# Patient Record
Sex: FEMALE | Race: WHITE | Employment: OTHER | URBAN - METROPOLITAN AREA
[De-identification: names, ages, dates, MRNs, and addresses within clinical notes are randomized per-mention and may not be internally consistent; named-entity substitution may affect disease eponyms.]

---

## 2017-01-02 PROBLEM — J96.21 ACUTE ON CHRONIC RESPIRATORY FAILURE WITH HYPOXIA (HCC): Status: ACTIVE | Noted: 2017-01-02

## 2017-01-06 ENCOUNTER — CARE COORDINATION (OUTPATIENT)
Dept: CARE COORDINATION | Age: 77
End: 2017-01-06

## 2017-01-18 ENCOUNTER — NURSE ONLY (OUTPATIENT)
Dept: FAMILY MEDICINE CLINIC | Age: 77
End: 2017-01-18

## 2017-01-18 ENCOUNTER — OFFICE VISIT (OUTPATIENT)
Dept: PULMONOLOGY | Age: 77
End: 2017-01-18

## 2017-01-18 ENCOUNTER — ANTI-COAG VISIT (OUTPATIENT)
Dept: FAMILY MEDICINE CLINIC | Age: 77
End: 2017-01-18

## 2017-01-18 VITALS
SYSTOLIC BLOOD PRESSURE: 122 MMHG | HEIGHT: 62 IN | WEIGHT: 268 LBS | OXYGEN SATURATION: 96 % | RESPIRATION RATE: 22 BRPM | HEART RATE: 104 BPM | TEMPERATURE: 98.6 F | DIASTOLIC BLOOD PRESSURE: 60 MMHG | BODY MASS INDEX: 49.32 KG/M2

## 2017-01-18 DIAGNOSIS — J43.2 CENTRILOBULAR EMPHYSEMA (HCC): Primary | ICD-10-CM

## 2017-01-18 DIAGNOSIS — I26.99 PULMONARY EMBOLISM (HCC): ICD-10-CM

## 2017-01-18 DIAGNOSIS — J96.11 CHRONIC RESPIRATORY FAILURE WITH HYPOXIA (HCC): ICD-10-CM

## 2017-01-18 DIAGNOSIS — R60.0 BILATERAL EDEMA OF LOWER EXTREMITY: ICD-10-CM

## 2017-01-18 LAB
INTERNATIONAL NORMALIZATION RATIO, POC: 3.6
PROTHROMBIN TIME, POC: NORMAL

## 2017-01-18 PROCEDURE — 85610 PROTHROMBIN TIME: CPT | Performed by: FAMILY MEDICINE

## 2017-01-18 PROCEDURE — G8926 SPIRO NO PERF OR DOC: HCPCS | Performed by: INTERNAL MEDICINE

## 2017-01-18 PROCEDURE — 4040F PNEUMOC VAC/ADMIN/RCVD: CPT | Performed by: INTERNAL MEDICINE

## 2017-01-18 PROCEDURE — G8400 PT W/DXA NO RESULTS DOC: HCPCS | Performed by: INTERNAL MEDICINE

## 2017-01-18 PROCEDURE — 1036F TOBACCO NON-USER: CPT | Performed by: INTERNAL MEDICINE

## 2017-01-18 PROCEDURE — 1090F PRES/ABSN URINE INCON ASSESS: CPT | Performed by: INTERNAL MEDICINE

## 2017-01-18 PROCEDURE — 3014F SCREEN MAMMO DOC REV: CPT | Performed by: INTERNAL MEDICINE

## 2017-01-18 PROCEDURE — 1123F ACP DISCUSS/DSCN MKR DOCD: CPT | Performed by: INTERNAL MEDICINE

## 2017-01-18 PROCEDURE — G8427 DOCREV CUR MEDS BY ELIG CLIN: HCPCS | Performed by: INTERNAL MEDICINE

## 2017-01-18 PROCEDURE — 1111F DSCHRG MED/CURRENT MED MERGE: CPT | Performed by: INTERNAL MEDICINE

## 2017-01-18 PROCEDURE — 3023F SPIROM DOC REV: CPT | Performed by: INTERNAL MEDICINE

## 2017-01-18 PROCEDURE — 99214 OFFICE O/P EST MOD 30 MIN: CPT | Performed by: INTERNAL MEDICINE

## 2017-01-18 PROCEDURE — 3017F COLORECTAL CA SCREEN DOC REV: CPT | Performed by: INTERNAL MEDICINE

## 2017-01-18 PROCEDURE — G8419 CALC BMI OUT NRM PARAM NOF/U: HCPCS | Performed by: INTERNAL MEDICINE

## 2017-01-18 PROCEDURE — G8484 FLU IMMUNIZE NO ADMIN: HCPCS | Performed by: INTERNAL MEDICINE

## 2017-01-19 ENCOUNTER — NURSE ONLY (OUTPATIENT)
Dept: FAMILY MEDICINE CLINIC | Age: 77
End: 2017-01-19

## 2017-01-19 DIAGNOSIS — I26.99 PULMONARY EMBOLISM (HCC): ICD-10-CM

## 2017-01-19 LAB
INTERNATIONAL NORMALIZATION RATIO, POC: 2.7
PROTHROMBIN TIME, POC: NORMAL

## 2017-01-19 PROCEDURE — 85610 PROTHROMBIN TIME: CPT | Performed by: NURSE PRACTITIONER

## 2017-01-26 ENCOUNTER — NURSE ONLY (OUTPATIENT)
Dept: FAMILY MEDICINE CLINIC | Age: 77
End: 2017-01-26

## 2017-01-26 DIAGNOSIS — I26.99 PULMONARY EMBOLISM (HCC): ICD-10-CM

## 2017-01-26 LAB
INTERNATIONAL NORMALIZATION RATIO, POC: 1.7
PROTHROMBIN TIME, POC: NORMAL

## 2017-01-26 PROCEDURE — 85610 PROTHROMBIN TIME: CPT | Performed by: NURSE PRACTITIONER

## 2017-01-30 ENCOUNTER — NURSE ONLY (OUTPATIENT)
Dept: FAMILY MEDICINE CLINIC | Age: 77
End: 2017-01-30

## 2017-01-30 DIAGNOSIS — I26.99 PULMONARY EMBOLISM (HCC): ICD-10-CM

## 2017-01-30 LAB
INTERNATIONAL NORMALIZATION RATIO, POC: 2.3
PROTHROMBIN TIME, POC: NORMAL

## 2017-01-30 PROCEDURE — 85610 PROTHROMBIN TIME: CPT | Performed by: NURSE PRACTITIONER

## 2017-02-14 ENCOUNTER — NURSE ONLY (OUTPATIENT)
Dept: FAMILY MEDICINE CLINIC | Age: 77
End: 2017-02-14

## 2017-02-14 ENCOUNTER — ANTI-COAG VISIT (OUTPATIENT)
Dept: FAMILY MEDICINE CLINIC | Age: 77
End: 2017-02-14

## 2017-02-14 DIAGNOSIS — I26.99 PULMONARY EMBOLISM (HCC): ICD-10-CM

## 2017-02-14 LAB
INTERNATIONAL NORMALIZATION RATIO, POC: 2.8
PROTHROMBIN TIME, POC: NORMAL

## 2017-02-14 PROCEDURE — 85610 PROTHROMBIN TIME: CPT | Performed by: NURSE PRACTITIONER

## 2017-03-15 ENCOUNTER — NURSE ONLY (OUTPATIENT)
Dept: FAMILY MEDICINE CLINIC | Age: 77
End: 2017-03-15

## 2017-03-15 DIAGNOSIS — I26.99 PULMONARY EMBOLISM (HCC): ICD-10-CM

## 2017-03-15 LAB
INTERNATIONAL NORMALIZATION RATIO, POC: 1.9
PROTHROMBIN TIME, POC: NORMAL

## 2017-03-15 PROCEDURE — 85610 PROTHROMBIN TIME: CPT | Performed by: NURSE PRACTITIONER

## 2017-04-19 ENCOUNTER — OFFICE VISIT (OUTPATIENT)
Dept: PULMONOLOGY | Age: 77
End: 2017-04-19

## 2017-04-19 VITALS
HEART RATE: 87 BPM | WEIGHT: 264 LBS | OXYGEN SATURATION: 92 % | SYSTOLIC BLOOD PRESSURE: 128 MMHG | RESPIRATION RATE: 20 BRPM | DIASTOLIC BLOOD PRESSURE: 68 MMHG | TEMPERATURE: 98 F | HEIGHT: 62 IN | BODY MASS INDEX: 48.58 KG/M2

## 2017-04-19 DIAGNOSIS — E66.01 MORBID OBESITY DUE TO EXCESS CALORIES (HCC): ICD-10-CM

## 2017-04-19 DIAGNOSIS — J44.9 CHRONIC OBSTRUCTIVE PULMONARY DISEASE, UNSPECIFIED COPD TYPE (HCC): Primary | ICD-10-CM

## 2017-04-19 DIAGNOSIS — J96.11 CHRONIC RESPIRATORY FAILURE WITH HYPOXIA (HCC): ICD-10-CM

## 2017-04-19 PROBLEM — J96.21 ACUTE ON CHRONIC RESPIRATORY FAILURE WITH HYPOXIA (HCC): Status: RESOLVED | Noted: 2017-01-02 | Resolved: 2017-04-19

## 2017-04-19 PROCEDURE — 99214 OFFICE O/P EST MOD 30 MIN: CPT | Performed by: INTERNAL MEDICINE

## 2017-04-19 PROCEDURE — 3023F SPIROM DOC REV: CPT | Performed by: INTERNAL MEDICINE

## 2017-04-19 PROCEDURE — 1123F ACP DISCUSS/DSCN MKR DOCD: CPT | Performed by: INTERNAL MEDICINE

## 2017-04-19 PROCEDURE — G8417 CALC BMI ABV UP PARAM F/U: HCPCS | Performed by: INTERNAL MEDICINE

## 2017-04-19 PROCEDURE — G8400 PT W/DXA NO RESULTS DOC: HCPCS | Performed by: INTERNAL MEDICINE

## 2017-04-19 PROCEDURE — 1036F TOBACCO NON-USER: CPT | Performed by: INTERNAL MEDICINE

## 2017-04-19 PROCEDURE — G8926 SPIRO NO PERF OR DOC: HCPCS | Performed by: INTERNAL MEDICINE

## 2017-04-19 PROCEDURE — 3017F COLORECTAL CA SCREEN DOC REV: CPT | Performed by: INTERNAL MEDICINE

## 2017-04-19 PROCEDURE — G8427 DOCREV CUR MEDS BY ELIG CLIN: HCPCS | Performed by: INTERNAL MEDICINE

## 2017-04-19 PROCEDURE — 4040F PNEUMOC VAC/ADMIN/RCVD: CPT | Performed by: INTERNAL MEDICINE

## 2017-04-19 PROCEDURE — 3014F SCREEN MAMMO DOC REV: CPT | Performed by: INTERNAL MEDICINE

## 2017-04-19 PROCEDURE — 1090F PRES/ABSN URINE INCON ASSESS: CPT | Performed by: INTERNAL MEDICINE

## 2017-04-19 RX ORDER — ALBUTEROL SULFATE 90 UG/1
2 AEROSOL, METERED RESPIRATORY (INHALATION) EVERY 6 HOURS PRN
Qty: 8 G | Refills: 5 | Status: SHIPPED | OUTPATIENT
Start: 2017-04-19 | End: 2017-06-14

## 2017-05-01 ENCOUNTER — OFFICE VISIT (OUTPATIENT)
Dept: FAMILY MEDICINE CLINIC | Age: 77
End: 2017-05-01

## 2017-05-01 ENCOUNTER — HOSPITAL ENCOUNTER (OUTPATIENT)
Dept: OTHER | Age: 77
Discharge: HOME | End: 2017-05-01
Attending: NURSE PRACTITIONER | Admitting: NURSE PRACTITIONER

## 2017-05-01 ENCOUNTER — HOSPITAL ENCOUNTER (OUTPATIENT)
Dept: CT IMAGING | Facility: MEDICAL CENTER | Age: 77
Discharge: HOME | End: 2017-05-01
Attending: NURSE PRACTITIONER | Admitting: NURSE PRACTITIONER

## 2017-05-01 ENCOUNTER — TELEPHONE (OUTPATIENT)
Dept: FAMILY MEDICINE CLINIC | Age: 77
End: 2017-05-01

## 2017-05-01 VITALS
WEIGHT: 267.2 LBS | DIASTOLIC BLOOD PRESSURE: 60 MMHG | SYSTOLIC BLOOD PRESSURE: 118 MMHG | BODY MASS INDEX: 49.17 KG/M2 | HEART RATE: 97 BPM | HEIGHT: 62 IN | OXYGEN SATURATION: 94 %

## 2017-05-01 DIAGNOSIS — R11.2 NAUSEA AND VOMITING, INTRACTABILITY OF VOMITING NOT SPECIFIED, UNSPECIFIED VOMITING TYPE: ICD-10-CM

## 2017-05-01 DIAGNOSIS — R10.10 PAIN OF UPPER ABDOMEN: ICD-10-CM

## 2017-05-01 DIAGNOSIS — Z51.81 MONITORING FOR LONG-TERM ANTICOAGULANT USE: ICD-10-CM

## 2017-05-01 DIAGNOSIS — R10.10 UPPER ABDOMINAL PAIN: ICD-10-CM

## 2017-05-01 DIAGNOSIS — R10.32 LEFT LOWER QUADRANT PAIN: ICD-10-CM

## 2017-05-01 DIAGNOSIS — Z79.01 MONITORING FOR LONG-TERM ANTICOAGULANT USE: ICD-10-CM

## 2017-05-01 DIAGNOSIS — R10.10 PAIN OF UPPER ABDOMEN: Primary | ICD-10-CM

## 2017-05-01 DIAGNOSIS — R19.7 DIARRHEA, UNSPECIFIED TYPE: ICD-10-CM

## 2017-05-01 LAB
BILIRUBIN, POC: NORMAL
BLOOD URINE, POC: NEGATIVE
CLARITY, POC: NORMAL
COLOR, POC: NORMAL
GLUCOSE URINE, POC: NEGATIVE
INTERNATIONAL NORMALIZATION RATIO, POC: 2.7
KETONES, POC: NEGATIVE
LEUKOCYTE EST, POC: NEGATIVE
NITRITE, POC: NEGATIVE
PH, POC: 5.5
PROTEIN, POC: NEGATIVE
PROTHROMBIN TIME, POC: NORMAL
SPECIFIC GRAVITY, POC: >=1.03
UROBILINOGEN, POC: 0.2

## 2017-05-01 PROCEDURE — G8427 DOCREV CUR MEDS BY ELIG CLIN: HCPCS | Performed by: NURSE PRACTITIONER

## 2017-05-01 PROCEDURE — G8400 PT W/DXA NO RESULTS DOC: HCPCS | Performed by: NURSE PRACTITIONER

## 2017-05-01 PROCEDURE — 81002 URINALYSIS NONAUTO W/O SCOPE: CPT | Performed by: NURSE PRACTITIONER

## 2017-05-01 PROCEDURE — 4040F PNEUMOC VAC/ADMIN/RCVD: CPT | Performed by: NURSE PRACTITIONER

## 2017-05-01 PROCEDURE — 99213 OFFICE O/P EST LOW 20 MIN: CPT | Performed by: NURSE PRACTITIONER

## 2017-05-01 PROCEDURE — 1090F PRES/ABSN URINE INCON ASSESS: CPT | Performed by: NURSE PRACTITIONER

## 2017-05-01 PROCEDURE — 3014F SCREEN MAMMO DOC REV: CPT | Performed by: NURSE PRACTITIONER

## 2017-05-01 PROCEDURE — 1036F TOBACCO NON-USER: CPT | Performed by: NURSE PRACTITIONER

## 2017-05-01 PROCEDURE — 1123F ACP DISCUSS/DSCN MKR DOCD: CPT | Performed by: NURSE PRACTITIONER

## 2017-05-01 PROCEDURE — 85610 PROTHROMBIN TIME: CPT | Performed by: NURSE PRACTITIONER

## 2017-05-01 PROCEDURE — 36415 COLL VENOUS BLD VENIPUNCTURE: CPT | Performed by: NURSE PRACTITIONER

## 2017-05-01 PROCEDURE — 3017F COLORECTAL CA SCREEN DOC REV: CPT | Performed by: NURSE PRACTITIONER

## 2017-05-01 PROCEDURE — G8417 CALC BMI ABV UP PARAM F/U: HCPCS | Performed by: NURSE PRACTITIONER

## 2017-05-01 RX ORDER — METRONIDAZOLE 500 MG/1
500 TABLET ORAL 3 TIMES DAILY
Qty: 30 TABLET | Refills: 0 | Status: SHIPPED | OUTPATIENT
Start: 2017-05-01 | End: 2017-05-11

## 2017-05-01 RX ORDER — SULFAMETHOXAZOLE AND TRIMETHOPRIM 800; 160 MG/1; MG/1
1 TABLET ORAL 2 TIMES DAILY
Qty: 20 TABLET | Refills: 0 | Status: SHIPPED | OUTPATIENT
Start: 2017-05-01 | End: 2017-05-11

## 2017-05-01 ASSESSMENT — ENCOUNTER SYMPTOMS
CONSTIPATION: 0
BLOATING: 1
COUGH: 1
DIARRHEA: 1
EYES NEGATIVE: 1
FLATUS: 1
NAUSEA: 1
ABDOMINAL PAIN: 1
VOMITING: 1
BELCHING: 1

## 2017-05-01 ASSESSMENT — CROHNS DISEASE ACTIVITY INDEX (CDAI): CDAI SCORE: 0

## 2017-05-02 LAB
A/G RATIO: 1.3 (ref 1.1–2.2)
ALBUMIN SERPL-MCNC: 4.1 G/DL (ref 3.4–5)
ALP BLD-CCNC: 98 U/L (ref 40–129)
ALT SERPL-CCNC: 10 U/L (ref 10–40)
ANION GAP SERPL CALCULATED.3IONS-SCNC: 13 MMOL/L (ref 3–16)
AST SERPL-CCNC: 12 U/L (ref 15–37)
BASOPHILS ABSOLUTE: 0.1 K/UL (ref 0–0.2)
BASOPHILS RELATIVE PERCENT: 0.8 %
BILIRUB SERPL-MCNC: 0.3 MG/DL (ref 0–1)
BUN BLDV-MCNC: 14 MG/DL (ref 7–20)
C DIFFICILE TOXIN, EIA: NORMAL
CALCIUM SERPL-MCNC: 9.2 MG/DL (ref 8.3–10.6)
CHLORIDE BLD-SCNC: 99 MMOL/L (ref 99–110)
CO2: 30 MMOL/L (ref 21–32)
CREAT SERPL-MCNC: 0.5 MG/DL (ref 0.6–1.2)
EOSINOPHILS ABSOLUTE: 0.2 K/UL (ref 0–0.6)
EOSINOPHILS RELATIVE PERCENT: 2.4 %
GFR AFRICAN AMERICAN: >60
GFR NON-AFRICAN AMERICAN: >60
GLOBULIN: 3.2 G/DL
GLUCOSE BLD-MCNC: 116 MG/DL (ref 70–99)
HCT VFR BLD CALC: 46.8 % (ref 36–48)
HEMOGLOBIN: 14.4 G/DL (ref 12–16)
LIPASE: 27 U/L (ref 13–60)
LYMPHOCYTES ABSOLUTE: 2.4 K/UL (ref 1–5.1)
LYMPHOCYTES RELATIVE PERCENT: 28.9 %
MCH RBC QN AUTO: 27 PG (ref 26–34)
MCHC RBC AUTO-ENTMCNC: 30.8 G/DL (ref 31–36)
MCV RBC AUTO: 87.7 FL (ref 80–100)
MONOCYTES ABSOLUTE: 0.8 K/UL (ref 0–1.3)
MONOCYTES RELATIVE PERCENT: 9.6 %
NEUTROPHILS ABSOLUTE: 4.8 K/UL (ref 1.7–7.7)
NEUTROPHILS RELATIVE PERCENT: 58.3 %
PDW BLD-RTO: 16.9 % (ref 12.4–15.4)
PLATELET # BLD: 325 K/UL (ref 135–450)
PMV BLD AUTO: 7.6 FL (ref 5–10.5)
POTASSIUM SERPL-SCNC: 4.9 MMOL/L (ref 3.5–5.1)
RBC # BLD: 5.34 M/UL (ref 4–5.2)
SODIUM BLD-SCNC: 142 MMOL/L (ref 136–145)
TOTAL PROTEIN: 7.3 G/DL (ref 6.4–8.2)
WBC # BLD: 8.2 K/UL (ref 4–11)

## 2017-05-03 ENCOUNTER — HOSPITAL ENCOUNTER (OUTPATIENT)
Dept: OTHER | Age: 77
Discharge: HOME | End: 2017-05-03
Attending: NURSE PRACTITIONER | Admitting: NURSE PRACTITIONER

## 2017-05-03 ENCOUNTER — OFFICE VISIT (OUTPATIENT)
Dept: FAMILY MEDICINE CLINIC | Age: 77
End: 2017-05-03

## 2017-05-03 VITALS
OXYGEN SATURATION: 92 % | BODY MASS INDEX: 48.99 KG/M2 | WEIGHT: 266.2 LBS | DIASTOLIC BLOOD PRESSURE: 64 MMHG | HEART RATE: 92 BPM | HEIGHT: 62 IN | SYSTOLIC BLOOD PRESSURE: 130 MMHG | TEMPERATURE: 97.7 F

## 2017-05-03 DIAGNOSIS — K57.32 DIVERTICULITIS OF LARGE INTESTINE WITHOUT PERFORATION OR ABSCESS WITHOUT BLEEDING: Primary | ICD-10-CM

## 2017-05-03 DIAGNOSIS — K59.00 CONSTIPATION, UNSPECIFIED CONSTIPATION TYPE: ICD-10-CM

## 2017-05-03 DIAGNOSIS — Z51.81 MONITORING FOR ANTICOAGULANT USE: ICD-10-CM

## 2017-05-03 DIAGNOSIS — Z79.01 MONITORING FOR ANTICOAGULANT USE: ICD-10-CM

## 2017-05-03 LAB
CRYPTOSPORIDIUM ANTIGEN STOOL: NORMAL
E HISTOLYTICA ANTIGEN STOOL: NORMAL
GI BACTERIAL PATHOGENS BY PCR: NORMAL
GIARDIA ANTIGEN STOOL: NORMAL
INTERNATIONAL NORMALIZATION RATIO, POC: 3.1
PROTHROMBIN TIME, POC: NORMAL

## 2017-05-03 PROCEDURE — G8400 PT W/DXA NO RESULTS DOC: HCPCS | Performed by: NURSE PRACTITIONER

## 2017-05-03 PROCEDURE — 99213 OFFICE O/P EST LOW 20 MIN: CPT | Performed by: NURSE PRACTITIONER

## 2017-05-03 PROCEDURE — 3017F COLORECTAL CA SCREEN DOC REV: CPT | Performed by: NURSE PRACTITIONER

## 2017-05-03 PROCEDURE — 1123F ACP DISCUSS/DSCN MKR DOCD: CPT | Performed by: NURSE PRACTITIONER

## 2017-05-03 PROCEDURE — 1090F PRES/ABSN URINE INCON ASSESS: CPT | Performed by: NURSE PRACTITIONER

## 2017-05-03 PROCEDURE — 1036F TOBACCO NON-USER: CPT | Performed by: NURSE PRACTITIONER

## 2017-05-03 PROCEDURE — 4040F PNEUMOC VAC/ADMIN/RCVD: CPT | Performed by: NURSE PRACTITIONER

## 2017-05-03 PROCEDURE — 85610 PROTHROMBIN TIME: CPT | Performed by: NURSE PRACTITIONER

## 2017-05-03 PROCEDURE — G8417 CALC BMI ABV UP PARAM F/U: HCPCS | Performed by: NURSE PRACTITIONER

## 2017-05-03 PROCEDURE — 3014F SCREEN MAMMO DOC REV: CPT | Performed by: NURSE PRACTITIONER

## 2017-05-03 PROCEDURE — G8427 DOCREV CUR MEDS BY ELIG CLIN: HCPCS | Performed by: NURSE PRACTITIONER

## 2017-05-03 RX ORDER — POLYETHYLENE GLYCOL 3350 17 G/17G
17 POWDER, FOR SOLUTION ORAL DAILY PRN
Qty: 1 BOTTLE | Refills: 11 | Status: SHIPPED | OUTPATIENT
Start: 2017-05-03 | End: 2017-08-23 | Stop reason: ALTCHOICE

## 2017-05-05 ENCOUNTER — ANTI-COAG VISIT (OUTPATIENT)
Dept: FAMILY MEDICINE CLINIC | Age: 77
End: 2017-05-05

## 2017-05-05 ENCOUNTER — NURSE ONLY (OUTPATIENT)
Dept: FAMILY MEDICINE CLINIC | Age: 77
End: 2017-05-05

## 2017-05-05 DIAGNOSIS — I26.99 PULMONARY EMBOLISM (HCC): ICD-10-CM

## 2017-05-05 LAB
INTERNATIONAL NORMALIZATION RATIO, POC: 3
PROTHROMBIN TIME, POC: NORMAL

## 2017-05-05 PROCEDURE — 85610 PROTHROMBIN TIME: CPT | Performed by: NURSE PRACTITIONER

## 2017-05-10 ENCOUNTER — NURSE ONLY (OUTPATIENT)
Dept: FAMILY MEDICINE CLINIC | Age: 77
End: 2017-05-10

## 2017-05-10 DIAGNOSIS — I26.99 PULMONARY EMBOLISM (HCC): ICD-10-CM

## 2017-05-10 LAB
INTERNATIONAL NORMALIZATION RATIO, POC: 4
PROTHROMBIN TIME, POC: NORMAL

## 2017-05-10 PROCEDURE — 85610 PROTHROMBIN TIME: CPT | Performed by: NURSE PRACTITIONER

## 2017-05-11 ENCOUNTER — NURSE ONLY (OUTPATIENT)
Dept: FAMILY MEDICINE CLINIC | Age: 77
End: 2017-05-11

## 2017-05-11 ENCOUNTER — TELEPHONE (OUTPATIENT)
Dept: FAMILY MEDICINE CLINIC | Age: 77
End: 2017-05-11

## 2017-05-11 DIAGNOSIS — R19.7 DIARRHEA, UNSPECIFIED TYPE: Primary | ICD-10-CM

## 2017-05-11 DIAGNOSIS — I26.99 PULMONARY EMBOLISM (HCC): ICD-10-CM

## 2017-05-11 LAB
INTERNATIONAL NORMALIZATION RATIO, POC: 3.3
PROTHROMBIN TIME, POC: NORMAL

## 2017-05-11 PROCEDURE — 85610 PROTHROMBIN TIME: CPT | Performed by: NURSE PRACTITIONER

## 2017-05-12 ENCOUNTER — NURSE ONLY (OUTPATIENT)
Dept: FAMILY MEDICINE CLINIC | Age: 77
End: 2017-05-12

## 2017-05-12 DIAGNOSIS — I26.99 PULMONARY EMBOLISM (HCC): ICD-10-CM

## 2017-05-12 LAB
INTERNATIONAL NORMALIZATION RATIO, POC: 2.7
PROTHROMBIN TIME, POC: NORMAL

## 2017-05-12 PROCEDURE — 85610 PROTHROMBIN TIME: CPT | Performed by: NURSE PRACTITIONER

## 2017-05-13 ASSESSMENT — ENCOUNTER SYMPTOMS
DIARRHEA: 1
RESPIRATORY NEGATIVE: 1
EYES NEGATIVE: 1
ABDOMINAL PAIN: 1

## 2017-05-15 ENCOUNTER — NURSE ONLY (OUTPATIENT)
Dept: FAMILY MEDICINE CLINIC | Age: 77
End: 2017-05-15

## 2017-05-15 ENCOUNTER — ANTI-COAG VISIT (OUTPATIENT)
Dept: FAMILY MEDICINE CLINIC | Age: 77
End: 2017-05-15

## 2017-05-15 ENCOUNTER — TELEPHONE (OUTPATIENT)
Dept: PULMONOLOGY | Age: 77
End: 2017-05-15

## 2017-05-15 DIAGNOSIS — J96.11 CHRONIC RESPIRATORY FAILURE WITH HYPOXIA (HCC): Primary | ICD-10-CM

## 2017-05-15 DIAGNOSIS — I26.99 PULMONARY EMBOLISM (HCC): ICD-10-CM

## 2017-05-15 LAB
INTERNATIONAL NORMALIZATION RATIO, POC: 2.7
PROTHROMBIN TIME, POC: NORMAL

## 2017-05-15 PROCEDURE — 85610 PROTHROMBIN TIME: CPT | Performed by: FAMILY MEDICINE

## 2017-05-23 ENCOUNTER — NURSE ONLY (OUTPATIENT)
Dept: FAMILY MEDICINE CLINIC | Age: 77
End: 2017-05-23

## 2017-05-23 ENCOUNTER — ANTI-COAG VISIT (OUTPATIENT)
Dept: FAMILY MEDICINE CLINIC | Age: 77
End: 2017-05-23

## 2017-05-23 ENCOUNTER — HOSPITAL ENCOUNTER (OUTPATIENT)
Dept: PULMONOLOGY | Age: 77
Discharge: HOME | End: 2017-05-23
Attending: INTERNAL MEDICINE | Admitting: INTERNAL MEDICINE

## 2017-05-23 ENCOUNTER — TELEPHONE (OUTPATIENT)
Dept: FAMILY MEDICINE CLINIC | Age: 77
End: 2017-05-23

## 2017-05-23 DIAGNOSIS — I26.99 PULMONARY EMBOLISM (HCC): ICD-10-CM

## 2017-05-23 DIAGNOSIS — J96.11 CHRONIC RESPIRATORY FAILURE WITH HYPOXIA (HCC): ICD-10-CM

## 2017-05-23 LAB
INTERNATIONAL NORMALIZATION RATIO, POC: 2.1
PROTHROMBIN TIME, POC: NORMAL

## 2017-05-23 PROCEDURE — 85610 PROTHROMBIN TIME: CPT | Performed by: NURSE PRACTITIONER

## 2017-05-24 RX ORDER — CHOLESTYRAMINE 4 G/9G
1 POWDER, FOR SUSPENSION ORAL
COMMUNITY
End: 2017-08-23 | Stop reason: ALTCHOICE

## 2017-05-25 DIAGNOSIS — J44.9 CHRONIC OBSTRUCTIVE PULMONARY DISEASE, UNSPECIFIED COPD TYPE (HCC): Primary | ICD-10-CM

## 2017-05-26 ENCOUNTER — TELEPHONE (OUTPATIENT)
Dept: PULMONOLOGY | Age: 77
End: 2017-05-26

## 2017-06-02 ENCOUNTER — NURSE ONLY (OUTPATIENT)
Dept: FAMILY MEDICINE CLINIC | Age: 77
End: 2017-06-02

## 2017-06-02 DIAGNOSIS — I26.99 PULMONARY EMBOLISM (HCC): ICD-10-CM

## 2017-06-02 LAB
INTERNATIONAL NORMALIZATION RATIO, POC: 1.8
PROTHROMBIN TIME, POC: NORMAL

## 2017-06-02 PROCEDURE — 85610 PROTHROMBIN TIME: CPT | Performed by: NURSE PRACTITIONER

## 2017-06-13 ENCOUNTER — NURSE ONLY (OUTPATIENT)
Dept: FAMILY MEDICINE CLINIC | Age: 77
End: 2017-06-13

## 2017-06-13 ENCOUNTER — ANTI-COAG VISIT (OUTPATIENT)
Dept: FAMILY MEDICINE CLINIC | Age: 77
End: 2017-06-13

## 2017-06-13 DIAGNOSIS — I26.99 PULMONARY EMBOLISM (HCC): ICD-10-CM

## 2017-06-13 LAB
INTERNATIONAL NORMALIZATION RATIO, POC: 2.5
PROTHROMBIN TIME, POC: NORMAL

## 2017-06-13 PROCEDURE — 85610 PROTHROMBIN TIME: CPT | Performed by: NURSE PRACTITIONER

## 2017-06-14 ENCOUNTER — OFFICE VISIT (OUTPATIENT)
Dept: FAMILY MEDICINE CLINIC | Age: 77
End: 2017-06-14

## 2017-06-14 VITALS
HEIGHT: 62 IN | BODY MASS INDEX: 47.92 KG/M2 | HEART RATE: 88 BPM | SYSTOLIC BLOOD PRESSURE: 124 MMHG | DIASTOLIC BLOOD PRESSURE: 60 MMHG | OXYGEN SATURATION: 94 % | WEIGHT: 260.4 LBS

## 2017-06-14 DIAGNOSIS — B02.9 HERPES ZOSTER WITHOUT COMPLICATION: Primary | ICD-10-CM

## 2017-06-14 PROCEDURE — 99213 OFFICE O/P EST LOW 20 MIN: CPT | Performed by: NURSE PRACTITIONER

## 2017-06-14 PROCEDURE — 3014F SCREEN MAMMO DOC REV: CPT | Performed by: NURSE PRACTITIONER

## 2017-06-14 PROCEDURE — 1090F PRES/ABSN URINE INCON ASSESS: CPT | Performed by: NURSE PRACTITIONER

## 2017-06-14 PROCEDURE — G8400 PT W/DXA NO RESULTS DOC: HCPCS | Performed by: NURSE PRACTITIONER

## 2017-06-14 PROCEDURE — G8427 DOCREV CUR MEDS BY ELIG CLIN: HCPCS | Performed by: NURSE PRACTITIONER

## 2017-06-14 PROCEDURE — 3017F COLORECTAL CA SCREEN DOC REV: CPT | Performed by: NURSE PRACTITIONER

## 2017-06-14 PROCEDURE — 1036F TOBACCO NON-USER: CPT | Performed by: NURSE PRACTITIONER

## 2017-06-14 PROCEDURE — 4040F PNEUMOC VAC/ADMIN/RCVD: CPT | Performed by: NURSE PRACTITIONER

## 2017-06-14 PROCEDURE — 1123F ACP DISCUSS/DSCN MKR DOCD: CPT | Performed by: NURSE PRACTITIONER

## 2017-06-14 PROCEDURE — G8417 CALC BMI ABV UP PARAM F/U: HCPCS | Performed by: NURSE PRACTITIONER

## 2017-06-14 RX ORDER — ACYCLOVIR 800 MG/1
800 TABLET ORAL
Qty: 50 TABLET | Refills: 0 | Status: SHIPPED | OUTPATIENT
Start: 2017-06-14 | End: 2017-06-24

## 2017-06-14 ASSESSMENT — ENCOUNTER SYMPTOMS
GASTROINTESTINAL NEGATIVE: 1
SHORTNESS OF BREATH: 1
SORE THROAT: 0
RHINORRHEA: 0
EYE PAIN: 0
DIARRHEA: 0
COUGH: 1
VOMITING: 0
EYES NEGATIVE: 1
NAIL CHANGES: 0

## 2017-06-14 ASSESSMENT — PATIENT HEALTH QUESTIONNAIRE - PHQ9
SUM OF ALL RESPONSES TO PHQ9 QUESTIONS 1 & 2: 1
SUM OF ALL RESPONSES TO PHQ QUESTIONS 1-9: 1
2. FEELING DOWN, DEPRESSED OR HOPELESS: 0
1. LITTLE INTEREST OR PLEASURE IN DOING THINGS: 1

## 2017-06-22 ENCOUNTER — NURSE ONLY (OUTPATIENT)
Dept: FAMILY MEDICINE CLINIC | Age: 77
End: 2017-06-22

## 2017-06-22 DIAGNOSIS — I26.99 PULMONARY EMBOLISM (HCC): ICD-10-CM

## 2017-06-22 LAB
INTERNATIONAL NORMALIZATION RATIO, POC: 2.6
PROTHROMBIN TIME, POC: NORMAL

## 2017-06-22 PROCEDURE — 85610 PROTHROMBIN TIME: CPT | Performed by: NURSE PRACTITIONER

## 2017-07-14 ENCOUNTER — NURSE ONLY (OUTPATIENT)
Dept: FAMILY MEDICINE CLINIC | Age: 77
End: 2017-07-14

## 2017-07-14 DIAGNOSIS — I26.99 PULMONARY EMBOLISM (HCC): ICD-10-CM

## 2017-07-14 LAB
INTERNATIONAL NORMALIZATION RATIO, POC: 2.4
PROTHROMBIN TIME, POC: NORMAL

## 2017-07-14 PROCEDURE — 85610 PROTHROMBIN TIME: CPT | Performed by: NURSE PRACTITIONER

## 2017-08-22 RX ORDER — WARFARIN SODIUM 5 MG/1
5 TABLET ORAL DAILY
Qty: 90 TABLET | Refills: 3 | Status: SHIPPED | OUTPATIENT
Start: 2017-08-22 | End: 2018-07-16 | Stop reason: SDUPTHER

## 2017-08-23 ENCOUNTER — OFFICE VISIT (OUTPATIENT)
Dept: PULMONOLOGY | Age: 77
End: 2017-08-23

## 2017-08-23 ENCOUNTER — NURSE ONLY (OUTPATIENT)
Dept: FAMILY MEDICINE CLINIC | Age: 77
End: 2017-08-23

## 2017-08-23 VITALS
OXYGEN SATURATION: 94 % | TEMPERATURE: 97.9 F | HEART RATE: 91 BPM | DIASTOLIC BLOOD PRESSURE: 68 MMHG | RESPIRATION RATE: 18 BRPM | HEIGHT: 62 IN | SYSTOLIC BLOOD PRESSURE: 128 MMHG

## 2017-08-23 DIAGNOSIS — I26.99 PULMONARY EMBOLISM (HCC): ICD-10-CM

## 2017-08-23 DIAGNOSIS — J44.9 CHRONIC OBSTRUCTIVE PULMONARY DISEASE, UNSPECIFIED COPD TYPE (HCC): Primary | ICD-10-CM

## 2017-08-23 DIAGNOSIS — R06.02 SHORTNESS OF BREATH: ICD-10-CM

## 2017-08-23 DIAGNOSIS — J96.11 CHRONIC RESPIRATORY FAILURE WITH HYPOXIA (HCC): ICD-10-CM

## 2017-08-23 LAB
INTERNATIONAL NORMALIZATION RATIO, POC: 2.6
PROTHROMBIN TIME, POC: NORMAL

## 2017-08-23 PROCEDURE — 1123F ACP DISCUSS/DSCN MKR DOCD: CPT | Performed by: INTERNAL MEDICINE

## 2017-08-23 PROCEDURE — 3023F SPIROM DOC REV: CPT | Performed by: INTERNAL MEDICINE

## 2017-08-23 PROCEDURE — G8926 SPIRO NO PERF OR DOC: HCPCS | Performed by: INTERNAL MEDICINE

## 2017-08-23 PROCEDURE — G8427 DOCREV CUR MEDS BY ELIG CLIN: HCPCS | Performed by: INTERNAL MEDICINE

## 2017-08-23 PROCEDURE — 1036F TOBACCO NON-USER: CPT | Performed by: INTERNAL MEDICINE

## 2017-08-23 PROCEDURE — 99214 OFFICE O/P EST MOD 30 MIN: CPT | Performed by: INTERNAL MEDICINE

## 2017-08-23 PROCEDURE — 4040F PNEUMOC VAC/ADMIN/RCVD: CPT | Performed by: INTERNAL MEDICINE

## 2017-08-23 PROCEDURE — 1090F PRES/ABSN URINE INCON ASSESS: CPT | Performed by: INTERNAL MEDICINE

## 2017-08-23 PROCEDURE — 3017F COLORECTAL CA SCREEN DOC REV: CPT | Performed by: INTERNAL MEDICINE

## 2017-08-23 PROCEDURE — 85610 PROTHROMBIN TIME: CPT | Performed by: NURSE PRACTITIONER

## 2017-08-23 PROCEDURE — 3014F SCREEN MAMMO DOC REV: CPT | Performed by: INTERNAL MEDICINE

## 2017-08-23 PROCEDURE — G8400 PT W/DXA NO RESULTS DOC: HCPCS | Performed by: INTERNAL MEDICINE

## 2017-08-23 PROCEDURE — G8417 CALC BMI ABV UP PARAM F/U: HCPCS | Performed by: INTERNAL MEDICINE

## 2017-08-23 RX ORDER — ALBUTEROL SULFATE 2.5 MG/3ML
2.5 SOLUTION RESPIRATORY (INHALATION) EVERY 6 HOURS PRN
Qty: 360 ML | Refills: 3 | Status: SHIPPED | OUTPATIENT
Start: 2017-08-23 | End: 2019-04-15 | Stop reason: SDUPTHER

## 2017-08-24 ENCOUNTER — TELEPHONE (OUTPATIENT)
Dept: PULMONOLOGY | Age: 77
End: 2017-08-24

## 2017-09-22 ENCOUNTER — NURSE ONLY (OUTPATIENT)
Dept: FAMILY MEDICINE CLINIC | Age: 77
End: 2017-09-22

## 2017-09-22 DIAGNOSIS — I26.99 PULMONARY EMBOLISM (HCC): ICD-10-CM

## 2017-09-22 DIAGNOSIS — Z23 NEED FOR INFLUENZA VACCINATION: Primary | ICD-10-CM

## 2017-09-22 LAB
INTERNATIONAL NORMALIZATION RATIO, POC: 2.9
PROTHROMBIN TIME, POC: NORMAL

## 2017-09-22 PROCEDURE — 85610 PROTHROMBIN TIME: CPT | Performed by: NURSE PRACTITIONER

## 2017-09-22 PROCEDURE — G0008 ADMIN INFLUENZA VIRUS VAC: HCPCS | Performed by: NURSE PRACTITIONER

## 2017-09-22 PROCEDURE — 90688 IIV4 VACCINE SPLT 0.5 ML IM: CPT | Performed by: NURSE PRACTITIONER

## 2017-09-25 ENCOUNTER — OFFICE VISIT (OUTPATIENT)
Dept: FAMILY MEDICINE CLINIC | Age: 77
End: 2017-09-25

## 2017-09-25 ENCOUNTER — HOSPITAL ENCOUNTER (OUTPATIENT)
Dept: OTHER | Age: 77
Discharge: HOME | End: 2017-09-25
Attending: NURSE PRACTITIONER | Admitting: NURSE PRACTITIONER

## 2017-09-25 VITALS
OXYGEN SATURATION: 93 % | HEART RATE: 97 BPM | WEIGHT: 256 LBS | DIASTOLIC BLOOD PRESSURE: 60 MMHG | SYSTOLIC BLOOD PRESSURE: 120 MMHG | RESPIRATION RATE: 22 BRPM | HEIGHT: 62 IN | BODY MASS INDEX: 47.11 KG/M2

## 2017-09-25 DIAGNOSIS — M25.551 RIGHT HIP PAIN: ICD-10-CM

## 2017-09-25 DIAGNOSIS — M54.41 CHRONIC BILATERAL LOW BACK PAIN WITH BILATERAL SCIATICA: ICD-10-CM

## 2017-09-25 DIAGNOSIS — M25.552 PAIN OF BOTH HIP JOINTS: Primary | ICD-10-CM

## 2017-09-25 DIAGNOSIS — M25.551 PAIN OF BOTH HIP JOINTS: Primary | ICD-10-CM

## 2017-09-25 DIAGNOSIS — G89.29 CHRONIC BILATERAL LOW BACK PAIN WITH BILATERAL SCIATICA: ICD-10-CM

## 2017-09-25 DIAGNOSIS — M25.552 LEFT HIP PAIN: ICD-10-CM

## 2017-09-25 DIAGNOSIS — Z23 NEED FOR PROPHYLACTIC VACCINATION AGAINST DIPHTHERIA-TETANUS-PERTUSSIS (DTP): ICD-10-CM

## 2017-09-25 DIAGNOSIS — M16.0 PRIMARY OSTEOARTHRITIS OF BOTH HIPS: ICD-10-CM

## 2017-09-25 DIAGNOSIS — M17.0 PRIMARY OSTEOARTHRITIS OF BOTH KNEES: ICD-10-CM

## 2017-09-25 DIAGNOSIS — M54.42 CHRONIC BILATERAL LOW BACK PAIN WITH BILATERAL SCIATICA: ICD-10-CM

## 2017-09-25 DIAGNOSIS — Z23 NEED FOR PROPHYLACTIC VACCINATION AGAINST STREPTOCOCCUS PNEUMONIAE (PNEUMOCOCCUS): ICD-10-CM

## 2017-09-25 DIAGNOSIS — F41.9 ANXIETY: ICD-10-CM

## 2017-09-25 PROCEDURE — 1090F PRES/ABSN URINE INCON ASSESS: CPT | Performed by: NURSE PRACTITIONER

## 2017-09-25 PROCEDURE — G0009 ADMIN PNEUMOCOCCAL VACCINE: HCPCS | Performed by: NURSE PRACTITIONER

## 2017-09-25 PROCEDURE — G8427 DOCREV CUR MEDS BY ELIG CLIN: HCPCS | Performed by: NURSE PRACTITIONER

## 2017-09-25 PROCEDURE — G8400 PT W/DXA NO RESULTS DOC: HCPCS | Performed by: NURSE PRACTITIONER

## 2017-09-25 PROCEDURE — 3017F COLORECTAL CA SCREEN DOC REV: CPT | Performed by: NURSE PRACTITIONER

## 2017-09-25 PROCEDURE — G8417 CALC BMI ABV UP PARAM F/U: HCPCS | Performed by: NURSE PRACTITIONER

## 2017-09-25 PROCEDURE — 99213 OFFICE O/P EST LOW 20 MIN: CPT | Performed by: NURSE PRACTITIONER

## 2017-09-25 PROCEDURE — 4040F PNEUMOC VAC/ADMIN/RCVD: CPT | Performed by: NURSE PRACTITIONER

## 2017-09-25 PROCEDURE — 1123F ACP DISCUSS/DSCN MKR DOCD: CPT | Performed by: NURSE PRACTITIONER

## 2017-09-25 PROCEDURE — 90670 PCV13 VACCINE IM: CPT | Performed by: NURSE PRACTITIONER

## 2017-09-25 PROCEDURE — 3014F SCREEN MAMMO DOC REV: CPT | Performed by: NURSE PRACTITIONER

## 2017-09-25 PROCEDURE — 1036F TOBACCO NON-USER: CPT | Performed by: NURSE PRACTITIONER

## 2017-09-25 RX ORDER — HYDROXYZINE PAMOATE 25 MG/1
25 CAPSULE ORAL 3 TIMES DAILY PRN
Qty: 60 CAPSULE | Refills: 1 | Status: SHIPPED | OUTPATIENT
Start: 2017-09-25 | End: 2018-06-13

## 2017-09-27 ASSESSMENT — ENCOUNTER SYMPTOMS
RESPIRATORY NEGATIVE: 1
GASTROINTESTINAL NEGATIVE: 1
BACK PAIN: 1
EYES NEGATIVE: 1

## 2017-09-28 ENCOUNTER — TELEPHONE (OUTPATIENT)
Dept: FAMILY MEDICINE CLINIC | Age: 77
End: 2017-09-28

## 2017-09-29 ENCOUNTER — CLINICAL DOCUMENTATION (OUTPATIENT)
Dept: SPIRITUAL SERVICES | Age: 77
End: 2017-09-29

## 2017-10-06 ENCOUNTER — CLINICAL DOCUMENTATION (OUTPATIENT)
Dept: SPIRITUAL SERVICES | Age: 77
End: 2017-10-06

## 2017-10-06 NOTE — PROGRESS NOTES
10/6/2017  1:47pm/1347  Outpatient SCS team member telephoned patient:    Ambulatory Spiritual Care Note     Visit Type: Initial Visit   Patient Name:  Monica Borden   YOB: 1940   Age/Gender: 78 y.o. / female     Consultation Requested By: Lissett Shaikh CNP for Emotional Distress, Grief/Loss and Life Adjustment. Advanced Care Planning:   Living Will: Yes Per patient's chart  Medical Power of : Yes per patient's chart  [place] DNR: No Per patient's chart    Patient Demographics    Address: [address] [place], [place] [address]   Contact Numbers: [phone] (home) [phone] (work)    E-mail Address: [email or web address]   Support System: Children, [employer] and Family Members   MyChart Status:  Declined   Place of Restorationist: [facility]   Mobility/Ability to leave home-Do they Drive? Patient said, \"I can't get around well because of my oxygen and I use a cane. \" Patient said she can drive and goes to stores. PCP's Name: Svetlana Patel CNP   TL  Scheduled appointments:  Future Appointments  Date Time Provider Allyssa Levine   2018 11:45 AM [facility]        Subjective:      Discussed with: Patient  Patient greeted Outpatient SCS team member warmly. Patient spoke of her current health as well as discussed losses she has experienced over the past years. Patient talked about the recent loss of her son who  in July of this year. Patient was appreciative of telephone call and would like to continue receiving telephone calls from Outpatient SCS. Objective:       Approachable [x] Sad  [x]   Angry [] Shock   []   Calm [] Tearful  [x]   Happy [] Venting Emotion []    Other []      Assessment:     Emotional Distress, Grief/Loss, Life Adjustment and Spiritual Struggle. Meaning/Purpose: Patient finds meaning in relating to her family. She voices that she does ask God, \"Why\", yet does find strength in her bita. Patient enjoys FaceBook and doing word searches. Community: Patient states that she has supportive family. Patient said that she does relate to a jaime community on an infrequent basis. Hope/Peace: Patient vocalized, \"I find hope in my children, grand-children, and great-grandchild. Patient has experienced multiple losses (death within family,  Changes in health, recent move). Personal Spirituality and Practices: Prayer, Bible reading and Religious attendance are meaningful practices    Effects on Medical Care and Decision Making:  Patient voiced that she feels \"tired\"    Plan:       Outpatient SCS to follow with telephone calls offering emotional and spiritual support.       Follow-Up: Telephone call in 2-3 weeks    Length of Encounter: 30 minutes    Complexity of Encounter: High

## 2017-10-13 ENCOUNTER — NURSE ONLY (OUTPATIENT)
Dept: FAMILY MEDICINE CLINIC | Age: 77
End: 2017-10-13

## 2017-10-13 DIAGNOSIS — I26.99 PULMONARY EMBOLISM (HCC): ICD-10-CM

## 2017-10-13 LAB
INTERNATIONAL NORMALIZATION RATIO, POC: 1.9
PROTHROMBIN TIME, POC: NORMAL

## 2017-10-13 PROCEDURE — 85610 PROTHROMBIN TIME: CPT | Performed by: NURSE PRACTITIONER

## 2017-10-13 NOTE — PATIENT INSTRUCTIONS
Please have patient take 7.5 mg of coumadin tonight and then continue her normal regimen of 5mg of coumadin nightly. Please have her come in for a recheck in 1 week.

## 2017-10-22 ENCOUNTER — HOSPITAL ENCOUNTER (OUTPATIENT)
Dept: OTHER | Age: 77
Discharge: HOME | End: 2017-10-22
Attending: NURSE PRACTITIONER | Admitting: NURSE PRACTITIONER

## 2017-10-22 DIAGNOSIS — J18.9 PNEUMONIA DUE TO ORGANISM: ICD-10-CM

## 2017-10-24 ENCOUNTER — CLINICAL DOCUMENTATION (OUTPATIENT)
Dept: SPIRITUAL SERVICES | Age: 77
End: 2017-10-24

## 2017-10-24 NOTE — PROGRESS NOTES
10/24/2017  9:44am/944    Outpatient SCS team member attempted telephone call to patient. There was no answer and voice message was left encouraging patient to contact Outpatient SCS. Contact information for Outpatient SCS provided.

## 2017-10-25 ENCOUNTER — NURSE ONLY (OUTPATIENT)
Dept: FAMILY MEDICINE CLINIC | Age: 77
End: 2017-10-25

## 2017-10-25 DIAGNOSIS — I26.99 PULMONARY EMBOLISM (HCC): ICD-10-CM

## 2017-10-25 LAB
INTERNATIONAL NORMALIZATION RATIO, POC: 2.4
PROTHROMBIN TIME, POC: NORMAL

## 2017-10-25 PROCEDURE — 85610 PROTHROMBIN TIME: CPT | Performed by: NURSE PRACTITIONER

## 2017-10-25 NOTE — PROGRESS NOTES
Continue same regimen and recheck on Friday since patient on atb  There was some/mild \"fluid\" on cxr

## 2017-10-25 NOTE — PROGRESS NOTES
Pt said that she was dx with pneumonia and placed on 3 different medications. Pt said that they did inform her that it will affect her INR and to keep an eye on it. Pt did say that they found water around her lungs she does believe so she wants Nelly to check out her records to see what all is going on.

## 2017-11-02 ENCOUNTER — NURSE ONLY (OUTPATIENT)
Dept: FAMILY MEDICINE CLINIC | Age: 77
End: 2017-11-02

## 2017-11-02 DIAGNOSIS — I26.99 PULMONARY EMBOLISM (HCC): ICD-10-CM

## 2017-11-02 LAB
INTERNATIONAL NORMALIZATION RATIO, POC: 1.9
PROTHROMBIN TIME, POC: NORMAL

## 2017-11-02 PROCEDURE — 85610 PROTHROMBIN TIME: CPT | Performed by: NURSE PRACTITIONER

## 2017-11-02 NOTE — PROGRESS NOTES
Pt states that she is till taking the antibiotic that she was on for pneumonia along with the steroids.

## 2017-11-10 ENCOUNTER — CLINICAL DOCUMENTATION (OUTPATIENT)
Dept: SPIRITUAL SERVICES | Age: 77
End: 2017-11-10

## 2017-11-20 ENCOUNTER — NURSE ONLY (OUTPATIENT)
Dept: FAMILY MEDICINE CLINIC | Age: 77
End: 2017-11-20

## 2017-11-20 ENCOUNTER — ANTI-COAG VISIT (OUTPATIENT)
Dept: FAMILY MEDICINE CLINIC | Age: 77
End: 2017-11-20

## 2017-11-20 DIAGNOSIS — I26.99 PULMONARY EMBOLISM (HCC): ICD-10-CM

## 2017-11-20 LAB
INTERNATIONAL NORMALIZATION RATIO, POC: 1.7
PROTHROMBIN TIME, POC: NORMAL

## 2017-11-20 PROCEDURE — 85610 PROTHROMBIN TIME: CPT | Performed by: NURSE PRACTITIONER

## 2017-11-29 ENCOUNTER — NURSE ONLY (OUTPATIENT)
Dept: FAMILY MEDICINE CLINIC | Age: 77
End: 2017-11-29

## 2017-11-29 DIAGNOSIS — I26.99 PULMONARY EMBOLISM (HCC): ICD-10-CM

## 2017-11-29 LAB
INTERNATIONAL NORMALIZATION RATIO, POC: 2.3
PROTHROMBIN TIME, POC: NORMAL

## 2017-11-29 PROCEDURE — 85610 PROTHROMBIN TIME: CPT | Performed by: NURSE PRACTITIONER

## 2017-12-05 ENCOUNTER — CLINICAL DOCUMENTATION (OUTPATIENT)
Dept: SPIRITUAL SERVICES | Age: 77
End: 2017-12-05

## 2017-12-12 ENCOUNTER — NURSE ONLY (OUTPATIENT)
Dept: FAMILY MEDICINE CLINIC | Age: 77
End: 2017-12-12

## 2017-12-12 ENCOUNTER — ANTI-COAG VISIT (OUTPATIENT)
Dept: FAMILY MEDICINE CLINIC | Age: 77
End: 2017-12-12

## 2017-12-12 DIAGNOSIS — I26.99 PULMONARY EMBOLISM (HCC): ICD-10-CM

## 2017-12-12 LAB
INTERNATIONAL NORMALIZATION RATIO, POC: 2.8
PROTHROMBIN TIME, POC: NORMAL

## 2017-12-12 PROCEDURE — 85610 PROTHROMBIN TIME: CPT | Performed by: NURSE PRACTITIONER

## 2017-12-22 ENCOUNTER — TELEPHONE (OUTPATIENT)
Dept: FAMILY MEDICINE CLINIC | Age: 77
End: 2017-12-22

## 2018-01-04 ENCOUNTER — NURSE ONLY (OUTPATIENT)
Dept: FAMILY MEDICINE CLINIC | Age: 78
End: 2018-01-04

## 2018-01-04 DIAGNOSIS — I26.99 PULMONARY EMBOLISM (HCC): ICD-10-CM

## 2018-01-04 LAB
INTERNATIONAL NORMALIZATION RATIO, POC: 2.3
PROTHROMBIN TIME, POC: NORMAL

## 2018-01-04 PROCEDURE — 85610 PROTHROMBIN TIME: CPT | Performed by: NURSE PRACTITIONER

## 2018-01-05 ENCOUNTER — CLINICAL DOCUMENTATION (OUTPATIENT)
Dept: SPIRITUAL SERVICES | Age: 78
End: 2018-01-05

## 2018-01-11 ENCOUNTER — OFFICE VISIT (OUTPATIENT)
Dept: FAMILY MEDICINE CLINIC | Age: 78
End: 2018-01-11

## 2018-01-11 VITALS
BODY MASS INDEX: 48.15 KG/M2 | HEART RATE: 89 BPM | HEIGHT: 61 IN | WEIGHT: 255 LBS | OXYGEN SATURATION: 95 % | SYSTOLIC BLOOD PRESSURE: 118 MMHG | DIASTOLIC BLOOD PRESSURE: 64 MMHG

## 2018-01-11 DIAGNOSIS — Z12.83 SKIN CANCER SCREENING: ICD-10-CM

## 2018-01-11 DIAGNOSIS — Z13.1 DIABETES MELLITUS SCREENING: ICD-10-CM

## 2018-01-11 DIAGNOSIS — I48.0 PAROXYSMAL ATRIAL FIBRILLATION WITH RVR (HCC): Primary | ICD-10-CM

## 2018-01-11 DIAGNOSIS — Z86.39 HISTORY OF HYPERGLYCEMIA: ICD-10-CM

## 2018-01-11 DIAGNOSIS — J44.9 CHRONIC OBSTRUCTIVE PULMONARY DISEASE, UNSPECIFIED COPD TYPE (HCC): ICD-10-CM

## 2018-01-11 DIAGNOSIS — E55.9 VITAMIN D DEFICIENCY: ICD-10-CM

## 2018-01-11 DIAGNOSIS — F41.9 ANXIETY: ICD-10-CM

## 2018-01-11 DIAGNOSIS — I87.2 VENOUS (PERIPHERAL) INSUFFICIENCY: ICD-10-CM

## 2018-01-11 LAB — HBA1C MFR BLD: 6.1 %

## 2018-01-11 PROCEDURE — 3017F COLORECTAL CA SCREEN DOC REV: CPT | Performed by: NURSE PRACTITIONER

## 2018-01-11 PROCEDURE — 99213 OFFICE O/P EST LOW 20 MIN: CPT | Performed by: NURSE PRACTITIONER

## 2018-01-11 PROCEDURE — 3023F SPIROM DOC REV: CPT | Performed by: NURSE PRACTITIONER

## 2018-01-11 PROCEDURE — 1090F PRES/ABSN URINE INCON ASSESS: CPT | Performed by: NURSE PRACTITIONER

## 2018-01-11 PROCEDURE — G8484 FLU IMMUNIZE NO ADMIN: HCPCS | Performed by: NURSE PRACTITIONER

## 2018-01-11 PROCEDURE — 1123F ACP DISCUSS/DSCN MKR DOCD: CPT | Performed by: NURSE PRACTITIONER

## 2018-01-11 PROCEDURE — 4040F PNEUMOC VAC/ADMIN/RCVD: CPT | Performed by: NURSE PRACTITIONER

## 2018-01-11 PROCEDURE — 83036 HEMOGLOBIN GLYCOSYLATED A1C: CPT | Performed by: NURSE PRACTITIONER

## 2018-01-11 PROCEDURE — 1036F TOBACCO NON-USER: CPT | Performed by: NURSE PRACTITIONER

## 2018-01-11 PROCEDURE — G8926 SPIRO NO PERF OR DOC: HCPCS | Performed by: NURSE PRACTITIONER

## 2018-01-11 PROCEDURE — G8427 DOCREV CUR MEDS BY ELIG CLIN: HCPCS | Performed by: NURSE PRACTITIONER

## 2018-01-11 PROCEDURE — G8417 CALC BMI ABV UP PARAM F/U: HCPCS | Performed by: NURSE PRACTITIONER

## 2018-01-11 PROCEDURE — G8400 PT W/DXA NO RESULTS DOC: HCPCS | Performed by: NURSE PRACTITIONER

## 2018-01-11 NOTE — PROGRESS NOTES
Subjective:     Patient Name: Derrick Leon is a 78 y.o. female. Chief Complaint   Patient presents with    Check-Up       HPI     Vitamin D Deficiency  Patient with vitamin d deficiency and currently on supplement without adverse reactions. Anticoagulation  Patient is here for anticoagulation follow-up. Indication: atrial fibrillation  Bleeding Signs/Symptoms:  None  Thromboembolic Signs/Symptoms:  None    Missed Coumadin Doses:  None  Medication Changes:  no  Dietary Changes:  no  Bacterial/Viral Infection:  no    Other Concerns:  no    COPD:  Current treatment includes short-acting beta agonist inhaler. Residual symptoms: chronic dyspnea and non-productive cough. She denies any other symptoms. She requires her rescue inhaler 2 time(s) per week. O2 at 3.5 L/min continuously. Has follow up with Dr Bowen Meneses 2/21/18---sees about every 6 months    Delon Hurtado is a 78 y.o. female who presents for follow-up of Paroxysmal atrial fibrillation and PE. Onset was 3 years---6/2015 ago, and patient reports symptoms have stabilized since that time. Recently, the patient has had leg swelling. The patient denies calf pain, chest pain, dizziness, palpitations, rapid heart beat, slow heart beat and syncope. The patient has a past history of atrial fibrillation and PE. Mood Disorder:  Patient presents for follow-up of anxiety disorder and panic disorder. Current complaints include: tearfulness, difficulty concentrating and depressed at times. She denies feelings of hopelessness, feelings of worthlessness/excessive guilt, obsessive thoughts, compulsive behaviors, increased use of drugs or alcohol and suicidal thoughts or behavior. Symptoms/signs of zulay: none. External stressors: nothing new. Current treatment includes: vistaril. Medication side effects: none. Venous insufficiency  Patient does have BLE edema, worse at end of the day.  Patient admits she hasn't used lasix in heart sounds and normal pulses. Exam reveals no gallop and no friction rub. No murmur heard. Pulses:       Radial pulses are 2+ on the right side, and 2+ on the left side. Dorsalis pedis pulses are 2+ on the right side, and 2+ on the left side. Posterior tibial pulses are 2+ on the right side, and 2+ on the left side. Pulmonary/Chest: Effort normal and breath sounds normal.   Abdominal: Soft. Normal appearance and bowel sounds are normal. She exhibits no mass. There is no hepatosplenomegaly. There is no tenderness. Musculoskeletal: Normal range of motion. She exhibits edema (2+ BLE). Lymphadenopathy:        Head (right side): No submandibular adenopathy present. Head (left side): No submandibular adenopathy present. She has no cervical adenopathy. Neurological: She is alert and oriented to person, place, and time. She has normal strength. Gait normal.   Skin: Skin is warm and dry. No lesion and no rash noted. Psychiatric: She has a normal mood and affect. Her speech is normal and behavior is normal. Judgment and thought content normal. Cognition and memory are normal.   Nursing note and vitals reviewed. /64   Pulse 89   Ht 5' 1\" (1.549 m)   Wt 255 lb (115.7 kg)   LMP  (LMP Unknown)   SpO2 95%   BMI 48.18 kg/m²   Body mass index is 48.18 kg/m². BP Readings from Last 2 Encounters:   01/11/18 118/64   10/28/17 (!) 121/58       Wt Readings from Last 3 Encounters:   01/11/18 255 lb (115.7 kg)   10/28/17 256 lb 3.2 oz (116.2 kg)   09/25/17 256 lb (116.1 kg)       Lab Review   Nurse Only on 01/04/2018   Component Date Value    INR 01/04/2018 2.3    Nurse Only on 12/12/2017   Component Date Value    INR 12/12/2017 2.8    Nurse Only on 11/29/2017   Component Date Value    INR 11/29/2017 2.3    Nurse Only on 11/20/2017   Component Date Value    INR 11/20/2017 1.7        No results found for this visit on 01/11/18. Assessment:       1.  Paroxysmal atrial screening  -     POCT glycosylated hemoglobin (Hb A1C)    History of hyperglycemia  -     POCT glycosylated hemoglobin (Hb A1C)    Skin cancer screening  -     Mai Casillas MD          Patient has been instructed call the office immediately with new symptoms, change in symptoms or worsening of symptoms. If this is not feasible, patient is instructed to report to the emergency room. Medication profile reviewed. Medication side effects and possible impairments from medications were discussed as applicable. Allergies were reviewed. Health maintenance was reviewed and updated as appropriate.

## 2018-01-21 ASSESSMENT — ENCOUNTER SYMPTOMS
GASTROINTESTINAL NEGATIVE: 1
SHORTNESS OF BREATH: 1
EYES NEGATIVE: 1
COUGH: 1

## 2018-02-02 ENCOUNTER — NURSE ONLY (OUTPATIENT)
Dept: FAMILY MEDICINE CLINIC | Age: 78
End: 2018-02-02

## 2018-02-02 DIAGNOSIS — I26.99 PULMONARY EMBOLISM (HCC): ICD-10-CM

## 2018-02-02 LAB
INTERNATIONAL NORMALIZATION RATIO, POC: 1.6
PROTHROMBIN TIME, POC: NORMAL

## 2018-02-02 PROCEDURE — 85610 PROTHROMBIN TIME: CPT | Performed by: NURSE PRACTITIONER

## 2018-02-04 NOTE — PROGRESS NOTES
Why was this sent to me when I was not in office? ??  It doesn't look like it was taken care of,  Please see if that is correct

## 2018-02-05 ENCOUNTER — TELEPHONE (OUTPATIENT)
Dept: FAMILY MEDICINE CLINIC | Age: 78
End: 2018-02-05

## 2018-02-06 ENCOUNTER — NURSE ONLY (OUTPATIENT)
Dept: FAMILY MEDICINE CLINIC | Age: 78
End: 2018-02-06

## 2018-02-06 ENCOUNTER — ANTI-COAG VISIT (OUTPATIENT)
Dept: FAMILY MEDICINE CLINIC | Age: 78
End: 2018-02-06

## 2018-02-06 DIAGNOSIS — I26.99 PULMONARY EMBOLISM (HCC): ICD-10-CM

## 2018-02-06 LAB
INTERNATIONAL NORMALIZATION RATIO, POC: 2.1
PROTHROMBIN TIME, POC: NORMAL

## 2018-02-06 PROCEDURE — 85610 PROTHROMBIN TIME: CPT | Performed by: NURSE PRACTITIONER

## 2018-02-08 ENCOUNTER — CLINICAL DOCUMENTATION (OUTPATIENT)
Dept: SPIRITUAL SERVICES | Age: 78
End: 2018-02-08

## 2018-02-08 NOTE — PROGRESS NOTES
2/8/2018 3:43p/1543    Outpatient Spiritual Care Services telephoned patient, who seemed hesitant to talk. Patient said \"I'm doing fine\" and identified her family, friends and [facility] as coping resources for the loss of her son last July. Conversation ended quickly as patient didn't engage in conversation.

## 2018-02-16 ENCOUNTER — NURSE ONLY (OUTPATIENT)
Dept: FAMILY MEDICINE CLINIC | Age: 78
End: 2018-02-16

## 2018-02-16 DIAGNOSIS — I26.99 PULMONARY EMBOLISM (HCC): ICD-10-CM

## 2018-02-16 LAB
INTERNATIONAL NORMALIZATION RATIO, POC: 1.7
PROTHROMBIN TIME, POC: NORMAL

## 2018-02-16 PROCEDURE — 85610 PROTHROMBIN TIME: CPT | Performed by: NURSE PRACTITIONER

## 2018-02-21 ENCOUNTER — OFFICE VISIT (OUTPATIENT)
Dept: PULMONOLOGY | Age: 78
End: 2018-02-21

## 2018-02-21 VITALS
SYSTOLIC BLOOD PRESSURE: 116 MMHG | TEMPERATURE: 98.1 F | RESPIRATION RATE: 18 BRPM | WEIGHT: 259 LBS | HEART RATE: 96 BPM | DIASTOLIC BLOOD PRESSURE: 58 MMHG | HEIGHT: 61 IN | BODY MASS INDEX: 48.9 KG/M2 | OXYGEN SATURATION: 94 %

## 2018-02-21 DIAGNOSIS — J44.9 CHRONIC OBSTRUCTIVE PULMONARY DISEASE, UNSPECIFIED COPD TYPE (HCC): Primary | ICD-10-CM

## 2018-02-21 DIAGNOSIS — J96.11 CHRONIC RESPIRATORY FAILURE WITH HYPOXIA (HCC): ICD-10-CM

## 2018-02-21 DIAGNOSIS — E66.09 CLASS 1 OBESITY DUE TO EXCESS CALORIES WITH SERIOUS COMORBIDITY IN ADULT, UNSPECIFIED BMI: ICD-10-CM

## 2018-02-21 PROCEDURE — G8427 DOCREV CUR MEDS BY ELIG CLIN: HCPCS | Performed by: INTERNAL MEDICINE

## 2018-02-21 PROCEDURE — 1090F PRES/ABSN URINE INCON ASSESS: CPT | Performed by: INTERNAL MEDICINE

## 2018-02-21 PROCEDURE — 3017F COLORECTAL CA SCREEN DOC REV: CPT | Performed by: INTERNAL MEDICINE

## 2018-02-21 PROCEDURE — 1036F TOBACCO NON-USER: CPT | Performed by: INTERNAL MEDICINE

## 2018-02-21 PROCEDURE — G8484 FLU IMMUNIZE NO ADMIN: HCPCS | Performed by: INTERNAL MEDICINE

## 2018-02-21 PROCEDURE — G8417 CALC BMI ABV UP PARAM F/U: HCPCS | Performed by: INTERNAL MEDICINE

## 2018-02-21 PROCEDURE — 99214 OFFICE O/P EST MOD 30 MIN: CPT | Performed by: INTERNAL MEDICINE

## 2018-02-21 PROCEDURE — 3023F SPIROM DOC REV: CPT | Performed by: INTERNAL MEDICINE

## 2018-02-21 PROCEDURE — 4040F PNEUMOC VAC/ADMIN/RCVD: CPT | Performed by: INTERNAL MEDICINE

## 2018-02-21 PROCEDURE — G8400 PT W/DXA NO RESULTS DOC: HCPCS | Performed by: INTERNAL MEDICINE

## 2018-02-21 PROCEDURE — 1123F ACP DISCUSS/DSCN MKR DOCD: CPT | Performed by: INTERNAL MEDICINE

## 2018-02-21 PROCEDURE — G8926 SPIRO NO PERF OR DOC: HCPCS | Performed by: INTERNAL MEDICINE

## 2018-02-21 RX ORDER — ALBUTEROL SULFATE 2.5 MG/3ML
2.5 SOLUTION RESPIRATORY (INHALATION) EVERY 6 HOURS PRN
Qty: 360 ML | Refills: 5 | Status: CANCELLED | OUTPATIENT
Start: 2018-02-21

## 2018-02-21 NOTE — PROGRESS NOTES
[facility]    Outpatient Follow Up Note    CHIEF COMPLAINT : Dyspnea    HPI:  The patient is a 79 yo woman with hx of tobacco use, COPD and ILD, PE in 6/15. She has a 50 pack year smoking history. Was in [facility] in 6/15 for bilateral pulmonary emboli. Since last clinic visit, the patient has been doing ok. She states she has gained some weight from not being able to exercise. Shortness of breath remains severe. She is wearing her auction around-the-clock. There are no changes to past medical history, family history, social history or review of systems(except as noted in the history section) since prior note (all reviewed with patient). Current Medications:    Current Outpatient Prescriptions:     furosemide (LASIX) 40 MG tablet, Take 0.5 tablets by mouth daily, Disp: 30 tablet, Rfl: 0    Lift Chair MISC, by Does not apply route, Disp: 1 each, Rfl: 0    hydrOXYzine (VISTARIL) 25 MG capsule, Take 1 capsule by mouth 3 times daily as needed for Anxiety, Disp: 60 capsule, Rfl: 1    warfarin (COUMADIN) 5 MG tablet, Take 1 tablet by mouth daily Indications: mon tues thurs sat sunday 5 mg and wednesdays 2.5 mg, Disp: 90 tablet, Rfl: 3    melatonin 3 MG TABS tablet, Take 1 tablet by mouth nightly as needed (insomnia), Disp: 15 tablet, Rfl: 0    OXYGEN, 4.5 L, Disp: , Rfl:     aspirin 81 MG chewable tablet, Take 1 tablet by mouth daily. , Disp: 30 tablet, Rfl: 11    Cholecalciferol (VITAMIN D-3 PO), Take  by mouth 2 times daily. , Disp: , Rfl:     albuterol (PROVENTIL) (2.5 MG/3ML) 0.083% nebulizer solution, Take 3 mLs by nebulization every 6 hours as needed for Wheezing or Shortness of Breath DX COPD J44.9, Disp: 360 mL, Rfl: 3        Objective:   PHYSICAL EXAM:        VITALS:    BP (!) 116/58   Pulse 96   Temp 98.1 °F (36.7 °C) (Oral)   Resp 18   Ht 5' 1\" (1.549 m)   Wt 259 lb (117.5 kg)   LMP  (LMP Unknown)   SpO2 94% Comment: 3L O2  BMI 48.94 kg/m²     Gen:  In

## 2018-02-23 ENCOUNTER — NURSE ONLY (OUTPATIENT)
Dept: FAMILY MEDICINE CLINIC | Age: 78
End: 2018-02-23

## 2018-02-23 ENCOUNTER — ANTI-COAG VISIT (OUTPATIENT)
Dept: FAMILY MEDICINE CLINIC | Age: 78
End: 2018-02-23

## 2018-02-23 DIAGNOSIS — I26.99 PULMONARY EMBOLISM (HCC): ICD-10-CM

## 2018-02-23 LAB
INTERNATIONAL NORMALIZATION RATIO, POC: 2.2
PROTHROMBIN TIME, POC: NORMAL

## 2018-02-23 PROCEDURE — 85610 PROTHROMBIN TIME: CPT | Performed by: NURSE PRACTITIONER

## 2018-03-02 ENCOUNTER — NURSE ONLY (OUTPATIENT)
Dept: FAMILY MEDICINE CLINIC | Age: 78
End: 2018-03-02

## 2018-03-02 DIAGNOSIS — I26.99 PULMONARY EMBOLISM (HCC): ICD-10-CM

## 2018-03-02 LAB
INTERNATIONAL NORMALIZATION RATIO, POC: 2.5
PROTHROMBIN TIME, POC: NORMAL

## 2018-03-02 PROCEDURE — 85610 PROTHROMBIN TIME: CPT | Performed by: NURSE PRACTITIONER

## 2018-03-12 ENCOUNTER — NURSE ONLY (OUTPATIENT)
Dept: FAMILY MEDICINE CLINIC | Age: 78
End: 2018-03-12

## 2018-03-12 ENCOUNTER — ANTI-COAG VISIT (OUTPATIENT)
Dept: FAMILY MEDICINE CLINIC | Age: 78
End: 2018-03-12

## 2018-03-12 DIAGNOSIS — I26.99 PULMONARY EMBOLISM (HCC): ICD-10-CM

## 2018-03-12 LAB
INTERNATIONAL NORMALIZATION RATIO, POC: 2.7
PROTHROMBIN TIME, POC: NORMAL

## 2018-03-12 PROCEDURE — 85610 PROTHROMBIN TIME: CPT | Performed by: NURSE PRACTITIONER

## 2018-04-06 ENCOUNTER — NURSE ONLY (OUTPATIENT)
Dept: FAMILY MEDICINE CLINIC | Age: 78
End: 2018-04-06

## 2018-04-06 DIAGNOSIS — I26.99 PULMONARY EMBOLISM (HCC): ICD-10-CM

## 2018-04-06 LAB
INTERNATIONAL NORMALIZATION RATIO, POC: 2.3
PROTHROMBIN TIME, POC: NORMAL

## 2018-04-06 PROCEDURE — 85610 PROTHROMBIN TIME: CPT | Performed by: NURSE PRACTITIONER

## 2018-05-02 ENCOUNTER — OFFICE VISIT (OUTPATIENT)
Dept: FAMILY MEDICINE CLINIC | Age: 78
End: 2018-05-02

## 2018-05-02 VITALS
BODY MASS INDEX: 48.33 KG/M2 | HEIGHT: 61 IN | HEART RATE: 90 BPM | SYSTOLIC BLOOD PRESSURE: 112 MMHG | WEIGHT: 256 LBS | OXYGEN SATURATION: 92 % | DIASTOLIC BLOOD PRESSURE: 64 MMHG

## 2018-05-02 DIAGNOSIS — F41.8 DEPRESSION WITH ANXIETY: Primary | ICD-10-CM

## 2018-05-02 DIAGNOSIS — Z23 NEED FOR SHINGLES VACCINE: ICD-10-CM

## 2018-05-02 DIAGNOSIS — Z13.31 POSITIVE DEPRESSION SCREENING: ICD-10-CM

## 2018-05-02 DIAGNOSIS — Z23 NEED FOR DIPHTHERIA-TETANUS-PERTUSSIS (TDAP) VACCINE: ICD-10-CM

## 2018-05-02 DIAGNOSIS — F51.01 PRIMARY INSOMNIA: ICD-10-CM

## 2018-05-02 DIAGNOSIS — Z79.01 ANTICOAGULANT LONG-TERM USE: ICD-10-CM

## 2018-05-02 LAB
INTERNATIONAL NORMALIZATION RATIO, POC: 2.4
PROTHROMBIN TIME, POC: NORMAL

## 2018-05-02 PROCEDURE — 1090F PRES/ABSN URINE INCON ASSESS: CPT | Performed by: NURSE PRACTITIONER

## 2018-05-02 PROCEDURE — 4040F PNEUMOC VAC/ADMIN/RCVD: CPT | Performed by: NURSE PRACTITIONER

## 2018-05-02 PROCEDURE — G8417 CALC BMI ABV UP PARAM F/U: HCPCS | Performed by: NURSE PRACTITIONER

## 2018-05-02 PROCEDURE — 3017F COLORECTAL CA SCREEN DOC REV: CPT | Performed by: NURSE PRACTITIONER

## 2018-05-02 PROCEDURE — G8431 POS CLIN DEPRES SCRN F/U DOC: HCPCS | Performed by: NURSE PRACTITIONER

## 2018-05-02 PROCEDURE — 85610 PROTHROMBIN TIME: CPT | Performed by: NURSE PRACTITIONER

## 2018-05-02 PROCEDURE — G8427 DOCREV CUR MEDS BY ELIG CLIN: HCPCS | Performed by: NURSE PRACTITIONER

## 2018-05-02 PROCEDURE — G0444 DEPRESSION SCREEN ANNUAL: HCPCS | Performed by: NURSE PRACTITIONER

## 2018-05-02 PROCEDURE — 1036F TOBACCO NON-USER: CPT | Performed by: NURSE PRACTITIONER

## 2018-05-02 PROCEDURE — 99213 OFFICE O/P EST LOW 20 MIN: CPT | Performed by: NURSE PRACTITIONER

## 2018-05-02 PROCEDURE — 1123F ACP DISCUSS/DSCN MKR DOCD: CPT | Performed by: NURSE PRACTITIONER

## 2018-05-02 PROCEDURE — G8400 PT W/DXA NO RESULTS DOC: HCPCS | Performed by: NURSE PRACTITIONER

## 2018-05-02 RX ORDER — TRAZODONE HYDROCHLORIDE 50 MG/1
50 TABLET ORAL NIGHTLY
Qty: 30 TABLET | Refills: 1 | Status: SHIPPED | OUTPATIENT
Start: 2018-05-02 | End: 2020-10-23 | Stop reason: SDUPTHER

## 2018-05-02 ASSESSMENT — ENCOUNTER SYMPTOMS
RESPIRATORY NEGATIVE: 1
GASTROINTESTINAL NEGATIVE: 1
EYES NEGATIVE: 1

## 2018-05-02 ASSESSMENT — PATIENT HEALTH QUESTIONNAIRE - PHQ9
SUM OF ALL RESPONSES TO PHQ QUESTIONS 1-9: 16
7. TROUBLE CONCENTRATING ON THINGS, SUCH AS READING THE NEWSPAPER OR WATCHING TELEVISION: 1
1. LITTLE INTEREST OR PLEASURE IN DOING THINGS: 2
5. POOR APPETITE OR OVEREATING: 3
4. FEELING TIRED OR HAVING LITTLE ENERGY: 3
9. THOUGHTS THAT YOU WOULD BE BETTER OFF DEAD, OR OF HURTING YOURSELF: 0
8. MOVING OR SPEAKING SO SLOWLY THAT OTHER PEOPLE COULD HAVE NOTICED. OR THE OPPOSITE, BEING SO FIGETY OR RESTLESS THAT YOU HAVE BEEN MOVING AROUND A LOT MORE THAN USUAL: 0
6. FEELING BAD ABOUT YOURSELF - OR THAT YOU ARE A FAILURE OR HAVE LET YOURSELF OR YOUR FAMILY DOWN: 2
2. FEELING DOWN, DEPRESSED OR HOPELESS: 2
3. TROUBLE FALLING OR STAYING ASLEEP: 3
10. IF YOU CHECKED OFF ANY PROBLEMS, HOW DIFFICULT HAVE THESE PROBLEMS MADE IT FOR YOU TO DO YOUR WORK, TAKE CARE OF THINGS AT HOME, OR GET ALONG WITH OTHER PEOPLE: 1
SUM OF ALL RESPONSES TO PHQ9 QUESTIONS 1 & 2: 4

## 2018-06-06 ENCOUNTER — TELEPHONE (OUTPATIENT)
Dept: PHARMACY | Facility: CLINIC | Age: 78
End: 2018-06-06

## 2018-06-11 DIAGNOSIS — J44.9 CHRONIC OBSTRUCTIVE PULMONARY DISEASE, UNSPECIFIED COPD TYPE (HCC): Primary | ICD-10-CM

## 2018-06-12 RX ORDER — ALBUTEROL SULFATE 90 UG/1
2 AEROSOL, METERED RESPIRATORY (INHALATION) EVERY 6 HOURS PRN
Qty: 2 INHALER | Refills: 3 | Status: SHIPPED | OUTPATIENT
Start: 2018-06-12 | End: 2020-10-28 | Stop reason: SDUPTHER

## 2018-06-13 ENCOUNTER — OFFICE VISIT (OUTPATIENT)
Dept: FAMILY MEDICINE CLINIC | Age: 78
End: 2018-06-13

## 2018-06-13 ENCOUNTER — ANTI-COAG VISIT (OUTPATIENT)
Dept: FAMILY MEDICINE CLINIC | Age: 78
End: 2018-06-13

## 2018-06-13 VITALS
DIASTOLIC BLOOD PRESSURE: 74 MMHG | HEIGHT: 61 IN | WEIGHT: 262 LBS | SYSTOLIC BLOOD PRESSURE: 122 MMHG | OXYGEN SATURATION: 92 % | HEART RATE: 99 BPM | BODY MASS INDEX: 49.47 KG/M2

## 2018-06-13 DIAGNOSIS — J44.9 CHRONIC OBSTRUCTIVE PULMONARY DISEASE, UNSPECIFIED COPD TYPE (HCC): ICD-10-CM

## 2018-06-13 DIAGNOSIS — R73.9 HYPERGLYCEMIA: ICD-10-CM

## 2018-06-13 DIAGNOSIS — R07.89 CHEST PRESSURE: Primary | ICD-10-CM

## 2018-06-13 DIAGNOSIS — I48.0 PAROXYSMAL ATRIAL FIBRILLATION WITH RVR (HCC): ICD-10-CM

## 2018-06-13 DIAGNOSIS — J96.11 CHRONIC RESPIRATORY FAILURE WITH HYPOXIA (HCC): ICD-10-CM

## 2018-06-13 DIAGNOSIS — R40.0 DAYTIME SOMNOLENCE: ICD-10-CM

## 2018-06-13 DIAGNOSIS — F41.9 ANXIETY: ICD-10-CM

## 2018-06-13 DIAGNOSIS — R53.83 FATIGUE, UNSPECIFIED TYPE: ICD-10-CM

## 2018-06-13 DIAGNOSIS — F45.8 OTHER SOMATOFORM DISORDERS: ICD-10-CM

## 2018-06-13 LAB
A/G RATIO: 1.5 (ref 1.1–2.2)
ALBUMIN SERPL-MCNC: 4 G/DL (ref 3.4–5)
ALP BLD-CCNC: 89 U/L (ref 40–129)
ALT SERPL-CCNC: 12 U/L (ref 10–40)
ANION GAP SERPL CALCULATED.3IONS-SCNC: 13 MMOL/L (ref 3–16)
AST SERPL-CCNC: 14 U/L (ref 15–37)
BASOPHILS ABSOLUTE: 0 K/UL (ref 0–0.2)
BASOPHILS RELATIVE PERCENT: 0.5 %
BILIRUB SERPL-MCNC: <0.2 MG/DL (ref 0–1)
BUN BLDV-MCNC: 11 MG/DL (ref 7–20)
CALCIUM SERPL-MCNC: 8.7 MG/DL (ref 8.3–10.6)
CHLORIDE BLD-SCNC: 100 MMOL/L (ref 99–110)
CO2: 31 MMOL/L (ref 21–32)
CREAT SERPL-MCNC: <0.5 MG/DL (ref 0.6–1.2)
EOSINOPHILS ABSOLUTE: 0.3 K/UL (ref 0–0.6)
EOSINOPHILS RELATIVE PERCENT: 4.1 %
GFR AFRICAN AMERICAN: >60
GFR NON-AFRICAN AMERICAN: >60
GLOBULIN: 2.6 G/DL
GLUCOSE BLD-MCNC: 94 MG/DL (ref 70–99)
HCT VFR BLD CALC: 38.3 % (ref 36–48)
HEMOGLOBIN: 12.6 G/DL (ref 12–16)
INTERNATIONAL NORMALIZATION RATIO, POC: 3.9
IRON SATURATION: 14 % (ref 15–50)
IRON: 48 UG/DL (ref 37–145)
LYMPHOCYTES ABSOLUTE: 2.3 K/UL (ref 1–5.1)
LYMPHOCYTES RELATIVE PERCENT: 29.5 %
MCH RBC QN AUTO: 28.6 PG (ref 26–34)
MCHC RBC AUTO-ENTMCNC: 32.8 G/DL (ref 31–36)
MCV RBC AUTO: 87.3 FL (ref 80–100)
MONOCYTES ABSOLUTE: 0.8 K/UL (ref 0–1.3)
MONOCYTES RELATIVE PERCENT: 10 %
NEUTROPHILS ABSOLUTE: 4.4 K/UL (ref 1.7–7.7)
NEUTROPHILS RELATIVE PERCENT: 55.9 %
PDW BLD-RTO: 14.1 % (ref 12.4–15.4)
PLATELET # BLD: 282 K/UL (ref 135–450)
PMV BLD AUTO: 7.9 FL (ref 5–10.5)
POTASSIUM SERPL-SCNC: 4.3 MMOL/L (ref 3.5–5.1)
PROTHROMBIN TIME, POC: NORMAL
RBC # BLD: 4.39 M/UL (ref 4–5.2)
SODIUM BLD-SCNC: 144 MMOL/L (ref 136–145)
TOTAL IRON BINDING CAPACITY: 344 UG/DL (ref 260–445)
TOTAL PROTEIN: 6.6 G/DL (ref 6.4–8.2)
WBC # BLD: 7.9 K/UL (ref 4–11)

## 2018-06-13 PROCEDURE — G8400 PT W/DXA NO RESULTS DOC: HCPCS | Performed by: NURSE PRACTITIONER

## 2018-06-13 PROCEDURE — 1036F TOBACCO NON-USER: CPT | Performed by: NURSE PRACTITIONER

## 2018-06-13 PROCEDURE — G8417 CALC BMI ABV UP PARAM F/U: HCPCS | Performed by: NURSE PRACTITIONER

## 2018-06-13 PROCEDURE — 3023F SPIROM DOC REV: CPT | Performed by: NURSE PRACTITIONER

## 2018-06-13 PROCEDURE — G8926 SPIRO NO PERF OR DOC: HCPCS | Performed by: NURSE PRACTITIONER

## 2018-06-13 PROCEDURE — G8427 DOCREV CUR MEDS BY ELIG CLIN: HCPCS | Performed by: NURSE PRACTITIONER

## 2018-06-13 PROCEDURE — 99214 OFFICE O/P EST MOD 30 MIN: CPT | Performed by: NURSE PRACTITIONER

## 2018-06-13 PROCEDURE — 93000 ELECTROCARDIOGRAM COMPLETE: CPT | Performed by: NURSE PRACTITIONER

## 2018-06-13 PROCEDURE — 1090F PRES/ABSN URINE INCON ASSESS: CPT | Performed by: NURSE PRACTITIONER

## 2018-06-13 PROCEDURE — 3017F COLORECTAL CA SCREEN DOC REV: CPT | Performed by: NURSE PRACTITIONER

## 2018-06-13 PROCEDURE — 85610 PROTHROMBIN TIME: CPT | Performed by: NURSE PRACTITIONER

## 2018-06-13 PROCEDURE — 4040F PNEUMOC VAC/ADMIN/RCVD: CPT | Performed by: NURSE PRACTITIONER

## 2018-06-13 PROCEDURE — 36415 COLL VENOUS BLD VENIPUNCTURE: CPT | Performed by: NURSE PRACTITIONER

## 2018-06-13 PROCEDURE — 1123F ACP DISCUSS/DSCN MKR DOCD: CPT | Performed by: NURSE PRACTITIONER

## 2018-06-13 RX ORDER — HYDROXYZINE PAMOATE 25 MG/1
25 CAPSULE ORAL 3 TIMES DAILY PRN
Qty: 60 CAPSULE | Refills: 1 | Status: SHIPPED | OUTPATIENT
Start: 2018-06-13 | End: 2018-11-16

## 2018-06-13 RX ORDER — BUSPIRONE HYDROCHLORIDE 10 MG/1
10 TABLET ORAL 3 TIMES DAILY
COMMUNITY
End: 2018-06-13 | Stop reason: ALTCHOICE

## 2018-06-13 ASSESSMENT — PATIENT HEALTH QUESTIONNAIRE - PHQ9
SUM OF ALL RESPONSES TO PHQ QUESTIONS 1-9: 9
9. THOUGHTS THAT YOU WOULD BE BETTER OFF DEAD, OR OF HURTING YOURSELF: 0
7. TROUBLE CONCENTRATING ON THINGS, SUCH AS READING THE NEWSPAPER OR WATCHING TELEVISION: 0
10. IF YOU CHECKED OFF ANY PROBLEMS, HOW DIFFICULT HAVE THESE PROBLEMS MADE IT FOR YOU TO DO YOUR WORK, TAKE CARE OF THINGS AT HOME, OR GET ALONG WITH OTHER PEOPLE: 1
2. FEELING DOWN, DEPRESSED OR HOPELESS: 1
4. FEELING TIRED OR HAVING LITTLE ENERGY: 3
3. TROUBLE FALLING OR STAYING ASLEEP: 1
8. MOVING OR SPEAKING SO SLOWLY THAT OTHER PEOPLE COULD HAVE NOTICED. OR THE OPPOSITE, BEING SO FIGETY OR RESTLESS THAT YOU HAVE BEEN MOVING AROUND A LOT MORE THAN USUAL: 0
1. LITTLE INTEREST OR PLEASURE IN DOING THINGS: 1
5. POOR APPETITE OR OVEREATING: 2
6. FEELING BAD ABOUT YOURSELF - OR THAT YOU ARE A FAILURE OR HAVE LET YOURSELF OR YOUR FAMILY DOWN: 1
SUM OF ALL RESPONSES TO PHQ9 QUESTIONS 1 & 2: 2

## 2018-06-13 ASSESSMENT — ENCOUNTER SYMPTOMS
SHORTNESS OF BREATH: 1
GASTROINTESTINAL NEGATIVE: 1
EYES NEGATIVE: 1

## 2018-06-14 LAB
ESTIMATED AVERAGE GLUCOSE: 134.1 MG/DL
FERRITIN: 42.1 NG/ML (ref 15–150)
FOLATE: 6.41 NG/ML (ref 4.78–24.2)
HBA1C MFR BLD: 6.3 %
TSH SERPL DL<=0.05 MIU/L-ACNC: 2.21 UIU/ML (ref 0.27–4.2)
VITAMIN B-12: 541 PG/ML (ref 211–911)

## 2018-06-15 ENCOUNTER — NURSE ONLY (OUTPATIENT)
Dept: FAMILY MEDICINE CLINIC | Age: 78
End: 2018-06-15

## 2018-06-15 DIAGNOSIS — I26.99 PULMONARY EMBOLISM (HCC): ICD-10-CM

## 2018-06-15 LAB
INTERNATIONAL NORMALIZATION RATIO, POC: 2.1
PROTHROMBIN TIME, POC: NORMAL

## 2018-06-15 PROCEDURE — 85610 PROTHROMBIN TIME: CPT | Performed by: NURSE PRACTITIONER

## 2018-06-19 ENCOUNTER — NURSE ONLY (OUTPATIENT)
Dept: FAMILY MEDICINE CLINIC | Age: 78
End: 2018-06-19

## 2018-06-19 DIAGNOSIS — I26.99 PULMONARY EMBOLISM (HCC): ICD-10-CM

## 2018-06-19 LAB
INTERNATIONAL NORMALIZATION RATIO, POC: 1.4
PROTHROMBIN TIME, POC: NORMAL

## 2018-06-19 PROCEDURE — 85610 PROTHROMBIN TIME: CPT | Performed by: NURSE PRACTITIONER

## 2018-06-20 ENCOUNTER — NURSE ONLY (OUTPATIENT)
Dept: FAMILY MEDICINE CLINIC | Age: 78
End: 2018-06-20

## 2018-06-20 DIAGNOSIS — I26.99 PULMONARY EMBOLISM (HCC): ICD-10-CM

## 2018-06-20 LAB
INTERNATIONAL NORMALIZATION RATIO, POC: 1.7
PROTHROMBIN TIME, POC: NORMAL

## 2018-06-20 PROCEDURE — 85610 PROTHROMBIN TIME: CPT | Performed by: NURSE PRACTITIONER

## 2018-06-25 ENCOUNTER — TELEPHONE (OUTPATIENT)
Dept: FAMILY MEDICINE CLINIC | Age: 78
End: 2018-06-25

## 2018-06-25 ENCOUNTER — NURSE ONLY (OUTPATIENT)
Dept: FAMILY MEDICINE CLINIC | Age: 78
End: 2018-06-25

## 2018-06-25 DIAGNOSIS — I26.99 PULMONARY EMBOLISM (HCC): ICD-10-CM

## 2018-06-25 LAB
INTERNATIONAL NORMALIZATION RATIO, POC: 2.6
PROTHROMBIN TIME, POC: NORMAL

## 2018-06-25 PROCEDURE — 85610 PROTHROMBIN TIME: CPT | Performed by: NURSE PRACTITIONER

## 2018-06-29 ENCOUNTER — TELEPHONE (OUTPATIENT)
Dept: PHARMACY | Facility: CLINIC | Age: 78
End: 2018-06-29

## 2018-06-29 ENCOUNTER — CARE COORDINATION (OUTPATIENT)
Dept: CASE MANAGEMENT | Age: 78
End: 2018-06-29

## 2018-06-29 DIAGNOSIS — J44.1 COPD EXACERBATION (HCC): Primary | ICD-10-CM

## 2018-06-29 PROCEDURE — 1111F DSCHRG MED/CURRENT MED MERGE: CPT | Performed by: NURSE PRACTITIONER

## 2018-07-02 ENCOUNTER — CARE COORDINATION (OUTPATIENT)
Dept: CASE MANAGEMENT | Age: 78
End: 2018-07-02

## 2018-07-02 NOTE — CARE COORDINATION
[facility] Transitions Follow Up Call    2018    Patient: Booker Zepeda  Patient : 1940   MRN: [id number]  Reason for Admission: aeCOPD  Discharge Date: 18 RARS: Readmission Risk Score: 12       Spoke with: Booker Zepeda (patient)    Care Transitions Subsequent and Final Call    Schedule Follow Up Appointment with PCP:  Completed  Subsequent and Final Calls  Do you have any ongoing symptoms?:  Yes  Onset of Patient-reported symptoms:  Other  Patient-reported symptoms:  Other  Interventions for patient-reported symptoms:  Notified Home Care  Have your medications changed?:  No  Do you have any questions related to your medications?:  No  Do you currently have any active services?:  Yes  Are you currently active with any services?:  Home Health  Do you have any needs or concerns that I can assist you with?:  No  Identified Barriers:  Impairment  Care Transitions Interventions  Other Interventions:          Continues with tightness in chest same as during admission noted by pulm prior to discharge. Has not improved. Breathing is baseline and wearing oxygen at 4lpm continuous. States weight same but would not provide number. Educated her again to report weight gain of 2-3#/day or 5#/week to pulm, PCP or CTC. She verbalizes understanding. States she is able to lie flat in bed and sleep. Denies cough. Urinating \"all the time\" from her furosemide 20mg daily. Still has BLE edema. Encouraged elevation above level of heart and ankle pumps while elevated. Bowels moving although she states not as they usually do, abd distended as during inpatient stay, denies n/v.  Reviewed COPD Zone Tool and when to communicate with Dr Yoshi Barroso. She verbalizes understanding. Encouraged her to continue daily weights. She got a call from [facility] office on Friday saying they would be out within 24-48 hours but no one ever called again to see her.  Writer will call [facility] to request [facility] today on call since they have made a delay in care. Patient in agreement with plan. Sees PCP in 3 days. Call to [facility] LPN liaison with [facility]. Notified her of [facility] delay in care with no update. Request patient be added to schedule today because services have been delayed and patient's report of BLE edema, abd distention. She will call back with update after looking into their system for documentation. Did not hear back from [facility]. so called patient. [facility] Yvonne RN did [facility] visit today. Encouraged elevation BLE. [employer] she found a pair of compression stockings. Instructed her to set them out and don first thing in the morning tomorrow - on in the morning and off at night. She verbalizes understanding. Again reviewed weight gain parameters and PCP appt in 3 days.      Future Appointments  Date Time Provider Allyssa Levine   7/5/2018 11:20 AM TURNER Scruggs CNP [facility]   8/22/2018 11:30 [facility]   9/13/2018 1:00 PM TURNER Scruggs CNP [facility]       Kartik Nurse, RN

## 2018-07-09 ENCOUNTER — ANTI-COAG VISIT (OUTPATIENT)
Dept: FAMILY MEDICINE CLINIC | Age: 78
End: 2018-07-09

## 2018-07-09 ENCOUNTER — CARE COORDINATION (OUTPATIENT)
Dept: CASE MANAGEMENT | Age: 78
End: 2018-07-09

## 2018-07-09 LAB — INR BLD: 3.5

## 2018-07-12 ENCOUNTER — OFFICE VISIT (OUTPATIENT)
Dept: FAMILY MEDICINE CLINIC | Age: 78
End: 2018-07-12

## 2018-07-12 ENCOUNTER — ANTI-COAG VISIT (OUTPATIENT)
Dept: FAMILY MEDICINE CLINIC | Age: 78
End: 2018-07-12

## 2018-07-12 VITALS
BODY MASS INDEX: 47.66 KG/M2 | HEART RATE: 95 BPM | HEIGHT: 62 IN | WEIGHT: 259 LBS | OXYGEN SATURATION: 93 % | DIASTOLIC BLOOD PRESSURE: 64 MMHG | SYSTOLIC BLOOD PRESSURE: 118 MMHG

## 2018-07-12 DIAGNOSIS — Z79.01 ANTICOAGULANT LONG-TERM USE: ICD-10-CM

## 2018-07-12 DIAGNOSIS — J96.21 ACUTE ON CHRONIC RESPIRATORY FAILURE WITH HYPOXIA (HCC): Primary | ICD-10-CM

## 2018-07-12 DIAGNOSIS — Z09 HOSPITAL DISCHARGE FOLLOW-UP: ICD-10-CM

## 2018-07-12 DIAGNOSIS — I26.99 OTHER PULMONARY EMBOLISM WITHOUT ACUTE COR PULMONALE, UNSPECIFIED CHRONICITY (HCC): ICD-10-CM

## 2018-07-12 DIAGNOSIS — J44.1 COPD EXACERBATION (HCC): ICD-10-CM

## 2018-07-12 LAB
INTERNATIONAL NORMALIZATION RATIO, POC: 3.1
PROTHROMBIN TIME, POC: NORMAL

## 2018-07-12 PROCEDURE — 85610 PROTHROMBIN TIME: CPT | Performed by: NURSE PRACTITIONER

## 2018-07-12 PROCEDURE — 99495 TRANSJ CARE MGMT MOD F2F 14D: CPT | Performed by: NURSE PRACTITIONER

## 2018-07-12 PROCEDURE — 1111F DSCHRG MED/CURRENT MED MERGE: CPT | Performed by: NURSE PRACTITIONER

## 2018-07-12 NOTE — PROGRESS NOTES
Post-Discharge Transitional Care Management Services      Art Mention   YOB: 1940    Date of Office Visit:  7/12/2018  Date of Hospital Admission: 6/25/18  Date of Hospital Discharge: 6/28/18  [facility] Risk Score [risk of hospital readmission >=10  medium risk (chance of readmission ~ 12%) >14  high risk (chance of readmission ~18%)]:Readmission Risk Score: 16    Care management risk score Rising risk (score 2-5) and Complex Care (Scores >=6): 9       Patient Active Problem List   Diagnosis    COPD exacerbation    Pulmonary fibrosis ([facility] [address]    Chronic respiratory failure with hypoxia (HCC)    Anxiety    Hyperlipidemia    Diverticulosis    COPD (chronic obstructive pulmonary disease) (HCC)    Abnormal chest CT    Vitamin D deficiency    Abnormal CXR    Venous (peripheral) insufficiency    Lymphedema    Swelling of limb, right, with redness and tightness of thigh    Chronic angle-closure glaucoma of both eyes    Arthritis of hips and knees    Obesity    Shortness of breath    Diastolic dysfunction    Acute on chronic respiratory failure with hypoxia (HCC)    Bilateral edema of lower extremity    Chest pain    Paroxysmal atrial fibrillation with RVR (HCC)       Allergies   Allergen Reactions    Crestor [Rosuvastatin]      Muscle aches    Levofloxacin Other (See Comments)     Achiness    Lipitor     Rocephin In Dextrose [Ceftriaxone Sodium In Dextrose]      Rash,itching    Zocor [Simvastatin]     Codeine Itching and Nausea And Vomiting       Medications listed as ordered at the time of discharge from [place] [place] [facility]   Home Medication Instructions CRISTOBAL:    Printed on:07/12/18 1137   Medication Information                      albuterol (PROVENTIL) (2.5 MG/3ML) 0.083% nebulizer solution  Take 3 mLs by nebulization every 6 hours as needed for Wheezing or Shortness of Breath DX COPD J44.9             albuterol sulfate HFA (VENTOLIN HFA) 108 (90 against medications ordered at time of hospital discharge: Yes    Vitals:    07/12/18 1121   BP: 118/64   Site: Right Arm   Position: Sitting   Cuff Size: Large Adult   Pulse: 95   SpO2: 93%   Weight: 259 lb (117.5 kg)   Height: 5' 2\" (1.575 m)     Body mass index is 47.37 kg/m². Wt Readings from Last 3 Encounters:   07/12/18 259 lb (117.5 kg)   06/28/18 275 lb 6 oz (124.9 kg)   06/13/18 262 lb (118.8 kg)     BP Readings from Last 3 Encounters:   07/12/18 118/64   06/28/18 123/61   06/13/18 122/74        Inpatient course: Discharge summary reviewed- see chart. Chief Complaint   Patient presents with    Follow-Up from Hospital     seen at [facility] for copd      History of Present illness - Follow up of Hospital diagnosis(es): COPD exacerbation, acute on chronic respiratory failure with hypoxia, chronic pulmonary edema and chest pain  No medications were changed and the patient was discharged on doxycycline and steroids      Non face to face  following discharge, date last encounter closed (first attempt may have been earlier): 6/29/2018 11:34 AM    Call initiated 2 business days of discharge: Yes     Interval history/Current status: Patient states that she has returned to her baseline dyspnea and fatigue. Patient states that she is feeling better than when she was in the hospital. The patient does not have follow-up with pulmonology until 8/22/18. The patient finished her doxycycline and steroid taper last week. No rebound symptoms of infection. No increase in amount of secretions or color of secretions. She denies any fever, chills, nausea, vomiting or diarrhea.     Physical Exam:  General Appearance: alert and oriented to person, place and time, well-developed and well nourished, obese, pale and O2 per NC  Skin: warm and dry and no rash or erythema  Head: normocephalic and atraumatic  Eyes: pupils equal, round, and reactive to light, extraocular eye movements intact, conjunctivae normal  ENT: tympanic

## 2018-07-13 ENCOUNTER — CARE COORDINATION (OUTPATIENT)
Dept: CASE MANAGEMENT | Age: 78
End: 2018-07-13

## 2018-07-13 NOTE — CARE COORDINATION
[facility] Transitions FINAL Call    2018    Patient: Booker Zepeda  Patient : 1940   MRN: [id number]  Reason for Admission: aeCOPD  Discharge Date: 18 RARS: Readmission Risk Score: 12       Spoke with: Booker Zepeda (patient)    Care Transitions Subsequent and Final Call    Schedule Follow Up Appointment with PCP:  Completed  Subsequent and Final Calls  Do you have any ongoing symptoms?:  No  Have your medications changed?:  No  Do you have any questions related to your medications?:  No  Do you currently have any active services?:  Yes  Are you currently active with any services?:  Home Health  Do you have any needs or concerns that I can assist you with?:  No  Identified Barriers:  None  Care Transitions Interventions  Other Interventions:          Reports she is back at baseline. Had a stressful week (accidentally threw her car keys in the garbage and had to have her car towed and new keys made at great expense). Completed TCM visit yesterday. No new orders. Continues Lasix and was instructed to elevate BLE. Reviewed zone tools for COPD and when to call doctor including 2-3# weight gain/day or 5#/week, increased SOB, increased cough, change in sputum, fever/chills. She verbalizes understanding. She is unsure if [facility] is still active. States SN came on Monday and she hasn't heard from them since. Writer to call [facility] and request they communicate care plan with her and visits. She is agreeable to plan. Call to [facility] and left message for Reshma Palomino RN liaison requesting communication with patient on visits and care plan and update to writer if she has been discharged [facility].. Return call from [facility],  is Jason Shearer RN and patient is scheduled for SN on  weekly through August. Peyton Man alerted Jason Shearer RN that patient needs to be alerted to plan and aware that INR due Monday. Care transition complete.      Follow Up  Future Appointments  Date Time Provider Allyssa Levine   8/22/2018 11:30 AM Martha Gates MD [facility]   9/13/2018 1:00 PM [facility]URNER Rankin [facility]       Job Gutierrez RN

## 2018-07-16 ENCOUNTER — ANTI-COAG VISIT (OUTPATIENT)
Dept: FAMILY MEDICINE CLINIC | Age: 78
End: 2018-07-16

## 2018-07-16 LAB — INR BLD: 2.1

## 2018-07-16 RX ORDER — WARFARIN SODIUM 5 MG/1
TABLET ORAL
Qty: 75 TABLET | Refills: 2 | Status: SHIPPED | OUTPATIENT
Start: 2018-07-16 | End: 2019-06-08 | Stop reason: SDUPTHER

## 2018-07-20 ENCOUNTER — CARE COORDINATION (OUTPATIENT)
Dept: CARE COORDINATION | Age: 78
End: 2018-07-20

## 2018-07-25 ENCOUNTER — ANTI-COAG VISIT (OUTPATIENT)
Dept: FAMILY MEDICINE CLINIC | Age: 78
End: 2018-07-25

## 2018-07-25 LAB — INR BLD: 2.1

## 2018-07-30 ENCOUNTER — HOSPITAL ENCOUNTER (OUTPATIENT)
Age: 78
Discharge: HOME | End: 2018-07-30
Payer: MEDICARE

## 2018-07-30 ENCOUNTER — CARE COORDINATION (OUTPATIENT)
Dept: CARE COORDINATION | Age: 78
End: 2018-07-30

## 2018-07-30 VITALS — OXYGEN SATURATION: 93 % | BODY MASS INDEX: 48.29 KG/M2 | WEIGHT: 264 LBS

## 2018-07-30 DIAGNOSIS — E87.79 OTHER HYPERVOLEMIA: Primary | ICD-10-CM

## 2018-07-30 LAB
ANION GAP SERPL CALCULATED.3IONS-SCNC: 19 MMOL/L (ref 3–16)
BUN BLDV-MCNC: 16 MG/DL (ref 7–20)
CALCIUM SERPL-MCNC: 9 MG/DL (ref 8.3–10.6)
CHLORIDE BLD-SCNC: 100 MMOL/L (ref 99–110)
CO2: 28 MMOL/L (ref 21–32)
CREAT SERPL-MCNC: 0.7 MG/DL (ref 0.6–1.2)
GFR AFRICAN AMERICAN: >60
GFR NON-AFRICAN AMERICAN: >60
GLUCOSE BLD-MCNC: 73 MG/DL (ref 70–99)
POTASSIUM SERPL-SCNC: 4.3 MMOL/L (ref 3.5–5.1)
PRO-BNP: 247 PG/ML (ref 0–449)
SODIUM BLD-SCNC: 147 MMOL/L (ref 136–145)

## 2018-07-30 PROCEDURE — 83880 ASSAY OF NATRIURETIC PEPTIDE: CPT

## 2018-07-30 PROCEDURE — 36415 COLL VENOUS BLD VENIPUNCTURE: CPT

## 2018-07-30 PROCEDURE — 80048 BASIC METABOLIC PNL TOTAL CA: CPT

## 2018-07-30 RX ORDER — POTASSIUM CHLORIDE 750 MG/1
10 TABLET, EXTENDED RELEASE ORAL DAILY
Qty: 30 TABLET | Refills: 0 | Status: SHIPPED | OUTPATIENT
Start: 2018-07-30 | End: 2019-01-16 | Stop reason: ALTCHOICE

## 2018-07-30 RX ORDER — FUROSEMIDE 40 MG/1
40 TABLET ORAL DAILY
Qty: 30 TABLET | Refills: 0 | Status: SHIPPED | OUTPATIENT
Start: 2018-07-30 | End: 2019-06-12 | Stop reason: ALTCHOICE

## 2018-08-10 ENCOUNTER — ANTI-COAG VISIT (OUTPATIENT)
Dept: FAMILY MEDICINE CLINIC | Age: 78
End: 2018-08-10

## 2018-08-10 ENCOUNTER — TELEPHONE (OUTPATIENT)
Dept: FAMILY MEDICINE CLINIC | Age: 78
End: 2018-08-10

## 2018-08-14 ENCOUNTER — TELEPHONE (OUTPATIENT)
Dept: FAMILY MEDICINE CLINIC | Age: 78
End: 2018-08-14

## 2018-08-14 NOTE — TELEPHONE ENCOUNTER
Serenity Fenton from [address] [place] [phone] called and said that pt was having problems with having a sore throat and the glandes in her neck were tender was running a temp yesterday and was wanting to see if she could get something called in to [facility]

## 2018-08-15 ENCOUNTER — OFFICE VISIT (OUTPATIENT)
Dept: FAMILY MEDICINE CLINIC | Age: 78
End: 2018-08-15

## 2018-08-15 VITALS
HEIGHT: 62 IN | HEART RATE: 99 BPM | SYSTOLIC BLOOD PRESSURE: 112 MMHG | BODY MASS INDEX: 48.95 KG/M2 | OXYGEN SATURATION: 92 % | DIASTOLIC BLOOD PRESSURE: 78 MMHG | WEIGHT: 266 LBS | TEMPERATURE: 98.6 F

## 2018-08-15 DIAGNOSIS — J02.9 SORE THROAT: Primary | ICD-10-CM

## 2018-08-15 DIAGNOSIS — L02.91 ABSCESS: ICD-10-CM

## 2018-08-15 LAB — S PYO AG THROAT QL: NORMAL

## 2018-08-15 PROCEDURE — 1123F ACP DISCUSS/DSCN MKR DOCD: CPT | Performed by: NURSE PRACTITIONER

## 2018-08-15 PROCEDURE — 99213 OFFICE O/P EST LOW 20 MIN: CPT | Performed by: NURSE PRACTITIONER

## 2018-08-15 PROCEDURE — 1101F PT FALLS ASSESS-DOCD LE1/YR: CPT | Performed by: NURSE PRACTITIONER

## 2018-08-15 PROCEDURE — G8400 PT W/DXA NO RESULTS DOC: HCPCS | Performed by: NURSE PRACTITIONER

## 2018-08-15 PROCEDURE — 4040F PNEUMOC VAC/ADMIN/RCVD: CPT | Performed by: NURSE PRACTITIONER

## 2018-08-15 PROCEDURE — 87880 STREP A ASSAY W/OPTIC: CPT | Performed by: NURSE PRACTITIONER

## 2018-08-15 PROCEDURE — G8417 CALC BMI ABV UP PARAM F/U: HCPCS | Performed by: NURSE PRACTITIONER

## 2018-08-15 PROCEDURE — 1036F TOBACCO NON-USER: CPT | Performed by: NURSE PRACTITIONER

## 2018-08-15 PROCEDURE — 1090F PRES/ABSN URINE INCON ASSESS: CPT | Performed by: NURSE PRACTITIONER

## 2018-08-15 PROCEDURE — 3017F COLORECTAL CA SCREEN DOC REV: CPT | Performed by: NURSE PRACTITIONER

## 2018-08-15 PROCEDURE — G8427 DOCREV CUR MEDS BY ELIG CLIN: HCPCS | Performed by: NURSE PRACTITIONER

## 2018-08-15 RX ORDER — SULFAMETHOXAZOLE AND TRIMETHOPRIM 800; 160 MG/1; MG/1
1 TABLET ORAL 2 TIMES DAILY
Qty: 20 TABLET | Refills: 0 | Status: SHIPPED | OUTPATIENT
Start: 2018-08-15 | End: 2018-08-25

## 2018-08-15 NOTE — PROGRESS NOTES
Subjective:     Patient Name: Piero Casarez is a 78 y.o. female. Chief Complaint   Patient presents with    Rash     under right arm pit area very sore and red     Pharyngitis     started a couple days ago       Pharyngitis   This is a new problem. The current episode started in the past 7 days. The problem occurs daily. The problem has been unchanged. Associated symptoms include coughing (chronic), a rash and a sore throat. Pertinent negatives include no abdominal pain, anorexia, arthralgias, change in bowel habit, chest pain, chills, congestion, diaphoresis, fatigue, fever, headaches, joint swelling, myalgias, nausea, neck pain, numbness, swollen glands, urinary symptoms, vertigo, visual change, vomiting or weakness. Nothing aggravates the symptoms. She has tried nothing for the symptoms. The treatment provided no relief. Rash   This is a new problem. The current episode started in the past 7 days. The problem has been gradually worsening since onset. The affected locations include the right axilla. The rash is characterized by pain, draining and redness. She was exposed to nothing. Associated symptoms include coughing (chronic) and a sore throat. Pertinent negatives include no anorexia, congestion, diarrhea, eye pain, facial edema, fatigue, fever, joint pain, nail changes, rhinorrhea, shortness of breath or vomiting. Past treatments include cold compress and antibiotic cream. The treatment provided no relief. There is no history of allergies or eczema. Review of Systems   Constitutional: Negative. Negative for chills, diaphoresis, fatigue and fever. HENT: Positive for sore throat. Negative for congestion and rhinorrhea. Eyes: Negative. Negative for pain. Respiratory: Positive for cough (chronic). Negative for shortness of breath. Cardiovascular: Negative. Negative for chest pain. Gastrointestinal: Negative.   Negative for abdominal pain, anorexia, change in bowel habit, Lab Review   Hospital Outpatient Visit on 07/30/2018   Component Date Value    Sodium 07/30/2018 147*    Potassium 07/30/2018 4.3     Chloride 07/30/2018 100     CO2 07/30/2018 28     Anion Gap 07/30/2018 19*    Glucose 07/30/2018 73     BUN 07/30/2018 16     CREATININE 07/30/2018 0.7     GFR Non- 07/30/2018 >60     GFR  07/30/2018 >60     Calcium 07/30/2018 9.0     Pro-BNP 07/30/2018 247    Anti-coag visit on 07/25/2018   Component Date Value    INR 07/25/2018 2.10    Anti-coag visit on 07/16/2018   Component Date Value    INR 07/16/2018 2.10    Office Visit on 07/12/2018   Component Date Value    INR 07/12/2018 3.1    Anti-coag visit on 07/09/2018   Component Date Value    INR 07/09/2018 3.50    Admission on 06/25/2018, Discharged on 06/28/2018   Component Date Value    WBC 06/25/2018 7.8     RBC 06/25/2018 4.47     Hemoglobin 06/25/2018 12.6     Hematocrit 06/25/2018 38.9     MCV 06/25/2018 87.1     MCH 06/25/2018 28.3     MCHC 06/25/2018 32.5     RDW 06/25/2018 14.0     Platelets 00/09/6540 300     MPV 06/25/2018 7.3     Neutrophils % 06/25/2018 56.8     Lymphocytes % 06/25/2018 30.2     Monocytes % 06/25/2018 9.7     Eosinophils % 06/25/2018 2.5     Basophils % 06/25/2018 0.8     Neutrophils # 06/25/2018 4.4     Lymphocytes # 06/25/2018 2.4     Monocytes # 06/25/2018 0.8     Eosinophils # 06/25/2018 0.2     Basophils # 06/25/2018 0.1     Sodium 06/25/2018 139     Potassium 06/25/2018 4.0     Chloride 06/25/2018 97*    CO2 06/25/2018 32     Anion Gap 06/25/2018 10     Glucose 06/25/2018 122*    BUN 06/25/2018 13     CREATININE 06/25/2018 <0.5*    GFR Non- 06/25/2018 >60     GFR  06/25/2018 >60     Calcium 06/25/2018 9.2     Total Protein 06/25/2018 7.3     Alb 06/25/2018 3.9     Albumin/Globulin Ratio 06/25/2018 1.1     Total Bilirubin 06/25/2018 0.3     Alkaline Phosphatase 06/25/2018 87  ALT 06/25/2018 12     AST 06/25/2018 16     Globulin 06/25/2018 3.4     Pro-BNP 06/25/2018 197     Troponin 06/25/2018 <0.01     Ventricular Rate 06/25/2018 85     Atrial Rate 06/25/2018 85     P-R Interval 06/25/2018 166     QRS Duration 06/25/2018 90     Q-T Interval 06/25/2018 368     QTc Calculation (Bazett) 06/25/2018 437     P Axis 06/25/2018 69     R Axis 06/25/2018 -10     T Axis 06/25/2018 63     Diagnosis 06/25/2018                      Value:Normal sinus rhythm  Normal ECG  When compared with ECG of 27-OCT-2017 10:39, (unconfirmed)  Questionable change in QRS axis  Confirmed by MIKE KHAN, DHEERAJ ([id number]) on 6/25/2018 5:06:48 PM      Protime 06/25/2018 29.0*    INR 06/25/2018 2.54*    Troponin 06/25/2018 <0.01     WBC 06/26/2018 8.7     RBC 06/26/2018 4.14     Hemoglobin 06/26/2018 11.9*    Hematocrit 06/26/2018 35.9*    MCV 06/26/2018 86.6     MCH 06/26/2018 28.7     MCHC 06/26/2018 33.1     RDW 06/26/2018 13.8     Platelets 48/29/8496 292     MPV 06/26/2018 7.3     Troponin 06/25/2018 <0.01     Protime 06/26/2018 29.6*    INR 06/26/2018 2.60*    Protime 06/27/2018 32.5*    INR 06/27/2018 2.85*    Protime 06/28/2018 36.2*    INR 06/28/2018 3.18*    Sodium 06/28/2018 142     Potassium 06/28/2018 4.2     Chloride 06/28/2018 101     CO2 06/28/2018 36*    Anion Gap 06/28/2018 5     Glucose 06/28/2018 97     BUN 06/28/2018 21*    CREATININE 06/28/2018 <0.5*    GFR Non- 06/28/2018 >60     GFR  06/28/2018 >60     Calcium 06/28/2018 8.7     WBC 06/28/2018 12.2*    RBC 06/28/2018 4.15     Hemoglobin 06/28/2018 11.7*    Hematocrit 06/28/2018 36.4     MCV 06/28/2018 87.9     MCH 06/28/2018 28.2     MCHC 06/28/2018 32.1     RDW 06/28/2018 14.0     Platelets 72/46/4487 308     MPV 06/28/2018 7.4     Neutrophils % 06/28/2018 62.8     Lymphocytes % 06/28/2018 28.3     Monocytes % 06/28/2018 8.4     Eosinophils % 06/28/2018 0.2  Basophils % 06/28/2018 0.3     Neutrophils # 06/28/2018 7.7     Lymphocytes # 06/28/2018 3.4     Monocytes # 06/28/2018 1.0     Eosinophils # 06/28/2018 0.0     Basophils # 06/28/2018 0.0    Nurse Only on 06/25/2018   Component Date Value    INR 06/25/2018 2.6    Nurse Only on 06/20/2018   Component Date Value    INR 06/20/2018 1.7    Nurse Only on 06/19/2018   Component Date Value    INR 06/19/2018 1.4        No results found for this visit on 08/15/18. Assessment:       1. Sore throat    2. Abscess        Results for POC orders placed in visit on 08/15/18   POCT rapid strep A   Result Value Ref Range    Strep A Ag None Detected None Detected            Plan:       [place] was seen today for rash and pharyngitis. Diagnoses and all orders for this visit:    Sore throat  -     POCT rapid strep A--negative  Symptomatic therapy suggested: push fluids, rest, gargle warm salt water, use vaporizer or mist prn, use acetaminophen, ibuprofen prn and return office visit prn if symptoms persist or worsen. Lack of antibiotic effectiveness discussed with her. Call or return to clinic prn if these symptoms worsen or fail to improve as anticipated. Abscess  -     Wound Culture  -     sulfamethoxazole-trimethoprim (BACTRIM DS) 800-160 MG per tablet; Take 1 tablet by mouth 2 times daily for 10 days  Follow up in few days  Educated patient she must have pt/inr checked every 2-3 days while on antibiotic therapy        Patient has been instructed call the office immediately with new symptoms, change in symptoms or worsening of symptoms. If this is not feasible, patient is instructed to report to the emergency room. Medication profile reviewed. Medication side effects and possible impairments from medications were discussed as applicable. Allergies were reviewed. Health maintenance was reviewed and updated as appropriate.

## 2018-08-17 ENCOUNTER — CARE COORDINATION (OUTPATIENT)
Dept: CARE COORDINATION | Age: 78
End: 2018-08-17

## 2018-08-17 ENCOUNTER — NURSE ONLY (OUTPATIENT)
Dept: FAMILY MEDICINE CLINIC | Age: 78
End: 2018-08-17

## 2018-08-17 DIAGNOSIS — I26.99 PULMONARY EMBOLISM (HCC): ICD-10-CM

## 2018-08-17 LAB
INTERNATIONAL NORMALIZATION RATIO, POC: 3.1
PROTHROMBIN TIME, POC: NORMAL

## 2018-08-17 PROCEDURE — 85610 PROTHROMBIN TIME: CPT | Performed by: NURSE PRACTITIONER

## 2018-08-17 NOTE — CARE COORDINATION
Ambulatory Care Coordination Note  8/17/2018  CM Risk Score: 9  Karla Mortality Risk Score: 23.89    ACC: Arpita Billings RN    Summary Note: ACC spoke with patient for follow up this am. She is still hoarse but has improvement in her sore throat. She continues on antibiotic (Bactrim) for the wound that she had drained in the office near her right armpit. Denies any fevers or increased redness. She feels this is healing. Reviewed s/s of worsening infection to watch for and report. She continues to have issues with edema in her feet and her abdomen. Reports weight fluctuations of 2-3 lbs at times. She was previously on increased lasix but symptoms of edema never fully resolved. She has a follow up with Dr. Kaykay Nunez next week. Patient is continuing to weight daily and understands weight parameters changes to report. Past Medical History:   Diagnosis Date    Acute exacerbation of chronic obstructive pulmonary disease (COPD) ([address]     Acute on chronic respiratory failure (HCC)     Atrial fibrillation with rapid ventricular response ([address] 6/23/2015    CAP (community acquired pneumonia) 1/29/2015    CHF (congestive heart failure) ([address]     Diverticulitis     HCAP (healthcare-associated pneumonia)     Panic disorder     Pulmonary embolism ([address]     6/15    [facility] spotted fever 2013    Sciatic nerve pain     Sepsis ([address] 6/22/2015    Tobacco abuse 6/22/2015    Vitamin D deficiency      Plan      follow up call next week for assessment of symptoms   Ongoing support with copd   COPD zones initiated and fall precautions discussed and mailed. Care Coordination Interventions    Program Enrollment:  Complex Care  Referral from Primary Care Provider:  No  Suggested Interventions and Community Resources  Fall Risk Prevention: In Process (Comment: fall precautions mailed and discussed)  Home Health Services:  Completed (Comment: active. [facility]  [phone])  Zone Management Tools:   In Process (Comment: copd mailed and discussed)         Goals Addressed             Most Recent       Care Coordination     Conditions and Symptoms   No change (8/17/2018)             I will schedule office visits, as directed by my provider. I will keep my appointment or reschedule if I have to cancel. I will notify my provider of any barriers to my plan of care. I will follow my Zone Management tool to seek urgent or emergent care. I will notify my provider of any symptoms that indicate a worsening of my condition. Barriers: none  Plan for overcoming my barriers: home care, care coordination   Confidence: 6/10  Anticipated Goal Completion Date:   9/18       Self Monitoring   On track (8/17/2018)             Daily Weights - I will weight myself as directed - Daily and write down weights    Patient Reported Weight No flowsheet data found. Barriers: none  Plan for overcoming my barriers: home care, care coordination, family support  Confidence: 7/10  Anticipated Goal Completion Date: 9/18              Prior to Admission medications    Medication Sig Start Date End Date Taking?  Authorizing Provider   sulfamethoxazole-trimethoprim (BACTRIM DS) 800-160 MG per tablet Take 1 tablet by mouth 2 times daily for 10 days 8/15/18 8/25/18  TURNER Rankin CNP   furosemide (LASIX) 40 MG tablet Take 1 tablet by mouth daily 7/30/18   TURNER Kearns CNP   potassium chloride (KLOR-CON M) 10 MEQ extended release tablet Take 1 tablet by mouth daily 7/30/18   TURNER Kearns CNP   warfarin (COUMADIN) 5 MG tablet TAKE ONE TABLET BY MOUTH DAILY ON MON, TUES, THURS, FRI,  AND SAT, AND SUNDAY (TAKE 2.5 MG ON WED) 7/16/18   Sonia Nance MD   hydrOXYzine (VISTARIL) 25 MG capsule Take 1 capsule by mouth 3 times daily as needed for Anxiety 6/13/18   TURNER Rankin CNP   albuterol sulfate HFA (VENTOLIN HFA) 108 (90 Base) MCG/ACT inhaler Inhale 2 puffs into the lungs every

## 2018-08-17 NOTE — PROGRESS NOTES
Patient  currently on antibiotics.   Coumadin 2.5 mg today, 5 mg Saturday, 2.5 mg on Sunday and recheck PT/INR on Monday

## 2018-08-17 NOTE — PATIENT INSTRUCTIONS
Patient Education        Preventing Falls: Care Instructions  Your Care Instructions    Getting around your home safely can be a challenge if you have injuries or health problems that make it easy for you to fall. Loose rugs and furniture in walkways are among the dangers for many older people who have problems walking or who have poor eyesight. People who have conditions such as arthritis, osteoporosis, or dementia also have to be careful not to fall. You can make your home safer with a few simple measures. Follow-up care is a key part of your treatment and safety. Be sure to make and go to all appointments, and call your doctor if you are having problems. It's also a good idea to know your test results and keep a list of the medicines you take. How can you care for yourself at home? Taking care of yourself  · You may get dizzy if you do not drink enough water. To prevent dehydration, drink plenty of fluids, enough so that your urine is light yellow or clear like water. Choose water and other caffeine-free clear liquids. If you have kidney, heart, or liver disease and have to limit fluids, talk with your doctor before you increase the amount of fluids you drink. · Exercise regularly to improve your strength, muscle tone, and balance. Walk if you can. Swimming may be a good choice if you cannot walk easily. · Have your vision and hearing checked each year or any time you notice a change. If you have trouble seeing and hearing, you might not be able to avoid objects and could lose your balance. · Know the side effects of the medicines you take. Ask your doctor or pharmacist whether the medicines you take can affect your balance. Sleeping pills or sedatives can affect your balance. · Limit the amount of alcohol you drink. Alcohol can impair your balance and other senses. · Ask your doctor whether calluses or corns on your feet need to be removed.  If you wear loose-fitting shoes because of calluses or corns, that will allow you to sit while showering. · Get into a tub or shower by putting the weaker leg in first. Get out of a tub or shower with your strong side first.  · Repair loose toilet seats and consider installing a raised toilet seat to make getting on and off the toilet easier. · Keep your bathroom door unlocked while you are in the shower. Where can you learn more? Go to https://chpepiceweb.OTI Greentech. org and sign in to your GeeYeet account. Enter [id number] in the [employer] box to learn more about \"Preventing Falls: Care Instructions. \"     If you do not have an account, please click on the \"Sign Up Now\" link. Current as of: May 12, 2017  Content Version: 11.7  © 1784-4734 [employer], [employer]. Care instructions adapted under license by [facility] ([facility]). If you have questions about a medical condition or this instruction, always ask your healthcare professional. [place] [address] any warranty or liability for your use of this information. Patient Education        Preventing Falls: Care Instructions  Your Care Instructions    Getting around your home safely can be a challenge if you have injuries or health problems that make it easy for you to fall. Loose rugs and furniture in walkways are among the dangers for many older people who have problems walking or who have poor eyesight. People who have conditions such as arthritis, osteoporosis, or dementia also have to be careful not to fall. You can make your home safer with a few simple measures. Follow-up care is a key part of your treatment and safety. Be sure to make and go to all appointments, and call your doctor if you are having problems. It's also a good idea to know your test results and keep a list of the medicines you take. How can you care for yourself at home? Taking care of yourself  · You may get dizzy if you do not drink enough water.  To prevent dehydration, drink plenty of fluids, enough so that your cabinets so that the things you use a lot are on the lower shelves (about waist level). · Keep a cordless phone and a flashlight with new batteries by your bed. If possible, put a phone in each of the main rooms of your house, or carry a cell phone in case you fall and cannot reach a phone. Or, you can wear a device around your neck or wrist. You push a button that sends a signal for help. · Wear low-heeled shoes that fit well and give your feet good support. Use footwear with nonskid soles. Check the heels and soles of your shoes for wear. Repair or replace worn heels or soles. · Do not wear socks without shoes on wood floors. · Walk on the grass when the sidewalks are slippery. If you live in an area that gets snow and ice in the winter, sprinkle salt on slippery steps and sidewalks. Preventing falls in the bath  · Install grab bars and nonskid mats inside and outside your shower or tub and near the toilet and sinks. · Use shower chairs and bath benches. · Use a hand-held shower head that will allow you to sit while showering. · Get into a tub or shower by putting the weaker leg in first. Get out of a tub or shower with your strong side first.  · Repair loose toilet seats and consider installing a raised toilet seat to make getting on and off the toilet easier. · Keep your bathroom door unlocked while you are in the shower. Where can you learn more? Go to https://Henableluisa.Limonetik. org and sign in to your Frograms account. Enter [id number] in the [facility] box to learn more about \"Preventing Falls: Care Instructions. \"     If you do not have an account, please click on the \"Sign Up Now\" link. Current as of: May 12, 2017  Content Version: 11.7  © 4249-2788 [employer], [employer]. Care instructions adapted under license by [facility] ([facility]).  If you have questions about a medical condition or this instruction, always ask your healthcare professional. Mario Chavarria any

## 2018-08-18 LAB
GRAM STAIN RESULT: ABNORMAL
ORGANISM: ABNORMAL
WOUND/ABSCESS: ABNORMAL
WOUND/ABSCESS: ABNORMAL

## 2018-08-20 ENCOUNTER — OFFICE VISIT (OUTPATIENT)
Dept: FAMILY MEDICINE CLINIC | Age: 78
End: 2018-08-20

## 2018-08-20 VITALS
HEIGHT: 62 IN | WEIGHT: 265 LBS | BODY MASS INDEX: 48.76 KG/M2 | DIASTOLIC BLOOD PRESSURE: 60 MMHG | SYSTOLIC BLOOD PRESSURE: 112 MMHG

## 2018-08-20 DIAGNOSIS — L02.411 ABSCESS OF RIGHT AXILLA: Primary | ICD-10-CM

## 2018-08-20 PROCEDURE — 3017F COLORECTAL CA SCREEN DOC REV: CPT | Performed by: NURSE PRACTITIONER

## 2018-08-20 PROCEDURE — 4040F PNEUMOC VAC/ADMIN/RCVD: CPT | Performed by: NURSE PRACTITIONER

## 2018-08-20 PROCEDURE — G8400 PT W/DXA NO RESULTS DOC: HCPCS | Performed by: NURSE PRACTITIONER

## 2018-08-20 PROCEDURE — G8417 CALC BMI ABV UP PARAM F/U: HCPCS | Performed by: NURSE PRACTITIONER

## 2018-08-20 PROCEDURE — 1101F PT FALLS ASSESS-DOCD LE1/YR: CPT | Performed by: NURSE PRACTITIONER

## 2018-08-20 PROCEDURE — G8427 DOCREV CUR MEDS BY ELIG CLIN: HCPCS | Performed by: NURSE PRACTITIONER

## 2018-08-20 PROCEDURE — 99212 OFFICE O/P EST SF 10 MIN: CPT | Performed by: NURSE PRACTITIONER

## 2018-08-20 PROCEDURE — 1123F ACP DISCUSS/DSCN MKR DOCD: CPT | Performed by: NURSE PRACTITIONER

## 2018-08-20 PROCEDURE — 1036F TOBACCO NON-USER: CPT | Performed by: NURSE PRACTITIONER

## 2018-08-20 PROCEDURE — 1090F PRES/ABSN URINE INCON ASSESS: CPT | Performed by: NURSE PRACTITIONER

## 2018-08-20 NOTE — PATIENT INSTRUCTIONS
Patient Education        Skin Abscess: Care Instructions  Your Care Instructions    A skin abscess is a bacterial infection that forms a pocket of pus. A boil is a kind of skin abscess. The doctor may have cut an opening in the abscess so that the pus can drain out. You may have gauze in the cut so that the abscess will stay open and keep draining. You may need antibiotics. You will need to follow up with your doctor to make sure the infection has gone away. The doctor has checked you carefully, but problems can develop later. If you notice any problems or new symptoms, get medical treatment right away. Follow-up care is a key part of your treatment and safety. Be sure to make and go to all appointments, and call your doctor if you are having problems. It's also a good idea to know your test results and keep a list of the medicines you take. How can you care for yourself at home? · Apply warm and dry compresses, a heating pad set on low, or a hot water bottle 3 or 4 times a day for pain. Keep a cloth between the heat source and your skin. · If your doctor prescribed antibiotics, take them as directed. Do not stop taking them just because you feel better. You need to take the full course of antibiotics. · Take pain medicines exactly as directed. ¨ If the doctor gave you a prescription medicine for pain, take it as prescribed. ¨ If you are not taking a prescription pain medicine, ask your doctor if you can take an over-the-counter medicine. · Keep your bandage clean and dry. Change the bandage whenever it gets wet or dirty, or at least one time a day. · If the abscess was packed with gauze:  ¨ Keep follow-up appointments to have the gauze changed or removed. If the doctor instructed you to remove the gauze, gently pull out all of the gauze when your doctor tells you to. ¨ After the gauze is removed, soak the area in warm water for 15 to 20 minutes 2 times a day, until the wound closes.   When should you call for help? Call your doctor now or seek immediate medical care if:    · You have signs of worsening infection, such as:  ¨ Increased pain, swelling, warmth, or redness. ¨ Red streaks leading from the infected skin. ¨ Pus draining from the wound. ¨ A fever.    Watch closely for changes in your health, and be sure to contact your doctor if:    · You do not get better as expected. Where can you learn more? Go to https://NerdiespeWalkmoreeweb.Revolution Money. org and sign in to your [employer] account. Enter V139 in the WeSpeke box to learn more about \"Skin Abscess: Care Instructions. \"     If you do not have an account, please click on the \"Sign Up Now\" link. Current as of: May 10, 2017  Content Version: 11.7  © 9629-3233 [employer]. Care instructions adapted under license by [facility] ([facility]). If you have questions about a medical condition or this instruction, always ask your healthcare professional. [place] [address] any warranty or liability for your use of this information.

## 2018-08-20 NOTE — PROGRESS NOTES
history, social history, or family history were noted during the patient encounter unless specifically listed above. All updates of past medical history, past surgical history, social history, or family history were reviewed personally by me during the office visit. All problems listed in the assessment are stable unless noted otherwise. Medication profile reviewed personally by me during the office visit. Medication side effects and possible impairments from medications were discussed as applicable. Objective:       Physical Exam   Constitutional: She is oriented to person, place, and time. Vital signs are normal. She appears well-developed and well-nourished. She is cooperative. Non-toxic appearance. She does not have a sickly appearance. No distress. HENT:   Head: Normocephalic and atraumatic. Right Ear: Hearing, tympanic membrane and ear canal normal.   Left Ear: Hearing, tympanic membrane and ear canal normal.   Nose: Nose normal.   Mouth/Throat: Uvula is midline, oropharynx is clear and moist and mucous membranes are normal.   Eyes: Conjunctivae and lids are normal.   Neck: Neck supple. Cardiovascular: Normal rate, regular rhythm, normal heart sounds and normal pulses. Pulmonary/Chest: Effort normal and breath sounds normal. No accessory muscle usage. No respiratory distress. Abdominal: Soft. Normal appearance. There is no tenderness. Lymphadenopathy:        Head (right side): No submental and no submandibular adenopathy present. Head (left side): No submental and no submandibular adenopathy present. She has no cervical adenopathy. She has axillary adenopathy. Neurological: She is alert and oriented to person, place, and time. Skin: Skin is warm and dry. No lesion and no rash noted. Psychiatric: She has a normal mood and affect.  Her speech is normal and behavior is normal. Cognition and memory are normal.       /60 (Site: Left Arm, Position: Sitting, Cuff 06/28/2018 142     Potassium 06/28/2018 4.2     Chloride 06/28/2018 101     CO2 06/28/2018 36*    Anion Gap 06/28/2018 5     Glucose 06/28/2018 97     BUN 06/28/2018 21*    CREATININE 06/28/2018 <0.5*    GFR Non- 06/28/2018 >60     GFR  06/28/2018 >60     Calcium 06/28/2018 8.7     WBC 06/28/2018 12.2*    RBC 06/28/2018 4.15     Hemoglobin 06/28/2018 11.7*    Hematocrit 06/28/2018 36.4     MCV 06/28/2018 87.9     MCH 06/28/2018 28.2     MCHC 06/28/2018 32.1     RDW 06/28/2018 14.0     Platelets 87/34/8320 308     MPV 06/28/2018 7.4     Neutrophils % 06/28/2018 62.8     Lymphocytes % 06/28/2018 28.3     Monocytes % 06/28/2018 8.4     Eosinophils % 06/28/2018 0.2     Basophils % 06/28/2018 0.3     Neutrophils # 06/28/2018 7.7     Lymphocytes # 06/28/2018 3.4     Monocytes # 06/28/2018 1.0     Eosinophils # 06/28/2018 0.0     Basophils # 06/28/2018 0.0    Nurse Only on 06/25/2018   Component Date Value    INR 06/25/2018 2.6        No results found for this visit on 08/20/18. Assessment:       1. Abscess of right axilla        No results found for this visit on 08/20/18. Plan:       [place] was seen today for [facility]. Diagnoses and all orders for this visit:    Abscess of right axilla  Patient to continue on her antibiotics as prescribed. I did review and discuss the wound culture results with the patient at today's visit  I did review MRSA precautions with the patient. She will keep the area clean and dry and covered as long as it is draining. Patient was reminded she needs to have her PT INR checked every 2-3 days while she continues on antibiotic therapy    Patient has been instructed call the office immediately with new symptoms, change in symptoms or worsening of symptoms. If this is not feasible, patient is instructed to report to the emergency room. Medication profile reviewed.  Medication side effects and possible impairments from

## 2018-08-22 ENCOUNTER — OFFICE VISIT (OUTPATIENT)
Dept: PULMONOLOGY | Age: 78
End: 2018-08-22

## 2018-08-22 VITALS
HEART RATE: 83 BPM | SYSTOLIC BLOOD PRESSURE: 118 MMHG | RESPIRATION RATE: 22 BRPM | DIASTOLIC BLOOD PRESSURE: 66 MMHG | BODY MASS INDEX: 48.87 KG/M2 | OXYGEN SATURATION: 93 % | HEIGHT: 62 IN | TEMPERATURE: 98.5 F | WEIGHT: 265.6 LBS

## 2018-08-22 DIAGNOSIS — J44.9 CHRONIC OBSTRUCTIVE PULMONARY DISEASE, UNSPECIFIED COPD TYPE (HCC): Primary | ICD-10-CM

## 2018-08-22 DIAGNOSIS — D75.1 ERYTHROCYTOSIS: ICD-10-CM

## 2018-08-22 DIAGNOSIS — J96.11 CHRONIC RESPIRATORY FAILURE WITH HYPOXIA (HCC): ICD-10-CM

## 2018-08-22 PROCEDURE — 3017F COLORECTAL CA SCREEN DOC REV: CPT | Performed by: INTERNAL MEDICINE

## 2018-08-22 PROCEDURE — 4040F PNEUMOC VAC/ADMIN/RCVD: CPT | Performed by: INTERNAL MEDICINE

## 2018-08-22 PROCEDURE — 1090F PRES/ABSN URINE INCON ASSESS: CPT | Performed by: INTERNAL MEDICINE

## 2018-08-22 PROCEDURE — G8400 PT W/DXA NO RESULTS DOC: HCPCS | Performed by: INTERNAL MEDICINE

## 2018-08-22 PROCEDURE — G8427 DOCREV CUR MEDS BY ELIG CLIN: HCPCS | Performed by: INTERNAL MEDICINE

## 2018-08-22 PROCEDURE — 1123F ACP DISCUSS/DSCN MKR DOCD: CPT | Performed by: INTERNAL MEDICINE

## 2018-08-22 PROCEDURE — G8926 SPIRO NO PERF OR DOC: HCPCS | Performed by: INTERNAL MEDICINE

## 2018-08-22 PROCEDURE — 3023F SPIROM DOC REV: CPT | Performed by: INTERNAL MEDICINE

## 2018-08-22 PROCEDURE — 99214 OFFICE O/P EST MOD 30 MIN: CPT | Performed by: INTERNAL MEDICINE

## 2018-08-22 PROCEDURE — G8417 CALC BMI ABV UP PARAM F/U: HCPCS | Performed by: INTERNAL MEDICINE

## 2018-08-22 PROCEDURE — 1101F PT FALLS ASSESS-DOCD LE1/YR: CPT | Performed by: INTERNAL MEDICINE

## 2018-08-22 PROCEDURE — 1036F TOBACCO NON-USER: CPT | Performed by: INTERNAL MEDICINE

## 2018-08-22 RX ORDER — ALBUTEROL SULFATE 2.5 MG/3ML
2.5 SOLUTION RESPIRATORY (INHALATION) EVERY 6 HOURS PRN
Qty: 360 ML | Refills: 3 | Status: CANCELLED | OUTPATIENT
Start: 2018-08-22

## 2018-08-22 RX ORDER — ALBUTEROL SULFATE 90 UG/1
2 AEROSOL, METERED RESPIRATORY (INHALATION) EVERY 6 HOURS PRN
Qty: 2 INHALER | Refills: 3 | Status: CANCELLED | OUTPATIENT
Start: 2018-08-22

## 2018-08-22 NOTE — PROGRESS NOTES
Oxygen was checked in office today and results are as follows:  92-94% on room air at rest.   88% on room air on exertion. Applied oxygen at 4 LPM.   Patient maintained oxygen saturations of 93% on 4 LPM on exertion.
suggestive of chronic pulmonary arterial hypertension        ONO2 5/20/13 showed significant nocturnal hypoxemia    Assessment:   -Chronic Obstructive Pulmonary Disease  -Chronic Respiratory Failure - hypoxemic, Stable  - Dyspnea-severe  -Erythrocytosis- likely secondary to chronic hypoxemia; myeloproliferative disease also possible, f/b Hem/Onc previously  - bilateral PEs 6/15  - obesity- worse  LE edema    Plan:     - Inhaled bronchodilator therapy -  Albuterol mdi and  nebs prn. Could not afford spiriva, tudorza- did not tolerate because of vision changes  - Patient is up to date with Pneumococcal vaccine   - Continue home O2 at night 4l and 6Lwith exertion,- OCD for supplemental O2 ;  Based upon repeat 6MWT. - has handicap placard  - recommend lifelong anticoagulation- on coumadin  - We discussed diet and limited exercise as she is able  - I advised the patient to take her Lasix twice daily for at least 3 days a week. She plans to follow-up on this plan with NP- Conn. - Son was present during the visit today. Multpile questions were asked and answered.

## 2018-08-23 ENCOUNTER — OFFICE VISIT (OUTPATIENT)
Dept: FAMILY MEDICINE CLINIC | Age: 78
End: 2018-08-23

## 2018-08-23 VITALS
WEIGHT: 265 LBS | TEMPERATURE: 98.3 F | HEIGHT: 62 IN | DIASTOLIC BLOOD PRESSURE: 64 MMHG | BODY MASS INDEX: 48.76 KG/M2 | SYSTOLIC BLOOD PRESSURE: 110 MMHG

## 2018-08-23 DIAGNOSIS — L02.411 ABSCESS OF RIGHT AXILLA: Primary | ICD-10-CM

## 2018-08-23 PROCEDURE — 1090F PRES/ABSN URINE INCON ASSESS: CPT | Performed by: NURSE PRACTITIONER

## 2018-08-23 PROCEDURE — 4040F PNEUMOC VAC/ADMIN/RCVD: CPT | Performed by: NURSE PRACTITIONER

## 2018-08-23 PROCEDURE — 3017F COLORECTAL CA SCREEN DOC REV: CPT | Performed by: NURSE PRACTITIONER

## 2018-08-23 PROCEDURE — 1101F PT FALLS ASSESS-DOCD LE1/YR: CPT | Performed by: NURSE PRACTITIONER

## 2018-08-23 PROCEDURE — G8427 DOCREV CUR MEDS BY ELIG CLIN: HCPCS | Performed by: NURSE PRACTITIONER

## 2018-08-23 PROCEDURE — 99213 OFFICE O/P EST LOW 20 MIN: CPT | Performed by: NURSE PRACTITIONER

## 2018-08-23 PROCEDURE — G8400 PT W/DXA NO RESULTS DOC: HCPCS | Performed by: NURSE PRACTITIONER

## 2018-08-23 PROCEDURE — 1036F TOBACCO NON-USER: CPT | Performed by: NURSE PRACTITIONER

## 2018-08-23 PROCEDURE — G8417 CALC BMI ABV UP PARAM F/U: HCPCS | Performed by: NURSE PRACTITIONER

## 2018-08-23 PROCEDURE — 1123F ACP DISCUSS/DSCN MKR DOCD: CPT | Performed by: NURSE PRACTITIONER

## 2018-08-23 RX ORDER — SULFAMETHOXAZOLE AND TRIMETHOPRIM 800; 160 MG/1; MG/1
1 TABLET ORAL 2 TIMES DAILY
Qty: 14 TABLET | Refills: 0 | Status: SHIPPED | OUTPATIENT
Start: 2018-08-23 | End: 2018-08-30

## 2018-08-23 ASSESSMENT — ENCOUNTER SYMPTOMS
GASTROINTESTINAL NEGATIVE: 1
EYES NEGATIVE: 1
RESPIRATORY NEGATIVE: 1

## 2018-08-23 NOTE — PROGRESS NOTES
Subjective:     Patient Name: Piero Casarez is a 78 y.o. female. Chief Complaint   Patient presents with    Mass     under right armpit area        HPI  Patient is here today for follow-up on abscess under right axilla. On 8/20/18 the patient did have spontaneous rupture of the abscess and packing was applied at that time. The patient also had multiple areas that were forming abscess at that visit. The patient states that the other areas have not opened up or drained and actually have become smaller. The patient states that the packing in the ruptured abscess is still in place. She denies any fever, chills, nausea, vomiting or diarrhea. She continues on the Bactrim DS and has been on the medication for approximately 8 days. The patient did have a wound culture on 8/15/18 that did show positive heavy growth MRSA    Review of Systems   Constitutional: Negative. HENT: Negative. Eyes: Negative. Respiratory: Negative. Cardiovascular: Negative. Gastrointestinal: Negative. Genitourinary: Negative. Musculoskeletal: Negative. Skin: Negative. Right axilla  abscess   Neurological: Negative. Endo/Heme/Allergies: Negative. Psychiatric/Behavioral: Negative. All other systems reviewed and are negative.        Past Medical History:   Diagnosis Date    Acute exacerbation of chronic obstructive pulmonary disease (COPD) ([address]     Acute on chronic respiratory failure (HCC)     Atrial fibrillation with rapid ventricular response ([address] 6/23/2015    CAP (community acquired pneumonia) 1/29/2015    CHF (congestive heart failure) (HCC)     Diverticulitis     HCAP (healthcare-associated pneumonia)     Panic disorder     Pulmonary embolism ([address]     6/15    [facility] spotted fever 2013    Sciatic nerve pain     Sepsis ([address] 6/22/2015    Tobacco abuse 6/22/2015    Vitamin D deficiency      Family History   Problem Relation Age of Onset    Cancer Mother 43        lung every 6 hours as needed for Wheezing or Shortness of Breath DX COPD J44.9 360 mL 3    melatonin 3 MG TABS tablet Take 1 tablet by mouth nightly as needed (insomnia) 15 tablet 0    OXYGEN 4.5 L      Cholecalciferol (VITAMIN D-3 PO) Take 2,000 Units by mouth daily        No current facility-administered medications for this visit. No changes in past medical history, past surgical history, social history, or family history were noted during the patient encounter unless specifically listed above. All updates of past medical history, past surgical history, social history, or family history were reviewed personally by me during the office visit. All problems listed in the assessment are stable unless noted otherwise. Medication profile reviewed personally by me during the office visit. Medication side effects and possible impairments from medications were discussed as applicable. Objective:       Physical Exam   Constitutional: She is oriented to person, place, and time. Vital signs are normal. She appears well-developed and well-nourished. She is cooperative. Non-toxic appearance. She does not have a sickly appearance. No distress. HENT:   Head: Normocephalic and atraumatic. Right Ear: Hearing, tympanic membrane and ear canal normal.   Left Ear: Hearing, tympanic membrane and ear canal normal.   Nose: Nose normal.   Mouth/Throat: Uvula is midline, oropharynx is clear and moist and mucous membranes are normal.   Eyes: Conjunctivae and lids are normal.   Neck: Neck supple. Cardiovascular: Normal rate, regular rhythm, normal heart sounds and normal pulses. Pulmonary/Chest: Effort normal and breath sounds normal. No accessory muscle usage. No respiratory distress. Abdominal: Soft. Normal appearance. There is no tenderness. Lymphadenopathy:        Head (right side): No submental and no submandibular adenopathy present.         Head (left side): No submental and no submandibular adenopathy present. She has no cervical adenopathy. Neurological: She is alert and oriented to person, place, and time. Skin: Skin is warm and dry. No lesion and no rash noted. Psychiatric: She has a normal mood and affect. Her speech is normal and behavior is normal. Cognition and memory are normal.       /64 (Site: Left Arm, Position: Sitting, Cuff Size: Large Adult)   Temp 98.3 °F (36.8 °C) (Oral)   Ht 5' 2\" (1.575 m)   Wt 265 lb (120.2 kg)   LMP  (LMP Unknown)   BMI 48.47 kg/m²   Body mass index is 48.47 kg/m².     BP Readings from Last 2 Encounters:   08/23/18 110/64   08/22/18 118/66       Wt Readings from Last 3 Encounters:   08/23/18 265 lb (120.2 kg)   08/22/18 265 lb 9.6 oz (120.5 kg)   08/20/18 265 lb (120.2 kg)       Lab Review   Nurse Only on 08/17/2018   Component Date Value    INR 08/17/2018 3.1    Office Visit on 08/15/2018   Component Date Value    Gram Stain Result 08/15/2018 *                    Value:1+ Gram positive cocci  1+ WBC's (Polymorphonuclear)      Organism 08/15/2018 Staph aureus MRSA*    WOUND/ABSCESS 08/15/2018 *                    Value:Heavy growth  [address] Outpatient Visit on 07/30/2018   Component Date Value    Sodium 07/30/2018 147*    Potassium 07/30/2018 4.3     Chloride 07/30/2018 100     CO2 07/30/2018 28     Anion Gap 07/30/2018 19*    Glucose 07/30/2018 73     BUN 07/30/2018 16     CREATININE 07/30/2018 0.7     GFR Non- 07/30/2018 >60     GFR  07/30/2018 >60     Calcium 07/30/2018 9.0     Pro-BNP 07/30/2018 247    Anti-coag visit on 07/25/2018   Component Date Value    INR 07/25/2018 2.10    Anti-coag visit on 07/16/2018   Component Date Value    INR 07/16/2018 2.10    Office Visit on 07/12/2018   Component Date Value    INR 07/12/2018 3.1    Anti-coag visit on 07/09/2018   Component Date Value    INR 07/09/2018 3.50    Admission on 06/25/2018, Discharged on 06/28/2018 Component Date Value    WBC 06/25/2018 7.8     RBC 06/25/2018 4.47     Hemoglobin 06/25/2018 12.6     Hematocrit 06/25/2018 38.9     MCV 06/25/2018 87.1     MCH 06/25/2018 28.3     MCHC 06/25/2018 32.5     RDW 06/25/2018 14.0     Platelets 03/71/1467 300     MPV 06/25/2018 7.3     Neutrophils % 06/25/2018 56.8     Lymphocytes % 06/25/2018 30.2     Monocytes % 06/25/2018 9.7     Eosinophils % 06/25/2018 2.5     Basophils % 06/25/2018 0.8     Neutrophils # 06/25/2018 4.4     Lymphocytes # 06/25/2018 2.4     Monocytes # 06/25/2018 0.8     Eosinophils # 06/25/2018 0.2     Basophils # 06/25/2018 0.1     Sodium 06/25/2018 139     Potassium 06/25/2018 4.0     Chloride 06/25/2018 97*    CO2 06/25/2018 32     Anion Gap 06/25/2018 10     Glucose 06/25/2018 122*    BUN 06/25/2018 13     CREATININE 06/25/2018 <0.5*    GFR Non- 06/25/2018 >60     GFR  06/25/2018 >60     Calcium 06/25/2018 9.2     Total Protein 06/25/2018 7.3     Alb 06/25/2018 3.9     Albumin/Globulin Ratio 06/25/2018 1.1     Total Bilirubin 06/25/2018 0.3     Alkaline Phosphatase 06/25/2018 87     ALT 06/25/2018 12     AST 06/25/2018 16     Globulin 06/25/2018 3.4     Pro-BNP 06/25/2018 197     Troponin 06/25/2018 <0.01     Ventricular Rate 06/25/2018 85     Atrial Rate 06/25/2018 85     P-R Interval 06/25/2018 166     QRS Duration 06/25/2018 90     Q-T Interval 06/25/2018 368     QTc Calculation ([place]azett) 06/25/2018 437     P Axis 06/25/2018 69     R Axis 06/25/2018 -10     T Axis 06/25/2018 63     Diagnosis 06/25/2018                      Value:Normal sinus rhythm  Normal ECG  When compared with ECG of 27-OCT-2017 10:39, (unconfirmed)  Questionable change in QRS axis  Confirmed by Saw Hathaway MD, [address] ([id number]) on 6/25/2018 5:06:48 PM      Protime 06/25/2018 29.0*    INR 06/25/2018 2.54*    Troponin 06/25/2018 <0.01     WBC 06/26/2018 8.7     RBC 06/26/2018 4.14     Hemoglobin 06/26/2018 11.9*    Hematocrit 06/26/2018 35.9*    MCV 06/26/2018 86.6     MCH 06/26/2018 28.7     MCHC 06/26/2018 33.1     RDW 06/26/2018 13.8     Platelets 17/40/0531 292     MPV 06/26/2018 7.3     Troponin 06/25/2018 <0.01     Protime 06/26/2018 29.6*    INR 06/26/2018 2.60*    Protime 06/27/2018 32.5*    INR 06/27/2018 2.85*    Protime 06/28/2018 36.2*    INR 06/28/2018 3.18*    Sodium 06/28/2018 142     Potassium 06/28/2018 4.2     Chloride 06/28/2018 101     CO2 06/28/2018 36*    Anion Gap 06/28/2018 5     Glucose 06/28/2018 97     BUN 06/28/2018 21*    CREATININE 06/28/2018 <0.5*    GFR Non- 06/28/2018 >60     GFR  06/28/2018 >60     Calcium 06/28/2018 8.7     WBC 06/28/2018 12.2*    RBC 06/28/2018 4.15     Hemoglobin 06/28/2018 11.7*    Hematocrit 06/28/2018 36.4     MCV 06/28/2018 87.9     MCH 06/28/2018 28.2     MCHC 06/28/2018 32.1     RDW 06/28/2018 14.0     Platelets 60/22/6088 308     MPV 06/28/2018 7.4     Neutrophils % 06/28/2018 62.8     Lymphocytes % 06/28/2018 28.3     Monocytes % 06/28/2018 8.4     Eosinophils % 06/28/2018 0.2     Basophils % 06/28/2018 0.3     Neutrophils # 06/28/2018 7.7     Lymphocytes # 06/28/2018 3.4     Monocytes # 06/28/2018 1.0     Eosinophils # 06/28/2018 0.0     Basophils # 06/28/2018 0.0    Nurse Only on 06/25/2018   Component Date Value    INR 06/25/2018 2.6        No results found for this visit on 08/23/18. Assessment:       1. Abscess of right axilla        No results found for this visit on 08/23/18. Plan:       [place] was seen today for mass. Diagnoses and all orders for this visit:    Abscess of right axilla  Area is improving and packing was removed. There was a clean wound bed the area was thoroughly cleaned and flushed and a new dressing without packing was applied. Patient was educated that she should continue with daily dressing changes.  We'll put her on one

## 2018-08-27 ENCOUNTER — ANTI-COAG VISIT (OUTPATIENT)
Dept: FAMILY MEDICINE CLINIC | Age: 78
End: 2018-08-27

## 2018-08-27 LAB — INR BLD: 3

## 2018-08-27 NOTE — PROGRESS NOTES
Patient is currently on antibiotic therapy until 8/30/18.   Please have patient continue the same Coumadin regimen and recheck PT/INR on Wednesday

## 2018-08-30 ENCOUNTER — ANTI-COAG VISIT (OUTPATIENT)
Dept: FAMILY MEDICINE CLINIC | Age: 78
End: 2018-08-30

## 2018-08-30 LAB — INR BLD: 3.4

## 2018-08-31 ENCOUNTER — TELEPHONE (OUTPATIENT)
Dept: FAMILY MEDICINE CLINIC | Age: 78
End: 2018-08-31

## 2018-08-31 ASSESSMENT — ENCOUNTER SYMPTOMS
GASTROINTESTINAL NEGATIVE: 1
ROS SKIN COMMENTS: RIGHT AXILLA ABSCESS
EYES NEGATIVE: 1
RESPIRATORY NEGATIVE: 1

## 2018-08-31 NOTE — TELEPHONE ENCOUNTER
Pt stated that she was suppose to come in today to have her pro time completed and due to transportation issues was not able to make it in and wants to know what her maintenance dose should be.

## 2018-08-31 NOTE — TELEPHONE ENCOUNTER
I recommend that the patient take Coumadin 2.5 mg today and then 5 mg every day and recheck PT/INR on Tuesday

## 2018-09-04 ENCOUNTER — TELEPHONE (OUTPATIENT)
Dept: FAMILY MEDICINE CLINIC | Age: 78
End: 2018-09-04

## 2018-09-04 ASSESSMENT — ENCOUNTER SYMPTOMS
NAUSEA: 0
GASTROINTESTINAL NEGATIVE: 1
NAIL CHANGES: 0
DIARRHEA: 0
EYE PAIN: 0
ABDOMINAL PAIN: 0
VOMITING: 0
CHANGE IN BOWEL HABIT: 0
VISUAL CHANGE: 0
RHINORRHEA: 0
COUGH: 1
EYES NEGATIVE: 1
SHORTNESS OF BREATH: 0
SORE THROAT: 1
SWOLLEN GLANDS: 0

## 2018-09-05 ENCOUNTER — TELEPHONE (OUTPATIENT)
Dept: FAMILY MEDICINE CLINIC | Age: 78
End: 2018-09-05

## 2018-09-05 ENCOUNTER — ANTI-COAG VISIT (OUTPATIENT)
Dept: FAMILY MEDICINE CLINIC | Age: 78
End: 2018-09-05

## 2018-09-05 LAB — INR BLD: 1.4

## 2018-09-05 NOTE — TELEPHONE ENCOUNTER
Patient's INR yesterday was 1.3. It should not jump that high over night. She needs a repeat INR stat.

## 2018-09-05 NOTE — TELEPHONE ENCOUNTER
Zina Gaona from [employer] called with PT and INR results. PT: 1.4 : INR: 17.4 : pt took coumadin 7.5mg last night.    Call back number [phone]

## 2018-09-07 ENCOUNTER — TELEPHONE (OUTPATIENT)
Dept: FAMILY MEDICINE CLINIC | Age: 78
End: 2018-09-07

## 2018-09-07 NOTE — TELEPHONE ENCOUNTER
Tejal([employer]) [phone] called with results    INR= 2.2 Her   Protime 26.8  Dose taken was 7.5 on Tues/ 10 on WED/ 5 on Thurs    Please advise.  Thank You

## 2018-09-10 ENCOUNTER — ANTI-COAG VISIT (OUTPATIENT)
Dept: FAMILY MEDICINE CLINIC | Age: 78
End: 2018-09-10

## 2018-09-10 ENCOUNTER — TELEPHONE (OUTPATIENT)
Dept: FAMILY MEDICINE CLINIC | Age: 78
End: 2018-09-10

## 2018-09-10 LAB — INR BLD: 3

## 2018-09-10 NOTE — TELEPHONE ENCOUNTER
Take an additional furosemide for the next 3 days. Call in with daily weights. Elevated legs above heart. Decrease salt intake. Compression hose on BLE.

## 2018-09-10 NOTE — TELEPHONE ENCOUNTER
Lauro Reinoso from [facility] called regarding pt having a lot of edema today. Had a 2lb weight gain overnight.  Would like a call back with instructions on what to do. [phone]

## 2018-09-12 ENCOUNTER — ANTI-COAG VISIT (OUTPATIENT)
Dept: FAMILY MEDICINE CLINIC | Age: 78
End: 2018-09-12

## 2018-09-12 ENCOUNTER — TELEPHONE (OUTPATIENT)
Dept: FAMILY MEDICINE CLINIC | Age: 78
End: 2018-09-12

## 2018-09-12 LAB — INR BLD: 2.2

## 2018-09-12 NOTE — PROGRESS NOTES
Spoke w/ patient, informed of dosing instructions and date of next testing. She is scheduled for a follow up appt tomorrow.  Advised pt to keep appt but recheck INR on Monday

## 2018-09-12 NOTE — TELEPHONE ENCOUNTER
Patient is taking 40 mg bid since Monday. She took one this morning and typically takes her 2nd pill around dinner.   She does have her follow up with you tomorrow

## 2018-09-13 ENCOUNTER — OFFICE VISIT (OUTPATIENT)
Dept: FAMILY MEDICINE CLINIC | Age: 78
End: 2018-09-13

## 2018-09-13 VITALS
BODY MASS INDEX: 48.76 KG/M2 | HEART RATE: 86 BPM | WEIGHT: 265 LBS | HEIGHT: 62 IN | DIASTOLIC BLOOD PRESSURE: 64 MMHG | OXYGEN SATURATION: 92 % | SYSTOLIC BLOOD PRESSURE: 110 MMHG

## 2018-09-13 DIAGNOSIS — R73.01 IFG (IMPAIRED FASTING GLUCOSE): ICD-10-CM

## 2018-09-13 DIAGNOSIS — E66.01 CLASS 3 SEVERE OBESITY DUE TO EXCESS CALORIES WITH SERIOUS COMORBIDITY AND BODY MASS INDEX (BMI) OF 45.0 TO 49.9 IN ADULT (HCC): Primary | ICD-10-CM

## 2018-09-13 DIAGNOSIS — R60.0 LEG EDEMA, RIGHT: ICD-10-CM

## 2018-09-13 DIAGNOSIS — M79.661 RIGHT CALF PAIN: ICD-10-CM

## 2018-09-13 DIAGNOSIS — E66.01 MORBID OBESITY WITH BMI OF 45.0-49.9, ADULT (HCC): ICD-10-CM

## 2018-09-13 DIAGNOSIS — F41.9 ANXIETY: ICD-10-CM

## 2018-09-13 DIAGNOSIS — E55.9 VITAMIN D DEFICIENCY: ICD-10-CM

## 2018-09-13 DIAGNOSIS — E78.5 HYPERLIPIDEMIA, UNSPECIFIED HYPERLIPIDEMIA TYPE: ICD-10-CM

## 2018-09-13 DIAGNOSIS — Z86.711 HISTORY OF PULMONARY EMBOLUS (PE): ICD-10-CM

## 2018-09-13 LAB
INTERNATIONAL NORMALIZATION RATIO, POC: 2
PROTHROMBIN TIME, POC: NORMAL

## 2018-09-13 PROCEDURE — 1101F PT FALLS ASSESS-DOCD LE1/YR: CPT | Performed by: NURSE PRACTITIONER

## 2018-09-13 PROCEDURE — G8427 DOCREV CUR MEDS BY ELIG CLIN: HCPCS | Performed by: NURSE PRACTITIONER

## 2018-09-13 PROCEDURE — 3017F COLORECTAL CA SCREEN DOC REV: CPT | Performed by: NURSE PRACTITIONER

## 2018-09-13 PROCEDURE — 85610 PROTHROMBIN TIME: CPT | Performed by: NURSE PRACTITIONER

## 2018-09-13 PROCEDURE — 4040F PNEUMOC VAC/ADMIN/RCVD: CPT | Performed by: NURSE PRACTITIONER

## 2018-09-13 PROCEDURE — G8400 PT W/DXA NO RESULTS DOC: HCPCS | Performed by: NURSE PRACTITIONER

## 2018-09-13 PROCEDURE — 99214 OFFICE O/P EST MOD 30 MIN: CPT | Performed by: NURSE PRACTITIONER

## 2018-09-13 PROCEDURE — 1123F ACP DISCUSS/DSCN MKR DOCD: CPT | Performed by: NURSE PRACTITIONER

## 2018-09-13 PROCEDURE — G8417 CALC BMI ABV UP PARAM F/U: HCPCS | Performed by: NURSE PRACTITIONER

## 2018-09-13 PROCEDURE — 1090F PRES/ABSN URINE INCON ASSESS: CPT | Performed by: NURSE PRACTITIONER

## 2018-09-13 PROCEDURE — 1036F TOBACCO NON-USER: CPT | Performed by: NURSE PRACTITIONER

## 2018-09-13 RX ORDER — PRAVASTATIN SODIUM 20 MG
20 TABLET ORAL DAILY
Qty: 30 TABLET | Refills: 5 | Status: SHIPPED | OUTPATIENT
Start: 2018-09-13 | End: 2018-11-26 | Stop reason: SINTOL

## 2018-09-13 ASSESSMENT — PATIENT HEALTH QUESTIONNAIRE - PHQ9
2. FEELING DOWN, DEPRESSED OR HOPELESS: 2
10. IF YOU CHECKED OFF ANY PROBLEMS, HOW DIFFICULT HAVE THESE PROBLEMS MADE IT FOR YOU TO DO YOUR WORK, TAKE CARE OF THINGS AT HOME, OR GET ALONG WITH OTHER PEOPLE: 2
7. TROUBLE CONCENTRATING ON THINGS, SUCH AS READING THE NEWSPAPER OR WATCHING TELEVISION: 0
SUM OF ALL RESPONSES TO PHQ QUESTIONS 1-9: 13
4. FEELING TIRED OR HAVING LITTLE ENERGY: 3
3. TROUBLE FALLING OR STAYING ASLEEP: 3
6. FEELING BAD ABOUT YOURSELF - OR THAT YOU ARE A FAILURE OR HAVE LET YOURSELF OR YOUR FAMILY DOWN: 3
SUM OF ALL RESPONSES TO PHQ9 QUESTIONS 1 & 2: 3
1. LITTLE INTEREST OR PLEASURE IN DOING THINGS: 1
9. THOUGHTS THAT YOU WOULD BE BETTER OFF DEAD, OR OF HURTING YOURSELF: 0
5. POOR APPETITE OR OVEREATING: 1
SUM OF ALL RESPONSES TO PHQ QUESTIONS 1-9: 13
8. MOVING OR SPEAKING SO SLOWLY THAT OTHER PEOPLE COULD HAVE NOTICED. OR THE OPPOSITE, BEING SO FIGETY OR RESTLESS THAT YOU HAVE BEEN MOVING AROUND A LOT MORE THAN USUAL: 0

## 2018-09-13 ASSESSMENT — ENCOUNTER SYMPTOMS
RESPIRATORY NEGATIVE: 1
EYES NEGATIVE: 1
GASTROINTESTINAL NEGATIVE: 1

## 2018-09-13 NOTE — PROGRESS NOTES
Results   Component Value Date    ALT 12 06/25/2018    AST 16 06/25/2018        Vitamin D Deficiency  Patient with vitamin d deficiency and currently on supplement without adverse reactions. Mood Disorder:  Patient presents for follow-up of anxiety disorder. Current complaints include: anious and other symptoms below. She denies any other symptoms, increased use of drugs or alcohol and suicidal thoughts or behavior. Symptoms/signs of zulay: none. External stressors: nothing new. Current treatment includes: sleep aid- trazodone 50 mg  and vistaril 25 mg tid prn. Medication side effects: none  . PHQ-9  6/13/2018 5/2/2018 6/14/2017 1/14/2015   Little interest or pleasure in doing things 1 2 1 1   Feeling down, depressed, or hopeless 1 2 0 0   Trouble falling or staying asleep, or sleeping too much 1 3 - -   Feeling tired or having little energy 3 3 - -   Poor appetite or overeating 2 3 - -   Feeling bad about yourself - or that you are a failure or have let yourself or your family down 1 2 - -   Trouble concentrating on things, such as reading the newspaper or watching television 0 1 - -   Moving or speaking so slowly that other people could have noticed. Or the opposite - being so fidgety or restless that you have been moving around a lot more than usual 0 0 - -   Thoughts that you would be better off dead, or of hurting yourself in some way 0 0 - -   PHQ-2 Score 2 4 1 1   PHQ-9 Total Score 9 16 1 1   If you checked off any problems, how difficult have these problems made it for you to do your work, take care of things at home, or get along with other people? 1 1 - -     Interpretation of Total Score Total Score Depression Severity: 1-4 = Minimal depression, 5-9 = Mild depression, 10-14 = Moderate depression, 15-19 = Moderately severe depression, 20-27 = Severe depression        Review of Systems   Constitutional: Negative. HENT: Negative. Eyes: Negative. Respiratory: Negative.     Cardiovascular: Refill    furosemide (LASIX) 40 MG tablet Take 1 tablet by mouth daily (Patient taking differently: Take 40 mg by mouth daily Pt is to take 2 tablet 3 days a week) 30 tablet 0    potassium chloride (KLOR-CON M) 10 MEQ extended release tablet Take 1 tablet by mouth daily 30 tablet 0    warfarin (COUMADIN) 5 MG tablet TAKE ONE TABLET BY MOUTH DAILY ON MON, TUES, THURS, FRI,  AND SAT, AND SUNDAY (TAKE 2.5 MG ON WED) 75 tablet 2    hydrOXYzine (VISTARIL) 25 MG capsule Take 1 capsule by mouth 3 times daily as needed for Anxiety 60 capsule 1    albuterol sulfate HFA (VENTOLIN HFA) 108 (90 Base) MCG/ACT inhaler Inhale 2 puffs into the lungs every 6 hours as needed for Wheezing 2 Inhaler 3    traZODone (DESYREL) 50 MG tablet Take 1 tablet by mouth nightly 30 tablet 1    albuterol (PROVENTIL) (2.5 MG/3ML) 0.083% nebulizer solution Take 3 mLs by nebulization every 6 hours as needed for Wheezing or Shortness of Breath DX COPD J44.9 360 mL 3    melatonin 3 MG TABS tablet Take 1 tablet by mouth nightly as needed (insomnia) 15 tablet 0    OXYGEN 4.5 L      Cholecalciferol (VITAMIN D-3 PO) Take 2,000 Units by mouth daily        No current facility-administered medications for this visit. No changes in past medical history, past surgical history, social history, or family history were noted during the patient encounter unless specifically listed above. All updates of past medical history, past surgical history, social history, or family history were reviewed personally by me during the office visit. All problems listed in the assessment are stable unless noted otherwise. Medication profile reviewed personally by me during the office visit. Medication side effects and possible impairments from medications were discussed as applicable. Objective:       Physical Exam   Constitutional: She is oriented to person, place, and time. She appears well-developed and well-nourished. No distress.  Nasal cannula in place.   HENT:   Head: Normocephalic and atraumatic. Right Ear: Tympanic membrane, external ear and ear canal normal.   Left Ear: Tympanic membrane, external ear and ear canal normal.   Nose: Nose normal.   Mouth/Throat: Uvula is midline, oropharynx is clear and moist and mucous membranes are normal. No oropharyngeal exudate. Eyes: Pupils are equal, round, and reactive to light. Conjunctivae, EOM and lids are normal.   Neck: Normal range of motion. Neck supple. No JVD present. Carotid bruit is not present. No thyromegaly present. Cardiovascular: Normal rate, regular rhythm, normal heart sounds and normal pulses. Exam reveals no gallop and no friction rub. No murmur heard. Pulses:       Radial pulses are 2+ on the right side, and 2+ on the left side. Dorsalis pedis pulses are 2+ on the right side, and 2+ on the left side. Posterior tibial pulses are 2+ on the right side, and 2+ on the left side. Pulmonary/Chest: Effort normal and breath sounds normal.   Abdominal: Soft. Normal appearance and bowel sounds are normal. She exhibits no mass. There is no hepatosplenomegaly. There is no tenderness. Musculoskeletal: Normal range of motion. She exhibits edema (right LE edema). Lymphadenopathy:        Head (right side): No submandibular adenopathy present. Head (left side): No submandibular adenopathy present. She has no cervical adenopathy. Neurological: She is alert and oriented to person, place, and time. She has normal strength. Gait normal.   Skin: Skin is warm and dry. No lesion and no rash noted. Psychiatric: She has a normal mood and affect. Her speech is normal and behavior is normal. Judgment and thought content normal. Cognition and memory are normal.   Nursing note and vitals reviewed.       /64 (Site: Left Upper Arm, Position: Sitting, Cuff Size: Large Adult)   Pulse 86   Ht 5' 2\" (1.575 m)   Wt 265 lb (120.2 kg)   LMP  (LMP Unknown)   SpO2 92%   BMI

## 2018-09-13 NOTE — PATIENT INSTRUCTIONS
play a relaxation tape while you drive a car. When should you call for help? Call 911 anytime you think you may need emergency care. For example, call if:    · You feel you cannot stop from hurting yourself or someone else.   Avram Ahumada the numbers for these national suicide hotlines: 4-222-947-TALK (1-979.734.1569) and 2-366-MSGMHMF (3-202.780.5325). If you or someone you know talks about suicide or feeling hopeless, get help right away.   Watch closely for changes in your health, and be sure to contact your doctor if:    · You have anxiety or fear that affects your life.     · You have symptoms of anxiety that are new or different from those you had before. Where can you learn more? Go to https://YesweplaypeXipineb.FamilySkyline. org and sign in to your [employer] account. Enter P754 in the [employer] box to learn more about \"Anxiety Disorder: Care Instructions. \"     If you do not have an account, please click on the \"Sign Up Now\" link. Current as of: December 7, 2017  Content Version: 11.7  © 9793-5760 [employer]. Care instructions adapted under license by [facility] ([facility]). If you have questions about a medical condition or this instruction, always ask your healthcare professional. [address] any warranty or liability for your use of this information. Patient Education        Learning About Low-Carbohydrate Diets for Weight Loss  What is a low-carbohydrate diet? Low-carb diets avoid foods that are high in carbohydrate. These high-carb foods include pasta, bread, rice, cereal, fruits, and starchy vegetables. Instead, these diets usually have you eat foods that are high in fat and protein. Many people lose weight quickly on a low-carb diet. But the early weight loss is water. People on this diet often gain the weight back after they start eating carbs again. Not all diet plans are safe or work well. A lot of the evidence shows that low-carb diets aren't healthy. That's because these diets often don't include healthy foods like fruits and vegetables. Losing weight safely means balancing protein, fat, and carbs with every meal and snack. And low-carb diets don't always provide the vitamins, minerals, and fiber you need. If you have a serious medical condition, talk to your doctor before you try any diet. These conditions include kidney disease, heart disease, type 2 diabetes, high cholesterol, and high blood pressure. If you are pregnant, it may not be safe for your baby if you are on a low-carb diet. How can you lose weight safely? You might have heard that a diet plan helped another person lose weight. But that doesn't mean that it will work for you. It is very hard to stay on a diet that includes lots of big changes in your eating habits. If you want to get to a healthy weight and stay there, making healthy lifestyle changes will often work better than dieting. These steps can help. · Make a plan for change. Work with your doctor to create a plan that is right for you. · See a dietitian. He or she can show you how to make healthy changes in your eating habits. · Manage stress. If you have a lot of stress in your life, it can be hard to focus on making healthy changes to your daily habits. · Track your food and activity. You are likely to do better at losing weight if you keep track of what you eat and what you do. Follow-up care is a key part of your treatment and safety. Be sure to make and go to all appointments, and call your doctor if you are having problems. It's also a good idea to know your test results and keep a list of the medicines you take. Where can you learn more? Go to https://dillon.[employer]ards. org and sign in to your [employer] account. Enter A121 in the [employer] box to learn more about \"Learning About Low-Carbohydrate Diets for Weight Loss. \"     If you do not have an account, please click on the \"Sign Up Now\"

## 2018-09-14 ENCOUNTER — HOSPITAL ENCOUNTER (OUTPATIENT)
Dept: VASCULAR LAB | Age: 78
Discharge: HOME | End: 2018-09-14
Payer: MEDICARE

## 2018-09-14 PROCEDURE — 93971 EXTREMITY STUDY: CPT

## 2018-09-17 ENCOUNTER — ANTI-COAG VISIT (OUTPATIENT)
Dept: FAMILY MEDICINE CLINIC | Age: 78
End: 2018-09-17

## 2018-09-17 ENCOUNTER — TELEPHONE (OUTPATIENT)
Dept: FAMILY MEDICINE CLINIC | Age: 78
End: 2018-09-17

## 2018-09-17 LAB — INR BLD: 3.6

## 2018-09-17 NOTE — TELEPHONE ENCOUNTER
[phone] (home)  Patient states she was feeling a little weak and lightheaded earlier. She is sitting now and feel ok. Once in a while she feels the room spin. She states home nurse will be back to her house on Thursday. She denies missing any medications and is still taking lasix. She also states she just started a new cholesterol medication Pravastatin and is wondering if this might have caused it? She states she is trying to lose weight and has been watching her diet. She only ate yogurt and fruit this morning and nothing for lunch. She is cooking hamburgers for dinner. Right now she feels ok.

## 2018-09-17 NOTE — TELEPHONE ENCOUNTER
Maura Watson from [facility] went to see pt and she was wanting to let us know that pt's blood pressure was 99/46 if there if anything that needs to be changed or added please call pt

## 2018-09-20 ENCOUNTER — CARE COORDINATION (OUTPATIENT)
Dept: CARE COORDINATION | Age: 78
End: 2018-09-20

## 2018-09-20 LAB — INR BLD: 1.8

## 2018-09-20 NOTE — CARE COORDINATION
Future Appointments  Date Time Provider Allyssa Levine   11/26/2018 11:45 AM Tom Theodore MD [facility]      and   COPD Assessment    Does the patient understand envrionmental exposure?:  Yes  Is the patient able to verbalize Rescue vs. Long Acting medications?:  Yes  Does the patient have a nebulizer?:  Yes  Does the patient use a space with inhaled medications?:  No     No patient-reported symptoms         Symptoms:

## 2018-09-21 ENCOUNTER — TELEPHONE (OUTPATIENT)
Dept: FAMILY MEDICINE CLINIC | Age: 78
End: 2018-09-21

## 2018-09-21 ENCOUNTER — ANTI-COAG VISIT (OUTPATIENT)
Dept: FAMILY MEDICINE CLINIC | Age: 78
End: 2018-09-21

## 2018-09-21 ENCOUNTER — HOSPITAL ENCOUNTER (OUTPATIENT)
Age: 78
Setting detail: SPECIMEN
Discharge: HOME | End: 2018-09-21
Payer: MEDICARE

## 2018-09-21 DIAGNOSIS — R06.02 SOB (SHORTNESS OF BREATH): Primary | ICD-10-CM

## 2018-09-21 LAB
A/G RATIO: 1.4 (ref 1.1–2.2)
ALBUMIN SERPL-MCNC: 4.2 G/DL (ref 3.4–5)
ALP BLD-CCNC: 91 U/L (ref 40–129)
ALT SERPL-CCNC: 16 U/L (ref 10–40)
ANION GAP SERPL CALCULATED.3IONS-SCNC: 12 MMOL/L (ref 3–16)
AST SERPL-CCNC: 16 U/L (ref 15–37)
BASOPHILS ABSOLUTE: 0 K/UL (ref 0–0.2)
BASOPHILS RELATIVE PERCENT: 0.6 %
BILIRUB SERPL-MCNC: 0.3 MG/DL (ref 0–1)
BUN BLDV-MCNC: 19 MG/DL (ref 7–20)
CALCIUM SERPL-MCNC: 9.2 MG/DL (ref 8.3–10.6)
CHLORIDE BLD-SCNC: 97 MMOL/L (ref 99–110)
CO2: 33 MMOL/L (ref 21–32)
CREAT SERPL-MCNC: <0.5 MG/DL (ref 0.6–1.2)
EOSINOPHILS ABSOLUTE: 0.2 K/UL (ref 0–0.6)
EOSINOPHILS RELATIVE PERCENT: 2.3 %
GFR AFRICAN AMERICAN: >60
GFR NON-AFRICAN AMERICAN: >60
GLOBULIN: 2.9 G/DL
GLUCOSE BLD-MCNC: 199 MG/DL (ref 70–99)
HCT VFR BLD CALC: 40.5 % (ref 36–48)
HEMOGLOBIN: 12.7 G/DL (ref 12–16)
LYMPHOCYTES ABSOLUTE: 2.7 K/UL (ref 1–5.1)
LYMPHOCYTES RELATIVE PERCENT: 35.1 %
MCH RBC QN AUTO: 27.3 PG (ref 26–34)
MCHC RBC AUTO-ENTMCNC: 31.3 G/DL (ref 31–36)
MCV RBC AUTO: 87.1 FL (ref 80–100)
MONOCYTES ABSOLUTE: 0.6 K/UL (ref 0–1.3)
MONOCYTES RELATIVE PERCENT: 7.7 %
NEUTROPHILS ABSOLUTE: 4.2 K/UL (ref 1.7–7.7)
NEUTROPHILS RELATIVE PERCENT: 54.3 %
PDW BLD-RTO: 14.3 % (ref 12.4–15.4)
PLATELET # BLD: 283 K/UL (ref 135–450)
PMV BLD AUTO: 7.2 FL (ref 5–10.5)
POTASSIUM SERPL-SCNC: 4.2 MMOL/L (ref 3.5–5.1)
RBC # BLD: 4.65 M/UL (ref 4–5.2)
SODIUM BLD-SCNC: 142 MMOL/L (ref 136–145)
TOTAL PROTEIN: 7.1 G/DL (ref 6.4–8.2)
WBC # BLD: 7.7 K/UL (ref 4–11)

## 2018-09-21 PROCEDURE — 85025 COMPLETE CBC W/AUTO DIFF WBC: CPT

## 2018-09-21 PROCEDURE — 36415 COLL VENOUS BLD VENIPUNCTURE: CPT

## 2018-09-21 PROCEDURE — 80053 COMPREHEN METABOLIC PANEL: CPT

## 2018-09-21 NOTE — TELEPHONE ENCOUNTER
Keerthi Whipple from [facility] [phone] called and said that pt is still puffy and short of breath was wanting to see if she could get some blood work orders afraid that maybe renal failure or heart failure

## 2018-09-21 NOTE — TELEPHONE ENCOUNTER
Villa Marie informed she can do the blood work but will have to go to [facility] for xray order enter to system for them

## 2018-09-22 ENCOUNTER — HOSPITAL ENCOUNTER (OUTPATIENT)
Age: 78
Discharge: HOME | End: 2018-09-22
Payer: MEDICARE

## 2018-09-22 ENCOUNTER — HOSPITAL ENCOUNTER (OUTPATIENT)
Dept: GENERAL RADIOLOGY | Age: 78
Discharge: HOME | End: 2018-09-22
Payer: MEDICARE

## 2018-09-22 DIAGNOSIS — R06.02 SOB (SHORTNESS OF BREATH): ICD-10-CM

## 2018-09-22 PROCEDURE — 71046 X-RAY EXAM CHEST 2 VIEWS: CPT

## 2018-09-25 ENCOUNTER — TELEPHONE (OUTPATIENT)
Dept: FAMILY MEDICINE CLINIC | Age: 78
End: 2018-09-25

## 2018-09-26 NOTE — TELEPHONE ENCOUNTER
I called [facility] and [facility]. Is verbal order for weekly skilled nursing all they need or do they need an actual order?

## 2018-10-01 ENCOUNTER — ANTI-COAG VISIT (OUTPATIENT)
Dept: FAMILY MEDICINE CLINIC | Age: 78
End: 2018-10-01

## 2018-10-01 LAB — INR BLD: 1.5

## 2018-10-01 NOTE — PROGRESS NOTES
[phone] (home)  Patient informed. She asked me to call home care nurse Hans Gee. I called Hans Gee [phone] and informed her. She states she will go to her house tomorrow and check INR. She states that she will call patient tonight and let her know that she will be there tomorrow.

## 2018-10-02 ENCOUNTER — ANTI-COAG VISIT (OUTPATIENT)
Dept: FAMILY MEDICINE CLINIC | Age: 78
End: 2018-10-02

## 2018-10-02 LAB — INR BLD: 1.7

## 2018-10-02 NOTE — PATIENT INSTRUCTIONS
Take 7.5 mg of warfarin tonight and then continue her normal warfarin regimen. Check INR on Thursday.

## 2018-10-04 ENCOUNTER — ANTI-COAG VISIT (OUTPATIENT)
Dept: FAMILY MEDICINE CLINIC | Age: 78
End: 2018-10-04

## 2018-10-04 LAB — INR BLD: 2.5

## 2018-10-09 ENCOUNTER — TELEPHONE (OUTPATIENT)
Dept: FAMILY MEDICINE CLINIC | Age: 78
End: 2018-10-09

## 2018-10-16 ENCOUNTER — ANTI-COAG VISIT (OUTPATIENT)
Dept: FAMILY MEDICINE CLINIC | Age: 78
End: 2018-10-16

## 2018-10-16 LAB — INR BLD: 2.2

## 2018-11-01 ENCOUNTER — ANTI-COAG VISIT (OUTPATIENT)
Dept: FAMILY MEDICINE CLINIC | Age: 78
End: 2018-11-01

## 2018-11-01 LAB — INR BLD: 3.7

## 2018-11-02 ENCOUNTER — NURSE ONLY (OUTPATIENT)
Dept: FAMILY MEDICINE CLINIC | Age: 78
End: 2018-11-02
Payer: MEDICARE

## 2018-11-02 DIAGNOSIS — Z23 NEED FOR INFLUENZA VACCINATION: Primary | ICD-10-CM

## 2018-11-02 DIAGNOSIS — I26.99 PULMONARY EMBOLISM (HCC): ICD-10-CM

## 2018-11-02 LAB
INTERNATIONAL NORMALIZATION RATIO, POC: 2.7
PROTHROMBIN TIME, POC: NORMAL

## 2018-11-02 PROCEDURE — 85610 PROTHROMBIN TIME: CPT | Performed by: NURSE PRACTITIONER

## 2018-11-02 PROCEDURE — 90688 IIV4 VACCINE SPLT 0.5 ML IM: CPT | Performed by: NURSE PRACTITIONER

## 2018-11-02 PROCEDURE — G0008 ADMIN INFLUENZA VIRUS VAC: HCPCS | Performed by: NURSE PRACTITIONER

## 2018-11-09 ENCOUNTER — ANTI-COAG VISIT (OUTPATIENT)
Dept: FAMILY MEDICINE CLINIC | Age: 78
End: 2018-11-09

## 2018-11-09 LAB — INR BLD: 2.1

## 2018-11-15 ENCOUNTER — CARE COORDINATION (OUTPATIENT)
Dept: INTERNAL MEDICINE CLINIC | Age: 78
End: 2018-11-15

## 2018-11-16 ENCOUNTER — ANTI-COAG VISIT (OUTPATIENT)
Dept: FAMILY MEDICINE CLINIC | Age: 78
End: 2018-11-16

## 2018-11-16 DIAGNOSIS — F41.9 ANXIETY: ICD-10-CM

## 2018-11-16 LAB — INR BLD: 2.7

## 2018-11-16 RX ORDER — HYDROXYZINE PAMOATE 25 MG/1
CAPSULE ORAL
Qty: 60 CAPSULE | Refills: 0 | Status: SHIPPED | OUTPATIENT
Start: 2018-11-16 | End: 2019-04-02 | Stop reason: SDUPTHER

## 2018-11-26 ENCOUNTER — OFFICE VISIT (OUTPATIENT)
Dept: PULMONOLOGY | Age: 78
End: 2018-11-26
Payer: MEDICARE

## 2018-11-26 VITALS
HEIGHT: 62 IN | RESPIRATION RATE: 22 BRPM | WEIGHT: 267 LBS | SYSTOLIC BLOOD PRESSURE: 112 MMHG | BODY MASS INDEX: 49.13 KG/M2 | DIASTOLIC BLOOD PRESSURE: 62 MMHG | TEMPERATURE: 98.3 F | HEART RATE: 95 BPM | OXYGEN SATURATION: 95 %

## 2018-11-26 DIAGNOSIS — R60.0 BILATERAL EDEMA OF LOWER EXTREMITY: ICD-10-CM

## 2018-11-26 DIAGNOSIS — J96.11 CHRONIC RESPIRATORY FAILURE WITH HYPOXIA (HCC): ICD-10-CM

## 2018-11-26 DIAGNOSIS — J44.9 CHRONIC OBSTRUCTIVE PULMONARY DISEASE, UNSPECIFIED COPD TYPE (HCC): Primary | ICD-10-CM

## 2018-11-26 PROCEDURE — G8482 FLU IMMUNIZE ORDER/ADMIN: HCPCS | Performed by: INTERNAL MEDICINE

## 2018-11-26 PROCEDURE — 4040F PNEUMOC VAC/ADMIN/RCVD: CPT | Performed by: INTERNAL MEDICINE

## 2018-11-26 PROCEDURE — G8926 SPIRO NO PERF OR DOC: HCPCS | Performed by: INTERNAL MEDICINE

## 2018-11-26 PROCEDURE — 99214 OFFICE O/P EST MOD 30 MIN: CPT | Performed by: INTERNAL MEDICINE

## 2018-11-26 PROCEDURE — G8427 DOCREV CUR MEDS BY ELIG CLIN: HCPCS | Performed by: INTERNAL MEDICINE

## 2018-11-26 PROCEDURE — 1090F PRES/ABSN URINE INCON ASSESS: CPT | Performed by: INTERNAL MEDICINE

## 2018-11-26 PROCEDURE — 1101F PT FALLS ASSESS-DOCD LE1/YR: CPT | Performed by: INTERNAL MEDICINE

## 2018-11-26 PROCEDURE — 3023F SPIROM DOC REV: CPT | Performed by: INTERNAL MEDICINE

## 2018-11-26 PROCEDURE — 1123F ACP DISCUSS/DSCN MKR DOCD: CPT | Performed by: INTERNAL MEDICINE

## 2018-11-26 PROCEDURE — 1036F TOBACCO NON-USER: CPT | Performed by: INTERNAL MEDICINE

## 2018-11-26 PROCEDURE — G8417 CALC BMI ABV UP PARAM F/U: HCPCS | Performed by: INTERNAL MEDICINE

## 2018-11-26 PROCEDURE — G8400 PT W/DXA NO RESULTS DOC: HCPCS | Performed by: INTERNAL MEDICINE

## 2018-11-27 ENCOUNTER — ANTI-COAG VISIT (OUTPATIENT)
Dept: FAMILY MEDICINE CLINIC | Age: 78
End: 2018-11-27

## 2018-11-27 LAB — INR BLD: 3.1

## 2018-11-28 ENCOUNTER — ANTI-COAG VISIT (OUTPATIENT)
Dept: FAMILY MEDICINE CLINIC | Age: 78
End: 2018-11-28

## 2018-11-28 LAB — INR BLD: 2

## 2018-12-03 ENCOUNTER — ANTI-COAG VISIT (OUTPATIENT)
Dept: FAMILY MEDICINE CLINIC | Age: 78
End: 2018-12-03

## 2018-12-03 LAB — INR BLD: 1.8

## 2018-12-05 ENCOUNTER — ANTI-COAG VISIT (OUTPATIENT)
Dept: FAMILY MEDICINE CLINIC | Age: 78
End: 2018-12-05

## 2018-12-05 LAB — INR BLD: 2.5

## 2018-12-10 ENCOUNTER — ANTI-COAG VISIT (OUTPATIENT)
Dept: FAMILY MEDICINE CLINIC | Age: 78
End: 2018-12-10

## 2018-12-10 LAB — INR BLD: 3.1

## 2018-12-17 ENCOUNTER — ANTI-COAG VISIT (OUTPATIENT)
Dept: FAMILY MEDICINE CLINIC | Age: 78
End: 2018-12-17

## 2018-12-17 LAB — INR BLD: 3.3

## 2018-12-22 LAB — INR BLD: 2.3

## 2018-12-24 ENCOUNTER — ANTI-COAG VISIT (OUTPATIENT)
Dept: FAMILY MEDICINE CLINIC | Age: 78
End: 2018-12-24

## 2018-12-26 ENCOUNTER — ANTI-COAG VISIT (OUTPATIENT)
Dept: FAMILY MEDICINE CLINIC | Age: 78
End: 2018-12-26

## 2018-12-26 LAB — INR BLD: 2.5

## 2018-12-27 LAB — INR BLD: 2.1

## 2019-01-02 ENCOUNTER — ANTI-COAG VISIT (OUTPATIENT)
Dept: FAMILY MEDICINE CLINIC | Age: 79
End: 2019-01-02

## 2019-01-03 ENCOUNTER — TELEPHONE (OUTPATIENT)
Dept: FAMILY MEDICINE CLINIC | Age: 79
End: 2019-01-03

## 2019-01-04 ENCOUNTER — OFFICE VISIT (OUTPATIENT)
Dept: FAMILY MEDICINE CLINIC | Age: 79
End: 2019-01-04
Payer: MEDICARE

## 2019-01-04 VITALS
BODY MASS INDEX: 49.13 KG/M2 | WEIGHT: 267 LBS | OXYGEN SATURATION: 93 % | HEART RATE: 96 BPM | TEMPERATURE: 98.2 F | HEIGHT: 62 IN | SYSTOLIC BLOOD PRESSURE: 120 MMHG | DIASTOLIC BLOOD PRESSURE: 60 MMHG

## 2019-01-04 DIAGNOSIS — J44.9 CHRONIC OBSTRUCTIVE PULMONARY DISEASE, UNSPECIFIED COPD TYPE (HCC): ICD-10-CM

## 2019-01-04 DIAGNOSIS — J96.11 CHRONIC RESPIRATORY FAILURE WITH HYPOXIA (HCC): ICD-10-CM

## 2019-01-04 DIAGNOSIS — R06.02 SHORTNESS OF BREATH: Primary | ICD-10-CM

## 2019-01-04 DIAGNOSIS — R06.2 WHEEZING: ICD-10-CM

## 2019-01-04 DIAGNOSIS — R05.9 COUGH: ICD-10-CM

## 2019-01-04 DIAGNOSIS — J40 BRONCHITIS: ICD-10-CM

## 2019-01-04 DIAGNOSIS — J44.1 COPD EXACERBATION (HCC): ICD-10-CM

## 2019-01-04 PROCEDURE — 1090F PRES/ABSN URINE INCON ASSESS: CPT | Performed by: NURSE PRACTITIONER

## 2019-01-04 PROCEDURE — 3023F SPIROM DOC REV: CPT | Performed by: NURSE PRACTITIONER

## 2019-01-04 PROCEDURE — 99214 OFFICE O/P EST MOD 30 MIN: CPT | Performed by: NURSE PRACTITIONER

## 2019-01-04 PROCEDURE — 1036F TOBACCO NON-USER: CPT | Performed by: NURSE PRACTITIONER

## 2019-01-04 PROCEDURE — G8400 PT W/DXA NO RESULTS DOC: HCPCS | Performed by: NURSE PRACTITIONER

## 2019-01-04 PROCEDURE — 94640 AIRWAY INHALATION TREATMENT: CPT | Performed by: NURSE PRACTITIONER

## 2019-01-04 PROCEDURE — G8417 CALC BMI ABV UP PARAM F/U: HCPCS | Performed by: NURSE PRACTITIONER

## 2019-01-04 PROCEDURE — 1123F ACP DISCUSS/DSCN MKR DOCD: CPT | Performed by: NURSE PRACTITIONER

## 2019-01-04 PROCEDURE — G8482 FLU IMMUNIZE ORDER/ADMIN: HCPCS | Performed by: NURSE PRACTITIONER

## 2019-01-04 PROCEDURE — G8427 DOCREV CUR MEDS BY ELIG CLIN: HCPCS | Performed by: NURSE PRACTITIONER

## 2019-01-04 PROCEDURE — 1101F PT FALLS ASSESS-DOCD LE1/YR: CPT | Performed by: NURSE PRACTITIONER

## 2019-01-04 PROCEDURE — G8926 SPIRO NO PERF OR DOC: HCPCS | Performed by: NURSE PRACTITIONER

## 2019-01-04 PROCEDURE — 4040F PNEUMOC VAC/ADMIN/RCVD: CPT | Performed by: NURSE PRACTITIONER

## 2019-01-04 PROCEDURE — 96372 THER/PROPH/DIAG INJ SC/IM: CPT | Performed by: NURSE PRACTITIONER

## 2019-01-04 RX ORDER — DEXTROMETHORPHAN HYDROBROMIDE AND PROMETHAZINE HYDROCHLORIDE 15; 6.25 MG/5ML; MG/5ML
5 SYRUP ORAL 4 TIMES DAILY PRN
Qty: 240 ML | Refills: 0 | Status: SHIPPED | OUTPATIENT
Start: 2019-01-04 | End: 2019-01-11

## 2019-01-04 RX ORDER — DOXYCYCLINE HYCLATE 100 MG
100 TABLET ORAL 2 TIMES DAILY
Qty: 14 TABLET | Refills: 0 | Status: SHIPPED | OUTPATIENT
Start: 2019-01-04 | End: 2019-01-11

## 2019-01-04 RX ORDER — PREDNISONE 10 MG/1
TABLET ORAL
Qty: 30 TABLET | Refills: 0 | Status: SHIPPED | OUTPATIENT
Start: 2019-01-04 | End: 2019-01-16 | Stop reason: ALTCHOICE

## 2019-01-04 RX ORDER — ALBUTEROL SULFATE 2.5 MG/3ML
2.5 SOLUTION RESPIRATORY (INHALATION) ONCE
Status: COMPLETED | OUTPATIENT
Start: 2019-01-04 | End: 2019-01-04

## 2019-01-04 RX ADMIN — ALBUTEROL SULFATE 2.5 MG: 2.5 SOLUTION RESPIRATORY (INHALATION) at 16:23

## 2019-01-04 RX ADMIN — Medication 0.5 MG: at 16:24

## 2019-01-04 ASSESSMENT — ENCOUNTER SYMPTOMS
CHOKING: 0
RHINORRHEA: 1
VOMITING: 0
SINUS PAIN: 0
NAUSEA: 0
DIARRHEA: 0
SHORTNESS OF BREATH: 1
STRIDOR: 0
VOICE CHANGE: 0
CHEST TIGHTNESS: 0
APNEA: 0
TROUBLE SWALLOWING: 0
ABDOMINAL PAIN: 0
SINUS PRESSURE: 0
COUGH: 1
SORE THROAT: 1
WHEEZING: 1
SWOLLEN GLANDS: 0
FACIAL SWELLING: 0

## 2019-01-09 ENCOUNTER — ANTI-COAG VISIT (OUTPATIENT)
Dept: FAMILY MEDICINE CLINIC | Age: 79
End: 2019-01-09

## 2019-01-09 LAB — INR BLD: 2.8

## 2019-01-14 ENCOUNTER — CARE COORDINATION (OUTPATIENT)
Dept: CARE COORDINATION | Age: 79
End: 2019-01-14

## 2019-01-16 ENCOUNTER — CARE COORDINATION (OUTPATIENT)
Dept: CARE COORDINATION | Age: 79
End: 2019-01-16

## 2019-01-16 ASSESSMENT — ENCOUNTER SYMPTOMS: DYSPNEA ASSOCIATED WITH: EXERTION

## 2019-01-20 LAB — INR BLD: 1.4

## 2019-01-23 ENCOUNTER — TELEPHONE (OUTPATIENT)
Dept: FAMILY MEDICINE CLINIC | Age: 79
End: 2019-01-23

## 2019-01-23 ENCOUNTER — ANTI-COAG VISIT (OUTPATIENT)
Dept: FAMILY MEDICINE CLINIC | Age: 79
End: 2019-01-23

## 2019-01-23 LAB — INR BLD: 3.2

## 2019-01-23 RX ORDER — PREDNISONE 20 MG/1
40 TABLET ORAL DAILY
Qty: 10 TABLET | Refills: 0 | Status: SHIPPED | OUTPATIENT
Start: 2019-01-23 | End: 2019-01-28

## 2019-01-31 ENCOUNTER — ANTI-COAG VISIT (OUTPATIENT)
Dept: FAMILY MEDICINE CLINIC | Age: 79
End: 2019-01-31

## 2019-01-31 ENCOUNTER — TELEPHONE (OUTPATIENT)
Dept: FAMILY MEDICINE CLINIC | Age: 79
End: 2019-01-31

## 2019-01-31 LAB — INR BLD: 1.8

## 2019-01-31 RX ORDER — AZITHROMYCIN 250 MG/1
250 TABLET, FILM COATED ORAL SEE ADMIN INSTRUCTIONS
Qty: 6 TABLET | Refills: 0 | Status: SHIPPED | OUTPATIENT
Start: 2019-01-31 | End: 2019-02-05

## 2019-02-04 ENCOUNTER — ANTI-COAG VISIT (OUTPATIENT)
Dept: FAMILY MEDICINE CLINIC | Age: 79
End: 2019-02-04

## 2019-02-04 LAB — INR BLD: 4

## 2019-02-06 ENCOUNTER — ANTI-COAG VISIT (OUTPATIENT)
Dept: FAMILY MEDICINE CLINIC | Age: 79
End: 2019-02-06

## 2019-02-06 LAB — INR BLD: 2

## 2019-02-11 ENCOUNTER — ANTI-COAG VISIT (OUTPATIENT)
Dept: FAMILY MEDICINE CLINIC | Age: 79
End: 2019-02-11

## 2019-02-11 LAB — INR BLD: 1.3

## 2019-02-12 ENCOUNTER — ANTI-COAG VISIT (OUTPATIENT)
Dept: FAMILY MEDICINE CLINIC | Age: 79
End: 2019-02-12

## 2019-02-12 LAB — INR BLD: 1.4

## 2019-02-14 ENCOUNTER — ANTI-COAG VISIT (OUTPATIENT)
Dept: FAMILY MEDICINE CLINIC | Age: 79
End: 2019-02-14

## 2019-02-14 LAB — INR BLD: 2.4

## 2019-02-20 ENCOUNTER — ANTI-COAG VISIT (OUTPATIENT)
Dept: FAMILY MEDICINE CLINIC | Age: 79
End: 2019-02-20

## 2019-02-20 LAB — INR BLD: 2.7

## 2019-03-01 LAB — INR BLD: 1.9

## 2019-03-04 ENCOUNTER — ANTI-COAG VISIT (OUTPATIENT)
Dept: FAMILY MEDICINE CLINIC | Age: 79
End: 2019-03-04

## 2019-03-07 ENCOUNTER — TELEPHONE (OUTPATIENT)
Dept: FAMILY MEDICINE CLINIC | Age: 79
End: 2019-03-07

## 2019-03-11 ENCOUNTER — ANTI-COAG VISIT (OUTPATIENT)
Dept: FAMILY MEDICINE CLINIC | Age: 79
End: 2019-03-11

## 2019-03-11 LAB — INR BLD: 2.7

## 2019-03-15 ENCOUNTER — CARE COORDINATION (OUTPATIENT)
Dept: CARE COORDINATION | Age: 79
End: 2019-03-15

## 2019-04-02 ENCOUNTER — ANTI-COAG VISIT (OUTPATIENT)
Dept: FAMILY MEDICINE CLINIC | Age: 79
End: 2019-04-02

## 2019-04-02 DIAGNOSIS — F41.9 ANXIETY: ICD-10-CM

## 2019-04-02 LAB — INR BLD: 2.8

## 2019-04-03 ENCOUNTER — TELEPHONE (OUTPATIENT)
Dept: FAMILY MEDICINE CLINIC | Age: 79
End: 2019-04-03

## 2019-04-03 RX ORDER — BUSPIRONE HYDROCHLORIDE 10 MG/1
10 TABLET ORAL 3 TIMES DAILY PRN
Qty: 60 TABLET | Refills: 1 | Status: SHIPPED | OUTPATIENT
Start: 2019-04-03 | End: 2019-04-11

## 2019-04-03 RX ORDER — HYDROXYZINE PAMOATE 25 MG/1
CAPSULE ORAL
Qty: 60 CAPSULE | Refills: 0 | Status: SHIPPED | OUTPATIENT
Start: 2019-04-03 | End: 2019-04-03 | Stop reason: ALTCHOICE

## 2019-04-03 NOTE — TELEPHONE ENCOUNTER
May send prescription for BuSpar 10 mg one by mouth 3 times a day when necessary anxiety #60 with one refill  Discontinue hydroxyzine from the medication list and send prescription for BuSpar to pharmacy of choice and then notify the patient

## 2019-04-09 ENCOUNTER — ANTI-COAG VISIT (OUTPATIENT)
Dept: FAMILY MEDICINE CLINIC | Age: 79
End: 2019-04-09

## 2019-04-09 LAB — INR BLD: 3.3

## 2019-04-11 ENCOUNTER — ANTI-COAG VISIT (OUTPATIENT)
Dept: FAMILY MEDICINE CLINIC | Age: 79
End: 2019-04-11

## 2019-04-11 ENCOUNTER — OFFICE VISIT (OUTPATIENT)
Dept: FAMILY MEDICINE CLINIC | Age: 79
End: 2019-04-11
Payer: MEDICARE

## 2019-04-11 VITALS
BODY MASS INDEX: 48.83 KG/M2 | DIASTOLIC BLOOD PRESSURE: 60 MMHG | HEART RATE: 83 BPM | OXYGEN SATURATION: 96 % | SYSTOLIC BLOOD PRESSURE: 108 MMHG | HEIGHT: 62 IN

## 2019-04-11 DIAGNOSIS — I87.2 VENOUS (PERIPHERAL) INSUFFICIENCY: ICD-10-CM

## 2019-04-11 DIAGNOSIS — F33.1 MODERATE EPISODE OF RECURRENT MAJOR DEPRESSIVE DISORDER (HCC): Primary | ICD-10-CM

## 2019-04-11 DIAGNOSIS — E66.01 MORBID OBESITY WITH BMI OF 45.0-49.9, ADULT (HCC): ICD-10-CM

## 2019-04-11 DIAGNOSIS — I51.89 DIASTOLIC DYSFUNCTION: ICD-10-CM

## 2019-04-11 DIAGNOSIS — I48.0 PAROXYSMAL ATRIAL FIBRILLATION WITH RVR (HCC): ICD-10-CM

## 2019-04-11 DIAGNOSIS — R60.0 BILATERAL EDEMA OF LOWER EXTREMITY: ICD-10-CM

## 2019-04-11 DIAGNOSIS — R73.01 IFG (IMPAIRED FASTING GLUCOSE): ICD-10-CM

## 2019-04-11 DIAGNOSIS — F41.9 ANXIETY: ICD-10-CM

## 2019-04-11 DIAGNOSIS — E78.5 HYPERLIPIDEMIA, UNSPECIFIED HYPERLIPIDEMIA TYPE: ICD-10-CM

## 2019-04-11 DIAGNOSIS — Z13.31 POSITIVE DEPRESSION SCREENING: ICD-10-CM

## 2019-04-11 DIAGNOSIS — E55.9 VITAMIN D DEFICIENCY: ICD-10-CM

## 2019-04-11 LAB
A/G RATIO: 1.4 (ref 1.1–2.2)
ALBUMIN SERPL-MCNC: 3.9 G/DL (ref 3.4–5)
ALP BLD-CCNC: 96 U/L (ref 40–129)
ALT SERPL-CCNC: 13 U/L (ref 10–40)
ANION GAP SERPL CALCULATED.3IONS-SCNC: 12 MMOL/L (ref 3–16)
AST SERPL-CCNC: 16 U/L (ref 15–37)
BASOPHILS ABSOLUTE: 0.1 K/UL (ref 0–0.2)
BASOPHILS RELATIVE PERCENT: 0.7 %
BILIRUB SERPL-MCNC: 0.3 MG/DL (ref 0–1)
BUN BLDV-MCNC: 12 MG/DL (ref 7–20)
CALCIUM SERPL-MCNC: 8.8 MG/DL (ref 8.3–10.6)
CHLORIDE BLD-SCNC: 100 MMOL/L (ref 99–110)
CHOLESTEROL, TOTAL: 228 MG/DL (ref 0–199)
CO2: 31 MMOL/L (ref 21–32)
CREAT SERPL-MCNC: 0.6 MG/DL (ref 0.6–1.2)
EOSINOPHILS ABSOLUTE: 0.2 K/UL (ref 0–0.6)
EOSINOPHILS RELATIVE PERCENT: 1.9 %
GFR AFRICAN AMERICAN: >60
GFR NON-AFRICAN AMERICAN: >60
GLOBULIN: 2.8 G/DL
GLUCOSE BLD-MCNC: 119 MG/DL (ref 70–99)
HCT VFR BLD CALC: 40 % (ref 36–48)
HDLC SERPL-MCNC: 57 MG/DL (ref 40–60)
HEMOGLOBIN: 12.7 G/DL (ref 12–16)
INR BLD: 2.5
LDL CHOLESTEROL CALCULATED: 138 MG/DL
LYMPHOCYTES ABSOLUTE: 2.8 K/UL (ref 1–5.1)
LYMPHOCYTES RELATIVE PERCENT: 34.4 %
MCH RBC QN AUTO: 27.3 PG (ref 26–34)
MCHC RBC AUTO-ENTMCNC: 31.8 G/DL (ref 31–36)
MCV RBC AUTO: 85.8 FL (ref 80–100)
MONOCYTES ABSOLUTE: 0.8 K/UL (ref 0–1.3)
MONOCYTES RELATIVE PERCENT: 9.4 %
NEUTROPHILS ABSOLUTE: 4.4 K/UL (ref 1.7–7.7)
NEUTROPHILS RELATIVE PERCENT: 53.6 %
PDW BLD-RTO: 14.7 % (ref 12.4–15.4)
PLATELET # BLD: 287 K/UL (ref 135–450)
PMV BLD AUTO: 7.8 FL (ref 5–10.5)
POTASSIUM SERPL-SCNC: 4.4 MMOL/L (ref 3.5–5.1)
RBC # BLD: 4.66 M/UL (ref 4–5.2)
SODIUM BLD-SCNC: 143 MMOL/L (ref 136–145)
TOTAL PROTEIN: 6.7 G/DL (ref 6.4–8.2)
TRIGL SERPL-MCNC: 163 MG/DL (ref 0–150)
TSH SERPL DL<=0.05 MIU/L-ACNC: 1.88 UIU/ML (ref 0.27–4.2)
VITAMIN D 25-HYDROXY: 16.2 NG/ML
VLDLC SERPL CALC-MCNC: 33 MG/DL
WBC # BLD: 8.2 K/UL (ref 4–11)

## 2019-04-11 PROCEDURE — G8400 PT W/DXA NO RESULTS DOC: HCPCS | Performed by: NURSE PRACTITIONER

## 2019-04-11 PROCEDURE — G8417 CALC BMI ABV UP PARAM F/U: HCPCS | Performed by: NURSE PRACTITIONER

## 2019-04-11 PROCEDURE — G8427 DOCREV CUR MEDS BY ELIG CLIN: HCPCS | Performed by: NURSE PRACTITIONER

## 2019-04-11 PROCEDURE — 1123F ACP DISCUSS/DSCN MKR DOCD: CPT | Performed by: NURSE PRACTITIONER

## 2019-04-11 PROCEDURE — 4040F PNEUMOC VAC/ADMIN/RCVD: CPT | Performed by: NURSE PRACTITIONER

## 2019-04-11 PROCEDURE — 1036F TOBACCO NON-USER: CPT | Performed by: NURSE PRACTITIONER

## 2019-04-11 PROCEDURE — G8431 POS CLIN DEPRES SCRN F/U DOC: HCPCS | Performed by: NURSE PRACTITIONER

## 2019-04-11 PROCEDURE — 99214 OFFICE O/P EST MOD 30 MIN: CPT | Performed by: NURSE PRACTITIONER

## 2019-04-11 PROCEDURE — 36415 COLL VENOUS BLD VENIPUNCTURE: CPT | Performed by: NURSE PRACTITIONER

## 2019-04-11 PROCEDURE — 1090F PRES/ABSN URINE INCON ASSESS: CPT | Performed by: NURSE PRACTITIONER

## 2019-04-11 RX ORDER — CITALOPRAM 10 MG/1
10 TABLET ORAL DAILY
Qty: 30 TABLET | Refills: 1 | Status: ON HOLD | OUTPATIENT
Start: 2019-04-11 | End: 2019-08-16 | Stop reason: HOSPADM

## 2019-04-11 ASSESSMENT — PATIENT HEALTH QUESTIONNAIRE - PHQ9
6. FEELING BAD ABOUT YOURSELF - OR THAT YOU ARE A FAILURE OR HAVE LET YOURSELF OR YOUR FAMILY DOWN: 1
2. FEELING DOWN, DEPRESSED OR HOPELESS: 2
8. MOVING OR SPEAKING SO SLOWLY THAT OTHER PEOPLE COULD HAVE NOTICED. OR THE OPPOSITE, BEING SO FIGETY OR RESTLESS THAT YOU HAVE BEEN MOVING AROUND A LOT MORE THAN USUAL: 0
3. TROUBLE FALLING OR STAYING ASLEEP: 3
SUM OF ALL RESPONSES TO PHQ QUESTIONS 1-9: 13
9. THOUGHTS THAT YOU WOULD BE BETTER OFF DEAD, OR OF HURTING YOURSELF: 0
SUM OF ALL RESPONSES TO PHQ9 QUESTIONS 1 & 2: 3
10. IF YOU CHECKED OFF ANY PROBLEMS, HOW DIFFICULT HAVE THESE PROBLEMS MADE IT FOR YOU TO DO YOUR WORK, TAKE CARE OF THINGS AT HOME, OR GET ALONG WITH OTHER PEOPLE: 1
7. TROUBLE CONCENTRATING ON THINGS, SUCH AS READING THE NEWSPAPER OR WATCHING TELEVISION: 1
1. LITTLE INTEREST OR PLEASURE IN DOING THINGS: 1
5. POOR APPETITE OR OVEREATING: 2
4. FEELING TIRED OR HAVING LITTLE ENERGY: 3
SUM OF ALL RESPONSES TO PHQ QUESTIONS 1-9: 13

## 2019-04-11 NOTE — PATIENT INSTRUCTIONS

## 2019-04-11 NOTE — PROGRESS NOTES
regular exercise  Barriers: none    Home blood sugar records: patient does not test  Any episodes of hypoglycemia? no   reports that she quit smoking about 3 years ago. Her smoking use included cigarettes. She has a 16.50 pack-year smoking history. She has never used smokeless tobacco.   Daily Aspirin? No: on coumadin    Lab Results   Component Value Date    LABA1C 6.3 06/13/2018    LABA1C 6.1 01/11/2018    LABA1C 6.3 09/02/2016     Lab Results   Component Value Date    LABMICR YES 09/01/2016    CREATININE <0.5 (L) 09/21/2018     Lab Results   Component Value Date    ALT 16 09/21/2018    AST 16 09/21/2018     Lab Results   Component Value Date    CHOL 171 10/27/2017    TRIG 102 10/27/2017    HDL 49 10/27/2017    LDLCALC 102 (H) 10/27/2017        Mood Disorder:  Patient presents for follow-up of anxiety disorder. Current complaints include: anhedonia, depressed mood, feelings of hopelessness, feelings of worthlessness/excessive guilt, insomnia, fatigue, changes in appetite/weight: , difficulty concentrating and excessive worry. She denies any other symptoms, increased use of drugs or alcohol and suicidal thoughts or behavior. Symptoms/signs of zulay: none. External stressors: nothing new. Current treatment includes: trazodone 50 mg nightly. Medication side effects: none. buspar quit taking d/t caused jittery, upset stomach and diarrhea  Vistaril made her tired all the time.    PHQ-9  4/11/2019 9/13/2018 6/13/2018 5/2/2018 6/14/2017 1/14/2015   Little interest or pleasure in doing things 1 1 1 2 1 1   Feeling down, depressed, or hopeless 2 2 1 2 0 0   Trouble falling or staying asleep, or sleeping too much 3 3 1 3 - -   Feeling tired or having little energy 3 3 3 3 - -   Poor appetite or overeating 2 1 2 3 - -   Feeling bad about yourself - or that you are a failure or have let yourself or your family down 1 3 1 2 - -   Trouble concentrating on things, such as reading the newspaper or watching television 1 0 0 1 - -   Moving or speaking so slowly that other people could have noticed. Or the opposite - being so fidgety or restless that you have been moving around a lot more than usual 0 0 0 0 - -   Thoughts that you would be better off dead, or of hurting yourself in some way 0 0 0 0 - -   PHQ-2 Score 3 3 2 4 1 1   PHQ-9 Total Score 13 13 9 16 1 1   If you checked off any problems, how difficult have these problems made it for you to do your work, take care of things at home, or get along with other people? 1 2 1 1 - -     Interpretation of Total Score Total Score Depression Severity: 1-4 = Minimal depression, 5-9 = Mild depression, 10-14 = Moderate depression, 15-19 = Moderately severe depression, 20-27 = Severe depression          Review of Systems   Constitutional: Negative. HENT: Negative. Eyes: Negative. Respiratory: Positive for shortness of breath (chronic). Cardiovascular: Positive for leg swelling (BLE, chronic). Gastrointestinal: Negative. Genitourinary: Negative. Musculoskeletal: Negative. Skin: Negative. Neurological: Negative. Psychiatric/Behavioral: Negative. All other systems reviewed and are negative.        Past Medical History:   Diagnosis Date    Acute exacerbation of chronic obstructive pulmonary disease (COPD) ([address]     Acute on chronic respiratory failure (HCC)     Atrial fibrillation with rapid ventricular response ([address] 6/23/2015    CAP (community acquired pneumonia) 1/29/2015    CHF (congestive heart failure) ([address]     Diverticulitis     HCAP (healthcare-associated pneumonia)     IFG (impaired fasting glucose) 9/13/2018    Panic disorder     Pulmonary embolism ([address]     6/15    [facility] spotted fever 2013    Sciatic nerve pain     Sepsis ([address] 6/22/2015    Tobacco abuse 6/22/2015    Vitamin D deficiency      Family History   Problem Relation Age of Onset    Cancer Mother 43        lung cancer    Diabetes Sister     Cancer Daughter 43        lung daily 30 tablet 0    warfarin (COUMADIN) 5 MG tablet TAKE ONE TABLET BY MOUTH DAILY ON MON, TUES, THURS, FRI,  AND SAT, AND SUNDAY (TAKE 2.5 MG ON WED) (Patient taking differently: take 1/2 tab on Monday and Thursday and whole tab on all other days.) 75 tablet 2    albuterol sulfate HFA (VENTOLIN HFA) 108 (90 Base) MCG/ACT inhaler Inhale 2 puffs into the lungs every 6 hours as needed for Wheezing 2 Inhaler 3    traZODone (DESYREL) 50 MG tablet Take 1 tablet by mouth nightly 30 tablet 1    albuterol (PROVENTIL) (2.5 MG/3ML) 0.083% nebulizer solution Take 3 mLs by nebulization every 6 hours as needed for Wheezing or Shortness of Breath DX COPD J44.9 360 mL 3    melatonin 3 MG TABS tablet Take 1 tablet by mouth nightly as needed (insomnia) 15 tablet 0    Cholecalciferol (VITAMIN D-3 PO) Take 2,000 Units by mouth daily       OXYGEN 4.5 L       No current facility-administered medications for this visit. No changes in past medical history, past surgical history, social history, orfamily history were noted during the patient encounter unless specifically listed above. All updates of past medical history, past surgical history, social history, or family history were reviewed personally by me duringthe office visit. All problems listed in the assessment are stable unless noted otherwise. Medication profile reviewed personally by me during the office visit. Medication side effects and possible impairments frommedications were discussed as applicable. Objective:       Physical Exam   Constitutional: She is oriented to person, place, and time. She appears well-developed and well-nourished. No distress. Nasal cannula in place. obese   HENT:   Head: Normocephalic and atraumatic.    Right Ear: Tympanic membrane, external ear and ear canal normal.   Left Ear: Tympanic membrane, external ear and ear canal normal.   Nose: Nose normal.   Mouth/Throat: Uvula is midline, oropharynx is clear and moist and mucous membranes are normal. No oropharyngeal exudate. Eyes: Pupils are equal, round, and reactive to light. Conjunctivae, EOM and lids are normal.   Neck: Normal range of motion. Neck supple. No JVD present. Carotid bruit is not present. No thyromegaly present. Cardiovascular: Normal rate, regular rhythm, normal heart sounds and normal pulses. Exam reveals no gallop and no friction rub. No murmur heard. Pulses:       Radial pulses are 2+ on the right side, and 2+ on the left side. Dorsalis pedis pulses are 2+ on the right side, and 2+ on the left side. Posterior tibial pulses are 2+ on the right side, and 2+ on the left side. Pulmonary/Chest: Effort normal. She has decreased breath sounds. She has no wheezes. She has no rhonchi. She has no rales. Abdominal: Soft. Normal appearance and bowel sounds are normal. She exhibits no mass. There is no hepatosplenomegaly. There is no tenderness. Musculoskeletal: Normal range of motion. She exhibits edema (1+ BLE). Lymphadenopathy:        Head (right side): No submandibular adenopathy present. Head (left side): No submandibular adenopathy present. She has no cervical adenopathy. Neurological: She is alert and oriented to person, place, and time. She has normal strength. Gait normal.   Skin: Skin is warm and dry. No lesion and no rash noted. Psychiatric: She has a normal mood and affect. Her speech is normal and behavior is normal. Judgment and thought content normal. Cognition and memory are normal.   Nursing note and vitals reviewed. /60 (Site: Left Upper Arm, Position: Sitting, Cuff Size: Large Adult)   Pulse 83   Ht 5' 2\" (1.575 m)   LMP  (LMP Unknown)   SpO2 96%   BMI 48.83 kg/m²   Body mass index is 48.83 kg/m².     BP Readings from Last 2 Encounters:   04/11/19 108/60   01/04/19 120/60       Wt Readings from Last 3 Encounters:   01/04/19 267 lb (121.1 kg)   11/26/18 267 lb (121.1 kg)   09/13/18 265 lb (120.2 kg) Lab Review   Anti-coag visit on 04/11/2019   Component Date Value    INR 04/11/2019 2.50    Anti-coag visit on 04/09/2019   Component Date Value    INR 04/09/2019 3.30    Anti-coag visit on 04/02/2019   Component Date Value    INR 04/02/2019 2.80    Anti-coag visit on 03/11/2019   Component Date Value    INR 03/11/2019 2.70    Anti-coag visit on 03/04/2019   Component Date Value    INR 03/01/2019 1.90    Anti-coag visit on 02/20/2019   Component Date Value    INR 02/20/2019 2.70    Anti-coag visit on 02/14/2019   Component Date Value    INR 02/14/2019 2.40    Anti-coag visit on 02/12/2019   Component Date Value    INR 02/12/2019 1.40        No results found for this visit on 04/11/19. Assessment:       1. Moderate episode of recurrent major depressive disorder (HCC)    2. Paroxysmal atrial fibrillation with RVR ([facility]    3. Morbid obesity with BMI of 45.0-49.9, adult ([facility]    4. Hyperlipidemia, unspecified hyperlipidemia type    5. IFG (impaired fasting glucose)    6. Vitamin D deficiency    7. Diastolic dysfunction    8. Venous (peripheral) insufficiency    9. Bilateral edema of lower extremity    10. Anxiety    11. Positive depression screening        No results found for this visit on 04/11/19. Plan:       [place] was seen today for anxiety, hyperlipidemia, other and insomnia. Diagnoses and all orders for this visit:    Moderate episode of recurrent major depressive disorder ([facility]  START  -     citalopram (CELEXA) 10 MG tablet; Take 1 tablet by mouth daily  Educated if patient develops SI/HI/zulay to call 911 or go to ER. Discussed use, benefit, risks and side effects of prescribed medications. Barriers to compliance discussed. All patient questions answered. Pt voiced understanding.     Paroxysmal atrial fibrillation with RVR (HCC)  Asymptomatic  Referred to cardiology    Morbid obesity with BMI of 45.0-49.9, adult ([facility]   Discussed healthy lifestyle choices to attempt to incorporate into daily routine to attempt to obtain and maintain a healthy weight. General weight loss/lifestyle modification strategies discussed (elicit support from others; identify saboteurs; non-food rewards, etc). Discussed healthy nutritional options as well. Diet interventions: moderate (500 kCal/d) deficit diet. Disscussed trying to incorporate routine physical activity into daily regimen. Informal exercise measures discussed, e.g. taking stairs instead of elevator. Regular aerobic exercise program discussed. Surgical Procedure: was not discussed  Behavioral treatment: discussed commercial programs (Weight Watchers, Nutrisystem,etc), Overeaters Anonymous, Slim Fast, stress management and TOPS. Discussed consequences of obesity in terms of medical conditions that may develop in the future. Patient verbalized understanding. Hyperlipidemia, unspecified hyperlipidemia type  -     Comprehensive Metabolic Panel  -     Lipid Panel  -     TSH without Reflex  Discussed healthy lifestyle changes to improve lipids. Encouraged routine exercise, healthy eating habits and the importance of weight control to improve cholesterol. IFG (impaired fasting glucose)  -     CBC Auto Differential  -     Hemoglobin A1C    Vitamin D deficiency  -     Vitamin D 25 Hydroxy  Discussed with patient that we make vitamin D from the sun and get it from some food sources, but it is very common to be deficient. Discussed the need for vitamin D replacement because low vitamin D can cause fatigue, joint aches and has been implicated in heart disease, bone disease like osteoporosis, and some other chronic illnesses. Patient will start/continue vitamin D supplement as per order. Diastolic dysfunction  -     Shirlene Rosdao MD, Cardiology, [facility]    Venous (peripheral) insufficiency  -     CBC Auto Differential    Bilateral edema of lower extremity  History venous insufficiency  Recommendations: decrease

## 2019-04-12 LAB
ESTIMATED AVERAGE GLUCOSE: 131.2 MG/DL
HBA1C MFR BLD: 6.2 %

## 2019-04-15 ENCOUNTER — OFFICE VISIT (OUTPATIENT)
Dept: PULMONOLOGY | Age: 79
End: 2019-04-15
Payer: MEDICARE

## 2019-04-15 VITALS
RESPIRATION RATE: 20 BRPM | BODY MASS INDEX: 50.05 KG/M2 | OXYGEN SATURATION: 95 % | HEIGHT: 62 IN | WEIGHT: 272 LBS | TEMPERATURE: 98 F | SYSTOLIC BLOOD PRESSURE: 122 MMHG | HEART RATE: 98 BPM | DIASTOLIC BLOOD PRESSURE: 70 MMHG

## 2019-04-15 DIAGNOSIS — J44.9 CHRONIC OBSTRUCTIVE PULMONARY DISEASE, UNSPECIFIED COPD TYPE (HCC): ICD-10-CM

## 2019-04-15 DIAGNOSIS — J44.9 COPD, SEVERE (HCC): Primary | ICD-10-CM

## 2019-04-15 DIAGNOSIS — J96.11 CHRONIC RESPIRATORY FAILURE WITH HYPOXIA (HCC): ICD-10-CM

## 2019-04-15 PROCEDURE — G8400 PT W/DXA NO RESULTS DOC: HCPCS | Performed by: INTERNAL MEDICINE

## 2019-04-15 PROCEDURE — 99213 OFFICE O/P EST LOW 20 MIN: CPT | Performed by: INTERNAL MEDICINE

## 2019-04-15 PROCEDURE — G8417 CALC BMI ABV UP PARAM F/U: HCPCS | Performed by: INTERNAL MEDICINE

## 2019-04-15 PROCEDURE — 3023F SPIROM DOC REV: CPT | Performed by: INTERNAL MEDICINE

## 2019-04-15 PROCEDURE — 1090F PRES/ABSN URINE INCON ASSESS: CPT | Performed by: INTERNAL MEDICINE

## 2019-04-15 PROCEDURE — G8427 DOCREV CUR MEDS BY ELIG CLIN: HCPCS | Performed by: INTERNAL MEDICINE

## 2019-04-15 PROCEDURE — G8926 SPIRO NO PERF OR DOC: HCPCS | Performed by: INTERNAL MEDICINE

## 2019-04-15 PROCEDURE — 1123F ACP DISCUSS/DSCN MKR DOCD: CPT | Performed by: INTERNAL MEDICINE

## 2019-04-15 PROCEDURE — 4040F PNEUMOC VAC/ADMIN/RCVD: CPT | Performed by: INTERNAL MEDICINE

## 2019-04-15 PROCEDURE — 1036F TOBACCO NON-USER: CPT | Performed by: INTERNAL MEDICINE

## 2019-04-15 RX ORDER — ALBUTEROL SULFATE 2.5 MG/3ML
2.5 SOLUTION RESPIRATORY (INHALATION) EVERY 6 HOURS PRN
Qty: 360 ML | Refills: 5 | Status: SHIPPED | OUTPATIENT
Start: 2019-04-15 | End: 2020-04-13 | Stop reason: SDUPTHER

## 2019-04-15 NOTE — PROGRESS NOTES
[facility]    Outpatient Follow Up Note    CHIEF COMPLAINT : Dyspnea    HPI:  The patient is a 71 yo woman with hx of tobacco use, COPD and ILD, PE in 6/15. She has a 50 pack year smoking history. Was in [facility] in 6/15 for bilateral pulmonary emboli. Since last clinic visit, the patient has been doing so-so. She says she's gained some weight despite her attempts to lose weight. She is wearing oxygen around-the-clock. She is taking Lasix twice daily for lower extremity edema. She remains on Coumadin. There are no changes to past medical history, family history, social history or review of systems(except as noted in the history section) since prior note (all reviewed with patient).     Current Medications:    Current Outpatient Medications:     citalopram (CELEXA) 10 MG tablet, Take 1 tablet by mouth daily, Disp: 30 tablet, Rfl: 1    furosemide (LASIX) 40 MG tablet, Take 1 tablet by mouth daily, Disp: 30 tablet, Rfl: 0    warfarin (COUMADIN) 5 MG tablet, TAKE ONE TABLET BY MOUTH DAILY ON MON, TUES, THURS, FRI,  AND SAT, AND SUNDAY (TAKE 2.5 MG ON WED) (Patient taking differently: take 1/2 tab on Monday and Thursday and whole tab on all other days.), Disp: 75 tablet, Rfl: 2    albuterol sulfate HFA (VENTOLIN HFA) 108 (90 Base) MCG/ACT inhaler, Inhale 2 puffs into the lungs every 6 hours as needed for Wheezing, Disp: 2 Inhaler, Rfl: 3    traZODone (DESYREL) 50 MG tablet, Take 1 tablet by mouth nightly, Disp: 30 tablet, Rfl: 1    albuterol (PROVENTIL) (2.5 MG/3ML) 0.083% nebulizer solution, Take 3 mLs by nebulization every 6 hours as needed for Wheezing or Shortness of Breath DX COPD J44.9, Disp: 360 mL, Rfl: 3    melatonin 3 MG TABS tablet, Take 1 tablet by mouth nightly as needed (insomnia), Disp: 15 tablet, Rfl: 0    OXYGEN, 4.5 L, Disp: , Rfl:     Cholecalciferol (VITAMIN D-3 PO), Take 2,000 Units by mouth daily , Disp: , Rfl:         Objective:   PHYSICAL EXAM: VITALS:    /70   Pulse 98   Temp 98 °F (36.7 °C) (Oral)   Resp 20   Ht 5' 2\" (1.575 m)   Wt 272 lb (123.4 kg)   LMP  (LMP Unknown)   SpO2 95% Comment: 3. 5LPM  BMI 49.75 kg/m²     Gen: In no acute distress. Alert. Obese. Normocephalic, atraumatic. Eyes: PERRL. No sclera icterus. No conjunctival injection. ENT: No ocular or auricular discharge. Oropharynx clear. Neck: Trachea midline. Normal thyroid. Resp: No accessory muscle use. No crackles. No wheezes. No rhonchi. No dullness on percussion. Poor air movement. CV: Regular rate. Regular rhythm. No murmur or rub. Normal S1 and S2.  +1-2 bilateral LE edema  GI: Abdomen non-tender. Non-distended. Soft. Skin: Warm and dry. No nodules on exposed extremities. No rash on exposed extremities. Lymph: No cervical LAD. No supraclavicular LAD. M/S: No cyanosis. No synovitis or joint deformity. No clubbing. Neuro: Cranial nerves are grossly intact. Moving all extremities. Motor and sensation grossly intact. Psych: Oriented x 3. No anxiety or agitation. DATA:    LABS:     PFTs 4/30/12  FVC 1.45 (55%) FEV1 1.03 (56%)  FEV1/FVC ratio 71% TLC 4.61 (105%) DLCO 27.5 (159%, ?error), + BD    PFTs 2/27/15  FVC 1.02 (38%) FEV1 0.76 (38%) FEV1/FVC ratio  75%  TLC 5.04 (107 %) DLCO 16.17 (78%)  + BD   6MWT 560 feet, reqd 4L for low sat 84%    12/31/15 160 feet, required 3L for sat 81%    IMAGING:     I personally reviewed and interpreted the following in the office :     CXR 9/16/16: no airspace opacities, normal cardiomediastinal silhouette     CXR 8/27/15: no focal airspace consolidation; improved c/w 6/15    Chest CT 6/26/15: Large bilateral PEs involving all lobes of the lung. No pneumonia. Mild bibasilar atelectasis worse in the left lower  lobe. Mild to moderate bullous changes. Small right pleural effusion. CXR 4/20/15: improved hazy attenuation; hyperinflation    CXR 1/28/15:    Increased groundglass attenuation in the right lower lobe and mild bilateral perihilar airspace opacities may represent asymmetric pulmonary edema, atelectasis or developing pneumonia      CXR 9/25/13: The heart size is normal. Probable hyperinflation the lateral view is unchanged and there is no acute pulmonary infiltrate. A focal opacity in the medial right lung base is unchanged consistent with epicardial fat. Chest CT 2/21/13  IMPRESSION- Interval resolution of groundglass \"infiltrates\" in   both lungs suggesting resolution of infectious or inflammatory   pneumonitis over the past 2 months. Interval decreased size of   mediastinal and hilar lymphadenopathy which was probably reactive   lymphadenopathy. New recurrent linear atelectasis anterior medial aspect left upper   lobe similar to exam of March 2012. Small amount of parenchymal   scarring within the lingular segment and medial aspect right   middle lobe. Chest XR (12/30/12)1. Improving bibasilar airspace disease, representing either   atelectasis or pneumonia. 2. Interval improvement in appearance of pulmonary vascular   congestion. 3. Stable bilateral hilar lymphadenopathy. Chest CT(dated 12/29/12): 1. Limited but negative study for the presence of pulmonary   embolism. 2. Multifocal groundglass infiltrate predominantly in the left   lung, not present on the comparison CT chest from March 29, 2012,   nonspecific but may represent multifocal pneumonitis. 3. Small area of consolidation and possible pneumonia in the   lingula. 4. New hilar and mediastinal adenopathy.    5. Mild enlargement of the main pulmonary artery, stable from   March 2012, suggestive of chronic pulmonary arterial hypertension        ONO2 5/20/13 showed significant nocturnal hypoxemia    Assessment:   -Chronic Obstructive Pulmonary Disease  -Chronic Respiratory Failure - hypoxemic, Stable  - Dyspnea-severe  -Erythrocytosis- likely secondary to chronic hypoxemia; myeloproliferative disease also possible, f/b Hem/Onc previously  - bilateral PEs 6/15  - obesity- persistent  LE edema    Plan:     - Inhaled bronchodilator therapy -  Albuterol mdi and  nebs prn. Could not afford spiriva, tudorza- did not tolerate because of vision changes  - Patient is up to date with Pneumococcal vaccine   - Continue home O2 at night 4l and 6Lwith exertion,- OCD for supplemental O2 ;  Based upon repeat 6MWT.    - has handicap placard  - recommend lifelong anticoagulation- remains on coumadin  - We discussed diet and limited exercise as she is able  - had flu vaccine last year

## 2019-04-16 ENCOUNTER — TELEPHONE (OUTPATIENT)
Dept: FAMILY MEDICINE CLINIC | Age: 79
End: 2019-04-16

## 2019-04-16 ASSESSMENT — ENCOUNTER SYMPTOMS
EYES NEGATIVE: 1
GASTROINTESTINAL NEGATIVE: 1
SHORTNESS OF BREATH: 1

## 2019-04-16 NOTE — TELEPHONE ENCOUNTER
Callie Dodson from [facility] called regarding pt new Rx for Citalopram. Pt hasn't started taking it yet, and wants to make sure it's okay to take at night with her Melatonin and Trazodone. Call back Callie Dodson with when to take medication.  [phone]

## 2019-04-17 RX ORDER — EZETIMIBE 10 MG/1
10 TABLET ORAL DAILY
Qty: 30 TABLET | Refills: 3 | Status: SHIPPED | OUTPATIENT
Start: 2019-04-17 | End: 2019-11-01

## 2019-04-18 ENCOUNTER — ANTI-COAG VISIT (OUTPATIENT)
Dept: FAMILY MEDICINE CLINIC | Age: 79
End: 2019-04-18

## 2019-04-18 LAB — INR BLD: 1.3

## 2019-04-18 NOTE — PATIENT INSTRUCTIONS
Take 7.5 mg tonight and tomorrow and then resume normal warfarin schedule. Return for INR on Monday. This provider called patient and gave her instructions on how to take medication.   She verbalized

## 2019-04-22 ENCOUNTER — ANTI-COAG VISIT (OUTPATIENT)
Dept: FAMILY MEDICINE CLINIC | Age: 79
End: 2019-04-22

## 2019-04-22 LAB — INR BLD: 2.4

## 2019-04-26 ENCOUNTER — ANTI-COAG VISIT (OUTPATIENT)
Dept: FAMILY MEDICINE CLINIC | Age: 79
End: 2019-04-26

## 2019-04-26 LAB — INR BLD: 2.9

## 2019-05-02 ENCOUNTER — CARE COORDINATION (OUTPATIENT)
Dept: CARE COORDINATION | Age: 79
End: 2019-05-02

## 2019-05-03 ENCOUNTER — ANTI-COAG VISIT (OUTPATIENT)
Dept: FAMILY MEDICINE CLINIC | Age: 79
End: 2019-05-03

## 2019-05-03 LAB — INR BLD: 1.8

## 2019-05-06 ENCOUNTER — ANTI-COAG VISIT (OUTPATIENT)
Dept: FAMILY MEDICINE CLINIC | Age: 79
End: 2019-05-06

## 2019-05-06 LAB — INR BLD: 2.6

## 2019-05-14 ENCOUNTER — ANTI-COAG VISIT (OUTPATIENT)
Dept: FAMILY MEDICINE CLINIC | Age: 79
End: 2019-05-14

## 2019-05-14 LAB — INR BLD: 3.3

## 2019-05-16 ENCOUNTER — ANTI-COAG VISIT (OUTPATIENT)
Dept: FAMILY MEDICINE CLINIC | Age: 79
End: 2019-05-16

## 2019-05-16 LAB — INR BLD: 2.1

## 2019-05-21 ENCOUNTER — ANTI-COAG VISIT (OUTPATIENT)
Dept: FAMILY MEDICINE CLINIC | Age: 79
End: 2019-05-21

## 2019-05-21 LAB — INR BLD: 1.8

## 2019-05-23 ENCOUNTER — ANTI-COAG VISIT (OUTPATIENT)
Dept: FAMILY MEDICINE CLINIC | Age: 79
End: 2019-05-23

## 2019-05-23 LAB — INR BLD: 2.2

## 2019-05-31 ENCOUNTER — ANTI-COAG VISIT (OUTPATIENT)
Dept: FAMILY MEDICINE CLINIC | Age: 79
End: 2019-05-31

## 2019-05-31 LAB — INR BLD: 3.5

## 2019-05-31 NOTE — PROGRESS NOTES
Pt informed to hold coumadin tonight and tomorrow and resume with regular dose on Sunday and recheck monday

## 2019-06-03 ENCOUNTER — ANTI-COAG VISIT (OUTPATIENT)
Dept: FAMILY MEDICINE CLINIC | Age: 79
End: 2019-06-03

## 2019-06-03 LAB — INR BLD: 1.6

## 2019-06-05 ENCOUNTER — ANTI-COAG VISIT (OUTPATIENT)
Dept: FAMILY MEDICINE CLINIC | Age: 79
End: 2019-06-05

## 2019-06-05 LAB — INR BLD: 2

## 2019-06-07 ENCOUNTER — ANTI-COAG VISIT (OUTPATIENT)
Dept: FAMILY MEDICINE CLINIC | Age: 79
End: 2019-06-07

## 2019-06-07 LAB — INR BLD: 2.3

## 2019-06-10 RX ORDER — WARFARIN SODIUM 5 MG/1
TABLET ORAL
Qty: 60 TABLET | Refills: 0 | Status: SHIPPED | OUTPATIENT
Start: 2019-06-10 | End: 2019-06-12 | Stop reason: SDUPTHER

## 2019-06-12 ENCOUNTER — OFFICE VISIT (OUTPATIENT)
Dept: CARDIOLOGY CLINIC | Age: 79
End: 2019-06-12
Payer: MEDICARE

## 2019-06-12 VITALS
BODY MASS INDEX: 48.96 KG/M2 | DIASTOLIC BLOOD PRESSURE: 62 MMHG | HEIGHT: 62 IN | SYSTOLIC BLOOD PRESSURE: 122 MMHG | HEART RATE: 87 BPM | OXYGEN SATURATION: 92 % | WEIGHT: 266.08 LBS

## 2019-06-12 DIAGNOSIS — I48.0 PAF (PAROXYSMAL ATRIAL FIBRILLATION) (HCC): ICD-10-CM

## 2019-06-12 DIAGNOSIS — R60.0 BILATERAL EDEMA OF LOWER EXTREMITY: Primary | ICD-10-CM

## 2019-06-12 DIAGNOSIS — R07.2 PRECORDIAL PAIN: ICD-10-CM

## 2019-06-12 DIAGNOSIS — I51.89 DIASTOLIC DYSFUNCTION: ICD-10-CM

## 2019-06-12 DIAGNOSIS — R06.02 SOB (SHORTNESS OF BREATH) ON EXERTION: ICD-10-CM

## 2019-06-12 PROCEDURE — 93000 ELECTROCARDIOGRAM COMPLETE: CPT | Performed by: INTERNAL MEDICINE

## 2019-06-12 PROCEDURE — G8400 PT W/DXA NO RESULTS DOC: HCPCS | Performed by: INTERNAL MEDICINE

## 2019-06-12 PROCEDURE — 1123F ACP DISCUSS/DSCN MKR DOCD: CPT | Performed by: INTERNAL MEDICINE

## 2019-06-12 PROCEDURE — 4040F PNEUMOC VAC/ADMIN/RCVD: CPT | Performed by: INTERNAL MEDICINE

## 2019-06-12 PROCEDURE — 1036F TOBACCO NON-USER: CPT | Performed by: INTERNAL MEDICINE

## 2019-06-12 PROCEDURE — 99214 OFFICE O/P EST MOD 30 MIN: CPT | Performed by: INTERNAL MEDICINE

## 2019-06-12 PROCEDURE — G8417 CALC BMI ABV UP PARAM F/U: HCPCS | Performed by: INTERNAL MEDICINE

## 2019-06-12 PROCEDURE — 1090F PRES/ABSN URINE INCON ASSESS: CPT | Performed by: INTERNAL MEDICINE

## 2019-06-12 PROCEDURE — G8427 DOCREV CUR MEDS BY ELIG CLIN: HCPCS | Performed by: INTERNAL MEDICINE

## 2019-06-12 RX ORDER — BUSPIRONE HYDROCHLORIDE 10 MG/1
10 TABLET ORAL PRN
COMMUNITY
End: 2020-01-09 | Stop reason: SDUPTHER

## 2019-06-12 RX ORDER — TORSEMIDE 20 MG/1
20 TABLET ORAL 2 TIMES DAILY
Qty: 60 TABLET | Refills: 6 | Status: SHIPPED | OUTPATIENT
Start: 2019-06-12 | End: 2019-12-18 | Stop reason: SDUPTHER

## 2019-06-12 RX ORDER — SPIRONOLACTONE 25 MG/1
25 TABLET ORAL DAILY
Qty: 30 TABLET | Refills: 6 | Status: SHIPPED | OUTPATIENT
Start: 2019-06-12 | End: 2019-12-18 | Stop reason: SDUPTHER

## 2019-06-12 RX ORDER — HYDROXYZINE PAMOATE 25 MG/1
25 CAPSULE ORAL 3 TIMES DAILY PRN
COMMUNITY
End: 2019-06-26 | Stop reason: ALTCHOICE

## 2019-06-12 RX ORDER — WARFARIN SODIUM 5 MG/1
TABLET ORAL
Qty: 60 TABLET | Refills: 0 | Status: SHIPPED | OUTPATIENT
Start: 2019-06-12 | End: 2019-11-11 | Stop reason: SDUPTHER

## 2019-06-12 NOTE — PROGRESS NOTES
[address] Office Note  6/12/2019     Subjective:  Ms. Nicol Menezes is being seen today for  cardiology evaluation for chest pain, SOB, edema    HPI:    Today she ambulates to office with rollator walker. She reports she has been having chest tightness feels like rope around her chest under her breasts, it is soreness worse when bends forward, SOB most of the time on 4 liters oximizer, BLE edema for past year takes Lasix for past year. She reports SOB is mainly exertional such as running the sweeper walking down hallway. She is typically ok at rest. She reports nurse from [employer] sees her once per week. She reports PCP monitors her INR. She now has a home machine to check her own and calls her numbers in. PMH  PAF, CHF, COPD, respiratory failure, PE    Review of Systems:         12 point ROS negative in all areas as listed below except as in [place]  Constitutional, EENT, Cardiovascular, pulmonary, GI, , Musculoskeletal, skin, neurological, hematological, endocrine, Psychiatric    Reviewed past medical history, social, and family history.    Former smoker 2012 after smoking since age 24 1ppd, , lives alone  Past Medical History:   Diagnosis Date    Acute exacerbation of chronic obstructive pulmonary disease (COPD) ([address]     Acute on chronic respiratory failure ([address]     Atrial fibrillation with rapid ventricular response ([address] 6/23/2015    CAP (community acquired pneumonia) 1/29/2015    CHF (congestive heart failure) ([address]     Diverticulitis     HCAP (healthcare-associated pneumonia)     IFG (impaired fasting glucose) 9/13/2018    Panic disorder     Pulmonary embolism ([address]     6/15    [place] spotted fever 2013    Sciatic nerve pain     Sepsis ([address] 6/22/2015    Tobacco abuse 6/22/2015    Vitamin D deficiency      Past Surgical History:   Procedure Laterality Date    APPENDECTOMY      CARDIAC CATHETERIZATION  2003    HERNIA REPAIR      PARTIAL HYSTERECTOMY Objective:   /62   Pulse 87   Ht 5' 2\" (1.575 m)   Wt 266 lb 1.3 oz (120.7 kg)   LMP  (LMP Unknown)   SpO2 92%   BMI 48.67 kg/m²     Wt Readings from Last 3 Encounters:   06/12/19 266 lb 1.3 oz (120.7 kg)   04/15/19 272 lb (123.4 kg)   01/04/19 267 lb (121.1 kg)       Physical Exam:  General: No Respiratory distress, appears well developed and well nourished. Eyes:  Sclera nonicteric  Nose/Sinuses:  negative findings: nose shows no deformity, asymmetry, or inflammation, nasal mucosa normal, septum midline with no perforation or bleeding  Back:  no pain to palpation  Joint:  no active joint inflammation  Musculoskeletal:  negative  Skin:  Warm and dry  Neck:  Negative for JVD and Carotid Bruits. Chest:  Clear to auscultation, respiration easy  Cardiovascular:  RRR, S1S2 normal, no murmur, no rub or thrill.   Abdomen:  Soft normal liver and spleen  Extremities:4+  BLE  edema, no clubbing, cyanosis,  Neuro: intact    Medications:   Outpatient Encounter Medications as of 6/12/2019   Medication Sig Dispense Refill    busPIRone (BUSPAR) 10 MG tablet Take 10 mg by mouth 3 times daily      Acetaminophen (TYLENOL ARTHRITIS PAIN PO) Take by mouth      hydrOXYzine (VISTARIL) 25 MG capsule Take 25 mg by mouth 3 times daily as needed for Itching      torsemide (DEMADEX) 20 MG tablet Take 1 tablet by mouth 2 times daily 60 tablet 6    spironolactone (ALDACTONE) 25 MG tablet Take 1 tablet by mouth daily 30 tablet 6    warfarin (COUMADIN) 5 MG tablet TAKE ONE TABLET BY MOUTH DAILY 60 tablet 0    albuterol sulfate HFA (VENTOLIN HFA) 108 (90 Base) MCG/ACT inhaler Inhale 2 puffs into the lungs every 6 hours as needed for Wheezing 2 Inhaler 3    traZODone (DESYREL) 50 MG tablet Take 1 tablet by mouth nightly 30 tablet 1    melatonin 3 MG TABS tablet Take 1 tablet by mouth nightly as needed (insomnia) 15 tablet 0    OXYGEN 4.5 L      Cholecalciferol (VITAMIN D-3 PO) Take 2,000 Units by mouth daily       ezetimibe (ZETIA) 10 MG tablet Take 1 tablet by mouth daily 30 tablet 3    albuterol (PROVENTIL) (2.5 MG/3ML) 0.083% nebulizer solution Take 3 mLs by nebulization every 6 hours as needed for Wheezing or Shortness of Breath DX COPD J44.9 360 mL 5    citalopram (CELEXA) 10 MG tablet Take 1 tablet by mouth daily 30 tablet 1    [DISCONTINUED] furosemide (LASIX) 40 MG tablet Take 1 tablet by mouth daily (Patient taking differently: Take 40 mg by mouth 2 times daily ) 30 tablet 0     No facility-administered encounter medications on file as of 6/12/2019. Lab Data:  CBC: No results for input(s): WBC, HGB, HCT, MCV, PLT in the last 72 hours. BMP: No results for input(s): NA, K, CL, CO2, PHOS, BUN, CREATININE in the last 72 hours. Invalid input(s): CA  LIVER PROFILE: No results for input(s): AST, ALT, LIPASE, BILIDIR, BILITOT, ALKPHOS in the last 72 hours. Invalid input(s): AMYLASE,  ALB  LIPID:   Lab Results   Component Value Date    CHOL 228 (H) 04/11/2019    CHOL 171 10/27/2017    CHOL 243 (H) 11/14/2014     Lab Results   Component Value Date    TRIG 163 (H) 04/11/2019    TRIG 102 10/27/2017    TRIG 208 (H) 11/14/2014     Lab Results   Component Value Date    HDL 57 04/11/2019    HDL 49 10/27/2017    HDL 59 11/14/2014     Lab Results   Component Value Date    LDLCALC 138 (H) 04/11/2019    LDLCALC 102 (H) 10/27/2017    LDLCALC 142 (H) 11/14/2014     Lab Results   Component Value Date    LABVLDL 33 04/11/2019    LABVLDL 20 10/27/2017    LABVLDL 42 11/14/2014     No results found for: CHOLHDLRATIO  PT/INR: No results for input(s): PROTIME, INR in the last 72 hours. A1C:   Lab Results   Component Value Date    LABA1C 6.2 04/11/2019     BNP:  No results for input(s): BNP in the last 72 hours. IMAGING:   EKG 6/12/19  SR possible old septal infarct    CXR 9/22/18  No definite acute pulmonary disease.    Chronic appearing coarse interstitial densities predominate parahilar regions and lung bases, typical of sequela from smoking or other previous infectious/inflammatory process. Calcific atherosclerotic disease aorta. FINDINGS: Calcifications involving the aorta reflect atherosclerosis. The cardiomediastinal and hilar silhouettes appear otherwise unremarkable. No pleural effusion or dense consolidation. Chronic appearing coarse interstitial densities predominate parahilar regions and lung bases, typical of sequela from smoking or other previous infectious/inflammatory process. No pneumothorax is seen. No acute osseous abnormality is identified. Degenerative changes are seen throughout the thoracic spine. ECHO 6/25/18     Normal left ventricle size and systolic function with an estimated EF 55-60%. No regional wall motion abnormalities are seen. mild concentric left ventricular hypertrophy. Normal left ventricular diastolic filling pressure vsGrade II diastolic dysfunction with elevated filing pressure. Trivial to mild tricuspid regurgitation.   Systolic pulmonary artery pressure (SPAP) estimated at 48 mmHg (RA pressure 15 mmHg), consistent with mild pulmonary hypertension. EKG 6/25/18  Normal sinus rhythm   Normal ECG   When compared with ECG of 27-OCT-2017 10:39, (unconfirmed)   Questionable change in QRS axis   Confirmed by Kaity Reese MD, [address] ([id number]) on 6/25/2018 5:06:48 PM     Stress test 10/28/17  Summary  There is a small anterior mostly fixed defect most consistent with breast  attenuation artifact. There is no evidence of stress induced ischemia. Normal LV function with ejection fraction of 71 %. There are no regional wall motion abnormalities. Assessment:  1. PAF (paroxysmal atrial fibrillation) ([address]    2. Precordial pain    3. Bilateral edema of lower extremity    4. Diastolic dysfunction    5. SOB (shortness of breath) on exertion         Plan:  Orders Placed This Encounter   Procedures    BASIC METABOLIC PANEL    EKG 12 lead    Echo 2D w doppler w color complete     1.  Scheduled return visit 6 months. 2. Medications reviewed pcp refills meds  3. Will stop Lasix and add Torsemide 20 mg twice a day and add Spironolactone 25 mg 1 daily     education reinforced. ~salt restriction 2000mg per day              ~fluid restriction 64 oz per day              ~medication compliance keep feet and legs elevated while sitting  4.             ~daily weights and notify of any significant weight gain/loss  5. check BMP in 1 week    QUALITY MEASURES  1. Tobacco Cessation Counseling: NA  2. Retake of BP if >140/90:   NA  3. Documentation to PCP/referring for new patient:  Sent to PCP at close of office visit  4. CAD patient on anti-platelet: NA  5. CAD patient on STATIN therapy:  No  intolerant  6. Patient with CHF and aFib on anticoagulation:  Yes     This note was scribed in the presence of  Clement Mcallister MD by Hernandez Kwan RN  I, Dr. Clement Mcallister, personally performed the services described in this documentation, as scribed by the above signed scribe in my presence. It is both accurate and complete to my knowledge. I agree with the details independently gathered by the clinical support staff, while the remaining scribed note accurately describes my personal service to the patient.       [address] [place], MD 6/12/2019 3:35 PM

## 2019-06-12 NOTE — LETTER
[address] Office Note  6/12/2019     Subjective:  Ms. Emily Ashraf is being seen today for  cardiology evaluation for chest pain, SOB, edema    HPI:    Today she ambulates to office with rollator walker. She reports she has been having chest tightness feels like rope around her chest under her breasts, it is soreness worse when bends forward, SOB most of the time on 4 liters oximizer, BLE edema for past year takes Lasix for past year. She reports SOB is mainly exertional such as running the sweeper walking down hallway. She is typically ok at rest. She reports nurse from [employer] sees her once per week. She reports PCP monitors her INR. She now has a home machine to check her own and calls her numbers in. PMH  PAF, CHF, COPD, respiratory failure, PE    Review of Systems:         12 point ROS negative in all areas as listed below except as in [place]  Constitutional, EENT, Cardiovascular, pulmonary, GI, , Musculoskeletal, skin, neurological, hematological, endocrine, Psychiatric    Reviewed past medical history, social, and family history.    Former smoker 2012 after smoking since age 24 1ppd, , lives alone  Past Medical History:   Diagnosis Date    Acute exacerbation of chronic obstructive pulmonary disease (COPD) ([address]     Acute on chronic respiratory failure ([address]     Atrial fibrillation with rapid ventricular response ([address] 6/23/2015    CAP (community acquired pneumonia) 1/29/2015    CHF (congestive heart failure) ([address]     Diverticulitis     HCAP (healthcare-associated pneumonia)     IFG (impaired fasting glucose) 9/13/2018    Panic disorder     Pulmonary embolism ([address]     6/15    [place] spotted fever 2013    Sciatic nerve pain     Sepsis ([address] 6/22/2015    Tobacco abuse 6/22/2015    Vitamin D deficiency      Past Surgical History:   Procedure Laterality Date    APPENDECTOMY      CARDIAC CATHETERIZATION  2003    HERNIA REPAIR  PARTIAL HYSTERECTOMY         Objective:   /62   Pulse 87   Ht 5' 2\" (1.575 m)   Wt 266 lb 1.3 oz (120.7 kg)   LMP  (LMP Unknown)   SpO2 92%   BMI 48.67 kg/m²      Wt Readings from Last 3 Encounters:   06/12/19 266 lb 1.3 oz (120.7 kg)   04/15/19 272 lb (123.4 kg)   01/04/19 267 lb (121.1 kg)       Physical Exam:  General: No Respiratory distress, appears well developed and well nourished. Eyes:  Sclera nonicteric  Nose/Sinuses:  negative findings: nose shows no deformity, asymmetry, or inflammation, nasal mucosa normal, septum midline with no perforation or bleeding  Back:  no pain to palpation  Joint:  no active joint inflammation  Musculoskeletal:  negative  Skin:  Warm and dry  Neck:  Negative for JVD and Carotid Bruits. Chest:  Clear to auscultation, respiration easy  Cardiovascular:  RRR, S1S2 normal, no murmur, no rub or thrill.   Abdomen:  Soft normal liver and spleen  Extremities:4+  BLE  edema, no clubbing, cyanosis,  Neuro: intact    Medications:   Outpatient Encounter Medications as of 6/12/2019   Medication Sig Dispense Refill    busPIRone (BUSPAR) 10 MG tablet Take 10 mg by mouth 3 times daily      Acetaminophen (TYLENOL ARTHRITIS PAIN PO) Take by mouth      hydrOXYzine (VISTARIL) 25 MG capsule Take 25 mg by mouth 3 times daily as needed for Itching      torsemide (DEMADEX) 20 MG tablet Take 1 tablet by mouth 2 times daily 60 tablet 6    spironolactone (ALDACTONE) 25 MG tablet Take 1 tablet by mouth daily 30 tablet 6    warfarin (COUMADIN) 5 MG tablet TAKE ONE TABLET BY MOUTH DAILY 60 tablet 0    albuterol sulfate HFA (VENTOLIN HFA) 108 (90 Base) MCG/ACT inhaler Inhale 2 puffs into the lungs every 6 hours as needed for Wheezing 2 Inhaler 3    traZODone (DESYREL) 50 MG tablet Take 1 tablet by mouth nightly 30 tablet 1    melatonin 3 MG TABS tablet Take 1 tablet by mouth nightly as needed (insomnia) 15 tablet 0    OXYGEN 4.5 L  Cholecalciferol (VITAMIN D-3 PO) Take 2,000 Units by mouth daily       ezetimibe (ZETIA) 10 MG tablet Take 1 tablet by mouth daily 30 tablet 3    albuterol (PROVENTIL) (2.5 MG/3ML) 0.083% nebulizer solution Take 3 mLs by nebulization every 6 hours as needed for Wheezing or Shortness of Breath DX COPD J44.9 360 mL 5    citalopram (CELEXA) 10 MG tablet Take 1 tablet by mouth daily 30 tablet 1    [DISCONTINUED] furosemide (LASIX) 40 MG tablet Take 1 tablet by mouth daily (Patient taking differently: Take 40 mg by mouth 2 times daily ) 30 tablet 0     No facility-administered encounter medications on file as of 6/12/2019. Lab Data:  CBC: No results for input(s): WBC, HGB, HCT, MCV, PLT in the last 72 hours. BMP: No results for input(s): NA, K, CL, CO2, PHOS, BUN, CREATININE in the last 72 hours. Invalid input(s): CA  LIVER PROFILE: No results for input(s): AST, ALT, LIPASE, BILIDIR, BILITOT, ALKPHOS in the last 72 hours. Invalid input(s): AMYLASE,  ALB  LIPID:   Lab Results   Component Value Date    CHOL 228 (H) 04/11/2019    CHOL 171 10/27/2017    CHOL 243 (H) 11/14/2014     Lab Results   Component Value Date    TRIG 163 (H) 04/11/2019    TRIG 102 10/27/2017    TRIG 208 (H) 11/14/2014     Lab Results   Component Value Date    HDL 57 04/11/2019    HDL 49 10/27/2017    HDL 59 11/14/2014     Lab Results   Component Value Date    LDLCALC 138 (H) 04/11/2019    LDLCALC 102 (H) 10/27/2017    LDLCALC 142 (H) 11/14/2014     Lab Results   Component Value Date    LABVLDL 33 04/11/2019    LABVLDL 20 10/27/2017    LABVLDL 42 11/14/2014     No results found for: CHOLHDLRATIO  PT/INR: No results for input(s): PROTIME, INR in the last 72 hours. A1C:   Lab Results   Component Value Date    LABA1C 6.2 04/11/2019     BNP:  No results for input(s): BNP in the last 72 hours. IMAGING:   EKG 6/12/19  SR possible old septal infarct    CXR 9/22/18  No definite acute pulmonary disease.    Chronic appearing coarse  BASIC METABOLIC PANEL    EKG 12 lead    Echo 2D w doppler w color complete     1. Scheduled return visit 6 months. 2. Medications reviewed pcp refills meds  3. Will stop Lasix and add Torsemide 20 mg twice a day and add Spironolactone 25 mg 1 daily     education reinforced. ~salt restriction 2000mg per day              ~fluid restriction 64 oz per day              ~medication compliance keep feet and legs elevated while sitting  4.             ~daily weights and notify of any significant weight gain/loss  5. check BMP in 1 week    QUALITY MEASURES  1. Tobacco Cessation Counseling: NA  2. Retake of BP if >140/90:   NA  3. Documentation to PCP/referring for new patient:  Sent to PCP at close of office visit  4. CAD patient on anti-platelet: NA  5. CAD patient on STATIN therapy:  No  intolerant  6. Patient with CHF and aFib on anticoagulation:  Yes     This note was scribed in the presence of  Cedrick Lr MD by Robert Amor, RN  I, Dr. Cedrick Lr, personally performed the services described in this documentation, as scribed by the above signed scribe in my presence. It is both accurate and complete to my knowledge. I agree with the details independently gathered by the clinical support staff, while the remaining scribed note accurately describes my personal service to the patient.       [address] [place], MD 6/12/2019 3:35 PM

## 2019-06-13 ENCOUNTER — HOSPITAL ENCOUNTER (OUTPATIENT)
Age: 79
Discharge: HOME | End: 2019-06-13
Payer: MEDICARE

## 2019-06-13 DIAGNOSIS — I51.89 DIASTOLIC DYSFUNCTION: ICD-10-CM

## 2019-06-13 DIAGNOSIS — R60.0 BILATERAL EDEMA OF LOWER EXTREMITY: ICD-10-CM

## 2019-06-13 LAB
ANION GAP SERPL CALCULATED.3IONS-SCNC: 9 MMOL/L (ref 3–16)
BUN BLDV-MCNC: 11 MG/DL (ref 7–20)
CALCIUM SERPL-MCNC: 9.1 MG/DL (ref 8.3–10.6)
CHLORIDE BLD-SCNC: 98 MMOL/L (ref 99–110)
CO2: 33 MMOL/L (ref 21–32)
CREAT SERPL-MCNC: <0.5 MG/DL (ref 0.6–1.2)
GFR AFRICAN AMERICAN: >60
GFR NON-AFRICAN AMERICAN: >60
GLUCOSE BLD-MCNC: 120 MG/DL (ref 70–99)
POTASSIUM SERPL-SCNC: 4.3 MMOL/L (ref 3.5–5.1)
SODIUM BLD-SCNC: 140 MMOL/L (ref 136–145)

## 2019-06-13 PROCEDURE — 80048 BASIC METABOLIC PNL TOTAL CA: CPT

## 2019-06-13 PROCEDURE — 36415 COLL VENOUS BLD VENIPUNCTURE: CPT

## 2019-06-14 ENCOUNTER — ANTI-COAG VISIT (OUTPATIENT)
Dept: FAMILY MEDICINE CLINIC | Age: 79
End: 2019-06-14

## 2019-06-14 DIAGNOSIS — I20.9 ANGINA PECTORIS (HCC): Primary | ICD-10-CM

## 2019-06-14 LAB — INR BLD: 1.8

## 2019-06-17 ENCOUNTER — ANTI-COAG VISIT (OUTPATIENT)
Dept: FAMILY MEDICINE CLINIC | Age: 79
End: 2019-06-17

## 2019-06-17 LAB — INR BLD: 2.2

## 2019-06-21 ENCOUNTER — ANTI-COAG VISIT (OUTPATIENT)
Dept: FAMILY MEDICINE CLINIC | Age: 79
End: 2019-06-21

## 2019-06-21 LAB — INR BLD: 2.2

## 2019-06-24 ENCOUNTER — TELEPHONE (OUTPATIENT)
Dept: FAMILY MEDICINE CLINIC | Age: 79
End: 2019-06-24

## 2019-06-26 ENCOUNTER — HOSPITAL ENCOUNTER (OUTPATIENT)
Dept: NON INVASIVE DIAGNOSTICS | Age: 79
Discharge: HOME | End: 2019-06-26
Payer: MEDICARE

## 2019-06-26 ENCOUNTER — HOSPITAL ENCOUNTER (OUTPATIENT)
Dept: NUCLEAR MEDICINE | Age: 79
Discharge: HOME | End: 2019-06-26
Payer: MEDICARE

## 2019-06-26 DIAGNOSIS — R06.02 SOB (SHORTNESS OF BREATH) ON EXERTION: ICD-10-CM

## 2019-06-26 DIAGNOSIS — I51.89 DIASTOLIC DYSFUNCTION: ICD-10-CM

## 2019-06-26 DIAGNOSIS — I20.9 ANGINA PECTORIS (HCC): ICD-10-CM

## 2019-06-26 LAB
LV EF: 58 %
LV EF: 69 %
LVEF MODALITY: NORMAL
LVEF MODALITY: NORMAL

## 2019-06-26 PROCEDURE — A9502 TC99M TETROFOSMIN: HCPCS | Performed by: INTERNAL MEDICINE

## 2019-06-26 PROCEDURE — 93017 CV STRESS TEST TRACING ONLY: CPT

## 2019-06-26 PROCEDURE — 6360000002 HC RX W HCPCS: Performed by: INTERNAL MEDICINE

## 2019-06-26 PROCEDURE — 78452 HT MUSCLE IMAGE SPECT MULT: CPT

## 2019-06-26 PROCEDURE — 3430000000 HC RX DIAGNOSTIC RADIOPHARMACEUTICAL: Performed by: INTERNAL MEDICINE

## 2019-06-26 PROCEDURE — 93306 TTE W/DOPPLER COMPLETE: CPT

## 2019-06-26 RX ADMIN — TETROFOSMIN 33 MILLICURIE: 1.38 INJECTION, POWDER, LYOPHILIZED, FOR SOLUTION INTRAVENOUS at 10:28

## 2019-06-26 RX ADMIN — REGADENOSON 0.4 MG: 0.08 INJECTION, SOLUTION INTRAVENOUS at 10:28

## 2019-06-26 NOTE — PROGRESS NOTES
Patient arrived to stress lab for Lexiscan stress test (2 Day). Patient was educated on procedure, all questions answered, and consent verified. Pt denies CP.

## 2019-06-26 NOTE — PROGRESS NOTES
Pt completed stress portion of cardiac stress test. Pt is discharged to nuclear department for stress scan. Nuclear tech will remove PIV. Discharge instructions given to pt. Pt verbalizes understanding to discharge instructions. Pt understands to return to stress lab tomorrow morning for resting scan.  Nuclear tech to give time of arrival.

## 2019-06-27 ENCOUNTER — HOSPITAL ENCOUNTER (OUTPATIENT)
Dept: NUCLEAR MEDICINE | Age: 79
Discharge: HOME | End: 2019-06-27
Payer: MEDICARE

## 2019-06-27 PROCEDURE — A9502 TC99M TETROFOSMIN: HCPCS | Performed by: INTERNAL MEDICINE

## 2019-06-27 PROCEDURE — 3430000000 HC RX DIAGNOSTIC RADIOPHARMACEUTICAL: Performed by: INTERNAL MEDICINE

## 2019-06-27 RX ADMIN — TETROFOSMIN 34 MILLICURIE: 1.38 INJECTION, POWDER, LYOPHILIZED, FOR SOLUTION INTRAVENOUS at 09:49

## 2019-06-28 ENCOUNTER — ANTI-COAG VISIT (OUTPATIENT)
Dept: FAMILY MEDICINE CLINIC | Age: 79
End: 2019-06-28

## 2019-06-28 LAB — INR BLD: 3.3

## 2019-07-02 ENCOUNTER — ANTI-COAG VISIT (OUTPATIENT)
Dept: FAMILY MEDICINE CLINIC | Age: 79
End: 2019-07-02

## 2019-07-02 ENCOUNTER — CARE COORDINATION (OUTPATIENT)
Dept: INTERNAL MEDICINE CLINIC | Age: 79
End: 2019-07-02

## 2019-07-02 LAB — INR BLD: 2.2

## 2019-07-02 NOTE — PATIENT INSTRUCTIONS
quitting, talk to your doctor about stop-smoking programs and medicines. These can increase your chances of quitting for good.     · Avoid colds and flu. Get a pneumococcal vaccine shot. If you have had one before, ask your doctor whether you need a second dose. Get the flu vaccine every fall. If you must be around people with colds or the flu, wash your hands often.     · Avoid secondhand smoke, air pollution, and high altitudes. Also avoid cold, dry air and hot, humid air. Stay at home with your windows closed when air pollution is bad.    Medicines and oxygen therapy    · Take your medicines exactly as prescribed. Call your doctor if you think you are having a problem with your medicine.     · You may be taking medicines such as:  ? Bronchodilators. These help open your airways and make breathing easier. Bronchodilators are either short-acting (work for 6 to 9 hours) or long-acting (work for 24 hours). You inhale most bronchodilators, so they start to act quickly. Always carry your quick-relief inhaler with you in case you need it while you are away from home. ? Corticosteroids (prednisone, budesonide). These reduce airway inflammation. They come in pill or inhaled form. You must take these medicines every day for them to work well.     · A spacer may help you get more inhaled medicine to your lungs. Ask your doctor or pharmacist if a spacer is right for you. If it is, ask how to use it properly.     · Do not take any vitamins, over-the-counter medicine, or herbal products without talking to your doctor first.     · If your doctor prescribed antibiotics, take them as directed. Do not stop taking them just because you feel better. You need to take the full course of antibiotics.     · Oxygen therapy boosts the amount of oxygen in your blood and helps you breathe easier. Use the flow rate your doctor has recommended, and do not change it without talking to your doctor first.   Activity    · Get regular exercise.

## 2019-07-02 NOTE — CARE COORDINATION
Completed (Comment: fall precautions mailed and discussed. no new falls 1/1/6/19)  Home Health Services:  Completed (Comment: [facility]  [phone]. still active 1/16/19 )  Meals on Wheels:  Declined  Pulmonary Rehab:  Declined  Senior Services:  [facility]  Social Work:  Declined  Other Services:  Declined (Comment: pulmonary rehab declined 1/16/19)  Transportation Support:  Declined  Zone Management Tools:  Completed (Comment: copd mailed and discussed again. 1/16/19)         Goals Addressed                 This Visit's Progress       Care Coordination     Conditions and Symptoms   Worsening     I will schedule office visits, as directed by my provider. I will keep my appointment or reschedule if I have to cancel. I will notify my provider of any barriers to my plan of care. I will follow my Zone Management tool to seek urgent or emergent care. I will notify my provider of any symptoms that indicate a worsening of my condition. Barriers: none  Plan for overcoming my barriers: home care, care coordination   Confidence: 6/10  Anticipated Goal Completion Date:8/19    Edema in her feet again. Diuretics increased last week. Encouraged monitoring weights and symptoms. Fluid intake and low sodium diet. 7/2/19 trb.  Self Monitoring   Worsening     Daily Weights - I will weight myself as directed - Daily and write down weights    Patient Reported Weight No flowsheet data found. Barriers: none  Plan for overcoming my barriers: home care, care coordination, family support  Confidence: 7/10  Anticipated Goal Completion Date: 8/19    Requested she check her weight more often. She is only doing it a couple times a week. She has edema and feels like she has a bandlike pressure around the base of her lungs.  7/2/19 trb         Lifestyle     follow up   Not on track      I will schedule the recommended follow up visit when leaving the office today, and agree to keep the appointment or to

## 2019-07-09 ENCOUNTER — ANTI-COAG VISIT (OUTPATIENT)
Dept: FAMILY MEDICINE CLINIC | Age: 79
End: 2019-07-09

## 2019-07-09 LAB — INR BLD: 2.8

## 2019-07-11 ENCOUNTER — CARE COORDINATION (OUTPATIENT)
Dept: INTERNAL MEDICINE CLINIC | Age: 79
End: 2019-07-11

## 2019-07-11 NOTE — PATIENT INSTRUCTIONS
other fast foods. ? Pickles, olives, ketchup, and other condiments, especially soy sauce, unless labeled sodium-free or low-sodium. Where can you learn more? Go to https://CAVI Video ShoppingpeGivkwik.Cognea. org and sign in to your [employer] account. Enter O262 in the [facility] box to learn more about \"Low Sodium Diet (2,000 Milligram): Care Instructions. \"     If you do not have an account, please click on the \"Sign Up Now\" link. Current as of: November 7, 2018  Content Version: 12.0  © 5689-7444 [employer]. Care instructions adapted under license by [facility] ([facility]). If you have questions about a medical condition or this instruction, always ask your healthcare professional. [address] any warranty or liability for your use of this information. Patient Education        Avoiding Triggers With Heart Failure: Care Instructions  Your Care Instructions    Triggers are anything that make your heart failure flare up. A flare-up is also called \"sudden heart failure\" or \"acute heart failure. \" When you have a flare-up, fluid builds up in your lungs, and you have problems breathing. You might need to go to the hospital. By watching for changes in your condition and avoiding triggers, you can prevent heart failure flare-ups. Follow-up care is a key part of your treatment and safety. Be sure to make and go to all appointments, and call your doctor if you are having problems. It's also a good idea to know your test results and keep a list of the medicines you take. How can you care for yourself at home? Watch for changes in your weight and condition  · Weigh yourself without clothing at the same time each day. Record your weight. Call your doctor if you have sudden weight gain, such as more than 2 to 3 pounds in a day or 5 pounds in a week.  (Your doctor may suggest a different range of weight gain.) A sudden weight gain may mean that your heart failure is getting different range of weight gain.)     · You have increased swelling in your legs, ankles, or feet.     · You are suddenly so tired or weak that you cannot do your usual activities.    Watch closely for changes in your health, and be sure to contact your doctor if you develop new symptoms. Where can you learn more? Go to https://chpepiceweb.Droplr. org and sign in to your [employer] account. Enter J525 in the [employer] box to learn more about \"Avoiding Triggers With Heart Failure: Care Instructions. \"     If you do not have an account, please click on the \"Sign Up Now\" link. Current as of: July 22, 2018  Content Version: 12.0  © 7286-9539 [employer]. Care instructions adapted under license by [facility] ([facility]). If you have questions about a medical condition or this instruction, always ask your healthcare professional. [address] any warranty or liability for your use of this information.

## 2019-07-16 ENCOUNTER — ANTI-COAG VISIT (OUTPATIENT)
Dept: FAMILY MEDICINE CLINIC | Age: 79
End: 2019-07-16

## 2019-07-16 LAB — INR BLD: 1.8

## 2019-07-19 ENCOUNTER — TELEPHONE (OUTPATIENT)
Dept: FAMILY MEDICINE CLINIC | Age: 79
End: 2019-07-19

## 2019-07-19 ENCOUNTER — ANTI-COAG VISIT (OUTPATIENT)
Dept: FAMILY MEDICINE CLINIC | Age: 79
End: 2019-07-19

## 2019-07-19 LAB — INR BLD: 2.2

## 2019-07-19 RX ORDER — PREDNISONE 20 MG/1
40 TABLET ORAL DAILY
Qty: 10 TABLET | Refills: 0 | Status: SHIPPED | OUTPATIENT
Start: 2019-07-19 | End: 2019-07-24

## 2019-07-22 ENCOUNTER — HOSPITAL ENCOUNTER (OUTPATIENT)
Age: 79
Discharge: HOME | End: 2019-07-22
Payer: MEDICARE

## 2019-07-22 ENCOUNTER — HOSPITAL ENCOUNTER (OUTPATIENT)
Dept: GENERAL RADIOLOGY | Age: 79
Discharge: HOME | End: 2019-07-22
Payer: MEDICARE

## 2019-07-22 ENCOUNTER — TELEPHONE (OUTPATIENT)
Dept: FAMILY MEDICINE CLINIC | Age: 79
End: 2019-07-22

## 2019-07-22 DIAGNOSIS — R05.9 COUGH: ICD-10-CM

## 2019-07-22 DIAGNOSIS — R05.9 COUGH: Primary | ICD-10-CM

## 2019-07-22 PROCEDURE — 71046 X-RAY EXAM CHEST 2 VIEWS: CPT

## 2019-07-22 NOTE — TELEPHONE ENCOUNTER
Enma Leigh with [address] called stating pt still has a deep productive cough, greenish/brown phlegm, tightness in chest and shortness of breath when coughing. Pt has been taking OTC cough syrup and is on prednisone 40mg for 5 days. Call back pt [phone] with recommendations. Pt uses [facility].

## 2019-07-23 RX ORDER — DOXYCYCLINE HYCLATE 100 MG
100 TABLET ORAL 2 TIMES DAILY
Qty: 14 TABLET | Refills: 0 | Status: SHIPPED | OUTPATIENT
Start: 2019-07-23 | End: 2019-07-30

## 2019-07-25 ENCOUNTER — ANTI-COAG VISIT (OUTPATIENT)
Dept: FAMILY MEDICINE CLINIC | Age: 79
End: 2019-07-25

## 2019-07-25 LAB — INR BLD: 2.3

## 2019-07-29 ENCOUNTER — ANTI-COAG VISIT (OUTPATIENT)
Dept: FAMILY MEDICINE CLINIC | Age: 79
End: 2019-07-29

## 2019-07-29 LAB — INR BLD: 1.6

## 2019-08-05 ENCOUNTER — TELEPHONE (OUTPATIENT)
Dept: FAMILY MEDICINE CLINIC | Age: 79
End: 2019-08-05

## 2019-08-05 DIAGNOSIS — R06.2 WHEEZING: ICD-10-CM

## 2019-08-05 DIAGNOSIS — R05.9 COUGH: Primary | ICD-10-CM

## 2019-08-05 NOTE — TELEPHONE ENCOUNTER
David Calderon with [address] called stating that pt still has a moist productive cough, thick green sputum, and wheezing for 2 weeks. No fever. Pt finished prednisone and doxycyline on Friday(8/2). Pt uses [facility] [facility].  Call back pt with recommendations [phone]

## 2019-08-06 ENCOUNTER — HOSPITAL ENCOUNTER (OUTPATIENT)
Age: 79
Discharge: HOME | DRG: 166 | End: 2019-08-06
Payer: MEDICARE

## 2019-08-06 ENCOUNTER — HOSPITAL ENCOUNTER (OUTPATIENT)
Dept: GENERAL RADIOLOGY | Age: 79
Discharge: HOME | DRG: 166 | End: 2019-08-06
Payer: MEDICARE

## 2019-08-06 DIAGNOSIS — R06.2 WHEEZING: ICD-10-CM

## 2019-08-06 DIAGNOSIS — R05.9 COUGH: ICD-10-CM

## 2019-08-06 PROCEDURE — 71046 X-RAY EXAM CHEST 2 VIEWS: CPT

## 2019-08-07 ENCOUNTER — TELEPHONE (OUTPATIENT)
Dept: FAMILY MEDICINE CLINIC | Age: 79
End: 2019-08-07

## 2019-08-07 ENCOUNTER — ANTI-COAG VISIT (OUTPATIENT)
Dept: FAMILY MEDICINE CLINIC | Age: 79
End: 2019-08-07

## 2019-08-07 DIAGNOSIS — R05.9 COUGH: Primary | ICD-10-CM

## 2019-08-07 DIAGNOSIS — R93.89 ABNORMAL CHEST X-RAY: ICD-10-CM

## 2019-08-07 DIAGNOSIS — R06.02 SOB (SHORTNESS OF BREATH): ICD-10-CM

## 2019-08-07 LAB — INR BLD: 2.5

## 2019-08-09 ENCOUNTER — APPOINTMENT (OUTPATIENT)
Dept: CT IMAGING | Age: 79
DRG: 166 | End: 2019-08-09
Payer: MEDICARE

## 2019-08-09 ENCOUNTER — HOSPITAL ENCOUNTER (INPATIENT)
Age: 79
LOS: 7 days | Discharge: HOME | DRG: 166 | End: 2019-08-16
Attending: EMERGENCY MEDICINE | Admitting: INTERNAL MEDICINE
Payer: MEDICARE

## 2019-08-09 ENCOUNTER — HOSPITAL ENCOUNTER (OUTPATIENT)
Age: 79
Discharge: HOME | DRG: 166 | End: 2019-08-09
Payer: MEDICARE

## 2019-08-09 DIAGNOSIS — R06.02 SOB (SHORTNESS OF BREATH): ICD-10-CM

## 2019-08-09 DIAGNOSIS — R05.9 COUGH: Primary | ICD-10-CM

## 2019-08-09 DIAGNOSIS — R93.89 ABNORMAL CHEST X-RAY: ICD-10-CM

## 2019-08-09 DIAGNOSIS — R05.9 COUGH: ICD-10-CM

## 2019-08-09 DIAGNOSIS — J44.1 COPD EXACERBATION (HCC): Primary | ICD-10-CM

## 2019-08-09 DIAGNOSIS — R91.8 LUNG MASS: ICD-10-CM

## 2019-08-09 LAB
A/G RATIO: 1 (ref 1.1–2.2)
ALBUMIN SERPL-MCNC: 3.5 G/DL (ref 3.4–5)
ALP BLD-CCNC: 84 U/L (ref 40–129)
ALT SERPL-CCNC: 12 U/L (ref 10–40)
ANION GAP SERPL CALCULATED.3IONS-SCNC: 10 MMOL/L (ref 3–16)
ANION GAP SERPL CALCULATED.3IONS-SCNC: 11 MMOL/L (ref 3–16)
APTT: 50.1 SEC (ref 26–36)
AST SERPL-CCNC: 14 U/L (ref 15–37)
BASOPHILS ABSOLUTE: 0 K/UL (ref 0–0.2)
BASOPHILS RELATIVE PERCENT: 0.6 %
BILIRUB SERPL-MCNC: <0.2 MG/DL (ref 0–1)
BUN BLDV-MCNC: 13 MG/DL (ref 7–20)
BUN BLDV-MCNC: 13 MG/DL (ref 7–20)
CALCIUM SERPL-MCNC: 9.2 MG/DL (ref 8.3–10.6)
CALCIUM SERPL-MCNC: 9.3 MG/DL (ref 8.3–10.6)
CHLORIDE BLD-SCNC: 100 MMOL/L (ref 99–110)
CHLORIDE BLD-SCNC: 100 MMOL/L (ref 99–110)
CO2: 31 MMOL/L (ref 21–32)
CO2: 32 MMOL/L (ref 21–32)
CREAT SERPL-MCNC: 0.6 MG/DL (ref 0.6–1.2)
CREAT SERPL-MCNC: 0.6 MG/DL (ref 0.6–1.2)
EKG ATRIAL RATE: 83 BPM
EKG DIAGNOSIS: NORMAL
EKG P AXIS: 56 DEGREES
EKG P-R INTERVAL: 152 MS
EKG Q-T INTERVAL: 344 MS
EKG QRS DURATION: 84 MS
EKG QTC CALCULATION (BAZETT): 404 MS
EKG R AXIS: -13 DEGREES
EKG T AXIS: 58 DEGREES
EKG VENTRICULAR RATE: 83 BPM
EOSINOPHILS ABSOLUTE: 0.2 K/UL (ref 0–0.6)
EOSINOPHILS RELATIVE PERCENT: 2.3 %
GFR AFRICAN AMERICAN: >60
GFR AFRICAN AMERICAN: >60
GFR NON-AFRICAN AMERICAN: >60
GFR NON-AFRICAN AMERICAN: >60
GLOBULIN: 3.4 G/DL
GLUCOSE BLD-MCNC: 149 MG/DL (ref 70–99)
GLUCOSE BLD-MCNC: 151 MG/DL (ref 70–99)
HCT VFR BLD CALC: 37.8 % (ref 36–48)
HEMOGLOBIN: 12 G/DL (ref 12–16)
INR BLD: 2.96 (ref 0.86–1.14)
LACTIC ACID: 1.6 MMOL/L (ref 0.4–2)
LYMPHOCYTES ABSOLUTE: 2.2 K/UL (ref 1–5.1)
LYMPHOCYTES RELATIVE PERCENT: 28.7 %
MCH RBC QN AUTO: 27.3 PG (ref 26–34)
MCHC RBC AUTO-ENTMCNC: 31.9 G/DL (ref 31–36)
MCV RBC AUTO: 85.6 FL (ref 80–100)
MONOCYTES ABSOLUTE: 0.7 K/UL (ref 0–1.3)
MONOCYTES RELATIVE PERCENT: 9.8 %
NEUTROPHILS ABSOLUTE: 4.4 K/UL (ref 1.7–7.7)
NEUTROPHILS RELATIVE PERCENT: 58.6 %
PDW BLD-RTO: 14.5 % (ref 12.4–15.4)
PLATELET # BLD: 286 K/UL (ref 135–450)
PMV BLD AUTO: 6.8 FL (ref 5–10.5)
POTASSIUM SERPL-SCNC: 4.2 MMOL/L (ref 3.5–5.1)
POTASSIUM SERPL-SCNC: 4.2 MMOL/L (ref 3.5–5.1)
PROTHROMBIN TIME: 32.7 SEC (ref 9.8–13)
RBC # BLD: 4.41 M/UL (ref 4–5.2)
SODIUM BLD-SCNC: 142 MMOL/L (ref 136–145)
SODIUM BLD-SCNC: 142 MMOL/L (ref 136–145)
TOTAL PROTEIN: 6.9 G/DL (ref 6.4–8.2)
TROPONIN: <0.01 NG/ML
WBC # BLD: 7.6 K/UL (ref 4–11)

## 2019-08-09 PROCEDURE — 6360000004 HC RX CONTRAST MEDICATION: Performed by: EMERGENCY MEDICINE

## 2019-08-09 PROCEDURE — 83605 ASSAY OF LACTIC ACID: CPT

## 2019-08-09 PROCEDURE — 80053 COMPREHEN METABOLIC PANEL: CPT

## 2019-08-09 PROCEDURE — 96374 THER/PROPH/DIAG INJ IV PUSH: CPT

## 2019-08-09 PROCEDURE — 2700000000 HC OXYGEN THERAPY PER DAY

## 2019-08-09 PROCEDURE — 6370000000 HC RX 637 (ALT 250 FOR IP)

## 2019-08-09 PROCEDURE — 71260 CT THORAX DX C+: CPT

## 2019-08-09 PROCEDURE — 6370000000 HC RX 637 (ALT 250 FOR IP): Performed by: INTERNAL MEDICINE

## 2019-08-09 PROCEDURE — 85730 THROMBOPLASTIN TIME PARTIAL: CPT

## 2019-08-09 PROCEDURE — 36415 COLL VENOUS BLD VENIPUNCTURE: CPT

## 2019-08-09 PROCEDURE — 84484 ASSAY OF TROPONIN QUANT: CPT

## 2019-08-09 PROCEDURE — 94640 AIRWAY INHALATION TREATMENT: CPT

## 2019-08-09 PROCEDURE — 93005 ELECTROCARDIOGRAM TRACING: CPT | Performed by: EMERGENCY MEDICINE

## 2019-08-09 PROCEDURE — 1200000000 HC SEMI PRIVATE

## 2019-08-09 PROCEDURE — 93010 ELECTROCARDIOGRAM REPORT: CPT | Performed by: INTERNAL MEDICINE

## 2019-08-09 PROCEDURE — 85610 PROTHROMBIN TIME: CPT

## 2019-08-09 PROCEDURE — 85025 COMPLETE CBC W/AUTO DIFF WBC: CPT

## 2019-08-09 PROCEDURE — 87040 BLOOD CULTURE FOR BACTERIA: CPT

## 2019-08-09 PROCEDURE — 94761 N-INVAS EAR/PLS OXIMETRY MLT: CPT

## 2019-08-09 PROCEDURE — 6360000002 HC RX W HCPCS: Performed by: EMERGENCY MEDICINE

## 2019-08-09 PROCEDURE — 99285 EMERGENCY DEPT VISIT HI MDM: CPT

## 2019-08-09 PROCEDURE — 2580000003 HC RX 258: Performed by: INTERNAL MEDICINE

## 2019-08-09 PROCEDURE — 6360000002 HC RX W HCPCS: Performed by: INTERNAL MEDICINE

## 2019-08-09 RX ORDER — LANOLIN ALCOHOL/MO/W.PET/CERES
3 CREAM (GRAM) TOPICAL NIGHTLY PRN
Status: DISCONTINUED | OUTPATIENT
Start: 2019-08-09 | End: 2019-08-16 | Stop reason: HOSPADM

## 2019-08-09 RX ORDER — ALBUTEROL SULFATE 90 UG/1
2 AEROSOL, METERED RESPIRATORY (INHALATION) EVERY 6 HOURS PRN
Status: DISCONTINUED | OUTPATIENT
Start: 2019-08-09 | End: 2019-08-16 | Stop reason: HOSPADM

## 2019-08-09 RX ORDER — IPRATROPIUM BROMIDE AND ALBUTEROL SULFATE 2.5; .5 MG/3ML; MG/3ML
SOLUTION RESPIRATORY (INHALATION)
Status: COMPLETED
Start: 2019-08-09 | End: 2019-08-09

## 2019-08-09 RX ORDER — ALBUTEROL SULFATE 2.5 MG/3ML
2.5 SOLUTION RESPIRATORY (INHALATION) ONCE
Status: COMPLETED | OUTPATIENT
Start: 2019-08-09 | End: 2019-08-09

## 2019-08-09 RX ORDER — IPRATROPIUM BROMIDE AND ALBUTEROL SULFATE 2.5; .5 MG/3ML; MG/3ML
1 SOLUTION RESPIRATORY (INHALATION)
Status: DISCONTINUED | OUTPATIENT
Start: 2019-08-10 | End: 2019-08-10

## 2019-08-09 RX ORDER — ACETAMINOPHEN 325 MG/1
650 TABLET ORAL EVERY 4 HOURS PRN
Status: DISCONTINUED | OUTPATIENT
Start: 2019-08-09 | End: 2019-08-16 | Stop reason: HOSPADM

## 2019-08-09 RX ORDER — SODIUM CHLORIDE 0.9 % (FLUSH) 0.9 %
10 SYRINGE (ML) INJECTION EVERY 12 HOURS SCHEDULED
Status: DISCONTINUED | OUTPATIENT
Start: 2019-08-09 | End: 2019-08-16 | Stop reason: HOSPADM

## 2019-08-09 RX ORDER — TORSEMIDE 20 MG/1
20 TABLET ORAL 2 TIMES DAILY
Status: DISCONTINUED | OUTPATIENT
Start: 2019-08-10 | End: 2019-08-16 | Stop reason: HOSPADM

## 2019-08-09 RX ORDER — ONDANSETRON 2 MG/ML
4 INJECTION INTRAMUSCULAR; INTRAVENOUS EVERY 6 HOURS PRN
Status: DISCONTINUED | OUTPATIENT
Start: 2019-08-09 | End: 2019-08-16 | Stop reason: HOSPADM

## 2019-08-09 RX ORDER — ALBUTEROL SULFATE 2.5 MG/3ML
2.5 SOLUTION RESPIRATORY (INHALATION) EVERY 6 HOURS PRN
Status: DISCONTINUED | OUTPATIENT
Start: 2019-08-09 | End: 2019-08-16 | Stop reason: HOSPADM

## 2019-08-09 RX ORDER — CITALOPRAM 20 MG/1
10 TABLET ORAL DAILY
Status: DISCONTINUED | OUTPATIENT
Start: 2019-08-10 | End: 2019-08-14

## 2019-08-09 RX ORDER — BUSPIRONE HYDROCHLORIDE 10 MG/1
10 TABLET ORAL 3 TIMES DAILY PRN
Status: DISCONTINUED | OUTPATIENT
Start: 2019-08-09 | End: 2019-08-16 | Stop reason: HOSPADM

## 2019-08-09 RX ORDER — SODIUM CHLORIDE 0.9 % (FLUSH) 0.9 %
10 SYRINGE (ML) INJECTION PRN
Status: DISCONTINUED | OUTPATIENT
Start: 2019-08-09 | End: 2019-08-16 | Stop reason: HOSPADM

## 2019-08-09 RX ORDER — METHYLPREDNISOLONE SODIUM SUCCINATE 125 MG/2ML
125 INJECTION, POWDER, LYOPHILIZED, FOR SOLUTION INTRAMUSCULAR; INTRAVENOUS ONCE
Status: COMPLETED | OUTPATIENT
Start: 2019-08-09 | End: 2019-08-09

## 2019-08-09 RX ORDER — PREDNISONE 10 MG/1
40 TABLET ORAL
Status: DISCONTINUED | OUTPATIENT
Start: 2019-08-12 | End: 2019-08-14

## 2019-08-09 RX ORDER — WARFARIN SODIUM 5 MG/1
5 TABLET ORAL ONCE
Status: COMPLETED | OUTPATIENT
Start: 2019-08-09 | End: 2019-08-09

## 2019-08-09 RX ORDER — SPIRONOLACTONE 25 MG/1
25 TABLET ORAL DAILY
Status: DISCONTINUED | OUTPATIENT
Start: 2019-08-10 | End: 2019-08-16 | Stop reason: HOSPADM

## 2019-08-09 RX ORDER — METHYLPREDNISOLONE SODIUM SUCCINATE 40 MG/ML
40 INJECTION, POWDER, LYOPHILIZED, FOR SOLUTION INTRAMUSCULAR; INTRAVENOUS EVERY 6 HOURS
Status: COMPLETED | OUTPATIENT
Start: 2019-08-09 | End: 2019-08-11

## 2019-08-09 RX ORDER — EZETIMIBE 10 MG/1
10 TABLET ORAL DAILY
Status: DISCONTINUED | OUTPATIENT
Start: 2019-08-10 | End: 2019-08-11 | Stop reason: RX

## 2019-08-09 RX ORDER — IPRATROPIUM BROMIDE AND ALBUTEROL SULFATE 2.5; .5 MG/3ML; MG/3ML
1 SOLUTION RESPIRATORY (INHALATION) ONCE
Status: COMPLETED | OUTPATIENT
Start: 2019-08-09 | End: 2019-08-09

## 2019-08-09 RX ORDER — TRAZODONE HYDROCHLORIDE 50 MG/1
50 TABLET ORAL NIGHTLY
Status: DISCONTINUED | OUTPATIENT
Start: 2019-08-09 | End: 2019-08-16 | Stop reason: HOSPADM

## 2019-08-09 RX ADMIN — METHYLPREDNISOLONE SODIUM SUCCINATE 125 MG: 125 INJECTION, POWDER, FOR SOLUTION INTRAMUSCULAR; INTRAVENOUS at 17:35

## 2019-08-09 RX ADMIN — IPRATROPIUM BROMIDE AND ALBUTEROL SULFATE 1 AMPULE: .5; 3 SOLUTION RESPIRATORY (INHALATION) at 23:43

## 2019-08-09 RX ADMIN — IPRATROPIUM BROMIDE AND ALBUTEROL SULFATE 1 AMPULE: .5; 3 SOLUTION RESPIRATORY (INHALATION) at 16:25

## 2019-08-09 RX ADMIN — Medication 10 ML: at 23:17

## 2019-08-09 RX ADMIN — WARFARIN SODIUM 5 MG: 5 TABLET ORAL at 23:16

## 2019-08-09 RX ADMIN — ALBUTEROL SULFATE 2.5 MG: 2.5 SOLUTION RESPIRATORY (INHALATION) at 18:05

## 2019-08-09 RX ADMIN — ALBUTEROL SULFATE 2.5 MG: 2.5 SOLUTION RESPIRATORY (INHALATION) at 17:35

## 2019-08-09 RX ADMIN — IOPAMIDOL 75 ML: 755 INJECTION, SOLUTION INTRAVENOUS at 17:15

## 2019-08-09 RX ADMIN — IPRATROPIUM BROMIDE AND ALBUTEROL SULFATE 1 AMPULE: 2.5; .5 SOLUTION RESPIRATORY (INHALATION) at 16:25

## 2019-08-09 RX ADMIN — METHYLPREDNISOLONE SODIUM SUCCINATE 40 MG: 40 INJECTION, POWDER, FOR SOLUTION INTRAMUSCULAR; INTRAVENOUS at 23:17

## 2019-08-09 RX ADMIN — TRAZODONE HYDROCHLORIDE 50 MG: 50 TABLET ORAL at 23:16

## 2019-08-09 ASSESSMENT — PAIN SCALES - GENERAL: PAINLEVEL_OUTOF10: 7

## 2019-08-09 ASSESSMENT — ENCOUNTER SYMPTOMS
SHORTNESS OF BREATH: 1
DIARRHEA: 0
VOMITING: 0
COUGH: 1
NAUSEA: 0
SORE THROAT: 0

## 2019-08-09 ASSESSMENT — PAIN DESCRIPTION - LOCATION: LOCATION: RIB CAGE

## 2019-08-09 ASSESSMENT — PAIN DESCRIPTION - DESCRIPTORS: DESCRIPTORS: SHARP

## 2019-08-09 NOTE — ED PROVIDER NOTES
infection. She will accept the patient and will place orders. The Clinical Impression is exacerbation of COPD, new lung mass. Case is discussed with Dr. Damien Huddleston as he will be the on-duty physician as patient awaits transportation.      Mariah Gilbert MD  08/09/19 4259

## 2019-08-10 ENCOUNTER — APPOINTMENT (OUTPATIENT)
Dept: GENERAL RADIOLOGY | Age: 79
DRG: 166 | End: 2019-08-10
Payer: MEDICARE

## 2019-08-10 LAB
HCT VFR BLD CALC: 37.2 % (ref 36–48)
HEMOGLOBIN: 12 G/DL (ref 12–16)
INR BLD: 2.94 (ref 0.86–1.14)
MCH RBC QN AUTO: 27.8 PG (ref 26–34)
MCHC RBC AUTO-ENTMCNC: 32.1 G/DL (ref 31–36)
MCV RBC AUTO: 86.7 FL (ref 80–100)
PDW BLD-RTO: 14.6 % (ref 12.4–15.4)
PLATELET # BLD: 287 K/UL (ref 135–450)
PMV BLD AUTO: 7 FL (ref 5–10.5)
PROCALCITONIN: 0.02 NG/ML (ref 0–0.15)
PROTHROMBIN TIME: 33.5 SEC (ref 9.8–13)
RBC # BLD: 4.29 M/UL (ref 4–5.2)
WBC # BLD: 6.6 K/UL (ref 4–11)

## 2019-08-10 PROCEDURE — 94640 AIRWAY INHALATION TREATMENT: CPT

## 2019-08-10 PROCEDURE — 6370000000 HC RX 637 (ALT 250 FOR IP): Performed by: INTERNAL MEDICINE

## 2019-08-10 PROCEDURE — 6360000002 HC RX W HCPCS: Performed by: INTERNAL MEDICINE

## 2019-08-10 PROCEDURE — 71046 X-RAY EXAM CHEST 2 VIEWS: CPT

## 2019-08-10 PROCEDURE — 36415 COLL VENOUS BLD VENIPUNCTURE: CPT

## 2019-08-10 PROCEDURE — 85610 PROTHROMBIN TIME: CPT

## 2019-08-10 PROCEDURE — 2580000003 HC RX 258: Performed by: INTERNAL MEDICINE

## 2019-08-10 PROCEDURE — 85027 COMPLETE CBC AUTOMATED: CPT

## 2019-08-10 PROCEDURE — 2700000000 HC OXYGEN THERAPY PER DAY

## 2019-08-10 PROCEDURE — 84145 PROCALCITONIN (PCT): CPT

## 2019-08-10 PROCEDURE — 1200000000 HC SEMI PRIVATE

## 2019-08-10 PROCEDURE — 99223 1ST HOSP IP/OBS HIGH 75: CPT | Performed by: INTERNAL MEDICINE

## 2019-08-10 PROCEDURE — 94761 N-INVAS EAR/PLS OXIMETRY MLT: CPT

## 2019-08-10 RX ORDER — SODIUM CHLORIDE 9 MG/ML
INJECTION, SOLUTION INTRAVENOUS
Status: DISPENSED
Start: 2019-08-10 | End: 2019-08-10

## 2019-08-10 RX ORDER — IPRATROPIUM BROMIDE AND ALBUTEROL SULFATE 2.5; .5 MG/3ML; MG/3ML
1 SOLUTION RESPIRATORY (INHALATION) EVERY 4 HOURS
Status: DISCONTINUED | OUTPATIENT
Start: 2019-08-10 | End: 2019-08-11

## 2019-08-10 RX ORDER — WARFARIN SODIUM 2 MG/1
4 TABLET ORAL
Status: DISCONTINUED | OUTPATIENT
Start: 2019-08-10 | End: 2019-08-10

## 2019-08-10 RX ADMIN — Medication 10 ML: at 09:41

## 2019-08-10 RX ADMIN — Medication 10 ML: at 17:19

## 2019-08-10 RX ADMIN — METHYLPREDNISOLONE SODIUM SUCCINATE 40 MG: 40 INJECTION, POWDER, FOR SOLUTION INTRAMUSCULAR; INTRAVENOUS at 17:19

## 2019-08-10 RX ADMIN — METHYLPREDNISOLONE SODIUM SUCCINATE 40 MG: 40 INJECTION, POWDER, FOR SOLUTION INTRAMUSCULAR; INTRAVENOUS at 10:51

## 2019-08-10 RX ADMIN — IPRATROPIUM BROMIDE AND ALBUTEROL SULFATE 1 AMPULE: .5; 3 SOLUTION RESPIRATORY (INHALATION) at 23:08

## 2019-08-10 RX ADMIN — TRAZODONE HYDROCHLORIDE 50 MG: 50 TABLET ORAL at 21:34

## 2019-08-10 RX ADMIN — AZITHROMYCIN DIHYDRATE 500 MG: 500 INJECTION, POWDER, LYOPHILIZED, FOR SOLUTION INTRAVENOUS at 05:15

## 2019-08-10 RX ADMIN — IPRATROPIUM BROMIDE AND ALBUTEROL SULFATE 1 AMPULE: .5; 3 SOLUTION RESPIRATORY (INHALATION) at 19:32

## 2019-08-10 RX ADMIN — IPRATROPIUM BROMIDE AND ALBUTEROL SULFATE 1 AMPULE: .5; 3 SOLUTION RESPIRATORY (INHALATION) at 10:55

## 2019-08-10 RX ADMIN — IPRATROPIUM BROMIDE AND ALBUTEROL SULFATE 1 AMPULE: .5; 3 SOLUTION RESPIRATORY (INHALATION) at 07:52

## 2019-08-10 RX ADMIN — METHYLPREDNISOLONE SODIUM SUCCINATE 40 MG: 40 INJECTION, POWDER, FOR SOLUTION INTRAMUSCULAR; INTRAVENOUS at 05:15

## 2019-08-10 RX ADMIN — METHYLPREDNISOLONE SODIUM SUCCINATE 40 MG: 40 INJECTION, POWDER, FOR SOLUTION INTRAMUSCULAR; INTRAVENOUS at 22:30

## 2019-08-10 RX ADMIN — Medication 10 ML: at 21:34

## 2019-08-10 RX ADMIN — IPRATROPIUM BROMIDE AND ALBUTEROL SULFATE 1 AMPULE: .5; 3 SOLUTION RESPIRATORY (INHALATION) at 15:04

## 2019-08-10 NOTE — PROGRESS NOTES
or secretions have lessened to the point that the patient is able to clear them with a cough. Anti-inflammatory and Combination Medications:    1. If the patient lacks prior history of lung disease, is not using inhaled anti-inflammatory medication at home, and lacks wheezing by examination or by history for at least 24 hours, contact physician for possible discontinuation.

## 2019-08-10 NOTE — CONSULTS
Patient is being seen at the request of Dr. Edith Joshua for a consultation for COPD, lung mass    HISTORY OF PRESENT ILLNESS: This is a 32-year-old white female with a history of COPD who presented to the emergency department on 8/9/2019 with shortness of breath, moderately severe, worse with exertion, associated with bilateral anterior pleuritic chest pain. She is on 4 to 5 L home oxygen. She feels about the same after treatment with inhaled bronchodilators and steroids in hospital.    PAST MEDICAL HISTORY:  Past Medical History:   Diagnosis Date    Acute exacerbation of chronic obstructive pulmonary disease (COPD) ([address]     Acute on chronic respiratory failure (HCC)     Atrial fibrillation with rapid ventricular response ([address] 6/23/2015    CAP (community acquired pneumonia) 1/29/2015    CHF (congestive heart failure) ([address]     Diverticulitis     HCAP (healthcare-associated pneumonia)     IFG (impaired fasting glucose) 9/13/2018    Panic disorder     Pulmonary embolism ([address]     6/15    [place] spotted fever 2013    Sciatic nerve pain     Sepsis ([address] 6/22/2015    Tobacco abuse 6/22/2015    Vitamin D deficiency      PAST SURGICAL HISTORY:  Past Surgical History:   Procedure Laterality Date    APPENDECTOMY      CARDIAC CATHETERIZATION  2003    HERNIA REPAIR      PARTIAL HYSTERECTOMY         FAMILY HISTORY:  family history includes Asthma in her grandchild and son; Cancer (age of onset: 43) in her daughter and mother; Diabetes in her sister; Hypertension in her son and son. SOCIAL HISTORY:   reports that she quit smoking about 4 years ago. Her smoking use included cigarettes. She has a 16.50 pack-year smoking history.  She has never used smokeless tobacco.    Scheduled Meds:   ipratropium-albuterol  1 ampule Inhalation Q4H    azithromycin  500 mg Intravenous Q24H    warfarin  4 mg Oral Once    vitamin D  2,000 Units Oral Daily    traZODone  50 mg Oral Nightly    citalopram  10

## 2019-08-10 NOTE — H&P
OF THE CHEST 8/6/2019 1:47 pm COMPARISON: 07/22/2019 HISTORY: ORDERING SYSTEM PROVIDED HISTORY: Cough TECHNOLOGIST PROVIDED HISTORY: Reason for exam:->cough and wheezing Reason for Exam: pneumonia x 2 weeks, continued sob Acuity: Acute Type of Exam: Initial Relevant Medical/Surgical History: former smoker, chf, afib, cardiac cath FINDINGS: No change in the left lower lobe pneumonia. The cardiac silhouette is within normal limits. There is no pneumothorax or pleural effusion. 1. Unchanged left lower lobe pneumonia. Recommend chest radiograph in 6 weeks to confirm resolution. Xr Chest Standard (2 Vw)    Result Date: 7/22/2019  EXAMINATION: TWO XRAY VIEWS OF THE CHEST 7/22/2019 5:31 pm COMPARISON: None. HISTORY: ORDERING SYSTEM PROVIDED HISTORY: Cough TECHNOLOGIST PROVIDED HISTORY: Reason for exam:->cough Reason for Exam: productive cough with green sputum X 3 days, hx of COPD, pulmonary fibrosis Acuity: Acute Type of Exam: Initial FINDINGS: Right lower lobe airspace disease. Mild edema. Heart and mediastinum normal.  Bony thorax intact. Moderate kyphosis. Right lower lobe airspace disease. Recommend follow-up to resolution. Ct Chest W Contrast    Result Date: 8/9/2019  EXAMINATION: CT OF THE CHEST WITH CONTRAST 8/9/2019 5:09 pm TECHNIQUE: CT of the chest was performed with the administration of intravenous contrast. Multiplanar reformatted images are provided for review. Dose modulation, iterative reconstruction, and/or weight based adjustment of the mA/kV was utilized to reduce the radiation dose to as low as reasonably achievable. COMPARISON: 01/01/2017 and prior HISTORY: ORDERING SYSTEM PROVIDED HISTORY: ACUTE RESP ILLNESS, >36YEARS OLD TECHNOLOGIST PROVIDED HISTORY: Reason for Exam: SOB, chest tightness, COPD Acuity: Acute Type of Exam: Initial FINDINGS: Mediastinum: Heart size is within normal limits. Aorta and origin of the great vessels grossly unremarkable.   Main pulmonary artery normal 152 ms    QRS Duration 84 ms    Q-T Interval 344 ms    QTc Calculation (Bazett) 404 ms    P Axis 56 degrees    R Axis -13 degrees    T Axis 58 degrees    Diagnosis Normal sinus rhythmNormal ECGWhen compared with ECG of 26-JUN-2019 10:09, (unconfirmed)Criteria for Septal infarct are no longer PresentConfirmed by Francisco Arnold MD, [address] (On license of [facility]) on 8/9/2019 8:58:34 PM     CBC:  Recent Labs     08/09/19  1615   WBC 7.6   HGB 12.0   HCT 37.8         RENAL  Recent Labs     08/09/19  1615     142   K 4.2  4.2     100   CO2 31  32   BUN 13  13   CREATININE 0.6  0.6   GLUCOSE 149*  151*     Hemoglobin a1c:  Lab Results   Component Value Date    LABA1C 6.2 04/11/2019    LABA1C 6.3 06/13/2018    LABA1C 6.1 01/11/2018     LFT'S:  Recent Labs     08/09/19  1615   AST 14*   ALT 12   BILITOT <0.2   ALKPHOS 84     COAG:  Recent Labs     08/07/19 08/09/19  1615   INR 2.50 2.96*     CARDIAC ENZYMES:   Recent Labs     08/09/19  1615   TROPONINI <0.01     Lab Results   Component Value Date    PROBNP 247 07/30/2018    PROBNP 197 06/25/2018    PROBNP 381 10/28/2017     LACTIC ACID:  Recent Labs     08/09/19  1615   LACTA 1.6     PHYSICIAN CERTIFICATION    I certify that Bettina Mchugh is expected to be hospitalized for 2 midnights based on the following assessment and plan:    ASSESSMENT/PLAN:  1. aeCOPD, IV solumedrol/pred regimen per protocol, IV Azithro, PRN/SUKDHEV intensive NEB therapy. Check respiratory culture and procalcitonin. Pulm consult. Hold home regimen for now. 2. New mass like consolidations in L lung w/ suspicious nodule in R lung, will likely need bronch. NPO after midnight for possible bronch. Pulm c/s. 3. Chronic respiratory failure with hypoxia, 2/2 COPD, supplemental O2 to maintain SPO2 ? 92%, continuous pulse ox. Sats stable on pts home 4-5L Pulm c/s.   4. Hx atrial fib, currently sinus. Cont Coumadin (INR 2.96) and rest of home regimen. 5. Insomnia, cont home regimen.     6. Hx of

## 2019-08-10 NOTE — PROGRESS NOTES
Pt resting comfortably in bed with respirations greater than 10. Pt denies any needs at this time. Call light within reach, will continue to monitor.

## 2019-08-10 NOTE — PROGRESS NOTES
Pt resting in bed w eyes closed. no signs of distress noted Call light within reach. Will cont to monitor.

## 2019-08-11 LAB
INR BLD: 3.73 (ref 0.86–1.14)
PROTHROMBIN TIME: 42.5 SEC (ref 9.8–13)

## 2019-08-11 PROCEDURE — 99232 SBSQ HOSP IP/OBS MODERATE 35: CPT | Performed by: INTERNAL MEDICINE

## 2019-08-11 PROCEDURE — 6370000000 HC RX 637 (ALT 250 FOR IP): Performed by: INTERNAL MEDICINE

## 2019-08-11 PROCEDURE — 6360000002 HC RX W HCPCS: Performed by: INTERNAL MEDICINE

## 2019-08-11 PROCEDURE — 85610 PROTHROMBIN TIME: CPT

## 2019-08-11 PROCEDURE — 94669 MECHANICAL CHEST WALL OSCILL: CPT

## 2019-08-11 PROCEDURE — 94640 AIRWAY INHALATION TREATMENT: CPT

## 2019-08-11 PROCEDURE — 2580000003 HC RX 258: Performed by: INTERNAL MEDICINE

## 2019-08-11 PROCEDURE — 94761 N-INVAS EAR/PLS OXIMETRY MLT: CPT

## 2019-08-11 PROCEDURE — 36415 COLL VENOUS BLD VENIPUNCTURE: CPT

## 2019-08-11 PROCEDURE — 2700000000 HC OXYGEN THERAPY PER DAY

## 2019-08-11 PROCEDURE — 1200000000 HC SEMI PRIVATE

## 2019-08-11 RX ORDER — IPRATROPIUM BROMIDE AND ALBUTEROL SULFATE 2.5; .5 MG/3ML; MG/3ML
1 SOLUTION RESPIRATORY (INHALATION)
Status: DISCONTINUED | OUTPATIENT
Start: 2019-08-11 | End: 2019-08-14

## 2019-08-11 RX ORDER — PHYTONADIONE 5 MG/1
5 TABLET ORAL ONCE
Status: COMPLETED | OUTPATIENT
Start: 2019-08-11 | End: 2019-08-11

## 2019-08-11 RX ADMIN — PHYTONADIONE 5 MG: 5 TABLET ORAL at 12:47

## 2019-08-11 RX ADMIN — IPRATROPIUM BROMIDE AND ALBUTEROL SULFATE 1 AMPULE: .5; 3 SOLUTION RESPIRATORY (INHALATION) at 06:48

## 2019-08-11 RX ADMIN — TRAZODONE HYDROCHLORIDE 50 MG: 50 TABLET ORAL at 20:30

## 2019-08-11 RX ADMIN — SPIRONOLACTONE 25 MG: 25 TABLET ORAL at 10:24

## 2019-08-11 RX ADMIN — IPRATROPIUM BROMIDE AND ALBUTEROL SULFATE 1 AMPULE: .5; 3 SOLUTION RESPIRATORY (INHALATION) at 11:03

## 2019-08-11 RX ADMIN — AZITHROMYCIN DIHYDRATE 500 MG: 500 INJECTION, POWDER, LYOPHILIZED, FOR SOLUTION INTRAVENOUS at 05:04

## 2019-08-11 RX ADMIN — Medication 10 ML: at 17:06

## 2019-08-11 RX ADMIN — Medication 10 ML: at 20:30

## 2019-08-11 RX ADMIN — CITALOPRAM HYDROBROMIDE 10 MG: 20 TABLET ORAL at 10:24

## 2019-08-11 RX ADMIN — IPRATROPIUM BROMIDE AND ALBUTEROL SULFATE 1 AMPULE: .5; 3 SOLUTION RESPIRATORY (INHALATION) at 19:28

## 2019-08-11 RX ADMIN — METHYLPREDNISOLONE SODIUM SUCCINATE 40 MG: 40 INJECTION, POWDER, FOR SOLUTION INTRAMUSCULAR; INTRAVENOUS at 10:47

## 2019-08-11 RX ADMIN — METHYLPREDNISOLONE SODIUM SUCCINATE 40 MG: 40 INJECTION, POWDER, FOR SOLUTION INTRAMUSCULAR; INTRAVENOUS at 17:06

## 2019-08-11 RX ADMIN — IPRATROPIUM BROMIDE AND ALBUTEROL SULFATE 1 AMPULE: .5; 3 SOLUTION RESPIRATORY (INHALATION) at 23:12

## 2019-08-11 RX ADMIN — METHYLPREDNISOLONE SODIUM SUCCINATE 40 MG: 40 INJECTION, POWDER, FOR SOLUTION INTRAMUSCULAR; INTRAVENOUS at 05:04

## 2019-08-11 RX ADMIN — Medication 10 ML: at 10:48

## 2019-08-11 RX ADMIN — IPRATROPIUM BROMIDE AND ALBUTEROL SULFATE 1 AMPULE: .5; 3 SOLUTION RESPIRATORY (INHALATION) at 14:46

## 2019-08-11 RX ADMIN — Medication 10 ML: at 10:24

## 2019-08-11 RX ADMIN — VITAMIN D, TAB 1000IU (100/BT) 2000 UNITS: 25 TAB at 10:24

## 2019-08-11 NOTE — CARE COORDINATION
needs.  She lives alone in one floor home and uses a walker and cane at time. She also uses oxygen at 4-5 LPM which she gets through [employer]. She has skilled nurse visits through [facility] and wants to continue this. Explained Case Management role/services.

## 2019-08-11 NOTE — PROGRESS NOTES
cough.    Anti-inflammatory and Combination Medications:    1. If the patient lacks prior history of lung disease, is not using inhaled anti-inflammatory medication at home, and lacks wheezing by examination or by history for at least 24 hours, contact physician for possible discontinuation.

## 2019-08-11 NOTE — PROGRESS NOTES
Pulmonary Progress Note  CC: shortness of breath, lung mass    Subjective:  No new c/o, son @ bedside to discuss biopsy    IV line peripheral    EXAM:   /72   Pulse 101   Temp 97 °F (36.1 °C) (Oral)   Resp 18   Ht 5' 2\" (1.575 m)   Wt 272 lb 6.4 oz (123.6 kg)   LMP  (LMP Unknown)   SpO2 91%   BMI 49.82 kg/m²  on 5 L  Constitutional:  No acute distress   HEENT: no scleral icterus  Neck: No tracheal deviation present. Cardiovascular: Normal heart sounds. Pulmonary/Chest: No wheezes. No rhonchi. No rales. + decreased breath sounds. No accessory muscle usage or stridor. Abdominal: Soft. Musculoskeletal: No cyanosis. No clubbing. Skin: Skin is warm and dry. Scheduled Meds:   ipratropium-albuterol  1 ampule Inhalation Q4H While awake    azithromycin  500 mg Intravenous Q24H    vitamin D  2,000 Units Oral Daily    traZODone  50 mg Oral Nightly    citalopram  10 mg Oral Daily    ezetimibe  10 mg Oral Daily    torsemide  20 mg Oral BID    spironolactone  25 mg Oral Daily    sodium chloride flush  10 mL Intravenous 2 times per day    methylPREDNISolone  40 mg Intravenous Q6H    Followed by   Bakari Mcclure ON 8/12/2019] predniSONE  40 mg Oral Daily with breakfast     Continuous Infusions:    PRN Meds:  melatonin, albuterol sulfate HFA, albuterol, busPIRone, sodium chloride flush, magnesium hydroxide, ondansetron, acetaminophen    Labs:  CBC:   Recent Labs     08/09/19  1615 08/10/19  0523   WBC 7.6 6.6   HGB 12.0 12.0   HCT 37.8 37.2   MCV 85.6 86.7    287     BMP:   Recent Labs     08/09/19  1615     142   K 4.2  4.2     100   CO2 31  32   BUN 13  13   CREATININE 0.6  0.6       Cultures:  8/9/2019 blood no growth to date    Films:  CT chest 8/9/2019    Impression:       New masslike areas of consolidation involving the posterior aspect of the  left lower lobe and perihilar posterior aspect of the left upper lobe.   Bronchoscopy could be considered as clinically

## 2019-08-12 ENCOUNTER — APPOINTMENT (OUTPATIENT)
Dept: GENERAL RADIOLOGY | Age: 79
DRG: 166 | End: 2019-08-12
Payer: MEDICARE

## 2019-08-12 ENCOUNTER — ANESTHESIA (OUTPATIENT)
Dept: ENDOSCOPY | Age: 79
DRG: 166 | End: 2019-08-12
Payer: MEDICARE

## 2019-08-12 ENCOUNTER — ANESTHESIA EVENT (OUTPATIENT)
Dept: ENDOSCOPY | Age: 79
DRG: 166 | End: 2019-08-12
Payer: MEDICARE

## 2019-08-12 VITALS
OXYGEN SATURATION: 98 % | DIASTOLIC BLOOD PRESSURE: 69 MMHG | RESPIRATION RATE: 5 BRPM | SYSTOLIC BLOOD PRESSURE: 152 MMHG

## 2019-08-12 LAB
ABO/RH: NORMAL
ANION GAP SERPL CALCULATED.3IONS-SCNC: 9 MMOL/L (ref 3–16)
ANTIBODY SCREEN: NORMAL
BASOPHILS ABSOLUTE: 0 K/UL (ref 0–0.2)
BASOPHILS RELATIVE PERCENT: 0.3 %
BLOOD BANK DISPENSE STATUS: NORMAL
BLOOD BANK DISPENSE STATUS: NORMAL
BLOOD BANK PRODUCT CODE: NORMAL
BLOOD BANK PRODUCT CODE: NORMAL
BPU ID: NORMAL
BPU ID: NORMAL
BUN BLDV-MCNC: 18 MG/DL (ref 7–20)
CALCIUM SERPL-MCNC: 9 MG/DL (ref 8.3–10.6)
CHLORIDE BLD-SCNC: 101 MMOL/L (ref 99–110)
CO2: 33 MMOL/L (ref 21–32)
CREAT SERPL-MCNC: <0.5 MG/DL (ref 0.6–1.2)
DESCRIPTION BLOOD BANK: NORMAL
DESCRIPTION BLOOD BANK: NORMAL
EOSINOPHILS ABSOLUTE: 0 K/UL (ref 0–0.6)
EOSINOPHILS RELATIVE PERCENT: 0 %
GFR AFRICAN AMERICAN: >60
GFR NON-AFRICAN AMERICAN: >60
GLUCOSE BLD-MCNC: 212 MG/DL (ref 70–99)
HCT VFR BLD CALC: 35.7 % (ref 36–48)
HEMOGLOBIN: 11.3 G/DL (ref 12–16)
INR BLD: 1.48 (ref 0.86–1.14)
LYMPHOCYTES ABSOLUTE: 2.3 K/UL (ref 1–5.1)
LYMPHOCYTES RELATIVE PERCENT: 17.2 %
MCH RBC QN AUTO: 27.1 PG (ref 26–34)
MCHC RBC AUTO-ENTMCNC: 31.6 G/DL (ref 31–36)
MCV RBC AUTO: 85.8 FL (ref 80–100)
MONOCYTES ABSOLUTE: 1.1 K/UL (ref 0–1.3)
MONOCYTES RELATIVE PERCENT: 8.4 %
NEUTROPHILS ABSOLUTE: 10.1 K/UL (ref 1.7–7.7)
NEUTROPHILS RELATIVE PERCENT: 74.1 %
PDW BLD-RTO: 14.7 % (ref 12.4–15.4)
PLATELET # BLD: 290 K/UL (ref 135–450)
PMV BLD AUTO: 6.5 FL (ref 5–10.5)
POTASSIUM SERPL-SCNC: 4.8 MMOL/L (ref 3.5–5.1)
PROTHROMBIN TIME: 16.9 SEC (ref 9.8–13)
RBC # BLD: 4.17 M/UL (ref 4–5.2)
SODIUM BLD-SCNC: 143 MMOL/L (ref 136–145)
WBC # BLD: 13.6 K/UL (ref 4–11)

## 2019-08-12 PROCEDURE — 88112 CYTOPATH CELL ENHANCE TECH: CPT

## 2019-08-12 PROCEDURE — 87015 SPECIMEN INFECT AGNT CONCNTJ: CPT

## 2019-08-12 PROCEDURE — 87116 MYCOBACTERIA CULTURE: CPT

## 2019-08-12 PROCEDURE — 88360 TUMOR IMMUNOHISTOCHEM/MANUAL: CPT

## 2019-08-12 PROCEDURE — 87077 CULTURE AEROBIC IDENTIFY: CPT

## 2019-08-12 PROCEDURE — 99233 SBSQ HOSP IP/OBS HIGH 50: CPT | Performed by: INTERNAL MEDICINE

## 2019-08-12 PROCEDURE — 87205 SMEAR GRAM STAIN: CPT

## 2019-08-12 PROCEDURE — 6370000000 HC RX 637 (ALT 250 FOR IP): Performed by: ANESTHESIOLOGY

## 2019-08-12 PROCEDURE — 94761 N-INVAS EAR/PLS OXIMETRY MLT: CPT

## 2019-08-12 PROCEDURE — 6360000002 HC RX W HCPCS: Performed by: INTERNAL MEDICINE

## 2019-08-12 PROCEDURE — 6370000000 HC RX 637 (ALT 250 FOR IP): Performed by: INTERNAL MEDICINE

## 2019-08-12 PROCEDURE — 86900 BLOOD TYPING SEROLOGIC ABO: CPT

## 2019-08-12 PROCEDURE — 88177 CYTP FNA EVAL EA ADDL: CPT

## 2019-08-12 PROCEDURE — 85610 PROTHROMBIN TIME: CPT

## 2019-08-12 PROCEDURE — 88172 CYTP DX EVAL FNA 1ST EA SITE: CPT

## 2019-08-12 PROCEDURE — 2580000003 HC RX 258: Performed by: INTERNAL MEDICINE

## 2019-08-12 PROCEDURE — 3603165200 HC BRNCHSC EBUS GUIDED SAMPL 1/2 NODE STATION/STRUX: Performed by: INTERNAL MEDICINE

## 2019-08-12 PROCEDURE — 3609011200 HC BRONCHOSCOPY W/TRANSBRONCL NDL ASPIR BX EA ADDL LOBE: Performed by: INTERNAL MEDICINE

## 2019-08-12 PROCEDURE — 87102 FUNGUS ISOLATION CULTURE: CPT

## 2019-08-12 PROCEDURE — 87206 SMEAR FLUORESCENT/ACID STAI: CPT

## 2019-08-12 PROCEDURE — 6360000002 HC RX W HCPCS: Performed by: NURSE ANESTHETIST, CERTIFIED REGISTERED

## 2019-08-12 PROCEDURE — 80048 BASIC METABOLIC PNL TOTAL CA: CPT

## 2019-08-12 PROCEDURE — 71045 X-RAY EXAM CHEST 1 VIEW: CPT

## 2019-08-12 PROCEDURE — 94640 AIRWAY INHALATION TREATMENT: CPT

## 2019-08-12 PROCEDURE — 86850 RBC ANTIBODY SCREEN: CPT

## 2019-08-12 PROCEDURE — 2500000003 HC RX 250 WO HCPCS: Performed by: NURSE ANESTHETIST, CERTIFIED REGISTERED

## 2019-08-12 PROCEDURE — 3700000000 HC ANESTHESIA ATTENDED CARE: Performed by: INTERNAL MEDICINE

## 2019-08-12 PROCEDURE — 88185 FLOWCYTOMETRY/TC ADD-ON: CPT

## 2019-08-12 PROCEDURE — 07974ZX DRAINAGE OF THORAX LYMPHATIC, PERCUTANEOUS ENDOSCOPIC APPROACH, DIAGNOSTIC: ICD-10-PCS | Performed by: INTERNAL MEDICINE

## 2019-08-12 PROCEDURE — 7100000011 HC PHASE II RECOVERY - ADDTL 15 MIN: Performed by: INTERNAL MEDICINE

## 2019-08-12 PROCEDURE — 2580000003 HC RX 258: Performed by: NURSE ANESTHETIST, CERTIFIED REGISTERED

## 2019-08-12 PROCEDURE — 3609011900 HC BRONCHOSCOPY NEEDLE BX TRACHEA MAIN STEM&/BRON: Performed by: INTERNAL MEDICINE

## 2019-08-12 PROCEDURE — 31623 DX BRONCHOSCOPE/BRUSH: CPT | Performed by: INTERNAL MEDICINE

## 2019-08-12 PROCEDURE — 76000 FLUOROSCOPY <1 HR PHYS/QHP: CPT

## 2019-08-12 PROCEDURE — 3609010800 HC BRONCHOSCOPY ALVEOLAR LAVAGE: Performed by: INTERNAL MEDICINE

## 2019-08-12 PROCEDURE — 3609011800 HC BRONCHOSCOPY/TRANSBRONCHIAL LUNG BIOPSY: Performed by: INTERNAL MEDICINE

## 2019-08-12 PROCEDURE — 36415 COLL VENOUS BLD VENIPUNCTURE: CPT

## 2019-08-12 PROCEDURE — 1200000000 HC SEMI PRIVATE

## 2019-08-12 PROCEDURE — 0BBB8ZX EXCISION OF LEFT LOWER LOBE BRONCHUS, VIA NATURAL OR ARTIFICIAL OPENING ENDOSCOPIC, DIAGNOSTIC: ICD-10-PCS | Performed by: INTERNAL MEDICINE

## 2019-08-12 PROCEDURE — 0B9J8ZX DRAINAGE OF LEFT LOWER LUNG LOBE, VIA NATURAL OR ARTIFICIAL OPENING ENDOSCOPIC, DIAGNOSTIC: ICD-10-PCS | Performed by: INTERNAL MEDICINE

## 2019-08-12 PROCEDURE — 31628 BRONCHOSCOPY/LUNG BX EACH: CPT | Performed by: INTERNAL MEDICINE

## 2019-08-12 PROCEDURE — 87070 CULTURE OTHR SPECIMN AEROBIC: CPT

## 2019-08-12 PROCEDURE — 2700000000 HC OXYGEN THERAPY PER DAY

## 2019-08-12 PROCEDURE — 85025 COMPLETE CBC W/AUTO DIFF WBC: CPT

## 2019-08-12 PROCEDURE — 31653 BRONCH EBUS SAMPLNG 3/> NODE: CPT | Performed by: INTERNAL MEDICINE

## 2019-08-12 PROCEDURE — 3609011100 HC BRONCHOSCOPY BRUSHINGS: Performed by: INTERNAL MEDICINE

## 2019-08-12 PROCEDURE — 7100000010 HC PHASE II RECOVERY - FIRST 15 MIN: Performed by: INTERNAL MEDICINE

## 2019-08-12 PROCEDURE — 99232 SBSQ HOSP IP/OBS MODERATE 35: CPT | Performed by: INTERNAL MEDICINE

## 2019-08-12 PROCEDURE — 88184 FLOWCYTOMETRY/ TC 1 MARKER: CPT

## 2019-08-12 PROCEDURE — 87186 SC STD MICRODIL/AGAR DIL: CPT

## 2019-08-12 PROCEDURE — 31624 DX BRONCHOSCOPE/LAVAGE: CPT | Performed by: INTERNAL MEDICINE

## 2019-08-12 PROCEDURE — 94669 MECHANICAL CHEST WALL OSCILL: CPT

## 2019-08-12 PROCEDURE — 86901 BLOOD TYPING SEROLOGIC RH(D): CPT

## 2019-08-12 PROCEDURE — 88305 TISSUE EXAM BY PATHOLOGIST: CPT

## 2019-08-12 PROCEDURE — 2709999900 HC NON-CHARGEABLE SUPPLY: Performed by: INTERNAL MEDICINE

## 2019-08-12 PROCEDURE — 88341 IMHCHEM/IMCYTCHM EA ADD ANTB: CPT

## 2019-08-12 PROCEDURE — 3700000001 HC ADD 15 MINUTES (ANESTHESIA): Performed by: INTERNAL MEDICINE

## 2019-08-12 PROCEDURE — 36430 TRANSFUSION BLD/BLD COMPNT: CPT

## 2019-08-12 PROCEDURE — 88173 CYTOPATH EVAL FNA REPORT: CPT

## 2019-08-12 PROCEDURE — 88342 IMHCHEM/IMCYTCHM 1ST ANTB: CPT

## 2019-08-12 PROCEDURE — P9017 PLASMA 1 DONOR FRZ W/IN 8 HR: HCPCS

## 2019-08-12 RX ORDER — ROCURONIUM BROMIDE 10 MG/ML
INJECTION, SOLUTION INTRAVENOUS PRN
Status: DISCONTINUED | OUTPATIENT
Start: 2019-08-12 | End: 2019-08-12 | Stop reason: SDUPTHER

## 2019-08-12 RX ORDER — IPRATROPIUM BROMIDE AND ALBUTEROL SULFATE 2.5; .5 MG/3ML; MG/3ML
1 SOLUTION RESPIRATORY (INHALATION)
Status: DISCONTINUED | OUTPATIENT
Start: 2019-08-12 | End: 2019-08-12

## 2019-08-12 RX ORDER — HYDRALAZINE HYDROCHLORIDE 20 MG/ML
5 INJECTION INTRAMUSCULAR; INTRAVENOUS EVERY 10 MIN PRN
Status: DISCONTINUED | OUTPATIENT
Start: 2019-08-12 | End: 2019-08-12

## 2019-08-12 RX ORDER — ONDANSETRON 2 MG/ML
4 INJECTION INTRAMUSCULAR; INTRAVENOUS PRN
Status: DISCONTINUED | OUTPATIENT
Start: 2019-08-12 | End: 2019-08-12

## 2019-08-12 RX ORDER — PROPOFOL 10 MG/ML
INJECTION, EMULSION INTRAVENOUS PRN
Status: DISCONTINUED | OUTPATIENT
Start: 2019-08-12 | End: 2019-08-12 | Stop reason: SDUPTHER

## 2019-08-12 RX ORDER — OXYCODONE HYDROCHLORIDE AND ACETAMINOPHEN 5; 325 MG/1; MG/1
2 TABLET ORAL PRN
Status: DISCONTINUED | OUTPATIENT
Start: 2019-08-12 | End: 2019-08-12

## 2019-08-12 RX ORDER — PROMETHAZINE HYDROCHLORIDE 25 MG/ML
6.25 INJECTION, SOLUTION INTRAMUSCULAR; INTRAVENOUS
Status: DISCONTINUED | OUTPATIENT
Start: 2019-08-12 | End: 2019-08-12

## 2019-08-12 RX ORDER — SUCCINYLCHOLINE CHLORIDE 20 MG/ML
INJECTION INTRAMUSCULAR; INTRAVENOUS PRN
Status: DISCONTINUED | OUTPATIENT
Start: 2019-08-12 | End: 2019-08-12 | Stop reason: SDUPTHER

## 2019-08-12 RX ORDER — DIPHENHYDRAMINE HYDROCHLORIDE 50 MG/ML
12.5 INJECTION INTRAMUSCULAR; INTRAVENOUS
Status: DISCONTINUED | OUTPATIENT
Start: 2019-08-12 | End: 2019-08-12

## 2019-08-12 RX ORDER — ONDANSETRON 2 MG/ML
INJECTION INTRAMUSCULAR; INTRAVENOUS PRN
Status: DISCONTINUED | OUTPATIENT
Start: 2019-08-12 | End: 2019-08-12 | Stop reason: SDUPTHER

## 2019-08-12 RX ORDER — 0.9 % SODIUM CHLORIDE 0.9 %
250 INTRAVENOUS SOLUTION INTRAVENOUS ONCE
Status: DISCONTINUED | OUTPATIENT
Start: 2019-08-12 | End: 2019-08-13

## 2019-08-12 RX ORDER — OXYCODONE HYDROCHLORIDE AND ACETAMINOPHEN 5; 325 MG/1; MG/1
1 TABLET ORAL PRN
Status: DISCONTINUED | OUTPATIENT
Start: 2019-08-12 | End: 2019-08-12

## 2019-08-12 RX ORDER — LABETALOL HYDROCHLORIDE 5 MG/ML
5 INJECTION, SOLUTION INTRAVENOUS EVERY 10 MIN PRN
Status: DISCONTINUED | OUTPATIENT
Start: 2019-08-12 | End: 2019-08-12

## 2019-08-12 RX ORDER — FUROSEMIDE 10 MG/ML
40 INJECTION INTRAMUSCULAR; INTRAVENOUS ONCE
Status: COMPLETED | OUTPATIENT
Start: 2019-08-12 | End: 2019-08-12

## 2019-08-12 RX ORDER — METHYLPREDNISOLONE SODIUM SUCCINATE 125 MG/2ML
INJECTION, POWDER, LYOPHILIZED, FOR SOLUTION INTRAMUSCULAR; INTRAVENOUS PRN
Status: DISCONTINUED | OUTPATIENT
Start: 2019-08-12 | End: 2019-08-12 | Stop reason: SDUPTHER

## 2019-08-12 RX ORDER — MORPHINE SULFATE 10 MG/ML
1 INJECTION, SOLUTION INTRAMUSCULAR; INTRAVENOUS EVERY 5 MIN PRN
Status: DISCONTINUED | OUTPATIENT
Start: 2019-08-12 | End: 2019-08-12

## 2019-08-12 RX ORDER — MORPHINE SULFATE 10 MG/ML
2 INJECTION, SOLUTION INTRAMUSCULAR; INTRAVENOUS EVERY 5 MIN PRN
Status: DISCONTINUED | OUTPATIENT
Start: 2019-08-12 | End: 2019-08-12

## 2019-08-12 RX ORDER — SODIUM CHLORIDE, SODIUM LACTATE, POTASSIUM CHLORIDE, CALCIUM CHLORIDE 600; 310; 30; 20 MG/100ML; MG/100ML; MG/100ML; MG/100ML
INJECTION, SOLUTION INTRAVENOUS CONTINUOUS PRN
Status: DISCONTINUED | OUTPATIENT
Start: 2019-08-12 | End: 2019-08-12 | Stop reason: SDUPTHER

## 2019-08-12 RX ADMIN — IPRATROPIUM BROMIDE AND ALBUTEROL SULFATE 1 AMPULE: .5; 3 SOLUTION RESPIRATORY (INHALATION) at 16:08

## 2019-08-12 RX ADMIN — ONDANSETRON 4 MG: 2 INJECTION, SOLUTION INTRAMUSCULAR; INTRAVENOUS at 14:00

## 2019-08-12 RX ADMIN — IPRATROPIUM BROMIDE AND ALBUTEROL SULFATE 1 AMPULE: .5; 3 SOLUTION RESPIRATORY (INHALATION) at 07:02

## 2019-08-12 RX ADMIN — ROCURONIUM BROMIDE 50 MG: 10 INJECTION, SOLUTION INTRAVENOUS at 13:54

## 2019-08-12 RX ADMIN — FUROSEMIDE 40 MG: 10 INJECTION, SOLUTION INTRAMUSCULAR; INTRAVENOUS at 16:32

## 2019-08-12 RX ADMIN — AZITHROMYCIN DIHYDRATE 500 MG: 500 INJECTION, POWDER, LYOPHILIZED, FOR SOLUTION INTRAVENOUS at 05:35

## 2019-08-12 RX ADMIN — TRAZODONE HYDROCHLORIDE 50 MG: 50 TABLET ORAL at 22:08

## 2019-08-12 RX ADMIN — IPRATROPIUM BROMIDE AND ALBUTEROL SULFATE 1 AMPULE: .5; 3 SOLUTION RESPIRATORY (INHALATION) at 22:40

## 2019-08-12 RX ADMIN — IPRATROPIUM BROMIDE AND ALBUTEROL SULFATE 1 AMPULE: .5; 3 SOLUTION RESPIRATORY (INHALATION) at 19:32

## 2019-08-12 RX ADMIN — TORSEMIDE 20 MG: 20 TABLET ORAL at 20:15

## 2019-08-12 RX ADMIN — PROPOFOL 200 MG: 10 INJECTION, EMULSION INTRAVENOUS at 13:47

## 2019-08-12 RX ADMIN — METHYLPREDNISOLONE SODIUM SUCCINATE 125 MG: 125 INJECTION, POWDER, FOR SOLUTION INTRAMUSCULAR; INTRAVENOUS at 13:58

## 2019-08-12 RX ADMIN — MAGNESIUM HYDROXIDE 30 ML: 400 SUSPENSION ORAL at 22:10

## 2019-08-12 RX ADMIN — Medication 10 ML: at 08:49

## 2019-08-12 RX ADMIN — SUCCINYLCHOLINE CHLORIDE 120 MG: 20 INJECTION, SOLUTION INTRAMUSCULAR; INTRAVENOUS at 13:47

## 2019-08-12 RX ADMIN — SUGAMMADEX 200 MG: 100 INJECTION, SOLUTION INTRAVENOUS at 15:05

## 2019-08-12 RX ADMIN — IPRATROPIUM BROMIDE AND ALBUTEROL SULFATE 1 AMPULE: .5; 3 SOLUTION RESPIRATORY (INHALATION) at 15:31

## 2019-08-12 RX ADMIN — Medication 10 ML: at 20:16

## 2019-08-12 RX ADMIN — SODIUM CHLORIDE, POTASSIUM CHLORIDE, SODIUM LACTATE AND CALCIUM CHLORIDE: 600; 310; 30; 20 INJECTION, SOLUTION INTRAVENOUS at 13:34

## 2019-08-12 RX ADMIN — LIDOCAINE HYDROCHLORIDE 50 MG: 10 INJECTION, SOLUTION INFILTRATION; PERINEURAL at 13:47

## 2019-08-12 RX ADMIN — IPRATROPIUM BROMIDE AND ALBUTEROL SULFATE 1 AMPULE: .5; 3 SOLUTION RESPIRATORY (INHALATION) at 11:39

## 2019-08-12 ASSESSMENT — PULMONARY FUNCTION TESTS
PIF_VALUE: 30
PIF_VALUE: 1
PIF_VALUE: 30
PIF_VALUE: 31
PIF_VALUE: 1
PIF_VALUE: 30
PIF_VALUE: 1
PIF_VALUE: 30
PIF_VALUE: 3
PIF_VALUE: 30
PIF_VALUE: 2
PIF_VALUE: 30
PIF_VALUE: 37
PIF_VALUE: 30
PIF_VALUE: 5
PIF_VALUE: 30
PIF_VALUE: 30
PIF_VALUE: 0
PIF_VALUE: 30
PIF_VALUE: 30
PIF_VALUE: 1
PIF_VALUE: 11
PIF_VALUE: 0
PIF_VALUE: 2
PIF_VALUE: 17
PIF_VALUE: 30
PIF_VALUE: 33
PIF_VALUE: 30
PIF_VALUE: 20
PIF_VALUE: 30
PIF_VALUE: 12
PIF_VALUE: 21
PIF_VALUE: 0
PIF_VALUE: 30
PIF_VALUE: 30
PIF_VALUE: 29
PIF_VALUE: 30
PIF_VALUE: 2
PIF_VALUE: 30
PIF_VALUE: 2
PIF_VALUE: 30
PIF_VALUE: 31
PIF_VALUE: 30
PIF_VALUE: 36
PIF_VALUE: 30
PIF_VALUE: 30
PIF_VALUE: 4
PIF_VALUE: 30
PIF_VALUE: 13
PIF_VALUE: 30
PIF_VALUE: 22
PIF_VALUE: 31
PIF_VALUE: 30
PIF_VALUE: 30
PIF_VALUE: 25
PIF_VALUE: 30
PIF_VALUE: 0
PIF_VALUE: 30
PIF_VALUE: 27
PIF_VALUE: 30
PIF_VALUE: 28
PIF_VALUE: 2
PIF_VALUE: 30

## 2019-08-12 ASSESSMENT — ENCOUNTER SYMPTOMS: SHORTNESS OF BREATH: 1

## 2019-08-12 ASSESSMENT — PAIN - FUNCTIONAL ASSESSMENT: PAIN_FUNCTIONAL_ASSESSMENT: 0-10

## 2019-08-12 ASSESSMENT — PAIN SCALES - GENERAL: PAINLEVEL_OUTOF10: 0

## 2019-08-12 NOTE — PROGRESS NOTES
Pulmonary Progress Note    CC: Lung mass    Subjective:   Appears comfortable today  On 4 L O2-uses 4 L at home    IV line:      Intake/Output Summary (Last 24 hours) at 8/12/2019 0729  Last data filed at 8/12/2019 0412  Gross per 24 hour   Intake 720 ml   Output 2400 ml   Net -1680 ml       Exam:   /70   Pulse 98   Temp 97.7 °F (36.5 °C) (Oral)   Resp 18   Ht 5' 2\" (1.575 m)   Wt 284 lb 12.8 oz (129.2 kg)   LMP  (LMP Unknown)   SpO2 96%   BMI 52.09 kg/m²  on 4 L  Gen: No distress. Alert. Eyes: PERRL. No sclera icterus. No conjunctival injection. ENT: No discharge. Pharynx clear. Neck: Trachea midline. Normal thyroid. Resp: No accessory muscle use. Basilar crackles. No wheezes. No rhonchi. No dullness on percussion. CV: Regular rate. Regular rhythm. No murmur or rub. 1+ bilateral lower extremity edema. GI: Non-tender. Non-distended. No masses. No organomegaly. Normal bowel sounds. No hernia. Skin: Warm and dry. No nodule on exposed extremities. No rash on exposed extremities. Lymph: No cervical LAD. No supraclavicular LAD. M/S: No cyanosis. No joint deformity. No clubbing. Neuro: Awake. Moves all extremities. CN grossly intact. Psych: Oriented x 3. No anxiety or agitation.      Scheduled Meds:   ipratropium-albuterol  1 ampule Inhalation Q4H While awake    azithromycin  500 mg Intravenous Q24H    vitamin D  2,000 Units Oral Daily    traZODone  50 mg Oral Nightly    citalopram  10 mg Oral Daily    torsemide  20 mg Oral BID    spironolactone  25 mg Oral Daily    sodium chloride flush  10 mL Intravenous 2 times per day    predniSONE  40 mg Oral Daily with breakfast     Continuous Infusions:    PRN Meds:  melatonin, albuterol sulfate HFA, albuterol, busPIRone, sodium chloride flush, magnesium hydroxide, ondansetron, acetaminophen    Labs:  CBC:   Recent Labs     08/09/19  1615 08/10/19  0523   WBC 7.6 6.6   HGB 12.0 12.0   HCT 37.8 37.2   MCV 85.6 86.7    287     BMP: alternatives to the procedure and doing nothing have been discussed with patient including complications (collapse lung, bleed, infection, mechanical ventilation, hospitalization, cardiopulmonary arrest and death). We also discussed the risks of sedation/anesthesia. It is my assessment that the risk of the invasive procedure is outweighed by the benefit. The patient understands and wishes to proceed.

## 2019-08-12 NOTE — PROGRESS NOTES
SaO2 continue at 85-88% on 4L nc O2. O2 increased to 6 l then 10 L. P)lacced on high flow nasal cannula by RT. Pt is awake and talking with son, coughing occas. SaO2 currently 90% on 10L High flow nasal cannula. Report called to [facility] [place] on [address].  Lasix 40 Mg given IVP by SOL Brown RN as ordered.

## 2019-08-12 NOTE — PROGRESS NOTES
Pt to floor via stretcher on o2 at 8 lpm, pt c/o burning in nares, return to 10 lpm once in room, no other issues enroute.

## 2019-08-12 NOTE — H&P
spironolactone (ALDACTONE) tablet 25 mg  25 mg Oral Daily Claude Stains, MD   Stopped at 08/12/19 1126    sodium chloride flush 0.9 % injection 10 mL  10 mL Intravenous 2 times per day Claude Stains, MD   10 mL at 08/12/19 0849    sodium chloride flush 0.9 % injection 10 mL  10 mL Intravenous PRN Claude Stains, MD   10 mL at 08/11/19 1706    magnesium hydroxide (MILK OF MAGNESIA) 400 MG/5ML suspension 30 mL  30 mL Oral Daily PRN Claude Stains, MD        ondansetron [facility]) injection 4 mg  4 mg Intravenous Q6H PRN Claude Stains, MD        predniSONE (DELTASONE) tablet 40 mg  40 mg Oral Daily with breakfast Claude Stains, MD   Stopped at 08/12/19 1127    acetaminophen (TYLENOL) tablet 650 mg  650 mg Oral Q4H PRN Claude Stains, MD           Medication list was reviewed and updated as needed in Epic     reports that she quit smoking about 4 years ago. Her smoking use included cigarettes. She has a 16.50 pack-year smoking history. She has never used smokeless tobacco.    family history includes Asthma in her grandchild and son; Cancer (age of onset: 43) in her daughter and mother; Diabetes in her sister; Hypertension in her son and son. HENT: Airway patent and reviewed. Mallampati IV  Cardiovascular: Normal rate, regular rhythm, normal heart sounds. Pulmonary/Chest: No wheezes. Few rhonchi. Basilar rales. Abdominal: Soft. Bowel sounds are normal. No distension. ASA CLASS    I. Normal, healthy adult    II. Mild systemic disease  X  III. Severe Systemic Disease    IV. Severe Systemic Disease which is a threat to life.     Luis Miguel Poster Moribund      Sedation plan:   No sedation   Minimal   Moderate  X Sedation per anesthesia   Continue ICU sedation      Post Procedure Plan   Return to same level of care   ______________________       The risks and benefits of procedure, alternatives to the procedure and doing nothing have been discussed with patient including complications (collapse lung, bleed, mechanical

## 2019-08-12 NOTE — PROGRESS NOTES
skin.    Labs:   Recent Labs     08/09/19  1615 08/10/19  0523 08/12/19  0553   WBC 7.6 6.6 13.6*   HGB 12.0 12.0 11.3*   HCT 37.8 37.2 35.7*    287 290     Recent Labs     08/09/19  1615 08/12/19  0553     142 143   K 4.2  4.2 4.8     100 101   CO2 31  32 33*   BUN 13  13 18   CREATININE 0.6  0.6 <0.5*   CALCIUM 9.3  9.2 9.0     Recent Labs     08/09/19  1615   AST 14*   ALT 12   BILITOT <0.2   ALKPHOS 84     Recent Labs     08/10/19  0523 08/11/19  0540 08/12/19  0553   INR 2.94* 3.73* 1.48*     Recent Labs     08/09/19  1615   TROPONINI <0.01       Urinalysis:      Lab Results   Component Value Date    NITRU Negative 09/01/2016    WBCUA 0-2 09/01/2016    RBCUA None seen 09/01/2016    BLOODU negative 05/01/2017    BLOODU Negative 09/01/2016    SPECGRAV >=1.030 05/01/2017    SPECGRAV >=1.030 09/01/2016    GLUCOSEU negative 05/01/2017    GLUCOSEU Negative 09/01/2016       Radiology:  XR CHEST STANDARD (2 VW)   Final Result   Unchanged appearance of the chest over the past 24 hours, including a   masslike area of disease in the left lung base. CT CHEST W CONTRAST   Final Result   New masslike areas of consolidation involving the posterior aspect of the   left lower lobe and perihilar posterior aspect of the left upper lobe. Bronchoscopy could be considered as clinically indicated. New suspicious nodule in the periphery of the right upper lobe. Differential   includes primary and metastatic malignancies as well as infectious and   inflammatory processes. Chronic changes at the right lung base involving the right middle lobe and   medial portions of the right lower lobe likely representing atelectasis.                  Assessment/Plan:    Active Hospital Problems    Diagnosis    COPD exacerbation [J44.1]     Priority: High    Atrial fibrillation, chronic (HCC) [I48.2]    COPD with acute exacerbation ([address] [J44.1]    Lung mass [R91.8]    Mediastinal lymphadenopathy

## 2019-08-12 NOTE — ANESTHESIA PRE PROCEDURE
Medication Dose Route Frequency Provider Last Rate Last Dose    0.9 % sodium chloride bolus  250 mL Intravenous Once Maria Hathaway MD        ipratropium-albuterol (DUONEB) nebulizer solution 1 ampule  1 ampule Inhalation Q4H While awake Bessie Teixeira MD   1 ampule at 08/12/19 1139    azithromycin (ZITHROMAX) 500 mg in D5W 250ml addavial  500 mg Intravenous Q24H Annette Dumont MD   Stopped at 08/12/19 0646    vitamin D (CHOLECALCIFEROL) tablet 2,000 Units  2,000 Units Oral Daily Bessie Teixeira MD   Stopped at 08/12/19 1124    melatonin tablet 3 mg  3 mg Oral Nightly PRN Bessie Teixeira MD        traZODone (DESYREL) tablet 50 mg  50 mg Oral Nightly Bessie Teixeira MD   50 mg at 08/11/19 2030    albuterol sulfate  (90 Base) MCG/ACT inhaler 2 puff  2 puff Inhalation Q6H PRN Bessie Teixeira MD        citalopram (CELEXA) tablet 10 mg  10 mg Oral Daily Bessie Teixeira MD   Stopped at 08/12/19 1124    albuterol (PROVENTIL) nebulizer solution 2.5 mg  2.5 mg Nebulization Q6H PRN Bessie Teixeira MD        busPIRone (BUSPAR) tablet 10 mg  10 mg Oral TID PRN Besise Teixeira MD        torsemide [facility]) tablet 20 mg  20 mg Oral BID Bessie Teixeira MD   Stopped at 08/12/19 1125    spironolactone (ALDACTONE) tablet 25 mg  25 mg Oral Daily Bessie Teixeira MD   Stopped at 08/12/19 1126    sodium chloride flush 0.9 % injection 10 mL  10 mL Intravenous 2 times per day Bessie Teixeira MD   10 mL at 08/12/19 0849    sodium chloride flush 0.9 % injection 10 mL  10 mL Intravenous PRN Bessie Teixeira MD   10 mL at 08/11/19 1706    magnesium hydroxide (MILK OF MAGNESIA) 400 MG/5ML suspension 30 mL  30 mL Oral Daily PRN Bessie Teixeira MD        ondansetron [facility]) injection 4 mg  4 mg Intravenous Q6H PRN Bessie Teixeira MD        predniSONE (DELTASONE) tablet 40 mg  40 mg Oral Daily with breakfast Bessie Teixeira MD   Stopped at 08/12/19 1127    acetaminophen (TYLENOL) tablet 650 mg  650 mg Oral Q4H PRN

## 2019-08-13 LAB
ANION GAP SERPL CALCULATED.3IONS-SCNC: 8 MMOL/L (ref 3–16)
ANISOCYTOSIS: ABNORMAL
ATYPICAL LYMPHOCYTE RELATIVE PERCENT: 2 % (ref 0–6)
BANDED NEUTROPHILS RELATIVE PERCENT: 1 % (ref 0–7)
BASOPHILIC STIPPLING: ABNORMAL
BASOPHILS ABSOLUTE: 0 K/UL (ref 0–0.2)
BASOPHILS RELATIVE PERCENT: 0 %
BUN BLDV-MCNC: 22 MG/DL (ref 7–20)
CALCIUM SERPL-MCNC: 8.5 MG/DL (ref 8.3–10.6)
CHLORIDE BLD-SCNC: 94 MMOL/L (ref 99–110)
CO2: 40 MMOL/L (ref 21–32)
CREAT SERPL-MCNC: 0.7 MG/DL (ref 0.6–1.2)
EOSINOPHILS ABSOLUTE: 0 K/UL (ref 0–0.6)
EOSINOPHILS RELATIVE PERCENT: 0 %
GFR AFRICAN AMERICAN: >60
GFR NON-AFRICAN AMERICAN: >60
GLUCOSE BLD-MCNC: 196 MG/DL (ref 70–99)
HCT VFR BLD CALC: 35.5 % (ref 36–48)
HEMOGLOBIN: 11.3 G/DL (ref 12–16)
INR BLD: 1.16 (ref 0.86–1.14)
LYMPHOCYTES ABSOLUTE: 1.1 K/UL (ref 1–5.1)
LYMPHOCYTES RELATIVE PERCENT: 8 %
MCH RBC QN AUTO: 27.4 PG (ref 26–34)
MCHC RBC AUTO-ENTMCNC: 31.7 G/DL (ref 31–36)
MCV RBC AUTO: 86.4 FL (ref 80–100)
MONOCYTES ABSOLUTE: 1.3 K/UL (ref 0–1.3)
MONOCYTES RELATIVE PERCENT: 12 %
NEUTROPHILS ABSOLUTE: 8.3 K/UL (ref 1.7–7.7)
NEUTROPHILS RELATIVE PERCENT: 77 %
PDW BLD-RTO: 14.8 % (ref 12.4–15.4)
PLATELET # BLD: 303 K/UL (ref 135–450)
PLATELET SLIDE REVIEW: ADEQUATE
PMV BLD AUTO: 6.5 FL (ref 5–10.5)
POIKILOCYTES: ABNORMAL
POLYCHROMASIA: ABNORMAL
POTASSIUM SERPL-SCNC: 4.4 MMOL/L (ref 3.5–5.1)
PROTHROMBIN TIME: 13.2 SEC (ref 9.8–13)
RBC # BLD: 4.11 M/UL (ref 4–5.2)
SLIDE REVIEW: ABNORMAL
SODIUM BLD-SCNC: 142 MMOL/L (ref 136–145)
WBC # BLD: 10.6 K/UL (ref 4–11)

## 2019-08-13 PROCEDURE — 2700000000 HC OXYGEN THERAPY PER DAY

## 2019-08-13 PROCEDURE — 99232 SBSQ HOSP IP/OBS MODERATE 35: CPT | Performed by: INTERNAL MEDICINE

## 2019-08-13 PROCEDURE — 94761 N-INVAS EAR/PLS OXIMETRY MLT: CPT

## 2019-08-13 PROCEDURE — 1200000000 HC SEMI PRIVATE

## 2019-08-13 PROCEDURE — 36415 COLL VENOUS BLD VENIPUNCTURE: CPT

## 2019-08-13 PROCEDURE — 2580000003 HC RX 258: Performed by: INTERNAL MEDICINE

## 2019-08-13 PROCEDURE — 80048 BASIC METABOLIC PNL TOTAL CA: CPT

## 2019-08-13 PROCEDURE — 85025 COMPLETE CBC W/AUTO DIFF WBC: CPT

## 2019-08-13 PROCEDURE — 94669 MECHANICAL CHEST WALL OSCILL: CPT

## 2019-08-13 PROCEDURE — 6370000000 HC RX 637 (ALT 250 FOR IP): Performed by: INTERNAL MEDICINE

## 2019-08-13 PROCEDURE — 6360000002 HC RX W HCPCS: Performed by: INTERNAL MEDICINE

## 2019-08-13 PROCEDURE — 94640 AIRWAY INHALATION TREATMENT: CPT

## 2019-08-13 PROCEDURE — 94150 VITAL CAPACITY TEST: CPT

## 2019-08-13 PROCEDURE — 85610 PROTHROMBIN TIME: CPT

## 2019-08-13 RX ORDER — WARFARIN SODIUM 5 MG/1
5 TABLET ORAL
Status: COMPLETED | OUTPATIENT
Start: 2019-08-13 | End: 2019-08-13

## 2019-08-13 RX ADMIN — AZITHROMYCIN DIHYDRATE 500 MG: 500 INJECTION, POWDER, LYOPHILIZED, FOR SOLUTION INTRAVENOUS at 06:50

## 2019-08-13 RX ADMIN — VITAMIN D, TAB 1000IU (100/BT) 2000 UNITS: 25 TAB at 08:42

## 2019-08-13 RX ADMIN — IPRATROPIUM BROMIDE AND ALBUTEROL SULFATE 1 AMPULE: .5; 3 SOLUTION RESPIRATORY (INHALATION) at 23:00

## 2019-08-13 RX ADMIN — IPRATROPIUM BROMIDE AND ALBUTEROL SULFATE 1 AMPULE: .5; 3 SOLUTION RESPIRATORY (INHALATION) at 07:38

## 2019-08-13 RX ADMIN — IPRATROPIUM BROMIDE AND ALBUTEROL SULFATE 1 AMPULE: .5; 3 SOLUTION RESPIRATORY (INHALATION) at 11:36

## 2019-08-13 RX ADMIN — TRAZODONE HYDROCHLORIDE 50 MG: 50 TABLET ORAL at 22:33

## 2019-08-13 RX ADMIN — WARFARIN SODIUM 5 MG: 5 TABLET ORAL at 17:20

## 2019-08-13 RX ADMIN — IPRATROPIUM BROMIDE AND ALBUTEROL SULFATE 1 AMPULE: .5; 3 SOLUTION RESPIRATORY (INHALATION) at 15:59

## 2019-08-13 RX ADMIN — PREDNISONE 40 MG: 10 TABLET ORAL at 08:44

## 2019-08-13 RX ADMIN — IPRATROPIUM BROMIDE AND ALBUTEROL SULFATE 1 AMPULE: .5; 3 SOLUTION RESPIRATORY (INHALATION) at 19:12

## 2019-08-13 RX ADMIN — Medication 10 ML: at 08:47

## 2019-08-13 RX ADMIN — TORSEMIDE 20 MG: 20 TABLET ORAL at 17:20

## 2019-08-13 RX ADMIN — ACETAMINOPHEN 650 MG: 325 TABLET ORAL at 16:16

## 2019-08-13 RX ADMIN — CITALOPRAM HYDROBROMIDE 10 MG: 20 TABLET ORAL at 08:43

## 2019-08-13 RX ADMIN — Medication 10 ML: at 22:33

## 2019-08-13 ASSESSMENT — PAIN DESCRIPTION - PROGRESSION
CLINICAL_PROGRESSION: GRADUALLY IMPROVING

## 2019-08-13 ASSESSMENT — PAIN DESCRIPTION - DESCRIPTORS
DESCRIPTORS: ACHING
DESCRIPTORS: ACHING;DISCOMFORT

## 2019-08-13 ASSESSMENT — PAIN DESCRIPTION - LOCATION
LOCATION: RIB CAGE
LOCATION: GENERALIZED

## 2019-08-13 ASSESSMENT — PAIN DESCRIPTION - ONSET: ONSET: ON-GOING

## 2019-08-13 ASSESSMENT — PAIN SCALES - GENERAL
PAINLEVEL_OUTOF10: 7
PAINLEVEL_OUTOF10: 7
PAINLEVEL_OUTOF10: 4
PAINLEVEL_OUTOF10: 9

## 2019-08-13 ASSESSMENT — PAIN DESCRIPTION - ORIENTATION
ORIENTATION: OTHER (COMMENT)
ORIENTATION: OTHER (COMMENT)

## 2019-08-13 ASSESSMENT — PAIN DESCRIPTION - PAIN TYPE
TYPE: ACUTE PAIN
TYPE: ACUTE PAIN

## 2019-08-13 ASSESSMENT — PAIN DESCRIPTION - FREQUENCY
FREQUENCY: INTERMITTENT
FREQUENCY: INTERMITTENT

## 2019-08-13 NOTE — CARE COORDINATION
you have all of your prescriptions and are they filled?:  Yes   Barriers to Medication Adherence:  None  Are you able to afford your medications?:  Yes  How often do you have difficulty taking your medications?:  I always take them as prescribed. Housing Review  Who do you live with?:  Alone  Are you an active caregiver in your home?:  No  Social Support  Do you have a ?:  No  Do you have a [facility]?:  Yes  Cintia Pollard Name:  [address]  S:  [id number]  Patient DME:  Straight cane  Patient Home Equipment:  Nebulizer, Oxygen  Functional Review  Ability to seek help/take action for Emergent/Urgent situations i.e. fire, crime, inclement weather or health crisis. :  Independent  Ability handle personal hygiene needs (bathing/dressing/grooming): Independent  Ability to manage medications: Independent  Ability to prepare food:  Independent  Ability to maintain home (clean home, laundry):  Needs Assistance  Ability to drive and/or has transportation:  Dependent  Ability to do shopping:  Independent  Ability to manage finances: Independent  Is patient able to live independently?:  Yes  Hearing and Vision  Visual Impairment:  None  Hearing Impairment:  None  Care Transitions Interventions         Follow Up  Future Appointments   Date Time Provider Allyssa Levine   10/14/2019 11:30 AM Addison Arndt MD [facility]   12/18/2019  2:45 PM Charity Farfan MD [address] [facility]       Health Maintenance  There are no preventive care reminders to display for this patient.     Duane Rogers RN

## 2019-08-13 NOTE — PROGRESS NOTES
HGB 11.3* 11.3*   HCT 35.7* 35.5*    303     Recent Labs     08/12/19  0553 08/13/19  0646    142   K 4.8 4.4    94*   CO2 33* 40*   BUN 18 22*   CREATININE <0.5* 0.7   CALCIUM 9.0 8.5     No results for input(s): AST, ALT, BILIDIR, BILITOT, ALKPHOS in the last 72 hours. Recent Labs     08/11/19  0540 08/12/19  0553 08/13/19  0646   INR 3.73* 1.48* 1.16*     No results for input(s): CKTOTAL, TROPONINI in the last 72 hours. Urinalysis:      Lab Results   Component Value Date    NITRU Negative 09/01/2016    WBCUA 0-2 09/01/2016    RBCUA None seen 09/01/2016    BLOODU negative 05/01/2017    BLOODU Negative 09/01/2016    SPECGRAV >=1.030 05/01/2017    SPECGRAV >=1.030 09/01/2016    GLUCOSEU negative 05/01/2017    GLUCOSEU Negative 09/01/2016       Radiology:  XR CHEST PORTABLE   Final Result   Bilateral airspace changes corresponding to recent bronchoscopy. There is no   evidence of pneumothorax. FL LESS THAN 1 HOUR   Final Result   Intraprocedural fluoroscopic spot images as above. See separate procedure   report for more information. XR CHEST STANDARD (2 VW)   Final Result   Unchanged appearance of the chest over the past 24 hours, including a   masslike area of disease in the left lung base. CT CHEST W CONTRAST   Final Result   New masslike areas of consolidation involving the posterior aspect of the   left lower lobe and perihilar posterior aspect of the left upper lobe. Bronchoscopy could be considered as clinically indicated. New suspicious nodule in the periphery of the right upper lobe. Differential   includes primary and metastatic malignancies as well as infectious and   inflammatory processes. Chronic changes at the right lung base involving the right middle lobe and   medial portions of the right lower lobe likely representing atelectasis.                  Assessment/Plan:    Active Hospital Problems    Diagnosis    COPD exacerbation [J44.1]

## 2019-08-13 NOTE — PROGRESS NOTES
Pt oxygen 96% decreased to 6L at this time. respiratory updated. Patient is laying with eyes closed. No s/s of distress at this time. Call light and bedside table within reach. Will continue to monitor.

## 2019-08-13 NOTE — PROGRESS NOTES
Pulmonary Progress Note    CC: Lung mass    Subjective:   Post bronchoscopy yesterday  Minimal hemoptysis-improving  Down to her home dose O2 4 L    IV line:      Intake/Output Summary (Last 24 hours) at 8/13/2019 0727  Last data filed at 8/13/2019 0435  Gross per 24 hour   Intake 660 ml   Output 2900 ml   Net -2240 ml       Exam:   BP (!) 144/65   Pulse 101   Temp 98.5 °F (36.9 °C) (Oral)   Resp 18   Ht 5' 2\" (1.575 m)   Wt 284 lb 12.8 oz (129.2 kg)   LMP  (LMP Unknown)   SpO2 94%   BMI 52.09 kg/m²  on 4 L  Gen: No distress. Alert. Eyes: PERRL. No sclera icterus. No conjunctival injection. ENT: No discharge. Pharynx clear. Neck: Trachea midline. Normal thyroid. Resp: No accessory muscle use. Basilar crackles. No wheezes. No rhonchi. No dullness on percussion. CV: Regular rate. Regular rhythm. No murmur or rub. 1+ bilateral lower extremity edema. GI: Non-tender. Non-distended. No masses. No organomegaly. Normal bowel sounds. No hernia. Skin: Warm and dry. No nodule on exposed extremities. No rash on exposed extremities. Lymph: No cervical LAD. No supraclavicular LAD. M/S: No cyanosis. No joint deformity. No clubbing. Neuro: Awake. Moves all extremities. CN grossly intact. Psych: Oriented x 3. No anxiety or agitation.      Scheduled Meds:   sodium chloride  250 mL Intravenous Once    sodium chloride  250 mL Intravenous Once    ipratropium-albuterol  1 ampule Inhalation Q4H While awake    azithromycin  500 mg Intravenous Q24H    vitamin D  2,000 Units Oral Daily    traZODone  50 mg Oral Nightly    citalopram  10 mg Oral Daily    torsemide  20 mg Oral BID    spironolactone  25 mg Oral Daily    sodium chloride flush  10 mL Intravenous 2 times per day    predniSONE  40 mg Oral Daily with breakfast     Continuous Infusions:    PRN Meds:  melatonin, albuterol sulfate HFA, albuterol, busPIRone, sodium chloride flush, magnesium hydroxide, ondansetron, acetaminophen    Labs:  CBC: Recent Labs     08/12/19  0553 08/13/19  0646   WBC 13.6* 10.6   HGB 11.3* 11.3*   HCT 35.7* 35.5*   MCV 85.8 86.4    303     BMP:   Recent Labs     08/12/19  0553      K 4.8      CO2 33*   BUN 18   CREATININE <0.5*     LIVER PROFILE:   No results for input(s): AST, ALT, LIPASE, BILIDIR, BILITOT, ALKPHOS in the last 72 hours. Invalid input(s): AMYLASE,  ALB  PT/INR:   Recent Labs     08/11/19  0540 08/12/19  0553 08/13/19  0646   PROTIME 42.5* 16.9* 13.2*   INR 3.73* 1.48* 1.16*     APTT:   No results for input(s): APTT in the last 72 hours. UA:No results for input(s): NITRITE, COLORU, PHUR, LABCAST, WBCUA, RBCUA, MUCUS, TRICHOMONAS, YEAST, BACTERIA, CLARITYU, SPECGRAV, LEUKOCYTESUR, UROBILINOGEN, BILIRUBINUR, BLOODU, GLUCOSEU, AMORPHOUS in the last 72 hours. Invalid input(s): KETONESU  No results for input(s): PHART, MOC1LFC, PO2ART in the last 72 hours. Cultures:   8/9 blood negative  8/12 BAL pending    Films:  Chest x-ray 8/12/2019 imaging reviewed by me and showed  Bibasilar airspace disease with no pneumothorax      CT chest 8/9/2019   New masslike areas of consolidation involving the posterior aspect of the  left lower lobe and perihilar posterior aspect of the left upper lobe. New suspicious nodule in the periphery of the right upper lobe  Chronic changes at the right lung base involving the right middle lobe and  medial portions of the right lower lobe likely representing atelectasis.         ASSESSMENT:  · Severe COPD with acute exacerbation  · LLL lung mass with mediastinal/hilar adenopathy-evaluate for bronchogenic carcinoma  · New right-sided pulmonary nodules-rule out metastatic disease  · Chronic hypoxemic respiratory failure on 4 to 6 L O2  · CHF and chronic A. fib on Coumadin       PLAN:  · Supplemental oxygen to maintain SaO2 >92%; wean as tolerated  · Inhaled bronchodilators  · Prednisone 40 mg daily  · Zithromax day #3  · Resume Coumadin pharmacy to dose  · Continue

## 2019-08-14 LAB
ANION GAP SERPL CALCULATED.3IONS-SCNC: 9 MMOL/L (ref 3–16)
BASOPHILS ABSOLUTE: 0 K/UL (ref 0–0.2)
BASOPHILS RELATIVE PERCENT: 0 %
BLOOD CULTURE, ROUTINE: NORMAL
BUN BLDV-MCNC: 21 MG/DL (ref 7–20)
CALCIUM SERPL-MCNC: 8.7 MG/DL (ref 8.3–10.6)
CHLORIDE BLD-SCNC: 91 MMOL/L (ref 99–110)
CO2: 40 MMOL/L (ref 21–32)
CREAT SERPL-MCNC: 0.7 MG/DL (ref 0.6–1.2)
CULTURE, BLOOD 2: NORMAL
EOSINOPHILS ABSOLUTE: 0.6 K/UL (ref 0–0.6)
EOSINOPHILS RELATIVE PERCENT: 5 %
GFR AFRICAN AMERICAN: >60
GFR NON-AFRICAN AMERICAN: >60
GLUCOSE BLD-MCNC: 131 MG/DL (ref 70–99)
HCT VFR BLD CALC: 40.9 % (ref 36–48)
HEMOGLOBIN: 12.9 G/DL (ref 12–16)
INR BLD: 1.04 (ref 0.86–1.14)
LYMPHOCYTES ABSOLUTE: 4 K/UL (ref 1–5.1)
LYMPHOCYTES RELATIVE PERCENT: 36 %
MAGNESIUM: 2.6 MG/DL (ref 1.8–2.4)
MCH RBC QN AUTO: 27.2 PG (ref 26–34)
MCHC RBC AUTO-ENTMCNC: 31.5 G/DL (ref 31–36)
MCV RBC AUTO: 86.4 FL (ref 80–100)
MONOCYTES ABSOLUTE: 0.4 K/UL (ref 0–1.3)
MONOCYTES RELATIVE PERCENT: 4 %
NEUTROPHILS ABSOLUTE: 6.1 K/UL (ref 1.7–7.7)
NEUTROPHILS RELATIVE PERCENT: 55 %
PDW BLD-RTO: 14.5 % (ref 12.4–15.4)
PHOSPHORUS: 4.1 MG/DL (ref 2.5–4.9)
PLATELET # BLD: 346 K/UL (ref 135–450)
PLATELET SLIDE REVIEW: ADEQUATE
PMV BLD AUTO: 6.5 FL (ref 5–10.5)
POTASSIUM SERPL-SCNC: 4.1 MMOL/L (ref 3.5–5.1)
PROTHROMBIN TIME: 11.9 SEC (ref 9.8–13)
RBC # BLD: 4.73 M/UL (ref 4–5.2)
SLIDE REVIEW: NORMAL
SODIUM BLD-SCNC: 140 MMOL/L (ref 136–145)
WBC # BLD: 11 K/UL (ref 4–11)

## 2019-08-14 PROCEDURE — 1200000000 HC SEMI PRIVATE

## 2019-08-14 PROCEDURE — 83735 ASSAY OF MAGNESIUM: CPT

## 2019-08-14 PROCEDURE — 2580000003 HC RX 258: Performed by: INTERNAL MEDICINE

## 2019-08-14 PROCEDURE — 85610 PROTHROMBIN TIME: CPT

## 2019-08-14 PROCEDURE — 84100 ASSAY OF PHOSPHORUS: CPT

## 2019-08-14 PROCEDURE — 6370000000 HC RX 637 (ALT 250 FOR IP): Performed by: INTERNAL MEDICINE

## 2019-08-14 PROCEDURE — 99233 SBSQ HOSP IP/OBS HIGH 50: CPT | Performed by: INTERNAL MEDICINE

## 2019-08-14 PROCEDURE — 2700000000 HC OXYGEN THERAPY PER DAY

## 2019-08-14 PROCEDURE — 6360000002 HC RX W HCPCS: Performed by: INTERNAL MEDICINE

## 2019-08-14 PROCEDURE — 36415 COLL VENOUS BLD VENIPUNCTURE: CPT

## 2019-08-14 PROCEDURE — 94640 AIRWAY INHALATION TREATMENT: CPT

## 2019-08-14 PROCEDURE — 99232 SBSQ HOSP IP/OBS MODERATE 35: CPT | Performed by: INTERNAL MEDICINE

## 2019-08-14 PROCEDURE — 94761 N-INVAS EAR/PLS OXIMETRY MLT: CPT

## 2019-08-14 PROCEDURE — 94669 MECHANICAL CHEST WALL OSCILL: CPT

## 2019-08-14 PROCEDURE — 85025 COMPLETE CBC W/AUTO DIFF WBC: CPT

## 2019-08-14 PROCEDURE — 80048 BASIC METABOLIC PNL TOTAL CA: CPT

## 2019-08-14 RX ORDER — IPRATROPIUM BROMIDE AND ALBUTEROL SULFATE 2.5; .5 MG/3ML; MG/3ML
1 SOLUTION RESPIRATORY (INHALATION) 3 TIMES DAILY
Status: DISCONTINUED | OUTPATIENT
Start: 2019-08-14 | End: 2019-08-16 | Stop reason: HOSPADM

## 2019-08-14 RX ORDER — SULFAMETHOXAZOLE AND TRIMETHOPRIM 800; 160 MG/1; MG/1
1 TABLET ORAL EVERY 12 HOURS SCHEDULED
Status: DISCONTINUED | OUTPATIENT
Start: 2019-08-14 | End: 2019-08-15

## 2019-08-14 RX ORDER — WARFARIN SODIUM 5 MG/1
5 TABLET ORAL
Status: COMPLETED | OUTPATIENT
Start: 2019-08-14 | End: 2019-08-14

## 2019-08-14 RX ADMIN — AZITHROMYCIN DIHYDRATE 500 MG: 500 INJECTION, POWDER, LYOPHILIZED, FOR SOLUTION INTRAVENOUS at 05:13

## 2019-08-14 RX ADMIN — WARFARIN SODIUM 5 MG: 5 TABLET ORAL at 17:14

## 2019-08-14 RX ADMIN — VITAMIN D, TAB 1000IU (100/BT) 2000 UNITS: 25 TAB at 08:38

## 2019-08-14 RX ADMIN — IPRATROPIUM BROMIDE AND ALBUTEROL SULFATE 1 AMPULE: .5; 3 SOLUTION RESPIRATORY (INHALATION) at 06:47

## 2019-08-14 RX ADMIN — PREDNISONE 40 MG: 10 TABLET ORAL at 08:38

## 2019-08-14 RX ADMIN — IPRATROPIUM BROMIDE AND ALBUTEROL SULFATE 1 AMPULE: .5; 3 SOLUTION RESPIRATORY (INHALATION) at 13:32

## 2019-08-14 RX ADMIN — IPRATROPIUM BROMIDE AND ALBUTEROL SULFATE 1 AMPULE: .5; 3 SOLUTION RESPIRATORY (INHALATION) at 19:22

## 2019-08-14 RX ADMIN — Medication 10 ML: at 08:38

## 2019-08-14 RX ADMIN — Medication 10 ML: at 22:36

## 2019-08-14 RX ADMIN — ACETAMINOPHEN 650 MG: 325 TABLET ORAL at 08:38

## 2019-08-14 RX ADMIN — TRAZODONE HYDROCHLORIDE 50 MG: 50 TABLET ORAL at 22:36

## 2019-08-14 RX ADMIN — MAGNESIUM HYDROXIDE 30 ML: 400 SUSPENSION ORAL at 23:12

## 2019-08-14 RX ADMIN — TORSEMIDE 20 MG: 20 TABLET ORAL at 08:38

## 2019-08-14 RX ADMIN — SPIRONOLACTONE 25 MG: 25 TABLET ORAL at 08:38

## 2019-08-14 RX ADMIN — SULFAMETHOXAZOLE AND TRIMETHOPRIM 1 TABLET: 800; 160 TABLET ORAL at 16:38

## 2019-08-14 RX ADMIN — TORSEMIDE 20 MG: 20 TABLET ORAL at 17:14

## 2019-08-14 ASSESSMENT — PAIN DESCRIPTION - PROGRESSION
CLINICAL_PROGRESSION: GRADUALLY IMPROVING

## 2019-08-14 ASSESSMENT — PAIN SCALES - GENERAL
PAINLEVEL_OUTOF10: 6
PAINLEVEL_OUTOF10: 7

## 2019-08-14 NOTE — PROGRESS NOTES
Spoke with son. Wants to be in room with Dr. Holly Heath if any bad results from biopsy. Stated will pass onto nurses.

## 2019-08-14 NOTE — PLAN OF CARE
Care plan ongoing
Problem: ACTIVITY INTOLERANCE/IMPAIRED MOBILITY  Goal: Mobility/activity is maintained at optimum level for patient  Outcome: Met This Shift   Pt is tolerated ambulating to the bathroom and around room  Problem: Falls - Risk of:  Goal: Will remain free from falls  Description  Will remain free from falls  Outcome: Ongoing   Pt understands need to use call light have assistance to bathroom due to risk of falls  Problem: OXYGENATION/RESPIRATORY FUNCTION  Goal: Patient will maintain patent airway  Outcome: Ongoing  Pt is using her acupella as well as cough and deep breathe  Goal: Patient will achieve/maintain normal respiratory rate/effort  Description  Respiratory rate and effort will be within normal limits for the patient  Outcome: Ongoing   Pts lung sounds are improving however still has expiratory wheezes  Problem: Airway Clearance - Ineffective:  Goal: Clear lung sounds  Description  Clear lung sounds  Outcome: Ongoing     Problem: Gas Exchange - Impaired:  Goal: Levels of oxygenation will improve  Description  Levels of oxygenation will improve  Outcome: Ongoing
Problem: Falls - Risk of:  Goal: Will remain free from falls  Description  Will remain free from falls  8/11/2019 2032 by Henry Leone RN  Outcome: Ongoing  8/11/2019 0712 by Carlitos Silveira RN  Outcome: Ongoing  Goal: Absence of physical injury  Description  Absence of physical injury  Outcome: Ongoing     Problem: OXYGENATION/RESPIRATORY FUNCTION  Goal: Patient will maintain patent airway  8/11/2019 2032 by Henry Leone RN  Outcome: Ongoing  8/11/2019 0712 by Carlitos Silveira RN  Outcome: Ongoing  Goal: Patient will achieve/maintain normal respiratory rate/effort  Description  Respiratory rate and effort will be within normal limits for the patient  Outcome: Ongoing     Problem: HEMODYNAMIC STATUS  Goal: Patient has stable vital signs and fluid balance  Outcome: Ongoing     Problem: FLUID AND ELECTROLYTE IMBALANCE  Goal: Fluid and electrolyte balance are achieved/maintained  Outcome: Ongoing     Problem: ACTIVITY INTOLERANCE/IMPAIRED MOBILITY  Goal: Mobility/activity is maintained at optimum level for patient  8/11/2019 2032 by Henry Leone RN  Outcome: Ongoing  8/11/2019 0712 by Carlitos Silveira RN  Outcome: Ongoing     Problem: Discharge Planning:  Goal: Discharged to appropriate level of care  Description  Discharged to appropriate level of care  8/11/2019 2032 by Henry Leone RN  Outcome: Ongoing  8/11/2019 0712 by Carlitos Silveira RN  Outcome: Ongoing  Goal: Participates in care planning  Description  Participates in care planning  Outcome: Ongoing     Problem: Airway Clearance - Ineffective:  Goal: Clear lung sounds  Description  Clear lung sounds  8/11/2019 2032 by Henry Leone RN  Outcome: Ongoing  8/11/2019 0712 by Carlitos Silveira RN  Outcome: Ongoing  Goal: Ability to maintain a clear airway will improve  Description  Ability to maintain a clear airway will improve  Outcome: Ongoing     Problem: Fluid Volume - Deficit:  Goal: Achieves intake and output within specified
Problem: Falls - Risk of:  Goal: Will remain free from falls  Description  Will remain free from falls  8/14/2019 1957 by Vianca Munson RN  Outcome: Ongoing  Note:   Pt will remain free of falls during shift. Fall Precautions in place. Bed locked in lowest position. Bed alarm engaged. Call light within reach. 8/14/2019 0950 by Lora Evans RN  Outcome: Ongoing  Goal: Absence of physical injury  Description  Absence of physical injury  8/14/2019 0950 by Lora Evans RN  Outcome: Ongoing     Problem: OXYGENATION/RESPIRATORY FUNCTION  Goal: Patient will maintain patent airway  8/14/2019 0950 by Lora Evans RN  Outcome: Ongoing  Goal: Patient will achieve/maintain normal respiratory rate/effort  Description  Respiratory rate and effort will be within normal limits for the patient  8/14/2019 1957 by Vianca Munson RN  Outcome: Ongoing  8/14/2019 0950 by Lora Evans RN  Outcome: Ongoing     Problem: HEMODYNAMIC STATUS  Goal: Patient has stable vital signs and fluid balance  8/14/2019 0950 by Lora Evans RN  Outcome: Ongoing     Problem: FLUID AND ELECTROLYTE IMBALANCE  Goal: Fluid and electrolyte balance are achieved/maintained  8/14/2019 1957 by Vianca Munson RN  Outcome: Ongoing  8/14/2019 0950 by Lora Evans RN  Outcome: Ongoing     Problem: ACTIVITY INTOLERANCE/IMPAIRED MOBILITY  Goal: Mobility/activity is maintained at optimum level for patient  8/14/2019 1957 by Vianca Munson RN  Outcome: Ongoing  8/14/2019 0950 by Lora Evans RN  Outcome: Ongoing     Problem: Discharge Planning:  Goal: Discharged to appropriate level of care  Description  Discharged to appropriate level of care  8/14/2019 0950 by Lora Evans RN  Outcome: Ongoing  Goal: Participates in care planning  Description  Participates in care planning  8/14/2019 0950 by Lora Evans RN  Outcome: Ongoing     Problem: Airway Clearance - Ineffective:  Goal: Clear lung
Will participate in inpatient tobacco-use cessation counseling  Description  Will participate in inpatient tobacco-use cessation counseling  Outcome: Ongoing     Problem: Breathing Pattern - Ineffective:  Goal: Ability to achieve and maintain a regular respiratory rate will improve  Description  Ability to achieve and maintain a regular respiratory rate will improve  Outcome: Ongoing
by Cindy Hyman RN  Outcome: Ongoing     Problem: Airway Clearance - Ineffective:  Goal: Clear lung sounds  Description  Clear lung sounds  8/13/2019 2017 by Cindy Hyman RN  Outcome: Ongoing  Goal: Ability to maintain a clear airway will improve  Description  Ability to maintain a clear airway will improve  8/14/2019 0950 by Lora Hair RN  Outcome: Ongoing  8/13/2019 2017 by Cindy Hyman RN  Outcome: Ongoing     Problem: Fluid Volume - Deficit:  Goal: Achieves intake and output within specified parameters  Description  Achieves intake and output within specified parameters  8/14/2019 0950 by Lora Hair RN  Outcome: Ongoing  8/13/2019 2017 by Cindy Hyman RN  Outcome: Ongoing     Problem: Gas Exchange - Impaired:  Goal: Levels of oxygenation will improve  Description  Levels of oxygenation will improve  8/13/2019 2017 by Cindy Hyman RN  Outcome: Ongoing     Problem: Hyperthermia:  Goal: Ability to maintain a body temperature in the normal range will improve  Description  Ability to maintain a body temperature in the normal range will improve  8/13/2019 2017 by Cindy Hyman RN  Outcome: Ongoing     Problem: Tobacco Use:  Goal: Will participate in inpatient tobacco-use cessation counseling  Description  Will participate in inpatient tobacco-use cessation counseling  8/13/2019 2017 by Cindy Hyman RN  Outcome: Ongoing     Problem: Breathing Pattern - Ineffective:  Goal: Ability to achieve and maintain a regular respiratory rate will improve  Description  Ability to achieve and maintain a regular respiratory rate will improve  8/13/2019 2017 by Cindy Hyman RN  Outcome: Ongoing     Problem: Pain:  Goal: Pain level will decrease  Description  Pain level will decrease  8/13/2019 2017 by Cindy Hyman RN  Outcome: Ongoing  Goal: Control of acute pain  Description  Control of acute pain  8/13/2019 2017 by Cindy Hyman RN  Outcome: Ongoing  Goal: Control of chronic

## 2019-08-14 NOTE — PROGRESS NOTES
lung base involving the right middle lobe and   medial portions of the right lower lobe likely representing atelectasis. Assessment/Plan:    Active Hospital Problems    Diagnosis    COPD exacerbation [J44.1]     Priority: High    Pulmonary nodule [R91.1]    Chronic hypoxemic respiratory failure (HCC) [J96.11]    Atrial fibrillation, chronic (HCC) [I48.2]    COPD with acute exacerbation (HCC) [J44.1]    Lung mass [R91.8]    Mediastinal lymphadenopathy [R59.0]         1. AeCOPD, acute bronchitis    - seen by pulm   -  IV solumedrol- > PO pred regimen per protocol, IV Azithro, PRN/SUKHDEV intensive NEB therapy. - procalcitonin neg .  - resp cultures growing GNR - add bactrim      2. Abnormal chest CT   -New mass like consolidations in L lung w/ suspicious nodule in R lung, mediastinal lymphadenopathy   -  Pulm c/s  - S/P  bronch with EBUS guided LN biopsy 8/12, path pending    3. Chronic respiratory failure with hypoxia, 2/2 COPD. - on Supplemental O2 to maintain SPO2 ? 92%, continuous pulse ox. Sats stable on pts home 4-5L Pulm c/s.     4. Hx atrial fib  - currently sinus. - resume Coumadin      5. Insomnia, cont home regimen.       6. Hx of CHF, appears compensated       DVT Prophylaxis: Coumadin   Diet: DIET GENERAL;  Code Status: Full Code        Dispo - cont care   improved,likely DC in AM         Sue Gipson MD    8/14/2019

## 2019-08-15 LAB
ANION GAP SERPL CALCULATED.3IONS-SCNC: 7 MMOL/L (ref 3–16)
BASOPHILS ABSOLUTE: 0.1 K/UL (ref 0–0.2)
BASOPHILS RELATIVE PERCENT: 0.6 %
BUN BLDV-MCNC: 26 MG/DL (ref 7–20)
CALCIUM SERPL-MCNC: 8.7 MG/DL (ref 8.3–10.6)
CHLORIDE BLD-SCNC: 91 MMOL/L (ref 99–110)
CO2: 41 MMOL/L (ref 21–32)
CREAT SERPL-MCNC: 0.8 MG/DL (ref 0.6–1.2)
CULTURE, RESPIRATORY: ABNORMAL
EOSINOPHILS ABSOLUTE: 0.1 K/UL (ref 0–0.6)
EOSINOPHILS RELATIVE PERCENT: 1.1 %
GFR AFRICAN AMERICAN: >60
GFR NON-AFRICAN AMERICAN: >60
GLUCOSE BLD-MCNC: 107 MG/DL (ref 70–99)
GRAM STAIN RESULT: ABNORMAL
GRAM STAIN RESULT: ABNORMAL
HCT VFR BLD CALC: 36.9 % (ref 36–48)
HEMOGLOBIN: 12.2 G/DL (ref 12–16)
INR BLD: 1.17 (ref 0.86–1.14)
LYMPHOCYTES ABSOLUTE: 3.2 K/UL (ref 1–5.1)
LYMPHOCYTES RELATIVE PERCENT: 29.4 %
MCH RBC QN AUTO: 27.8 PG (ref 26–34)
MCHC RBC AUTO-ENTMCNC: 33.2 G/DL (ref 31–36)
MCV RBC AUTO: 83.9 FL (ref 80–100)
MONOCYTES ABSOLUTE: 1 K/UL (ref 0–1.3)
MONOCYTES RELATIVE PERCENT: 9.6 %
NEUTROPHILS ABSOLUTE: 6.4 K/UL (ref 1.7–7.7)
NEUTROPHILS RELATIVE PERCENT: 59.3 %
ORGANISM: ABNORMAL
ORGANISM: ABNORMAL
PDW BLD-RTO: 14.1 % (ref 12.4–15.4)
PLATELET # BLD: 303 K/UL (ref 135–450)
PMV BLD AUTO: 6.4 FL (ref 5–10.5)
POTASSIUM SERPL-SCNC: 4 MMOL/L (ref 3.5–5.1)
PROTHROMBIN TIME: 13.3 SEC (ref 9.8–13)
RBC # BLD: 4.4 M/UL (ref 4–5.2)
SODIUM BLD-SCNC: 139 MMOL/L (ref 136–145)
WBC # BLD: 10.8 K/UL (ref 4–11)

## 2019-08-15 PROCEDURE — 94761 N-INVAS EAR/PLS OXIMETRY MLT: CPT

## 2019-08-15 PROCEDURE — 6370000000 HC RX 637 (ALT 250 FOR IP): Performed by: INTERNAL MEDICINE

## 2019-08-15 PROCEDURE — 1200000000 HC SEMI PRIVATE

## 2019-08-15 PROCEDURE — 85610 PROTHROMBIN TIME: CPT

## 2019-08-15 PROCEDURE — 80048 BASIC METABOLIC PNL TOTAL CA: CPT

## 2019-08-15 PROCEDURE — 99233 SBSQ HOSP IP/OBS HIGH 50: CPT | Performed by: INTERNAL MEDICINE

## 2019-08-15 PROCEDURE — 36415 COLL VENOUS BLD VENIPUNCTURE: CPT

## 2019-08-15 PROCEDURE — 6360000002 HC RX W HCPCS: Performed by: INTERNAL MEDICINE

## 2019-08-15 PROCEDURE — 85025 COMPLETE CBC W/AUTO DIFF WBC: CPT

## 2019-08-15 PROCEDURE — 94640 AIRWAY INHALATION TREATMENT: CPT

## 2019-08-15 PROCEDURE — 99232 SBSQ HOSP IP/OBS MODERATE 35: CPT | Performed by: INTERNAL MEDICINE

## 2019-08-15 PROCEDURE — 2580000003 HC RX 258: Performed by: INTERNAL MEDICINE

## 2019-08-15 PROCEDURE — 2700000000 HC OXYGEN THERAPY PER DAY

## 2019-08-15 PROCEDURE — 94669 MECHANICAL CHEST WALL OSCILL: CPT

## 2019-08-15 RX ORDER — WARFARIN SODIUM 5 MG/1
5 TABLET ORAL
Status: COMPLETED | OUTPATIENT
Start: 2019-08-15 | End: 2019-08-15

## 2019-08-15 RX ADMIN — IPRATROPIUM BROMIDE AND ALBUTEROL SULFATE 1 AMPULE: .5; 3 SOLUTION RESPIRATORY (INHALATION) at 13:21

## 2019-08-15 RX ADMIN — TRAZODONE HYDROCHLORIDE 50 MG: 50 TABLET ORAL at 22:14

## 2019-08-15 RX ADMIN — IPRATROPIUM BROMIDE AND ALBUTEROL SULFATE 1 AMPULE: .5; 3 SOLUTION RESPIRATORY (INHALATION) at 19:40

## 2019-08-15 RX ADMIN — Medication 10 ML: at 08:39

## 2019-08-15 RX ADMIN — MEROPENEM 1 G: 1 INJECTION, POWDER, FOR SOLUTION INTRAVENOUS at 08:39

## 2019-08-15 RX ADMIN — SULFAMETHOXAZOLE AND TRIMETHOPRIM 1 TABLET: 800; 160 TABLET ORAL at 08:39

## 2019-08-15 RX ADMIN — IPRATROPIUM BROMIDE AND ALBUTEROL SULFATE 1 AMPULE: .5; 3 SOLUTION RESPIRATORY (INHALATION) at 08:00

## 2019-08-15 RX ADMIN — TORSEMIDE 20 MG: 20 TABLET ORAL at 17:57

## 2019-08-15 RX ADMIN — Medication 10 ML: at 22:14

## 2019-08-15 RX ADMIN — VITAMIN D, TAB 1000IU (100/BT) 2000 UNITS: 25 TAB at 08:39

## 2019-08-15 RX ADMIN — WARFARIN SODIUM 5 MG: 5 TABLET ORAL at 17:57

## 2019-08-15 RX ADMIN — SPIRONOLACTONE 25 MG: 25 TABLET ORAL at 08:39

## 2019-08-15 RX ADMIN — MEROPENEM 1 G: 1 INJECTION, POWDER, FOR SOLUTION INTRAVENOUS at 23:41

## 2019-08-15 RX ADMIN — PREDNISONE 30 MG: 10 TABLET ORAL at 08:39

## 2019-08-15 RX ADMIN — MEROPENEM 1 G: 1 INJECTION, POWDER, FOR SOLUTION INTRAVENOUS at 15:47

## 2019-08-15 RX ADMIN — TORSEMIDE 20 MG: 20 TABLET ORAL at 08:39

## 2019-08-15 ASSESSMENT — PAIN DESCRIPTION - PAIN TYPE: TYPE: ACUTE PAIN

## 2019-08-15 ASSESSMENT — PAIN SCALES - GENERAL: PAINLEVEL_OUTOF10: 5

## 2019-08-15 ASSESSMENT — PAIN DESCRIPTION - LOCATION: LOCATION: BACK

## 2019-08-15 ASSESSMENT — PAIN DESCRIPTION - DESCRIPTORS: DESCRIPTORS: ACHING

## 2019-08-15 NOTE — PROGRESS NOTES
10. 8   HGB 11.3* 12.9 12.2   HCT 35.5* 40.9 36.9   MCV 86.4 86.4 83.9    346 303     BMP:   Recent Labs     08/13/19  0646 08/14/19  0709 08/15/19  0459    140 139   K 4.4 4.1 4.0   CL 94* 91* 91*   CO2 40* 40* 41*   PHOS  --  4.1  --    BUN 22* 21* 26*   CREATININE 0.7 0.7 0.8     LIVER PROFILE:   No results for input(s): AST, ALT, LIPASE, BILIDIR, BILITOT, ALKPHOS in the last 72 hours. Invalid input(s): AMYLASE,  ALB  PT/INR:   Recent Labs     08/13/19  0646 08/14/19  0709 08/15/19  0459   PROTIME 13.2* 11.9 13.3*   INR 1.16* 1.04 1.17*     APTT:   No results for input(s): APTT in the last 72 hours. UA:No results for input(s): NITRITE, COLORU, PHUR, LABCAST, WBCUA, RBCUA, MUCUS, TRICHOMONAS, YEAST, BACTERIA, CLARITYU, SPECGRAV, LEUKOCYTESUR, UROBILINOGEN, BILIRUBINUR, BLOODU, GLUCOSEU, AMORPHOUS in the last 72 hours. Invalid input(s): KETONESU  No results for input(s): PHART, QTT1LYM, PO2ART in the last 72 hours. Cultures:   8/9 blood negative  8/12 BAL gram-negative rods    Films:  Chest x-ray 8/12/2019 imaging reviewed by me and showed  Bibasilar airspace disease with no pneumothorax      CT chest 8/9/2019   New masslike areas of consolidation involving the posterior aspect of the  left lower lobe and perihilar posterior aspect of the left upper lobe. New suspicious nodule in the periphery of the right upper lobe  Chronic changes at the right lung base involving the right middle lobe and  medial portions of the right lower lobe likely representing atelectasis. Bronchoscopy 8/12/2019  A. Lymph Node, 11R, Transbronchial Fine Needle Aspirate:       -  No malignant cells identified. B. Lymph Node, Subcarinal, Transbronchial Fine Needle Aspirate:       -  No malignant cells identified. C. Lymph Node, 11L, Transbronchial Fine Needle Aspirate:       -  B-cell lymphoma.       -  Please see comment.     D. Lung, Left Lower Lobe, Brushings and Brush Tip:       -  No malignant cells identified. E. Lung, Left Lower Lobe, Bronchoalveolar Lavage:       -  No malignant cells identified. F. Lung, Bronchial Washings:       -  No malignant cells identified. ASSESSMENT:  · Severe COPD with acute exacerbation  · Gram-negative pneumoniaLLL   · Mediastinal/hilar adenopathy- EBUS 11L 8/12/2019 ? B-cell lymphoma  · LLL lung mass  On CT 8/9/2019. No bronchoscopy EBL 8/12/2019. Bx is pending   · New right-sided pulmonary nodules-rule out metastatic disease  · Chronic hypoxemic respiratory failure on 4 to 6 L O2  · CHF and chronic A. fib on Coumadin       PLAN:  · Supplemental oxygen to maintain SaO2 >92%; wean as tolerated  · Inhaled bronchodilators  · Prednisone 40 mg daily  · Add Meropenem.     · Coumadin pharmacy to dose  · Continue Lasix  · Oncology consult   · Follow up bronch results   · Discussed with pathology and biopsy still pending

## 2019-08-15 NOTE — CONSULTS
HEMATOLOGY/ONCOLOGY CONSULTATION:     8/15/2019 4:44 PM    REASON FOR CONSULT: Lymphoma    PROVIDERS:  TURNER Hanley CNP    CHIEF COMPLAINT:     Chief Complaint   Patient presents with    Shortness of Breath     Dx with pneumonia, finished ATB, followed up with PCP who sent pt here for out patient blood work after repeat xray did not show improvement. Pt decided not to have out pt blood work drawn and signed into ED as pt. Pt states she signed in because she is SOB and states she has pain on inspiration       HISTORY OF PRESENT ILLNESS:     HPI:  70 WF with COPD, hx tobacco abuse, a fib, CHF, PE. The patient was admitted with SOB and abdominal fullness. CT scan revealed a R and a L lung nodule. She had bronch and biopsy which suggests B cell lymphoma. She has had no weight loss, fever, night sweats. She has noticed no LN enlargement. PAST MEDICAL HISTORY:     Past Medical History:   Diagnosis Date    Acute exacerbation of chronic obstructive pulmonary disease (COPD) ([address]     Acute on chronic respiratory failure (HCC)     Atrial fibrillation with rapid ventricular response ([address] 6/23/2015    CAP (community acquired pneumonia) 1/29/2015    CHF (congestive heart failure) ([address]     Diverticulitis     HCAP (healthcare-associated pneumonia)     IFG (impaired fasting glucose) 9/13/2018    Panic disorder     Pulmonary embolism ([address]     6/15    [place] spotted fever 2013    Sciatic nerve pain     Sepsis ([address] 6/22/2015    Tobacco abuse 6/22/2015    Vitamin D deficiency        PAST SURGICAL HISTORY:        Past Surgical History:   Procedure Laterality Date    APPENDECTOMY      BRONCHOSCOPY N/A 8/12/2019    EBUS WF W/ANES.  performed by Edith Amaya MD at [address] [place] [address]  8/12/2019    BRONCHOSCOPY ALVEOLAR LAVAGE performed by Edith Amaya MD at [address] [place] [address]  8/12/2019    BRONCHOSCOPY BRUSHINGS performed by Edith Amaya MD at [address] [place] [address] (PROVENTIL) nebulizer solution 2.5 mg    methylPREDNISolone sodium (SOLU-MEDROL) injection 40 mg (Completed)    ipratropium-albuterol (DUONEB) nebulizer solution 1 ampule    predniSONE (DELTASONE) tablet 30 mg    Lung mass    Relevant Orders    FL LESS THAN 1 HOUR (Completed)                PLAN:     1. Lung Mass LLL, RUL  2.  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

## 2019-08-16 VITALS
HEART RATE: 91 BPM | BODY MASS INDEX: 48.84 KG/M2 | HEIGHT: 62 IN | SYSTOLIC BLOOD PRESSURE: 133 MMHG | TEMPERATURE: 97.9 F | RESPIRATION RATE: 18 BRPM | OXYGEN SATURATION: 92 % | DIASTOLIC BLOOD PRESSURE: 52 MMHG | WEIGHT: 265.4 LBS

## 2019-08-16 LAB
ANION GAP SERPL CALCULATED.3IONS-SCNC: 8 MMOL/L (ref 3–16)
BASOPHILS ABSOLUTE: 0 K/UL (ref 0–0.2)
BASOPHILS RELATIVE PERCENT: 0 %
BUN BLDV-MCNC: 30 MG/DL (ref 7–20)
CALCIUM SERPL-MCNC: 9.2 MG/DL (ref 8.3–10.6)
CHLORIDE BLD-SCNC: 86 MMOL/L (ref 99–110)
CO2: 38 MMOL/L (ref 21–32)
CREAT SERPL-MCNC: 1 MG/DL (ref 0.6–1.2)
EOSINOPHILS ABSOLUTE: 0 K/UL (ref 0–0.6)
EOSINOPHILS RELATIVE PERCENT: 0 %
GFR AFRICAN AMERICAN: >60
GFR NON-AFRICAN AMERICAN: 54
GLUCOSE BLD-MCNC: 177 MG/DL (ref 70–99)
HCT VFR BLD CALC: 37.8 % (ref 36–48)
HEMOGLOBIN: 12.2 G/DL (ref 12–16)
INR BLD: 1.48 (ref 0.86–1.14)
LYMPHOCYTES ABSOLUTE: 3.7 K/UL (ref 1–5.1)
LYMPHOCYTES RELATIVE PERCENT: 33 %
MCH RBC QN AUTO: 27.5 PG (ref 26–34)
MCHC RBC AUTO-ENTMCNC: 32.3 G/DL (ref 31–36)
MCV RBC AUTO: 85.1 FL (ref 80–100)
MONOCYTES ABSOLUTE: 0.4 K/UL (ref 0–1.3)
MONOCYTES RELATIVE PERCENT: 4 %
NEUTROPHILS ABSOLUTE: 7.1 K/UL (ref 1.7–7.7)
NEUTROPHILS RELATIVE PERCENT: 63 %
PDW BLD-RTO: 14 % (ref 12.4–15.4)
PLATELET # BLD: 330 K/UL (ref 135–450)
PLATELET SLIDE REVIEW: ADEQUATE
PMV BLD AUTO: 6.6 FL (ref 5–10.5)
POTASSIUM SERPL-SCNC: 3.7 MMOL/L (ref 3.5–5.1)
PROTHROMBIN TIME: 16.9 SEC (ref 9.8–13)
RBC # BLD: 4.44 M/UL (ref 4–5.2)
SLIDE REVIEW: ABNORMAL
SODIUM BLD-SCNC: 132 MMOL/L (ref 136–145)
WBC # BLD: 11.2 K/UL (ref 4–11)

## 2019-08-16 PROCEDURE — 85025 COMPLETE CBC W/AUTO DIFF WBC: CPT

## 2019-08-16 PROCEDURE — 80048 BASIC METABOLIC PNL TOTAL CA: CPT

## 2019-08-16 PROCEDURE — 99233 SBSQ HOSP IP/OBS HIGH 50: CPT | Performed by: INTERNAL MEDICINE

## 2019-08-16 PROCEDURE — 6370000000 HC RX 637 (ALT 250 FOR IP): Performed by: INTERNAL MEDICINE

## 2019-08-16 PROCEDURE — 99238 HOSP IP/OBS DSCHRG MGMT 30/<: CPT | Performed by: INTERNAL MEDICINE

## 2019-08-16 PROCEDURE — 94761 N-INVAS EAR/PLS OXIMETRY MLT: CPT

## 2019-08-16 PROCEDURE — 94669 MECHANICAL CHEST WALL OSCILL: CPT

## 2019-08-16 PROCEDURE — 36415 COLL VENOUS BLD VENIPUNCTURE: CPT

## 2019-08-16 PROCEDURE — 94640 AIRWAY INHALATION TREATMENT: CPT

## 2019-08-16 PROCEDURE — 85610 PROTHROMBIN TIME: CPT

## 2019-08-16 PROCEDURE — 2700000000 HC OXYGEN THERAPY PER DAY

## 2019-08-16 RX ORDER — PREDNISONE 10 MG/1
TABLET ORAL
Qty: 18 TABLET | Refills: 0 | Status: SHIPPED | OUTPATIENT
Start: 2019-08-16 | End: 2019-09-09

## 2019-08-16 RX ORDER — SULFAMETHOXAZOLE AND TRIMETHOPRIM 800; 160 MG/1; MG/1
1 TABLET ORAL EVERY 12 HOURS SCHEDULED
Status: DISCONTINUED | OUTPATIENT
Start: 2019-08-16 | End: 2019-08-16 | Stop reason: HOSPADM

## 2019-08-16 RX ORDER — SULFAMETHOXAZOLE AND TRIMETHOPRIM 800; 160 MG/1; MG/1
1 TABLET ORAL EVERY 12 HOURS SCHEDULED
Qty: 20 TABLET | Refills: 0 | Status: SHIPPED | OUTPATIENT
Start: 2019-08-16 | End: 2019-08-26

## 2019-08-16 RX ORDER — LACTOBACILLUS RHAMNOSUS GG 10B CELL
1 CAPSULE ORAL 2 TIMES DAILY
Qty: 30 CAPSULE | Refills: 0 | Status: ON HOLD | OUTPATIENT
Start: 2019-08-16 | End: 2020-06-16 | Stop reason: HOSPADM

## 2019-08-16 RX ORDER — WARFARIN SODIUM 2.5 MG/1
2.5 TABLET ORAL
Status: DISCONTINUED | OUTPATIENT
Start: 2019-08-16 | End: 2019-08-16 | Stop reason: HOSPADM

## 2019-08-16 RX ADMIN — PREDNISONE 30 MG: 10 TABLET ORAL at 09:40

## 2019-08-16 RX ADMIN — SULFAMETHOXAZOLE AND TRIMETHOPRIM 1 TABLET: 800; 160 TABLET ORAL at 09:40

## 2019-08-16 RX ADMIN — SPIRONOLACTONE 25 MG: 25 TABLET ORAL at 09:40

## 2019-08-16 RX ADMIN — VITAMIN D, TAB 1000IU (100/BT) 2000 UNITS: 25 TAB at 09:40

## 2019-08-16 RX ADMIN — IPRATROPIUM BROMIDE AND ALBUTEROL SULFATE 1 AMPULE: .5; 3 SOLUTION RESPIRATORY (INHALATION) at 11:53

## 2019-08-16 RX ADMIN — TORSEMIDE 20 MG: 20 TABLET ORAL at 09:40

## 2019-08-16 NOTE — DISCHARGE SUMMARY
input(s): BNP in the last 72 hours. PT/INR:   Recent Labs     08/14/19  0709 08/15/19  0459 08/16/19  0529   PROTIME 11.9 13.3* 16.9*   INR 1.04 1.17* 1.48*     APTT: No results for input(s): APTT in the last 72 hours. CARDIAC ENZYMES: No results for input(s): CKMB, CKMBINDEX, TROPONINI in the last 72 hours. Invalid input(s): CKTOTAL;3  FASTING LIPID PANEL:  Lab Results   Component Value Date    CHOL 228 (H) 04/11/2019    HDL 57 04/11/2019    TRIG 163 (H) 04/11/2019        Radiology:  XR CHEST PORTABLE   Final Result   Bilateral airspace changes corresponding to recent bronchoscopy. There is no   evidence of pneumothorax.           FL LESS THAN 1 HOUR   Final Result   Intraprocedural fluoroscopic spot images as above. See separate procedure   report for more information.           XR CHEST STANDARD (2 VW)   Final Result   Unchanged appearance of the chest over the past 24 hours, including a   masslike area of disease in the left lung base.           CT CHEST W CONTRAST   Final Result   New masslike areas of consolidation involving the posterior aspect of the   left lower lobe and perihilar posterior aspect of the left upper lobe. Bronchoscopy could be considered as clinically indicated.       New suspicious nodule in the periphery of the right upper lobe. Differential   includes primary and metastatic malignancies as well as infectious and   inflammatory processes.       Chronic changes at the right lung base involving the right middle lobe and   medial portions of the right lower lobe likely representing atelectasis.               Respiratory cultures growing gram-negative rods- > moderate growth stenotrophomonas        Bronchoscopy with lymph node biopsy  Medical cytology   - > 1 out of 6 lymph nodes were positive for B-cell lymphoma  FINAL DIAGNOSIS:       A. Lymph Node, 11R, Transbronchial Fine Needle Aspirate:       -  No malignant cells identified.       B.  Lymph Node, Subcarinal, Transbronchial No bruits; no thyromegaly or nodules

## 2019-08-16 NOTE — PROGRESS NOTES
Pulmonary Progress Note    CC: Lung mass    Subjective:   Appears comfortable  Home O2 4 L  No sputum production    IV line:      Intake/Output Summary (Last 24 hours) at 8/16/2019 0716  Last data filed at 8/15/2019 1829  Gross per 24 hour   Intake 720 ml   Output 2200 ml   Net -1480 ml       Exam:   BP (!) 142/65   Pulse 90   Temp 97.5 °F (36.4 °C) (Oral)   Resp 18   Ht 5' 2\" (1.575 m)   Wt 284 lb 12.8 oz (129.2 kg)   LMP  (LMP Unknown)   SpO2 95%   BMI 52.09 kg/m²  on 4 L  Gen: No distress. Alert. Eyes: PERRL. No sclera icterus. No conjunctival injection. ENT: No discharge. Pharynx clear. Neck: Trachea midline. Normal thyroid. Resp: No accessory muscle use. Basilar crackles. Minimal wheezes. No rhonchi. No dullness on percussion. CV: Regular rate. Regular rhythm. No murmur or rub. 1+ bilateral lower extremity edema. GI: Non-tender. Non-distended. No masses. No organomegaly. Normal bowel sounds. No hernia. Skin: Warm and dry. No nodule on exposed extremities. No rash on exposed extremities. Lymph: No cervical LAD. No supraclavicular LAD. M/S: No cyanosis. No joint deformity. No clubbing. Neuro: Awake. Moves all extremities. CN grossly intact. Psych: Oriented x 3. No anxiety or agitation.      Scheduled Meds:   meropenem  1 g Intravenous Q8H    ipratropium-albuterol  1 ampule Inhalation TID    predniSONE  30 mg Oral Daily with breakfast    warfarin (COUMADIN) daily dosing (placeholder)   Other RX Placeholder    vitamin D  2,000 Units Oral Daily    traZODone  50 mg Oral Nightly    torsemide  20 mg Oral BID    spironolactone  25 mg Oral Daily    sodium chloride flush  10 mL Intravenous 2 times per day     Continuous Infusions:    PRN Meds:  melatonin, albuterol sulfate HFA, albuterol, busPIRone, sodium chloride flush, magnesium hydroxide, ondansetron, acetaminophen    Labs:  CBC:   Recent Labs     08/14/19  0709 08/15/19  0459 08/16/19  0528   WBC 11.0 10.8 11.2*   HGB 12.9 12.2

## 2019-08-16 NOTE — PROGRESS NOTES
Discharge instructions reviewed with patient and son at bedside. Patient verbalizes understanding and denies any questions. Prescriptions given to son at this time. Patient requesting a standing weight prior to discharge. Patient states she has all her belongings. Patient will call out when she is ready to leave.  Jamila Pascal

## 2019-08-17 ENCOUNTER — CARE COORDINATION (OUTPATIENT)
Dept: CASE MANAGEMENT | Age: 79
End: 2019-08-17

## 2019-08-17 NOTE — CARE COORDINATION
[facility] Transitions Initial Follow Up Call    Call within 2 business days of discharge: Yes    Patient: Booker Zepeda Patient : 1940   MRN: [id number]  Reason for Admission: COPD  Discharge Date: 19 RARS: Readmission Risk Score: 20      Last Discharge [facility]       Complaint Diagnosis Description Type Department Provider    19 Shortness of Breath COPD exacerbation ([address] . .. ED to Hosp-Admission (Discharged) (ADMITTED) [facility] 2 Emery Cole MD; Tory Davis. .. Spoke with: patient [facility]    Non-face-to-face services provided:  Obtained and reviewed discharge summary and/or continuity of care documents  Communication with home health agencies or other community services the patient is currently [facility] review    Care Transitions 24 Hour Call    Do you have any ongoing symptoms?:  Yes  Patient-reported symptoms:  Fatigue  Do you have a copy of your discharge instructions?:  Yes  Do you have all of your prescriptions and are they filled?:  Yes  Have you been contacted by a [address]?:  No  Have you scheduled your follow up appointment?:  No  Were you discharged with any Home Care or Post Acute Services:  Yes  Post Acute Services:  Home Health (Comment: [facility])  Patient DME:  Sharla cane  Patient Home Equipment:  Nebulizer, Oxygen  Do you have support at home?:  Alone  Do you feel like you have everything you need to keep you well at home?:  Yes  Are you an active caregiver in your home?:  No  Care Transitions Interventions     Spoke with Crow Arreola at [facility], stated patient scheduled today for [facility] visit. Patient reports that she's tired. Says breathing is \"normal for her\", states it's better than it was when she went into the hospital.  Using 02 cont at 4l/m. Has used 1 nebulizer tx since home, says they help but they make her feel jittery so she really doesn't like having to use them.   Said she has an occ NP

## 2019-08-20 ENCOUNTER — CARE COORDINATION (OUTPATIENT)
Dept: CASE MANAGEMENT | Age: 79
End: 2019-08-20

## 2019-08-21 ENCOUNTER — OFFICE VISIT (OUTPATIENT)
Dept: PULMONOLOGY | Age: 79
End: 2019-08-21
Payer: MEDICARE

## 2019-08-21 VITALS
OXYGEN SATURATION: 96 % | RESPIRATION RATE: 16 BRPM | WEIGHT: 265 LBS | SYSTOLIC BLOOD PRESSURE: 138 MMHG | HEART RATE: 74 BPM | DIASTOLIC BLOOD PRESSURE: 68 MMHG | BODY MASS INDEX: 48.76 KG/M2 | HEIGHT: 62 IN

## 2019-08-21 DIAGNOSIS — C83.02 SMALL B-CELL LYMPHOMA OF INTRATHORACIC LYMPH NODES (HCC): ICD-10-CM

## 2019-08-21 DIAGNOSIS — J44.9 COPD, SEVERE (HCC): Primary | ICD-10-CM

## 2019-08-21 DIAGNOSIS — J96.11 CHRONIC RESPIRATORY FAILURE WITH HYPOXIA (HCC): ICD-10-CM

## 2019-08-21 PROCEDURE — G8427 DOCREV CUR MEDS BY ELIG CLIN: HCPCS | Performed by: INTERNAL MEDICINE

## 2019-08-21 PROCEDURE — 1123F ACP DISCUSS/DSCN MKR DOCD: CPT | Performed by: INTERNAL MEDICINE

## 2019-08-21 PROCEDURE — G8926 SPIRO NO PERF OR DOC: HCPCS | Performed by: INTERNAL MEDICINE

## 2019-08-21 PROCEDURE — 1111F DSCHRG MED/CURRENT MED MERGE: CPT | Performed by: INTERNAL MEDICINE

## 2019-08-21 PROCEDURE — G8417 CALC BMI ABV UP PARAM F/U: HCPCS | Performed by: INTERNAL MEDICINE

## 2019-08-21 PROCEDURE — 1090F PRES/ABSN URINE INCON ASSESS: CPT | Performed by: INTERNAL MEDICINE

## 2019-08-21 PROCEDURE — 4040F PNEUMOC VAC/ADMIN/RCVD: CPT | Performed by: INTERNAL MEDICINE

## 2019-08-21 PROCEDURE — 1036F TOBACCO NON-USER: CPT | Performed by: INTERNAL MEDICINE

## 2019-08-21 PROCEDURE — 3023F SPIROM DOC REV: CPT | Performed by: INTERNAL MEDICINE

## 2019-08-21 PROCEDURE — 99214 OFFICE O/P EST MOD 30 MIN: CPT | Performed by: INTERNAL MEDICINE

## 2019-08-21 PROCEDURE — G8598 ASA/ANTIPLAT THER USED: HCPCS | Performed by: INTERNAL MEDICINE

## 2019-08-21 PROCEDURE — G8400 PT W/DXA NO RESULTS DOC: HCPCS | Performed by: INTERNAL MEDICINE

## 2019-08-21 NOTE — PROGRESS NOTES
[facility]    Outpatient Follow Up Note    CHIEF COMPLAINT : Dyspnea    HPI:  The patient is a 71 yo woman with hx of tobacco use, COPD and ILD, PE in 6/15. She has a 50 pack year smoking history. Was in [facility] in 6/15 for bilateral pulmonary emboli. Since last clinic visit, the patient is admitted to the hospital and found to have a left lung mass and mediastinal adenopathy. She underwent bronchoscopy with biopsy revealing B cell lymphoma. She has some mild discomfort in her upper abdomen but denies any shortness of breath or cough at present. There are no changes to past medical history, family history, social history or review of systems(except as noted in the history section) since prior note (all reviewed with patient). Current Medications:    Current Outpatient Medications:     predniSONE (DELTASONE) 10 MG tablet, Take 3 tabs a day for 3 days, Then 2 tabs a day for 3 days , Then 1 tab a day for 3 days. , Disp: 18 tablet, Rfl: 0    sulfamethoxazole-trimethoprim (BACTRIM DS;SEPTRA DS) 800-160 MG per tablet, Take 1 tablet by mouth every 12 hours for 10 days, Disp: 20 tablet, Rfl: 0    lactobacillus (CULTURELLE) capsule, Take 1 capsule by mouth 2 times daily, Disp: 30 capsule, Rfl: 0    warfarin (COUMADIN) 5 MG tablet, TAKE ONE TABLET BY MOUTH DAILY, Disp: 60 tablet, Rfl: 0    busPIRone (BUSPAR) 10 MG tablet, Take 10 mg by mouth as needed , Disp: , Rfl:     Acetaminophen (TYLENOL ARTHRITIS PAIN PO), Take by mouth, Disp: , Rfl:     torsemide (DEMADEX) 20 MG tablet, Take 1 tablet by mouth 2 times daily, Disp: 60 tablet, Rfl: 6    spironolactone (ALDACTONE) 25 MG tablet, Take 1 tablet by mouth daily, Disp: 30 tablet, Rfl: 6    ezetimibe (ZETIA) 10 MG tablet, Take 1 tablet by mouth daily, Disp: 30 tablet, Rfl: 3    albuterol (PROVENTIL) (2.5 MG/3ML) 0.083% nebulizer solution, Take 3 mLs by nebulization every 6 hours as needed for Wheezing or Shortness of Breath DX COPD J44.9, Disp: 360 mL, Rfl: 5    albuterol sulfate HFA (VENTOLIN HFA) 108 (90 Base) MCG/ACT inhaler, Inhale 2 puffs into the lungs every 6 hours as needed for Wheezing, Disp: 2 Inhaler, Rfl: 3    traZODone (DESYREL) 50 MG tablet, Take 1 tablet by mouth nightly, Disp: 30 tablet, Rfl: 1    melatonin 3 MG TABS tablet, Take 1 tablet by mouth nightly as needed (insomnia), Disp: 15 tablet, Rfl: 0    OXYGEN, 4.5 L, Disp: , Rfl:     Cholecalciferol (VITAMIN D-3 PO), Take 2,000 Units by mouth daily , Disp: , Rfl:         Objective:   PHYSICAL EXAM:        VITALS:    /68   Pulse 74   Resp 16   Ht 5' 2\" (1.575 m)   Wt 265 lb (120.2 kg)   LMP  (LMP Unknown)   SpO2 96% Comment: 4.5L of 02  BMI 48.47 kg/m²     Gen: In no acute distress. Alert. Obese. Normocephalic, atraumatic. Eyes: PERRL. No sclera icterus. No conjunctival injection. ENT: No ocular or auricular discharge. Oropharynx clear. Neck: Trachea midline. Normal thyroid. Resp: No accessory muscle use. No crackles. No wheezes. No rhonchi. No dullness on percussion. Poor air movement. CV: Regular rate. Regular rhythm. No murmur or rub. Normal S1 and S2.  +1-2 bilateral LE edema  GI: Abdomen non-tender. Non-distended. Soft. NTND. Skin: Warm and dry. No nodules on exposed extremities. No rash on exposed extremities. Lymph: No cervical LAD. No supraclavicular LAD. M/S: No cyanosis. No synovitis or joint deformity. No clubbing. Neuro: Cranial nerves are grossly intact. Moving all extremities. Motor and sensation grossly intact. Psych: Oriented x 3. No anxiety or agitation.        DATA:    LABS:     PFTs 4/30/12  FVC 1.45 (55%) FEV1 1.03 (56%)  FEV1/FVC ratio 71% TLC 4.61 (105%) DLCO 27.5 (159%, ?error), + BD    PFTs 2/27/15  FVC 1.02 (38%) FEV1 0.76 (38%) FEV1/FVC ratio  75%  TLC 5.04 (107 %) DLCO 16.17 (78%)  + BD   6MWT 560 feet, reqd 4L for low sat 84%    12/31/15 160 feet, required 3L for sat 81%      8/12/19:     FINAL was probably reactive   lymphadenopathy. New recurrent linear atelectasis anterior medial aspect left upper   lobe similar to exam of March 2012. Small amount of parenchymal   scarring within the lingular segment and medial aspect right   middle lobe. Chest XR (12/30/12)1. Improving bibasilar airspace disease, representing either   atelectasis or pneumonia. 2. Interval improvement in appearance of pulmonary vascular   congestion. 3. Stable bilateral hilar lymphadenopathy. Chest CT(dated 12/29/12): 1. Limited but negative study for the presence of pulmonary   embolism. 2. Multifocal groundglass infiltrate predominantly in the left   lung, not present on the comparison CT chest from March 29, 2012,   nonspecific but may represent multifocal pneumonitis. 3. Small area of consolidation and possible pneumonia in the   lingula. 4. New hilar and mediastinal adenopathy. 5. Mild enlargement of the main pulmonary artery, stable from   March 2012, suggestive of chronic pulmonary arterial hypertension        ONO2 5/20/13 showed significant nocturnal hypoxemia    Assessment:   -Chronic Obstructive Pulmonary Disease severe  -Chronic Respiratory Failure - hypoxemic, Stable  - Dyspnea-severe  -Erythrocytosis- likely secondary to chronic hypoxemia; myeloproliferative disease also possible, f/b Hem/Onc previously  - bilateral PEs 6/15  - obesity- persistent  LE edema  - B cell lymphoma in chest    Plan:     - Inhaled bronchodilator therapy -  Albuterol mdi and  nebs prn. Could not afford spiriva, tudorza- did not tolerate because of vision changes  - Patient is up to date with Pneumococcal vaccine   - Continue home O2 at night 4l and 6Lwith exertion,- OCD for supplemental O2 ;  Based upon repeat 6MWT.    - has handicap placard  - recommend lifelong anticoagulation- remains on coumadin  - We discussed diet and limited exercise as she is able  - had flu vaccine last year  - PET scan soon  - Oncology follow up  -

## 2019-08-22 ENCOUNTER — TELEPHONE (OUTPATIENT)
Dept: PULMONOLOGY | Age: 79
End: 2019-08-22

## 2019-08-22 ENCOUNTER — ANTI-COAG VISIT (OUTPATIENT)
Dept: FAMILY MEDICINE CLINIC | Age: 79
End: 2019-08-22

## 2019-08-22 LAB — INR BLD: 2.4

## 2019-08-22 NOTE — TELEPHONE ENCOUNTER
I don't think an inhaled steroid would necessarily benefit this patient. Please inform [employer].

## 2019-08-23 ENCOUNTER — OFFICE VISIT (OUTPATIENT)
Dept: FAMILY MEDICINE CLINIC | Age: 79
End: 2019-08-23
Payer: MEDICARE

## 2019-08-23 VITALS
DIASTOLIC BLOOD PRESSURE: 68 MMHG | TEMPERATURE: 98.3 F | OXYGEN SATURATION: 92 % | WEIGHT: 268 LBS | SYSTOLIC BLOOD PRESSURE: 118 MMHG | HEART RATE: 75 BPM | HEIGHT: 62 IN | BODY MASS INDEX: 49.32 KG/M2

## 2019-08-23 DIAGNOSIS — J96.11 CHRONIC HYPOXEMIC RESPIRATORY FAILURE (HCC): ICD-10-CM

## 2019-08-23 DIAGNOSIS — C83.02 SMALL B-CELL LYMPHOMA OF INTRATHORACIC LYMPH NODES (HCC): ICD-10-CM

## 2019-08-23 DIAGNOSIS — A49.8 INFECTION DUE TO STENOTROPHOMONAS MALTOPHILIA: ICD-10-CM

## 2019-08-23 DIAGNOSIS — J44.1 COPD EXACERBATION (HCC): Primary | ICD-10-CM

## 2019-08-23 DIAGNOSIS — R91.8 LUNG MASS: ICD-10-CM

## 2019-08-23 DIAGNOSIS — R91.1 PULMONARY NODULE: ICD-10-CM

## 2019-08-23 DIAGNOSIS — R59.0 MEDIASTINAL LYMPHADENOPATHY: ICD-10-CM

## 2019-08-23 DIAGNOSIS — J15.6 GRAM-NEGATIVE PNEUMONIA (HCC): ICD-10-CM

## 2019-08-23 DIAGNOSIS — Z09 HOSPITAL DISCHARGE FOLLOW-UP: ICD-10-CM

## 2019-08-23 PROCEDURE — 1111F DSCHRG MED/CURRENT MED MERGE: CPT | Performed by: NURSE PRACTITIONER

## 2019-08-23 PROCEDURE — 99496 TRANSJ CARE MGMT HIGH F2F 7D: CPT | Performed by: NURSE PRACTITIONER

## 2019-08-23 NOTE — PATIENT INSTRUCTIONS
the lymphoma. It is usually treated with medicines called chemotherapy. Your doctor may also give you medicines that work on the body's immune system (immunotherapy). You may also need radiation treatments or a procedure called a bone marrow transplant. Your doctor will talk to you about what kind of treatment may be best for you. When you find out that you have cancer, you may feel many emotions and may need some help coping. Seek out family, friends, and counselors for support. You also can do things at home to make yourself feel better while you go through treatment. Call the [employer] (2-343.143.1111) or visit its website at [address] for more information. Follow-up care is a key part of your treatment and safety. Be sure to make and go to all appointments, and call your doctor if you are having problems. It's also a good idea to know your test results and keep a list of the medicines you take. How can you care for yourself at home? · Take your medicines exactly as prescribed. Call your doctor if you think you are having a problem with your medicine. You may get medicine for nausea and vomiting if you have these side effects. · Eat healthy food. If you do not feel like eating, try to eat food that has protein and extra calories to keep up your strength and prevent weight loss. Drink liquid meal replacements for extra calories and protein. Try to eat your main meal early. · Get some physical activity every day, but do not get too tired. Keep doing the hobbies you enjoy as your energy allows. · Take steps to control your stress and workload. Learn relaxation techniques. ? Share your feelings. Stress and tension affect our emotions. By expressing your feelings to others, you may be able to understand and cope with them. ? Consider joining a support group.  Talking about a problem with your spouse, a good friend, or other people with similar problems is a good way to reduce tension and

## 2019-08-26 ENCOUNTER — CARE COORDINATION (OUTPATIENT)
Dept: CASE MANAGEMENT | Age: 79
End: 2019-08-26

## 2019-08-26 ENCOUNTER — ANTI-COAG VISIT (OUTPATIENT)
Dept: FAMILY MEDICINE CLINIC | Age: 79
End: 2019-08-26

## 2019-08-26 LAB — INR BLD: 3.5

## 2019-08-27 ENCOUNTER — ANTI-COAG VISIT (OUTPATIENT)
Dept: FAMILY MEDICINE CLINIC | Age: 79
End: 2019-08-27

## 2019-08-27 ENCOUNTER — TELEPHONE (OUTPATIENT)
Dept: FAMILY MEDICINE CLINIC | Age: 79
End: 2019-08-27

## 2019-08-27 LAB — INR BLD: 3.1

## 2019-08-28 NOTE — TELEPHONE ENCOUNTER
Elena Timmons, [facility] verbalizing okay to continue skilled nursing.

## 2019-08-29 ENCOUNTER — HOSPITAL ENCOUNTER (OUTPATIENT)
Dept: PET IMAGING | Age: 79
Discharge: HOME | End: 2019-08-29
Payer: MEDICARE

## 2019-08-29 VITALS — BODY MASS INDEX: 49.69 KG/M2 | WEIGHT: 270 LBS | HEIGHT: 62 IN

## 2019-08-29 DIAGNOSIS — C82.12 FOLLICULAR LYMPHOMA GRADE II OF INTRATHORACIC LYMPH NODES (HCC): ICD-10-CM

## 2019-08-29 PROCEDURE — 78815 PET IMAGE W/CT SKULL-THIGH: CPT

## 2019-08-29 PROCEDURE — A9552 F18 FDG: HCPCS | Performed by: INTERNAL MEDICINE

## 2019-08-29 PROCEDURE — 3430000000 HC RX DIAGNOSTIC RADIOPHARMACEUTICAL: Performed by: INTERNAL MEDICINE

## 2019-08-29 RX ORDER — FLUDEOXYGLUCOSE F 18 200 MCI/ML
14.2 INJECTION, SOLUTION INTRAVENOUS
Status: COMPLETED | OUTPATIENT
Start: 2019-08-29 | End: 2019-08-29

## 2019-08-29 RX ADMIN — FLUDEOXYGLUCOSE F 18 14.2 MILLICURIE: 200 INJECTION, SOLUTION INTRAVENOUS at 12:13

## 2019-08-30 ENCOUNTER — ANTI-COAG VISIT (OUTPATIENT)
Dept: FAMILY MEDICINE CLINIC | Age: 79
End: 2019-08-30

## 2019-08-30 LAB — INR BLD: 1.8

## 2019-09-03 ENCOUNTER — ANTI-COAG VISIT (OUTPATIENT)
Dept: FAMILY MEDICINE CLINIC | Age: 79
End: 2019-09-03

## 2019-09-03 LAB — INR BLD: 2.3

## 2019-09-04 ENCOUNTER — CARE COORDINATION (OUTPATIENT)
Dept: CASE MANAGEMENT | Age: 79
End: 2019-09-04

## 2019-09-06 PROBLEM — C85.90 NON-HODGKIN LYMPHOMA (HCC): Status: ACTIVE | Noted: 2019-09-06

## 2019-09-09 ENCOUNTER — ANTI-COAG VISIT (OUTPATIENT)
Dept: FAMILY MEDICINE CLINIC | Age: 79
End: 2019-09-09

## 2019-09-09 ENCOUNTER — CARE COORDINATION (OUTPATIENT)
Dept: CARE COORDINATION | Age: 79
End: 2019-09-09

## 2019-09-09 LAB — INR BLD: 2.8

## 2019-09-11 NOTE — PROGRESS NOTES
Dextrose]      Rash,itching    Zocor [Simvastatin]     Codeine Itching and Nausea And Vomiting    Pravastatin Nausea And Vomiting       Medications listed as ordered at the time of discharge from [place], [place]   Home Medication Instructions [facility]:    Printed on:19 7231   Medication Information                      Acetaminophen (TYLENOL ARTHRITIS PAIN PO)  Take by mouth             albuterol (PROVENTIL) (2.5 MG/3ML) 0.083% nebulizer solution  Take 3 mLs by nebulization every 6 hours as needed for Wheezing or Shortness of Breath DX COPD J44.9             albuterol sulfate HFA (VENTOLIN HFA) 108 (90 Base) MCG/ACT inhaler  Inhale 2 puffs into the lungs every 6 hours as needed for Wheezing             busPIRone (BUSPAR) 10 MG tablet  Take 10 mg by mouth as needed              Cholecalciferol (VITAMIN D-3 PO)  Take 2,000 Units by mouth daily              ezetimibe (ZETIA) 10 MG tablet  Take 1 tablet by mouth daily             lactobacillus (CULTURELLE) capsule  Take 1 capsule by mouth 2 times daily             melatonin 3 MG TABS tablet  Take 1 tablet by mouth nightly as needed (insomnia)             OXYGEN  4.5 L             spironolactone (ALDACTONE) 25 MG tablet  Take 1 tablet by mouth daily             torsemide (DEMADEX) 20 MG tablet  Take 1 tablet by mouth 2 times daily             traZODone (DESYREL) 50 MG tablet  Take 1 tablet by mouth nightly             warfarin (COUMADIN) 5 MG tablet  TAKE ONE TABLET BY MOUTH DAILY                   Medications marked \"taking\" at this time  Outpatient Medications Marked as Taking for the 19 encounter (Office Visit) with TURNER Brown CNP   Medication Sig Dispense Refill    [] sulfamethoxazole-trimethoprim (BACTRIM DS;SEPTRA DS) 800-160 MG per tablet Take 1 tablet by mouth every 12 hours for 10 days 20 tablet 0    lactobacillus (CULTURELLE) capsule Take 1 capsule by mouth 2 times daily 30 capsule 0    [DISCONTINUED]

## 2019-09-16 ENCOUNTER — ANTI-COAG VISIT (OUTPATIENT)
Dept: FAMILY MEDICINE CLINIC | Age: 79
End: 2019-09-16

## 2019-09-16 LAB
FUNGUS (MYCOLOGY) CULTURE: NORMAL
FUNGUS (MYCOLOGY) CULTURE: NORMAL
FUNGUS STAIN: NORMAL
FUNGUS STAIN: NORMAL
INR BLD: 2.3

## 2019-09-23 ENCOUNTER — ANTI-COAG VISIT (OUTPATIENT)
Dept: FAMILY MEDICINE CLINIC | Age: 79
End: 2019-09-23

## 2019-09-23 LAB — INR BLD: 1.4

## 2019-09-24 ENCOUNTER — ANESTHESIA EVENT (OUTPATIENT)
Dept: ENDOSCOPY | Age: 79
End: 2019-09-24
Payer: MEDICARE

## 2019-09-24 ASSESSMENT — COPD QUESTIONNAIRES: CAT_SEVERITY: SEVERE

## 2019-09-24 NOTE — ANESTHESIA PRE PROCEDURE
Department of Anesthesiology  Preprocedure Note       Name:  Yvonne Radford   Age:  70 y.o.  :  1940                                          MRN:  [id number]         Date:  2019      Surgeon:  Mehrdad Shah MD    Procedure: COLON W/ANES. (8:30) PT IS ON OXYGEN , & HAS PANIC ATTACKS (N/A )    HPI:  This is a 69 y/o Female with multiple medical problems including O2-dependent COPD, atrial fib, non-Hodgkins Lymphoma and panic attacks who presents for a colonoscopy. Patient had a PET Scan for the NHL and a \"hot spot\" was seen on the scan.     Medications prior to admission:    lactobacillus (CULTURELLE) capsule Take 1 capsule by mouth 2 times daily   warfarin (COUMADIN) 5 MG tablet TAKE ONE TABLET BY MOUTH DAILY   busPIRone (BUSPAR) 10 MG tablet Take 10 mg by mouth as needed    Acetaminophen (TYLENOL ARTHRITIS PAIN PO) Take by mouth   torsemide (DEMADEX) 20 MG tablet Take 1 tablet by mouth 2 times daily   spironolactone (ALDACTONE) 25 MG tablet Take 1 tablet by mouth daily   ezetimibe (ZETIA) 10 MG tablet Take 1 tablet by mouth daily   albuterol (PROVENTIL)  solution Take by nebulization every 6 hours as needed for Wheezing or Shortness of Breath   albuterol sulfate  inhaler Inhale 2 puffs  every 6 hours as needed for Wheezing   traZODone (DESYREL) 50 MG tablet Take 1 tablet by mouth nightly   melatonin 3 MG TABS tablet Take 1 tablet by mouth nightly as needed (insomnia)   OXYGEN 4.5 L   Cholecalciferol (VITAMIN D-3 PO) Take 2,000 Units by mouth daily      Allergies:      Crestor [Rosuvastatin]      Muscle aches    Levofloxacin Other (See Comments)     Achiness    Lipitor     Rocephin [Ceftriaxone Sodium In Dextrose]      Rash,itching    Zocor [Simvastatin]     Codeine Itching and Nausea And Vomiting    Pravastatin Nausea And Vomiting     Problem List:     Chronic respiratory failure with hypoxia (HCC)    Anxiety    Hyperlipidemia    Diverticulosis    COPD, severe

## 2019-09-25 ENCOUNTER — ANESTHESIA (OUTPATIENT)
Dept: ENDOSCOPY | Age: 79
End: 2019-09-25
Payer: MEDICARE

## 2019-09-25 ENCOUNTER — HOSPITAL ENCOUNTER (OUTPATIENT)
Age: 79
Setting detail: OUTPATIENT SURGERY
Discharge: HOME | End: 2019-09-25
Attending: INTERNAL MEDICINE | Admitting: INTERNAL MEDICINE
Payer: MEDICARE

## 2019-09-25 ENCOUNTER — APPOINTMENT (OUTPATIENT)
Dept: GENERAL RADIOLOGY | Age: 79
End: 2019-09-25
Attending: INTERNAL MEDICINE
Payer: MEDICARE

## 2019-09-25 VITALS
TEMPERATURE: 96.5 F | BODY MASS INDEX: 50.61 KG/M2 | HEART RATE: 78 BPM | OXYGEN SATURATION: 96 % | RESPIRATION RATE: 16 BRPM | WEIGHT: 275 LBS | DIASTOLIC BLOOD PRESSURE: 81 MMHG | SYSTOLIC BLOOD PRESSURE: 161 MMHG | HEIGHT: 62 IN

## 2019-09-25 VITALS
SYSTOLIC BLOOD PRESSURE: 133 MMHG | RESPIRATION RATE: 15 BRPM | OXYGEN SATURATION: 96 % | DIASTOLIC BLOOD PRESSURE: 69 MMHG

## 2019-09-25 PROCEDURE — 2709999900 HC NON-CHARGEABLE SUPPLY: Performed by: INTERNAL MEDICINE

## 2019-09-25 PROCEDURE — 3700000001 HC ADD 15 MINUTES (ANESTHESIA): Performed by: INTERNAL MEDICINE

## 2019-09-25 PROCEDURE — 7100000011 HC PHASE II RECOVERY - ADDTL 15 MIN: Performed by: INTERNAL MEDICINE

## 2019-09-25 PROCEDURE — 2580000003 HC RX 258: Performed by: NURSE ANESTHETIST, CERTIFIED REGISTERED

## 2019-09-25 PROCEDURE — 3700000000 HC ANESTHESIA ATTENDED CARE: Performed by: INTERNAL MEDICINE

## 2019-09-25 PROCEDURE — 6360000004 HC RX CONTRAST MEDICATION: Performed by: RADIOLOGY

## 2019-09-25 PROCEDURE — 88305 TISSUE EXAM BY PATHOLOGIST: CPT

## 2019-09-25 PROCEDURE — 7100000010 HC PHASE II RECOVERY - FIRST 15 MIN: Performed by: INTERNAL MEDICINE

## 2019-09-25 PROCEDURE — 74280 X-RAY XM COLON 2CNTRST STD: CPT

## 2019-09-25 PROCEDURE — 3609010600 HC COLONOSCOPY POLYPECTOMY SNARE/COLD BIOPSY: Performed by: INTERNAL MEDICINE

## 2019-09-25 PROCEDURE — 6360000002 HC RX W HCPCS: Performed by: INTERNAL MEDICINE

## 2019-09-25 PROCEDURE — 6360000002 HC RX W HCPCS: Performed by: NURSE ANESTHETIST, CERTIFIED REGISTERED

## 2019-09-25 RX ORDER — HYDROMORPHONE HCL 110MG/55ML
1 PATIENT CONTROLLED ANALGESIA SYRINGE INTRAVENOUS ONCE
Status: DISCONTINUED | OUTPATIENT
Start: 2019-09-25 | End: 2019-09-25 | Stop reason: HOSPADM

## 2019-09-25 RX ORDER — HYDROMORPHONE HCL 110MG/55ML
1 PATIENT CONTROLLED ANALGESIA SYRINGE INTRAVENOUS ONCE
Status: DISCONTINUED | OUTPATIENT
Start: 2019-09-25 | End: 2019-09-25

## 2019-09-25 RX ORDER — SODIUM CHLORIDE 0.9 % (FLUSH) 0.9 %
10 SYRINGE (ML) INJECTION PRN
Status: DISCONTINUED | OUTPATIENT
Start: 2019-09-25 | End: 2019-09-25 | Stop reason: HOSPADM

## 2019-09-25 RX ORDER — LIDOCAINE HYDROCHLORIDE 10 MG/ML
0.3 INJECTION, SOLUTION EPIDURAL; INFILTRATION; INTRACAUDAL; PERINEURAL
Status: DISCONTINUED | OUTPATIENT
Start: 2019-09-25 | End: 2019-09-25 | Stop reason: HOSPADM

## 2019-09-25 RX ORDER — SODIUM CHLORIDE, SODIUM LACTATE, POTASSIUM CHLORIDE, CALCIUM CHLORIDE 600; 310; 30; 20 MG/100ML; MG/100ML; MG/100ML; MG/100ML
INJECTION, SOLUTION INTRAVENOUS CONTINUOUS
Status: DISCONTINUED | OUTPATIENT
Start: 2019-09-25 | End: 2019-09-25 | Stop reason: HOSPADM

## 2019-09-25 RX ORDER — SODIUM CHLORIDE, SODIUM LACTATE, POTASSIUM CHLORIDE, CALCIUM CHLORIDE 600; 310; 30; 20 MG/100ML; MG/100ML; MG/100ML; MG/100ML
INJECTION, SOLUTION INTRAVENOUS CONTINUOUS PRN
Status: DISCONTINUED | OUTPATIENT
Start: 2019-09-25 | End: 2019-09-25 | Stop reason: SDUPTHER

## 2019-09-25 RX ORDER — SODIUM CHLORIDE 0.9 % (FLUSH) 0.9 %
10 SYRINGE (ML) INJECTION EVERY 12 HOURS SCHEDULED
Status: DISCONTINUED | OUTPATIENT
Start: 2019-09-25 | End: 2019-09-25 | Stop reason: HOSPADM

## 2019-09-25 RX ORDER — PROPOFOL 10 MG/ML
INJECTION, EMULSION INTRAVENOUS PRN
Status: DISCONTINUED | OUTPATIENT
Start: 2019-09-25 | End: 2019-09-25 | Stop reason: SDUPTHER

## 2019-09-25 RX ADMIN — PROPOFOL 20 MG: 10 INJECTION, EMULSION INTRAVENOUS at 08:51

## 2019-09-25 RX ADMIN — PROPOFOL 20 MG: 10 INJECTION, EMULSION INTRAVENOUS at 09:19

## 2019-09-25 RX ADMIN — PROPOFOL 20 MG: 10 INJECTION, EMULSION INTRAVENOUS at 08:59

## 2019-09-25 RX ADMIN — PROPOFOL 20 MG: 10 INJECTION, EMULSION INTRAVENOUS at 09:01

## 2019-09-25 RX ADMIN — PROPOFOL 20 MG: 10 INJECTION, EMULSION INTRAVENOUS at 09:09

## 2019-09-25 RX ADMIN — PROPOFOL 10 MG: 10 INJECTION, EMULSION INTRAVENOUS at 09:17

## 2019-09-25 RX ADMIN — IOHEXOL 200 ML: 240 INJECTION, SOLUTION INTRATHECAL; INTRAVASCULAR; INTRAVENOUS; ORAL at 12:36

## 2019-09-25 RX ADMIN — PROPOFOL 10 MG: 10 INJECTION, EMULSION INTRAVENOUS at 08:53

## 2019-09-25 RX ADMIN — HYDROMORPHONE HYDROCHLORIDE 1 MG: 2 INJECTION, SOLUTION INTRAMUSCULAR; INTRAVENOUS; SUBCUTANEOUS at 10:08

## 2019-09-25 RX ADMIN — PROPOFOL 20 MG: 10 INJECTION, EMULSION INTRAVENOUS at 09:03

## 2019-09-25 RX ADMIN — SODIUM CHLORIDE, POTASSIUM CHLORIDE, SODIUM LACTATE AND CALCIUM CHLORIDE: 600; 310; 30; 20 INJECTION, SOLUTION INTRAVENOUS at 08:46

## 2019-09-25 RX ADMIN — PROPOFOL 20 MG: 10 INJECTION, EMULSION INTRAVENOUS at 09:11

## 2019-09-25 RX ADMIN — PROPOFOL 20 MG: 10 INJECTION, EMULSION INTRAVENOUS at 09:07

## 2019-09-25 RX ADMIN — PROPOFOL 20 MG: 10 INJECTION, EMULSION INTRAVENOUS at 09:05

## 2019-09-25 RX ADMIN — PROPOFOL 10 MG: 10 INJECTION, EMULSION INTRAVENOUS at 09:15

## 2019-09-25 RX ADMIN — PROPOFOL 20 MG: 10 INJECTION, EMULSION INTRAVENOUS at 08:57

## 2019-09-25 RX ADMIN — PROPOFOL 10 MG: 10 INJECTION, EMULSION INTRAVENOUS at 08:55

## 2019-09-25 RX ADMIN — PROPOFOL 50 MG: 10 INJECTION, EMULSION INTRAVENOUS at 08:49

## 2019-09-25 RX ADMIN — PROPOFOL 10 MG: 10 INJECTION, EMULSION INTRAVENOUS at 09:13

## 2019-09-25 ASSESSMENT — ENCOUNTER SYMPTOMS: SHORTNESS OF BREATH: 1

## 2019-09-25 ASSESSMENT — PAIN SCALES - GENERAL
PAINLEVEL_OUTOF10: 0
PAINLEVEL_OUTOF10: 9

## 2019-09-25 ASSESSMENT — LIFESTYLE VARIABLES: SMOKING_STATUS: 0

## 2019-09-25 NOTE — OP NOTE
[address] [place] [address]   9/25/2019  Colonoscopy  A pre-procedure re-evaluation was performed immediately prior to the procedure.   Preprocedure Dx: positive PET scan  Postprocedure Dx: sigmoid strciture, sigmoid polyp, poor prep, diverticulosis  Medications: Procedural sedation with MAC  Complications: None  Estimated Blood Loss: <5cc  Specimens: were obtained  Recommendations: await path, schedule barium enema  Mary Beth Blade

## 2019-09-25 NOTE — H&P
Unknown)   SpO2 96%   BMI 50.30 kg/m²    Airway: Mallampati: II (soft palate, uvula, fauces visible)  Pulmonary:Normal  Cardiac:Normal  Abdomen:Normal    Pre-Procedure Assessment / Plan:  ASA: Class 3 - A patient with severe systemic disease that limits activity but is not incapacitating  Level of Sedation Plan: Moderate sedation  Post Procedure plan: Return to same level of care    I assessed the patient and find that the patient is in satisfactory condition to proceed with the planned procedure and sedation plan. I have explained the risk, benefits, and alternatives to the procedure; the patient understands and agrees to proceed.        Arianna Yang  9/25/2019

## 2019-09-30 ENCOUNTER — ANTI-COAG VISIT (OUTPATIENT)
Dept: FAMILY MEDICINE CLINIC | Age: 79
End: 2019-09-30

## 2019-09-30 LAB — INR BLD: 1.1

## 2019-10-01 LAB
AFB CULTURE (MYCOBACTERIA): NORMAL
AFB CULTURE (MYCOBACTERIA): NORMAL
AFB SMEAR: NORMAL
AFB SMEAR: NORMAL

## 2019-10-02 ENCOUNTER — ANTI-COAG VISIT (OUTPATIENT)
Dept: FAMILY MEDICINE CLINIC | Age: 79
End: 2019-10-02

## 2019-10-02 LAB — INR BLD: 1.7

## 2019-10-04 ENCOUNTER — ANTI-COAG VISIT (OUTPATIENT)
Dept: FAMILY MEDICINE CLINIC | Age: 79
End: 2019-10-04

## 2019-10-04 LAB — INR BLD: 2.3

## 2019-10-07 ENCOUNTER — ANTI-COAG VISIT (OUTPATIENT)
Dept: FAMILY MEDICINE CLINIC | Age: 79
End: 2019-10-07

## 2019-10-07 LAB — INR BLD: 2.9

## 2019-10-11 ENCOUNTER — ANTI-COAG VISIT (OUTPATIENT)
Dept: FAMILY MEDICINE CLINIC | Age: 79
End: 2019-10-11

## 2019-10-11 LAB — INR BLD: 2.4

## 2019-10-14 ENCOUNTER — OFFICE VISIT (OUTPATIENT)
Dept: PULMONOLOGY | Age: 79
End: 2019-10-14
Payer: MEDICARE

## 2019-10-14 VITALS
HEART RATE: 95 BPM | BODY MASS INDEX: 50.05 KG/M2 | DIASTOLIC BLOOD PRESSURE: 62 MMHG | OXYGEN SATURATION: 91 % | TEMPERATURE: 98.2 F | WEIGHT: 272 LBS | SYSTOLIC BLOOD PRESSURE: 147 MMHG | HEIGHT: 62 IN | RESPIRATION RATE: 16 BRPM

## 2019-10-14 DIAGNOSIS — C83.02 SMALL B-CELL LYMPHOMA OF INTRATHORACIC LYMPH NODES (HCC): ICD-10-CM

## 2019-10-14 DIAGNOSIS — J44.9 COPD, SEVERE (HCC): Primary | ICD-10-CM

## 2019-10-14 DIAGNOSIS — J96.11 CHRONIC RESPIRATORY FAILURE WITH HYPOXIA (HCC): ICD-10-CM

## 2019-10-14 PROCEDURE — 3023F SPIROM DOC REV: CPT | Performed by: INTERNAL MEDICINE

## 2019-10-14 PROCEDURE — G8598 ASA/ANTIPLAT THER USED: HCPCS | Performed by: INTERNAL MEDICINE

## 2019-10-14 PROCEDURE — 1036F TOBACCO NON-USER: CPT | Performed by: INTERNAL MEDICINE

## 2019-10-14 PROCEDURE — G8926 SPIRO NO PERF OR DOC: HCPCS | Performed by: INTERNAL MEDICINE

## 2019-10-14 PROCEDURE — G8484 FLU IMMUNIZE NO ADMIN: HCPCS | Performed by: INTERNAL MEDICINE

## 2019-10-14 PROCEDURE — 4040F PNEUMOC VAC/ADMIN/RCVD: CPT | Performed by: INTERNAL MEDICINE

## 2019-10-14 PROCEDURE — 99214 OFFICE O/P EST MOD 30 MIN: CPT | Performed by: INTERNAL MEDICINE

## 2019-10-14 PROCEDURE — 1090F PRES/ABSN URINE INCON ASSESS: CPT | Performed by: INTERNAL MEDICINE

## 2019-10-14 PROCEDURE — G8400 PT W/DXA NO RESULTS DOC: HCPCS | Performed by: INTERNAL MEDICINE

## 2019-10-14 PROCEDURE — 1123F ACP DISCUSS/DSCN MKR DOCD: CPT | Performed by: INTERNAL MEDICINE

## 2019-10-14 PROCEDURE — G8427 DOCREV CUR MEDS BY ELIG CLIN: HCPCS | Performed by: INTERNAL MEDICINE

## 2019-10-14 PROCEDURE — G8417 CALC BMI ABV UP PARAM F/U: HCPCS | Performed by: INTERNAL MEDICINE

## 2019-10-16 ENCOUNTER — CARE COORDINATION (OUTPATIENT)
Dept: CARE COORDINATION | Age: 79
End: 2019-10-16

## 2019-10-22 ENCOUNTER — ANTI-COAG VISIT (OUTPATIENT)
Dept: FAMILY MEDICINE CLINIC | Age: 79
End: 2019-10-22

## 2019-10-22 LAB — INR BLD: 2.5

## 2019-10-25 ENCOUNTER — TELEPHONE (OUTPATIENT)
Dept: SURGERY | Age: 79
End: 2019-10-25

## 2019-11-01 ENCOUNTER — TELEPHONE (OUTPATIENT)
Dept: FAMILY MEDICINE CLINIC | Age: 79
End: 2019-11-01

## 2019-11-01 ENCOUNTER — OFFICE VISIT (OUTPATIENT)
Dept: SURGERY | Age: 79
End: 2019-11-01
Payer: MEDICARE

## 2019-11-01 VITALS
WEIGHT: 268 LBS | HEIGHT: 62 IN | SYSTOLIC BLOOD PRESSURE: 138 MMHG | DIASTOLIC BLOOD PRESSURE: 70 MMHG | BODY MASS INDEX: 49.32 KG/M2

## 2019-11-01 DIAGNOSIS — N82.4 COLOVAGINAL FISTULA: Primary | ICD-10-CM

## 2019-11-01 PROBLEM — I73.9 PVD (PERIPHERAL VASCULAR DISEASE) WITH CLAUDICATION (HCC): Status: ACTIVE | Noted: 2019-11-01

## 2019-11-01 PROCEDURE — G8417 CALC BMI ABV UP PARAM F/U: HCPCS | Performed by: SURGERY

## 2019-11-01 PROCEDURE — G8484 FLU IMMUNIZE NO ADMIN: HCPCS | Performed by: SURGERY

## 2019-11-01 PROCEDURE — G8427 DOCREV CUR MEDS BY ELIG CLIN: HCPCS | Performed by: SURGERY

## 2019-11-01 PROCEDURE — 99204 OFFICE O/P NEW MOD 45 MIN: CPT | Performed by: SURGERY

## 2019-11-01 PROCEDURE — 1090F PRES/ABSN URINE INCON ASSESS: CPT | Performed by: SURGERY

## 2019-11-04 ENCOUNTER — ANTI-COAG VISIT (OUTPATIENT)
Dept: FAMILY MEDICINE CLINIC | Age: 79
End: 2019-11-04

## 2019-11-04 LAB — INR BLD: 2.1

## 2019-11-12 RX ORDER — WARFARIN SODIUM 5 MG/1
TABLET ORAL
Qty: 60 TABLET | Refills: 0 | Status: SHIPPED | OUTPATIENT
Start: 2019-11-12 | End: 2020-01-23

## 2019-11-18 ENCOUNTER — CARE COORDINATION (OUTPATIENT)
Dept: CARE COORDINATION | Age: 79
End: 2019-11-18

## 2019-11-19 LAB — INR BLD: 4.4

## 2019-11-20 ENCOUNTER — ANTI-COAG VISIT (OUTPATIENT)
Dept: FAMILY MEDICINE CLINIC | Age: 79
End: 2019-11-20

## 2019-11-21 ENCOUNTER — ANTI-COAG VISIT (OUTPATIENT)
Dept: FAMILY MEDICINE CLINIC | Age: 79
End: 2019-11-21

## 2019-11-21 LAB — INR BLD: 2

## 2019-11-27 ENCOUNTER — ANTI-COAG VISIT (OUTPATIENT)
Dept: FAMILY MEDICINE CLINIC | Age: 79
End: 2019-11-27

## 2019-11-27 ENCOUNTER — TELEPHONE (OUTPATIENT)
Dept: FAMILY MEDICINE CLINIC | Age: 79
End: 2019-11-27

## 2019-11-27 LAB
INR BLD: 2.9
INR BLD: 2.9

## 2019-12-02 ENCOUNTER — TELEPHONE (OUTPATIENT)
Dept: FAMILY MEDICINE CLINIC | Age: 79
End: 2019-12-02

## 2019-12-02 ENCOUNTER — ANTI-COAG VISIT (OUTPATIENT)
Dept: FAMILY MEDICINE CLINIC | Age: 79
End: 2019-12-02

## 2019-12-02 LAB — INR BLD: 3.5

## 2019-12-04 ENCOUNTER — ANTI-COAG VISIT (OUTPATIENT)
Dept: FAMILY MEDICINE CLINIC | Age: 79
End: 2019-12-04

## 2019-12-04 LAB — INR BLD: 1.9

## 2019-12-10 ENCOUNTER — ANTI-COAG VISIT (OUTPATIENT)
Dept: FAMILY MEDICINE CLINIC | Age: 79
End: 2019-12-10

## 2019-12-10 LAB — INR BLD: 1.5

## 2019-12-11 ENCOUNTER — ANTI-COAG VISIT (OUTPATIENT)
Dept: FAMILY MEDICINE CLINIC | Age: 79
End: 2019-12-11

## 2019-12-11 LAB — INR BLD: 1.6

## 2019-12-13 ENCOUNTER — ANTI-COAG VISIT (OUTPATIENT)
Dept: FAMILY MEDICINE CLINIC | Age: 79
End: 2019-12-13

## 2019-12-13 LAB — INR BLD: 4

## 2019-12-16 ENCOUNTER — ANTI-COAG VISIT (OUTPATIENT)
Dept: FAMILY MEDICINE CLINIC | Age: 79
End: 2019-12-16

## 2019-12-16 LAB — INR BLD: 2.3

## 2019-12-18 ENCOUNTER — OFFICE VISIT (OUTPATIENT)
Dept: CARDIOLOGY CLINIC | Age: 79
End: 2019-12-18
Payer: MEDICARE

## 2019-12-18 ENCOUNTER — TELEPHONE (OUTPATIENT)
Dept: CARDIOLOGY CLINIC | Age: 79
End: 2019-12-18

## 2019-12-18 VITALS
HEART RATE: 100 BPM | OXYGEN SATURATION: 95 % | BODY MASS INDEX: 48.6 KG/M2 | SYSTOLIC BLOOD PRESSURE: 122 MMHG | HEIGHT: 62 IN | DIASTOLIC BLOOD PRESSURE: 50 MMHG | WEIGHT: 264.1 LBS

## 2019-12-18 DIAGNOSIS — I48.0 PAROXYSMAL ATRIAL FIBRILLATION WITH RVR (HCC): ICD-10-CM

## 2019-12-18 DIAGNOSIS — E78.5 HYPERLIPIDEMIA, UNSPECIFIED HYPERLIPIDEMIA TYPE: ICD-10-CM

## 2019-12-18 DIAGNOSIS — I73.9 PVD (PERIPHERAL VASCULAR DISEASE) WITH CLAUDICATION (HCC): ICD-10-CM

## 2019-12-18 DIAGNOSIS — I51.89 DIASTOLIC DYSFUNCTION: Primary | ICD-10-CM

## 2019-12-18 PROCEDURE — G8427 DOCREV CUR MEDS BY ELIG CLIN: HCPCS | Performed by: INTERNAL MEDICINE

## 2019-12-18 PROCEDURE — 1090F PRES/ABSN URINE INCON ASSESS: CPT | Performed by: INTERNAL MEDICINE

## 2019-12-18 PROCEDURE — G8598 ASA/ANTIPLAT THER USED: HCPCS | Performed by: INTERNAL MEDICINE

## 2019-12-18 PROCEDURE — 99214 OFFICE O/P EST MOD 30 MIN: CPT | Performed by: INTERNAL MEDICINE

## 2019-12-18 PROCEDURE — 1036F TOBACCO NON-USER: CPT | Performed by: INTERNAL MEDICINE

## 2019-12-18 PROCEDURE — 4040F PNEUMOC VAC/ADMIN/RCVD: CPT | Performed by: INTERNAL MEDICINE

## 2019-12-18 PROCEDURE — G8400 PT W/DXA NO RESULTS DOC: HCPCS | Performed by: INTERNAL MEDICINE

## 2019-12-18 PROCEDURE — 1123F ACP DISCUSS/DSCN MKR DOCD: CPT | Performed by: INTERNAL MEDICINE

## 2019-12-18 PROCEDURE — G8417 CALC BMI ABV UP PARAM F/U: HCPCS | Performed by: INTERNAL MEDICINE

## 2019-12-18 PROCEDURE — G8484 FLU IMMUNIZE NO ADMIN: HCPCS | Performed by: INTERNAL MEDICINE

## 2019-12-18 RX ORDER — SPIRONOLACTONE 25 MG/1
25 TABLET ORAL DAILY
Qty: 30 TABLET | Refills: 11 | Status: SHIPPED | OUTPATIENT
Start: 2019-12-18 | End: 2020-09-15 | Stop reason: SDUPTHER

## 2019-12-18 RX ORDER — TORSEMIDE 20 MG/1
20 TABLET ORAL 2 TIMES DAILY
Qty: 60 TABLET | Refills: 11 | Status: ON HOLD
Start: 2019-12-18 | End: 2020-06-17 | Stop reason: HOSPADM

## 2019-12-18 NOTE — TELEPHONE ENCOUNTER
Pt saw Eli Levy today at [facility] wanted to know the name of the chemo treatment pt will be having. They are  Bendamustine and Rituximab.

## 2019-12-19 NOTE — DISCHARGE INSTR - DIET

## 2019-12-23 ENCOUNTER — ANTI-COAG VISIT (OUTPATIENT)
Dept: FAMILY MEDICINE CLINIC | Age: 79
End: 2019-12-23

## 2019-12-23 LAB — INR BLD: 4.4

## 2019-12-26 ENCOUNTER — ANTI-COAG VISIT (OUTPATIENT)
Dept: FAMILY MEDICINE CLINIC | Age: 79
End: 2019-12-26

## 2019-12-26 LAB — INR BLD: 2.2

## 2020-01-03 ENCOUNTER — ANTI-COAG VISIT (OUTPATIENT)
Dept: FAMILY MEDICINE CLINIC | Age: 80
End: 2020-01-03

## 2020-01-03 LAB — INR BLD: 2

## 2020-01-09 ENCOUNTER — TELEPHONE (OUTPATIENT)
Dept: FAMILY MEDICINE CLINIC | Age: 80
End: 2020-01-09

## 2020-01-09 RX ORDER — BUSPIRONE HYDROCHLORIDE 10 MG/1
10 TABLET ORAL PRN
Qty: 30 TABLET | Refills: 0 | OUTPATIENT
Start: 2020-01-09

## 2020-01-09 RX ORDER — BUSPIRONE HYDROCHLORIDE 10 MG/1
10 TABLET ORAL 3 TIMES DAILY PRN
Qty: 60 TABLET | Refills: 1 | Status: SHIPPED | OUTPATIENT
Start: 2020-01-09 | End: 2020-02-08

## 2020-01-10 ENCOUNTER — TELEPHONE (OUTPATIENT)
Dept: CARDIOLOGY CLINIC | Age: 80
End: 2020-01-10

## 2020-01-10 NOTE — TELEPHONE ENCOUNTER
[address] [place] [address], 1940    Cardiac Risk Assessment    What type of procedure are you having?:   PORT PLACEMENT    When is your procedure scheduled for?:  TBD (POSS 1.17.20)    What physician is performing your procedure?:  DR. SPARKS [facility]    Phone Number:  [phone]    Fax number to send the letter:  CAN BE VIEWED IN EPIC    PT IS ON COUMADIN WILL SHE NEED TO BE BRIDGED? LAST OV 12.18.19    Assessment:  1. Diastolic dysfunction    2. Paroxysmal atrial fibrillation with RVR ([facility]    3. Hyperlipidemia, unspecified hyperlipidemia type    4. PVD (peripheral vascular disease) with claudication ([address]     4. B cell lymphoma     Plan:  1. Meds reviewed. Refills as warranted   2. No changes today. Continue current medications   3. Follow up with me in 6 months   4. I assured her that lymphoma is curable and drugs may or may not affect heart, but will monitor side effects.   This note was scribed in the presence of Johnson Brice MD by Taya Butler RN

## 2020-01-13 ENCOUNTER — ANTI-COAG VISIT (OUTPATIENT)
Dept: FAMILY MEDICINE CLINIC | Age: 80
End: 2020-01-13

## 2020-01-13 ENCOUNTER — TELEPHONE (OUTPATIENT)
Dept: SURGERY | Age: 80
End: 2020-01-13

## 2020-01-13 LAB — INR BLD: 2.9

## 2020-01-13 NOTE — TELEPHONE ENCOUNTER
Called and spoke with pt, advised Port insertion has been scheduled for this Friday 01/17/2020, 10am arrival time, for noon surgery, advised NPO after midnight, she needs to start holding her Coumadin today per cardiology. I reviewed her chart, medications and allergies, gave directions for surgery.

## 2020-01-14 ENCOUNTER — OFFICE VISIT (OUTPATIENT)
Dept: PULMONOLOGY | Age: 80
End: 2020-01-14
Payer: MEDICARE

## 2020-01-14 VITALS
HEART RATE: 95 BPM | BODY MASS INDEX: 47.84 KG/M2 | RESPIRATION RATE: 18 BRPM | SYSTOLIC BLOOD PRESSURE: 126 MMHG | TEMPERATURE: 98.4 F | OXYGEN SATURATION: 96 % | WEIGHT: 260 LBS | HEIGHT: 62 IN | DIASTOLIC BLOOD PRESSURE: 58 MMHG

## 2020-01-14 PROCEDURE — 4040F PNEUMOC VAC/ADMIN/RCVD: CPT | Performed by: INTERNAL MEDICINE

## 2020-01-14 PROCEDURE — G8482 FLU IMMUNIZE ORDER/ADMIN: HCPCS | Performed by: INTERNAL MEDICINE

## 2020-01-14 PROCEDURE — 1036F TOBACCO NON-USER: CPT | Performed by: INTERNAL MEDICINE

## 2020-01-14 PROCEDURE — G8400 PT W/DXA NO RESULTS DOC: HCPCS | Performed by: INTERNAL MEDICINE

## 2020-01-14 PROCEDURE — G8926 SPIRO NO PERF OR DOC: HCPCS | Performed by: INTERNAL MEDICINE

## 2020-01-14 PROCEDURE — 3023F SPIROM DOC REV: CPT | Performed by: INTERNAL MEDICINE

## 2020-01-14 PROCEDURE — 1123F ACP DISCUSS/DSCN MKR DOCD: CPT | Performed by: INTERNAL MEDICINE

## 2020-01-14 PROCEDURE — G8427 DOCREV CUR MEDS BY ELIG CLIN: HCPCS | Performed by: INTERNAL MEDICINE

## 2020-01-14 PROCEDURE — G8417 CALC BMI ABV UP PARAM F/U: HCPCS | Performed by: INTERNAL MEDICINE

## 2020-01-14 PROCEDURE — 99214 OFFICE O/P EST MOD 30 MIN: CPT | Performed by: INTERNAL MEDICINE

## 2020-01-14 PROCEDURE — 1090F PRES/ABSN URINE INCON ASSESS: CPT | Performed by: INTERNAL MEDICINE

## 2020-01-14 NOTE — PROGRESS NOTES
[facility]    Outpatient Follow Up Note    CHIEF COMPLAINT : Dyspnea    HPI:  The patient is a 69 yo woman with hx of tobacco use, COPD and ILD, PE in 6/15. She has a 50 pack year smoking history. Was in [facility] in 6/15 for bilateral pulmonary emboli. In 8/19,  patient is admitted to the hospital and found to have a left lung mass and mediastinal adenopathy. She underwent bronchoscopy with biopsy revealing B cell lymphoma. Since last clinic visit, the patient has been doing okay. She is planning to have a port placed in anticipation of chemotherapy for her lymphoma. She is wearing her oxygen around-the-clock. Her breathing has been stable. No recent exacerbations. She was told to hold her Coumadin until the surgery in 3 days. There are no changes to past medical history, family history, social history or review of systems(except as noted in the history section) since prior note (all reviewed with patient).     Current Medications:    Current Outpatient Medications:     busPIRone (BUSPAR) 10 MG tablet, Take 1 tablet by mouth 3 times daily as needed (anxiety), Disp: 60 tablet, Rfl: 1    torsemide (DEMADEX) 20 MG tablet, Take 1 tablet by mouth 2 times daily, Disp: 60 tablet, Rfl: 11    spironolactone (ALDACTONE) 25 MG tablet, Take 1 tablet by mouth daily, Disp: 30 tablet, Rfl: 11    warfarin (COUMADIN) 5 MG tablet, TAKE ONE TABLET BY MOUTH DAILY, Disp: 60 tablet, Rfl: 0    lactobacillus (CULTURELLE) capsule, Take 1 capsule by mouth 2 times daily, Disp: 30 capsule, Rfl: 0    Acetaminophen (TYLENOL ARTHRITIS PAIN PO), Take by mouth, Disp: , Rfl:     albuterol (PROVENTIL) (2.5 MG/3ML) 0.083% nebulizer solution, Take 3 mLs by nebulization every 6 hours as needed for Wheezing or Shortness of Breath DX COPD J44.9, Disp: 360 mL, Rfl: 5    albuterol sulfate HFA (VENTOLIN HFA) 108 (90 Base) MCG/ACT inhaler, Inhale 2 puffs into the lungs every 6 hours as needed for Wheezing, Disp: 2 Inhaler, Rfl: 3    traZODone (DESYREL) 50 MG tablet, Take 1 tablet by mouth nightly, Disp: 30 tablet, Rfl: 1    melatonin 3 MG TABS tablet, Take 1 tablet by mouth nightly as needed (insomnia), Disp: 15 tablet, Rfl: 0    OXYGEN, 4.5 L, Disp: , Rfl:     Cholecalciferol (VITAMIN D-3 PO), Take 2,000 Units by mouth daily , Disp: , Rfl:         Objective:   PHYSICAL EXAM:        VITALS:    BP (!) 126/58   Pulse 95   Temp 98.4 °F (36.9 °C) (Oral)   Resp 18   Ht 5' 2\" (1.575 m)   Wt 260 lb (117.9 kg)   LMP  (LMP Unknown)   SpO2 96% Comment: 4. 5LPM  BMI 47.55 kg/m²     Gen: In no acute distress. Alert. Obese. Normocephalic, atraumatic. Comfortable. Eyes: PERRL. No sclera icterus. No conjunctival injection. ENT: No ocular or auricular discharge. Oropharynx clear. Neck: Trachea midline. Normal thyroid. Resp: No accessory muscle use. No crackles. No wheezes. No rhonchi. No dullness on percussion. Poor air movement. CV: Regular rate. Regular rhythm. No murmur or rub. Normal S1 and S2.  +1 bilateral LE edema  GI: Abdomen non-tender. Non-distended. Soft. NTND. Skin: Warm and dry. No nodules on exposed extremities. No rash on exposed extremities. Lymph: No cervical LAD. No supraclavicular LAD. M/S: No cyanosis. No synovitis or joint deformity. No clubbing. Neuro: Cranial nerves are grossly intact. Moving all extremities. Motor and sensation grossly intact. Psych: Oriented x 3. No anxiety or agitation. DATA:    LABS:     PFTs 4/30/12  FVC 1.45 (55%) FEV1 1.03 (56%)  FEV1/FVC ratio 71% TLC 4.61 (105%) DLCO 27.5 (159%, ?error), + BD    PFTs 2/27/15  FVC 1.02 (38%) FEV1 0.76 (38%) FEV1/FVC ratio  75%  TLC 5.04 (107 %) DLCO 16.17 (78%)  + BD   6MWT 560 feet, reqd 4L for low sat 84%    12/31/15 160 feet, required 3L for sat 81%      8/12/19:     FINAL DIAGNOSIS:       A. Lymph Node, 11R, Transbronchial Fine Needle Aspirate:       -  No malignant cells identified.       B.  Lymph adenopathy. 5. Mild enlargement of the main pulmonary artery, stable from   March 2012, suggestive of chronic pulmonary arterial hypertension        ONO2 5/20/13 showed significant nocturnal hypoxemia    Assessment:   -Chronic Obstructive Pulmonary Disease severe  -Chronic Respiratory Failure - hypoxemic, Stable  - Dyspnea-severe  -Erythrocytosis- likely secondary to chronic hypoxemia; myeloproliferative disease also possible, f/b Hem/Onc previously  - bilateral PEs 6/15  - obesity- persistent  LE edema  - B cell lymphoma in chest  -Preoperative pulmonary evaluation    Plan:     - Inhaled bronchodilator therapy -  Albuterol mdi and  nebs prn. Could not afford spiriva, tudorza- did not tolerate because of vision changes  - Patient is up to date with Pneumococcal vaccine   - Continue home O2 at night 4l and 6Lwith exertion,- OCD for supplemental O2 ;  Based upon repeat 6MWT. - has handicap placard  - recommend lifelong anticoagulation- remains on coumadin  - We discussed diet and limited exercise as she is able  - had flu vaccine last year  - Oncology follow up- considering chemotherapy soon  - Patient may proceed to the operating room from pulmonary perspective without further testing. Because of underlying disease, the patient is at increased risk of moon-operative complications, including atelectasis, pneumonia and, when general anesthesia is required, failure to liberate from mechanical ventilation. However, this should not preclude the patient from having the recommended operation. It is my recommendation that the patient's inhaled bronchodilators be continued in the perioperative period and that short acting bronchodilators  be used on a prn basis as well. -I think it is reasonable to bridge the patient with subcutaneous Lovenox while she is holding her Coumadin given her recurrent venous thromboembolism.   Patient plans to touch base with Dr. Tee Reusing office to see if this is what the plan is.  -All of this was discussed with the patient today in the office.

## 2020-01-15 ENCOUNTER — ANESTHESIA EVENT (OUTPATIENT)
Dept: OPERATING ROOM | Age: 80
End: 2020-01-15
Payer: MEDICARE

## 2020-01-15 ENCOUNTER — TELEPHONE (OUTPATIENT)
Dept: SURGERY | Age: 80
End: 2020-01-15

## 2020-01-15 ASSESSMENT — COPD QUESTIONNAIRES: CAT_SEVERITY: MODERATE

## 2020-01-15 ASSESSMENT — ENCOUNTER SYMPTOMS: SHORTNESS OF BREATH: 1

## 2020-01-15 NOTE — PROGRESS NOTES

## 2020-01-15 NOTE — ANESTHESIA PRE PROCEDURE
Department of Anesthesiology  Preprocedure Note       Name:  Missy Saldana   Age:  70 y.o.  :  1940                                          MRN:  [id number]         Date:  2020      Surgeon: Darcy Gaytan MD    Procedure: PORT INSERTION (N/A )    HPI:  HPI:  The patient is a 69 yo woman with hx of tobacco use, COPD and ILD, PE in 6/15. She has a 50 pack year smoking history. Was in [facility] in 6/15 for bilateral pulmonary emboli. In , patient is admitted to the hospital and found to have a left lung mass and mediastinal adenopathy. he underwent bronchoscopy with biopsy revealing B cell lymphoma. Medications prior to admission:    enoxaparin (LOVENOX) 100 MG/ML injection Give one injection this evening, give one injection AM, and one PM on 20, then NO injection on 2020. Lovenox schedule will resume post operatively, per MD instructions.    busPIRone (BUSPAR) 10 MG tablet Take 1 tablet by mouth 3 times daily as needed (anxiety)   torsemide (DEMADEX) 20 MG tablet Take 1 tablet by mouth 2 times daily   spironolactone (ALDACTONE) 25 MG  Take 1 tablet by mouth daily   warfarin (COUMADIN) 5 MG tablet TAKE ONE TABLET BY MOUTH DAILY   lactobacillus (CULTURELLE) capsule Take 1 capsule by mouth 2 times daily   Acetaminophen (TYLENOL ARTHRITIS PAIN PO) Take by mouth   albuterol (PROVENTIL) (2.5 MG/3ML) neb soln Take 3 mLs by nebulization every 6 hours as needed for Wheezing or Shortness of Breath     albuterol sulfate HFA (VENTOLIN HFA)   inhaler Inhale 2 puffs into the lungs every 6 hours as needed for Wheezing   traZODone (DESYREL) 50 MG tablet Take 1 tablet by mouth nightly   melatonin 3 MG TABS tablet Take 1 tablet by mouth nightly as needed (insomnia)   OXYGEN 4.5 L   Cholecalciferol (VITAMIN D-3 PO) Take 2,000 Units by mouth daily      Allergies:     Crestor [Rosuvastatin]      Muscle aches    Levofloxacin Other (See Comments)     Achiness    Lipitor     Rocephin In Dextrose [Ceftriaxone Sodium In Dextrose]      Rash,itching    Zocor [Simvastatin]     Codeine Itching and Nausea And Vomiting    Pravastatin Nausea And Vomiting     Problem List:    COPD exacerbation    Chronic respiratory failure with hypoxia (HCC)    Anxiety    Hyperlipidemia    Diverticulosis    COPD, severe (HCC)    Abnormal chest CT    Vitamin D deficiency    Abnormal CXR    Venous (peripheral) insufficiency    Lymphedema    Swelling of limb    Chronic angle-closure glaucoma of both eyes    Arthritis of hips and knees    Obesity    Shortness of breath    Diastolic dysfunction    Acute on chronic respiratory failure with hypoxia (HCC)    Bilateral edema of lower extremity    Chest pain    Paroxysmal atrial fibrillation with RVR (HCC)    Erythrocytosis    IFG (impaired fasting glucose)    Lung mass    Mediastinal lymphadenopathy    Atrial fibrillation, chronic    COPD with acute exacerbation (HCC)    Pulmonary nodule    Chronic hypoxemic respiratory failure (HCC)    Gram-negative pneumonia (HCC)    Infection due to Stenotrophomonas maltophilia    B-cell lymphoma (HCC)    Non-Hodgkin lymphoma (HCC)    PVD (peripheral vascular disease) with claudication ([address]    Carotid stenosis     Past Medical History:     Acute exacerbation of chronic obstructive pulmonary disease (COPD) ([address]     Acute on chronic respiratory failure (HCC)     Atrial fibrillation with rapid ventricular response ([address] 6/23/2015    B-cell lymphoma ([address]     CAP (community acquired pneumonia) 1/29/2015    CHF (congestive heart failure) (HCC)     Diverticulitis     HCAP (healthcare-associated pneumonia)     IFG (impaired fasting glucose) 9/13/2018    Non-Hodgkins lymphoma (HCC)     Panic disorder     Pulmonary embolism ([address]     6/15    Rectal vaginal fistula     [facility] spotted fever 2013    Sciatic nerve pain     Sepsis ([address] 6/22/2015    Tobacco abuse 6/22/2015    Vitamin D deficiency      Past Surgical History:     APPENDECTOMY      BRONCHOSCOPY N/A 2019    EBUS WF W/ANES.  performed by Milady Nolen MD at [address] [place] [address]  2019    BRONCHOSCOPY ALVEOLAR LAVAGE performed by Milady Nolen MD at [address] [place] [address]  2019    BRONCHOSCOPY BRUSHINGS performed by Milady Nolen MD at [address] [place] [address]  2019    BRONCHOSCOPY/TRANSBRONCHIAL LUNG BIOPSY performed by Milady Nolen MD at [address] [place] [address]  2019    BRONCHOSCOPY/TRANSBRONCHIAL NEEDLE BIOPSY performed by Milady Nolen MD at [address] [place] [address]  2019    BRONCHOSCOPY/TRANSBRONCHIAL NEEDLE BIOPSY ADDL LOBE performed by Milady Nolen MD at [address] [place] [address]  2003    COLONOSCOPY  2019    diverticulosis, polyp sigmoid    COLONOSCOPY N/A 2019    COLONOSCOPY POLYPECTOMY SNARE/COLD BIOPSY performed by Janina Osgood, MD at [address] [place] [address]       Social History:     Smoking status: Former Smoker     Packs/day: 0.33     Years: 50.00     Pack years: 16.50     Types: Cigarettes     Last attempt to quit: 2015     Years since quittin.5    Smokeless tobacco: Never Used   Substance Use Topics    Alcohol use: Not Currently     Alcohol/week: 0.0 standard drinks     Vital Signs (Current):    BP: 145/79 Pulse: 80   Resp: 18 SpO2: 98   Temp: 97.4 °F (36.3 °C)   Height: 5' 2\" (1.575 m)  (01/15/20) Weight: 260 lb (117.9 kg)  (20)   BMI: 47.55           BP Readings from Last 3 Encounters:   20 (!) 126/58   19 (!) 122/50   19 138/70     NPO Status: >8 hrs                          CBC:    HGB 12.2 2019    HCT 37.8 2019     2019     CMP:    CO2 38 2019    BUN 30 2019    CREATININE 1.0 2019    GLUCOSE 177 2019     Coags:    PROTIME 16.9 2019    INR 2.90 2020

## 2020-01-15 NOTE — TELEPHONE ENCOUNTER
Please call in Lovenox 100 mg x 8 syringes. She should use one tonight, one tomorrow morning, and one tomorrow evening while continuing to hold her coumadin. No lovenox injection Friday morning.  I will prepare a post-op bridging schedule to send home with her after surgery

## 2020-01-16 ENCOUNTER — CARE COORDINATION (OUTPATIENT)
Dept: CARE COORDINATION | Age: 80
End: 2020-01-16

## 2020-01-17 ENCOUNTER — APPOINTMENT (OUTPATIENT)
Dept: GENERAL RADIOLOGY | Age: 80
End: 2020-01-17
Attending: SURGERY
Payer: MEDICARE

## 2020-01-17 ENCOUNTER — HOSPITAL ENCOUNTER (OUTPATIENT)
Age: 80
Setting detail: OUTPATIENT SURGERY
Discharge: HOME | End: 2020-01-17
Attending: SURGERY | Admitting: SURGERY
Payer: MEDICARE

## 2020-01-17 ENCOUNTER — HOSPITAL ENCOUNTER (OUTPATIENT)
Dept: GENERAL RADIOLOGY | Age: 80
Setting detail: OUTPATIENT SURGERY
Discharge: HOME | End: 2020-01-17
Attending: SURGERY
Payer: MEDICARE

## 2020-01-17 ENCOUNTER — ANESTHESIA (OUTPATIENT)
Dept: OPERATING ROOM | Age: 80
End: 2020-01-17
Payer: MEDICARE

## 2020-01-17 ENCOUNTER — TELEPHONE (OUTPATIENT)
Dept: SURGERY | Age: 80
End: 2020-01-17

## 2020-01-17 VITALS
HEIGHT: 62 IN | HEART RATE: 81 BPM | DIASTOLIC BLOOD PRESSURE: 82 MMHG | TEMPERATURE: 97 F | BODY MASS INDEX: 47.84 KG/M2 | OXYGEN SATURATION: 95 % | SYSTOLIC BLOOD PRESSURE: 153 MMHG | RESPIRATION RATE: 18 BRPM | WEIGHT: 260 LBS

## 2020-01-17 VITALS — OXYGEN SATURATION: 91 % | DIASTOLIC BLOOD PRESSURE: 67 MMHG | SYSTOLIC BLOOD PRESSURE: 130 MMHG

## 2020-01-17 VITALS — WEIGHT: 260 LBS | BODY MASS INDEX: 47.55 KG/M2

## 2020-01-17 LAB
APTT: 39.3 SEC (ref 24.2–36.2)
INR BLD: 1.29 (ref 0.86–1.14)
PROTHROMBIN TIME: 15 SEC (ref 10–13.2)

## 2020-01-17 PROCEDURE — 77001 FLUOROGUIDE FOR VEIN DEVICE: CPT | Performed by: SURGERY

## 2020-01-17 PROCEDURE — 6360000002 HC RX W HCPCS: Performed by: SURGERY

## 2020-01-17 PROCEDURE — 2709999900 HC NON-CHARGEABLE SUPPLY: Performed by: SURGERY

## 2020-01-17 PROCEDURE — 2500000003 HC RX 250 WO HCPCS: Performed by: NURSE ANESTHETIST, CERTIFIED REGISTERED

## 2020-01-17 PROCEDURE — 77001 FLUOROGUIDE FOR VEIN DEVICE: CPT

## 2020-01-17 PROCEDURE — 2580000003 HC RX 258: Performed by: ANESTHESIOLOGY

## 2020-01-17 PROCEDURE — 71045 X-RAY EXAM CHEST 1 VIEW: CPT

## 2020-01-17 PROCEDURE — 3700000001 HC ADD 15 MINUTES (ANESTHESIA): Performed by: SURGERY

## 2020-01-17 PROCEDURE — 7100000011 HC PHASE II RECOVERY - ADDTL 15 MIN: Performed by: SURGERY

## 2020-01-17 PROCEDURE — 2500000003 HC RX 250 WO HCPCS: Performed by: SURGERY

## 2020-01-17 PROCEDURE — 2500000003 HC RX 250 WO HCPCS: Performed by: ANESTHESIOLOGY

## 2020-01-17 PROCEDURE — 6360000002 HC RX W HCPCS: Performed by: NURSE ANESTHETIST, CERTIFIED REGISTERED

## 2020-01-17 PROCEDURE — 2580000003 HC RX 258: Performed by: SURGERY

## 2020-01-17 PROCEDURE — 36561 INSERT TUNNELED CV CATH: CPT | Performed by: SURGERY

## 2020-01-17 PROCEDURE — 85730 THROMBOPLASTIN TIME PARTIAL: CPT

## 2020-01-17 PROCEDURE — 3600000012 HC SURGERY LEVEL 2 ADDTL 15MIN: Performed by: SURGERY

## 2020-01-17 PROCEDURE — C1788 PORT, INDWELLING, IMP: HCPCS | Performed by: SURGERY

## 2020-01-17 PROCEDURE — 7100000010 HC PHASE II RECOVERY - FIRST 15 MIN: Performed by: SURGERY

## 2020-01-17 PROCEDURE — 85610 PROTHROMBIN TIME: CPT

## 2020-01-17 PROCEDURE — 3700000000 HC ANESTHESIA ATTENDED CARE: Performed by: SURGERY

## 2020-01-17 PROCEDURE — 3600000002 HC SURGERY LEVEL 2 BASE: Performed by: SURGERY

## 2020-01-17 PROCEDURE — 36415 COLL VENOUS BLD VENIPUNCTURE: CPT

## 2020-01-17 DEVICE — PORT INFUS 8FR PWR INJ CT FOR VASC ACCS CATH: Type: IMPLANTABLE DEVICE | Site: SUBCLAVIAN | Status: FUNCTIONAL

## 2020-01-17 RX ORDER — PROPOFOL 10 MG/ML
INJECTION, EMULSION INTRAVENOUS PRN
Status: DISCONTINUED | OUTPATIENT
Start: 2020-01-17 | End: 2020-01-17 | Stop reason: SDUPTHER

## 2020-01-17 RX ORDER — OXYCODONE HYDROCHLORIDE AND ACETAMINOPHEN 5; 325 MG/1; MG/1
1 TABLET ORAL PRN
Status: DISCONTINUED | OUTPATIENT
Start: 2020-01-17 | End: 2020-01-17 | Stop reason: HOSPADM

## 2020-01-17 RX ORDER — MIDAZOLAM HYDROCHLORIDE 1 MG/ML
INJECTION INTRAMUSCULAR; INTRAVENOUS PRN
Status: DISCONTINUED | OUTPATIENT
Start: 2020-01-17 | End: 2020-01-17 | Stop reason: SDUPTHER

## 2020-01-17 RX ORDER — SODIUM CHLORIDE, SODIUM LACTATE, POTASSIUM CHLORIDE, CALCIUM CHLORIDE 600; 310; 30; 20 MG/100ML; MG/100ML; MG/100ML; MG/100ML
INJECTION, SOLUTION INTRAVENOUS CONTINUOUS
Status: DISCONTINUED | OUTPATIENT
Start: 2020-01-17 | End: 2020-01-17 | Stop reason: HOSPADM

## 2020-01-17 RX ORDER — HYDROCODONE BITARTRATE AND ACETAMINOPHEN 5; 325 MG/1; MG/1
1 TABLET ORAL EVERY 4 HOURS PRN
Qty: 18 TABLET | Refills: 0 | Status: SHIPPED | OUTPATIENT
Start: 2020-01-17 | End: 2020-01-20

## 2020-01-17 RX ORDER — SODIUM CHLORIDE 0.9 % (FLUSH) 0.9 %
10 SYRINGE (ML) INJECTION EVERY 12 HOURS SCHEDULED
Status: DISCONTINUED | OUTPATIENT
Start: 2020-01-17 | End: 2020-01-17 | Stop reason: HOSPADM

## 2020-01-17 RX ORDER — PROMETHAZINE HYDROCHLORIDE 25 MG/ML
6.25 INJECTION, SOLUTION INTRAMUSCULAR; INTRAVENOUS
Status: DISCONTINUED | OUTPATIENT
Start: 2020-01-17 | End: 2020-01-17 | Stop reason: HOSPADM

## 2020-01-17 RX ORDER — OXYCODONE HYDROCHLORIDE AND ACETAMINOPHEN 5; 325 MG/1; MG/1
2 TABLET ORAL PRN
Status: DISCONTINUED | OUTPATIENT
Start: 2020-01-17 | End: 2020-01-17 | Stop reason: HOSPADM

## 2020-01-17 RX ORDER — FENTANYL CITRATE 50 UG/ML
25 INJECTION, SOLUTION INTRAMUSCULAR; INTRAVENOUS EVERY 5 MIN PRN
Status: DISCONTINUED | OUTPATIENT
Start: 2020-01-17 | End: 2020-01-17 | Stop reason: HOSPADM

## 2020-01-17 RX ORDER — HYDRALAZINE HYDROCHLORIDE 20 MG/ML
5 INJECTION INTRAMUSCULAR; INTRAVENOUS EVERY 10 MIN PRN
Status: DISCONTINUED | OUTPATIENT
Start: 2020-01-17 | End: 2020-01-17 | Stop reason: HOSPADM

## 2020-01-17 RX ORDER — ONDANSETRON 2 MG/ML
4 INJECTION INTRAMUSCULAR; INTRAVENOUS
Status: DISCONTINUED | OUTPATIENT
Start: 2020-01-17 | End: 2020-01-17 | Stop reason: HOSPADM

## 2020-01-17 RX ORDER — LIDOCAINE HYDROCHLORIDE 20 MG/ML
INJECTION, SOLUTION EPIDURAL; INFILTRATION; INTRACAUDAL; PERINEURAL PRN
Status: DISCONTINUED | OUTPATIENT
Start: 2020-01-17 | End: 2020-01-17 | Stop reason: SDUPTHER

## 2020-01-17 RX ORDER — FENTANYL CITRATE 50 UG/ML
INJECTION, SOLUTION INTRAMUSCULAR; INTRAVENOUS PRN
Status: DISCONTINUED | OUTPATIENT
Start: 2020-01-17 | End: 2020-01-17 | Stop reason: SDUPTHER

## 2020-01-17 RX ORDER — LIDOCAINE HYDROCHLORIDE 10 MG/ML
1 INJECTION, SOLUTION EPIDURAL; INFILTRATION; INTRACAUDAL; PERINEURAL
Status: COMPLETED | OUTPATIENT
Start: 2020-01-17 | End: 2020-01-17

## 2020-01-17 RX ORDER — HEPARIN SODIUM (PORCINE) LOCK FLUSH IV SOLN 100 UNIT/ML 100 UNIT/ML
SOLUTION INTRAVENOUS PRN
Status: DISCONTINUED | OUTPATIENT
Start: 2020-01-17 | End: 2020-01-17 | Stop reason: ALTCHOICE

## 2020-01-17 RX ORDER — SODIUM CHLORIDE 0.9 % (FLUSH) 0.9 %
10 SYRINGE (ML) INJECTION PRN
Status: DISCONTINUED | OUTPATIENT
Start: 2020-01-17 | End: 2020-01-17 | Stop reason: HOSPADM

## 2020-01-17 RX ADMIN — LIDOCAINE HYDROCHLORIDE 0.1 ML: 10 INJECTION, SOLUTION EPIDURAL; INFILTRATION; INTRACAUDAL; PERINEURAL at 11:19

## 2020-01-17 RX ADMIN — SODIUM CHLORIDE, POTASSIUM CHLORIDE, SODIUM LACTATE AND CALCIUM CHLORIDE: 600; 310; 30; 20 INJECTION, SOLUTION INTRAVENOUS at 11:19

## 2020-01-17 RX ADMIN — FENTANYL CITRATE 25 MCG: 50 INJECTION INTRAMUSCULAR; INTRAVENOUS at 12:25

## 2020-01-17 RX ADMIN — MIDAZOLAM 0.5 MG: 1 INJECTION INTRAMUSCULAR; INTRAVENOUS at 12:25

## 2020-01-17 RX ADMIN — SODIUM CHLORIDE, POTASSIUM CHLORIDE, SODIUM LACTATE AND CALCIUM CHLORIDE: 600; 310; 30; 20 INJECTION, SOLUTION INTRAVENOUS at 12:25

## 2020-01-17 RX ADMIN — VANCOMYCIN HYDROCHLORIDE 1.5 G: 1 INJECTION, POWDER, LYOPHILIZED, FOR SOLUTION INTRAVENOUS at 11:49

## 2020-01-17 RX ADMIN — LIDOCAINE HYDROCHLORIDE 50 MG: 20 INJECTION, SOLUTION EPIDURAL; INFILTRATION; INTRACAUDAL; PERINEURAL at 12:28

## 2020-01-17 RX ADMIN — PROPOFOL 150 MG: 10 INJECTION, EMULSION INTRAVENOUS at 12:28

## 2020-01-17 RX ADMIN — ENOXAPARIN SODIUM 40 MG: 40 INJECTION SUBCUTANEOUS at 11:31

## 2020-01-17 SDOH — ECONOMIC STABILITY: INCOME INSECURITY: HOW HARD IS IT FOR YOU TO PAY FOR THE VERY BASICS LIKE FOOD, HOUSING, MEDICAL CARE, AND HEATING?: NOT VERY HARD

## 2020-01-17 SDOH — HEALTH STABILITY: MENTAL HEALTH
STRESS IS WHEN SOMEONE FEELS TENSE, NERVOUS, ANXIOUS, OR CAN'T SLEEP AT NIGHT BECAUSE THEIR MIND IS TROUBLED. HOW STRESSED ARE YOU?: TO SOME EXTENT

## 2020-01-17 SDOH — ECONOMIC STABILITY: TRANSPORTATION INSECURITY
IN THE PAST 12 MONTHS, HAS THE LACK OF TRANSPORTATION KEPT YOU FROM MEDICAL APPOINTMENTS OR FROM GETTING MEDICATIONS?: NO

## 2020-01-17 SDOH — HEALTH STABILITY: MENTAL HEALTH: HOW OFTEN DO YOU HAVE A DRINK CONTAINING ALCOHOL?: NEVER

## 2020-01-17 SDOH — HEALTH STABILITY: PHYSICAL HEALTH: ON AVERAGE, HOW MANY DAYS PER WEEK DO YOU ENGAGE IN MODERATE TO STRENUOUS EXERCISE (LIKE A BRISK WALK)?: 0 DAYS

## 2020-01-17 SDOH — ECONOMIC STABILITY: FOOD INSECURITY: WITHIN THE PAST 12 MONTHS, THE FOOD YOU BOUGHT JUST DIDN'T LAST AND YOU DIDN'T HAVE MONEY TO GET MORE.: NEVER TRUE

## 2020-01-17 SDOH — HEALTH STABILITY: PHYSICAL HEALTH: ON AVERAGE, HOW MANY MINUTES DO YOU ENGAGE IN EXERCISE AT THIS LEVEL?: 0 MIN

## 2020-01-17 SDOH — ECONOMIC STABILITY: FOOD INSECURITY: WITHIN THE PAST 12 MONTHS, YOU WORRIED THAT YOUR FOOD WOULD RUN OUT BEFORE YOU GOT MONEY TO BUY MORE.: NEVER TRUE

## 2020-01-17 SDOH — ECONOMIC STABILITY: TRANSPORTATION INSECURITY
IN THE PAST 12 MONTHS, HAS LACK OF TRANSPORTATION KEPT YOU FROM MEETINGS, WORK, OR FROM GETTING THINGS NEEDED FOR DAILY LIVING?: NO

## 2020-01-17 ASSESSMENT — PAIN SCALES - GENERAL
PAINLEVEL_OUTOF10: 3
PAINLEVEL_OUTOF10: 0

## 2020-01-17 ASSESSMENT — PULMONARY FUNCTION TESTS
PIF_VALUE: 1
PIF_VALUE: 4
PIF_VALUE: 0
PIF_VALUE: 4
PIF_VALUE: 0
PIF_VALUE: 1
PIF_VALUE: 49
PIF_VALUE: 2
PIF_VALUE: 1
PIF_VALUE: 0
PIF_VALUE: 0
PIF_VALUE: 1
PIF_VALUE: 2
PIF_VALUE: 5
PIF_VALUE: 1
PIF_VALUE: 5
PIF_VALUE: 1
PIF_VALUE: 5
PIF_VALUE: 0
PIF_VALUE: 1
PIF_VALUE: 8
PIF_VALUE: 1

## 2020-01-17 ASSESSMENT — PAIN - FUNCTIONAL ASSESSMENT: PAIN_FUNCTIONAL_ASSESSMENT: 0-10

## 2020-01-17 ASSESSMENT — ENCOUNTER SYMPTOMS: DYSPNEA ASSOCIATED WITH: EXERTION

## 2020-01-17 ASSESSMENT — LIFESTYLE VARIABLES: SMOKING_STATUS: 1

## 2020-01-17 NOTE — BRIEF OP NOTE
Brief Postoperative Note  ______________________________________________________________    Patient: Harriet Quintanilla  YOB: 1940  MRN: [id number]  Date of Procedure: 1/17/2020    Pre-Op Diagnosis: NON-HODGKIN LYMPHOMA    Post-Op Diagnosis: Same       Procedure(s):  PORT INSERTION    Anesthesia: Monitor Anesthesia Care, TIVA    Surgeon(s):  Luz Guerrero MD    Assistant: Josselyn Oviedo    Estimated Blood Loss (mL): 50    Complications: None    Specimens:   * No specimens in log *    Implants:  Implant Name Type Inv.  Item Serial No.  Lot No. LRB No. Used   PORT CT POWER INJ 8F POLYURET Port PORT CT POWER INJ 8F POLYURET  [facility] [id number] Left 1         Drains:   Urethral Catheter Straight-tip 16 fr (Active)       Findings: as above    Jeimy Fernando MD  Date: 1/17/2020  Time: 12:54 PM

## 2020-01-17 NOTE — TELEPHONE ENCOUNTER
Advised pts son Jesus Mann after speaking with Dr. Halie Riddle that her Lorcet Rx is okay to take as needed post op. If she can not tolerate this she mat take otc tylenol as directed and use an ice pack.

## 2020-01-17 NOTE — CARE COORDINATION
Self Monitoring   No change     Daily Weights - I will weight myself as directed - Daily and write down weights    Patient Reported Weight No flowsheet data found. Barriers: none  Plan for overcoming my barriers: home care, care coordination, family support  Confidence: 7/10  Anticipated Goal Completion Date: 3/20  Goal extended. She continues to weigh daily. Weight has declined some. Feels it may be related to stress of upcoming treatment for her cancer. Discussed nutrition with chemo and also continuing to watch for s/s of CHF. Concerns with diet changes with chemo side effects as well. 1/17/20 trb            Prior to Admission medications    Medication Sig Start Date End Date Taking? Authorizing Provider   enoxaparin (LOVENOX) 100 MG/ML injection Give one injection this evening, give one injection AM, and one PM on 01/16/20, then NO injection on 01/17/2020. Lovenox schedule will resume post operatively, per MD instructions.  1/15/20   Gisselle Tapia MD   busPIRone (BUSPAR) 10 MG tablet Take 1 tablet by mouth 3 times daily as needed (anxiety) 1/9/20 2/8/20  TURNER Mccallum CNP   torsemide [facility]) 20 MG tablet Take 1 tablet by mouth 2 times daily 12/18/19   Adam Sampson MD   spironolactone (ALDACTONE) 25 MG tablet Take 1 tablet by mouth daily 12/18/19   Adam Sampson MD   warfarin (COUMADIN) 5 MG tablet TAKE ONE TABLET BY MOUTH DAILY 11/12/19   TURNER Mccallum CNP   lactobacillus (CULTURELLE) capsule Take 1 capsule by mouth 2 times daily 8/16/19   Samir Saba MD   Acetaminophen (TYLENOL ARTHRITIS PAIN PO) Take by mouth    Historical Provider, MD   albuterol (PROVENTIL) (2.5 MG/3ML) 0.083% nebulizer solution Take 3 mLs by nebulization every 6 hours as needed for Wheezing or Shortness of Breath DX COPD J44.9 4/15/19   Suni Lara MD   albuterol sulfate HFA (VENTOLIN HFA) 108 (90 Base) MCG/ACT inhaler Inhale 2 puffs into the lungs every 6 hours as needed for Wheezing 6/12/18   TURNER Hamilton CNP   traZODone (DESYREL) 50 MG tablet Take 1 tablet by mouth nightly 5/2/18   TURNER Murphy CNP   melatonin 3 MG TABS tablet Take 1 tablet by mouth nightly as needed (insomnia) 1/5/17   Shanice Starr MD   OXYGEN 4.5 L 1/5/17   Shanice Starr MD   Cholecalciferol (VITAMIN D-3 PO) Take 2,000 Units by mouth daily     Historical Provider, MD       Future Appointments   Date Time Provider Allyssa Levine   4/16/2020 10:45 AM Milly Moffett MD [facility]     ,   Congestive Heart Failure Assessment           Symptoms:          and   COPD Assessment    Does the patient understand envrionmental exposure?:  Yes  Is the patient able to verbalize Rescue vs. Long Acting medications?:  Yes  Does the patient have a nebulizer?:  Yes  Does the patient use a space with inhaled medications?:  No     No patient-reported symptoms         Symptoms:      Symptom course:  stable  Breathlessness:  exertion  Change in sputum?:  No/At Baseline  Self Monitoring - SaO2:  Yes (Comment: using 4.5 L/NC)  Have you had a recent diagnosis of pneumonia either by PCP or at a hospital?:  No

## 2020-01-17 NOTE — PROGRESS NOTES
Pt is alert and oriented, stable for discharge to home, iv out, cath intact, pressure and dressing applied, pt tolerated well. Surgical site dressings are clean, dry, intact. Pt and family received printed and verbal instructions for post op care at home, and they denied any further questions. Pt will be taken out via wheelchair to family's car.       Pt received a lateral xray per radiologist requests, and dr Clarita Snyder gave verbal order with repeat back for    Per Report, pt's chest port is in correct are with no signs or pneumothorax,  Pt's sun received her printed and verbal discharge instructions, to include printed prescription for Norco,

## 2020-01-17 NOTE — PATIENT INSTRUCTIONS
Patient Education        Dealing With Being Tired From Cancer Treatment: Care Instructions  Your Care Instructions    Cancer, some complications from cancer, and some cancer treatments can make you feel very tired. But there are things you can do to feel better. Try to get extra rest while you have chemotherapy or radiation therapy. You may be able to stay with much of your routine, with some extra breaks for rest. It is important to stay as active as you can during treatment. Staying active can help you have more energy. Feeling tired is often worse at the end of these treatments or just after they end. Your doctor may prescribe medicines to help boost your energy. Follow-up care is a key part of your treatment and safety. Be sure to make and go to all appointments, and call your doctor if you are having problems. It's also a good idea to know your test results and keep a list of the medicines you take. How can you care for yourself at home? · Slowly increase your activity to have more energy. Try walking as your energy allows. · Take breaks during the day to rest.  · If your doctor prescribes medicines to help with your energy, take them exactly as prescribed. Call your doctor if you think you are having a problem with your medicine. · Keep a list of all your medicines, and review them with your doctor at your visits. · Eat healthy foods. A diet that contains fruits, vegetables, and whole grains may increase your energy level. Limit alcohol, which can cause dehydration and make you feel more tired. Drink plenty of fluids. Do not skip meals, especially breakfast.  · Do not smoke. It can make you feel more tired. If you need help quitting, talk to your doctor about stop-smoking programs and medicines. These can increase your chances of quitting for good. · If you have trouble getting a good night's sleep, try to:  ? Keep your room dark and quiet.  If needed, wear earplugs or use a sound machine to cover

## 2020-01-17 NOTE — H&P
I have reviewed the progress note of Dr. April Mercedes serving as history and physical dated January/8/ 2020 (scanned into media tab 1/10/2020) and examined the patient and find no relevant changes. I have reviewed with the patient and/or family the risks, benefits, and alternatives to the procedure.

## 2020-01-20 ENCOUNTER — ANTI-COAG VISIT (OUTPATIENT)
Dept: FAMILY MEDICINE CLINIC | Age: 80
End: 2020-01-20

## 2020-01-20 NOTE — PROGRESS NOTES
Informed pt of recommendations. Pt has her first round of chemo tomorrow and will call in her INR as soon as she gets home.

## 2020-01-21 ENCOUNTER — ANTI-COAG VISIT (OUTPATIENT)
Dept: FAMILY MEDICINE CLINIC | Age: 80
End: 2020-01-21

## 2020-01-21 LAB — INR BLD: 1.4

## 2020-01-22 ENCOUNTER — ANTI-COAG VISIT (OUTPATIENT)
Dept: FAMILY MEDICINE CLINIC | Age: 80
End: 2020-01-22

## 2020-01-22 LAB — INR BLD: 2.3

## 2020-01-23 RX ORDER — WARFARIN SODIUM 5 MG/1
TABLET ORAL
Qty: 60 TABLET | Refills: 0 | Status: SHIPPED | OUTPATIENT
Start: 2020-01-23 | End: 2020-01-24 | Stop reason: SDUPTHER

## 2020-01-24 ENCOUNTER — ANTI-COAG VISIT (OUTPATIENT)
Dept: FAMILY MEDICINE CLINIC | Age: 80
End: 2020-01-24

## 2020-01-24 LAB — INR BLD: 2.7

## 2020-01-24 RX ORDER — WARFARIN SODIUM 5 MG/1
TABLET ORAL
Qty: 60 TABLET | Refills: 5 | Status: SHIPPED | OUTPATIENT
Start: 2020-01-24 | End: 2020-03-30

## 2020-01-29 ENCOUNTER — ANTI-COAG VISIT (OUTPATIENT)
Dept: FAMILY MEDICINE CLINIC | Age: 80
End: 2020-01-29

## 2020-01-29 LAB — INR BLD: 2.1

## 2020-02-10 ENCOUNTER — ANTI-COAG VISIT (OUTPATIENT)
Dept: FAMILY MEDICINE CLINIC | Age: 80
End: 2020-02-10

## 2020-02-10 LAB — INR BLD: 2.3

## 2020-02-12 ENCOUNTER — TELEPHONE (OUTPATIENT)
Dept: FAMILY MEDICINE CLINIC | Age: 80
End: 2020-02-12

## 2020-02-12 NOTE — TELEPHONE ENCOUNTER
Gave patient the number for [facility] derm and also number for Dr Toshia Celaya since she wants to get in ASAP due to so near her eye.

## 2020-02-12 NOTE — TELEPHONE ENCOUNTER
Pt has a scaling rash between eye brows, nose and moving under pt eye and chin. (spreading).  Pt has itching patches all over her scalp and flakes, she is requesting a referral to the dermatologist.

## 2020-02-14 ENCOUNTER — OFFICE VISIT (OUTPATIENT)
Dept: FAMILY MEDICINE CLINIC | Age: 80
End: 2020-02-14
Payer: MEDICARE

## 2020-02-14 ENCOUNTER — CARE COORDINATION (OUTPATIENT)
Dept: CARE COORDINATION | Age: 80
End: 2020-02-14

## 2020-02-14 VITALS
BODY MASS INDEX: 48.95 KG/M2 | DIASTOLIC BLOOD PRESSURE: 52 MMHG | HEIGHT: 62 IN | SYSTOLIC BLOOD PRESSURE: 117 MMHG | HEART RATE: 86 BPM | TEMPERATURE: 98.2 F | WEIGHT: 266 LBS | OXYGEN SATURATION: 91 %

## 2020-02-14 PROCEDURE — G8427 DOCREV CUR MEDS BY ELIG CLIN: HCPCS | Performed by: NURSE PRACTITIONER

## 2020-02-14 PROCEDURE — 1123F ACP DISCUSS/DSCN MKR DOCD: CPT | Performed by: NURSE PRACTITIONER

## 2020-02-14 PROCEDURE — G8482 FLU IMMUNIZE ORDER/ADMIN: HCPCS | Performed by: NURSE PRACTITIONER

## 2020-02-14 PROCEDURE — G8417 CALC BMI ABV UP PARAM F/U: HCPCS | Performed by: NURSE PRACTITIONER

## 2020-02-14 PROCEDURE — 99213 OFFICE O/P EST LOW 20 MIN: CPT | Performed by: NURSE PRACTITIONER

## 2020-02-14 PROCEDURE — 1036F TOBACCO NON-USER: CPT | Performed by: NURSE PRACTITIONER

## 2020-02-14 PROCEDURE — 4040F PNEUMOC VAC/ADMIN/RCVD: CPT | Performed by: NURSE PRACTITIONER

## 2020-02-14 PROCEDURE — G8400 PT W/DXA NO RESULTS DOC: HCPCS | Performed by: NURSE PRACTITIONER

## 2020-02-14 PROCEDURE — 1090F PRES/ABSN URINE INCON ASSESS: CPT | Performed by: NURSE PRACTITIONER

## 2020-02-14 RX ORDER — VALACYCLOVIR HYDROCHLORIDE 1 G/1
1000 TABLET, FILM COATED ORAL 3 TIMES DAILY
Qty: 21 TABLET | Refills: 0 | Status: SHIPPED | OUTPATIENT
Start: 2020-02-14 | End: 2020-02-21

## 2020-02-14 RX ORDER — BETAMETHASONE VALERATE 0.1 %
LOTION (ML) TOPICAL
Qty: 60 ML | Refills: 1 | Status: SHIPPED | OUTPATIENT
Start: 2020-02-14 | End: 2020-02-26

## 2020-02-14 SDOH — SOCIAL STABILITY: SOCIAL NETWORK
DO YOU BELONG TO ANY CLUBS OR ORGANIZATIONS SUCH AS CHURCH GROUPS UNIONS, FRATERNAL OR ATHLETIC GROUPS, OR SCHOOL GROUPS?: NO

## 2020-02-14 SDOH — SOCIAL STABILITY: SOCIAL NETWORK: HOW OFTEN DO YOU ATTEND CHURCH OR RELIGIOUS SERVICES?: NEVER

## 2020-02-14 SDOH — SOCIAL STABILITY: SOCIAL NETWORK: HOW OFTEN DO YOU ATTENT MEETINGS OF THE CLUB OR ORGANIZATION YOU BELONG TO?: NEVER

## 2020-02-14 SDOH — SOCIAL STABILITY: SOCIAL NETWORK: ARE YOU MARRIED, WIDOWED, DIVORCED, SEPARATED, NEVER MARRIED, OR LIVING WITH A PARTNER?: DIVORCED

## 2020-02-14 SDOH — SOCIAL STABILITY: SOCIAL NETWORK: HOW OFTEN DO YOU GET TOGETHER WITH FRIENDS OR RELATIVES?: ONCE A WEEK

## 2020-02-14 ASSESSMENT — PATIENT HEALTH QUESTIONNAIRE - PHQ9
SUM OF ALL RESPONSES TO PHQ9 QUESTIONS 1 & 2: 0
1. LITTLE INTEREST OR PLEASURE IN DOING THINGS: 0
2. FEELING DOWN, DEPRESSED OR HOPELESS: 0
SUM OF ALL RESPONSES TO PHQ QUESTIONS 1-9: 0
SUM OF ALL RESPONSES TO PHQ QUESTIONS 1-9: 0

## 2020-02-14 NOTE — CARE COORDINATION
Ambulatory Care Coordination Note  2/14/2020  CM Risk Score: 5  Charlson 10 Year Mortality Risk Score: 100%     ACC: Anaya Sofia RN    Summary Note: ACM met with patient in the office today. She is her for concerns with rash on her face over her eyes. Concerned about shingles. She is ready to start week 2 of chemo. She stated she tolerated it well so far. Some increased fatigue at home. She feels she would benefit from nursing care and PT again at home. Active with [facility] in past. She did tell me that she missed a dose of coumadin recently but usually manages her own meds. Held lasix today for office visit ad her weight is up a little here. No s/s of copd symptoms. Oxygen at 4.5l/NC and this was baseline from our last call. No new falls. Appetite has been ok. No reported significant nausea with chemo. Past Medical History:   Diagnosis Date    Acute exacerbation of chronic obstructive pulmonary disease (COPD) ([address]     Acute on chronic respiratory failure (HCC)     Atrial fibrillation with rapid ventricular response ([address] 6/23/2015    B-cell lymphoma ([address]     CAP (community acquired pneumonia) 1/29/2015    CHF (congestive heart failure) ([address]     Diverticulitis     HCAP (healthcare-associated pneumonia)     IFG (impaired fasting glucose) 9/13/2018    Non-Hodgkins lymphoma ([address]     Panic disorder     Pulmonary embolism ([address]     6/15    Rectal vaginal fistula     [facility] spotted fever 2013    Sciatic nerve pain     Sepsis ([address] 6/22/2015    Tobacco abuse 6/22/2015    Vitamin D deficiency      Plan     Requested SN and PT orders  Follow up week for rash symptoms   Continue to work toward discharge from care coordination           Care Coordination Interventions    Program Enrollment:  Complex Care  Referral from Primary Care Provider:  No  Suggested Interventions and Community Resources  Fall Risk Prevention:  Completed (Comment: fall precautions mailed and discussed.  no new falls 1/1/6/19)  Home Health Services:  Not Started (Comment: no longer Active. Had [facility] in past. Feels she may need it again. requsted order today  2/14/20)  Meals on Wheels:  Declined  Pulmonary Rehab:  Declined  Senior Services:  Declined (Comment: Not ready yet per patient but feels she may need it in th future. Homemaking provided by family 1/7/20 trb)  Social Work:  Declined (Comment: 10/2019 again)  Other Services:  Declined (Comment: pulmonary rehab declined 1/16/19)  Transportation Support:  Completed  Zone Management Tools:  Completed (Comment: copd mailed and discussed again. 1/16/19)  Other Services or Interventions:  Newly starting Chemo January 21 adn 22nd         Goals Addressed                 This Visit's Progress       Care Coordination     Conditions and Symptoms   No change     I will schedule office visits, as directed by my provider. I will keep my appointment or reschedule if I have to cancel. I will notify my provider of any barriers to my plan of care. I will follow my Zone Management tool to seek urgent or emergent care. I will notify my provider of any symptoms that indicate a worsening of my condition. Barriers: none  Plan for overcoming my barriers: home care, care coordination   Confidence: 6/10  Anticipated Goal Completion Date:10/19    Reviewed right care, right place, right time. Discussion included when to contact primary care physician, when to seek walk in care and conditions that should result in an ER visit. Encourage daily weight and reporting symptom changes. 2/14/20 trb         Self Monitoring   No change     Daily Weights - I will weight myself as directed - Daily and write down weights    Patient Reported Weight No flowsheet data found. Barriers: none  Plan for overcoming my barriers: home care, care coordination, family support  Confidence: 7/10  Anticipated Goal Completion Date: 3/20  Goal extended. Wt 266 today in office.  Did not take her lasix due to appt. She is not weighing every day but 2-3 times a week. Forgets sometimes. Reinforced importance of weights. 2/14/20 trb            Prior to Admission medications    Medication Sig Start Date End Date Taking? Authorizing Provider   valACYclovir (VALTREX) 1 g tablet Take 1 tablet by mouth 3 times daily for 7 days 2/14/20 2/21/20  TURNER Rankin CNP   betamethasone valerate (VALISONE) 0.1 % lotion Apply topically 2 times daily.  2/14/20   TURNER Rankin CNP   warfarin (COUMADIN) 5 MG tablet Take two tablet daily 1/24/20   TURNER Rankin CNP   torsemide [facility]) 20 MG tablet Take 1 tablet by mouth 2 times daily 12/18/19   Paulo Johansen MD   spironolactone (ALDACTONE) 25 MG tablet Take 1 tablet by mouth daily 12/18/19   Paulo Johansen MD   lactobacillus (CULTURELLE) capsule Take 1 capsule by mouth 2 times daily 8/16/19   Amalia Chan MD   Acetaminophen (TYLENOL ARTHRITIS PAIN PO) Take by mouth    Historical Provider, MD   albuterol (PROVENTIL) (2.5 MG/3ML) 0.083% nebulizer solution Take 3 mLs by nebulization every 6 hours as needed for Wheezing or Shortness of Breath DX COPD J44.9 4/15/19   Namrata Sanabria MD   albuterol sulfate HFA (VENTOLIN HFA) 108 (90 Base) MCG/ACT inhaler Inhale 2 puffs into the lungs every 6 hours as needed for Wheezing 6/12/18   Randall Cranker, APRN - CNP   traZODone (DESYREL) 50 MG tablet Take 1 tablet by mouth nightly 5/2/18   TURNER Rankin CNP   melatonin 3 MG TABS tablet Take 1 tablet by mouth nightly as needed (insomnia) 1/5/17   Amalia Chan MD   OXYGEN 4.5 L 1/5/17   Amalia Chan MD   Cholecalciferol (VITAMIN D-3 PO) Take 2,000 Units by mouth daily     Historical Provider, MD       Future Appointments   Date Time Provider Allyssa Levine   4/16/2020 10:45 AM Namrata Sanabria MD [facility]      and   COPD Assessment    Does the patient understand envrionmental exposure?:  Yes  Is the patient able to verbalize Rescue vs. Long Acting medications?:  Yes  Does the patient have a nebulizer?:  Yes  Does the patient use a space with inhaled medications?:  No     No patient-reported symptoms         Symptoms:   None:  Yes      Symptom course:  stable  Change in chronic cough?:  No/At Baseline  Self Monitoring - SaO2:   (Comment: 4.5 l/nc in office)  Have you had a recent diagnosis of pneumonia either by PCP or at a hospital?:  No

## 2020-02-14 NOTE — PROGRESS NOTES
Subjective:     Patient Name: Eugenio Roger is a 70 y.o. female. Chief Complaint   Patient presents with    Rash     on face and in scalp, pt first noticed it about a month ago pt said just within the last two day she has noticed it closer to her eye       Rash   This is a new problem. The current episode started more than 1 month ago. The problem has been gradually worsening since onset. The affected locations include the scalp and face. The rash is characterized by dryness and scaling. She was exposed to nothing. Pertinent negatives include no anorexia, congestion, cough, diarrhea, eye pain, facial edema, fatigue, fever, joint pain, nail changes, rhinorrhea, sore throat or vomiting. Past treatments include nothing. The treatment provided no relief. Patient tried to get in with dermatology but can not be seen any time soon. Patient came in because she was concerned about new blister like areas around left eye since she has a history of shingles in that area. Review of Systems   Constitutional: Negative. Negative for fatigue and fever. HENT: Negative. Negative for congestion, rhinorrhea and sore throat. Eyes: Negative. Negative for pain, discharge and redness. Respiratory: Negative. Negative for cough. Cardiovascular: Negative. Gastrointestinal: Negative. Negative for anorexia, diarrhea and vomiting. Genitourinary: Negative. Musculoskeletal: Negative. Negative for joint pain. Skin: Positive for rash. Negative for nail changes. Neurological: Negative. Psychiatric/Behavioral: Negative. All other systems reviewed and are negative.        Past Medical History:   Diagnosis Date    Acute exacerbation of chronic obstructive pulmonary disease (COPD) ([address]     Acute on chronic respiratory failure (HCC)     Atrial fibrillation with rapid ventricular response ([address] 6/23/2015    B-cell lymphoma ([address]     CAP (community acquired pneumonia) 1/29/2015    CHF (congestive [id number] lot [id number] 3/31/21     Social History     Socioeconomic History    Marital status:      Spouse name: Not on file    Number of children: Not on file    Years of education: Not on file    Highest education level: Not on file   Occupational History    Occupation: retired   Social Needs    Financial resource strain: Not very hard    Food insecurity:     Worry: Never true     Inability: Never true    Transportation needs:     Medical: No     Non-medical: No   Tobacco Use    Smoking status: Former Smoker     Packs/day: 0.33     Years: 50.00     Pack years: 16.50     Types: Cigarettes     Last attempt to quit: 2015     Years since quittin.6    Smokeless tobacco: Never Used   Substance and Sexual Activity    Alcohol use: Not Currently     Alcohol/week: 0.0 standard drinks     Frequency: Never    Drug use: No    Sexual activity: Not Currently   Lifestyle    Physical activity:     Days per week: 0 days     Minutes per session: 0 min    Stress: To some extent   Relationships    Social connections:     Talks on phone: Not on file     Gets together: Not on file     Attends Buddhism service: Not on file     Active member of club or organization: Not on file     Attends meetings of clubs or organizations: Not on file     Relationship status: Not on file    Intimate partner violence:     Fear of current or ex partner: Not on file     Emotionally abused: Not on file     Physically abused: Not on file     Forced sexual activity: Not on file   Other Topics Concern    Not on file   Social History Narrative    Not on file     Current Outpatient Medications   Medication Sig Dispense Refill    warfarin (COUMADIN) 5 MG tablet Take two tablet daily 60 tablet 5    enoxaparin (LOVENOX) 100 MG/ML injection Give one injection this evening, give one injection AM, and one PM on 20, then NO injection on 2020. Lovenox schedule will resume post operatively, per MD instructions.  8 Mouth/Throat:      Pharynx: Uvula midline. Eyes:      General: Lids are normal.      Conjunctiva/sclera: Conjunctivae normal.   Neck:      Musculoskeletal: Neck supple. Cardiovascular:      Rate and Rhythm: Normal rate and regular rhythm. Pulses: Normal pulses. Heart sounds: Normal heart sounds. Pulmonary:      Effort: Pulmonary effort is normal. No accessory muscle usage or respiratory distress. Breath sounds: Normal breath sounds. Abdominal:      Palpations: Abdomen is soft. Tenderness: There is no abdominal tenderness. Lymphadenopathy:      Head:      Right side of head: No submental or submandibular adenopathy. Left side of head: No submental or submandibular adenopathy. Cervical: No cervical adenopathy. Skin:     General: Skin is warm and dry. Findings: Rash present. No lesion. Comments: Skin exam - DERMATITIS NOTED: atopic dermatitis on scalp, eye brows, around nasal folds, herpes zoster of left ocular area. Neurological:      Mental Status: She is alert and oriented to person, place, and time. Psychiatric:         Speech: Speech normal.         Behavior: Behavior normal. Behavior is cooperative. BP (!) 117/52 (Site: Left Upper Arm, Position: Sitting, Cuff Size: Large Adult)   Pulse 86   Temp 98.2 °F (36.8 °C) (Oral)   Ht 5' 2\" (1.575 m)   Wt 266 lb (120.7 kg)   LMP  (LMP Unknown)   SpO2 91%   BMI 48.65 kg/m²   Body mass index is 48.65 kg/m².     BP Readings from Last 2 Encounters:   01/17/20 130/67   01/17/20 (!) 153/82       Wt Readings from Last 3 Encounters:   01/15/20 260 lb (117.9 kg)   01/17/20 260 lb (117.9 kg)   01/14/20 260 lb (117.9 kg)       Lab Review   Anti-coag visit on 02/10/2020   Component Date Value    INR 02/10/2020 2.30    Anti-coag visit on 01/29/2020   Component Date Value    INR 01/29/2020 2.10    Anti-coag visit on 01/24/2020   Component Date Value    INR 01/24/2020 2.70    Anti-coag visit on 01/22/2020 Component Date Value    INR 01/22/2020 2.30    Anti-coag visit on 01/21/2020   Component Date Value    INR 01/21/2020 1.40    Anti-coag visit on 01/20/2020   Component Date Value    INR 01/20/2019 1.40    Admission on 01/17/2020, Discharged on 01/17/2020   Component Date Value    Protime 01/17/2020 15.0*    INR 01/17/2020 1.29*    aPTT 01/17/2020 39.3*   Anti-coag visit on 01/13/2020   Component Date Value    INR 01/13/2020 2.90    Anti-coag visit on 01/03/2020   Component Date Value    INR 01/03/2020 2.00    Anti-coag visit on 12/26/2019   Component Date Value    INR 12/26/2019 2.20    There may be more visits with results that are not included. No results found for this visit on 02/14/20. Assessment:       1. Seborrheic dermatitis    2. Herpes zoster without complication        No results found for this visit on 02/14/20. Plan:       [place] was seen today for rash. Diagnoses and all orders for this visit:    Seborrheic dermatitis  -     betamethasone valerate (VALISONE) 0.1 % lotion; Apply topically 2 times daily. Follow up with dermatology    Herpes zoster without complication  -     valACYclovir (VALTREX) 1 g tablet; Take 1 tablet by mouth 3 times daily for 7 days  Patient should see opthalmologist    Patient has been instructed call the office immediately with new symptoms, change in symptoms or worseningof symptoms. If this is not feasible, patient is instructed to report to the emergency room. Medication profile reviewed. Medication side effects and possible impairments from medications were discussed as applicable. Allergies were reviewed. Health maintenance was reviewed and updated as appropriate.

## 2020-02-18 ENCOUNTER — CARE COORDINATION (OUTPATIENT)
Dept: CARE COORDINATION | Age: 80
End: 2020-02-18

## 2020-02-19 NOTE — CARE COORDINATION
Ambulatory Care Coordination Note  2/18/20  CM Risk Score: 5  Charlson 10 Year Mortality Risk Score: 100%     ACC: Nupur Turner RN    Summary Note: Spoke with patient for outreach. Completed chemo today she is tired. She is having some nausea. New meds prescribed for nausea by oncology. Discussed need for increased rest and healthy eating with chemotherapy. Reviewed s/s to report. She is not using the Valisone cream ordered for the rash/psoriasis on her face. $55 for a tube. She is now using OTC hydrocortisone cream 1% to see if it is effective. Patient is using Valtrex for shingles outbreak on her face. She feels it is improving some. Discussed needs for home health and PT. She has a follow up with provider or evaluation of need for services with PCP. Patient is weaker with the chemotherapy and has some difficulty getting her ADLs completed at home. Past Medical History:   Diagnosis Date    Acute exacerbation of chronic obstructive pulmonary disease (COPD) ([address]     Acute on chronic respiratory failure (HCC)     Atrial fibrillation with rapid ventricular response ([address] 6/23/2015    B-cell lymphoma ([address]     CAP (community acquired pneumonia) 1/29/2015    CHF (congestive heart failure) ([address]     Diverticulitis     HCAP (healthcare-associated pneumonia)     IFG (impaired fasting glucose) 9/13/2018    Non-Hodgkins lymphoma ([address]     Panic disorder     Pulmonary embolism ([address]     6/15    Rectal vaginal fistula     [facility] spotted fever 2013    Sciatic nerve pain     Sepsis ([address] 6/22/2015    Tobacco abuse 6/22/2015    Vitamin D deficiency      Plan     Follow up planned with PCP on home health and PT  Nutrition and nausea with chemo  Plan to complete care coordination episode. Education completed.          Care Coordination Interventions    Program Enrollment:  Complex Care  Referral from Primary Care Provider:  No  Suggested Interventions and Community Resources  Fall Risk Completion Date: 3/20  Goal extended. Wt 266 today in office. Did not take her lasix due to appt. She is not weighing every day but 2-3 times a week. Forgets sometimes. Reinforced importance of weights. 2/14/20 trb            Prior to Admission medications    Medication Sig Start Date End Date Taking? Authorizing Provider   valACYclovir (VALTREX) 1 g tablet Take 1 tablet by mouth 3 times daily for 7 days 2/14/20 2/21/20  TURNER Isabel CNP   betamethasone valerate (VALISONE) 0.1 % lotion Apply topically 2 times daily.  2/14/20   TURNER Isabel CNP   warfarin (COUMADIN) 5 MG tablet Take two tablet daily 1/24/20   TURNER Isabel CNP   torsemide [facility]) 20 MG tablet Take 1 tablet by mouth 2 times daily 12/18/19   Isidro Nolan MD   spironolactone (ALDACTONE) 25 MG tablet Take 1 tablet by mouth daily 12/18/19   Isidro Nolan MD   lactobacillus (CULTURELLE) capsule Take 1 capsule by mouth 2 times daily 8/16/19   Gagan Mulligan MD   Acetaminophen (TYLENOL ARTHRITIS PAIN PO) Take by mouth    Historical Provider, MD   albuterol (PROVENTIL) (2.5 MG/3ML) 0.083% nebulizer solution Take 3 mLs by nebulization every 6 hours as needed for Wheezing or Shortness of Breath DX COPD J44.9 4/15/19   Clement Gottron, MD   albuterol sulfate HFA (VENTOLIN HFA) 108 (90 Base) MCG/ACT inhaler Inhale 2 puffs into the lungs every 6 hours as needed for Wheezing 6/12/18   TURNER Meredith CNP   traZODone (DESYREL) 50 MG tablet Take 1 tablet by mouth nightly 5/2/18   TURNER Isabel CNP   melatonin 3 MG TABS tablet Take 1 tablet by mouth nightly as needed (insomnia) 1/5/17   Gagan Mulligan MD   OXYGEN 4.5 L 1/5/17   Gagan Mulligan MD   Cholecalciferol (VITAMIN D-3 PO) Take 2,000 Units by mouth daily     Historical Provider, MD       Future Appointments   Date Time Provider Allyssa Levine   2/26/2020 10:20 AM TURNER Isabel - CNP [facility]   4/16/2020 10:45

## 2020-02-22 ASSESSMENT — ENCOUNTER SYMPTOMS
EYE PAIN: 0
GASTROINTESTINAL NEGATIVE: 1
VOMITING: 0
COUGH: 0
RESPIRATORY NEGATIVE: 1
EYE DISCHARGE: 0
DIARRHEA: 0
EYE REDNESS: 0
SORE THROAT: 0
RHINORRHEA: 0
NAIL CHANGES: 0
EYES NEGATIVE: 1

## 2020-02-25 ENCOUNTER — ANTI-COAG VISIT (OUTPATIENT)
Dept: FAMILY MEDICINE CLINIC | Age: 80
End: 2020-02-25

## 2020-02-25 PROBLEM — E66.01 MORBID OBESITY WITH BMI OF 45.0-49.9, ADULT (HCC): Status: ACTIVE | Noted: 2020-02-25

## 2020-02-25 LAB — INR BLD: 2

## 2020-02-25 NOTE — PROGRESS NOTES
Subjective:     Patient Name: Yusra Palencia is a 70 y.o. female. Chief Complaint   Patient presents with    Other     pt here for a face to face for home health for SN and PT with [facility]        HPI  Patient presents for face to face for evaluation of need for home services  She  Started  Chemotherapy a few weeks ago for Non Hodgkins lymphoma. She stated she tolerated it well so far. Has chemo 2 days straight every 28 days for the next 6 months  Some increased fatigue at home and feels it is difficult to complete her ADLs. She feels she would benefit from nursing care and PT again at home. Active with [facility] in past.  Port insertion 1/17/20    She has COPD and on oxygen therapy  4 L/min at rest and 6 L/min with exertion  Continues to see pulmonology and last seen 1/14/20  Confined most of the time to a wheelchair and/or lift chair/recliner at home. +Back pain,  Hurts when she stands and when she sits for a long time. Has to rest frequently  + Fall risk--having extreme difficulty getting in and out of the shower recently  Has almost fallen several times even though she has a shower chair. Using walker instead of cane now  + pressure ulcer risk---sitting in w/c or recliner  20 hours per day  Needs mitigation of pain using non pharmacological sources  Impaired nutrition d/t chemotherapy and obesity  She has CHF and a-fib and PE   Patient saw cardiology last on 12/18/19 with no new changes  Daily weights--most of the time  Fluid restriction--64 oz and following most of the time. 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 Having a lot of abdominal discomfort and pressure and makes her very tired and increases her dyspnea which makes it difficult to complete her ADLS or complete them in a timely manner.      Feels vision is \"smokey\" d/t cataracts and feels Gastrointestinal: Positive for abdominal distention and abdominal pain. Negative for blood in stool and vomiting. Endocrine: Negative. Genitourinary: Negative. Musculoskeletal: Positive for arthralgias, back pain, gait problem and myalgias. Skin: Negative. Neurological: Positive for tremors (new since starting chemo, Emre hands) and weakness. Hematological: Negative. Psychiatric/Behavioral: Positive for dysphoric mood and sleep disturbance (d/t pain). Negative for self-injury and suicidal ideas. The patient is nervous/anxious. All other systems reviewed and are negative. Past Medical History:   Diagnosis Date    Acute exacerbation of chronic obstructive pulmonary disease (COPD) ([address]     Acute on chronic respiratory failure (HCC)     Atrial fibrillation with rapid ventricular response ([address] 6/23/2015    B-cell lymphoma ([address]     CAP (community acquired pneumonia) 1/29/2015    CHF (congestive heart failure) ([address]     Diverticulitis     HCAP (healthcare-associated pneumonia)     IFG (impaired fasting glucose) 9/13/2018    Non-Hodgkins lymphoma (HCC)     Panic disorder     Pulmonary embolism ([address]     6/15    Rectal vaginal fistula     [facility] spotted fever 2013    Sciatic nerve pain     Sepsis ([address] 6/22/2015    Tobacco abuse 6/22/2015    Vitamin D deficiency      Family History   Problem Relation Age of Onset    Cancer Mother 43        lung cancer    Diabetes Sister     Cancer Daughter 43        lung cancer    Asthma Son     Hypertension Son     Asthma Grandchild     Hypertension Son     Emphysema Neg Hx     Heart Failure Neg Hx      Past Surgical History:   Procedure Laterality Date    APPENDECTOMY      BRONCHOSCOPY N/A 8/12/2019    EBUS WF W/ANES.  performed by Hamida Miller MD at [address]  8/12/2019    BRONCHOSCOPY ALVEOLAR LAVAGE performed by Hamida Miller MD at [address]  8/12/2019    BRONCHOSCOPY Relationships    Social connections:     Talks on phone: Not on file     Gets together: Once a week     Attends Anabaptism service: Never     Active member of club or organization: No     Attends meetings of clubs or organizations: Never     Relationship status:     Intimate partner violence:     Fear of current or ex partner: Not on file     Emotionally abused: Not on file     Physically abused: Not on file     Forced sexual activity: Not on file   Other Topics Concern    Not on file   Social History Narrative    Not on file     Current Outpatient Medications   Medication Sig Dispense Refill    betamethasone valerate (VALISONE) 0.1 % lotion Apply topically 2 times daily. 60 mL 1    warfarin (COUMADIN) 5 MG tablet Take two tablet daily 60 tablet 5    torsemide (DEMADEX) 20 MG tablet Take 1 tablet by mouth 2 times daily 60 tablet 11    spironolactone (ALDACTONE) 25 MG tablet Take 1 tablet by mouth daily 30 tablet 11    lactobacillus (CULTURELLE) capsule Take 1 capsule by mouth 2 times daily 30 capsule 0    Acetaminophen (TYLENOL ARTHRITIS PAIN PO) Take by mouth      albuterol (PROVENTIL) (2.5 MG/3ML) 0.083% nebulizer solution Take 3 mLs by nebulization every 6 hours as needed for Wheezing or Shortness of Breath DX COPD J44.9 360 mL 5    albuterol sulfate HFA (VENTOLIN HFA) 108 (90 Base) MCG/ACT inhaler Inhale 2 puffs into the lungs every 6 hours as needed for Wheezing 2 Inhaler 3    traZODone (DESYREL) 50 MG tablet Take 1 tablet by mouth nightly 30 tablet 1    melatonin 3 MG TABS tablet Take 1 tablet by mouth nightly as needed (insomnia) 15 tablet 0    OXYGEN 4.5 L      Cholecalciferol (VITAMIN D-3 PO) Take 2,000 Units by mouth daily        No current facility-administered medications for this visit. No changes in past medical history, past surgical history, social history, orfamily history were noted during the patient encounter unless specifically listed above.   All updates of past Abdominal:      General: Abdomen is protuberant. Bowel sounds are normal.      Palpations: Abdomen is soft. There is no mass. Tenderness: There is generalized abdominal tenderness. There is no right CVA tenderness, left CVA tenderness, guarding or rebound. Musculoskeletal: Normal range of motion. Right lower le+ Edema present. Left lower le+ Edema present. Lymphadenopathy:      Head:      Right side of head: No submandibular adenopathy. Left side of head: No submandibular adenopathy. Cervical: No cervical adenopathy. Skin:     General: Skin is warm and dry. Findings: Rash (dermatitis of face and scalp) present. No lesion. Neurological:      Mental Status: She is alert and oriented to person, place, and time. GCS: GCS eye subscore is 4. GCS verbal subscore is 5. GCS motor subscore is 6. Motor: Weakness and tremor (bilateral hands ) present. Coordination: Romberg sign negative. Gait: Gait abnormal.      Comments: BUE strength 3-4/5  BLE 2-3/5   Psychiatric:         Attention and Perception: Attention normal.         Mood and Affect: Mood is anxious and depressed. Speech: Speech normal.         Behavior: Behavior normal. Behavior is cooperative. Thought Content: Thought content normal.         Cognition and Memory: Cognition normal.         Judgment: Judgment normal.         BP (!) 119/54 (Site: Left Upper Arm, Position: Sitting, Cuff Size: Large Adult)   Pulse 86   Ht 5' 2\" (1.575 m)   Wt 267 lb (121.1 kg)   LMP  (LMP Unknown)   SpO2 93%   BMI 48.83 kg/m²   Body mass index is 48.83 kg/m².     BP Readings from Last 2 Encounters:   20 (!) 119/54   20 (!) 117/52       Wt Readings from Last 3 Encounters:   20 267 lb (121.1 kg)   20 266 lb (120.7 kg)   01/15/20 260 lb (117.9 kg)       Lab Review   Anti-coag visit on 02/10/2020   Component Date Value    INR 02/10/2020 2.30    Anti-coag visit on 2020 Component Date Value    INR 01/29/2020 2.10    Anti-coag visit on 01/24/2020   Component Date Value    INR 01/24/2020 2.70    Anti-coag visit on 01/22/2020   Component Date Value    INR 01/22/2020 2.30    Anti-coag visit on 01/21/2020   Component Date Value    INR 01/21/2020 1.40    Anti-coag visit on 01/20/2020   Component Date Value    INR 01/20/2019 1.40    Admission on 01/17/2020, Discharged on 01/17/2020   Component Date Value    Protime 01/17/2020 15.0*    INR 01/17/2020 1.29*    aPTT 01/17/2020 39.3*   Anti-coag visit on 01/13/2020   Component Date Value    INR 01/13/2020 2.90    Anti-coag visit on 01/03/2020   Component Date Value    INR 01/03/2020 2.00        No results found for this visit on 02/26/20. Assessment:       1. 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 Other pulmonary embolism without acute cor pulmonale, unspecified chronicity (HCC)    3. Paroxysmal atrial fibrillation with RVR ([address]    4. Morbid obesity with BMI of 45.0-49.9, adult ([address]    5. Chronic hypoxemic respiratory failure (HCC)    6. COPD, severe ([address]    7. Diastolic dysfunction    8. At risk for falls    9. Debility    10. Fatigue, unspecified type    11. Weakness    12. Tremor of both hands        No results found for this visit on 02/26/20. Plan:       [place] was seen today for other.     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 ([address]  -     External Referral To Home Health  Continue with oncology follow-up  Will be on chemotherapy for the next 6 months  He has received her first 2 doses of chemotherapy    Other pulmonary embolism without acute cor pulmonale, unspecified chronicity ([address]  -     External Referral To Home Health  Continue with anticoagulation therapy and monitoring of PT/INR    Paroxysmal atrial fibrillation with RVR ([address]  -     External Referral To Home Health  Follow-up with cardiology  Continue monitoring chemotherapy  Recommend she discuss with her oncologist and follow-up with me if not related to chemotherapy  -     External Referral To [address] will require the following home care treatments or therapies: as noted below in rx. Home care will be necessary because of multiple chronic conditions as noted above and eyes well as new onset non-Hodgkin's lymphoma with increasing debility and weakness. The patient is in agreement to receiving home care. Pt. Does require assistance with medication set-up, monitoring of vitals, and monitoring of condition to discover changes in condition requiring pre-emptive action to prevent deterioration/hospitalization. Physician to Follow Referral: TURNER Isabel CNP and ROLANDA Jvaier certify that I, or a nurse practitioner or physician assistant working with me, had an in-person encounter with Fran Hatch on 2/26/2020 and the reason for the home care services is documented in the clinical note for that day. Fran Hatch was assessed on the above date and was found to have medical conditions requiring Home Care that included  See attached diagnose on this prescription.     Homebound Status: further, I certify that my clinical findings support that Fran Hatch does meet the Medicare definition of \"Confined to the Home\" because of   Dyspnea on exertion related to diagnosis of  Decompensated CHF Decompensated COPD Hypoxia  Deconditioned due to medical condition  Unable to leave home without maximum effort and requires assistance of wheelchair or walker  Unsteady gait or dizziness that requires assistance of wheelchair, walker, or assistance of another person  Weakness and/or pain and activities are restricted or limited     Certification and medical necessity: I certify that, based on my findings, the following services are medically necessary home health services for Fran Hatch for the following reasons:  - Vital signs monitoring: Nurse to perform BP, HR, RR, Temp, and Weight with each visit and teach patient and caregivers on taking daily. Nurse to call physician if pulse less than 50 or greater than 120, respiratory rate less than 12 or greater than 25, oral temperature greater than or equal to 894 oF, systolic BP less than 90 or greater than 083, diastolic BP less than 50 or greater than 100. - Medication compliance and education for  safety concerns  - Cardiopulmonary assessment and monitoring due to recent  decompensated COPD Monitoring to include oxygen saturation by pulse oximeter on  on room air and on supplemental oxygen and  both at rest and during exertion Notify provider if any deterioration in cardiopulmonary status or oxygen saturation less than 90%. - Consult Physical Therapy for  Evaluate and Treat, strength training, gait training, balance training, safety training, functional mobility and home assessment due to fall risk  - Consult Occupational Therapy for  range of motion, strengthening training, Activities of Daily Living retraining, Independent Activities of Daily Living retraining and Evaluate and Treat  - [address] Aid for  assistance with Activities of Daily Living  - Home laboratory draws (send results to provider noted above unless otherwise specified):  As ordered by oncology and for pt/inr    - Teaching and management of the following disease process and symptom management: for non hodgkins lymphoma, copd and chf  - Disease Process Education      Patient has been instructed call the office immediately with new symptoms, change in symptoms or worseningof symptoms. If this is not feasible, patient is instructed to report to the emergency room. Medication profile reviewed. Medication side effects and possible impairments from medications were discussed as applicable. Allergies were reviewed. Health maintenance was reviewed and updated as appropriate. Time spent: 40  Minutes, >50% of which was spent face to face with patient counseling, discussing HPI/plan/reviewing records and coordinating care

## 2020-02-26 ENCOUNTER — OFFICE VISIT (OUTPATIENT)
Dept: FAMILY MEDICINE CLINIC | Age: 80
End: 2020-02-26
Payer: MEDICARE

## 2020-02-26 VITALS
DIASTOLIC BLOOD PRESSURE: 54 MMHG | HEIGHT: 62 IN | WEIGHT: 267 LBS | OXYGEN SATURATION: 93 % | HEART RATE: 86 BPM | SYSTOLIC BLOOD PRESSURE: 119 MMHG | BODY MASS INDEX: 49.13 KG/M2

## 2020-02-26 PROCEDURE — 3023F SPIROM DOC REV: CPT | Performed by: NURSE PRACTITIONER

## 2020-02-26 PROCEDURE — 99215 OFFICE O/P EST HI 40 MIN: CPT | Performed by: NURSE PRACTITIONER

## 2020-02-26 PROCEDURE — 1123F ACP DISCUSS/DSCN MKR DOCD: CPT | Performed by: NURSE PRACTITIONER

## 2020-02-26 PROCEDURE — G8417 CALC BMI ABV UP PARAM F/U: HCPCS | Performed by: NURSE PRACTITIONER

## 2020-02-26 PROCEDURE — 1090F PRES/ABSN URINE INCON ASSESS: CPT | Performed by: NURSE PRACTITIONER

## 2020-02-26 PROCEDURE — 1036F TOBACCO NON-USER: CPT | Performed by: NURSE PRACTITIONER

## 2020-02-26 PROCEDURE — 4040F PNEUMOC VAC/ADMIN/RCVD: CPT | Performed by: NURSE PRACTITIONER

## 2020-02-26 PROCEDURE — G8427 DOCREV CUR MEDS BY ELIG CLIN: HCPCS | Performed by: NURSE PRACTITIONER

## 2020-02-26 PROCEDURE — G8400 PT W/DXA NO RESULTS DOC: HCPCS | Performed by: NURSE PRACTITIONER

## 2020-02-26 PROCEDURE — G8926 SPIRO NO PERF OR DOC: HCPCS | Performed by: NURSE PRACTITIONER

## 2020-02-26 PROCEDURE — G8482 FLU IMMUNIZE ORDER/ADMIN: HCPCS | Performed by: NURSE PRACTITIONER

## 2020-02-26 ASSESSMENT — ENCOUNTER SYMPTOMS
BLOOD IN STOOL: 0
SHORTNESS OF BREATH: 1
COUGH: 1
VOMITING: 0
BACK PAIN: 1
ABDOMINAL DISTENTION: 1
ABDOMINAL PAIN: 1

## 2020-02-26 ASSESSMENT — VISUAL ACUITY: OU: 1

## 2020-03-02 ENCOUNTER — TELEPHONE (OUTPATIENT)
Dept: FAMILY MEDICINE CLINIC | Age: 80
End: 2020-03-02

## 2020-03-02 NOTE — TELEPHONE ENCOUNTER
Amada Dotson PT with [facility] called for verbal home PT orders, 2x week for 2 weeks, 1x week for 1 week, and 2x week for 1 week.  Call back Amada Dotson [phone]

## 2020-03-03 ENCOUNTER — TELEPHONE (OUTPATIENT)
Dept: FAMILY MEDICINE CLINIC | Age: 80
End: 2020-03-03

## 2020-03-05 ENCOUNTER — CARE COORDINATION (OUTPATIENT)
Dept: CARE COORDINATION | Age: 80
End: 2020-03-05

## 2020-03-05 NOTE — CARE COORDINATION
will schedule office visits, as directed by my provider. I will keep my appointment or reschedule if I have to cancel. I will notify my provider of any barriers to my plan of care. I will follow my Zone Management tool to seek urgent or emergent care. I will notify my provider of any symptoms that indicate a worsening of my condition. Barriers: none  Plan for overcoming my barriers: home care, care coordination   Confidence: 6/10  Anticipated Goal Completion Date:10/19    Reviewed right care, right place, right time. Discussion included when to contact primary care physician, when to seek walk in care and conditions that should result in an ER visit. Encourage daily weight and reporting symptom changes. 2/14/20 trb              Prior to Admission medications    Medication Sig Start Date End Date Taking?  Authorizing Provider   warfarin (COUMADIN) 5 MG tablet Take two tablet daily 1/24/20  Yes TURNER Velásquez CNP   torsemide (DEMADEX) 20 MG tablet Take 1 tablet by mouth 2 times daily 12/18/19  Yes Audrey Hare MD   spironolactone (ALDACTONE) 25 MG tablet Take 1 tablet by mouth daily 12/18/19  Yes Audrey Hare MD   lactobacillus (CULTURELLE) capsule Take 1 capsule by mouth 2 times daily 8/16/19  Yes Enriqueta Betts MD   albuterol (PROVENTIL) (2.5 MG/3ML) 0.083% nebulizer solution Take 3 mLs by nebulization every 6 hours as needed for Wheezing or Shortness of Breath DX COPD J44.9 4/15/19  Yes Verónica Whitley MD   albuterol sulfate HFA (VENTOLIN HFA) 108 (90 Base) MCG/ACT inhaler Inhale 2 puffs into the lungs every 6 hours as needed for Wheezing 6/12/18  Yes TURNER Salinas CNP   OXYGEN 4.5 L 1/5/17  Yes Enriqueta Betts MD   Cholecalciferol (VITAMIN D-3 PO) Take 2,000 Units by mouth daily    Yes Historical Provider, MD   Acetaminophen (TYLENOL ARTHRITIS PAIN PO) Take by mouth    Historical Provider, MD   traZODone (DESYREL) 50 MG tablet Take 1 tablet by mouth nightly 5/2/18   Miri Handsome

## 2020-03-11 ENCOUNTER — ANTI-COAG VISIT (OUTPATIENT)
Dept: FAMILY MEDICINE CLINIC | Age: 80
End: 2020-03-11

## 2020-03-11 LAB — INR BLD: 1.1

## 2020-03-13 ENCOUNTER — ANTI-COAG VISIT (OUTPATIENT)
Dept: FAMILY MEDICINE CLINIC | Age: 80
End: 2020-03-13

## 2020-03-13 LAB — INR BLD: 1.8

## 2020-03-16 ENCOUNTER — ANTI-COAG VISIT (OUTPATIENT)
Dept: FAMILY MEDICINE CLINIC | Age: 80
End: 2020-03-16

## 2020-03-16 LAB — INR BLD: 3

## 2020-03-19 ENCOUNTER — ANTI-COAG VISIT (OUTPATIENT)
Dept: FAMILY MEDICINE CLINIC | Age: 80
End: 2020-03-19

## 2020-03-19 LAB — INR BLD: 3.4

## 2020-03-24 ENCOUNTER — ANTI-COAG VISIT (OUTPATIENT)
Dept: FAMILY MEDICINE CLINIC | Age: 80
End: 2020-03-24

## 2020-03-24 LAB — INR BLD: 2.1

## 2020-03-30 ENCOUNTER — HOSPITAL ENCOUNTER (OUTPATIENT)
Age: 80
End: 2020-03-30
Payer: MEDICARE

## 2020-03-30 ENCOUNTER — HOSPITAL ENCOUNTER (OUTPATIENT)
Dept: CT IMAGING | Age: 80
Discharge: HOME | End: 2020-03-30
Payer: MEDICARE

## 2020-03-30 PROCEDURE — 74177 CT ABD & PELVIS W/CONTRAST: CPT

## 2020-03-30 PROCEDURE — 6360000004 HC RX CONTRAST MEDICATION: Performed by: INTERNAL MEDICINE

## 2020-03-30 RX ORDER — WARFARIN SODIUM 5 MG/1
TABLET ORAL
Qty: 60 TABLET | Refills: 2 | Status: CANCELLED | OUTPATIENT
Start: 2020-03-30

## 2020-03-30 RX ADMIN — IOPAMIDOL 75 ML: 755 INJECTION, SOLUTION INTRAVENOUS at 09:50

## 2020-03-30 RX ADMIN — IOHEXOL 50 ML: 240 INJECTION, SOLUTION INTRATHECAL; INTRAVASCULAR; INTRAVENOUS; ORAL at 09:49

## 2020-04-01 ENCOUNTER — ANTI-COAG VISIT (OUTPATIENT)
Dept: FAMILY MEDICINE CLINIC | Age: 80
End: 2020-04-01

## 2020-04-01 LAB — INR BLD: 1.3

## 2020-04-03 ENCOUNTER — ANTI-COAG VISIT (OUTPATIENT)
Dept: FAMILY MEDICINE CLINIC | Age: 80
End: 2020-04-03

## 2020-04-03 LAB — INR BLD: 2.1

## 2020-04-08 ENCOUNTER — ANTI-COAG VISIT (OUTPATIENT)
Dept: FAMILY MEDICINE CLINIC | Age: 80
End: 2020-04-08

## 2020-04-08 LAB — INR BLD: 2.1

## 2020-04-13 ENCOUNTER — TELEPHONE (OUTPATIENT)
Dept: PULMONOLOGY | Age: 80
End: 2020-04-13

## 2020-04-17 ENCOUNTER — ANTI-COAG VISIT (OUTPATIENT)
Dept: FAMILY MEDICINE CLINIC | Age: 80
End: 2020-04-17

## 2020-04-17 LAB — INR BLD: 2.3

## 2020-04-24 ENCOUNTER — TELEPHONE (OUTPATIENT)
Dept: PULMONOLOGY | Age: 80
End: 2020-04-24

## 2020-05-01 ENCOUNTER — ANTI-COAG VISIT (OUTPATIENT)
Dept: FAMILY MEDICINE CLINIC | Age: 80
End: 2020-05-01

## 2020-05-01 LAB — INR BLD: 2.3

## 2020-05-18 ENCOUNTER — ANTI-COAG VISIT (OUTPATIENT)
Dept: FAMILY MEDICINE CLINIC | Age: 80
End: 2020-05-18

## 2020-05-18 LAB — INR BLD: 2.2

## 2020-06-05 ENCOUNTER — ANTI-COAG VISIT (OUTPATIENT)
Dept: FAMILY MEDICINE CLINIC | Age: 80
End: 2020-06-05

## 2020-06-05 LAB — INR BLD: 1.9

## 2020-06-12 ENCOUNTER — ANTI-COAG VISIT (OUTPATIENT)
Dept: FAMILY MEDICINE CLINIC | Age: 80
End: 2020-06-12

## 2020-06-12 LAB — INR BLD: 3.5

## 2020-06-14 ENCOUNTER — APPOINTMENT (OUTPATIENT)
Dept: CT IMAGING | Age: 80
End: 2020-06-14
Payer: MEDICARE

## 2020-06-14 ENCOUNTER — APPOINTMENT (OUTPATIENT)
Dept: GENERAL RADIOLOGY | Age: 80
End: 2020-06-14
Payer: MEDICARE

## 2020-06-14 ENCOUNTER — HOSPITAL ENCOUNTER (EMERGENCY)
Age: 80
Discharge: ANOTHER HOSPITAL | End: 2020-06-14
Attending: EMERGENCY MEDICINE
Payer: MEDICARE

## 2020-06-14 ENCOUNTER — HOSPITAL ENCOUNTER (INPATIENT)
Age: 80
LOS: 3 days | Discharge: HOME HEALTH | DRG: 068 | End: 2020-06-17
Attending: INTERNAL MEDICINE | Admitting: INTERNAL MEDICINE
Payer: MEDICARE

## 2020-06-14 VITALS
BODY MASS INDEX: 48.21 KG/M2 | SYSTOLIC BLOOD PRESSURE: 108 MMHG | DIASTOLIC BLOOD PRESSURE: 38 MMHG | WEIGHT: 262 LBS | HEART RATE: 88 BPM | OXYGEN SATURATION: 100 % | HEIGHT: 62 IN | TEMPERATURE: 98.6 F | RESPIRATION RATE: 22 BRPM

## 2020-06-14 PROBLEM — G45.9 TIA (TRANSIENT ISCHEMIC ATTACK): Status: ACTIVE | Noted: 2020-06-14

## 2020-06-14 PROBLEM — R42 DIZZINESS: Status: ACTIVE | Noted: 2020-06-14

## 2020-06-14 LAB
A/G RATIO: 1.3 (ref 1.1–2.2)
ALBUMIN SERPL-MCNC: 3.8 G/DL (ref 3.4–5)
ALP BLD-CCNC: 87 U/L (ref 40–129)
ALT SERPL-CCNC: 14 U/L (ref 10–40)
ANION GAP SERPL CALCULATED.3IONS-SCNC: 11 MMOL/L (ref 3–16)
AST SERPL-CCNC: 17 U/L (ref 15–37)
BACTERIA: ABNORMAL /HPF
BASOPHILS ABSOLUTE: 0 K/UL (ref 0–0.2)
BASOPHILS RELATIVE PERCENT: 0.9 %
BILIRUB SERPL-MCNC: 0.4 MG/DL (ref 0–1)
BILIRUBIN URINE: NEGATIVE
BLOOD, URINE: NEGATIVE
BUN BLDV-MCNC: 14 MG/DL (ref 7–20)
CALCIUM SERPL-MCNC: 9.1 MG/DL (ref 8.3–10.6)
CHLORIDE BLD-SCNC: 96 MMOL/L (ref 99–110)
CLARITY: CLEAR
CO2: 36 MMOL/L (ref 21–32)
COLOR: YELLOW
CREAT SERPL-MCNC: 0.6 MG/DL (ref 0.6–1.2)
EKG ATRIAL RATE: 89 BPM
EKG DIAGNOSIS: NORMAL
EKG P AXIS: 52 DEGREES
EKG P-R INTERVAL: 162 MS
EKG Q-T INTERVAL: 346 MS
EKG QRS DURATION: 84 MS
EKG QTC CALCULATION (BAZETT): 420 MS
EKG R AXIS: -13 DEGREES
EKG T AXIS: 57 DEGREES
EKG VENTRICULAR RATE: 89 BPM
EOSINOPHILS ABSOLUTE: 0.2 K/UL (ref 0–0.6)
EOSINOPHILS RELATIVE PERCENT: 4.5 %
EPITHELIAL CELLS, UA: ABNORMAL /HPF (ref 0–5)
GFR AFRICAN AMERICAN: >60
GFR NON-AFRICAN AMERICAN: >60
GLOBULIN: 2.9 G/DL
GLUCOSE BLD-MCNC: 114 MG/DL (ref 70–99)
GLUCOSE URINE: NEGATIVE MG/DL
HCT VFR BLD CALC: 36.6 % (ref 36–48)
HEMOGLOBIN: 12 G/DL (ref 12–16)
INR BLD: 1.36 (ref 0.86–1.14)
KETONES, URINE: NEGATIVE MG/DL
LEUKOCYTE ESTERASE, URINE: ABNORMAL
LIPASE: 27 U/L (ref 13–60)
LYMPHOCYTES ABSOLUTE: 0.6 K/UL (ref 1–5.1)
LYMPHOCYTES RELATIVE PERCENT: 11.4 %
MCH RBC QN AUTO: 28.9 PG (ref 26–34)
MCHC RBC AUTO-ENTMCNC: 32.8 G/DL (ref 31–36)
MCV RBC AUTO: 88.3 FL (ref 80–100)
MICROSCOPIC EXAMINATION: YES
MONOCYTES ABSOLUTE: 0.7 K/UL (ref 0–1.3)
MONOCYTES RELATIVE PERCENT: 14.9 %
NEUTROPHILS ABSOLUTE: 3.4 K/UL (ref 1.7–7.7)
NEUTROPHILS RELATIVE PERCENT: 68.3 %
NITRITE, URINE: NEGATIVE
PDW BLD-RTO: 14.5 % (ref 12.4–15.4)
PH UA: 5.5 (ref 5–8)
PLATELET # BLD: 274 K/UL (ref 135–450)
PMV BLD AUTO: 6.5 FL (ref 5–10.5)
POTASSIUM REFLEX MAGNESIUM: 3.9 MMOL/L (ref 3.5–5.1)
PRO-BNP: 179 PG/ML (ref 0–449)
PROTEIN UA: NEGATIVE MG/DL
PROTHROMBIN TIME: 15.6 SEC (ref 10–13.2)
RBC # BLD: 4.15 M/UL (ref 4–5.2)
RBC UA: ABNORMAL /HPF (ref 0–4)
SODIUM BLD-SCNC: 143 MMOL/L (ref 136–145)
SPECIFIC GRAVITY UA: 1.01 (ref 1–1.03)
TOTAL PROTEIN: 6.7 G/DL (ref 6.4–8.2)
TROPONIN: <0.01 NG/ML
URINE REFLEX TO CULTURE: ABNORMAL
URINE TYPE: ABNORMAL
UROBILINOGEN, URINE: 0.2 E.U./DL
WBC # BLD: 4.9 K/UL (ref 4–11)
WBC UA: ABNORMAL /HPF (ref 0–5)

## 2020-06-14 PROCEDURE — 93005 ELECTROCARDIOGRAM TRACING: CPT | Performed by: PHYSICIAN ASSISTANT

## 2020-06-14 PROCEDURE — 71045 X-RAY EXAM CHEST 1 VIEW: CPT

## 2020-06-14 PROCEDURE — 94761 N-INVAS EAR/PLS OXIMETRY MLT: CPT

## 2020-06-14 PROCEDURE — 81001 URINALYSIS AUTO W/SCOPE: CPT

## 2020-06-14 PROCEDURE — 74176 CT ABD & PELVIS W/O CONTRAST: CPT

## 2020-06-14 PROCEDURE — 70498 CT ANGIOGRAPHY NECK: CPT

## 2020-06-14 PROCEDURE — 96374 THER/PROPH/DIAG INJ IV PUSH: CPT

## 2020-06-14 PROCEDURE — 2700000000 HC OXYGEN THERAPY PER DAY

## 2020-06-14 PROCEDURE — 85025 COMPLETE CBC W/AUTO DIFF WBC: CPT

## 2020-06-14 PROCEDURE — 80053 COMPREHEN METABOLIC PANEL: CPT

## 2020-06-14 PROCEDURE — 1200000000 HC SEMI PRIVATE

## 2020-06-14 PROCEDURE — 96375 TX/PRO/DX INJ NEW DRUG ADDON: CPT

## 2020-06-14 PROCEDURE — 84484 ASSAY OF TROPONIN QUANT: CPT

## 2020-06-14 PROCEDURE — 6360000004 HC RX CONTRAST MEDICATION: Performed by: PHYSICIAN ASSISTANT

## 2020-06-14 PROCEDURE — 2580000003 HC RX 258: Performed by: PHYSICIAN ASSISTANT

## 2020-06-14 PROCEDURE — 99285 EMERGENCY DEPT VISIT HI MDM: CPT

## 2020-06-14 PROCEDURE — 83690 ASSAY OF LIPASE: CPT

## 2020-06-14 PROCEDURE — 93010 ELECTROCARDIOGRAM REPORT: CPT | Performed by: INTERNAL MEDICINE

## 2020-06-14 PROCEDURE — 83880 ASSAY OF NATRIURETIC PEPTIDE: CPT

## 2020-06-14 PROCEDURE — 70450 CT HEAD/BRAIN W/O DYE: CPT

## 2020-06-14 PROCEDURE — 36415 COLL VENOUS BLD VENIPUNCTURE: CPT

## 2020-06-14 PROCEDURE — 85610 PROTHROMBIN TIME: CPT

## 2020-06-14 PROCEDURE — 6360000002 HC RX W HCPCS: Performed by: PHYSICIAN ASSISTANT

## 2020-06-14 PROCEDURE — 6370000000 HC RX 637 (ALT 250 FOR IP): Performed by: NURSE PRACTITIONER

## 2020-06-14 RX ORDER — ASPIRIN 81 MG/1
81 TABLET, CHEWABLE ORAL ONCE
Status: ON HOLD | COMMUNITY
End: 2020-06-14 | Stop reason: ALTCHOICE

## 2020-06-14 RX ORDER — TRAZODONE HYDROCHLORIDE 50 MG/1
50 TABLET ORAL NIGHTLY
Status: DISCONTINUED | OUTPATIENT
Start: 2020-06-14 | End: 2020-06-17 | Stop reason: HOSPADM

## 2020-06-14 RX ORDER — WARFARIN SODIUM 5 MG/1
5 TABLET ORAL DAILY
Status: DISCONTINUED | OUTPATIENT
Start: 2020-06-14 | End: 2020-06-15

## 2020-06-14 RX ORDER — ONDANSETRON 2 MG/ML
4 INJECTION INTRAMUSCULAR; INTRAVENOUS ONCE
Status: COMPLETED | OUTPATIENT
Start: 2020-06-14 | End: 2020-06-14

## 2020-06-14 RX ORDER — SPIRONOLACTONE 25 MG/1
25 TABLET ORAL DAILY
Status: DISCONTINUED | OUTPATIENT
Start: 2020-06-15 | End: 2020-06-17 | Stop reason: HOSPADM

## 2020-06-14 RX ORDER — TORSEMIDE 20 MG/1
20 TABLET ORAL 2 TIMES DAILY
Status: DISCONTINUED | OUTPATIENT
Start: 2020-06-14 | End: 2020-06-16

## 2020-06-14 RX ORDER — POLYETHYLENE GLYCOL 3350 17 G/17G
17 POWDER, FOR SOLUTION ORAL DAILY PRN
Status: DISCONTINUED | OUTPATIENT
Start: 2020-06-14 | End: 2020-06-17 | Stop reason: HOSPADM

## 2020-06-14 RX ORDER — BUSPIRONE HYDROCHLORIDE 10 MG/1
10 TABLET ORAL 3 TIMES DAILY PRN
COMMUNITY
Start: 2019-08-09 | End: 2020-10-10

## 2020-06-14 RX ORDER — ASPIRIN 300 MG/1
300 SUPPOSITORY RECTAL DAILY
Status: DISCONTINUED | OUTPATIENT
Start: 2020-06-15 | End: 2020-06-17 | Stop reason: HOSPADM

## 2020-06-14 RX ORDER — ALBUTEROL SULFATE 90 UG/1
2 AEROSOL, METERED RESPIRATORY (INHALATION) EVERY 6 HOURS PRN
Status: DISCONTINUED | OUTPATIENT
Start: 2020-06-14 | End: 2020-06-17 | Stop reason: HOSPADM

## 2020-06-14 RX ORDER — ONDANSETRON 2 MG/ML
4 INJECTION INTRAMUSCULAR; INTRAVENOUS EVERY 6 HOURS PRN
Status: DISCONTINUED | OUTPATIENT
Start: 2020-06-14 | End: 2020-06-17 | Stop reason: HOSPADM

## 2020-06-14 RX ORDER — ASPIRIN 81 MG/1
81 TABLET ORAL DAILY
Status: DISCONTINUED | OUTPATIENT
Start: 2020-06-15 | End: 2020-06-17 | Stop reason: HOSPADM

## 2020-06-14 RX ORDER — MORPHINE SULFATE 4 MG/ML
4 INJECTION, SOLUTION INTRAMUSCULAR; INTRAVENOUS ONCE
Status: COMPLETED | OUTPATIENT
Start: 2020-06-14 | End: 2020-06-14

## 2020-06-14 RX ORDER — SODIUM CHLORIDE 0.9 % (FLUSH) 0.9 %
10 SYRINGE (ML) INJECTION PRN
Status: DISCONTINUED | OUTPATIENT
Start: 2020-06-14 | End: 2020-06-17 | Stop reason: HOSPADM

## 2020-06-14 RX ORDER — PROMETHAZINE HYDROCHLORIDE 25 MG/1
12.5 TABLET ORAL EVERY 6 HOURS PRN
Status: DISCONTINUED | OUTPATIENT
Start: 2020-06-14 | End: 2020-06-17 | Stop reason: HOSPADM

## 2020-06-14 RX ORDER — SODIUM CHLORIDE 0.9 % (FLUSH) 0.9 %
10 SYRINGE (ML) INJECTION EVERY 12 HOURS SCHEDULED
Status: DISCONTINUED | OUTPATIENT
Start: 2020-06-14 | End: 2020-06-17 | Stop reason: HOSPADM

## 2020-06-14 RX ADMIN — ONDANSETRON 4 MG: 2 INJECTION INTRAMUSCULAR; INTRAVENOUS at 15:52

## 2020-06-14 RX ADMIN — MORPHINE SULFATE 4 MG: 4 INJECTION, SOLUTION INTRAMUSCULAR; INTRAVENOUS at 15:52

## 2020-06-14 RX ADMIN — MEROPENEM 1 G: 1 INJECTION, POWDER, FOR SOLUTION INTRAVENOUS at 18:48

## 2020-06-14 RX ADMIN — IOPAMIDOL 75 ML: 755 INJECTION, SOLUTION INTRAVENOUS at 15:31

## 2020-06-14 ASSESSMENT — ENCOUNTER SYMPTOMS
SHORTNESS OF BREATH: 0
VOMITING: 0
VISUAL CHANGE: 0
ABDOMINAL PAIN: 1
DIARRHEA: 0

## 2020-06-14 ASSESSMENT — PAIN DESCRIPTION - ORIENTATION: ORIENTATION: POSTERIOR

## 2020-06-14 ASSESSMENT — PAIN DESCRIPTION - DESCRIPTORS: DESCRIPTORS: CONSTANT;THROBBING

## 2020-06-14 ASSESSMENT — PAIN SCALES - GENERAL
PAINLEVEL_OUTOF10: 8
PAINLEVEL_OUTOF10: 0
PAINLEVEL_OUTOF10: 0

## 2020-06-14 ASSESSMENT — PAIN DESCRIPTION - LOCATION: LOCATION: NECK

## 2020-06-14 NOTE — ED PROVIDER NOTES
EKG interpreted by No att. providers found:    Rhythm: normal sinus   Rate: 89  Ectopy: none  Conduction: normal  ST Segments: no acute change  T Waves: no acute change       3:26 PM EDT  Spoke with Dr. Juan Jalloh radiology. He reports that the patient's CAT scan shows no hemorrhage and no acute findings. I have personally performed and/or participated in the history, exam and medical decision making and spent time face to face with the patient and agree with all pertinent clinical information. My history and physical revealed: This patient presented with lightheadedness and dizziness. She states that this began last night and has been constant. She has been having problems with upper abdominal discomfort as well. Patient recently finished chemotherapy for non-Hodgkin's lymphoma. She states they recently removed her port. She states she is to schedule an outpatient CAT scan of her chest abdomen and pelvis. She has not been aware of any fever she has not a headache she has not had any difficulty speaking chewing or swallowing. She describes the sensation as an easy sensation. No weakness on one arm or 1 leg. She states she has chronic edema to the lower extremities and that is unchanged. She is on Coumadin and has had DVTs and PEs in the past.  She has not had any fevers no earaches or sore throat no cough or cold no vomiting. She states a week ago she had an episode of diarrhea. No dysuria. She states that when she ate today she had a biscuit and it made her stomach hurt. Exam-awake alert obese elderly female.   Pupils equal round reactive TMs are clear mouth is pink moist erythema neck states cranial facial sensation grossly intact her lungs are clear with good bilateral breath sounds no rales wheezes rhonchi retractions or stridor heart is regular rhythm normal sinus 2 without gallop rub murmur abdomen is soft with lower abdominal tenderness at this moment she does not have the upper abdominal tenderness no guarding rebound or mass extremities have 3+ edema no cords or Homans. She is able to lift each leg equally up off of the bed is equal dorsi and plantar flexion equal hand grasp and flexion extension. Alert, nontoxic in appearance   Vitals viewed. I was present during the key portions of the examination and treatment of the patient. Case discussed with MLP. Concur with treatment and disposition. BP (!) 139/54   Pulse 82   Temp 98.3 °F (36.8 °C) (Oral)   Resp 21   Ht 5' 2\" (1.575 m)   Wt 262 lb (118.8 kg)   LMP  (LMP Unknown)   SpO2 100%   BMI 47.92 kg/m²     I discussed with the patient admission to the hospital for further evaluation of her symptoms. She would like to be admitted to [facility]. I discussed with her that at this time I did not believe that [facility] had neurology available. I will contact them check on the status of neurology by telemedicine. .    I did perfect serve Dr. Narda Geller who is the hospitalist on today. He messages that they are not  doing telemedicine. Plan will be to complete the work-up and admit the patient to [facility]. All entries by Ella Diaz are made while acting as a scribe for Konrad Velazco MD.    This dictation was generated by voice recognition computer software. Although all attempts are made to edit the dictation for accuracy, there may be errors in the transcription that are not intended.          Konrad Velazco MD  06/14/20 [id number]

## 2020-06-14 NOTE — ED NOTES
Pt returned to tx rm via [facility] without incident or c/o's. Cardiac monitor applied.  Denies aphagia, or further c/o's     Christen IVAN Yoon  06/14/20 1520

## 2020-06-14 NOTE — ED NOTES
[facility] here to transport pt.  Pt remains alert and without c/o's     Neo Royal RN  06/14/20 1919

## 2020-06-14 NOTE — ED PROVIDER NOTES
J44.9    ALBUTEROL SULFATE HFA (VENTOLIN HFA) 108 (90 BASE) MCG/ACT INHALER    Inhale 2 puffs into the lungs every 6 hours as needed for Wheezing    ASPIRIN 81 MG CHEWABLE TABLET    Take 81 mg by mouth once    CHOLECALCIFEROL (VITAMIN D-3 PO)    Take 2,000 Units by mouth daily     LACTOBACILLUS (CULTURELLE) CAPSULE    Take 1 capsule by mouth 2 times daily    MELATONIN 3 MG TABS TABLET    Take 1 tablet by mouth nightly as needed (insomnia)    OXYGEN    4.5 L    SPIRONOLACTONE (ALDACTONE) 25 MG TABLET    Take 1 tablet by mouth daily    TORSEMIDE (DEMADEX) 20 MG TABLET    Take 1 tablet by mouth 2 times daily    TRAZODONE (DESYREL) 50 MG TABLET    Take 1 tablet by mouth nightly    WARFARIN (COUMADIN) 5 MG TABLET    TAKE ONE TABLET BY MOUTH DAILY         ALLERGIES     Crestor [rosuvastatin]; Levofloxacin; Lipitor; Rocephin in dextrose [ceftriaxone sodium in dextrose]; Zocor [simvastatin];  Codeine; and Pravastatin    FAMILYHISTORY       Family History   Problem Relation Age of Onset   Irma Cordero Cancer Mother 43        lung cancer    Diabetes Sister     Cancer Daughter 43        lung cancer    Asthma Son     Hypertension Son     Asthma Grandchild     Hypertension Son     Emphysema Neg Hx     Heart Failure Neg Hx           SOCIAL HISTORY       Social History     Socioeconomic History    Marital status:      Spouse name: None    Number of children: None    Years of education: None    Highest education level: None   Occupational History    Occupation: retired   Social Needs    Financial resource strain: Not very hard    Food insecurity     Worry: Never true     Inability: Never true    Transportation needs     Medical: No     Non-medical: No   Tobacco Use    Smoking status: Former Smoker     Packs/day: 0.33     Years: 50.00     Pack years: 16.50     Types: Cigarettes     Last attempt to quit: 2015     Years since quittin.9    Smokeless tobacco: Never Used   Substance and Sexual Activity    Alcohol use: Not Currently     Alcohol/week: 0.0 standard drinks     Frequency: Never    Drug use: No    Sexual activity: Not Currently   Lifestyle    Physical activity     Days per week: 0 days     Minutes per session: 0 min    Stress: To some extent   Relationships    Social connections     Talks on phone: None     Gets together: Once a week     Attends Yarsani service: Never     Active member of club or organization: No     Attends meetings of clubs or organizations: Never     Relationship status:     Intimate partner violence     Fear of current or ex partner: None     Emotionally abused: None     Physically abused: None     Forced sexual activity: None   Other Topics Concern    None   Social History Narrative    None       SCREENINGS   NIH Stroke Scale  Interval: Baseline  Level of Consciousness (1a. ): Alert  LOC Questions (1b. ): Answers both correctly  LOC Commands (1c. ): Performs both tasks correctly  Best Gaze (2. ): Normal  Visual (3. ): No visual loss  Facial Palsy (4. ): Normal symmetrical movement  Motor Arm, Left (5a. ): No drift  Motor Arm, Right (5b. ): No drift  Motor Leg, Left (6a. ): No drift  Motor Leg, Right (6b. ): No drift  Limb Ataxia (7. ): Absent  Sensory (8. ): Normal  Best Language (9. ): No aphasia  Dysarthria (10. ): Normal  Extinction and Inattention (11): No abnormality  Total: 0Glasgow Coma Scale  Eye Opening: Spontaneous  Best Verbal Response: Oriented  Best Motor Response: Obeys commands  Kirstin Coma Scale Score: 15        PHYSICAL EXAM    (up to 7 for level 4, 8 or more for level 5)     ED Triage Vitals [06/14/20 1429]   BP Temp Temp Source Pulse Resp SpO2 Height Weight   124/67 98.3 °F (36.8 °C) Oral 92 20 99 % 5' 2\" (1.575 m) 262 lb (118.8 kg)       Physical Exam  Vitals signs and nursing note reviewed. Constitutional:       Appearance: She is well-developed. She is obese. Interventions: Nasal cannula in place. HENT:      Head: Normocephalic and atraumatic. deficit. Sensory: Sensation is intact. No sensory deficit. Motor: Motor function is intact. No weakness or abnormal muscle tone. Coordination: Coordination is intact. Coordination normal. Finger-Nose-Finger Test and Heel to X [facility] Test normal.      Comments: Alert and oriented x3. Turning her head quickly brings on dizziness. Cranial facial musculature and sensation are intact. the pt has normal ROM neck with no nuchal rigidity or meningismus. Pt has intact finger to nose. Intact sensation. Equal strength 5 out of 5 symmetric upper and lower extremities. Patient holds each extremity up off the bed arms to a count of 10 and each leg to a count of 5. Touches each heel to opposite shin and runs it down without difficulty. She does have pain with raising of the left leg states she has a bad knee and hip. Psychiatric:         Behavior: Behavior normal.           NIH Stroke Scale       1a  Level of consciousness: 0=alert; keenly responsive   1b. LOC questions:  0=Performs both tasks correctly   1c. LOC commands: 0=Performs both tasks correctly   2. Best Gaze: 0=normal   3. Visual: 0=No visual loss   4. Facial Palsy: 0=Normal symmetric movement   5a. Motor left arm: 0=No drift, limb holds 90 (or 45) degrees for full 10 seconds   5b. Motor right arm: 0=No drift, limb holds 90 (or 45) degrees for full 10 seconds   6a. motor left le=No drift, limb holds 90 (or 45) degrees for full 10 seconds   6b  Motor right le=No drift, limb holds 90 (or 45) degrees for full 10 seconds   7. Limb Ataxia: 0=Absent   8. Sensory: 0=Normal; no sensory loss   9. Best Language:  0=No aphasia, normal   10. Dysarthria: 0=Normal   11.  Extinction and Inattention: 0=No abnormality         Total:  0       DIAGNOSTIC RESULTS   LABS:    Labs Reviewed   CBC WITH AUTO DIFFERENTIAL - Abnormal; Notable for the following components:       Result Value    Lymphocytes Absolute 0.6 (*)     All other components within normal hemodynamically significant stenosis by NASCET criteria. Atherosclerotic disease involving the bilateral carotid bifurcation. Retropharyngeal course of the carotids. VERTEBRAL ARTERIES: No dissection, arterial injury, or significant stenosis. SOFT TISSUES: No significant soft tissue abnormality of the neck. BONES: Multilevel degenerative changes of the cervical spine. CTA HEAD: ANTERIOR CIRCULATION: Atherosclerotic disease involving the intracranial internal carotid arteries. No significant stenosis of the intracranial internal carotid, anterior cerebral, or middle cerebral arteries. No aneurysm. POSTERIOR CIRCULATION: Fetal or near fetal origin of the bilateral posterior cerebral arteries. No significant stenosis of the vertebral, basilar, or posterior cerebral arteries. No aneurysm. No high-grade stenosis or focal occlusion involving the intracranial or cervical vasculature. No evidence of acute dissection. No evidence of aneurysm. PROCEDURES   Unless otherwise noted below, none     Procedures    CRITICAL CARE TIME   N/A    CONSULTS:  None      EMERGENCY DEPARTMENT COURSE and DIFFERENTIAL DIAGNOSIS/MDM:   Vitals:    Vitals:    06/14/20 1745 06/14/20 1800 06/14/20 1815 06/14/20 1830   BP: (!) 120/47 (!) 110/54 (!) 117/54 (!) 114/47   Pulse: 80 79 81 82   Resp: 14 13 14 14   Temp:       TempSrc:       SpO2: 95% 97% 97% 97%   Weight:       Height:           Patient was given thefollowing medications:  Medications   morphine sulfate (PF) injection 4 mg (4 mg Intravenous Given 6/14/20 1552)   ondansetron (ZOFRAN) injection 4 mg (4 mg Intravenous Given 6/14/20 1552)   iopamidol (ISOVUE-370) 76 % injection 75 mL (75 mLs Intravenous Given 6/14/20 1531)   meropenem (MERREM) 1 g in sterile water 20 mL IV syringe (1 g Intravenous Given 6/14/20 1848)       2:52 PM EDT  I spoke with the stroke team Dr. Ron Forbes and discussed case. Patient not a candidate for TPA. No intervention at this time.   In agreement

## 2020-06-15 ENCOUNTER — APPOINTMENT (OUTPATIENT)
Dept: MRI IMAGING | Age: 80
DRG: 068 | End: 2020-06-15
Attending: INTERNAL MEDICINE
Payer: MEDICARE

## 2020-06-15 LAB
ANION GAP SERPL CALCULATED.3IONS-SCNC: 7 MMOL/L (ref 3–16)
BUN BLDV-MCNC: 13 MG/DL (ref 7–20)
CALCIUM SERPL-MCNC: 8.8 MG/DL (ref 8.3–10.6)
CHLORIDE BLD-SCNC: 98 MMOL/L (ref 99–110)
CHOLESTEROL, TOTAL: 231 MG/DL (ref 0–199)
CO2: 37 MMOL/L (ref 21–32)
CREAT SERPL-MCNC: 0.6 MG/DL (ref 0.6–1.2)
ESTIMATED AVERAGE GLUCOSE: 134.1 MG/DL
GFR AFRICAN AMERICAN: >60
GFR NON-AFRICAN AMERICAN: >60
GLUCOSE BLD-MCNC: 126 MG/DL (ref 70–99)
HBA1C MFR BLD: 6.3 %
HCT VFR BLD CALC: 35.7 % (ref 36–48)
HDLC SERPL-MCNC: 53 MG/DL (ref 40–60)
HEMOGLOBIN: 11.6 G/DL (ref 12–16)
INR BLD: 1.23 (ref 0.86–1.14)
LDL CHOLESTEROL CALCULATED: 131 MG/DL
MCH RBC QN AUTO: 28.6 PG (ref 26–34)
MCHC RBC AUTO-ENTMCNC: 32.5 G/DL (ref 31–36)
MCV RBC AUTO: 88.1 FL (ref 80–100)
PDW BLD-RTO: 14.3 % (ref 12.4–15.4)
PLATELET # BLD: 252 K/UL (ref 135–450)
PMV BLD AUTO: 6.3 FL (ref 5–10.5)
POTASSIUM REFLEX MAGNESIUM: 3.8 MMOL/L (ref 3.5–5.1)
PROTHROMBIN TIME: 14.3 SEC (ref 10–13.2)
RBC # BLD: 4.05 M/UL (ref 4–5.2)
SODIUM BLD-SCNC: 142 MMOL/L (ref 136–145)
TRIGL SERPL-MCNC: 234 MG/DL (ref 0–150)
VLDLC SERPL CALC-MCNC: 47 MG/DL
WBC # BLD: 4.9 K/UL (ref 4–11)

## 2020-06-15 PROCEDURE — 6360000004 HC RX CONTRAST MEDICATION: Performed by: NURSE PRACTITIONER

## 2020-06-15 PROCEDURE — 97165 OT EVAL LOW COMPLEX 30 MIN: CPT

## 2020-06-15 PROCEDURE — 2580000003 HC RX 258: Performed by: NURSE PRACTITIONER

## 2020-06-15 PROCEDURE — 6370000000 HC RX 637 (ALT 250 FOR IP)

## 2020-06-15 PROCEDURE — 92610 EVALUATE SWALLOWING FUNCTION: CPT

## 2020-06-15 PROCEDURE — 1200000000 HC SEMI PRIVATE

## 2020-06-15 PROCEDURE — 6370000000 HC RX 637 (ALT 250 FOR IP): Performed by: INTERNAL MEDICINE

## 2020-06-15 PROCEDURE — 97116 GAIT TRAINING THERAPY: CPT

## 2020-06-15 PROCEDURE — 85610 PROTHROMBIN TIME: CPT

## 2020-06-15 PROCEDURE — 83036 HEMOGLOBIN GLYCOSYLATED A1C: CPT

## 2020-06-15 PROCEDURE — 97110 THERAPEUTIC EXERCISES: CPT

## 2020-06-15 PROCEDURE — 6370000000 HC RX 637 (ALT 250 FOR IP): Performed by: NURSE PRACTITIONER

## 2020-06-15 PROCEDURE — 70551 MRI BRAIN STEM W/O DYE: CPT

## 2020-06-15 PROCEDURE — 2700000000 HC OXYGEN THERAPY PER DAY

## 2020-06-15 PROCEDURE — C8929 TTE W OR WO FOL WCON,DOPPLER: HCPCS

## 2020-06-15 PROCEDURE — 85027 COMPLETE CBC AUTOMATED: CPT

## 2020-06-15 PROCEDURE — 6360000002 HC RX W HCPCS: Performed by: NURSE PRACTITIONER

## 2020-06-15 PROCEDURE — 94761 N-INVAS EAR/PLS OXIMETRY MLT: CPT

## 2020-06-15 PROCEDURE — 80061 LIPID PANEL: CPT

## 2020-06-15 PROCEDURE — 99223 1ST HOSP IP/OBS HIGH 75: CPT | Performed by: PSYCHIATRY & NEUROLOGY

## 2020-06-15 PROCEDURE — 80048 BASIC METABOLIC PNL TOTAL CA: CPT

## 2020-06-15 PROCEDURE — 97530 THERAPEUTIC ACTIVITIES: CPT

## 2020-06-15 PROCEDURE — 36415 COLL VENOUS BLD VENIPUNCTURE: CPT

## 2020-06-15 PROCEDURE — 97161 PT EVAL LOW COMPLEX 20 MIN: CPT

## 2020-06-15 RX ORDER — ACETAMINOPHEN 325 MG/1
TABLET ORAL
Status: COMPLETED
Start: 2020-06-15 | End: 2020-06-15

## 2020-06-15 RX ORDER — ACETAMINOPHEN 325 MG/1
325 TABLET ORAL EVERY 6 HOURS PRN
Status: DISCONTINUED | OUTPATIENT
Start: 2020-06-15 | End: 2020-06-17 | Stop reason: HOSPADM

## 2020-06-15 RX ORDER — WARFARIN SODIUM 5 MG/1
5 TABLET ORAL
Status: COMPLETED | OUTPATIENT
Start: 2020-06-15 | End: 2020-06-15

## 2020-06-15 RX ADMIN — Medication 10 ML: at 10:13

## 2020-06-15 RX ADMIN — ACETAMINOPHEN 325 MG: 325 TABLET ORAL at 16:31

## 2020-06-15 RX ADMIN — Medication 10 ML: at 20:24

## 2020-06-15 RX ADMIN — TRAZODONE HYDROCHLORIDE 50 MG: 50 TABLET ORAL at 00:09

## 2020-06-15 RX ADMIN — Medication 10 ML: at 00:10

## 2020-06-15 RX ADMIN — WARFARIN SODIUM 5 MG: 5 TABLET ORAL at 16:32

## 2020-06-15 RX ADMIN — WARFARIN SODIUM 5 MG: 5 TABLET ORAL at 00:09

## 2020-06-15 RX ADMIN — PERFLUTREN 1.65 MG: 6.52 INJECTION, SUSPENSION INTRAVENOUS at 08:34

## 2020-06-15 RX ADMIN — ASPIRIN 81 MG: 81 TABLET ORAL at 10:11

## 2020-06-15 RX ADMIN — ENOXAPARIN SODIUM 40 MG: 40 INJECTION SUBCUTANEOUS at 10:11

## 2020-06-15 RX ADMIN — TRAZODONE HYDROCHLORIDE 50 MG: 50 TABLET ORAL at 21:41

## 2020-06-15 RX ADMIN — TORSEMIDE 20 MG: 20 TABLET ORAL at 10:10

## 2020-06-15 RX ADMIN — SPIRONOLACTONE 25 MG: 25 TABLET ORAL at 10:10

## 2020-06-15 ASSESSMENT — PAIN DESCRIPTION - LOCATION
LOCATION: HIP;KNEE
LOCATION: KNEE;HIP
LOCATION: SHOULDER

## 2020-06-15 ASSESSMENT — PAIN - FUNCTIONAL ASSESSMENT
PAIN_FUNCTIONAL_ASSESSMENT: PREVENTS OR INTERFERES SOME ACTIVE ACTIVITIES AND ADLS
PAIN_FUNCTIONAL_ASSESSMENT: PREVENTS OR INTERFERES SOME ACTIVE ACTIVITIES AND ADLS

## 2020-06-15 ASSESSMENT — PAIN DESCRIPTION - PAIN TYPE
TYPE: CHRONIC PAIN

## 2020-06-15 ASSESSMENT — PAIN DESCRIPTION - ORIENTATION
ORIENTATION: LEFT

## 2020-06-15 ASSESSMENT — PAIN SCALES - GENERAL
PAINLEVEL_OUTOF10: 7
PAINLEVEL_OUTOF10: 8

## 2020-06-15 NOTE — PROGRESS NOTES
RESPIRATORY THERAPY ASSESSMENT    Name:  [facility] Record Number:  [id number]  Age: 70 y.o. Gender: female  : 1940  Today's Date:  6/15/2020  Room:  0537/0537-01    Assessment     Is the patient being admitted for a COPD or Asthma exacerbation? No   (If yes the patient will be seen every 4 hours for the first 24 hours and then reassessed)    Patient Admission Diagnosis      Allergies  Allergies   Allergen Reactions    Crestor [Rosuvastatin]      Muscle aches    Levofloxacin Other (See Comments)     Achiness    Lipitor      Muscle aches    Rocephin In Dextrose [Ceftriaxone Sodium In Dextrose]      Rash,itching    Zocor [Simvastatin]      Muscle aches    Codeine Itching and Nausea And Vomiting    Pravastatin Nausea And Vomiting       Minimum Predicted Vital Capacity:     n/a          Actual Vital Capacity:      Not assessed, pt sleeping              Pulmonary History:COPD  Home Oxygen Therapy:  4.5 liters/min via nasal cannula  Home Respiratory Therapy: per chart, Albuterol PRN   Current Respiratory Therapy:  Albuterol PRN          Respiratory Severity Index(RSI)   Patients with orders for inhalation medications, oxygen, or any therapeutic treatment modality will be placed on Respiratory Protocol. They will be assessed with the first treatment and at least every 72 hours thereafter. The following severity scale will be used to determine frequency of treatment intervention.     Smoking History: Pulmonary Disease or Smoking History, Greater than 15 pack year = 2    Social History  Social History     Tobacco Use    Smoking status: Former Smoker     Packs/day: 0.33     Years: 50.00     Pack years: 16.50     Types: Cigarettes     Last attempt to quit: 2015     Years since quittin.9    Smokeless tobacco: Never Used   Substance Use Topics    Alcohol use: Not Currently     Alcohol/week: 0.0 standard drinks     Frequency: Never    Drug use: No       Recent Surgical

## 2020-06-15 NOTE — PROGRESS NOTES
Pharmacy to Dose Warfarin    Dx: afib  Goal INR range 2-3   Home Warfarin dose: 5mg daily  Enoxaparin 40mg daily ordered    Date  INR  Warfarin  6/14                1.36                   5mg  6/15                1.23                   5mg    Recommend Warfarin 5mg tonight x1. Daily INR ordered. Rx will continue to manage therapy per consult order.   Sonia Diaz, PharmD 6/15/2020  9:36 AM

## 2020-06-15 NOTE — H&P
Hospital Medicine History & Physical      PCP: TURNER Villalobos CNP    Date of Admission: 6/14/2020    Date of Service: Pt seen/examined on 6/14/2020 and Admitted to Inpatient with expected LOS greater than two midnights due to medical therapy. Chief Complaint: Dizziness    History Of Present Illness:      70 y.o. female, with past medical history of morbid obesity, CHF, COPD, PE and PAF, who was a direct admit from [address] to [facility] with dizziness. History obtained from the patient and review of EMR. The patient stated last night around 11 PM she was sitting watching the news when she began feeling dizzy, lightheaded and unsteady. She stated she had gotten up and felt \"off balance\" when she would walk. The patient stated she eventually went to bed, but when she got up at 9:00 this morning she immediately began feeling dizzy again. She stated her dizziness is constant, but worse with movement. The patient stated it does not make a difference if she is sitting, lying and/or standing and nothing makes it better. In the emergency room the patient had a CT of her head that revealed no acute intracranial abnormality. She also had a CTA of her head and neck that revealed No high-grade stenosis or focal occlusion involving the intracranial or cervical.  A urinalysis was obtained that was negative for infection. The patient was ultimately transferred to [facility] for further evaluation. An MRI of her brain and neurology consult has been ordered for a. m. The patient denied any other associated symptoms as well as any aggravating and/or alleviating factors. At the time of this assessment, the patient was resting comfortably in bed. She currently denies any chest pain, back pain, abdominal pain, shortness of breath, numbness, tingling, N/V/C/D, fever and/or chills.      Past Medical History:          Diagnosis Date    Acute exacerbation of chronic obstructive pulmonary disease (COPD) has a 16.50 pack-year smoking history. She has never used smokeless tobacco.  ETOH:   reports previous alcohol use. E-Cigarettes Vaping or Juuling     Questions Responses    Vaping Use Never User    Start Date     Does device contain nicotine? Never    Quit Date     Vaping Type         Family History:      Reviewed in detail. Positive as follows:        Problem Relation Age of Onset    Cancer Mother 43        lung cancer    Diabetes Sister     Cancer Daughter 43        lung cancer    Asthma Son     Hypertension Son     Asthma Grandchild     Hypertension Son     Emphysema Neg Hx     Heart Failure Neg Hx      REVIEW OF SYSTEMS:   Pertinent positives as noted in the HPI. All other systems reviewed and negative. PHYSICAL EXAM PERFORMED:    /78   Pulse 88   Temp 98.2 °F (36.8 °C) (Oral)   Resp 18   Ht 5' 2\" (1.575 m)   Wt 264 lb (119.7 kg)   LMP  (LMP Unknown)   SpO2 97%   BMI 48.29 kg/m²     General appearance: Pleasant, obese female in no apparent distress, appears stated age and cooperative. HEENT: Pupils equal, round, and reactive to light. Glasses extra ocular muscles intact. Conjunctivae/corneas clear. Neck: Supple, with full range of motion. No jugular venous distention. Trachea midline. Respiratory:  Normal respiratory effort. Clear to auscultation, bilaterally without Rales/Wheezes/Rhonchi. Cardiovascular:  Regular rate and rhythm with normal S1/S2 without murmurs, rubs or gallops. Abdomen: Soft, obese, round non-tender, non-distended with normal bowel sounds. Musculoskeletal:  No clubbing, cyanosis or edema bilaterally. Full range of motion without deformity. Skin: Skin color, texture, turgor normal.  No significant rashes or lesions. Neurologic:  Neurovascularly intact.  Cranial nerves: II-XII intact, grossly non-focal.  Psychiatric:  Alert and oriented, thought content appropriate, normal insight  Capillary Refill: Brisk,< 3 seconds   Peripheral Pulses: +2 palpable,

## 2020-06-15 NOTE — PROGRESS NOTES
Currently she does not have any pain. Medications:  Reviewed    Infusion Medications   Scheduled Medications    warfarin (COUMADIN) daily dosing (placeholder)   Other RX Placeholder    warfarin  5 mg Oral Once    spironolactone  25 mg Oral Daily    torsemide  20 mg Oral BID    traZODone  50 mg Oral Nightly    sodium chloride flush  10 mL Intravenous 2 times per day    enoxaparin  40 mg Subcutaneous Daily    aspirin  81 mg Oral Daily    Or    aspirin  300 mg Rectal Daily     PRN Meds: albuterol sulfate HFA, melatonin ER, sodium chloride flush, polyethylene glycol, promethazine **OR** ondansetron      Intake/Output Summary (Last 24 hours) at 6/15/2020 1301  Last data filed at 6/15/2020 0338  Gross per 24 hour   Intake --   Output 375 ml   Net -375 ml       Physical Exam Performed:    BP (!) 106/49   Pulse 83   Temp 98 °F (36.7 °C) (Oral)   Resp 16   Ht 5' 2\" (1.575 m)   Wt 256 lb 14.4 oz (116.5 kg)   LMP  (LMP Unknown)   SpO2 97%   BMI 46.99 kg/m²     General appearance: No apparent distress, appears stated age and cooperative. HEENT: Pupils equal, round, and reactive to light. Conjunctivae/corneas clear. Neck: Supple, with full range of motion. No jugular venous distention. Trachea midline. Respiratory:  Normal respiratory effort. Clear to auscultation, bilaterally without Rales/Wheezes/Rhonchi. Cardiovascular: Regular rate and rhythm with normal S1/S2 without murmurs, rubs or gallops. Abdomen: Soft, non-tender, non-distended with normal bowel sounds. Musculoskeletal: No clubbing, cyanosis , has trace edema bilaterally. Full range of motion without deformity. Skin: Skin color, texture, turgor normal.  No rashes or lesions. Neurologic:  Neurovascularly intact without any focal sensory/motor deficits.  Cranial nerves: II-XII intact, grossly non-focal.  Psychiatric: Alert and oriented, thought content appropriate, normal insight  Capillary Refill: Brisk,< 3 seconds   Peripheral Pulses: +2

## 2020-06-15 NOTE — PROGRESS NOTES
dysphagia. Pain:  Pain Assessment: Pt reported shoulder pain, RN aware    Reason for Referral  Luz Maria Wu was referred for a bedside swallow evaluation to assess the efficiency of her swallow function, identify signs and symptoms of aspiration and make recommendations regarding safe dietary consistencies, effective compensatory strategies, and safe eating environment. Impression  Dysphagia Diagnosis: Swallow function appears grossly intact  Dysphagia Outcome Severity Scale: Level 6: Within functional limits/Modified independence   Pt seen upright in chair, alert and agreeable to evaluation, RN OK'd SLP entry and evaluation. Pt currently on 4 L O2 NC (baseline per pt). Oral-motor exam grossly unremarkable, she completed timely volitional swallow. Pt observed with consecutive thin liquid trials via cup and straw and regular solids. Grossly timely swallow initiation noted, no overt s/s of aspiration/penetration noted throughout. Pt's O2 sats 94-96% throughout, RR consistent at 16-20/min. Pt denied globus sensation post-swallow with PO. Adequate A-P bolus transit with rotary chew noted with solid PO, no oral/lingual residue post-swallow. Recommend continuing pt's current Regular solid diet with thin liquids, meds whole with water. ST to continue to follow for 1-2 total F/Us to ensure diet tolerance, d/t pt's recent CXR findings of increased ASD. RN aware of recs. ST to continue to follow. Treatment Plan  Requires SLP Intervention: Yes  Duration/Frequency of Treatment: 1-2x/week for LOS  D/C Recommendations: To be determined       Recommended Diet and Intervention  Diet Solids Recommendation: Regular  Liquid Consistency Recommendation:  Thin  Recommended Form of Meds: PO  Recommendations: Dysphagia treatment  Therapeutic Interventions: Diet tolerance monitoring;Patient/Family education    Compensatory Swallowing Strategies  Compensatory Swallowing Strategies: Small bites/sips;Upright as possible for all oral intake;Remain upright for 30-45 minutes after meals;Eat/Feed slowly    Treatment/Goals  Short-term Goals  Timeframe for Short-term Goals: 2 days (6/17/20)  Long-term Goals  Timeframe for Long-term Goals: 3 days (6/18/20)  Goal 1: The pt will tolerate safest and least restrictive diet without s/s of aspiration. Dysphagia Goals: The patient will tolerate recommended diet without observed clinical signs of aspiration; The patient/caregiver will demonstrate understanding of compensatory strategies for improved swallowing safety. General  Chart Reviewed: Yes  Comments: Pt admitted d/t dizziness, diverticulitis. Pt has hx of pulmonary fibrosis, lymphoma, COPD, CHF, and PNA per chart. Behavior/Cognition: Alert; Cooperative;Pleasant mood  Respiratory Status: O2 via nasual cannula  O2 Device: Nasal cannula  Liters of Oxygen: 4 L  Communication Observation: Functional  Follows Directions: Simple  Dentition: Adequate  Patient Positioning: Upright in chair  Baseline Vocal Quality: Normal  Volitional Cough: Strong  Prior Dysphagia History: No hx of dysphagia per chart review. Consistencies Administered: Reg solid; Thin - cup; Thin - straw           Vision/Hearing  Vision  Vision: Impaired  Vision Exceptions: Wears glasses at all times  Hearing  Hearing: Exceptions to [facility]  Hearing Exceptions: Hard of hearing/hearing concerns    Oral Motor Deficits  Oral/Motor  Oral Motor: Within functional limits    Oral Phase Dysfunction  Oral phase of swallow grossly appears WNL. No oral phase deficits noted during evaluation. Indicators of Pharyngeal Phase Dysfunction   Pharyngeal Phase  Pharyngeal Phase: Exceptions  Indicators of Pharyngeal Phase Dysfunction  Decreased Laryngeal Elevation: All  Pharyngeal Phase   Pharyngeal: No overt s/s of aspiration/penetration noted throughout. Pt's O2 sats 94-96% throughout, RR consistent at 16-20/min. Pt denied globus sensation post-swallow with PO.

## 2020-06-15 NOTE — CARE COORDINATION
CASE MANAGEMENT INITIAL ASSESSMENT        Reviewed chart and met with patient today to inform of social work role, discuss dc options and plan of care, community resources and support. Explained Case Management role/services.      Family present: no family present; discussed with patient. Family supportive and help when able/as needed.    Primary contact information: Ronak Baltazar   Admit date/status: 6-14-20  Diagnosis: Dizziness  Is this a Readmission?:  N     Insurance: Medicare  Precert required for SNF -     N    3 night stay required - Y  Patient states preference for return home with home health care, is active with [facility],  when given choice of facilities and options; declines SNF option     Living arrangements, Adls, care needs, prior to admission: lives alone in own home; family supportive and able to help when needed      Transportation: Self/Others; does use [address] sometimes for transportation  [facility] [address] at home: [place], cane if needed    Walker__Cane__RTS__ BSC__Shower Chair__  02__ HHN__ CPAP__  BiPap__  Hospital Bed__ W/C___ Other__________     Services in the home and/or outpatient, prior to admission: active with [facility]; wants to resume service plan at [facility].      Dialysis Facility: N/A  · Name:  · Address:  · Dialysis Schedule:  · Phone:  · Fax:     PT/OT recs: await any recs; states she plans to return home at dc and wants resumption with [facility] Exemption Notification (HEN): Not needed for rehab unit     Barriers to discharge: None identified     Plan/comments: Patient wants to return home with referral for resumption of care with [facility].      ECOC on chart for MD signature

## 2020-06-16 LAB
INR BLD: 1.33 (ref 0.86–1.14)
PROTHROMBIN TIME: 15.5 SEC (ref 10–13.2)

## 2020-06-16 PROCEDURE — 6370000000 HC RX 637 (ALT 250 FOR IP): Performed by: NURSE PRACTITIONER

## 2020-06-16 PROCEDURE — 94761 N-INVAS EAR/PLS OXIMETRY MLT: CPT

## 2020-06-16 PROCEDURE — 99232 SBSQ HOSP IP/OBS MODERATE 35: CPT | Performed by: PSYCHIATRY & NEUROLOGY

## 2020-06-16 PROCEDURE — 6370000000 HC RX 637 (ALT 250 FOR IP): Performed by: INTERNAL MEDICINE

## 2020-06-16 PROCEDURE — 2700000000 HC OXYGEN THERAPY PER DAY

## 2020-06-16 PROCEDURE — 1200000000 HC SEMI PRIVATE

## 2020-06-16 PROCEDURE — 2580000003 HC RX 258: Performed by: NURSE PRACTITIONER

## 2020-06-16 PROCEDURE — 85610 PROTHROMBIN TIME: CPT

## 2020-06-16 PROCEDURE — 36415 COLL VENOUS BLD VENIPUNCTURE: CPT

## 2020-06-16 PROCEDURE — 6360000002 HC RX W HCPCS: Performed by: NURSE PRACTITIONER

## 2020-06-16 RX ORDER — TORSEMIDE 20 MG/1
20 TABLET ORAL DAILY
Status: DISCONTINUED | OUTPATIENT
Start: 2020-06-17 | End: 2020-06-17 | Stop reason: HOSPADM

## 2020-06-16 RX ORDER — ASPIRIN 81 MG/1
81 TABLET ORAL DAILY
Qty: 30 TABLET | Refills: 3 | Status: SHIPPED | OUTPATIENT
Start: 2020-06-17 | End: 2020-10-10 | Stop reason: ALTCHOICE

## 2020-06-16 RX ORDER — WARFARIN SODIUM 2.5 MG/1
TABLET ORAL
Qty: 12 TABLET | Refills: 0 | Status: ON HOLD | OUTPATIENT
Start: 2020-06-16 | End: 2020-10-10

## 2020-06-16 RX ADMIN — TRAZODONE HYDROCHLORIDE 50 MG: 50 TABLET ORAL at 22:12

## 2020-06-16 RX ADMIN — ASPIRIN 81 MG: 81 TABLET ORAL at 09:35

## 2020-06-16 RX ADMIN — WARFARIN SODIUM 7.5 MG: 2.5 TABLET ORAL at 18:13

## 2020-06-16 RX ADMIN — Medication 10 ML: at 09:37

## 2020-06-16 RX ADMIN — ENOXAPARIN SODIUM 40 MG: 40 INJECTION SUBCUTANEOUS at 09:35

## 2020-06-16 NOTE — PLAN OF CARE
Problem: Falls - Risk of:  Goal: Will remain free from falls  Description: Will remain free from falls  6/15/2020 2327 by Julia Law RN  Outcome: Ongoing  Note: Patient encouraged to call out when needing assistance. Bed locked and in the lowest position. Bedside table, pt belongings, along with call light within reach. Verbal understanding from pt.

## 2020-06-16 NOTE — PROGRESS NOTES
Janay Valentin  Neurology Follow-up  [facility]    Date of Service: 6/16/2020    Subjective:   CC: Follow up today regarding: Acute dizziness and ataxia. Events noted. Chart and lab reviewed. The patient had an MRI of the brain which showed no acute stroke. She continues to have intermittent dizziness but not worse compared to yesterday. She denies any new symptoms today. No headache, chest pain, weakness or numbness, neck or back pain or visual changes. Recent blood test was unremarkable.  and A1c 6.3. Echo showed normal EF with 55 to 60%. She denies headaches, dizziness, CP, N, V or visual changes. ROS : A 10-12 system review obtained and updated today and is unremarkable except as mentioned  in my interval history. family history includes Asthma in her grandchild and son; Cancer (age of onset: 43) in her daughter and mother; Diabetes in her sister; Hypertension in her son and son.     Past Medical History:   Diagnosis Date    Acute exacerbation of chronic obstructive pulmonary disease (COPD) ([address]     Acute on chronic respiratory failure (HCC)     Atrial fibrillation with rapid ventricular response ([address] 6/23/2015    B-cell lymphoma ([address]     CAP (community acquired pneumonia) 1/29/2015    CHF (congestive heart failure) ([address]     Diverticulitis     HCAP (healthcare-associated pneumonia)     IFG (impaired fasting glucose) 9/13/2018    Non-Hodgkins lymphoma ([address]     Panic disorder     Pulmonary embolism ([address]     6/15    Rectal vaginal fistula     [facility] spotted fever 2013    Sciatic nerve pain     Sepsis ([address] 6/22/2015    Tobacco abuse 6/22/2015    Vitamin D deficiency      Current Facility-Administered Medications   Medication Dose Route Frequency Provider Last Rate Last Dose    warfarin (COUMADIN) tablet 7.5 mg  7.5 mg Oral Once Danny Simms MD        warfarin (COUMADIN) daily dosing (placeholder)   Other RX Placeholder SmartBIM Tammy Petersen MD        acetaminophen (TYLENOL) tablet 325 mg  325 mg Oral Q6H PRN Mallory Reinoso MD   325 mg at 06/15/20 1631    albuterol sulfate  (90 Base) MCG/ACT inhaler 2 puff  2 puff Inhalation Q6H PRN TURNER Perez CNP        melatonin ER tablet 3 mg  3 mg Oral Nightly PRN TURNER Perez CNP        spironolactone (ALDACTONE) tablet 25 mg  25 mg Oral Daily TURNER Perez - CNP   25 mg at 06/15/20 1010    torsemide (DEMADEX) tablet 20 mg  20 mg Oral BID TURNER Perez CNP   20 mg at 06/15/20 1010    traZODone (DESYREL) tablet 50 mg  50 mg Oral Nightly TURNER Perez CNP   50 mg at 06/15/20 2141    sodium chloride flush 0.9 % injection 10 mL  10 mL Intravenous 2 times per day UTRNER Perez CNP   10 mL at 06/16/20 1998    sodium chloride flush 0.9 % injection 10 mL  10 mL Intravenous PRN TURNER Perez CNP        polyethylene glycol (GLYCOLAX) packet 17 g  17 g Oral Daily PRN TURNER Perez CNP        promethazine (PHENERGAN) tablet 12.5 mg  12.5 mg Oral Q6H PRN TURNER Perez CNP        Or    ondansetron (ZOFRAN) injection 4 mg  4 mg Intravenous Q6H PRN TURNER Perez CNP        enoxaparin (LOVENOX) injection 40 mg  40 mg Subcutaneous Daily TURNER Perez CNP   40 mg at 06/16/20 0935    aspirin EC tablet 81 mg  81 mg Oral Daily TURNER Perez - CNP   81 mg at 06/16/20 8514    Or    aspirin suppository 300 mg  300 mg Rectal Daily TURNER Perez CNP         Allergies   Allergen Reactions    Crestor [Rosuvastatin]      Muscle aches    Levofloxacin Other (See Comments)     Achiness    Lipitor      Muscle aches    Rocephin In Dextrose [Ceftriaxone Sodium In Dextrose]      Rash,itching    Zocor [Simvastatin]      Muscle aches    Codeine Itching and Nausea And Vomiting    Pravastatin Nausea And Vomiting      reports that she quit smoking about 4 years ago. Her smoking use included cigarettes.  She has a 16.50 06/15/2020    HCT 35.7 06/15/2020    MCV 88.1 06/15/2020    RDW 14.3 06/15/2020     06/15/2020     Lab Results   Component Value Date    INR 1.33 (H) 06/16/2020    PROTIME 15.5 (H) 06/16/2020       Neuroimaging was independently reviewed by me and discussed results with the patient  I reviewed blood testing and other test results and discussed results with the patient      Impression:  Acute dizziness, vertigo and ataxia. MRI of the brain showed no acute stroke. Possible acute peripheral vertigo or even vestibular neuronitis. Paroxysmal A. fib on Coumadin. low INR  Hypertension, not controlled  Hyperlipidemia  COPD  CHF  History of PE      Recommendation  Continue current supportive care  Coumadin and adjust dose according INR  Tele  PT and OT  Monitor BP and continue with the same BP medications. Hold BP medications if BP below 120/80  Statin? Stroke prevention was discussed  DC planning when medically stable and INR therapeutic  No further recommendation  Will sign off           Nestora Holstein, MD   [phone]      This dictation was generated by voice recognition computer software. Although all attempts are made to edit the dictation for accuracy, there may be errors in the transcription that are not intended.

## 2020-06-16 NOTE — DISCHARGE INSTR - COC
Continuity of Care Form    Patient Name: Eric Escobar   :  1940  MRN:  [id number]    Admit date:  2020  Discharge date:  ***    Code Status Order: Full Code   Advance Directives:   Advance Care Flowsheet Documentation     Date/Time Healthcare Directive Type of Healthcare Directive Copy in [address] Agent's Name Healthcare Agent's Phone Number    [phone]  Yes, patient has an advance directive for healthcare treatment  Durable power of  for health care;Living will  No, copy requested from medical records  --  [place]  --          Admitting Physician:  Demian Olsen MD  PCP: TURNER Santoyo - CNP    Discharging Nurse: [facility] Unit/Room#: 2543/3417-75  Discharging Unit Phone Number: ***    Emergency Contact:   Extended Emergency Contact Information  Primary Emergency Contact: [facility]  Address: [address] Phone: [phone]  Relation: Child  Secondary Emergency Contact: Allison Sanches 44 [address] Phone: [phone]  Relation: Child    Past Surgical History:  Past Surgical History:   Procedure Laterality Date    APPENDECTOMY      BRONCHOSCOPY N/A 2019    EBUS WF W/ANES.  performed by Alexandra Lao MD at [address] [place] [address]  2019    BRONCHOSCOPY ALVEOLAR LAVAGE performed by Alexandra Lao MD at [address] [place] [address]  2019    BRONCHOSCOPY BRUSHINGS performed by Alexandra Lao MD at [address] [place] [address]  2019    BRONCHOSCOPY/TRANSBRONCHIAL LUNG BIOPSY performed by Alexandra Lao MD at [address] [place] [address]  2019    BRONCHOSCOPY/TRANSBRONCHIAL NEEDLE BIOPSY performed by Alexandra Lao MD at [address] [place] [address]  2019    BRONCHOSCOPY/TRANSBRONCHIAL NEEDLE BIOPSY ADDL LOBE performed by Alexandra Lao MD at [facility]    CARDIAC CATHETERIZATION  2003    COLONOSCOPY  09/25/2019    diverticulosis, polyp sigmoid    COLONOSCOPY N/A 9/25/2019    COLONOSCOPY POLYPECTOMY SNARE/COLD BIOPSY performed by Sebas Fay MD at [address] [place] [address]      PORT SURGERY N/A 1/17/2020    PORT INSERTION performed by Marquita Cagle MD at [address] [place] [address] Blvd TUNNELED VENOUS PORT PLACEMENT Left 01/17/2020    Port Insertion ENRIQUETA Chest w. Dr Suarez [facility] 1/17/20 Vaccess CT Injectable [id number] lot [id number] 3/31/21       Immunization History:   Immunization History   Administered Date(s) Administered    Influenza Virus Vaccine 01/02/2013, 10/01/2013, 09/15/2014, 10/27/2016, 09/22/2017, 11/02/2018    Influenza Whole 10/01/2013, 09/15/2014, 09/15/2015    Influenza, Millie Dixons, IM, (6 mo and older Fluzone, Flulaval, Fluarix and 3 yrs and older Afluria) 10/27/2016, 09/22/2017, 11/02/2018    Influenza, Millie Dixons, IM, PF (6 mo and older Fluzone, Flulaval, Fluarix, and 3 yrs and older Afluria) 09/04/2015    Influenza, Millie Dixons, Recombinant, IM PF (Flublok 18 yrs and older) 11/01/2019    Pneumococcal Conjugate 13-valent (Qgkbqjv27) 09/25/2017    Pneumococcal Conjugate 7-valent (Prevnar7) 09/01/2009    Pneumococcal Polysaccharide ([id number]) 01/29/2015    Zoster Live (Zostavax) 05/30/2013       Active Problems:  Patient Active Problem List   Diagnosis Code    COPD exacerbation J44.1    Chronic respiratory failure with hypoxia ([address] J96.11    Anxiety F41.9    Hyperlipidemia E78.5    Diverticulosis K57.90    COPD, severe ([address] J44.9    Abnormal chest CT R93.89    Vitamin D deficiency E55.9    Abnormal CXR R93.89    Other pulmonary embolism without acute cor pulmonale (HCC) I26.99    Venous (peripheral) insufficiency I87.2    Lymphedema I89.0    Swelling of limb M79.89    Chronic angle-closure glaucoma of both eyes H40.2230    Arthritis of hips and knees M19.90    Obesity E66.9    Shortness of breath F20.79    Diastolic dysfunction M66.65    Acute on chronic respiratory failure with hypoxia ([facility]) J96.21    Bilateral edema of lower extremity R60.0    Chest pain R07.9    Paroxysmal atrial fibrillation with RVR ([facility]) I48.0    Erythrocytosis D75.1    IFG (impaired fasting glucose) R73.01    Lung mass R91.8    Mediastinal lymphadenopathy R59.0    Atrial fibrillation, chronic I48.20    COPD with acute exacerbation ([facility]) J44.1    Pulmonary nodule R91.1    Chronic hypoxemic respiratory failure ([facility]) J96.11    Gram-negative pneumonia ([facility]) J15.6    Infection due to Stenotrophomonas maltophilia A49.8    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 ([facility]) C83.02    Non-Hodgkin lymphoma ([facility]) C85.90    PVD (peripheral vascular disease) with claudication ([facility]) I73.9    Carotid stenosis I65.29    Morbid obesity with BMI of 45.0-49.9, adult ([facility]) E66.01, Z68.42    Dizziness R42    TIA (transient ischemic attack) G45.9    CHF (congestive heart failure) ([facility]) I50.9    HTN (hypertension), benign I10    Dyslipidemia E78.5       Isolation/Infection:   Isolation          No Isolation        Patient Infection Status     None to display          Nurse Assessment:  Last Vital Signs: BP (!) 114/44   Pulse 85   Temp 97.7 °F (36.5 °C) (Oral)   Resp 20   Ht 5' 2\" (1.575 m)   Wt 256 lb 14.4 oz (116.5 kg)   LMP  (LMP Unknown)   SpO2 (!) 87%   BMI 46.99 kg/m²     Last documented pain score (0-10 scale): Pain Level: 8  Last Weight:   Wt Readings from Last 1 Encounters:   06/15/20 256 lb 14.4 oz (116.5 kg)     Mental Status:  {IP PT MENTAL STATUS:}    IV Access:  {[facility] ACCESS:338199866}    Nursing Mobility/ADLs:  Walking   {CHP DME VXX}  Transfer  {CHP DME HUUY:819863156}  Bathing  {CHP DME YENS:312929065}  Dressing  {CHP DME PNYP:675501880}  Toileting  {CHP DME EBWP:059731991}  Feeding  {CHP DME STTX:239112140}  Med Admin  {GISELE BALDWIN INR is equal to 2 or more than 2, possible Coumadin clinic follow-up follow-up with PCP, monitor BMP advised to monitor blood pressure and keep a log. And follow-up with PCP in 1 week    Physician Certification: I certify the above information and transfer of Deyanira Moffett  is necessary for the continuing treatment of the diagnosis listed and that she requires [address] for less 30 days.      Update Admission H&P: No change in H&P    PHYSICIAN SIGNATURE:  Electronically signed by Joe Solorio MD on 6/16/20 at 12:35 PM EDT  Electronically signed by Joe Solorio MD on 6/17/2020 at 1:55 PM

## 2020-06-16 NOTE — PROGRESS NOTES
Nutrition Education    Type and Reason for Visit: Initial, Patient Education    Nutrition Assessment:    Pt seen per [facility] for CHF diet education. Provided pt with written and verbal instruction on HF nutrition therapy. Discussed low sodium diet, daily weights, and fluid restriction. Pt voiced understanding. Reported consuming many high sodium foods. We discussed lower sodium substitutions. Pt motivated to make dietary changes. Time spent: 10 minutes    · Verbally reviewed information with Patient  · Written educational materials provided. · Contact name and number provided. · Refer to Patient Education activity for more details. Electronically signed by Derian Velázquez RD, LD on 6/16/20 at 1:18 PM EDT    Contact Number: [phone]

## 2020-06-16 NOTE — CARE COORDINATION
[facility]    DC order noted, all docs needed have been faxed to [facility] for home care services.     Home care to see patient within 24-48 hrs    Otis Zimmerman RN, BSN CTN  [facility] [phone]

## 2020-06-16 NOTE — DISCHARGE SUMMARY
exacerbation  -continue inhaler  -oxygen therapy protocol if needed     PAF, stable  -currently in sinus rhythm  -tele monitoring  -On Coumadin             Physical Exam Performed:     BP (!) 114/44   Pulse 85   Temp 97.7 °F (36.5 °C) (Oral)   Resp 20   Ht 5' 2\" (1.575 m)   Wt 256 lb 14.4 oz (116.5 kg)   LMP  (LMP Unknown)   SpO2 (!) 87%   BMI 46.99 kg/m²       General appearance:  No apparent distress, appears stated age and cooperative. HEENT:  Normal cephalic, atraumatic without obvious deformity. Pupils equal, round, and reactive to light. Extra ocular muscles intact. Conjunctivae/corneas clear. Neck: Supple, with full range of motion. No jugular venous distention. Trachea midline. Respiratory:  Normal respiratory effort. Clear to auscultation, bilaterally without Rales/Wheezes/Rhonchi. Cardiovascular:  Regular rate and rhythm with normal S1/S2 without murmurs, rubs or gallops. Abdomen: Soft, non-tender, non-distended with normal bowel sounds. Musculoskeletal:  No clubbing, cyanosis or edema bilaterally. Full range of motion without deformity. Skin: Skin color, texture, turgor normal.  No rashes or lesions. Neurologic:  Neurovascularly intact without any focal sensory/motor deficits. Cranial nerves: II-XII intact, grossly non-focal.  Psychiatric:  Alert and oriented, thought content appropriate, normal insight  Capillary Refill: Brisk,< 3 seconds   Peripheral Pulses: +2 palpable, equal bilaterally       Labs:  For convenience and continuity at follow-up the following most recent labs are provided:      CBC:    Lab Results   Component Value Date    WBC 4.9 06/15/2020    HGB 11.6 06/15/2020    HCT 35.7 06/15/2020     06/15/2020       Renal:    Lab Results   Component Value Date     06/15/2020    K 3.8 06/15/2020    CL 98 06/15/2020    CO2 37 06/15/2020    BUN 13 06/15/2020    CREATININE 0.6 06/15/2020    CALCIUM 8.8 06/15/2020    PHOS 4.1 08/14/2019         Significant Diagnostic Studies    Radiology:   MRI brain without contrast   Final Result   No acute infarct.                 Consults:     IP CONSULT TO PHARMACY  IP CONSULT TO NEUROLOGY  IP CONSULT TO PHARMACY  IP CONSULT TO PHARMACY  IP CONSULT TO HOME CARE NEEDS    Disposition: Home with home care    Condition at Discharge: Stable    Discharge Instructions/Follow-up: ECP    Code Status:  Full Code     Activity: activity as tolerated    Diet: cardiac diet      Discharge Medications:     Current Discharge Medication List           Details   aspirin 81 MG EC tablet Take 1 tablet by mouth daily  Qty: 30 tablet, Refills: 3                         Details   warfarin (COUMADIN) 2.5 MG tablet INR - subtherapeutic  Qty: 12 tablet, Refills: 0              Details   busPIRone (BUSPAR) 10 MG tablet Take 10 mg by mouth 3 times daily as needed      albuterol (PROVENTIL) (2.5 MG/3ML) 0.083% nebulizer solution Take 3 mLs by nebulization every 6 hours as needed for Wheezing or Shortness of Breath DX COPD J44.9  Qty: 360 mL, Refills: 5    Associated Diagnoses: Chronic obstructive pulmonary disease, unspecified COPD type (HCC)      torsemide (DEMADEX) 20 MG tablet Take 1 tablet by mouth 2 times daily  Qty: 60 tablet, Refills: 11      spironolactone (ALDACTONE) 25 MG tablet Take 1 tablet by mouth daily  Qty: 30 tablet, Refills: 11      Acetaminophen (TYLENOL ARTHRITIS PAIN PO) Take by mouth      albuterol sulfate HFA (VENTOLIN HFA) 108 (90 Base) MCG/ACT inhaler Inhale 2 puffs into the lungs every 6 hours as needed for Wheezing  Qty: 2 Inhaler, Refills: 3    Associated Diagnoses: Chronic obstructive pulmonary disease, unspecified COPD type (HCC)      traZODone (DESYREL) 50 MG tablet Take 1 tablet by mouth nightly  Qty: 30 tablet, Refills: 1    Associated Diagnoses: Depression with anxiety; Primary insomnia      melatonin 3 MG TABS tablet Take 1 tablet by mouth nightly as needed (insomnia)  Qty: 15 tablet, Refills: 0      OXYGEN 4.5 L      Cholecalciferol (VITAMIN D-3 PO) Take 2,000 Units by mouth daily              Time Spent on discharge is more than 30 minutes in the examination, evaluation, counseling and review of medications and discharge plan. Signed:    Demian Olsen MD   6/16/2020      Thank you TURNER Santoyo CNP for the opportunity to be involved in this patient's care. If you have any questions or concerns please feel free to contact me at [phone].

## 2020-06-16 NOTE — PROGRESS NOTES
kg/m². Complicating assessment and treatment. Placing patient at risk for multiple co-morbidities as well as early death and contributing to the patient's presentation. Counseled on weight loss     CHF, stable  -does not appear to be in acute exacerbation at this time  -strict I&O  -64 fl oz fluid restriction daily  -daily weights  -continue demadex and spironolactone  -echo on 6/26/2019:  Normal LV systolic function with an estimated EF of 55-60%. No regional wall motion abnormalities are seen. There is moderate concentric left ventricular hypertrophy. Normal left ventricular diastolic filling pressure Mild posterior mitral annular calcification is present.  Normal systolic pulmonary artery pressure (SPAP) estimated at 37 mmHg (RA pressure 8 mmHg).     H/o PE  -anticoagulated on coumadin  -INR subtherapeutic  -Bridged with Lovenox if MRI does not show any infarct     COPD, stable  -Patient wears 4.5 L nasal cannula of supplemental oxygen at all times  -does not appear to be in acute exacerbation  -continue inhaler  -oxygen therapy protocol if needed     PAF, stable  -currently in sinus rhythm  -tele monitoring  -On Coumadin       DVT Prophylaxis: Coumadin  Diet: DIET CARDIAC;  Code Status: Full Code    PT/OT Eval Status: Ordered    Dispo -1 to 2 days    Michael Echavarria MD

## 2020-06-17 VITALS
SYSTOLIC BLOOD PRESSURE: 169 MMHG | BODY MASS INDEX: 47.28 KG/M2 | OXYGEN SATURATION: 96 % | DIASTOLIC BLOOD PRESSURE: 84 MMHG | RESPIRATION RATE: 16 BRPM | HEIGHT: 62 IN | TEMPERATURE: 97.9 F | WEIGHT: 256.9 LBS | HEART RATE: 91 BPM

## 2020-06-17 LAB
INR BLD: 1.5 (ref 0.86–1.14)
PROTHROMBIN TIME: 17.5 SEC (ref 10–13.2)

## 2020-06-17 PROCEDURE — 36415 COLL VENOUS BLD VENIPUNCTURE: CPT

## 2020-06-17 PROCEDURE — 6370000000 HC RX 637 (ALT 250 FOR IP): Performed by: NURSE PRACTITIONER

## 2020-06-17 PROCEDURE — 94761 N-INVAS EAR/PLS OXIMETRY MLT: CPT

## 2020-06-17 PROCEDURE — 2580000003 HC RX 258: Performed by: NURSE PRACTITIONER

## 2020-06-17 PROCEDURE — 85610 PROTHROMBIN TIME: CPT

## 2020-06-17 PROCEDURE — 6370000000 HC RX 637 (ALT 250 FOR IP): Performed by: INTERNAL MEDICINE

## 2020-06-17 PROCEDURE — 2700000000 HC OXYGEN THERAPY PER DAY

## 2020-06-17 PROCEDURE — 6360000002 HC RX W HCPCS: Performed by: NURSE PRACTITIONER

## 2020-06-17 PROCEDURE — 6360000002 HC RX W HCPCS: Performed by: INTERNAL MEDICINE

## 2020-06-17 RX ORDER — TORSEMIDE 20 MG/1
20 TABLET ORAL DAILY
Qty: 30 TABLET | Refills: 3 | Status: SHIPPED | OUTPATIENT
Start: 2020-06-18 | End: 2021-02-24 | Stop reason: SDUPTHER

## 2020-06-17 RX ORDER — TROLAMINE SALICYLATE 10 G/100G
CREAM TOPICAL 2 TIMES DAILY PRN
Status: DISCONTINUED | OUTPATIENT
Start: 2020-06-17 | End: 2020-06-17 | Stop reason: HOSPADM

## 2020-06-17 RX ADMIN — ENOXAPARIN SODIUM 40 MG: 40 INJECTION SUBCUTANEOUS at 08:35

## 2020-06-17 RX ADMIN — Medication 10 ML: at 08:34

## 2020-06-17 RX ADMIN — ASPIRIN 81 MG: 81 TABLET ORAL at 08:37

## 2020-06-17 RX ADMIN — TROLAMINE SALICYLATE: 10 CREAM TOPICAL at 07:38

## 2020-06-17 RX ADMIN — ENOXAPARIN SODIUM 140 MG: 150 INJECTION SUBCUTANEOUS at 13:17

## 2020-06-17 RX ADMIN — SPIRONOLACTONE 25 MG: 25 TABLET ORAL at 08:37

## 2020-06-17 ASSESSMENT — PAIN SCALES - GENERAL: PAINLEVEL_OUTOF10: 7

## 2020-06-17 NOTE — DISCHARGE SUMMARY
(insomnia)  Qty: 15 tablet, Refills: 0      OXYGEN 4.5 L      Cholecalciferol (VITAMIN D-3 PO) Take 2,000 Units by mouth daily              Time Spent on discharge is more than 30 minutes in the examination, evaluation, counseling and review of medications and discharge plan. Signed:    Westley Gomes MD   6/17/2020      Thank you TURNER Presley CNP for the opportunity to be involved in this patient's care. If you have any questions or concerns please feel free to contact me at [phone].

## 2020-06-17 NOTE — PROGRESS NOTES
Pharmacy to Dose Warfarin     Dx: afib  Goal INR range 2-3   Home Warfarin dose: 5mg daily  Enoxaparin 40mg daily ordered     Date                 INR                  Warfarin  6/14                1.36                   5mg  6/15                1.23                   5mg  6/16                1.33                   7.5mg  6/17                1.50                   7.5 mg      Recommend Warfarin 7.5mg tonight x1. Daily INR ordered. Rx will continue to manage therapy per consult order.   Margarita Nuñez, PharmD 6/17/2020 11:52 AM

## 2020-06-18 ENCOUNTER — ANTI-COAG VISIT (OUTPATIENT)
Dept: FAMILY MEDICINE CLINIC | Age: 80
End: 2020-06-18

## 2020-06-18 ENCOUNTER — CARE COORDINATION (OUTPATIENT)
Dept: CASE MANAGEMENT | Age: 80
End: 2020-06-18

## 2020-06-18 LAB — INR BLD: 1.6

## 2020-06-18 NOTE — CARE COORDINATION
[facility] Transitions Initial Follow Up Call    Call within 2 business days of discharge: Yes    Patient: [facility] Patient : 1940   MRN: [id number]  Reason for Admission: Dizziness/A-Fib   Discharge Date: 20 RARS: Readmission Risk Score: 16      Last Discharge [address] [address]       Complaint Diagnosis Description Type Department Provider    20  Atrial fibrillation, chronic Admission (Discharged) Malissa Dominique MD    20 Dizziness Dizziness . .. ED (TRANSFER) Celestino Love MD           Attempted to reach patient via phone for initial post hospital transition call. VM left stating purpose of call along with my contact information requesting a return call. CTN contacted [facility] liaison BENITO Breaux who [facility] and patient is scheduled for soc today.      Follow Up  Future Appointments   Date Time Provider Allyssa Levine   2020  1:00 PM MMO CT RM 1 [facility] SCOTTIE Sauer Rad   10/28/2020  1:15 PM MD JAGUAR Hollis RN

## 2020-06-18 NOTE — CARE COORDINATION
[facility] Transitions Initial Follow Up Call    Call within 2 business days of discharge: Yes    Patient: [facility] Patient : 1940   MRN: [id number]  Reason for Admission: Dizziness/A-fib   Discharge Date: 20 RARS: Readmission Risk Score: 16      Last Discharge  [facility]       Complaint Diagnosis Description Type Department Provider    20  Atrial fibrillation, chronic Admission (Discharged) Tashia Driver MD    20 Dizziness Dizziness . .. ED (TRANSFER) Oscar Barreto MD           Spoke with: [address]: [facility]     Spoke with patient who reported she is doing fine. Patient denied any CP or palpitations. Patient stated she had to contact her doctor regarding her medications and that she was told she was to no longer take the Lovenox. Patient will take 10 mg of coumadin tonight and recheck her INR and then report to doctor tomorrow. Patient stated [facility] nurse was out today to see as well. Denies any acute needs at present time. Agreeable to f/u calls. Educated on the use of urgent care or physicians 24 hr access line if assistance is needed after hours & that they can always contact their home care provider to request a nurse visit even if it isn't their regularly scheduled day for their nurse to visit.            Care Transitions 24 Hour Call    Do you have any ongoing symptoms?:  No  Do you have a copy of your discharge instructions?:  Yes  Do you have all of your prescriptions and are they filled?:  Yes  Have you been contacted by a [address]?:  No  Have you scheduled your follow up appointment?:  No  Were you discharged with any Home Care or Post Acute Services:  Yes  Post Acute Services:  Home Health (Comment: [facility])  Patient DME:  Mo Easley  Patient Home Equipment:  Nebulizer, Oxygen  Do you have support at home?:  Alone  Do you feel like you have everything you need to keep you well at home?: Yes  Are you an active caregiver in your home?:  No  Care Transitions Interventions         Follow Up  Future Appointments   Date Time Provider Allyssa Levine   7/6/2020  1:00 PM MMO CT RM 1 [facility] SCOTTIE John   10/28/2020  1:15 PM MD JAGUAR Gonzalez RN

## 2020-06-18 NOTE — TELEPHONE ENCOUNTER
There is a note in the chart as noted below with the change. There is also a note in the chart that the pharmacy was to be called and and was to be told not to fill the Lovenox. According to the note she should be taking 7.5 mg of coumadin daily. It is fine that the patient already took the Lovenox today.     She should take 10 mg of Coumadin today and recheck her INR tomorrow and do not take any more Lovenox until she hears from us tomorrow

## 2020-06-19 ENCOUNTER — ANTI-COAG VISIT (OUTPATIENT)
Dept: FAMILY MEDICINE CLINIC | Age: 80
End: 2020-06-19

## 2020-06-19 LAB — INR BLD: 2

## 2020-06-22 ENCOUNTER — TELEPHONE (OUTPATIENT)
Dept: FAMILY MEDICINE CLINIC | Age: 80
End: 2020-06-22

## 2020-06-23 ENCOUNTER — ANTI-COAG VISIT (OUTPATIENT)
Dept: FAMILY MEDICINE CLINIC | Age: 80
End: 2020-06-23

## 2020-06-23 LAB — INR BLD: 2.3

## 2020-06-25 ENCOUNTER — CARE COORDINATION (OUTPATIENT)
Dept: CASE MANAGEMENT | Age: 80
End: 2020-06-25

## 2020-06-25 NOTE — CARE COORDINATION
Transitions Interventions  Other Interventions:             Follow Up  Future Appointments   Date Time Provider Allyssa Levine   7/6/2020  1:00 PM MMO CT RM 1 [facility] SCOTTIE John   10/28/2020  1:15 PM MD JAGUAR Kapoor RN

## 2020-06-26 ENCOUNTER — ANTI-COAG VISIT (OUTPATIENT)
Dept: FAMILY MEDICINE CLINIC | Age: 80
End: 2020-06-26

## 2020-06-26 LAB — INR BLD: 2.8

## 2020-07-01 ENCOUNTER — CARE COORDINATION (OUTPATIENT)
Dept: CASE MANAGEMENT | Age: 80
End: 2020-07-01

## 2020-07-01 NOTE — CARE COORDINATION
Carl[facility] Transitions Follow Up Call    2020    Patient: Luwana Castleman  Patient : 1940   MRN: <[id number]>  Reason for Admission:   Discharge Date: 20 RARS: Readmission Risk Score: 16         Spoke with: Patient    Called and spoke with patient. Patient states she is feeling better. Still has low energy. No dizziness or syncope. Uses Oxygen 24/7 at 4.5/L. Uses nebulizer sparingly - gets very shaky from medicine and doesn't like that feeling. Patient states has all medications is taking medications as directed and has no questions concerning medications at this time. Taking warfarin and is checking INR at home and calls in to PCP who doses patient. Leg swelling is less and patient is taking diuretics as directed. Encouraged to elevate legs as tolerated. Patient expresses understanding. Patient states knows when to contact physician or report to ED with worsening or severe symptoms, changes, or concerns. Will Follow up at later time. Heather Reyna LPN     [place] [facility] / [address] [facility] 5 Transitions Subsequent and Final Call    Subsequent and Final Calls  Do you have any ongoing symptoms?:  Yes  Patient-reported symptoms:  Shortness of Breath, Fatigue  Have your medications changed?:  No  Do you have any questions related to your medications?:  No  Do you currently have any active services?:  No  Are you currently active with any services?:  Home Health  Do you have any needs or concerns that I can assist you with?:  No  Identified Barriers:  Impairment  Care Transitions Interventions  Other Interventions:             Follow Up  Future Appointments   Date Time Provider Allyssa Levine   2020  1:00 PM MMO CT RM 1 [facility] SCOTTIE John   10/28/2020  1:15 PM MD JAGUAR Catherine PULTAMI Reyna LPN

## 2020-07-02 ENCOUNTER — ANTI-COAG VISIT (OUTPATIENT)
Dept: FAMILY MEDICINE CLINIC | Age: 80
End: 2020-07-02

## 2020-07-02 LAB — INR BLD: 1.8

## 2020-07-06 ENCOUNTER — ANTI-COAG VISIT (OUTPATIENT)
Dept: FAMILY MEDICINE CLINIC | Age: 80
End: 2020-07-06

## 2020-07-06 ENCOUNTER — HOSPITAL ENCOUNTER (OUTPATIENT)
Dept: CT IMAGING | Age: 80
Discharge: HOME | End: 2020-07-06
Payer: MEDICARE

## 2020-07-06 LAB — INR BLD: 2.8

## 2020-07-06 PROCEDURE — 6360000004 HC RX CONTRAST MEDICATION: Performed by: NURSE PRACTITIONER

## 2020-07-06 PROCEDURE — 74177 CT ABD & PELVIS W/CONTRAST: CPT

## 2020-07-06 RX ADMIN — IOHEXOL 50 ML: 240 INJECTION, SOLUTION INTRATHECAL; INTRAVASCULAR; INTRAVENOUS; ORAL at 13:00

## 2020-07-06 RX ADMIN — IOPAMIDOL 75 ML: 755 INJECTION, SOLUTION INTRAVENOUS at 13:23

## 2020-07-07 ENCOUNTER — CARE COORDINATION (OUTPATIENT)
Dept: CASE MANAGEMENT | Age: 80
End: 2020-07-07

## 2020-07-07 NOTE — CARE COORDINATION
[facility] Transitions Follow Up Call    2020    Patient: Jose Luis Rodriguez  Patient : 1940   MRN: [id number]  Reason for Admission: Dizziness/A-fib  Discharge Date: 20 RARS: Readmission Risk Score: 16         Attempted to reach patient via phone for care transition. VM left stating purpose of call along with my contact information requesting a return call.             Follow Up  Future Appointments   Date Time Provider Allyssa Levine   10/28/2020  1:15 PM Ike Holloway MD [facility]       Alexis Beth RN

## 2020-07-08 ENCOUNTER — CARE COORDINATION (OUTPATIENT)
Dept: CASE MANAGEMENT | Age: 80
End: 2020-07-08

## 2020-07-08 NOTE — CARE COORDINATION
[facility] Transitions Follow Up Call    2020    Patient: David Barron  Patient : 1940   MRN: [id number]  Reason for Admission: dizziness, A Fib  Discharge Date: 20 RARS: Readmission Risk Score: 12       Spoke with: David Barron (patient)    States had CT Monday, [facility] yesterday for results on CT and states clear for NHL. [facility] nurse still visits weekly. States back and hip pain is chronic and declines therapy services. States she can do her own exercises independently. Has appt with [facility] again 10/2020 for one year follow up. Feels she is doing well. States this is a hard day because it is the anniversary of her youngest son's death 3 years ago he had an MI. She denies needs or concerns. Knows how/when to reach [facility] SN. Has no upcoming follow up appointments but knows if there is a need she can communicate by phone or My Chart to MDs, CTN. Care Transitions Subsequent and Final Call    Schedule Follow Up Appointment with PCP:  Completed  Subsequent and Final Calls  Do you have any ongoing symptoms?:  No  Have your medications changed?:  No  Do you have any questions related to your medications?:  No  Do you currently have any active services?:  Yes  Are you currently active with any services?:  Home Health  Do you have any needs or concerns that I can assist you with?:  No  Identified Barriers: Other  Care Transitions Interventions  Other Interventions:             Follow Up  Future Appointments   Date Time Provider Allyssa Levine   10/28/2020  1:15 PM Raza Bennett MD [facility]       Cheryl Paige RN

## 2020-07-15 ENCOUNTER — CARE COORDINATION (OUTPATIENT)
Dept: CASE MANAGEMENT | Age: 80
End: 2020-07-15

## 2020-07-15 NOTE — CARE COORDINATION
[facility] Transitions Follow Up Call    7/15/2020    Patient: Jeniffer Crowe  Patient : 1940   MRN: [id number]  Reason for Admission: dizziness/A-fib   Discharge Date: 20 RARS: Readmission Risk Score: 16         Spoke with: Adalgisa Robb with patient who reported she is doing pretty good. Patient stated she had a couple of episodes of dizziness but stated she feels it was because she stood up to soon to go to the bathroom. Patient stated she takes water pills and when she has to go she has to go. CTN discussed the importance of trying to rise slowly and if dizziness reoccurs she needs to notify her doctor. Patient reported everything else is stable and she denied any further issues or concerns at this time. CTN informed patient of final call. CTN advised patient of use of urgent care or physicians 24 hr access line if assistance is needed after hours. Care Transitions Subsequent and Final Call    Subsequent and Final Calls  Do you have any ongoing symptoms?:  No  Have your medications changed?:  No  Do you have any questions related to your medications?:  No  Do you currently have any active services?:  Yes  Are you currently active with any services?:  Home Health  Do you have any needs or concerns that I can assist you with?:  No  Identified Barriers:  None  Care Transitions Interventions  Other Interventions:             Follow Up  Future Appointments   Date Time Provider Allyssa Levine   10/28/2020  1:15 PM MD Chu Sam RN

## 2020-07-20 ENCOUNTER — ANTI-COAG VISIT (OUTPATIENT)
Dept: FAMILY MEDICINE CLINIC | Age: 80
End: 2020-07-20

## 2020-07-20 LAB — INR BLD: 2.3

## 2020-08-05 ENCOUNTER — ANTI-COAG VISIT (OUTPATIENT)
Dept: FAMILY MEDICINE CLINIC | Age: 80
End: 2020-08-05

## 2020-08-05 LAB — INR BLD: 1.2

## 2020-08-07 LAB — INR BLD: 1.8

## 2020-08-10 ENCOUNTER — TELEPHONE (OUTPATIENT)
Dept: FAMILY MEDICINE CLINIC | Age: 80
End: 2020-08-10

## 2020-08-10 ENCOUNTER — ANTI-COAG VISIT (OUTPATIENT)
Dept: FAMILY MEDICINE CLINIC | Age: 80
End: 2020-08-10

## 2020-08-10 LAB — INR BLD: 3

## 2020-08-10 NOTE — TELEPHONE ENCOUNTER
----- Message from Dianna Bonilla sent at 8/10/2020 11:33 AM EDT -----  Subject: Message to Provider    QUESTIONS  Information for Provider? Patients did a INR monitor read 3.0 please   advise  ---------------------------------------------------------------------------  --------------  CALL BACK INFO  What is the best way for the office to contact you? OK to leave message on   voicemail  Do not leave any message   patient will call back for answer  Preferred Call Back Phone Number? [phone]  ---------------------------------------------------------------------------  --------------  SCRIPT ANSWERS  Relationship to Patient?  Self

## 2020-08-12 ENCOUNTER — ANTI-COAG VISIT (OUTPATIENT)
Dept: FAMILY MEDICINE CLINIC | Age: 80
End: 2020-08-12

## 2020-08-12 LAB — INR BLD: 3.2

## 2020-08-14 ENCOUNTER — ANTI-COAG VISIT (OUTPATIENT)
Dept: FAMILY MEDICINE CLINIC | Age: 80
End: 2020-08-14

## 2020-08-14 LAB — INR BLD: 3.2

## 2020-08-20 ENCOUNTER — ANTI-COAG VISIT (OUTPATIENT)
Dept: FAMILY MEDICINE CLINIC | Age: 80
End: 2020-08-20

## 2020-08-20 LAB — INR BLD: 2.8

## 2020-08-28 ENCOUNTER — ANTI-COAG VISIT (OUTPATIENT)
Dept: FAMILY MEDICINE CLINIC | Age: 80
End: 2020-08-28

## 2020-08-28 LAB — INR BLD: 2.5

## 2020-09-03 ENCOUNTER — ANTI-COAG VISIT (OUTPATIENT)
Dept: FAMILY MEDICINE CLINIC | Age: 80
End: 2020-09-03

## 2020-09-03 LAB — INR BLD: 2

## 2020-09-10 ENCOUNTER — ANTI-COAG VISIT (OUTPATIENT)
Dept: FAMILY MEDICINE CLINIC | Age: 80
End: 2020-09-10

## 2020-09-10 LAB — INR BLD: 1.7

## 2020-09-14 ENCOUNTER — ANTI-COAG VISIT (OUTPATIENT)
Dept: FAMILY MEDICINE CLINIC | Age: 80
End: 2020-09-14

## 2020-09-14 LAB — INR BLD: 2.7

## 2020-09-15 ENCOUNTER — TELEPHONE (OUTPATIENT)
Dept: FAMILY MEDICINE CLINIC | Age: 80
End: 2020-09-15

## 2020-09-15 RX ORDER — SPIRONOLACTONE 25 MG/1
25 TABLET ORAL DAILY
Qty: 30 TABLET | Refills: 1 | Status: SHIPPED | OUTPATIENT
Start: 2020-09-15 | End: 2021-02-24 | Stop reason: SDUPTHER

## 2020-09-15 NOTE — TELEPHONE ENCOUNTER
Pt called stating she's having pain's in her knees, legs, and feet that's waking her up at night. Pt has tried icy hot and lidocaine, that's not helping.  Pt states that it could be something with her diabetes and not sure if she should see PCP or get a referral to neurologist. Call back pt with recommendations [phone]

## 2020-09-15 NOTE — TELEPHONE ENCOUNTER
Last appt 2/26/2020, no appt scheduled. Pt was prescribed this by Dr. Olga Palafox, but she only saw him while she was in the hospital. Pt wanting to know if PCP would refill Rx. States the bottle she has was last filled in June with no refills. Please advise. Routing to Sandee Valenzuela due to PCP out of office.

## 2020-09-16 ENCOUNTER — TELEPHONE (OUTPATIENT)
Dept: FAMILY MEDICINE CLINIC | Age: 80
End: 2020-09-16

## 2020-09-16 NOTE — TELEPHONE ENCOUNTER
----- Message from [place] Fidel sent at 9/16/2020  3:47 PM EDT -----  Subject: Message to Provider    QUESTIONS  Information for Provider? patient has an appointment on September 21st at   11:20 and she would like to know if she can get a flu shot at the time of   that appointment .   ---------------------------------------------------------------------------  --------------  [address]  What is the best way for the office to contact you? OK to leave message on   voicemail  Preferred Call Back Phone Number? [phone]  ---------------------------------------------------------------------------  --------------  SCRIPT ANSWERS  Relationship to Patient?  Self

## 2020-09-18 ENCOUNTER — ANTI-COAG VISIT (OUTPATIENT)
Dept: FAMILY MEDICINE CLINIC | Age: 80
End: 2020-09-18

## 2020-09-18 LAB — INR BLD: 3.6

## 2020-09-21 ENCOUNTER — OFFICE VISIT (OUTPATIENT)
Dept: FAMILY MEDICINE CLINIC | Age: 80
End: 2020-09-21
Payer: MEDICARE

## 2020-09-21 ENCOUNTER — ANTI-COAG VISIT (OUTPATIENT)
Dept: FAMILY MEDICINE CLINIC | Age: 80
End: 2020-09-21

## 2020-09-21 VITALS
DIASTOLIC BLOOD PRESSURE: 48 MMHG | WEIGHT: 263 LBS | TEMPERATURE: 97.2 F | HEART RATE: 89 BPM | OXYGEN SATURATION: 91 % | SYSTOLIC BLOOD PRESSURE: 107 MMHG | BODY MASS INDEX: 48.1 KG/M2

## 2020-09-21 LAB
BASOPHILS ABSOLUTE: 0.1 K/UL (ref 0–0.2)
BASOPHILS RELATIVE PERCENT: 1 %
EOSINOPHILS ABSOLUTE: 0.2 K/UL (ref 0–0.6)
EOSINOPHILS RELATIVE PERCENT: 3.5 %
HCT VFR BLD CALC: 38.4 % (ref 36–48)
HEMOGLOBIN: 12.2 G/DL (ref 12–16)
INR BLD: 2.8
INR BLD: 2.8
LYMPHOCYTES ABSOLUTE: 1.5 K/UL (ref 1–5.1)
LYMPHOCYTES RELATIVE PERCENT: 22 %
MCH RBC QN AUTO: 27.2 PG (ref 26–34)
MCHC RBC AUTO-ENTMCNC: 31.8 G/DL (ref 31–36)
MCV RBC AUTO: 85.5 FL (ref 80–100)
MONOCYTES ABSOLUTE: 0.7 K/UL (ref 0–1.3)
MONOCYTES RELATIVE PERCENT: 11 %
NEUTROPHILS ABSOLUTE: 4.2 K/UL (ref 1.7–7.7)
NEUTROPHILS RELATIVE PERCENT: 62.5 %
PDW BLD-RTO: 14.6 % (ref 12.4–15.4)
PLATELET # BLD: 302 K/UL (ref 135–450)
PMV BLD AUTO: 7.3 FL (ref 5–10.5)
RBC # BLD: 4.49 M/UL (ref 4–5.2)
WBC # BLD: 6.8 K/UL (ref 4–11)

## 2020-09-21 PROCEDURE — 1123F ACP DISCUSS/DSCN MKR DOCD: CPT | Performed by: NURSE PRACTITIONER

## 2020-09-21 PROCEDURE — 99214 OFFICE O/P EST MOD 30 MIN: CPT | Performed by: NURSE PRACTITIONER

## 2020-09-21 PROCEDURE — 4040F PNEUMOC VAC/ADMIN/RCVD: CPT | Performed by: NURSE PRACTITIONER

## 2020-09-21 PROCEDURE — G8400 PT W/DXA NO RESULTS DOC: HCPCS | Performed by: NURSE PRACTITIONER

## 2020-09-21 PROCEDURE — G8427 DOCREV CUR MEDS BY ELIG CLIN: HCPCS | Performed by: NURSE PRACTITIONER

## 2020-09-21 PROCEDURE — G0008 ADMIN INFLUENZA VIRUS VAC: HCPCS | Performed by: NURSE PRACTITIONER

## 2020-09-21 PROCEDURE — 36415 COLL VENOUS BLD VENIPUNCTURE: CPT | Performed by: NURSE PRACTITIONER

## 2020-09-21 PROCEDURE — 90686 IIV4 VACC NO PRSV 0.5 ML IM: CPT | Performed by: NURSE PRACTITIONER

## 2020-09-21 PROCEDURE — 1090F PRES/ABSN URINE INCON ASSESS: CPT | Performed by: NURSE PRACTITIONER

## 2020-09-21 PROCEDURE — 1036F TOBACCO NON-USER: CPT | Performed by: NURSE PRACTITIONER

## 2020-09-21 PROCEDURE — G8417 CALC BMI ABV UP PARAM F/U: HCPCS | Performed by: NURSE PRACTITIONER

## 2020-09-21 RX ORDER — BETAMETHASONE VALERATE 0.1 %
LOTION (ML) TOPICAL
Qty: 60 ML | Refills: 2 | Status: SHIPPED | OUTPATIENT
Start: 2020-09-21 | End: 2020-10-10

## 2020-09-21 NOTE — PROGRESS NOTES
[address] [facility]  [address] [facility] [address]  Dept: [phone]  Dept Fax: [phone]  Loc: All Castellon is a 70 y.o. female who presents today for her medical conditions/complaints as noted below. Kem Templeton is c/o of Leg Pain (pt said the pain is from her knee down on both legs pt said the pain is worse at night ); Other (sore in pt nose ); and Psoriasis (on scalp)       Subjective:     Chief Complaint   Patient presents with    Leg Pain     pt said the pain is from her knee down on both legs pt said the pain is worse at night     Other     sore in pt nose     Psoriasis     on scalp       HPI  Leg pain  The patient c/o bilateral lower extremity pain. The patient does have chronic back pain with sciatica on left side but patient states this leg pain is different. She states she has pain mostly from the bilateral knees to the foot. It has been ongoing x few months and is worsening. The pain is worse/mostly present in the evening and at bedtime. She states that getting up and walking around helps alleviate the discomfort. It is mostly and aching or cramping sensation. She has noticed some numbness and tingling in bilateral feet at times. She denies temperature or color change to her BLE. No history of PAD. Completed chemotherapy 7/2020    Sore in left nares  The patient c/o a several month history of a sore inside the lateral left nares. She states she has been using otc ointments such as antibiotic ointments with little success. The patient states the area will heal up for a little while and then returns. She has not had any drainage from the area. No facial or nasal erythema, pain or swelling. She does wear oxygen per NC. Scalp problem  The patient states she has a \"flakey scalp\". She has been using otc tar shampoos and selsun blue type shampoos without success.   The problem has been waxing and waning. She states she does have itching and flaking. She has a lot of \"dryness\" and itching of the skin around her temple region, eyebrow and eyelash region. Past Medical History:   Diagnosis Date    Acute exacerbation of chronic obstructive pulmonary disease (COPD) ([facility]     Acute on chronic respiratory failure (HCC)     Atrial fibrillation with rapid ventricular response ([facility] 6/23/2015    B-cell lymphoma ([facility]     CAP (community acquired pneumonia) 1/29/2015    CHF (congestive heart failure) ([facility]     Diverticulitis     HCAP (healthcare-associated pneumonia)     IFG (impaired fasting glucose) 9/13/2018    Non-Hodgkins lymphoma ([facility]     Panic disorder     [facility]     6/15    Rectal vaginal fistula     [facility] spotted fever 2013    Sciatic nerve pain     Sepsis ([facility] 6/22/2015    Tobacco abuse 6/22/2015    Vitamin D deficiency          Review of Systems   Constitutional: Negative. Negative for appetite change, fatigue and unexpected weight change. HENT: Negative. Eyes: Negative. Respiratory: Negative. Negative for shortness of breath. Cardiovascular: Negative. Negative for chest pain, palpitations and leg swelling. Gastrointestinal: Negative. Negative for abdominal pain, anal bleeding, blood in stool and nausea. Endocrine: Negative. Genitourinary: Negative. Negative for hematuria. Musculoskeletal: Positive for myalgias. Skin: Positive for rash and wound. Negative for color change and pallor. Allergic/Immunologic: Negative. Neurological: Negative. Negative for dizziness, syncope, light-headedness and numbness. Hematological: Negative. Does not bruise/bleed easily. Psychiatric/Behavioral: Negative. All other systems reviewed and are negative.        Past Medical History:   Diagnosis Date    Acute exacerbation of chronic obstructive pulmonary disease (COPD) ([facility]     Acute on chronic respiratory failure (HCC)     Atrial fibrillation with rapid ventricular response ([address] 6/23/2015    B-cell lymphoma ([facility]     CAP (community acquired pneumonia) 1/29/2015    CHF (congestive heart failure) ([address]     Diverticulitis     HCAP (healthcare-associated pneumonia)     IFG (impaired fasting glucose) 9/13/2018    Non-Hodgkins lymphoma (HCC)     Panic disorder     Pulmonary embolism ([address]     6/15    Rectal vaginal fistula     [facility] spotted fever 2013    Sciatic nerve pain     Sepsis ([address] 6/22/2015    Tobacco abuse 6/22/2015    Vitamin D deficiency      Family History   Problem Relation Age of Onset    Cancer Mother 43        lung cancer    Diabetes Sister     Cancer Daughter 43        lung cancer    Asthma Son     Hypertension Son     Asthma Grandchild     Hypertension Son     Emphysema Neg Hx     Heart Failure Neg Hx      Past Surgical History:   Procedure Laterality Date    APPENDECTOMY      BRONCHOSCOPY N/A 8/12/2019    EBUS WF W/ANES.  performed by Ramya Davis MD at [address] [place] [address]  8/12/2019    BRONCHOSCOPY ALVEOLAR LAVAGE performed by Ramya Davis MD at [address] [place] [address]  8/12/2019    BRONCHOSCOPY BRUSHINGS performed by Ramya Davis MD at [address] [place] [address]  8/12/2019    BRONCHOSCOPY/TRANSBRONCHIAL LUNG BIOPSY performed by Ramya Davis MD at [address] [place] [address]  8/12/2019    BRONCHOSCOPY/TRANSBRONCHIAL NEEDLE BIOPSY performed by Ramya Davis MD at [address] [place] [address]  8/12/2019    BRONCHOSCOPY/TRANSBRONCHIAL NEEDLE BIOPSY ADDL LOBE performed by Ramya Davis MD at [address] [place] [address]  2003    COLONOSCOPY  09/25/2019    diverticulosis, polyp sigmoid    COLONOSCOPY N/A 9/25/2019    COLONOSCOPY POLYPECTOMY SNARE/COLD BIOPSY performed by Dot Thomas MD at [place] [address] N/A 1/17/2020    PORT INSERTION performed by Anahi Tejada MD at [address] [place] [address] Blvd TUNNELED VENOUS PORT PLACEMENT Left 2020    Port Insertion ENRIQUETA Chest w. Dr Meeks Plate 20 Vaccess CT Injectable [id number] 3/31/21     Social History     Socioeconomic History    Marital status:      Spouse name: Not on file    Number of children: Not on file    Years of education: Not on file    Highest education level: Not on file   Occupational History    Occupation: retired   Social Needs    Financial resource strain: Not very hard    Food insecurity     Worry: Never true     Inability: Never true    Transportation needs     Medical: No     Non-medical: No   Tobacco Use    Smoking status: Former Smoker     Packs/day: 0.33     Years: 50.00     Pack years: 16.50     Types: Cigarettes     Last attempt to quit: 2015     Years since quittin.2    Smokeless tobacco: Never Used   Substance and Sexual Activity    Alcohol use: Not Currently     Alcohol/week: 0.0 standard drinks     Frequency: Never    Drug use: No    Sexual activity: Not Currently   Lifestyle    Physical activity     Days per week: 0 days     Minutes per session: 0 min    Stress:  To some extent   Relationships    Social connections     Talks on phone: Not on file     Gets together: Once a week     Attends Qu[employer] service: Never     Active member of club or organization: No     Attends meetings of clubs or organizations: Never     Relationship status:     Intimate partner violence     Fear of current or ex partner: Not on file     Emotionally abused: Not on file     Physically abused: Not on file     Forced sexual activity: Not on file   Other Topics Concern    Not on file   Social History Narrative    Not on file     Current Outpatient Medications   Medication Sig Dispense Refill    spironolactone (ALDACTONE) 25 MG tablet Take 1 tablet by mouth daily 30 tablet 1    torsemide (DEMADEX) 20 MG tablet Take 1 tablet by mouth daily 30 tablet 3    aspirin 81 MG EC tablet Take 1 tablet by mouth daily 30 tablet 3    warfarin (COUMADIN) 2.5 MG tablet INR - subtherapeutic 12 tablet 0    busPIRone (BUSPAR) 10 MG tablet Take 10 mg by mouth 3 times daily as needed      albuterol (PROVENTIL) (2.5 MG/3ML) 0.083% nebulizer solution Take 3 mLs by nebulization every 6 hours as needed for Wheezing or Shortness of Breath DX COPD J44.9 360 mL 5    Acetaminophen (TYLENOL ARTHRITIS PAIN PO) Take by mouth      albuterol sulfate HFA (VENTOLIN HFA) 108 (90 Base) MCG/ACT inhaler Inhale 2 puffs into the lungs every 6 hours as needed for Wheezing 2 Inhaler 3    traZODone (DESYREL) 50 MG tablet Take 1 tablet by mouth nightly 30 tablet 1    melatonin 3 MG TABS tablet Take 1 tablet by mouth nightly as needed (insomnia) 15 tablet 0    OXYGEN 4.5 L      Cholecalciferol (VITAMIN D-3 PO) Take 2,000 Units by mouth daily        No current facility-administered medications for this visit. No changes in past medical history, past surgical history, social history, orfamily history were noted during the patient encounter unless specifically listed above. All updates of past medical history, past surgical history, social history, or family history were reviewed personally by me duringthe office visit. All problems listed in the assessment are stable unless noted otherwise. Medication profile reviewed personally by me during the office visit. Medication side effects and possible impairments frommedications were discussed as applicable. Objective:     Physical Exam  Vitals signs and nursing note reviewed. Constitutional:       General: She is not in acute distress. Appearance: Normal appearance. She is well-developed. She is obese. Interventions: Nasal cannula in place. HENT:      Head: Normocephalic and atraumatic.         Right Ear: Tympanic membrane, ear canal and external ear normal.      Left Ear: Tympanic membrane, ear canal and external ear normal.      Nose: Nose normal. No nasal deformity, nasal tenderness or mucosal edema. Right Nostril: No foreign body or epistaxis. Left Nostril: No foreign body or epistaxis. Right Sinus: No maxillary sinus tenderness or frontal sinus tenderness. Left Sinus: No maxillary sinus tenderness or frontal sinus tenderness. Mouth/Throat:      Pharynx: Uvula midline. No oropharyngeal exudate. Eyes:      General: Lids are normal. No allergic shiner. Right eye: No discharge or hordeolum. Left eye: No discharge or hordeolum. Extraocular Movements: Extraocular movements intact. Conjunctiva/sclera: Conjunctivae normal.      Pupils: Pupils are equal, round, and reactive to light. Comments: Bilateral eye lashes with some flaking   Neck:      Musculoskeletal: Normal range of motion and neck supple. Thyroid: No thyromegaly. Vascular: No carotid bruit or JVD. Cardiovascular:      Rate and Rhythm: Normal rate and regular rhythm. Pulses: Normal pulses. Radial pulses are 2+ on the right side and 2+ on the left side. Dorsalis pedis pulses are 2+ on the right side and 2+ on the left side. Posterior tibial pulses are 2+ on the right side and 2+ on the left side. Heart sounds: Normal heart sounds. No murmur. No friction rub. No gallop. Pulmonary:      Effort: Pulmonary effort is normal.      Breath sounds: Normal breath sounds. Abdominal:      General: Bowel sounds are normal.      Palpations: Abdomen is soft. There is no mass. Tenderness: There is no abdominal tenderness. Musculoskeletal: Normal range of motion. Lymphadenopathy:      Head:      Right side of head: No submandibular adenopathy. Left side of head: No submandibular adenopathy. Cervical: No cervical adenopathy. Skin:     General: Skin is warm and dry. Findings: No lesion or rash.    Neurological:      Mental Status: She is alert and oriented to [place] was seen today for leg pain, other and psoriasis. Diagnoses and all orders for this visit:    Pain in both lower extremities  -     Comprehensive Metabolic Panel  -     CBC Auto Differential  -     MAGNESIUM  -     Vitamin B12 & Folate  Possibly RLS vs neuropathy from chemotherapy    Sore in nose  -     mupirocin (BACTROBAN) 2 % ointment; Apply 3 times daily. Educated this is most likely d/t chronic oxygen NC usage and may not completely heal  Educated to monitor for s/s infection if reoccurs    Seborrheic dermatitis of scalp  -     betamethasone valerate (VALISONE) 0.1 % lotion; Apply to scalp topically 2 times daily. Educated to use short term until symptoms controlled (2-3 weeks) and then 1-2 times per week  Educated patient on diagnosis, treatment and that this would be a long term issue and that keeping symptoms under control is the goal.    Bilateral blepharitis is associated with this condition and most likely cause of her eyelid/lash symptoms  Educated to wash lash line with water and baby shampoo daily. --instructions on mixture provided    Flu vaccine need  -     INFLUENZA, QUADV, 3 YRS AND OLDER, IM PF, PREFILL SYR OR SDV, 0.5ML (AFLURIA QUADV, PF)    Paroxysmal atrial fibrillation with RVR (HCC)   INR 2.8. Informed patient of current INR during office visit. Continue current warfarin dosage schedule and return Friday/ 4 day(s). Patient has been instructed call the office immediately with new symptoms, change in symptoms or worseningof symptoms. If this is not feasible, patient is instructed to report to the emergency room. Medication profile reviewed. Medication side effects and possible impairments from medications were discussed as applicable. Allergies were reviewed. Health maintenance was reviewed and updated as appropriate.      Return for needs appoitment scheduled for [facility],  can be virtual.    (Comment: Please note this report has been produced using a combination of typing and speech recognition software and may contain errors related to that system including errors in grammar, punctuation, and spelling, as well as words and phrases that may be inappropriate.  If there are any questions or concerns please feel free to contact the dictating provider for clarification.)

## 2020-09-21 NOTE — PROGRESS NOTES
Vaccine Information Sheet, \"Influenza - Inactivated\"  given to [place], or parent/legal guardian of  [facility] and verbalized understanding. Patient responses:    Have you ever had a reaction to a flu vaccine? No  Are you able to eat eggs without adverse effects? Yes  Do you have any current illness? No  Have you ever had Guillian Liberty Syndrome? No    Flu vaccine given per order. Please see immunization tab.

## 2020-09-22 LAB
A/G RATIO: 1.7 (ref 1.1–2.2)
ALBUMIN SERPL-MCNC: 4.3 G/DL (ref 3.4–5)
ALP BLD-CCNC: 97 U/L (ref 40–129)
ALT SERPL-CCNC: 12 U/L (ref 10–40)
ANION GAP SERPL CALCULATED.3IONS-SCNC: 9 MMOL/L (ref 3–16)
AST SERPL-CCNC: 14 U/L (ref 15–37)
BILIRUB SERPL-MCNC: 0.3 MG/DL (ref 0–1)
BUN BLDV-MCNC: 15 MG/DL (ref 7–20)
CALCIUM SERPL-MCNC: 9.4 MG/DL (ref 8.3–10.6)
CHLORIDE BLD-SCNC: 97 MMOL/L (ref 99–110)
CO2: 35 MMOL/L (ref 21–32)
CREAT SERPL-MCNC: 0.6 MG/DL (ref 0.6–1.2)
FOLATE: 10.9 NG/ML (ref 4.78–24.2)
GFR AFRICAN AMERICAN: >60
GFR NON-AFRICAN AMERICAN: >60
GLOBULIN: 2.5 G/DL
GLUCOSE BLD-MCNC: 125 MG/DL (ref 70–99)
MAGNESIUM: 2.2 MG/DL (ref 1.8–2.4)
POTASSIUM SERPL-SCNC: 4.4 MMOL/L (ref 3.5–5.1)
SODIUM BLD-SCNC: 141 MMOL/L (ref 136–145)
TOTAL PROTEIN: 6.8 G/DL (ref 6.4–8.2)
VITAMIN B-12: 501 PG/ML (ref 211–911)

## 2020-09-22 RX ORDER — ROPINIROLE 0.5 MG/1
0.5 TABLET, FILM COATED ORAL NIGHTLY
Qty: 30 TABLET | Refills: 1 | Status: SHIPPED | OUTPATIENT
Start: 2020-09-22 | End: 2020-10-10 | Stop reason: SINTOL

## 2020-09-22 ASSESSMENT — ENCOUNTER SYMPTOMS
COLOR CHANGE: 0
BLOOD IN STOOL: 0
ABDOMINAL PAIN: 0
GASTROINTESTINAL NEGATIVE: 1
ALLERGIC/IMMUNOLOGIC NEGATIVE: 1
ANAL BLEEDING: 0
EYES NEGATIVE: 1
RESPIRATORY NEGATIVE: 1
NAUSEA: 0
SHORTNESS OF BREATH: 0

## 2020-09-25 ENCOUNTER — ANTI-COAG VISIT (OUTPATIENT)
Dept: FAMILY MEDICINE CLINIC | Age: 80
End: 2020-09-25

## 2020-09-25 LAB — INR BLD: 2.5

## 2020-10-02 ENCOUNTER — ANTI-COAG VISIT (OUTPATIENT)
Dept: FAMILY MEDICINE CLINIC | Age: 80
End: 2020-10-02

## 2020-10-02 LAB — INR BLD: 3.1

## 2020-10-02 NOTE — PROGRESS NOTES
INR slightly elevated. Take 2.5 mg of Warfarin tonight instead of 5 mg and then resume normal home regimen. Check INR on Monday.

## 2020-10-05 LAB — INR BLD: 2.2

## 2020-10-06 ENCOUNTER — TELEPHONE (OUTPATIENT)
Dept: FAMILY MEDICINE CLINIC | Age: 80
End: 2020-10-06

## 2020-10-06 NOTE — LETTER
[address] [facility]  [facility]  [address] [facility] [facility]  Phone: [phone]  Fax: [phone]    TURNER Machado CNP        October 6, 2020     Patient: Raymond Dorado   YOB: 1940   Date of Visit: 10/6/2020       To Whom It May Concern: It is my medical opinion that Raymond Dorado requires a disability parking placard for the following reasons:  She cannot walk without assistance from another person or the use of an assistance device (cane, crutch, prosthetic device, wheelchair, etc.). Duration of need: 5 year    If you have any questions or concerns, please don't hesitate to call.     Sincerely,          TURNER Machado CNP

## 2020-10-06 NOTE — TELEPHONE ENCOUNTER
Pt is asking for a letter for her handicap sticker [facility]     Letter done and set up front for pt to

## 2020-10-10 ENCOUNTER — HOSPITAL ENCOUNTER (INPATIENT)
Age: 80
LOS: 4 days | Discharge: HOME HEALTH | DRG: 392 | End: 2020-10-14
Attending: STUDENT IN AN ORGANIZED HEALTH CARE EDUCATION/TRAINING PROGRAM | Admitting: INTERNAL MEDICINE
Payer: MEDICARE

## 2020-10-10 ENCOUNTER — APPOINTMENT (OUTPATIENT)
Dept: CT IMAGING | Age: 80
DRG: 392 | End: 2020-10-10
Payer: MEDICARE

## 2020-10-10 PROBLEM — K57.92 ACUTE DIVERTICULITIS: Status: ACTIVE | Noted: 2020-10-10

## 2020-10-10 LAB
A/G RATIO: 1.4 (ref 1.1–2.2)
ALBUMIN SERPL-MCNC: 4.2 G/DL (ref 3.4–5)
ALP BLD-CCNC: 113 U/L (ref 40–129)
ALT SERPL-CCNC: 12 U/L (ref 10–40)
ANION GAP SERPL CALCULATED.3IONS-SCNC: 10 MMOL/L (ref 3–16)
AST SERPL-CCNC: 16 U/L (ref 15–37)
BACTERIA: ABNORMAL /HPF
BASOPHILS ABSOLUTE: 0.1 K/UL (ref 0–0.2)
BASOPHILS RELATIVE PERCENT: 0.8 %
BILIRUB SERPL-MCNC: 0.5 MG/DL (ref 0–1)
BILIRUBIN URINE: NEGATIVE
BLOOD, URINE: ABNORMAL
BUN BLDV-MCNC: 8 MG/DL (ref 7–20)
CALCIUM SERPL-MCNC: 9.5 MG/DL (ref 8.3–10.6)
CHLORIDE BLD-SCNC: 94 MMOL/L (ref 99–110)
CLARITY: ABNORMAL
CO2: 34 MMOL/L (ref 21–32)
COLOR: YELLOW
CREAT SERPL-MCNC: 0.6 MG/DL (ref 0.6–1.2)
EOSINOPHILS ABSOLUTE: 0.3 K/UL (ref 0–0.6)
EOSINOPHILS RELATIVE PERCENT: 3.2 %
EPITHELIAL CELLS, UA: ABNORMAL /HPF (ref 0–5)
GFR AFRICAN AMERICAN: >60
GFR NON-AFRICAN AMERICAN: >60
GLOBULIN: 3 G/DL
GLUCOSE BLD-MCNC: 117 MG/DL (ref 70–99)
GLUCOSE URINE: NEGATIVE MG/DL
HCT VFR BLD CALC: 37.8 % (ref 36–48)
HEMOGLOBIN: 12 G/DL (ref 12–16)
INR BLD: 2.93 (ref 0.86–1.14)
KETONES, URINE: NEGATIVE MG/DL
LACTIC ACID: 0.8 MMOL/L (ref 0.4–2)
LACTIC ACID: 4 MMOL/L (ref 0.4–2)
LEUKOCYTE ESTERASE, URINE: ABNORMAL
LIPASE: 21 U/L (ref 13–60)
LYMPHOCYTES ABSOLUTE: 2 K/UL (ref 1–5.1)
LYMPHOCYTES RELATIVE PERCENT: 24.7 %
MCH RBC QN AUTO: 27.3 PG (ref 26–34)
MCHC RBC AUTO-ENTMCNC: 31.8 G/DL (ref 31–36)
MCV RBC AUTO: 85.7 FL (ref 80–100)
MICROSCOPIC EXAMINATION: YES
MONOCYTES ABSOLUTE: 0.8 K/UL (ref 0–1.3)
MONOCYTES RELATIVE PERCENT: 10.4 %
NEUTROPHILS ABSOLUTE: 4.9 K/UL (ref 1.7–7.7)
NEUTROPHILS RELATIVE PERCENT: 60.9 %
NITRITE, URINE: NEGATIVE
PDW BLD-RTO: 14.5 % (ref 12.4–15.4)
PH UA: 8.5 (ref 5–8)
PLATELET # BLD: 309 K/UL (ref 135–450)
PMV BLD AUTO: 6.8 FL (ref 5–10.5)
POTASSIUM REFLEX MAGNESIUM: 4.2 MMOL/L (ref 3.5–5.1)
PRO-BNP: 171 PG/ML (ref 0–449)
PROTEIN UA: ABNORMAL MG/DL
PROTHROMBIN TIME: 34.3 SEC (ref 10–13.2)
RBC # BLD: 4.42 M/UL (ref 4–5.2)
RBC UA: ABNORMAL /HPF (ref 0–4)
SODIUM BLD-SCNC: 138 MMOL/L (ref 136–145)
SPECIFIC GRAVITY UA: 1.01 (ref 1–1.03)
TOTAL PROTEIN: 7.2 G/DL (ref 6.4–8.2)
URINE TYPE: ABNORMAL
UROBILINOGEN, URINE: 1 E.U./DL
WBC # BLD: 8.1 K/UL (ref 4–11)
WBC UA: ABNORMAL /HPF (ref 0–5)
YEAST: PRESENT /HPF

## 2020-10-10 PROCEDURE — 36415 COLL VENOUS BLD VENIPUNCTURE: CPT

## 2020-10-10 PROCEDURE — 6360000002 HC RX W HCPCS: Performed by: INTERNAL MEDICINE

## 2020-10-10 PROCEDURE — 2580000003 HC RX 258: Performed by: STUDENT IN AN ORGANIZED HEALTH CARE EDUCATION/TRAINING PROGRAM

## 2020-10-10 PROCEDURE — 74177 CT ABD & PELVIS W/CONTRAST: CPT

## 2020-10-10 PROCEDURE — 96365 THER/PROPH/DIAG IV INF INIT: CPT

## 2020-10-10 PROCEDURE — 85025 COMPLETE CBC W/AUTO DIFF WBC: CPT

## 2020-10-10 PROCEDURE — 83605 ASSAY OF LACTIC ACID: CPT

## 2020-10-10 PROCEDURE — 2580000003 HC RX 258: Performed by: INTERNAL MEDICINE

## 2020-10-10 PROCEDURE — 80053 COMPREHEN METABOLIC PANEL: CPT

## 2020-10-10 PROCEDURE — 85610 PROTHROMBIN TIME: CPT

## 2020-10-10 PROCEDURE — 83880 ASSAY OF NATRIURETIC PEPTIDE: CPT

## 2020-10-10 PROCEDURE — 6370000000 HC RX 637 (ALT 250 FOR IP): Performed by: INTERNAL MEDICINE

## 2020-10-10 PROCEDURE — 6360000004 HC RX CONTRAST MEDICATION: Performed by: STUDENT IN AN ORGANIZED HEALTH CARE EDUCATION/TRAINING PROGRAM

## 2020-10-10 PROCEDURE — 99285 EMERGENCY DEPT VISIT HI MDM: CPT

## 2020-10-10 PROCEDURE — 81001 URINALYSIS AUTO W/SCOPE: CPT

## 2020-10-10 PROCEDURE — 6360000002 HC RX W HCPCS: Performed by: STUDENT IN AN ORGANIZED HEALTH CARE EDUCATION/TRAINING PROGRAM

## 2020-10-10 PROCEDURE — 83690 ASSAY OF LIPASE: CPT

## 2020-10-10 PROCEDURE — 96375 TX/PRO/DX INJ NEW DRUG ADDON: CPT

## 2020-10-10 PROCEDURE — 1200000000 HC SEMI PRIVATE

## 2020-10-10 RX ORDER — TRAZODONE HYDROCHLORIDE 50 MG/1
50 TABLET ORAL NIGHTLY
Status: DISCONTINUED | OUTPATIENT
Start: 2020-10-10 | End: 2020-10-14 | Stop reason: HOSPADM

## 2020-10-10 RX ORDER — WARFARIN SODIUM 2.5 MG/1
2.5 TABLET ORAL DAILY
Status: DISCONTINUED | OUTPATIENT
Start: 2020-10-10 | End: 2020-10-11

## 2020-10-10 RX ORDER — POLYETHYLENE GLYCOL 3350 17 G/17G
17 POWDER, FOR SOLUTION ORAL DAILY PRN
Status: DISCONTINUED | OUTPATIENT
Start: 2020-10-10 | End: 2020-10-14 | Stop reason: HOSPADM

## 2020-10-10 RX ORDER — PROMETHAZINE HYDROCHLORIDE 25 MG/1
12.5 TABLET ORAL EVERY 6 HOURS PRN
Status: DISCONTINUED | OUTPATIENT
Start: 2020-10-10 | End: 2020-10-14 | Stop reason: HOSPADM

## 2020-10-10 RX ORDER — ALBUTEROL SULFATE 2.5 MG/3ML
2.5 SOLUTION RESPIRATORY (INHALATION) EVERY 6 HOURS PRN
Status: DISCONTINUED | OUTPATIENT
Start: 2020-10-10 | End: 2020-10-11

## 2020-10-10 RX ORDER — MORPHINE SULFATE 2 MG/ML
2 INJECTION, SOLUTION INTRAMUSCULAR; INTRAVENOUS EVERY 4 HOURS PRN
Status: DISCONTINUED | OUTPATIENT
Start: 2020-10-10 | End: 2020-10-14 | Stop reason: HOSPADM

## 2020-10-10 RX ORDER — WARFARIN SODIUM 5 MG/1
5 TABLET ORAL
COMMUNITY
End: 2021-05-19 | Stop reason: SDUPTHER

## 2020-10-10 RX ORDER — 0.9 % SODIUM CHLORIDE 0.9 %
1000 INTRAVENOUS SOLUTION INTRAVENOUS ONCE
Status: COMPLETED | OUTPATIENT
Start: 2020-10-10 | End: 2020-10-10

## 2020-10-10 RX ORDER — LANOLIN ALCOHOL/MO/W.PET/CERES
CREAM (GRAM) TOPICAL DAILY
COMMUNITY

## 2020-10-10 RX ORDER — SODIUM CHLORIDE 9 MG/ML
INJECTION, SOLUTION INTRAVENOUS CONTINUOUS
Status: DISCONTINUED | OUTPATIENT
Start: 2020-10-10 | End: 2020-10-12

## 2020-10-10 RX ORDER — TORSEMIDE 20 MG/1
20 TABLET ORAL DAILY
Status: DISCONTINUED | OUTPATIENT
Start: 2020-10-11 | End: 2020-10-11

## 2020-10-10 RX ORDER — ACETAMINOPHEN 650 MG/1
650 SUPPOSITORY RECTAL EVERY 6 HOURS PRN
Status: DISCONTINUED | OUTPATIENT
Start: 2020-10-10 | End: 2020-10-14 | Stop reason: HOSPADM

## 2020-10-10 RX ORDER — SODIUM CHLORIDE 0.9 % (FLUSH) 0.9 %
10 SYRINGE (ML) INJECTION EVERY 12 HOURS SCHEDULED
Status: DISCONTINUED | OUTPATIENT
Start: 2020-10-10 | End: 2020-10-14 | Stop reason: HOSPADM

## 2020-10-10 RX ORDER — SODIUM CHLORIDE 9 MG/ML
1000 INJECTION, SOLUTION INTRAVENOUS CONTINUOUS
Status: DISCONTINUED | OUTPATIENT
Start: 2020-10-10 | End: 2020-10-10

## 2020-10-10 RX ORDER — SODIUM CHLORIDE 0.9 % (FLUSH) 0.9 %
10 SYRINGE (ML) INJECTION PRN
Status: DISCONTINUED | OUTPATIENT
Start: 2020-10-10 | End: 2020-10-14 | Stop reason: HOSPADM

## 2020-10-10 RX ORDER — ONDANSETRON 2 MG/ML
4 INJECTION INTRAMUSCULAR; INTRAVENOUS EVERY 6 HOURS PRN
Status: DISCONTINUED | OUTPATIENT
Start: 2020-10-10 | End: 2020-10-14 | Stop reason: HOSPADM

## 2020-10-10 RX ORDER — ACETAMINOPHEN 325 MG/1
650 TABLET ORAL EVERY 6 HOURS PRN
Status: DISCONTINUED | OUTPATIENT
Start: 2020-10-10 | End: 2020-10-14 | Stop reason: HOSPADM

## 2020-10-10 RX ORDER — VITAMIN B COMPLEX
2000 TABLET ORAL DAILY
Status: DISCONTINUED | OUTPATIENT
Start: 2020-10-10 | End: 2020-10-14 | Stop reason: HOSPADM

## 2020-10-10 RX ORDER — MORPHINE SULFATE 4 MG/ML
4 INJECTION, SOLUTION INTRAMUSCULAR; INTRAVENOUS EVERY 4 HOURS PRN
Status: DISCONTINUED | OUTPATIENT
Start: 2020-10-10 | End: 2020-10-14 | Stop reason: HOSPADM

## 2020-10-10 RX ORDER — 0.9 % SODIUM CHLORIDE 0.9 %
500 INTRAVENOUS SOLUTION INTRAVENOUS ONCE
Status: DISCONTINUED | OUTPATIENT
Start: 2020-10-10 | End: 2020-10-10

## 2020-10-10 RX ADMIN — Medication 10 ML: at 21:00

## 2020-10-10 RX ADMIN — PIPERACILLIN SODIUM AND TAZOBACTAM SODIUM 3.38 G: 3; .375 INJECTION, POWDER, LYOPHILIZED, FOR SOLUTION INTRAVENOUS at 15:59

## 2020-10-10 RX ADMIN — IOPAMIDOL 75 ML: 755 INJECTION, SOLUTION INTRAVENOUS at 14:58

## 2020-10-10 RX ADMIN — TRAZODONE HYDROCHLORIDE 50 MG: 50 TABLET ORAL at 22:24

## 2020-10-10 RX ADMIN — SODIUM CHLORIDE: 9 INJECTION, SOLUTION INTRAVENOUS at 19:14

## 2020-10-10 RX ADMIN — SODIUM CHLORIDE 1000 ML: 9 INJECTION, SOLUTION INTRAVENOUS at 18:44

## 2020-10-10 RX ADMIN — MORPHINE SULFATE 4 MG: 4 INJECTION, SOLUTION INTRAMUSCULAR; INTRAVENOUS at 20:19

## 2020-10-10 RX ADMIN — MORPHINE SULFATE 4 MG: 4 INJECTION, SOLUTION INTRAMUSCULAR; INTRAVENOUS at 14:25

## 2020-10-10 RX ADMIN — WARFARIN 2.5 MG: 2.5 TABLET ORAL at 20:17

## 2020-10-10 RX ADMIN — SODIUM CHLORIDE 1000 ML: 9 INJECTION, SOLUTION INTRAVENOUS at 14:25

## 2020-10-10 ASSESSMENT — PAIN DESCRIPTION - ORIENTATION
ORIENTATION: LEFT;LOWER
ORIENTATION: LEFT;LOWER

## 2020-10-10 ASSESSMENT — PAIN DESCRIPTION - DESCRIPTORS: DESCRIPTORS: DISCOMFORT

## 2020-10-10 ASSESSMENT — PAIN DESCRIPTION - PAIN TYPE
TYPE: ACUTE PAIN;CHRONIC PAIN
TYPE: ACUTE PAIN;CHRONIC PAIN

## 2020-10-10 ASSESSMENT — PAIN SCALES - GENERAL
PAINLEVEL_OUTOF10: 7
PAINLEVEL_OUTOF10: 9
PAINLEVEL_OUTOF10: 9

## 2020-10-10 ASSESSMENT — PAIN DESCRIPTION - LOCATION
LOCATION: ABDOMEN

## 2020-10-10 NOTE — ED NOTES
Perfect serve message to Dr. Tracy Dodge @ [address] [employer]  10/10/20 8759    Dr Tracy Dodge returned the call to Dr. Elena Castillo @ [address] [employer]  10/10/20 1534

## 2020-10-10 NOTE — PROGRESS NOTES
4 Eyes Skin Assessment  with Minerva Mas RN    The patient is being assess for   Admission    I agree that 2 RN's have performed a thorough Head to Toe Skin Assessment on the patient. ALL  assessment sites listed below have been assessed. Areas assessed by both nurses:   [x]   Head, Face, and Ears   [x]   Shoulders, Back, and Chest, Abdomen  [x]   Arms, Elbows, and Hands   [x]   Coccyx, Sacrum, and Ischium  [x]   Legs, Feet, and Heels            **SHARE this note so that the co-signing nurse is able to place an eSignature**    Co-signer eSignature: Electronically signed by Sharon Valencia RN on 10/10/20 at 7:58 PM EDT    Does the Patient have Skin Breakdown?   No          Jefe Prevention initiated:  No   Wound Care Orders initiated:  No      [facility] nurse consulted for Pressure Injury (Stage 3,4, Unstageable, DTI, NWPT, Complex wounds)and New or Established Ostomies:  No      Primary Nurse eSignature: Electronically signed by Geovani Dennis RN on 10/10/20 at 7:47 PM EDT

## 2020-10-10 NOTE — ED PROVIDER NOTES
[address] ED      CHIEF COMPLAINT  Abdominal Pain (Patient states that she has diverticulitis and is having increasing abdominal pain x 2 days. Patient states that she has had 1 episode of emesis, yesterday, and feels that her stools are harder than usual. Denies diarrhea.)       HISTORY OF PRESENT ILLNESS  Lazarus Clinton is a 70 y.o. female with a past medical history of COPD, hyperlipidemia, diverticulitis, PE, CHF, atrial fibrillation, TIA, and lymphoma who presents to the ED complaining of abdominal pain. Has had diverticulitis, reports similar symptoms at this time. Reports 2d of pain LLQ pain. Most recent diverticulitis a couple years ago. 9/10. Constant Aching pain. Worse with movement. Laying still improves pain but it is still there. Has not taken anything for pain. 1 episode of NBNB emesis yesterday. Last BM yesterday (hard stool). Still passing gas. Denies diarrhea, dysuria, fever, vaginal bleeding or discharge. Last chemo treatment in July. Previous appendectomy and hernia repair and partial hysterectomy. No other complaints, modifying factors or associated symptoms. I have reviewed the following from the nursing documentation.     Past Medical History:   Diagnosis Date    Acute exacerbation of chronic obstructive pulmonary disease (COPD) ([address]     Acute on chronic respiratory failure (HCC)     Atrial fibrillation with rapid ventricular response ([address] 6/23/2015    B-cell lymphoma ([address]     CAP (community acquired pneumonia) 1/29/2015    CHF (congestive heart failure) ([address]     Diverticulitis     HCAP (healthcare-associated pneumonia)     IFG (impaired fasting glucose) 9/13/2018    Non-Hodgkins lymphoma ([address]     Panic disorder     Pulmonary embolism ([address]     6/15    Rectal vaginal fistula     [facility] spotted fever 2013    Sciatic nerve pain     Sepsis ([address] 6/22/2015    Tobacco abuse 6/22/2015    Vitamin D deficiency      Past Surgical History:   Procedure Laterality Date    APPENDECTOMY      BRONCHOSCOPY N/A 8/12/2019    EBUS WF W/ANES.  performed by Jake Thakur MD at [address] [place] [address]  8/12/2019    BRONCHOSCOPY ALVEOLAR LAVAGE performed by Jake Thakur MD at [address] [place] [address]  8/12/2019    BRONCHOSCOPY BRUSHINGS performed by Jake Thakur MD at [address] [place] [address]  8/12/2019    BRONCHOSCOPY/TRANSBRONCHIAL LUNG BIOPSY performed by Jake Thakur MD at [address] [place] [address]  8/12/2019    BRONCHOSCOPY/TRANSBRONCHIAL NEEDLE BIOPSY performed by Jake Thakur MD at [address] [place] [address]  8/12/2019    BRONCHOSCOPY/TRANSBRONCHIAL NEEDLE BIOPSY ADDL LOBE performed by Jake Thakur MD at [address] [place] [address]    COLONOSCOPY  09/25/2019    diverticulosis, polyp sigmoid    COLONOSCOPY N/A 9/25/2019    COLONOSCOPY POLYPECTOMY SNARE/COLD BIOPSY performed by Yusuf Scott MD at [place] [address] N/A 1/17/2020    PORT INSERTION performed by Louis Perea MD at [address] [facility] Left 01/17/2020    Port Insertion ENRIQUETA Chest w. Dr Gloria Henderson 1/17/20 Vaccess CT Injectable [id number] lot [id number] 3/31/21     Family History   Problem Relation Age of Onset    Cancer Mother 43        lung cancer    Diabetes Sister     Cancer Daughter 43        lung cancer    Asthma Son     Hypertension Son     Asthma Grandchild     Hypertension Son     Emphysema Neg Hx     Heart Failure Neg Hx      Social History     Socioeconomic History    Marital status:      Spouse name: Not on file    Number of children: Not on file    Years of education: Not on file    Highest education level: Not on file   Occupational History    Occupation: retired   Social Needs    Financial resource strain: Not very hard    Food insecurity     Worry: Never true     Inability: Never true    Transportation needs     Medical: No     Non-medical: No   Tobacco Use    Smoking status: Former Smoker     Packs/day: 0.33     Years: 50.00     Pack years: 16.50     Types: Cigarettes     Last attempt to quit: 2015     Years since quittin.3    Smokeless tobacco: Never Used   Substance and Sexual Activity    Alcohol use: Not Currently     Alcohol/week: 0.0 standard drinks     Frequency: Never    Drug use: No    Sexual activity: Not Currently   Lifestyle    Physical activity     Days per week: 0 days     Minutes per session: 0 min    Stress: To some extent   Relationships    Social connections     Talks on phone: Not on file     Gets together: Once a week     Attends Rastafarian service: Never     Active member of club or organization: No     Attends meetings of clubs or organizations: Never     Relationship status:     Intimate partner violence     Fear of current or ex partner: Not on file     Emotionally abused: Not on file     Physically abused: Not on file     Forced sexual activity: Not on file   Other Topics Concern    Not on file   Social History Narrative    Not on file     No current facility-administered medications for this encounter.       Current Outpatient Medications   Medication Sig Dispense Refill    spironolactone (ALDACTONE) 25 MG tablet Take 1 tablet by mouth daily 30 tablet 1    torsemide (DEMADEX) 20 MG tablet Take 1 tablet by mouth daily 30 tablet 3    warfarin (COUMADIN) 2.5 MG tablet INR - subtherapeutic 12 tablet 0    albuterol (PROVENTIL) (2.5 MG/3ML) 0.083% nebulizer solution Take 3 mLs by nebulization every 6 hours as needed for Wheezing or Shortness of Breath DX COPD J44.9 360 mL 5    Acetaminophen (TYLENOL ARTHRITIS PAIN PO) Take by mouth      albuterol sulfate HFA (VENTOLIN HFA) 108 (90 Base) MCG/ACT inhaler Inhale 2 puffs into the lungs every 6 hours as needed for Wheezing 2 Inhaler 3    traZODone (DESYREL) 50 MG tablet Take 1 tablet by mouth nightly 30 tablet 1    melatonin 3 MG TABS tablet Take 1 tablet by mouth nightly as needed (insomnia) 15 tablet 0    OXYGEN 4.5 L      Cholecalciferol (VITAMIN D-3 PO) Take 2,000 Units by mouth daily        Allergies   Allergen Reactions    Crestor [Rosuvastatin]      Muscle aches    Levofloxacin Other (See Comments)     Achiness    Lipitor      Muscle aches    Rocephin In Dextrose [Ceftriaxone Sodium In Dextrose]      Rash,itching    Zocor [Simvastatin]      Muscle aches    Codeine Itching and Nausea And Vomiting    Pravastatin Nausea And Vomiting       REVIEW OF SYSTEMS  10 systems reviewed, pertinent positives per HPI otherwise noted to be negative. PHYSICAL EXAM  /62   Pulse 97   Temp 97.7 °F (36.5 °C) (Oral)   Resp 16   Ht 5' 2\" (1.575 m)   Wt 263 lb (119.3 kg)   LMP  (LMP Unknown)   SpO2 96%   BMI 48.10 kg/m²    GENERAL APPEARANCE: Awake and alert. Cooperative. Uncomfortable but no acute distress. HENT: Normocephalic. Atraumatic. Mucous membranes are moist  NECK: Supple. Full range of motion without pain or stiffness  EYES: PERRL. EOM's grossly intact. HEART/CHEST: RRR. No murmurs. LUNGS: Respirations unlabored. CTAB. Good air exchange. Speaking comfortably in full sentences. ABDOMEN: Tender in LLQ. Soft. Non-distended. No masses. No organomegaly. No guarding or rebound. MUSCULOSKELETAL: mild extremity edema. Compartments soft. No deformity. No tenderness in the extremities. All extremities neurovascularly intact. SKIN: Warm and dry. No acute rashes. NEUROLOGICAL: Alert and oriented. CN's 2-12 intact. No gross facial drooping. Strength 5/5, sensation intact. PSYCHIATRIC: Normal mood and affect. LABS  I have reviewed all labs for this visit.    Results for orders placed or performed during the hospital encounter of 10/10/20   CBC Auto Differential   Result Value Ref Range    WBC 8.1 4.0 - 11.0 K/uL    RBC 4.42 4.00 - 5.20 M/uL Hemoglobin 12.0 12.0 - 16.0 g/dL    Hematocrit 37.8 36.0 - 48.0 %    MCV 85.7 80.0 - 100.0 fL    MCH 27.3 26.0 - 34.0 pg    MCHC 31.8 31.0 - 36.0 g/dL    RDW 14.5 12.4 - 15.4 %    Platelets 423 004 - 052 K/uL    MPV 6.8 5.0 - 10.5 fL    Neutrophils % 60.9 %    Lymphocytes % 24.7 %    Monocytes % 10.4 %    Eosinophils % 3.2 %    Basophils % 0.8 %    Neutrophils Absolute 4.9 1.7 - 7.7 K/uL    Lymphocytes Absolute 2.0 1.0 - 5.1 K/uL    Monocytes Absolute 0.8 0.0 - 1.3 K/uL    Eosinophils Absolute 0.3 0.0 - 0.6 K/uL    Basophils Absolute 0.1 0.0 - 0.2 K/uL   Comprehensive Metabolic Panel w/ Reflex to MG   Result Value Ref Range    Sodium 138 136 - 145 mmol/L    Potassium reflex Magnesium 4.2 3.5 - 5.1 mmol/L    Chloride 94 (L) 99 - 110 mmol/L    CO2 34 (H) 21 - 32 mmol/L    Anion Gap 10 3 - 16    Glucose 117 (H) 70 - 99 mg/dL    BUN 8 7 - 20 mg/dL    CREATININE 0.6 0.6 - 1.2 mg/dL    GFR Non-African American >60 >60    GFR African American >60 >60    Calcium 9.5 8.3 - 10.6 mg/dL    Total Protein 7.2 6.4 - 8.2 g/dL    Alb 4.2 3.4 - 5.0 g/dL    Albumin/Globulin Ratio 1.4 1.1 - 2.2    Total Bilirubin 0.5 0.0 - 1.0 mg/dL    Alkaline Phosphatase 113 40 - 129 U/L    ALT 12 10 - 40 U/L    AST 16 15 - 37 U/L    Globulin 3.0 g/dL   Lipase   Result Value Ref Range    Lipase 21.0 13.0 - 60.0 U/L   Lactic Acid, Plasma   Result Value Ref Range    Lactic Acid 4.0 (HH) 0.4 - 2.0 mmol/L   Urinalysis, reflex to microscopic   Result Value Ref Range    Color, UA Yellow Straw/Yellow    Clarity, UA CLOUDY (A) Clear    Glucose, Ur Negative Negative mg/dL    Bilirubin Urine Negative Negative    Ketones, Urine Negative Negative mg/dL    Specific Gravity, UA 1.010 1.005 - 1.030    Blood, Urine SMALL (A) Negative    pH, UA 8.5 (A) 5.0 - 8.0    Protein, UA TRACE (A) Negative mg/dL    Urobilinogen, Urine 1.0 <2.0 E.U./dL    Nitrite, Urine Negative Negative    Leukocyte Esterase, Urine LARGE (A) Negative    Microscopic Examination YES     Urine Type NotGiven    Microscopic Urinalysis   Result Value Ref Range    WBC, UA 3-5 0 - 5 /HPF    RBC, UA 3-4 0 - 4 /HPF    Epithelial Cells, UA 11-20 (A) 0 - 5 /HPF    Bacteria, UA 4+ (A) None Seen /HPF    Yeast, UA Present (A) None Seen /HPF   Brain Natriuretic Peptide   Result Value Ref Range    Pro- 0 - 449 pg/mL   Lactic Acid, Plasma   Result Value Ref Range    Lactic Acid 0.8 0.4 - 2.0 mmol/L   Protime-INR   Result Value Ref Range    Protime 34.3 (H) 10.0 - 13.2 sec    INR 2.93 (H) 0.86 - 1.14       RADIOLOGY    CT ABDOMEN PELVIS W IV CONTRAST Additional Contrast? None   Final Result   Findings compatible with acute uncomplicated diverticulitis involving the   proximal sigmoid colon. No evidence of perforation or diverticular abscesses. There is a small hiatal hernia. There is suggestion of mild mural thickening   at the gastroesophageal junction with slight surrounding fat stranding. Correlate with any clinical evidence of esophagitis. Fat stranding surrounding the urinary bladder. Correlate with urinalysis   evidence of urinary tract infection. ED COURSE/MDM  Patient seen and evaluated. Old records reviewed. Labs and imaging reviewed and results discussed with patient. Overall, uncomfortable appearing patient in no acute distress presenting with LLQ pain consistent with previous diverticulitis. Physical exam remarkable for LLQ tenderness to palpation. Differential diagnosis includes but is not limited to: Diverticulitis, bowel obstruction, gastroenteritis, peptic ulcer disease, cholecystitis, hernia, pancreatitis, hepatitis or other liver disease, UTI, AAA     Laboratory studies obtained. Based on LLQ consistent with previous diverticulitis and elevated lactate,  I do feel that CT scan of the abdomen and pelvis is warranted at this time. ED Course as of Oct 10 1957   Sat Oct 10, 2020   1408 Lipase within normal limits, low suspicion for pancreatitis.     [ER] 1409 No leukocytosis, anemia, or thrombocytopenia. [ER]   1409 Hypochloremia and elevated bicarb. No other electrolyte abnormalities or evidence of kidney dysfunction.    [ER]   1409 Liver function testing unremarkable. [ER]   1409 Urinalysis with blood and leukocyte esterase.    [ER]   1409 Lactate significantly elevated at 4. Patient receiving fluids. [ER]   9170 CT abd/pelvis: IMPRESSION:  Findings compatible with acute uncomplicated diverticulitis involving the  proximal sigmoid colon. No evidence of perforation or diverticular abscesses.     There is a small hiatal hernia. There is suggestion of mild mural thickening  at the gastroesophageal junction with slight surrounding fat stranding. Correlate with any clinical evidence of esophagitis.     Fat stranding surrounding the urinary bladder. Correlate with urinalysis evidence of urinary tract infection.    [ER]   1629 Patient's BNP assessed to assess ability to give further fluids to patient with known heart failure. BNP within normal limits. Patient has mild bilateral lower extremity edema on exam.  Will give further fluids. [ER]   [id number] Receiving Zosyn for UTI and diverticulitis. [ER]   1630 Patient to be admitted for further management. [ER]      ED Course User Index  [ER] Debi Alvarez MD        During the patient's ED course, the patient was given:  Medications   morphine (PF) injection 2 mg (has no administration in time range)   piperacillin-tazobactam (ZOSYN) 3.375 g in sodium chloride 0.9 % 100 mL IVPB extended infusion (mini-bag) (has no administration in time range)   0.9 % sodium chloride infusion ( Intravenous New Bag 10/10/20 1914)   0.9 % sodium chloride bolus (0 mLs Intravenous Stopped 10/10/20 1545)   iopamidol (ISOVUE-370) 76 % injection 75 mL (75 mLs Intravenous Given 10/10/20 1458)         Workup remarkable for UTI. Patient has multiple allergies and so was started on Zosyn for treatment.     Patient's CT scan shows uncomplicated diverticulitis. Will continue zosyn for treatment of diverticulitis. Diverticulitis is not perforated or abscess, no surgical needs at this time. Patient lactate significantly elevated on arrival, down-trended with IVF. Given patient's age and co-morbidities, as well as concurrent UTI and elevated lactate- do feel that patient should be admitted for IV antibiotics and further management. Discussed the patient with hospital team.    5:35 PM   I have signed out [place] [facility] care to Dr. Deepti Boyce. We discussed the pertinent history, physical exam, completed/pending test results (if applicable) and current treatment plan. Please refer to his/her chart for the patients remaining Emergency Department course and final disposition. CLINICAL IMPRESSION  1. Diverticulitis of colon    2. Urinary tract infection in female    3. Elevated lactic acid level        Blood pressure (!) 143/63, pulse 85, temperature 97.5 °F (36.4 °C), temperature source Oral, resp. rate 16, height 5' 2\" (1.575 m), weight 263 lb (119.3 kg), SpO2 96 %, not currently breastfeeding. [address] was signed out to oncoming physician while awaiting admission in stable condition. DISCLAIMER: This chart was created using Dragon dictation software. Efforts were made by me to ensure accuracy, however some errors may be present due to limitations of this technology and occasionally words are not transcribed correctly.       Shalonda Sanches MD  10/10/20 2000

## 2020-10-11 LAB
ANION GAP SERPL CALCULATED.3IONS-SCNC: 7 MMOL/L (ref 3–16)
BASOPHILS ABSOLUTE: 0.1 K/UL (ref 0–0.2)
BASOPHILS RELATIVE PERCENT: 0.8 %
BUN BLDV-MCNC: 7 MG/DL (ref 7–20)
CALCIUM SERPL-MCNC: 8.4 MG/DL (ref 8.3–10.6)
CHLORIDE BLD-SCNC: 99 MMOL/L (ref 99–110)
CO2: 30 MMOL/L (ref 21–32)
CREAT SERPL-MCNC: 0.6 MG/DL (ref 0.6–1.2)
EOSINOPHILS ABSOLUTE: 0.2 K/UL (ref 0–0.6)
EOSINOPHILS RELATIVE PERCENT: 3.6 %
GFR AFRICAN AMERICAN: >60
GFR NON-AFRICAN AMERICAN: >60
GLUCOSE BLD-MCNC: 123 MG/DL (ref 70–99)
HCT VFR BLD CALC: 34.3 % (ref 36–48)
HEMOGLOBIN: 10.9 G/DL (ref 12–16)
INR BLD: 3.05 (ref 0.86–1.14)
LACTIC ACID: 0.7 MMOL/L (ref 0.4–2)
LACTIC ACID: 0.7 MMOL/L (ref 0.4–2)
LACTIC ACID: 0.9 MMOL/L (ref 0.4–2)
LYMPHOCYTES ABSOLUTE: 1 K/UL (ref 1–5.1)
LYMPHOCYTES RELATIVE PERCENT: 14.4 %
MCH RBC QN AUTO: 27.3 PG (ref 26–34)
MCHC RBC AUTO-ENTMCNC: 31.8 G/DL (ref 31–36)
MCV RBC AUTO: 85.8 FL (ref 80–100)
MONOCYTES ABSOLUTE: 0.7 K/UL (ref 0–1.3)
MONOCYTES RELATIVE PERCENT: 11.2 %
NEUTROPHILS ABSOLUTE: 4.6 K/UL (ref 1.7–7.7)
NEUTROPHILS RELATIVE PERCENT: 70 %
PDW BLD-RTO: 14.1 % (ref 12.4–15.4)
PLATELET # BLD: 259 K/UL (ref 135–450)
PMV BLD AUTO: 6.8 FL (ref 5–10.5)
POTASSIUM REFLEX MAGNESIUM: 3.9 MMOL/L (ref 3.5–5.1)
PROTHROMBIN TIME: 35.8 SEC (ref 10–13.2)
RBC # BLD: 4 M/UL (ref 4–5.2)
SODIUM BLD-SCNC: 136 MMOL/L (ref 136–145)
WBC # BLD: 6.6 K/UL (ref 4–11)

## 2020-10-11 PROCEDURE — 6360000002 HC RX W HCPCS: Performed by: INTERNAL MEDICINE

## 2020-10-11 PROCEDURE — 6370000000 HC RX 637 (ALT 250 FOR IP): Performed by: INTERNAL MEDICINE

## 2020-10-11 PROCEDURE — 94761 N-INVAS EAR/PLS OXIMETRY MLT: CPT

## 2020-10-11 PROCEDURE — 83605 ASSAY OF LACTIC ACID: CPT

## 2020-10-11 PROCEDURE — 1200000000 HC SEMI PRIVATE

## 2020-10-11 PROCEDURE — 85610 PROTHROMBIN TIME: CPT

## 2020-10-11 PROCEDURE — 2700000000 HC OXYGEN THERAPY PER DAY

## 2020-10-11 PROCEDURE — 2580000003 HC RX 258: Performed by: INTERNAL MEDICINE

## 2020-10-11 PROCEDURE — 80048 BASIC METABOLIC PNL TOTAL CA: CPT

## 2020-10-11 PROCEDURE — 85025 COMPLETE CBC W/AUTO DIFF WBC: CPT

## 2020-10-11 PROCEDURE — 36415 COLL VENOUS BLD VENIPUNCTURE: CPT

## 2020-10-11 RX ORDER — ALBUTEROL SULFATE 2.5 MG/3ML
2.5 SOLUTION RESPIRATORY (INHALATION) EVERY 4 HOURS PRN
Status: DISCONTINUED | OUTPATIENT
Start: 2020-10-11 | End: 2020-10-14 | Stop reason: HOSPADM

## 2020-10-11 RX ORDER — WARFARIN SODIUM 1 MG/1
1 TABLET ORAL
Status: COMPLETED | OUTPATIENT
Start: 2020-10-11 | End: 2020-10-11

## 2020-10-11 RX ADMIN — PIPERACILLIN SODIUM AND TAZOBACTAM SODIUM 3.38 G: 3; .375 INJECTION, POWDER, LYOPHILIZED, FOR SOLUTION INTRAVENOUS at 15:03

## 2020-10-11 RX ADMIN — PIPERACILLIN SODIUM AND TAZOBACTAM SODIUM 3.38 G: 3; .375 INJECTION, POWDER, LYOPHILIZED, FOR SOLUTION INTRAVENOUS at 23:55

## 2020-10-11 RX ADMIN — Medication 2000 UNITS: at 08:59

## 2020-10-11 RX ADMIN — Medication 10 ML: at 22:13

## 2020-10-11 RX ADMIN — WARFARIN SODIUM 1 MG: 1 TABLET ORAL at 17:10

## 2020-10-11 RX ADMIN — PIPERACILLIN SODIUM AND TAZOBACTAM SODIUM 3.38 G: 3; .375 INJECTION, POWDER, LYOPHILIZED, FOR SOLUTION INTRAVENOUS at 00:12

## 2020-10-11 RX ADMIN — TORSEMIDE 20 MG: 20 TABLET ORAL at 08:59

## 2020-10-11 RX ADMIN — MORPHINE SULFATE 4 MG: 4 INJECTION, SOLUTION INTRAMUSCULAR; INTRAVENOUS at 09:19

## 2020-10-11 RX ADMIN — TRAZODONE HYDROCHLORIDE 50 MG: 50 TABLET ORAL at 22:13

## 2020-10-11 RX ADMIN — SODIUM CHLORIDE: 9 INJECTION, SOLUTION INTRAVENOUS at 05:57

## 2020-10-11 RX ADMIN — SODIUM CHLORIDE: 9 INJECTION, SOLUTION INTRAVENOUS at 19:00

## 2020-10-11 RX ADMIN — SODIUM CHLORIDE: 9 INJECTION, SOLUTION INTRAVENOUS at 22:13

## 2020-10-11 RX ADMIN — MORPHINE SULFATE 4 MG: 4 INJECTION, SOLUTION INTRAMUSCULAR; INTRAVENOUS at 22:14

## 2020-10-11 RX ADMIN — PIPERACILLIN SODIUM AND TAZOBACTAM SODIUM 3.38 G: 3; .375 INJECTION, POWDER, LYOPHILIZED, FOR SOLUTION INTRAVENOUS at 09:00

## 2020-10-11 ASSESSMENT — PAIN DESCRIPTION - PROGRESSION
CLINICAL_PROGRESSION: GRADUALLY WORSENING
CLINICAL_PROGRESSION: GRADUALLY IMPROVING

## 2020-10-11 ASSESSMENT — PAIN DESCRIPTION - LOCATION
LOCATION: ABDOMEN
LOCATION: ABDOMEN

## 2020-10-11 ASSESSMENT — PAIN DESCRIPTION - FREQUENCY
FREQUENCY: CONTINUOUS
FREQUENCY: INTERMITTENT

## 2020-10-11 ASSESSMENT — PAIN DESCRIPTION - ONSET: ONSET: ON-GOING

## 2020-10-11 ASSESSMENT — PAIN DESCRIPTION - PAIN TYPE
TYPE: ACUTE PAIN
TYPE: ACUTE PAIN

## 2020-10-11 ASSESSMENT — PAIN SCALES - GENERAL
PAINLEVEL_OUTOF10: 0
PAINLEVEL_OUTOF10: 7
PAINLEVEL_OUTOF10: 7
PAINLEVEL_OUTOF10: 8

## 2020-10-11 ASSESSMENT — PAIN DESCRIPTION - DESCRIPTORS: DESCRIPTORS: ACHING;DISCOMFORT

## 2020-10-11 ASSESSMENT — PAIN DESCRIPTION - ORIENTATION
ORIENTATION: LOWER;LEFT
ORIENTATION: LOWER;LEFT

## 2020-10-11 ASSESSMENT — PAIN - FUNCTIONAL ASSESSMENT: PAIN_FUNCTIONAL_ASSESSMENT: ACTIVITIES ARE NOT PREVENTED

## 2020-10-11 NOTE — PROGRESS NOTES
Assessment completed as charted. Patient medicated with morphine per mar for c/o pain to abd. Resp. E/e. Patient remains on 4LNC which she states is her home requirements. Call light in reach will monitor.

## 2020-10-11 NOTE — PROGRESS NOTES
RESPIRATORY THERAPY ASSESSMENT    Name:  [facility] Record Number:  [id number]  Age: 70 y.o. Gender: female  : 1940  Today's Date:  10/11/2020  Room:  0218/0218-01    Assessment     Is the patient being admitted for a COPD or Asthma exacerbation? No   (If yes the patient will be seen every 4 hours for the first 24 hours and then reassessed)    Patient Admission Diagnosis      Allergies  Allergies   Allergen Reactions    Crestor [Rosuvastatin]      Muscle aches    Levofloxacin Other (See Comments)     Achiness    Lipitor      Muscle aches    Rocephin In Dextrose [Ceftriaxone Sodium In Dextrose]      Rash,itching    Zocor [Simvastatin]      Muscle aches    Codeine Itching and Nausea And Vomiting    Pravastatin Nausea And Vomiting       Minimum Predicted Vital Capacity:               Actual Vital Capacity:                    Pulmonary History:COPD and CHF/Pulmonary Edema  Home Oxygen Therapy:  4.5 liters/min via nasal cannula  Home Respiratory Therapy:Albuterol   Current Respiratory Therapy:  Albuterol q6 prn          Respiratory Severity Index(RSI)   Patients with orders for inhalation medications, oxygen, or any therapeutic treatment modality will be placed on Respiratory Protocol. They will be assessed with the first treatment and at least every 72 hours thereafter. The following severity scale will be used to determine frequency of treatment intervention.     Smoking History: Pulmonary Disease or Smoking History, Greater than 15 pack year = 2    Social History  Social History     Tobacco Use    Smoking status: Former Smoker     Packs/day: 0.33     Years: 50.00     Pack years: 16.50     Types: Cigarettes     Last attempt to quit: 2015     Years since quittin.3    Smokeless tobacco: Never Used   Substance Use Topics    Alcohol use: Not Currently     Alcohol/week: 0.0 standard drinks     Frequency: Never    Drug use: No       Recent Surgical History: None = 0  Past Surgical History  Past Surgical History:   Procedure Laterality Date    APPENDECTOMY      BRONCHOSCOPY N/A 8/12/2019    EBUS WF W/ANES.  performed by Reg Nolasco MD at [address]  8/12/2019    BRONCHOSCOPY ALVEOLAR LAVAGE performed by Reg Nolasco MD at [address]  8/12/2019    BRONCHOSCOPY BRUSHINGS performed by Reg Nolasco MD at [address]  8/12/2019    BRONCHOSCOPY/TRANSBRONCHIAL LUNG BIOPSY performed by Reg Nolasco MD at [address]  8/12/2019    BRONCHOSCOPY/TRANSBRONCHIAL NEEDLE BIOPSY performed by Reg Nolasco MD at [address]  8/12/2019    BRONCHOSCOPY/TRANSBRONCHIAL NEEDLE BIOPSY ADDL LOBE performed by Reg Nolasco MD at [address] [place] [address]  2003    COLONOSCOPY  09/25/2019    diverticulosis, polyp sigmoid    COLONOSCOPY N/A 9/25/2019    COLONOSCOPY POLYPECTOMY SNARE/COLD BIOPSY performed by Scottie Méndez MD at [place] [address] N/A 1/17/2020    PORT INSERTION performed by Kaykay Card MD at [address] [place] [address] 01/17/2020    Port Insertion ENRIQUETA Chest w. Dr Radha Berrios 1/17/20 Vaccess CT Injectable [id number] lot [id number] 3/31/21       Level of Consciousness: Alert, Oriented, and Cooperative = 0    Level of Activity: Walking with assistance = 1    Respiratory Pattern: Regular Pattern; RR 8-20 = 0    Breath Sounds: Diminshed bilaterally and/or crackles = 2    Sputum   ,  ,    Cough: Strong, spontaneous, non-productive = 0    Vital Signs   /69   Pulse 64   Temp 97.2 °F (36.2 °C) (Oral)   Resp 16   Ht 5' 2\" (1.575 m)   Wt 263 lb (119.3 kg)   LMP  (LMP Unknown)   SpO2 98%   BMI 48.10 kg/m²   SPO2 (COPD values may differ): 88-89% on room air or greater than 92% on FiO2 28- 35% = 2    Peak Flow (asthma only): not applicable = 0    RSI: 5-6 = Q4hr PRN (every four hours as needed) for dyspnea        Plan       Goals: mobilize retained secretions, volume expansion and improve oxygenation    Patient/caregiver was educated on the proper method of use for Respiratory Care Devices:  No:       Level of patient/caregiver understanding able to:   ? Verbalize understanding   ? Demonstrate understanding       ? Teach back        ? Needs reinforcement       ? No available caregiver               ? Other:     Response to education:  no treatment given     Is patient being placed on Home Treatment Regimen? Yes     Does the patient have everything they need prior to discharge? NA     Comments: pt assessed and chart reviewed    Plan of Care: albuterol q6 prn    Electronically signed by Kay Harkins RCP on 10/11/2020 at 12:22 AM    Respiratory Protocol Guidelines     1. Assessment and treatment by Respiratory Therapy will be initiated for medication and therapeutic interventions upon initiation of aerosolized medication. 2. Physician will be contacted for respiratory rate (RR) greater than 35 breaths per minute. Therapy will be held for heart rate (HR) greater than 140 beats per minute, pending direction from physician. 3. Bronchodilators will be administered via Metered Dose Inhaler (MDI) with spacer when the following criteria are met:  a. Alert and cooperative     b. HR < 140 bpm  c. RR < 30 bpm                d. Can demonstrate a 2-3 second inspiratory hold  4. Bronchodilators will be administered via Hand Held Nebulizer PASCUAL [facility]) to patients when ANY of the following criteria are met  a. Incognizant or uncooperative          b. Patients treated with HHN at Home        c. Unable to demonstrate proper use of MDI with spacer     d. RR > 30 bpm   5. Bronchodilators will be delivered via Metered Dose Inhaler (MDI), HHN, Aerogen to intubated patients on mechanical ventilation.   6. Inhalation medication orders will be delivered and/or substituted as outlined below. Aerosolized Medications Ordering and Administration Guidelines:    1. All Medications will be ordered by a physician, and their frequency and/or modality will be adjusted as defined by the patients Respiratory Severity Index (RSI) score. 2. If the patient does not have documented COPD, consider discontinuing anticholinergics when RSI is less than 9.  3. If the bronchospasm worsens (increased RSI), then the bronchodilator frequency can be increased to a maximum of every 4 hours. If greater than every 4 hours is required, the physician will be contacted. 4. If the bronchospasm improves, the frequency of the bronchodilator can be decreased, based on the patient's RSI, but not less than home treatment regimen frequency. 5. Bronchodilator(s) will be discontinued if patient has a RSI less than 9 and has received no scheduled or as needed treatment for 72  Hrs. Patients Ordered on a Mucolytic Agent:    1. Must always be administered with a bronchodilator. 2. Discontinue if patient experiences worsened bronchospasm, or secretions have lessened to the point that the patient is able to clear them with a cough. Anti-inflammatory and Combination Medications:    1. If the patient lacks prior history of lung disease, is not using inhaled anti-inflammatory medication at home, and lacks wheezing by examination or by history for at least 24 hours, contact physician for possible discontinuation.

## 2020-10-11 NOTE — PROGRESS NOTES
Pharmacy note:  Warfarin  Dr. Fuad Marlow to dose warfarin    INR = 3.03    INR is slightly above goal range of 2-3. Will give warfarin 1 mg today and continue to dose per daily INR. Flavio Winchester. 10/11/2020 2:41 PM

## 2020-10-12 LAB
ANION GAP SERPL CALCULATED.3IONS-SCNC: 7 MMOL/L (ref 3–16)
BUN BLDV-MCNC: 6 MG/DL (ref 7–20)
CALCIUM SERPL-MCNC: 8.2 MG/DL (ref 8.3–10.6)
CHLORIDE BLD-SCNC: 99 MMOL/L (ref 99–110)
CO2: 31 MMOL/L (ref 21–32)
CREAT SERPL-MCNC: 0.6 MG/DL (ref 0.6–1.2)
GFR AFRICAN AMERICAN: >60
GFR NON-AFRICAN AMERICAN: >60
GLUCOSE BLD-MCNC: 107 MG/DL (ref 70–99)
HCT VFR BLD CALC: 33.1 % (ref 36–48)
HEMOGLOBIN: 10.5 G/DL (ref 12–16)
INR BLD: 3.31 (ref 0.86–1.14)
LACTIC ACID: 0.5 MMOL/L (ref 0.4–2)
MCH RBC QN AUTO: 27.2 PG (ref 26–34)
MCHC RBC AUTO-ENTMCNC: 31.7 G/DL (ref 31–36)
MCV RBC AUTO: 85.7 FL (ref 80–100)
PDW BLD-RTO: 14.7 % (ref 12.4–15.4)
PLATELET # BLD: 254 K/UL (ref 135–450)
PMV BLD AUTO: 7.1 FL (ref 5–10.5)
POTASSIUM REFLEX MAGNESIUM: 4.2 MMOL/L (ref 3.5–5.1)
PROTHROMBIN TIME: 38.9 SEC (ref 10–13.2)
RBC # BLD: 3.86 M/UL (ref 4–5.2)
SODIUM BLD-SCNC: 137 MMOL/L (ref 136–145)
WBC # BLD: 5.5 K/UL (ref 4–11)

## 2020-10-12 PROCEDURE — 87088 URINE BACTERIA CULTURE: CPT

## 2020-10-12 PROCEDURE — 1200000000 HC SEMI PRIVATE

## 2020-10-12 PROCEDURE — 6360000002 HC RX W HCPCS: Performed by: INTERNAL MEDICINE

## 2020-10-12 PROCEDURE — 83605 ASSAY OF LACTIC ACID: CPT

## 2020-10-12 PROCEDURE — 85027 COMPLETE CBC AUTOMATED: CPT

## 2020-10-12 PROCEDURE — 87077 CULTURE AEROBIC IDENTIFY: CPT

## 2020-10-12 PROCEDURE — 6370000000 HC RX 637 (ALT 250 FOR IP): Performed by: INTERNAL MEDICINE

## 2020-10-12 PROCEDURE — 36415 COLL VENOUS BLD VENIPUNCTURE: CPT

## 2020-10-12 PROCEDURE — 99231 SBSQ HOSP IP/OBS SF/LOW 25: CPT | Performed by: NURSE PRACTITIONER

## 2020-10-12 PROCEDURE — 2580000003 HC RX 258: Performed by: INTERNAL MEDICINE

## 2020-10-12 PROCEDURE — 85610 PROTHROMBIN TIME: CPT

## 2020-10-12 PROCEDURE — 80048 BASIC METABOLIC PNL TOTAL CA: CPT

## 2020-10-12 PROCEDURE — 87086 URINE CULTURE/COLONY COUNT: CPT

## 2020-10-12 PROCEDURE — 87186 SC STD MICRODIL/AGAR DIL: CPT

## 2020-10-12 RX ORDER — WARFARIN SODIUM 5 MG/1
TABLET ORAL
Qty: 60 TABLET | Refills: 1 | Status: SHIPPED | OUTPATIENT
Start: 2020-10-12 | End: 2021-02-24 | Stop reason: SDUPTHER

## 2020-10-12 RX ADMIN — MORPHINE SULFATE 2 MG: 2 INJECTION, SOLUTION INTRAMUSCULAR; INTRAVENOUS at 23:38

## 2020-10-12 RX ADMIN — MORPHINE SULFATE 4 MG: 4 INJECTION, SOLUTION INTRAMUSCULAR; INTRAVENOUS at 09:03

## 2020-10-12 RX ADMIN — Medication 10 ML: at 23:38

## 2020-10-12 RX ADMIN — TRAZODONE HYDROCHLORIDE 50 MG: 50 TABLET ORAL at 23:39

## 2020-10-12 RX ADMIN — Medication 2000 UNITS: at 09:04

## 2020-10-12 RX ADMIN — POLYETHYLENE GLYCOL 3350 17 G: 17 POWDER, FOR SOLUTION ORAL at 12:50

## 2020-10-12 RX ADMIN — PIPERACILLIN SODIUM AND TAZOBACTAM SODIUM 3.38 G: 3; .375 INJECTION, POWDER, LYOPHILIZED, FOR SOLUTION INTRAVENOUS at 23:38

## 2020-10-12 RX ADMIN — PIPERACILLIN SODIUM AND TAZOBACTAM SODIUM 3.38 G: 3; .375 INJECTION, POWDER, LYOPHILIZED, FOR SOLUTION INTRAVENOUS at 15:39

## 2020-10-12 RX ADMIN — PIPERACILLIN SODIUM AND TAZOBACTAM SODIUM 3.38 G: 3; .375 INJECTION, POWDER, LYOPHILIZED, FOR SOLUTION INTRAVENOUS at 09:03

## 2020-10-12 RX ADMIN — Medication 10 ML: at 09:03

## 2020-10-12 ASSESSMENT — PAIN DESCRIPTION - DESCRIPTORS
DESCRIPTORS: DISCOMFORT
DESCRIPTORS: ACHING

## 2020-10-12 ASSESSMENT — PAIN DESCRIPTION - FREQUENCY: FREQUENCY: CONTINUOUS

## 2020-10-12 ASSESSMENT — PAIN DESCRIPTION - ONSET: ONSET: ON-GOING

## 2020-10-12 ASSESSMENT — PAIN SCALES - GENERAL
PAINLEVEL_OUTOF10: 6
PAINLEVEL_OUTOF10: 0
PAINLEVEL_OUTOF10: 6
PAINLEVEL_OUTOF10: 7

## 2020-10-12 ASSESSMENT — PAIN DESCRIPTION - PAIN TYPE
TYPE: ACUTE PAIN;CHRONIC PAIN
TYPE: CHRONIC PAIN

## 2020-10-12 ASSESSMENT — PAIN DESCRIPTION - LOCATION
LOCATION: ABDOMEN;LEG
LOCATION: ABDOMEN

## 2020-10-12 NOTE — CARE COORDINATION
Case Management Assessment  Initial Evaluation      Patient Name: [facility]  YOB: 1940  Diagnosis: Acute diverticulitis [K57.92]  Date / Time: 10/10/2020 12:21 PM    Admission status/Date:INPT 10/10/2020  Chart Reviewed: Yes      Patient Interviewed: Yes   Family Interviewed:  No      Hospitalization in the last 30 days:  No      Health Care Decision Maker: pt    Met with: pt  Interview conducted  (bedside/phone): bedside    Current PCP: Surekha Velazquez    Financial  Medicare  Precert required for SNF : [facility]          3 night stay required - Y, N    ADLS  Support Systems/Care Needs: Family Members  Transportation: self  and senior services. Meal Preparation: self    Housing  Living Arrangements: home alone  Steps: 3  Intent for return to present living arrangements: Yes  Identified Issues: [address] [place] [address] with [address] : No Agency:(Services)     Passport/Waiver : No  :                      Phone Number:    Passport/Waiver Services: n/a          Durable Medical Equiptment   [facility]  Equipment:   Walker_X__Cane_X__RTS___ BSC___Shower Chair_X__Hospital Bed___W/C____Other________  02 at _4_Liter(s)---wears(frequency)_cont______ HHN X___ CPAP___ BiPap___   N/A____      Home O2 Use :  Yes    If No for home O2---if presently on O2 during hospitalization:  Yes  if yes CM to follow for potential DC O2 need  Informed of need for care provider to bring portable home O2 tank on day of discharge for nursing to connect prior to leaving:   Yes  Verbalized agreement/Understanding:   Yes    Community Service Affiliation  Dialysis:  No    · Agency:  · Location:  · Dialysis Schedule:  · Phone:   · Fax: Other Community Services: (ex:PT/OT,Mental Health,Wound Clinic, Cardio/Pul [address])    DISCHARGE PLAN: Explained Case Management role/services. Reviewed chart and met with pt. Pt reports to live at home alone and is IPTA.  Pt is active with [facility] for home O2 and hhn. Pt plans to return home at discharge. Will follow for poss hhc needs.

## 2020-10-12 NOTE — PROGRESS NOTES
Physician Progress Note      PATIENTReginal Pion  CSN #:                  [id number]  :                       1940  ADMIT DATE:       10/10/2020 12:21 PM  DISCH DATE:  RESPONDING  PROVIDER #:        Liliam Paz CNP          QUERY TEXT:    Pt admitted with abd pain with diverticulitis. Pt noted to have UTI in the ED   findings. If possible, please document in the progress notes and discharge   summary if you are evaluating and/or treating any of the following: The medical record reflects the following:  Risk Factors: advanced age, diverticulitis  Clinical Indicators: per ed: Workup remarkable for UTI. Patient has multiple   allergies and so was started on Zosyn for treatment, no urine cx ordered,   (UA=4+bacteria, +yeast, large leuks)  Treatment: Zosyn IV, UA    Thank you for your assistance,  Noemi Frazier RN,BSN,CCDS,CRCR  Options provided:  -- Urinary Tract Infection (UTI)  -- Bacteriuria  -- No UTI  -- Other - I will add my own diagnosis  -- Disagree - Not applicable / Not valid  -- Disagree - Clinically unable to determine / Unknown  -- Refer to Clinical Documentation Reviewer    PROVIDER RESPONSE TEXT:    This patient has a UTI. Query created by: Sylvester Dimas on 10/12/2020 9:32 AM      QUERY TEXT:    Pt admitted with diverticulitis. Pt noted to have history of copd and oxygen   dependent on 4.5L. If possible, please document in the progress notes and   discharge summary if you are evaluating and/or treating any of the following:     The medical record reflects the following:  Risk Factors: age, copd, chf, PE  Clinical Indicators: baseline oxygen 4 to 4.5 L of oxygen  Treatment: 4L oxygen    Thank you for your assistance,  Noemi Frazier RN,BSN,CCDS,CRCR  Options provided:  -- Chronic respiratory failure with hypoxia  -- Chronic respiratory failure with hypercapnia  -- Other - I will add my own diagnosis  -- Disagree - Not applicable / Not valid  -- Disagree - Clinically unable to determine / Unknown  -- Refer to Clinical Documentation Reviewer    PROVIDER RESPONSE TEXT:    This patient has chronic respiratory failure with hypoxia.     Query created by: Skye Kasper on 10/12/2020 9:37 AM      Electronically signed by:  Jefferson Paz CNP 10/12/2020 11:34 AM

## 2020-10-12 NOTE — H&P
Hospital Medicine History & Physical      PCP: TURNER Barahona - CNP    Date of Admission: 10/10/2020    Date of Service: Pt seen/examined on 10/11/2020 and Admitted to Inpatient     Chief Complaint: abd pain    History Of Present Illness: The patient is a 70 y.o. female who presents to [facility] with abd pain. Pt states she noticed LLQ abd pain for the last couple days, denies any nausea/vomiting, no fevers. States had had h/o diverticulitis in the past and hence presented to the ER. Found to have acute diverticulitis and started on abx and hence admitted. Past Medical History:        Diagnosis Date    Acute exacerbation of chronic obstructive pulmonary disease (COPD) ([address]     Acute on chronic respiratory failure (HCC)     Atrial fibrillation with rapid ventricular response ([address] 6/23/2015    B-cell lymphoma ([address]     CAP (community acquired pneumonia) 1/29/2015    CHF (congestive heart failure) ([address]     Diverticulitis     Fistula     anal-vaginal fistula    HCAP (healthcare-associated pneumonia)     Hx of blood clots     IFG (impaired fasting glucose) 9/13/2018    Non-Hodgkins lymphoma ([address]     Panic disorder     Pulmonary embolism ([address]     6/15    Rectal vaginal fistula     [facility] spotted fever 2013    Sciatic nerve pain     Sepsis ([address] 6/22/2015    Tobacco abuse 6/22/2015    Vitamin D deficiency        Past Surgical History:        Procedure Laterality Date    APPENDECTOMY      BRONCHOSCOPY N/A 8/12/2019    EBUS WF W/ANES.  performed by Joel Kong MD at [address]  8/12/2019    BRONCHOSCOPY ALVEOLAR LAVAGE performed by Joel Kong MD at [address]  8/12/2019    BRONCHOSCOPY BRUSHINGS performed by Joel Kong MD at [address]  8/12/2019 BRONCHOSCOPY/TRANSBRONCHIAL LUNG BIOPSY performed by Constantino Harper MD at [address] [place] [address]  8/12/2019    BRONCHOSCOPY/TRANSBRONCHIAL NEEDLE BIOPSY performed by Constantino Harper MD at [address] [place] [address]  8/12/2019    BRONCHOSCOPY/TRANSBRONCHIAL NEEDLE BIOPSY ADDL LOBE performed by Constantino Harper MD at [address] [place] [address]    COLONOSCOPY  09/25/2019    diverticulosis, polyp sigmoid    COLONOSCOPY N/A 9/25/2019    COLONOSCOPY POLYPECTOMY SNARE/COLD BIOPSY performed by Gladys Anaya MD at [place] [address] N/A 1/17/2020    PORT INSERTION performed by Leonarda Wilder MD at [address] [facility] Left 01/17/2020    Port Insertion ENRIQUETA Chest w. Dr Cindy Wilkerson 1/17/20 Vaccess CT Injectable [id number] lot [id number] 3/31/21       Medications Prior to Admission:    Prior to Admission medications    Medication Sig Start Date End Date Taking?  Authorizing Provider   warfarin (COUMADIN) 5 MG tablet Take 5 mg by mouth Except for Wednesday 7.5mg   Yes Historical Provider, MD   vitamin B-6 (PYRIDOXINE) 50 MG tablet Take by mouth daily   Yes Historical Provider, MD   spironolactone (ALDACTONE) 25 MG tablet Take 1 tablet by mouth daily 9/15/20  Yes TURNER Smith CNP   torsemide (DEMADEX) 20 MG tablet Take 1 tablet by mouth daily 6/18/20  Yes Concepcion Springer MD   albuterol (PROVENTIL) (2.5 MG/3ML) 0.083% nebulizer solution Take 3 mLs by nebulization every 6 hours as needed for Wheezing or Shortness of Breath DX COPD J44.9 4/13/20  Yes Patrick Garcia MD   Acetaminophen (TYLENOL ARTHRITIS PAIN PO) Take by mouth   Yes Historical Provider, MD   albuterol sulfate HFA (VENTOLIN HFA) 108 (90 Base) MCG/ACT inhaler Inhale 2 puffs into the lungs every 6 hours as needed for Wheezing 6/12/18  Yes TURNER Smith CNP   traZODone (DESYREL) 50 MG tablet Take 1 tablet by mouth nightly 5/2/18  Yes TURNER Curtis CNP   melatonin 3 MG TABS tablet Take 1 tablet by mouth nightly as needed (insomnia) 1/5/17  Yes Toribio Fowler MD   OXYGEN 4.5 L 1/5/17  Yes Toribio Fowler MD   Cholecalciferol (VITAMIN D-3 PO) Take 2,000 Units by mouth daily    Yes Historical Provider, MD       Allergies:  Crestor [rosuvastatin]; Levofloxacin; Lipitor; Rocephin in dextrose [ceftriaxone sodium in dextrose]; Zocor [simvastatin]; Codeine; and Pravastatin    Social History:  The patient currently lives at home    TOBACCO:   reports that she quit smoking about 5 years ago. Her smoking use included cigarettes. She has a 16.50 pack-year smoking history. She has never used smokeless tobacco.  ETOH:   reports previous alcohol use. Family History:  Reviewed in detail and negative for DM, Early CAD, Cancer, CVA. Positive as follows:        Problem Relation Age of Onset    Cancer Mother 43        lung cancer    Diabetes Sister     Cancer Daughter 43        lung cancer    Asthma Son     Hypertension Son     Asthma Grandchild     Hypertension Son     Emphysema Neg Hx     Heart Failure Neg Hx        REVIEW OF SYSTEMS:   Positive for abd pain and as noted in the HPI. All other systems reviewed and negative. PHYSICAL EXAM:    /68   Pulse 86   Temp 96.6 °F (35.9 °C) (Oral)   Resp 18   Ht 5' 2\" (1.575 m)   Wt 263 lb (119.3 kg)   LMP  (LMP Unknown)   SpO2 95%   BMI 48.10 kg/m²     General appearance: No apparent distress appears stated age and cooperative. HEENT Normal cephalic, atraumatic without obvious deformity. Pupils equal, round, and reactive to light. Extra ocular muscles intact. Conjunctivae/corneas clear. Neck: Supple, No jugular venous distention/bruits. Trachea midline without thyromegaly or adenopathy with full range of motion. Lungs: Clear to auscultation, bilaterally without Rales/Wheezes/Rhonchi with good respiratory effort.   Heart: Regular rate and rhythm with Normal S1/S2  Abdomen: Soft, non-tender or non-distended without rigidity or guarding and positive bowel sounds   Extremities: No clubbing, cyanosis, or edema bilaterally. Skin: Skin color, texture, turgor normal.  No rashes or lesions. Neurologic: Alert and oriented X 3, neurovascularly intact with sensory/motor intact upper extremities/lower extremities, bilaterally. Cranial nerves: II-XII intact, grossly non-focal.  Mental status: Alert, oriented, thought content appropriate. Capillary Refill: Acceptable  < 3 seconds  Peripheral Pulses: +3 Easily felt, not easily obliterated with pressure      CXR:  I have reviewed the CXR with the following interpretation: clear    CBC   Recent Labs     10/10/20  1335 10/11/20  0526   WBC 8.1 6.6   HGB 12.0 10.9*   HCT 37.8 34.3*    259      RENAL  Recent Labs     10/10/20  1335 10/11/20  0526    136   K 4.2 3.9   CL 94* 99   CO2 34* 30   BUN 8 7   CREATININE 0.6 0.6     LFT'S  Recent Labs     10/10/20  1335   AST 16   ALT 12   BILITOT 0.5   ALKPHOS 113     COAG  Recent Labs     10/10/20  1335 10/11/20  0526   INR 2.93* 3.05*     CARDIAC ENZYMES  No results for input(s): CKTOTAL, CKMB, CKMBINDEX, TROPONINI in the last 72 hours.     U/A:    Lab Results   Component Value Date    NITRITE negative 05/01/2017    COLORU Yellow 10/10/2020    WBCUA 3-5 10/10/2020    RBCUA 3-4 10/10/2020    BACTERIA 4+ 10/10/2020    CLARITYU CLOUDY 10/10/2020    SPECGRAV 1.010 10/10/2020    LEUKOCYTESUR LARGE 10/10/2020    BLOODU SMALL 10/10/2020    GLUCOSEU Negative 10/10/2020    AMORPHOUS 4+ 09/01/2016       ABG    Lab Results   Component Value Date    DFK5LTT 34.0 09/01/2016    BEART 7.6 09/01/2016    H1HIZVKQ 89.9 09/01/2016    PHART 7.409 09/01/2016    THGBART 17.8 01/01/2013    YCP7XZM 55.0 09/01/2016    PO2ART 58.1 09/01/2016    TEM8EXC 35.7 09/01/2016           Active Hospital Problems    Diagnosis Date Noted    Acute diverticulitis [K57.92] 10/10/2020         PHYSICIANS CERTIFICATION:    I certify that Kemal Winters is expected to be hospitalized for > than 2 midnights based on the following assessment and plan:      ASSESSMENT/PLAN:    Acute diverticulitis - started on IV abx, CT abdomen reviewed. Started on full liquid diet, advance as tolerated    Lactic acidosis - suspect 2/2 above, on IVF, resolved    Anemia - hb dropped from 12-->10.9, monitor    H/o vaginoanal fistula per patient      DVT Prophylaxis: coumadin  Diet: DIET FULL LIQUID;  Code Status: Full Code  PT/OT Eval Status: ordered    Margy Chang MD    Thank you TURNER Salas - BOOKER for the opportunity to be involved in this patient's care. If you have any questions or concerns please feel free to contact me at [phone].

## 2020-10-12 NOTE — PROGRESS NOTES
Progress Note    Admit Date:  10/10/2020    70year old female presented with LLQ abdominal pain. Admitted for diverticulitis     Subjective:  Ms. Jez To states abdominal pain is slightly improved. Not much. She reports no BM for 3 days. No nausea/vomiting. Objective:   Patient Vitals for the past 4 hrs:   BP Temp Temp src Pulse Resp SpO2   10/12/20 0854 116/60 98.6 °F (37 °C) Oral 83 16 96 %   10/12/20 0745 (!) 108/56 97.6 °F (36.4 °C) Oral 102 17 92 %            Intake/Output Summary (Last 24 hours) at 10/12/2020 1110  Last data filed at 10/12/2020 0424  Gross per 24 hour   Intake 1989.71 ml   Output 600 ml   Net 1389.71 ml       Physical Exam:  Gen: No distress. Alert. Eyes: PERRL. No sclera icterus. No conjunctival injection. ENT: No discharge. Pharynx clear. Neck: Trachea midline. Resp: No accessory muscle use. No crackles. No wheezes. No rhonchi. CV: Regular rate. Regular rhythm. No murmur. No rub. 1+ BLE edema. Capillary Refill: Brisk,< 3 seconds   Peripheral Pulses: +2 palpable, equal bilaterally   GI: LLQ tender. Non-distended. Normal bowel sounds. Skin: Warm and dry. No nodule on exposed extremities. No rash on exposed extremities. M/S: No cyanosis. No joint deformity. No clubbing. Neuro: Awake. Grossly nonfocal    Psych: Oriented x 3.  No anxiety or agitation      Data:  CBC:   Recent Labs     10/10/20  1335 10/11/20  0526   WBC 8.1 6.6   HGB 12.0 10.9*   HCT 37.8 34.3*   MCV 85.7 85.8    259     BMP:   Recent Labs     10/10/20  1335 10/11/20  0526    136   K 4.2 3.9   CL 94* 99   CO2 34* 30   BUN 8 7   CREATININE 0.6 0.6     LIVER PROFILE:   Recent Labs     10/10/20  1335   AST 16   ALT 12   LIPASE 21.0   BILITOT 0.5   ALKPHOS 113     PT/INR:   Recent Labs     10/10/20  1335 10/11/20  0526 10/12/20  0525   PROTIME 34.3* 35.8* 38.9*   INR 2.93* 3.05* 3.31*       CULTURES  N/A    RADIOLOGY  CT ABDOMEN PELVIS W IV CONTRAST Additional Contrast? None   Final Result Findings compatible with acute uncomplicated diverticulitis involving the   proximal sigmoid colon. No evidence of perforation or diverticular abscesses. There is a small hiatal hernia. There is suggestion of mild mural thickening   at the gastroesophageal junction with slight surrounding fat stranding. Correlate with any clinical evidence of esophagitis. Fat stranding surrounding the urinary bladder. Correlate with urinalysis   evidence of urinary tract infection. Echo 6/15/2020   Summary   Technically difficult examination. Normal left ventricular systolic function with ejection fraction of 55-60%. Moderate concentric left ventricular hypertrophy. No regional wall motion abnormalites are seen. Grade I diastolic dysfunction with normal filling pressure. Compared to previous study from 6- no changes noted in left   ventricular function. Assessment/Plan:  Acute diverticulitis   - started on IV Zosyn D#2,   - CT abdomen reviewed. - Started on full liquid diet, advance as tolerated     UTI  - receiving Zosyn. Urine cx added on. Chronic hypoxic respiratory failure  COPD  - stable on home O2 settings: 4 L  - F/w Dr. Vester Dakin  - albuterol prn.      Lactic acidosis - suspect 2/2 above, on IVF, resolved     Anemia - hb dropped from 12-->10.9, monitor     H/o vaginoanal fistula   - per patient    H/o PE  - on Coumadin    Paroxysmal A-fib  - not on rate control. On warfarin for AC. Supratherapeutic INR  - 3.31 today. - pharmacy consult to manage dose. Chronic diastolic CHF  - stable. No s/s decompensation  - stopped IVFs. - resume Aldactone, Torsemide tomorrow.      B cell lymphoma  - F/w Dr. Kali Chávez. Morbid Obesity  - Body mass index is 48.1 kg/m². - Complicating assessment and treatment. Placing patient at risk for multiple co-morbidities as well as early death and contributing to the patient's presentation.   - Counseled on weight loss.     DVT Prophylaxis: coumadin    Diet: DIET FULL LIQUID;  Code Status: Full Code    Plan of care discussed with Dr. Brad Brooks FNP-C  10/12/2020

## 2020-10-12 NOTE — PROGRESS NOTES
Pharmacy to Dose Warfarin    Dx: H/O of PE  Goal INR range 2-3   Home Warfarin dose:7.5mg on Weds and 5mg on all other days      Date  INR  Warfarin  10/12              3.31                  hold  Recommend holding Warfarin tonight x1. Daily INR ordered. Rx will continue to manage therapy per consult order.   Colten Castaneda Pharm D 10/12/90695:56 PM  .

## 2020-10-12 NOTE — PLAN OF CARE
Problem: Pain:  Goal: Pain level will decrease  Description: Pain level will decrease  Outcome: Ongoing  Goal: Control of acute pain  Description: Control of acute pain  Outcome: Ongoing  Goal: Patient's pain/discomfort is manageable  Description: Patient's pain/discomfort is manageable  Outcome: Ongoing     Problem: Infection:  Goal: Will remain free from infection  Description: Will remain free from infection  Outcome: Ongoing     Problem: Safety:  Goal: Free from accidental physical injury  Description: Free from accidental physical injury  Outcome: Ongoing  Goal: Free from intentional harm  Description: Free from intentional harm  Outcome: Ongoing     Problem: Daily Care:  Goal: Daily care needs are met  Description: Daily care needs are met  Outcome: Ongoing     Problem: Skin Integrity:  Goal: Skin integrity will stabilize  Description: Skin integrity will stabilize  Outcome: Ongoing     Problem: Discharge Planning:  Goal: Patients continuum of care needs are met  Description: Patients continuum of care needs are met  Outcome: Ongoing     Problem: Nausea/Vomiting:  Goal: Absence of nausea/vomiting  Description: Absence of nausea/vomiting  Outcome: Ongoing  Goal: Able to drink  Description: Able to drink  Outcome: Ongoing  Goal: Able to eat  Description: Able to eat  Outcome: Ongoing  Goal: Ability to achieve adequate nutritional intake will improve  Description: Ability to achieve adequate nutritional intake will improve  Outcome: Ongoing     Problem: Activity:  Goal: Risk for activity intolerance will decrease  Description: Risk for activity intolerance will decrease  Outcome: Ongoing     Problem:  Bowel/Gastric:  Goal: Bowel function will improve  Description: Bowel function will improve  Outcome: Ongoing  Goal: Diagnostic test results will improve  Description: Diagnostic test results will improve  Outcome: Ongoing  Goal: Occurrences of nausea will decrease  Description: Occurrences of nausea will decrease  Outcome: Ongoing  Goal: Occurrences of vomiting will decrease  Description: Occurrences of vomiting will decrease  Outcome: Ongoing     Problem: Fluid Volume:  Goal: Maintenance of adequate hydration will improve  Description: Maintenance of adequate hydration will improve  Outcome: Ongoing     Problem: Health Behavior:  Goal: Ability to state signs and symptoms to report to health care provider will improve  Description: Ability to state signs and symptoms to report to health care provider will improve  Outcome: Ongoing     Problem: Physical Regulation:  Goal: Complications related to the disease process, condition or treatment will be avoided or minimized  Description: Complications related to the disease process, condition or treatment will be avoided or minimized  Outcome: Ongoing  Goal: Ability to maintain clinical measurements within normal limits will improve  Description: Ability to maintain clinical measurements within normal limits will improve  Outcome: Ongoing     Problem: Sensory:  Goal: Pain level will decrease  Description: Pain level will decrease  Outcome: Ongoing  Goal: Ability to identify factors that increase the pain will improve  Description: Ability to identify factors that increase the pain will improve  Outcome: Ongoing  Goal: Ability to notify healthcare provider of pain before it becomes unmanageable or unbearable will improve  Description: Ability to notify healthcare provider of pain before it becomes unmanageable or unbearable will improve  Outcome: Ongoing

## 2020-10-13 LAB
INR BLD: 2.89 (ref 0.86–1.14)
PROTHROMBIN TIME: 33.9 SEC (ref 10–13.2)

## 2020-10-13 PROCEDURE — 6370000000 HC RX 637 (ALT 250 FOR IP): Performed by: INTERNAL MEDICINE

## 2020-10-13 PROCEDURE — 6360000002 HC RX W HCPCS: Performed by: INTERNAL MEDICINE

## 2020-10-13 PROCEDURE — 99232 SBSQ HOSP IP/OBS MODERATE 35: CPT | Performed by: INTERNAL MEDICINE

## 2020-10-13 PROCEDURE — 85610 PROTHROMBIN TIME: CPT

## 2020-10-13 PROCEDURE — 1200000000 HC SEMI PRIVATE

## 2020-10-13 PROCEDURE — 36415 COLL VENOUS BLD VENIPUNCTURE: CPT

## 2020-10-13 PROCEDURE — 2580000003 HC RX 258: Performed by: INTERNAL MEDICINE

## 2020-10-13 RX ORDER — POLYETHYLENE GLYCOL 3350 17 G/17G
17 POWDER, FOR SOLUTION ORAL 2 TIMES DAILY
Status: DISCONTINUED | OUTPATIENT
Start: 2020-10-13 | End: 2020-10-14 | Stop reason: HOSPADM

## 2020-10-13 RX ORDER — WARFARIN SODIUM 1 MG/1
1 TABLET ORAL
Status: COMPLETED | OUTPATIENT
Start: 2020-10-13 | End: 2020-10-13

## 2020-10-13 RX ORDER — HYDROCORTISONE 0.5 %
CREAM (GRAM) TOPICAL 3 TIMES DAILY PRN
Status: DISCONTINUED | OUTPATIENT
Start: 2020-10-13 | End: 2020-10-14 | Stop reason: HOSPADM

## 2020-10-13 RX ADMIN — Medication 10 ML: at 08:50

## 2020-10-13 RX ADMIN — Medication: at 14:14

## 2020-10-13 RX ADMIN — MORPHINE SULFATE 2 MG: 2 INJECTION, SOLUTION INTRAMUSCULAR; INTRAVENOUS at 23:36

## 2020-10-13 RX ADMIN — Medication 10 ML: at 23:19

## 2020-10-13 RX ADMIN — WARFARIN SODIUM 1 MG: 1 TABLET ORAL at 16:10

## 2020-10-13 RX ADMIN — MORPHINE SULFATE 2 MG: 2 INJECTION, SOLUTION INTRAMUSCULAR; INTRAVENOUS at 03:48

## 2020-10-13 RX ADMIN — Medication 10 ML: at 03:48

## 2020-10-13 RX ADMIN — Medication 2000 UNITS: at 08:50

## 2020-10-13 RX ADMIN — POLYETHYLENE GLYCOL (3350) 17 G: 17 POWDER, FOR SOLUTION ORAL at 15:42

## 2020-10-13 RX ADMIN — PIPERACILLIN SODIUM AND TAZOBACTAM SODIUM 3.38 G: 3; .375 INJECTION, POWDER, LYOPHILIZED, FOR SOLUTION INTRAVENOUS at 08:49

## 2020-10-13 RX ADMIN — PIPERACILLIN SODIUM AND TAZOBACTAM SODIUM 3.38 G: 3; .375 INJECTION, POWDER, LYOPHILIZED, FOR SOLUTION INTRAVENOUS at 23:20

## 2020-10-13 RX ADMIN — Medication 10 ML: at 23:36

## 2020-10-13 RX ADMIN — TRAZODONE HYDROCHLORIDE 50 MG: 50 TABLET ORAL at 23:18

## 2020-10-13 RX ADMIN — Medication: at 23:20

## 2020-10-13 RX ADMIN — PIPERACILLIN SODIUM AND TAZOBACTAM SODIUM 3.38 G: 3; .375 INJECTION, POWDER, LYOPHILIZED, FOR SOLUTION INTRAVENOUS at 15:42

## 2020-10-13 ASSESSMENT — PAIN SCALES - GENERAL
PAINLEVEL_OUTOF10: 0
PAINLEVEL_OUTOF10: 6
PAINLEVEL_OUTOF10: 7
PAINLEVEL_OUTOF10: 7

## 2020-10-13 ASSESSMENT — PAIN DESCRIPTION - FREQUENCY: FREQUENCY: INTERMITTENT

## 2020-10-13 ASSESSMENT — PAIN DESCRIPTION - DESCRIPTORS: DESCRIPTORS: DISCOMFORT;DULL;ACHING

## 2020-10-13 ASSESSMENT — PAIN DESCRIPTION - ORIENTATION: ORIENTATION: LEFT;LOWER;MID

## 2020-10-13 ASSESSMENT — PAIN DESCRIPTION - ONSET: ONSET: ON-GOING

## 2020-10-13 ASSESSMENT — PAIN - FUNCTIONAL ASSESSMENT: PAIN_FUNCTIONAL_ASSESSMENT: ACTIVITIES ARE NOT PREVENTED

## 2020-10-13 ASSESSMENT — PAIN DESCRIPTION - LOCATION: LOCATION: ABDOMEN

## 2020-10-13 ASSESSMENT — PAIN DESCRIPTION - PROGRESSION: CLINICAL_PROGRESSION: GRADUALLY WORSENING

## 2020-10-13 ASSESSMENT — PAIN DESCRIPTION - PAIN TYPE: TYPE: ACUTE PAIN;CHRONIC PAIN

## 2020-10-13 NOTE — FLOWSHEET NOTE
Pt A/Ox4. VSS. Pt unlabored, respirations even & easy. No distress noted. TIMMONS noted. BLE +2, pitting edema. Shift assessment complete. See flowsheet. PM meds & prn morphine given. See MAR. Denies needs at this time. Bed in low position. Call light within reach. Will continue to monitor.          10/12/20 2333   Vital Signs   Temp 99 °F (37.2 °C)   Temp Source Oral   Pulse 83   Heart Rate Source Monitor   Resp 18   /68   Level of Consciousness 0   MEWS Score 1   Pain Assessment   Pain Assessment 0-10   Pain Level 6   Pain Location Abdomen   Pain Type Acute pain;Chronic pain   Pain Descriptors Discomfort   Functional Pain Assessment Prevents or interferes some active activities and ADLs   Non-Pharmaceutical Pain Intervention(s) Rest;Repositioned   Oxygen Therapy   SpO2 96 %   O2 Device Nasal cannula   O2 Flow Rate (L/min) 4 L/min

## 2020-10-13 NOTE — PROGRESS NOTES
Pharmacy to Dose Warfarin    Dx: H/O of PE  Goal INR range 2-3   Home Warfarin dose:7.5mg on Weds and 5mg on all other days      Date  INR  Warfarin  10/12              3.31                  hold  10/13              2.89                  1mg  Recommend Warfarin 1mg tonight x1. Daily INR ordered. Rx will continue to manage therapy per consult order. Karrie Meneses Pharm D 10/13/243550:50 AM  .      .

## 2020-10-13 NOTE — PLAN OF CARE
Problem: Pain:  Description: Pain management should include both nonpharmacologic and pharmacologic interventions.   Goal: Pain level will decrease  Description: Pain level will decrease  10/13/2020 1251 by Ngoc Boland RN  Outcome: Ongoing  10/13/2020 0320 by Lyubov Gloria RN  Outcome: Ongoing  Goal: Control of acute pain  Description: Control of acute pain  10/13/2020 1251 by Ngoc Boland RN  Outcome: Ongoing  10/13/2020 0320 by Lyubov Gloria RN  Outcome: Ongoing  Goal: Control of chronic pain  Description: Control of chronic pain  10/13/2020 1251 by Ngoc Boland RN  Outcome: Ongoing  10/13/2020 0320 by Lyubov Gloria RN  Outcome: Ongoing  Goal: Patient's pain/discomfort is manageable  Description: Patient's pain/discomfort is manageable  10/13/2020 1251 by Ngoc Boland RN  Outcome: Ongoing  10/13/2020 0320 by Lyubov Gloria RN  Outcome: Ongoing     Problem: Infection:  Goal: Will remain free from infection  Description: Will remain free from infection  10/13/2020 1251 by Ngoc Boland RN  Outcome: Ongoing  10/13/2020 0320 by Lyubov Gloria RN  Outcome: Ongoing     Problem: Safety:  Goal: Free from accidental physical injury  Description: Free from accidental physical injury  10/13/2020 1251 by Ngoc Boland RN  Outcome: Ongoing  10/13/2020 0320 by Lyubov Gloria RN  Outcome: Ongoing  Goal: Free from intentional harm  Description: Free from intentional harm  10/13/2020 1251 by Ngoc Boland RN  Outcome: Ongoing  10/13/2020 0320 by Lyubov Gloria RN  Outcome: Ongoing     Problem: Daily Care:  Goal: Daily care needs are met  Description: Daily care needs are met  10/13/2020 1251 by Ngoc Boland RN  Outcome: Ongoing  10/13/2020 0320 by Lyubov Gloria RN  Outcome: Ongoing     Problem: Skin Integrity:  Goal: Skin integrity will stabilize  Description: Skin integrity will stabilize  10/13/2020 1251 by Ngoc Boland RN  Outcome: Ongoing  10/13/2020 0320 by Slime Tavarez IVAN Martinez  Outcome: Ongoing     Problem: Discharge Planning:  Goal: Patients continuum of care needs are met  Description: Patients continuum of care needs are met  10/13/2020 1251 by Nannette Garcia RN  Outcome: Ongoing  10/13/2020 0320 by Asim Clark RN  Outcome: Ongoing     Problem: Nausea/Vomiting:  Goal: Absence of nausea/vomiting  Description: Absence of nausea/vomiting  10/13/2020 1251 by Nannette Garcia RN  Outcome: Ongoing  10/13/2020 0320 by Asim Clark RN  Outcome: Ongoing  Goal: Able to drink  Description: Able to drink  10/13/2020 1251 by Nannette Garcia RN  Outcome: Ongoing  10/13/2020 0320 by Asim Clark RN  Outcome: Ongoing  Goal: Able to eat  Description: Able to eat  10/13/2020 1251 by Nannette Garcia RN  Outcome: Ongoing  10/13/2020 0320 by Asim Clark RN  Outcome: Ongoing  Goal: Ability to achieve adequate nutritional intake will improve  Description: Ability to achieve adequate nutritional intake will improve  10/13/2020 1251 by Nannette Garcia RN  Outcome: Ongoing  10/13/2020 0320 by Asim Clark RN  Outcome: Ongoing     Problem: Activity:  Goal: Risk for activity intolerance will decrease  Description: Risk for activity intolerance will decrease  10/13/2020 1251 by Nannette Garcia RN  Outcome: Ongoing  10/13/2020 0320 by Asim Clark RN  Outcome: Ongoing     Problem:  Bowel/Gastric:  Goal: Bowel function will improve  Description: Bowel function will improve  10/13/2020 1251 by Nannette Garcia RN  Outcome: Ongoing  10/13/2020 0320 by Asim Clark RN  Outcome: Ongoing  Goal: Diagnostic test results will improve  Description: Diagnostic test results will improve  10/13/2020 1251 by Nannette Garcia RN  Outcome: Ongoing  10/13/2020 0320 by Asim Clark RN  Outcome: Ongoing  Goal: Occurrences of nausea will decrease  Description: Occurrences of nausea will decrease  10/13/2020 1251 by Nannette Garcia RN  Outcome: Ongoing  10/13/2020 0320 by Afsaneh Lind RN  Outcome: Ongoing  Goal: Ability to notify healthcare provider of pain before it becomes unmanageable or unbearable will improve  Description: Ability to notify healthcare provider of pain before it becomes unmanageable or unbearable will improve  10/13/2020 1251 by Cheri Aguero RN  Outcome: Ongoing  10/13/2020 0320 by Tammi Osborn RN  Outcome: Ongoing

## 2020-10-13 NOTE — PLAN OF CARE
Problem: Pain:  Goal: Pain level will decrease  Description: Pain level will decrease  Outcome: Ongoing  Goal: Control of acute pain  Description: Control of acute pain  Outcome: Ongoing  Goal: Control of chronic pain  Description: Control of chronic pain  Outcome: Ongoing  Goal: Patient's pain/discomfort is manageable  Description: Patient's pain/discomfort is manageable  Outcome: Ongoing     Problem: Infection:  Goal: Will remain free from infection  Description: Will remain free from infection  Outcome: Ongoing     Problem: Safety:  Goal: Free from accidental physical injury  Description: Free from accidental physical injury  Outcome: Ongoing  Goal: Free from intentional harm  Description: Free from intentional harm  Outcome: Ongoing     Problem: Daily Care:  Goal: Daily care needs are met  Description: Daily care needs are met  Outcome: Ongoing     Problem: Skin Integrity:  Goal: Skin integrity will stabilize  Description: Skin integrity will stabilize  Outcome: Ongoing     Problem: Discharge Planning:  Goal: Patients continuum of care needs are met  Description: Patients continuum of care needs are met  Outcome: Ongoing     Problem: Nausea/Vomiting:  Goal: Absence of nausea/vomiting  Description: Absence of nausea/vomiting  Outcome: Ongoing  Goal: Able to drink  Description: Able to drink  Outcome: Ongoing  Goal: Able to eat  Description: Able to eat  Outcome: Ongoing  Goal: Ability to achieve adequate nutritional intake will improve  Description: Ability to achieve adequate nutritional intake will improve  Outcome: Ongoing     Problem: Activity:  Goal: Risk for activity intolerance will decrease  Description: Risk for activity intolerance will decrease  Outcome: Ongoing     Problem:  Bowel/Gastric:  Goal: Bowel function will improve  Description: Bowel function will improve  Outcome: Ongoing  Goal: Diagnostic test results will improve  Description: Diagnostic test results will improve  Outcome: Ongoing  Goal:

## 2020-10-13 NOTE — PROGRESS NOTES
Progress Note    Admit Date:  10/10/2020    70year old female presented with LLQ abdominal pain. Admitted for diverticulitis     Subjective:  Ms. Landon Gallagher complains of persistent left lower quadrant pain today. Her diet has been advanced and she is taking a regular low-sodium diet now. Complains of persistent left lower quadrant pain. No nausea or vomiting. Had a BM today. Feels bloated. Objective:   Vitals:    10/12/20 2333 10/13/20 0219 10/13/20 0343 10/13/20 0815   BP: 124/68 137/79  124/65   Pulse: 83 90  81   Resp: 18 18  18   Temp: 99 °F (37.2 °C) 97.9 °F (36.6 °C)  97.3 °F (36.3 °C)   TempSrc: Oral Oral  Oral   SpO2: 96% 95%  93%   Weight:   268 lb 12.8 oz (121.9 kg)    Height:                Intake/Output Summary (Last 24 hours) at 10/13/2020 1443  Last data filed at 10/13/2020 1156  Gross per 24 hour   Intake 700 ml   Output 1550 ml   Net -850 ml       Physical Exam:  Gen: Alert. Awake and well-oriented. Patient with mild distress. She appears fatigued  Eyes: PERRL. No sclera icterus. No conjunctival injection. ENT: No discharge. Pharynx clear. Neck: Trachea midline. Resp: No accessory muscle use. No crackles. No wheezes. No rhonchi. CV: Regular rate. Regular rhythm. No murmur. No rub. 1+ BLE edema. Capillary Refill: Brisk,< 3 seconds   Peripheral Pulses: +2 palpable, equal bilaterally   GI: LLQ tender. distended. Normal bowel sounds. Skin: Warm and dry. No nodule on exposed extremities. No rash on exposed extremities. M/S: No cyanosis. No joint deformity. No clubbing. Neuro: Awake. Grossly nonfocal    Psych: Oriented x 3.  No anxiety or agitation      Data:  CBC:   Recent Labs     10/11/20  0526 10/12/20  0529   WBC 6.6 5.5   HGB 10.9* 10.5*   HCT 34.3* 33.1*   MCV 85.8 85.7    254     BMP:   Recent Labs     10/11/20  0526 10/12/20  0529    137   K 3.9 4.2   CL 99 99   CO2 30 31   BUN 7 6*   CREATININE 0.6 0.6     LIVER PROFILE:   No results for input(s): AST, warfarin for AC. Supratherapeutic INR  - 3.31 today. - pharmacy consult to manage dose. Chronic diastolic CHF  - stable. No s/s decompensation  - stopped IVFs. - resume Aldactone, Torsemide tomorrow.      B cell lymphoma  - F/w Dr. Keshia Page. Morbid Obesity  - Body mass index is 49.16 kg/m². - Complicating assessment and treatment. Placing patient at risk for multiple co-morbidities as well as early death and contributing to the patient's presentation.   - Counseled on weight loss. DVT Prophylaxis: coumadin    Diet: DIET LOW SODIUM 2 GM; 2000 ml  Code Status: Full Code    Patient improving.    plan likely discharge in AM      Brook Dukes MD  10/13/2020

## 2020-10-14 VITALS
SYSTOLIC BLOOD PRESSURE: 127 MMHG | TEMPERATURE: 98.4 F | HEIGHT: 62 IN | OXYGEN SATURATION: 95 % | BODY MASS INDEX: 49.76 KG/M2 | HEART RATE: 83 BPM | DIASTOLIC BLOOD PRESSURE: 64 MMHG | WEIGHT: 270.4 LBS | RESPIRATION RATE: 18 BRPM

## 2020-10-14 LAB
ANION GAP SERPL CALCULATED.3IONS-SCNC: 8 MMOL/L (ref 3–16)
BUN BLDV-MCNC: 9 MG/DL (ref 7–20)
CALCIUM SERPL-MCNC: 9 MG/DL (ref 8.3–10.6)
CHLORIDE BLD-SCNC: 103 MMOL/L (ref 99–110)
CO2: 32 MMOL/L (ref 21–32)
CREAT SERPL-MCNC: 0.6 MG/DL (ref 0.6–1.2)
GFR AFRICAN AMERICAN: >60
GFR NON-AFRICAN AMERICAN: >60
GLUCOSE BLD-MCNC: 109 MG/DL (ref 70–99)
HCT VFR BLD CALC: 33.5 % (ref 36–48)
HEMOGLOBIN: 10.8 G/DL (ref 12–16)
INR BLD: 2.26 (ref 0.86–1.14)
MAGNESIUM: 2.3 MG/DL (ref 1.8–2.4)
MCH RBC QN AUTO: 27.6 PG (ref 26–34)
MCHC RBC AUTO-ENTMCNC: 32.3 G/DL (ref 31–36)
MCV RBC AUTO: 85.4 FL (ref 80–100)
ORGANISM: ABNORMAL
PDW BLD-RTO: 14.4 % (ref 12.4–15.4)
PHOSPHORUS: 2.8 MG/DL (ref 2.5–4.9)
PLATELET # BLD: 258 K/UL (ref 135–450)
PMV BLD AUTO: 6.6 FL (ref 5–10.5)
POTASSIUM SERPL-SCNC: 4.3 MMOL/L (ref 3.5–5.1)
PROTHROMBIN TIME: 26.4 SEC (ref 10–13.2)
RBC # BLD: 3.92 M/UL (ref 4–5.2)
SODIUM BLD-SCNC: 143 MMOL/L (ref 136–145)
URINE CULTURE, ROUTINE: ABNORMAL
WBC # BLD: 4.7 K/UL (ref 4–11)

## 2020-10-14 PROCEDURE — 85027 COMPLETE CBC AUTOMATED: CPT

## 2020-10-14 PROCEDURE — 6360000002 HC RX W HCPCS: Performed by: INTERNAL MEDICINE

## 2020-10-14 PROCEDURE — 36415 COLL VENOUS BLD VENIPUNCTURE: CPT

## 2020-10-14 PROCEDURE — 94761 N-INVAS EAR/PLS OXIMETRY MLT: CPT

## 2020-10-14 PROCEDURE — 99238 HOSP IP/OBS DSCHRG MGMT 30/<: CPT | Performed by: INTERNAL MEDICINE

## 2020-10-14 PROCEDURE — 2700000000 HC OXYGEN THERAPY PER DAY

## 2020-10-14 PROCEDURE — 85610 PROTHROMBIN TIME: CPT

## 2020-10-14 PROCEDURE — 6370000000 HC RX 637 (ALT 250 FOR IP): Performed by: INTERNAL MEDICINE

## 2020-10-14 PROCEDURE — 80048 BASIC METABOLIC PNL TOTAL CA: CPT

## 2020-10-14 PROCEDURE — 2580000003 HC RX 258: Performed by: INTERNAL MEDICINE

## 2020-10-14 PROCEDURE — 83735 ASSAY OF MAGNESIUM: CPT

## 2020-10-14 PROCEDURE — 84100 ASSAY OF PHOSPHORUS: CPT

## 2020-10-14 RX ORDER — BISACODYL 10 MG
10 SUPPOSITORY, RECTAL RECTAL DAILY PRN
Status: DISCONTINUED | OUTPATIENT
Start: 2020-10-14 | End: 2020-10-14 | Stop reason: HOSPADM

## 2020-10-14 RX ORDER — POLYETHYLENE GLYCOL 3350 17 G/17G
17 POWDER, FOR SOLUTION ORAL 2 TIMES DAILY
Qty: 60 EACH | Refills: 1 | Status: SHIPPED | OUTPATIENT
Start: 2020-10-14 | End: 2020-10-23 | Stop reason: DRUGHIGH

## 2020-10-14 RX ORDER — AMOXICILLIN AND CLAVULANATE POTASSIUM 875; 125 MG/1; MG/1
1 TABLET, FILM COATED ORAL 2 TIMES DAILY
Qty: 14 TABLET | Refills: 0 | Status: SHIPPED | OUTPATIENT
Start: 2020-10-14 | End: 2020-10-21

## 2020-10-14 RX ORDER — TORSEMIDE 20 MG/1
20 TABLET ORAL DAILY
Status: DISCONTINUED | OUTPATIENT
Start: 2020-10-14 | End: 2020-10-14 | Stop reason: HOSPADM

## 2020-10-14 RX ORDER — WARFARIN SODIUM 2.5 MG/1
2.5 TABLET ORAL
Status: COMPLETED | OUTPATIENT
Start: 2020-10-14 | End: 2020-10-14

## 2020-10-14 RX ORDER — SPIRONOLACTONE 25 MG/1
25 TABLET ORAL DAILY
Status: DISCONTINUED | OUTPATIENT
Start: 2020-10-14 | End: 2020-10-14 | Stop reason: HOSPADM

## 2020-10-14 RX ORDER — LANOLIN ALCOHOL/MO/W.PET/CERES
50 CREAM (GRAM) TOPICAL DAILY
Status: DISCONTINUED | OUTPATIENT
Start: 2020-10-14 | End: 2020-10-14 | Stop reason: HOSPADM

## 2020-10-14 RX ADMIN — TORSEMIDE 20 MG: 20 TABLET ORAL at 12:39

## 2020-10-14 RX ADMIN — BISACODYL 10 MG: 10 SUPPOSITORY RECTAL at 15:55

## 2020-10-14 RX ADMIN — ACETAMINOPHEN 650 MG: 325 TABLET ORAL at 09:29

## 2020-10-14 RX ADMIN — Medication 2000 UNITS: at 09:19

## 2020-10-14 RX ADMIN — Medication 10 ML: at 09:31

## 2020-10-14 RX ADMIN — PIPERACILLIN SODIUM AND TAZOBACTAM SODIUM 3.38 G: 3; .375 INJECTION, POWDER, LYOPHILIZED, FOR SOLUTION INTRAVENOUS at 09:19

## 2020-10-14 RX ADMIN — POLYETHYLENE GLYCOL (3350) 17 G: 17 POWDER, FOR SOLUTION ORAL at 09:19

## 2020-10-14 RX ADMIN — Medication: at 09:20

## 2020-10-14 RX ADMIN — SPIRONOLACTONE 25 MG: 25 TABLET ORAL at 12:39

## 2020-10-14 RX ADMIN — WARFARIN 2.5 MG: 2.5 TABLET ORAL at 18:50

## 2020-10-14 RX ADMIN — PYRIDOXINE HCL TAB 50 MG 50 MG: 50 TAB at 12:39

## 2020-10-14 ASSESSMENT — PAIN SCALES - GENERAL
PAINLEVEL_OUTOF10: 5
PAINLEVEL_OUTOF10: 3
PAINLEVEL_OUTOF10: 5

## 2020-10-14 ASSESSMENT — PAIN DESCRIPTION - PROGRESSION
CLINICAL_PROGRESSION: GRADUALLY IMPROVING
CLINICAL_PROGRESSION: NOT CHANGED

## 2020-10-14 NOTE — PLAN OF CARE
Problem: Nausea/Vomiting:  Goal: Absence of nausea/vomiting  Description: Absence of nausea/vomiting  Outcome: Met This Shift  Goal: Able to drink  Description: Able to drink  Outcome: Met This Shift  Goal: Able to eat  Description: Able to eat  Outcome: Met This Shift  Goal: Ability to achieve adequate nutritional intake will improve  Description: Ability to achieve adequate nutritional intake will improve  Outcome: Met This Shift     Problem: Pain:  Goal: Pain level will decrease  Description: Pain level will decrease  Outcome: Ongoing  Goal: Control of acute pain  Description: Control of acute pain  Outcome: Ongoing  Goal: Control of chronic pain  Description: Control of chronic pain  Outcome: Ongoing  Goal: Patient's pain/discomfort is manageable  Description: Patient's pain/discomfort is manageable  Outcome: Ongoing     Problem: Infection:  Goal: Will remain free from infection  Description: Will remain free from infection  Outcome: Ongoing     Problem: Safety:  Goal: Free from accidental physical injury  Description: Free from accidental physical injury  Outcome: Ongoing  Goal: Free from intentional harm  Description: Free from intentional harm  Outcome: Ongoing     Problem: Daily Care:  Goal: Daily care needs are met  Description: Daily care needs are met  Outcome: Ongoing     Problem: Skin Integrity:  Goal: Skin integrity will stabilize  Description: Skin integrity will stabilize  Outcome: Ongoing     Problem: Discharge Planning:  Goal: Patients continuum of care needs are met  Description: Patients continuum of care needs are met  Outcome: Ongoing     Problem: Activity:  Goal: Risk for activity intolerance will decrease  Description: Risk for activity intolerance will decrease  Outcome: Ongoing     Problem:  Bowel/Gastric:  Goal: Bowel function will improve  Description: Bowel function will improve  Outcome: Ongoing  Goal: Diagnostic test results will improve  Description: Diagnostic test results will improve  Outcome: Ongoing  Goal: Occurrences of nausea will decrease  Description: Occurrences of nausea will decrease  Outcome: Ongoing  Goal: Occurrences of vomiting will decrease  Description: Occurrences of vomiting will decrease  Outcome: Ongoing     Problem: Fluid Volume:  Goal: Maintenance of adequate hydration will improve  Description: Maintenance of adequate hydration will improve  Outcome: Ongoing     Problem: Health Behavior:  Goal: Ability to state signs and symptoms to report to health care provider will improve  Description: Ability to state signs and symptoms to report to health care provider will improve  Outcome: Ongoing     Problem: Physical Regulation:  Goal: Complications related to the disease process, condition or treatment will be avoided or minimized  Description: Complications related to the disease process, condition or treatment will be avoided or minimized  Outcome: Ongoing  Goal: Ability to maintain clinical measurements within normal limits will improve  Description: Ability to maintain clinical measurements within normal limits will improve  Outcome: Ongoing     Problem: Sensory:  Goal: Pain level will decrease  Description: Pain level will decrease  Outcome: Ongoing  Goal: Ability to identify factors that increase the pain will improve  Description: Ability to identify factors that increase the pain will improve  Outcome: Ongoing  Goal: Ability to notify healthcare provider of pain before it becomes unmanageable or unbearable will improve  Description: Ability to notify healthcare provider of pain before it becomes unmanageable or unbearable will improve  Outcome: Ongoing

## 2020-10-14 NOTE — DISCHARGE INSTR - COC
Continuity of Care Form    Patient Name: Beth Barroso   :  1940  MRN:  [id number]    Admit date:  10/10/2020  Discharge date:  ***    Code Status Order: Full Code   Advance Directives:   Advance Care Flowsheet Documentation       Date/Time Healthcare Directive Type of Healthcare Directive Copy in [address] Agent's Name Healthcare Agent's Phone Number    10/10/20 9108  Yes, patient has an advance directive for healthcare treatment  Durable power of  for health care;Living will  Yes, copy in chart Per patient  --  --  --            Admitting Physician:  Shorty Alvarado MD  PCP: TURNER Lozada CNP    Discharging Nurse: [facility] Unit/Room#: 0218/0218-01  Discharging Unit Phone Number: ***    Emergency Contact:   Extended Emergency Contact Information  Primary Emergency Contact: Johnson Castillo  Address: [address] [place] [address] Phone: [phone]  Relation: Child  Secondary Emergency Contact: Allison Phelps [address] [place] [address] Phone: [phone]  Relation: Child    Past Surgical History:  Past Surgical History:   Procedure Laterality Date    APPENDECTOMY      BRONCHOSCOPY N/A 2019    EBUS WF W/ANES.  performed by [facility]den [facility]ung MD at  [facility]  2019    BRONCHOSCOPY ALVEOLAR LAVAGE performed by [facility]den Chung MD at  [facility]  2019    BRONCHOSCOPY BRUSHINGS performed by [facility]den Chung MD at  [facility]  2019    BRONCHOSCOPY/TRANSBRONCHIAL LUNG BIOPSY performed by [facility]den Chung MD at  [facility]  2019    BRONCHOSCOPY/TRANSBRONCHIAL NEEDLE BIOPSY performed by [facility]den Chung MD at  [facility]  2019    BRONCHOSCOPY/TRANSBRONCHIAL NEEDLE BIOPSY ADDL LOBE performed by Ayden Chung MD at [address] [place] [address]  CARDIAC CATHETERIZATION  2003    COLONOSCOPY  09/25/2019    diverticulosis, polyp sigmoid    COLONOSCOPY N/A 9/25/2019    COLONOSCOPY POLYPECTOMY SNARE/COLD BIOPSY performed by Rea Morales MD at [id number] [place] [address]      PORT SURGERY N/A 1/17/2020    PORT INSERTION performed by Sky Woo MD at [address] [place] [address] TUNNELED VENOUS PORT PLACEMENT Left 01/17/2020    Port Insertion ENRIQUETA Chest w. Dr Mark Ramos 1/17/20 Vaccess CT Injectable [id number] lot [id number] 3/31/21       Immunization History:   Immunization History   Administered Date(s) Administered    Influenza Virus Vaccine 01/02/2013, 10/01/2013, 09/15/2014, 10/27/2016, 09/22/2017, 11/02/2018, 09/29/2020    Influenza Whole 10/01/2013, 09/15/2014, 09/15/2015    Influenza, [place], IM, (6 mo and older Fluzone, Flulaval, Fluarix and 3 yrs and older Afluria) 10/27/2016, 09/22/2017, 11/02/2018    Influenza, [place]aker Jacks, IM, PF (6 mo and older Fluzone, Flulaval, Fluarix, and 3 yrs and older Afluria) 09/04/2015, 09/21/2020    Influenza, [place]aker Jacks, Recombinant, IM PF (Flublok 18 yrs and older) 11/01/2019    Pneumococcal Conjugate 13-valent (Thtxnmo16) 09/25/2017    Pneumococcal Conjugate 7-valent (Prevnar7) 09/01/2009    Pneumococcal Polysaccharide (Xymsmdndy18) 01/29/2015    Zoster Live (Zostavax) 05/30/2013       Active Problems:  Patient Active Problem List   Diagnosis Code    COPD exacerbation J44.1    Chronic respiratory failure with hypoxia ([address] J96.11    Anxiety F41.9    Hyperlipidemia E78.5    Diverticulosis K57.90    COPD, severe ([address] J44.9    Abnormal chest CT R93.89    Vitamin D deficiency E55.9    Abnormal CXR R93.89    Other pulmonary embolism without acute cor pulmonale (HCC) I26.99    Chronic obstructive pulmonary disease (HCC) J44.9    Venous (peripheral) insufficiency I87.2    Lymphedema I89.0    Swelling of limb M79.89    Chronic angle-closure glaucoma of both eyes H40.2230    Arthritis of hips and knees M19.90    Obesity E66.9    Shortness of breath K14.00    Diastolic dysfunction W36.24    Acute on chronic respiratory failure with hypoxia ([facility]) J96.21    Bilateral edema of lower extremity R60.0    Chest pain R07.9    Paroxysmal atrial fibrillation ([facility]) I48.0    Erythrocytosis D75.1    IFG (impaired fasting glucose) R73.01    Lung mass R91.8    Mediastinal lymphadenopathy R59.0    Atrial fibrillation, chronic ([facility]) I48.20    COPD with acute exacerbation ([facility]) J44.1    Pulmonary nodule R91.1    Chronic hypoxemic respiratory failure ([facility]) J96.11    Gram-negative pneumonia ([facility]) J15.6    Infection due to Stenotrophomonas maltophilia A49.8    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 ([facility]) C83.02    Non-Hodgkin lymphoma ([facility]) C85.90    PVD (peripheral vascular disease) with claudication ([facility]) I73.9    Carotid stenosis I65.29    Morbid obesity with BMI of 45.0-49.9, adult ([facility]) E66.01, Z68.42    Dizziness R42    TIA (transient ischemic attack) G45.9    CHF (congestive heart failure) ([facility]) I50.9    HTN (hypertension), benign I10    Dyslipidemia E78.5    Acute diverticulitis K57.92    Elevated lactic acid level R79.89    Urinary tract infection in female N39.0    Diverticulitis of colon K57.32       Isolation/Infection:   Isolation            No Isolation          Patient Infection Status       None to display            Nurse Assessment:  Last Vital Signs: BP (!) 110/57   Pulse 77   Temp 97.7 °F (36.5 °C) (Oral)   Resp 18   Ht 5' 2\" (1.575 m)   Wt 270 lb 6.4 oz (122.7 kg)   LMP  (LMP Unknown)   SpO2 97%   BMI 49.46 kg/m²     Last documented pain score (0-10 scale): Pain Level: 3  Last Weight:   Wt Readings from Last 1 Encounters:   10/14/20 270 lb 6.4 oz (122.7 kg)     Mental Status:  {IP PT MENTAL STATUS:20030:::0}    IV Access:  {[facility] ACCESS:493299849:::0}    Nursing Mobility/ADLs:  Walking   {[facility] DME QOCF:931745554:::4}  Transfer  {[facility] DME ADLs:393290072:::0}  Bathing  {[facility] DME ADLs:302741018:::0}  Dressing  {[facility] DME ADLs:067449803:::0}  Toileting  {[facility] DME ADLs:289585891:::0}  Feeding  {[facility] DME ADLs:650511381:::0}  Med Admin  {[facility] DME ADLs:594551973:::0}  Med Delivery   {[facility] MED Delivery:785770095:::0}    Wound Care Documentation and Therapy:  Wound 16 NO OPEN WOUND  (Active)   Number of days: 1743       Wound (Active)   Number of days:        Wound 20 Radial Right (Active)   Number of days: 121        Elimination:  Continence:   · Bowel: {YES / UI:55408}  · Bladder: {YES / BQ:23997}  Urinary Catheter: {Urinary Catheter:287933774:::0}   Colostomy/Ileostomy/Ileal Conduit: {YES / YQ:67745}       Date of Last BM: ***    Intake/Output Summary (Last 24 hours) at 10/14/2020 1035  Last data filed at 10/14/2020 0300  Gross per 24 hour   Intake 680 ml   Output 800 ml   Net -120 ml     I/O last 3 completed shifts: In: 446 [P.O.:900;  I.V.:20]  Out: 1000 [Urine:1000]    Safety Concerns:     [address] Safety Concerns:807128763:::0}    Impairments/Disabilities:      [address] Impairments/Disabilities:989734726:::0}    Nutrition Therapy:  Current Nutrition Therapy:   [address] Diet List:331881317:::0}    Routes of Feeding: {[facility] DME Other Feedings:225026383:::0}  Liquids: {Slp liquid thickness:12869}  Daily Fluid Restriction: {[facility] DME Yes amt example:440309571:::0}  Last Modified Barium Swallow with Video (Video Swallowing Test): {Done Not Done ZQWP:890344997:::0}    Treatments at the Time of Hospital Discharge:   Respiratory Treatments: ***  Oxygen Therapy:  {Therapy; copd oxygen:37429:::0}  Ventilator:    {[facility] Vent List:417260257:::0}    Rehab Therapies: {THERAPEUTIC INTERVENTION:0787292365}  Weight Bearing Status/Restrictions: [address] Weight Bearin:::0}  Other Medical Equipment (for information only, NOT a DME order):  {EQUIPMENT:861526027}  Other Treatments: ***    Patient's personal belongings

## 2020-10-14 NOTE — PROGRESS NOTES
Pt alert complained of chronic pain to bi lat knee requested tylenol and alana-clinton and ace warp, IV ABX infusing per order call light in reach bed alalrm on will continue to monitor.

## 2020-10-14 NOTE — PROGRESS NOTES
Pharmacy to Dose Warfarin    Dx: H/O of PE  Goal INR range 2-3   Home Warfarin dose:7.5mg on Weds and 5mg on all other days      Date  INR  Warfarin  10/12              3.31                  hold  10/13              2.89                  1mg  10/14              2.26                  2.5mg  Recommend Warfarin 2.5mg tonight x1. Daily INR ordered. Rx will continue to manage therapy per consult order. Any Ambrocio Pharm D 10/14/03578:20 PM  .      . Ann Ga

## 2020-10-14 NOTE — CARE COORDINATION
DISCHARGE ORDER  Date/Time 10/14/2020 11:49 AM  Completed by: Lincoln Portillo, Case Management    Patient Name: Sarbjit Brunner      : 1940  Admitting Diagnosis: Acute diverticulitis [K57.92]      Admit order Date and Status: 10/10/20 inpt  (verify MD's last order for status of admission)      Noted discharge order. If applicable PT/OT recommendation at Discharge: N/A  DME recommendation by PT/OT:N/A  Confirmed discharge plan  : Yes  with whom patient  If pt confirmed DC plan does family need to be contacted by CM No   Discharge Plan: Order for dc noted. Spoke with pt at bedside who cont plan to go to Home. Discussed HHC and pt is agreeable and chooses [facility]. Referral called to Brian Casarez at [facility] Noted already has home O2 on admit. Chart reviewed and no other dc needs identified        Reviewed chart. Role of discharge planner explained and patient verbalized understanding. Discharge order is noted. Has Home O2 in place on admit:  Yes  Informed of need to bring portable home O2 tank on day of discharge for nursing to connect prior to leaving:   Yes  Verbalized agreement/Understanding:   Yes  Pt is being d/c'd to home today. Pt's O2 sats are 97% on 4L. Discharge timeout done with  Pt, nsg and Cm. All discharge needs and concerns addressed.

## 2020-10-14 NOTE — PROGRESS NOTES
Nutrition Education    · Verbally reviewed information with Patient  · Educated on Constipation   · Written educational materials provided. · Contact name and number provided. · Refer to Patient Education activity for more details.     Electronically signed by Rishabh Oliver RD, LD on 10/14/20 at 6:19 PM EDT    Contact: [phone]

## 2020-10-14 NOTE — PROGRESS NOTES
Pt d/c at this time IV removed, reviewed pt d/c paperwork and scripts. all questions answered staff transferred pt out to waiting car with belongings.

## 2020-10-14 NOTE — FLOWSHEET NOTE
10/13/20 1854   Vital Signs   Temp 98.4 °F (36.9 °C)   Temp Source Oral   Pulse 75   Heart Rate Source Monitor   Resp 18   /65   BP Location Right lower arm   Patient Position Semi fowlers   Level of Consciousness 0   MEWS Score 1   Patient Currently in Pain Denies   Pain Assessment   Pain Assessment 0-10   Pain Level 0   Oxygen Therapy   SpO2 98 %   O2 Device Nasal cannula   O2 Flow Rate (L/min) 4 L/min       Pt A/Ox4. VSS. Reassessed pain. Pain 6/10 in abd, dull ache, discomfort. Pt unlabored, respirations even & easy. TIMMONS noted. 4L O2 NC in place. BLE +2, pitting edema. No distress noted. Shift assessment complete. See flowsheet. PM meds & prn alana clinton, morphine given. See MAR. Denies needs at this time. Bed in low position. Call light within reach. Will continue to monitor.

## 2020-10-14 NOTE — DISCHARGE SUMMARY
Name:  Rojean Felty  Room:  0218/0218-01  MRN:    [id number]    Discharge Summary      This discharge summary is in conjunction with a complete physical exam done on the day of discharge. Attending Physician: Dr. Samuel Rojas  Discharging Physician: Dr. Enrique Craig: 10/10/2020  Discharge:   10/14/2020    HPI:  The patient is a 70 y.o. female who presents to [facility] with abd pain. Pt states she noticed LLQ abd pain for the last couple days, denies any nausea/vomiting, no fevers. States had had h/o diverticulitis in the past and hence presented to the ER. Found to have acute diverticulitis and started on abx and hence admitted. Diagnoses this Admission and Hospital Course   Acute diverticulitis   -Continued with IV antibiotics on IV Zosyn D#4  - CT abdomen reviewed. - Started on full liquid diet, advanced as tolerated-->she is tolerating her low-sodium diet today   -Continued pain control with morphine as needed. -   we started her on scheduled MiraLAX, PRN dulcolax suppository   Patient is improved. Had BM prior to DC . D/c home on Augmentin, Miralax.       UTI  - treated with Zosyn. Urine cx added on. Only 25 K CFU/mL E. coli    Chronic hypoxic respiratory failure  COPD  - stable on home O2 settings: 4 L  - F/w Dr. Shahid Lieberman  - albuterol prn.      Lactic acidosis   - suspect 2/2 above, on IVF, resolved     Anemia   - hb dropped from 12-->10.8, monitored     H/o anovaginal fistula   - per patient     H/o PE  - on Coumadin     Paroxysmal A-fib  - not on rate control. On warfarin for AC.      Supratherapeutic INR- resolved  - INR 2.26 today  - pharmacy consulted to manage dose while inpatient. Chronic diastolic CHF  - stable. No s/s decompensation  - stopped IVFs. - resumed Aldactone, Torsemide today     B cell lymphoma  - F/w Dr. Wagner Chiu.      Morbid Obesity  - Body mass index is 49.16 kg/m². - Complicating assessment and treatment.  Placing patient at risk for multiple co-morbidities as well as early death and contributing to the patient's presentation.   - Counseled on weight loss.     DVT Prophylaxis: coumadin    Procedures (Please Review Full Report for Details)  N/A    Consults    N/A      Physical Exam at Discharge:    BP (!) 110/57   Pulse 77   Temp 97.7 °F (36.5 °C) (Oral)   Resp 18   Ht 5' 2\" (1.575 m)   Wt 270 lb 6.4 oz (122.7 kg)   LMP  (LMP Unknown)   SpO2 97%   BMI 49.46 kg/m²   Gen: Alert. Awake and well-oriented. Patient with mild distress. She appears fatigued  Eyes: PERRL. No sclera icterus. No conjunctival injection. ENT: No discharge. Pharynx clear. Neck: Trachea midline. Resp: No accessory muscle use. No crackles. No wheezes. No rhonchi. CV: Regular rate. Regular rhythm. No murmur. No rub. 1+ BLE edema. Capillary Refill: Brisk,< 3 seconds   Peripheral Pulses: +2 palpable, equal bilaterally   GI: LLQ tender. Mildly distended. Normal bowel sounds. Skin: Warm and dry. No nodule on exposed extremities. No rash on exposed extremities. M/S: No cyanosis. No joint deformity. No clubbing. Neuro: Awake. Grossly nonfocal    Psych: Oriented x 3.  No anxiety or agitation    CBC:   Recent Labs     10/12/20  0529 10/14/20  0532   WBC 5.5 4.7   HGB 10.5* 10.8*   HCT 33.1* 33.5*   MCV 85.7 85.4    258     BMP:   Recent Labs     10/12/20  0529 10/14/20  0532    143   K 4.2 4.3   CL 99 103   CO2 31 32   PHOS  --  2.8   BUN 6* 9   CREATININE 0.6 0.6     PT/INR:   Recent Labs     10/12/20  0525 10/13/20  0541 10/14/20  0532   PROTIME 38.9* 33.9* 26.4*   INR 3.31* 2.89* 2.26*       U/A:    Lab Results   Component Value Date    NITRITE negative 05/01/2017    COLORU Yellow 10/10/2020    WBCUA 3-5 10/10/2020    RBCUA 3-4 10/10/2020    BACTERIA 4+ 10/10/2020    CLARITYU CLOUDY 10/10/2020    SPECGRAV 1.010 10/10/2020    LEUKOCYTESUR LARGE 10/10/2020    BLOODU SMALL 10/10/2020    GLUCOSEU Negative 10/10/2020    AMORPHOUS 4+ 09/01/2016         CULTURES  Urine: 25 K CFU/mL E. coli    RADIOLOGY  CT ABDOMEN PELVIS W IV CONTRAST Additional Contrast? None   Final Result   Findings compatible with acute uncomplicated diverticulitis involving the   proximal sigmoid colon. No evidence of perforation or diverticular abscesses. There is a small hiatal hernia. There is suggestion of mild mural thickening   at the gastroesophageal junction with slight surrounding fat stranding. Correlate with any clinical evidence of esophagitis. Fat stranding surrounding the urinary bladder. Correlate with urinalysis   evidence of urinary tract infection. Discharge Medications     Medication List      START taking these medications    amoxicillin-clavulanate 875-125 MG per tablet  Commonly known as:  AUGMENTIN  Take 1 tablet by mouth 2 times daily for 7 days     polyethylene glycol 17 g packet  Commonly known as:  GLYCOLAX  Take 17 g by mouth 2 times daily        CHANGE how you take these medications    * warfarin 5 MG tablet  Commonly known as:  COUMADIN  Take as directed. If you are unsure how to take this medication, talk to your nurse or doctor. What changed:  Another medication with the same name was changed. Make sure you understand how and when to take each. * warfarin 5 MG tablet  Commonly known as:  COUMADIN  Take as directed. If you are unsure how to take this medication, talk to your nurse or doctor. Original instructions:  TAKE ONE TABLET BY MOUTH DAILY  What changed:    · medication strength  · See the new instructions. * This list has 2 medication(s) that are the same as other medications prescribed for you. Read the directions carefully, and ask your doctor or other care provider to review them with you.             CONTINUE taking these medications    * albuterol sulfate  (90 Base) MCG/ACT inhaler  Commonly known as:  Ventolin HFA  Inhale 2 puffs into the lungs every 6 hours as needed for Wheezing     * albuterol (2.5 MG/3ML) 0.083% nebulizer solution  Commonly known as:  PROVENTIL  Take 3 mLs by nebulization every 6 hours as needed for Wheezing or Shortness of Breath DX COPD J44.9     melatonin 3 MG Tabs tablet  Take 1 tablet by mouth nightly as needed (insomnia)     OXYGEN     spironolactone 25 MG tablet  Commonly known as:  ALDACTONE  Take 1 tablet by mouth daily     torsemide 20 MG tablet  Commonly known as:  DEMADEX  Take 1 tablet by mouth daily     traZODone 50 MG tablet  Commonly known as:  DESYREL  Take 1 tablet by mouth nightly     TYLENOL ARTHRITIS PAIN PO     vitamin B-6 50 MG tablet  Commonly known as:  PYRIDOXINE     VITAMIN D-3 PO         * This list has 2 medication(s) that are the same as other medications prescribed for you. Read the directions carefully, and ask your doctor or other care provider to review them with you. STOP taking these medications    aspirin 81 MG EC tablet     rOPINIRole 0.5 MG tablet  Commonly known as:  Requip           Where to Get Your Medications      These medications were sent to [facility] [address] [place] [address] [address], [address]    Phone:  [phone]   · warfarin 5 MG tablet     You can get these medications from any pharmacy    Bring a paper prescription for each of these medications  · amoxicillin-clavulanate 875-125 MG per tablet  · polyethylene glycol 17 g packet           Discharged in stable condition to home. Follow Up: Follow up with PCP.       Oksana Harris MD  10/14/2020

## 2020-10-15 ENCOUNTER — CARE COORDINATION (OUTPATIENT)
Dept: CASE MANAGEMENT | Age: 80
End: 2020-10-15

## 2020-10-15 PROCEDURE — 1111F DSCHRG MED/CURRENT MED MERGE: CPT | Performed by: NURSE PRACTITIONER

## 2020-10-15 NOTE — CARE COORDINATION
[facility] Transitions Initial Follow Up Call    Call within 2 business days of discharge: Yes    Patient: [facility] Patient : 1940   MRN: [id number]  Reason for Admission: diverticulitis   Discharge Date: 10/14/20 RARS: Readmission Risk Score: 22      Last Discharge [facility]       Complaint Diagnosis Description Type Department Provider    10/10/20 Abdominal Pain Diverticulitis of colon . .. ED to Hosp-Admission (Discharged) (ADMITTED) [id number] Bryce Chisholm MD; Ashwini Reynolds ... Spoke with: [address]: [facility] Transitions 24 Hour Call    Do you have any ongoing symptoms?:  No  Do you have a copy of your discharge instructions?:  Yes  Do you have all of your prescriptions and are they filled?:  Yes  Have you been contacted by a [facility] Pharmacist?:  No  Have you scheduled your follow up appointment?:  Yes  How are you going to get to your appointment?:  Car - family or friend to transport  Were you discharged with any Home Care or Post Acute Services:  Yes  Post Acute Services:  Home Health (Comment: [facility])  Patient DME:  Mo Easley  Patient Home Equipment:  Nebulizer, Oxygen  Do you have support at home?:  Alone  Do you feel like you have everything you need to keep you well at home?:  Yes  Are you an active caregiver in your home?:  No  Care Transitions Interventions         Follow Up  Future Appointments   Date Time Provider Allyssa Levine   10/22/2020 10:00 AM TURNER Lozada CNP, [place] [facility]   10/28/2020  1:15 PM [facility]   10/29/2020 10:00 AM TURNER Lozada CNP, [place]  KOJO Crook RN    Challenges to be reviewed by the provider   Additional needs identified to be addressed with provider No  none           Method of communication with provider : none    Advance Care Planning:   Does patient have an Advance Directive:  reviewed and current.      Was this a readmission? No  Patient stated reason for admission: abd pain   Patients top risk factors for readmission: medical condition    Care Transition Nurse (CTN) contacted the patient by telephone to perform post hospital discharge assessment. Verified name and  with patient as identifiers. Provided introduction to self, and explanation of the CTN role. CTN reviewed discharge instructions, medical action plan and red flags with patient who verbalized understanding. Patient given an opportunity to ask questions and does not have any further questions or concerns at this time. Were discharge instructions available to patient? Yes. Reviewed appropriate site of care based on symptoms and resources available to patient including: PCP, Specialist, Home health and When to call 911. The patient agrees to contact the PCP office for questions related to their healthcare. Medication reconciliation was performed with patient, who verbalizes understanding of administration of home medications. Advised obtaining a 90-day supply of all daily and as-needed medications. Discussed follow-up appointments. If no appointment was previously scheduled, appointment scheduling offered: No. Is follow up appointment scheduled within 7 days of discharge? Yes  Non-[facility] follow up appointment(s):     Plan for follow-up call in 5-7 days based on severity of symptoms and risk factors. Spoke with patient who reported she is doing alright. Patient stated her abd pain is better and she is just still dealing with leg cramps. Patient stated she had leg cramps while in the hospital but denied any worsening issues. Reviewed all medications with patient who reported she is taking all as prescribed. Patient reported she had a bowel movement last night. Patient has apt with pcp on 10/22. Denies any acute needs at present time. Agreeable to f/u calls.   Educated on the use of urgent care or physicians 24 hr access line if assistance is needed after hours & that they can always contact their home care provider to request a nurse visit even if it isn't their regularly scheduled day for their nurse to visit. CTN spoke with Armond Armstrong who verified [facility] orders were received and lvm message for patient. CTN provided contact information for future needs.       Kae DE PAZ, RN, [facility]  Care Transition Nurse   [phone]

## 2020-10-16 ENCOUNTER — CARE COORDINATION (OUTPATIENT)
Dept: CARE COORDINATION | Age: 80
End: 2020-10-16

## 2020-10-16 NOTE — CARE COORDINATION
Chart reviewed. Will await notification from CTN once episode is complete to assess for care coordination needs.

## 2020-10-19 ENCOUNTER — ANTI-COAG VISIT (OUTPATIENT)
Dept: FAMILY MEDICINE CLINIC | Age: 80
End: 2020-10-19

## 2020-10-19 LAB — INR BLD: 1.3

## 2020-10-20 ENCOUNTER — CARE COORDINATION (OUTPATIENT)
Dept: CASE MANAGEMENT | Age: 80
End: 2020-10-20

## 2020-10-20 ENCOUNTER — ANTI-COAG VISIT (OUTPATIENT)
Dept: FAMILY MEDICINE CLINIC | Age: 80
End: 2020-10-20

## 2020-10-20 LAB — INR BLD: 1.4

## 2020-10-20 NOTE — CARE COORDINATION
[facility] Transitions Follow Up Call    10/20/2020    Patient: Jerry Mcnair  Patient : 1940   MRN: [id number]  Reason for Admission: abd pain, UTI, COPD, lactic acidosis  Discharge Date: 10/14/20 RARS: Readmission Risk Score: 22       Unable to reach patient by phone. Message left stating purpose of call with contact information requesting return call.       Follow Up  Future Appointments   Date Time Provider Allyssa Levine   10/23/2020  3:00 PM TURNER Park CNP [facility]   10/28/2020  1:15 PM MD JAGUAR Hernandez [facility]   10/29/2020 10:00 AM TURNER Park CNP [facility]       Alie Le RN

## 2020-10-21 ENCOUNTER — ANTI-COAG VISIT (OUTPATIENT)
Dept: FAMILY MEDICINE CLINIC | Age: 80
End: 2020-10-21

## 2020-10-21 LAB — INR BLD: 2.1

## 2020-10-23 ENCOUNTER — VIRTUAL VISIT (OUTPATIENT)
Dept: FAMILY MEDICINE CLINIC | Age: 80
End: 2020-10-23
Payer: MEDICARE

## 2020-10-23 ENCOUNTER — CARE COORDINATION (OUTPATIENT)
Dept: CASE MANAGEMENT | Age: 80
End: 2020-10-23

## 2020-10-23 ENCOUNTER — ANTI-COAG VISIT (OUTPATIENT)
Dept: FAMILY MEDICINE CLINIC | Age: 80
End: 2020-10-23

## 2020-10-23 PROBLEM — F41.8 DEPRESSION WITH ANXIETY: Status: ACTIVE | Noted: 2020-10-23

## 2020-10-23 PROBLEM — F51.01 PRIMARY INSOMNIA: Status: ACTIVE | Noted: 2020-10-23

## 2020-10-23 PROBLEM — F33.1 MODERATE EPISODE OF RECURRENT MAJOR DEPRESSIVE DISORDER (HCC): Status: ACTIVE | Noted: 2020-10-23

## 2020-10-23 LAB — INR BLD: 2.8

## 2020-10-23 PROCEDURE — 99443 PR PHYS/QHP TELEPHONE EVALUATION 21-30 MIN: CPT | Performed by: NURSE PRACTITIONER

## 2020-10-23 RX ORDER — POLYETHYLENE GLYCOL 3350 17 G/17G
17 POWDER, FOR SOLUTION ORAL DAILY
Qty: 30 EACH | Refills: 1 | Status: SHIPPED | OUTPATIENT
Start: 2020-10-23

## 2020-10-23 RX ORDER — TRAZODONE HYDROCHLORIDE 50 MG/1
50 TABLET ORAL NIGHTLY
Qty: 30 TABLET | Refills: 1 | Status: SHIPPED | OUTPATIENT
Start: 2020-10-23 | End: 2021-06-24 | Stop reason: SDUPTHER

## 2020-10-23 ASSESSMENT — ENCOUNTER SYMPTOMS
EYES NEGATIVE: 1
ALLERGIC/IMMUNOLOGIC NEGATIVE: 1
NAUSEA: 0
BACK PAIN: 1
ABDOMINAL PAIN: 0
ANAL BLEEDING: 0
BLOOD IN STOOL: 0
SHORTNESS OF BREATH: 1
GASTROINTESTINAL NEGATIVE: 1

## 2020-10-23 NOTE — CARE COORDINATION
[facility] Transitions Follow Up Call    10/23/2020    Patient: Jerry Mcnair  Patient : 1940   MRN: [id number]  Reason for Admission: UTI, COPD, abd pain  Discharge Date: 10/14/20 RARS: Readmission Risk Score: 25         Spoke with: Jerry Mcnair (patient)    Has vv with PCP later today. Weighing daily and reports stable within 2-3#. CHF education:  -weigh daily in the morning at the same time and in the same clothing  -report weight gain of >3#/day or >5#/week  -report increased SOB, edema, abd fullness, difficulty lying flat    INR 2.8 today after taking larger dose of warfarin last 2 days. She is aware that abx alter her INR and will need more frequent checks with home coag check. [facility] established, PT just left. She is some tired but otherwise feeling well. Denies needs going into weekend. Knows how to reach [facility] prn needs. Care Transitions Subsequent and Final Call    Schedule Follow Up Appointment with PCP:  Completed  Subsequent and Final Calls  Do you have any ongoing symptoms?:  Yes  Onset of Patient-reported symptoms:  Other  Patient-reported symptoms:  Fatigue  Interventions for patient-reported symptoms:  Other  Have your medications changed?:  Yes  Do you have any questions related to your medications?:  No  Do you currently have any active services?:  Yes  Are you currently active with any services?:  Home Health  Do you have any needs or concerns that I can assist you with?:  No  Identified Barriers:  None  Care Transitions Interventions  No Identified Needs  Other Interventions:             Follow Up  Future Appointments   Date Time Provider Allyssa Levine   10/23/2020  3:00 PM TURNER Park CNP [facility]   10/28/2020  1:15 PM MD JAGUAR Hernandez [facility]   10/29/2020 10:00 AM TURNER Park CNP [facility]       Alie Le RN

## 2020-10-23 NOTE — PROGRESS NOTES
[address] [place] [address]  [address] [place] [id number]  Dept: [phone]  Dept Fax: [phone]  Loc: Patricia Winters is a 70 y.o. female evaluated via telephone on 10/23/2020. Consent:  She and/or health care decision maker is aware that that she may receive a bill for this telephone service, depending on her insurance coverage, and has provided verbal consent to proceed: Yes    Preethi Aguirre is a 70 y.o. female who presents today for her medical conditions/complaints as noted below. Preethi Aguirre is c/o of Anxiety; Depression (pt states her aniety and depression fluctuates. pt uses buspar as needed and states it helps some. ); Atrial Fibrillation (pt coumadin regimain is usually stable. ); Insomnia (pt takes melatonin and trazadone as needed. pt was also taking requip that only works for a little while and then she wakes up. ); and Edema (pt is taking )        [facility]  [address] [place] [id number]  Dept: [phone]  Dept Fax: [phone]  Loc: Patricia Winters is a 70 y.o. female who presents today for her medical conditions/complaints as noted below. Preethi Aguirre is c/o of Anxiety; Depression (pt states her aniety and depression fluctuates. pt uses buspar as needed and states it helps some. ); Atrial Fibrillation (pt coumadin regimain is usually stable. ); Insomnia (pt takes melatonin and trazadone as needed. pt was also taking requip that only works for a little while and then she wakes up. ); and Edema (pt is taking )       Subjective:     Chief Complaint   Patient presents with    Anxiety    Depression     pt states her aniety and depression fluctuates. pt uses buspar as needed and states it helps some.  Atrial Fibrillation     pt coumadin regimain is usually stable.      Insomnia     pt takes melatonin and trazadone as needed. pt was also taking requip that only works for a little while and then she wakes up.  Edema     pt is taking        HPI  Insomnia:  Current treatment: avoiding heavy meal before bedtime, avoiding long naps during the day, avoiding use of electronic devices before bedtime, decreasing caffeine consumption, going to sleep at the same time each night, melatonin use and prescription sleep aid- trazodone- 50 mg prn and uses vary rarely -- which has been effective. Medication side effects: none. Restless Leg Syndrome  Patient has been complaining of cramps and spasms in the lower extremities, especially at rest and at night. She reports waking up several times a night due to pain and cramping in the lower extremities that is relieves with walking. She denies any complaint during activities. These symptoms have been going on for months and is not improving. Quit requip about 1 week ago due to made her sleep all day and felt requip did not help    Vitamin D Deficiency  Patient is here to follow up on vitamin D deficiency. Patient is taking vitamin D supplement without any adverse effects as prescribed. Lab Results   Component Value Date    VITD25 16.2 (L) 04/11/2019     Chronic constipation:  Currently treated with miralax, which has been effective. She has been using this therapy daily. Side effects: no.     Tomas Altamirano is a 70 y.o. female who presents for follow-up of atrial fibrillation. Onset was several months ago, and patient reports symptoms have stabilized since that time. Recently, the patient has had no other symptoms. The patient denies abdominal pain, calf pain, chest pain, cough, dizziness, leg swelling, palpitations, rapid heart beat, shortness of breath, slow heart beat and syncope. The patient has a past history of atrial fibrillation, CAD, CHF, DVT and PE. The patient also is complaining of bilateral knee pain.   She does state that the left knee is worse than the Psychiatric/Behavioral: Negative. All other systems reviewed and are negative. Past Medical History:   Diagnosis Date    Acute exacerbation of chronic obstructive pulmonary disease (COPD) ([address]     Acute on chronic respiratory failure (HCC)     Atrial fibrillation with rapid ventricular response ([address] 6/23/2015    B-cell lymphoma ([address]     CAP (community acquired pneumonia) 1/29/2015    CHF (congestive heart failure) ([address]     Diverticulitis     Fistula     anal-vaginal fistula    HCAP (healthcare-associated pneumonia)     Hx of blood clots     IFG (impaired fasting glucose) 9/13/2018    Non-Hodgkins lymphoma (HCC)     Panic disorder     Pulmonary embolism ([address]     6/15    Rectal vaginal fistula     [facility] spotted fever 2013    Sciatic nerve pain     Sepsis ([facility] 6/22/2015    Tobacco abuse 6/22/2015    Vitamin D deficiency      Family History   Problem Relation Age of Onset    Cancer Mother 43        lung cancer    Diabetes Sister     Cancer Daughter 43        lung cancer    Asthma Son     Hypertension Son     Asthma Grandchild     Hypertension Son     Emphysema Neg Hx     Heart Failure Neg Hx      Past Surgical History:   Procedure Laterality Date    APPENDECTOMY      BRONCHOSCOPY N/A 8/12/2019    EBUS WF W/ANES.  performed by Lefty Montague MD at [address] [place] [address]  8/12/2019    BRONCHOSCOPY ALVEOLAR LAVAGE performed by Lefty Montague MD at [address] [place] [address]  8/12/2019    BRONCHOSCOPY BRUSHINGS performed by Lefty Montague MD at [address] [place] [address]  8/12/2019    BRONCHOSCOPY/TRANSBRONCHIAL LUNG BIOPSY performed by Lefty Montague MD at [address] [place] [address]  8/12/2019    BRONCHOSCOPY/TRANSBRONCHIAL NEEDLE BIOPSY performed by Lefty Montague MD at [address] [place] [address]  8/12/2019    BRONCHOSCOPY/TRANSBRONCHIAL NEEDLE BIOPSY ADDL LOBE performed by Lefty Montague MD at [address] [place] [address] CATHETERIZATION  2003    COLONOSCOPY  2019    diverticulosis, polyp sigmoid    COLONOSCOPY N/A 2019    COLONOSCOPY POLYPECTOMY SNARE/COLD BIOPSY performed by Mariah Malone MD at [address] [place] [address] PARTIAL HYSTERECTOMY      PORT SURGERY N/A 2020    PORT INSERTION performed by Lyle Mak MD at [address] [place] [address] Blvd TUNNELED VENOUS PORT PLACEMENT Left 2020    Port Insertion ENRIQUETA Chest w. Dr Darion Gusman 20 V[address] CT Injectable [id number] 3/31/21     Social History     Socioeconomic History    Marital status:      Spouse name: Not on file    Number of children: Not on file    Years of education: Not on file    Highest education level: Not on file   Occupational History    Occupation: retired   Social Needs    Financial resource strain: Not very hard    Food insecurity     Worry: Never true     Inability: Never true    Transportation needs     Medical: No     Non-medical: No   Tobacco Use    Smoking status: Former Smoker     Packs/day: 0.33     Years: 50.00     Pack years: 16.50     Types: Cigarettes     Last attempt to quit: 2015     Years since quittin.3    Smokeless tobacco: Never Used   Substance and Sexual Activity    Alcohol use: Not Currently     Alcohol/week: 0.0 standard drinks     Frequency: Never    Drug use: No    Sexual activity: Not Currently   Lifestyle    Physical activity     Days per week: 0 days     Minutes per session: 0 min    Stress:  To some extent   Relationships    Social connections     Talks on phone: Not on file     Gets together: Once a week     Attends Spiritism service: Never     Active member of club or organization: No     Attends meetings of clubs or organizations: Never     Relationship status:     Intimate partner violence     Fear of current or ex partner: Not on file     Emotionally abused: Not on file     Physically abused: Not on file     Forced sexual activity: Not on file   Other Topics Concern    Not on file   Social History Narrative    Not on file     Current Outpatient Medications   Medication Sig Dispense Refill    polyethylene glycol (GLYCOLAX) 17 g packet Take 17 g by mouth 2 times daily 60 each 1    warfarin (COUMADIN) 5 MG tablet TAKE ONE TABLET BY MOUTH DAILY 60 tablet 1    warfarin (COUMADIN) 5 MG tablet Take 5 mg by mouth Except for Wednesday 7.5mg      vitamin B-6 (PYRIDOXINE) 50 MG tablet Take by mouth daily      spironolactone (ALDACTONE) 25 MG tablet Take 1 tablet by mouth daily 30 tablet 1    torsemide (DEMADEX) 20 MG tablet Take 1 tablet by mouth daily 30 tablet 3    albuterol (PROVENTIL) (2.5 MG/3ML) 0.083% nebulizer solution Take 3 mLs by nebulization every 6 hours as needed for Wheezing or Shortness of Breath DX COPD J44.9 360 mL 5    Acetaminophen (TYLENOL ARTHRITIS PAIN PO) Take by mouth      albuterol sulfate HFA (VENTOLIN HFA) 108 (90 Base) MCG/ACT inhaler Inhale 2 puffs into the lungs every 6 hours as needed for Wheezing 2 Inhaler 3    traZODone (DESYREL) 50 MG tablet Take 1 tablet by mouth nightly 30 tablet 1    melatonin 3 MG TABS tablet Take 1 tablet by mouth nightly as needed (insomnia) 15 tablet 0    OXYGEN 4.5 L      Cholecalciferol (VITAMIN D-3 PO) Take 2,000 Units by mouth daily        No current facility-administered medications for this visit. No changes in past medical history, past surgical history, social history, orfamily history were noted during the patient encounter unless specifically listed above. All updates of past medical history, past surgical history, social history, or family history were reviewed personally by me duringthe office visit. All problems listed in the assessment are stable unless noted otherwise. Medication profile reviewed personally by me during the office visit. Medication side effects and possible impairments frommedications were discussed as applicable.      Objective: 10/10/2020 12     AST 10/10/2020 16     Globulin 10/10/2020 3.0     Lipase 10/10/2020 21.0     Lactic Acid 10/10/2020 4.0*    Color, UA 10/10/2020 Yellow     Clarity, UA 10/10/2020 CLOUDY*    Glucose, Ur 10/10/2020 Negative     Bilirubin Urine 10/10/2020 Negative     Ketones, Urine 10/10/2020 Negative     Specific Gravity, UA 10/10/2020 1.010     Blood, Urine 10/10/2020 SMALL*    pH, UA 10/10/2020 8.5*    Protein, UA 10/10/2020 TRACE*    Urobilinogen, Urine 10/10/2020 1.0     Nitrite, Urine 10/10/2020 Negative     Leukocyte Esterase, Urine 10/10/2020 LARGE*    Microscopic Examination 10/10/2020 YES     Urine Type 10/10/2020 NotGiven     WBC, UA 10/10/2020 3-5     RBC, UA 10/10/2020 3-4     Epithelial Cells, UA 10/10/2020 11-20*    Bacteria, UA 10/10/2020 4+*    Yeast, UA 10/10/2020 Present*    Pro-BNP 10/10/2020 171     Lactic Acid 10/10/2020 0.8     Lactic Acid 10/10/2020 0.7     Lactic Acid 10/11/2020 0.7     Protime 10/10/2020 34.3*    INR 10/10/2020 2.93*    Sodium 10/11/2020 136     Potassium reflex Magnesi* 10/11/2020 3.9     Chloride 10/11/2020 99     CO2 10/11/2020 30     Anion Gap 10/11/2020 7     Glucose 10/11/2020 123*    BUN 10/11/2020 7     CREATININE 10/11/2020 0.6     GFR Non- 10/11/2020 >60     GFR  10/11/2020 >60     Calcium 10/11/2020 8.4     WBC 10/11/2020 6.6     RBC 10/11/2020 4.00     Hemoglobin 10/11/2020 10.9*    Hematocrit 10/11/2020 34.3*    MCV 10/11/2020 85.8     MCH 10/11/2020 27.3     MCHC 10/11/2020 31.8     RDW 10/11/2020 14.1     Platelets 99/90/1053 259     MPV 10/11/2020 6.8     Neutrophils % 10/11/2020 70.0     Lymphocytes % 10/11/2020 14.4     Monocytes % 10/11/2020 11.2     Eosinophils % 10/11/2020 3.6     Basophils % 10/11/2020 0.8     Neutrophils Absolute 10/11/2020 4.6     Lymphocytes Absolute 10/11/2020 1.0     Monocytes Absolute 10/11/2020 0.7     Eosinophils Absolute 10/11/2020 0.2  Basophils Absolute 10/11/2020 0.1     Protime 10/11/2020 35.8*    INR 10/11/2020 3.05*    Lactic Acid 10/11/2020 0.9     Lactic Acid 10/12/2020 0.5     Protime 10/12/2020 38.9*    INR 10/12/2020 3.31*    Organism 10/12/2020 Escherichia coli*    Urine Culture, Routine 10/12/2020 25,000 CFU/ml     Sodium 10/12/2020 137     Potassium reflex Magnesi* 10/12/2020 4.2     Chloride 10/12/2020 99     CO2 10/12/2020 31     Anion Gap 10/12/2020 7     Glucose 10/12/2020 107*    BUN 10/12/2020 6*    CREATININE 10/12/2020 0.6     GFR Non- 10/12/2020 >60     GFR  10/12/2020 >60     Calcium 10/12/2020 8.2*    WBC 10/12/2020 5.5     RBC 10/12/2020 3.86*    Hemoglobin 10/12/2020 10.5*    Hematocrit 10/12/2020 33.1*    MCV 10/12/2020 85.7     MCH 10/12/2020 27.2     MCHC 10/12/2020 31.7     RDW 10/12/2020 14.7     Platelets 47/16/7956 254     MPV 10/12/2020 7.1     Protime 10/13/2020 33.9*    INR 10/13/2020 2.89*    Protime 10/14/2020 26.4*    INR 10/14/2020 2.26*    WBC 10/14/2020 4.7     RBC 10/14/2020 3.92*    Hemoglobin 10/14/2020 10.8*    Hematocrit 10/14/2020 33.5*    MCV 10/14/2020 85.4     MCH 10/14/2020 27.6     MCHC 10/14/2020 32.3     RDW 10/14/2020 14.4     Platelets 22/68/3496 258     MPV 10/14/2020 6.6     Sodium 10/14/2020 143     Potassium 10/14/2020 4.3     Chloride 10/14/2020 103     CO2 10/14/2020 32     Anion Gap 10/14/2020 8     Glucose 10/14/2020 109*    BUN 10/14/2020 9     CREATININE 10/14/2020 0.6     GFR Non- 10/14/2020 >60     GFR  10/14/2020 >60     Calcium 10/14/2020 9.0     Magnesium 10/14/2020 2.30     Phosphorus 10/14/2020 2.8    Anti-coag visit on 10/02/2020   Component Date Value    INR 10/02/2020 3.10    Anti-coag visit on 10/02/2020   Component Date Value    INR 10/05/2020 2.20    Anti-coag visit on 09/25/2020   Component Date Value    INR 09/25/2020 2.50    Anti-coag visit on 09/21/2020   Component Date Value    INR 09/21/2020 2.80    Office Visit on 09/21/2020   Component Date Value    INR 09/21/2020 2.80     Sodium 09/21/2020 141     Potassium 09/21/2020 4.4     Chloride 09/21/2020 97*    CO2 09/21/2020 35*    Anion Gap 09/21/2020 9     Glucose 09/21/2020 125*    BUN 09/21/2020 15     CREATININE 09/21/2020 0.6     GFR Non- 09/21/2020 >60     GFR  09/21/2020 >60     Calcium 09/21/2020 9.4     Total Protein 09/21/2020 6.8     Alb 09/21/2020 4.3     Albumin/Globulin Ratio 09/21/2020 1.7     Total Bilirubin 09/21/2020 0.3     Alkaline Phosphatase 09/21/2020 97     ALT 09/21/2020 12     AST 09/21/2020 14*    Globulin 09/21/2020 2.5     WBC 09/21/2020 6.8     RBC 09/21/2020 4.49     Hemoglobin 09/21/2020 12.2     Hematocrit 09/21/2020 38.4     MCV 09/21/2020 85.5     MCH 09/21/2020 27.2     MCHC 09/21/2020 31.8     RDW 09/21/2020 14.6     Platelets 36/13/4527 302     MPV 09/21/2020 7.3     Neutrophils % 09/21/2020 62.5     Lymphocytes % 09/21/2020 22.0     Monocytes % 09/21/2020 11.0     Eosinophils % 09/21/2020 3.5     Basophils % 09/21/2020 1.0     Neutrophils Absolute 09/21/2020 4.2     Lymphocytes Absolute 09/21/2020 1.5     Monocytes Absolute 09/21/2020 0.7     Eosinophils Absolute 09/21/2020 0.2     Basophils Absolute 09/21/2020 0.1     Magnesium 09/21/2020 2.20     Vitamin B-12 09/21/2020 501     Folate 09/21/2020 10.90    There may be more visits with results that are not included. No results found for this visit on 10/23/20. Assessment:       1. Paroxysmal atrial fibrillation (HCC)    2. Depression with anxiety    3. Primary insomnia    4. RLS (restless legs syndrome)    5. Chronic constipation    6. Vitamin D deficiency    7. Chronic pain of both knees        No results found for this visit on 10/23/20.          Plan:       [place] was seen today for anxiety, depression, atrial fibrillation, insomnia and edema. Diagnoses and all orders for this visit:    Paroxysmal atrial fibrillation ([address]  Continue on current Coumadin regimen  Patient denies any signs or symptoms of abnormal bruising or bleeding    Depression with anxiety  Patient states her depression and anxiety overall is doing well however she only uses the trazodone as needed  -     traZODone (DESYREL) 50 MG tablet; Take 1 tablet by mouth nightly as needed for insomnia    Primary insomnia  -     traZODone (DESYREL) 50 MG tablet; Take 1 tablet by mouth nightly    RLS (restless legs syndrome)  Patient quit taking the Requip because it was making her sleepy all day and she felt like it did not do anything for her restless leg. Patient is not wanting to try any other medication at this time    Chronic constipation  -     polyethylene glycol (GLYCOLAX) 17 g packet; Take 17 g by mouth daily  Patient is going to start drinking prune juice on a daily basis and see if she can cut back on her MiraLAX to every other day. She was educated to watch for changes in her bowels and for signs of potential bowel blockage or recurrent constipation    Vitamin D deficiency  Discussed with patient that we make vitamin D from the sun and get it from some food sources, but it is very common to be deficient. Discussed the need for vitamin D replacement because low vitamin D can cause fatigue, joint aches and has been implicated in heart disease, bone disease like osteoporosis, and some other chronic illnesses. Patient will start/continue vitamin D supplement as per order. Recommend vitamin D to be rechecked in 6 months. Chronic pain of both knees  -     Marcos Knox MD, Orthopedic Surgery, [facility]    Patient has been instructed call the office immediately with new symptoms, change in symptoms or worseningof symptoms. If this is not feasible, patient is instructed to report to the emergency room. Medication profile reviewed.  Medication side

## 2020-10-28 ENCOUNTER — VIRTUAL VISIT (OUTPATIENT)
Dept: PULMONOLOGY | Age: 80
End: 2020-10-28
Payer: MEDICARE

## 2020-10-28 PROCEDURE — 99442 PR PHYS/QHP TELEPHONE EVALUATION 11-20 MIN: CPT | Performed by: INTERNAL MEDICINE

## 2020-10-28 RX ORDER — ALBUTEROL SULFATE 90 UG/1
2 AEROSOL, METERED RESPIRATORY (INHALATION) EVERY 6 HOURS PRN
Qty: 2 INHALER | Refills: 5 | Status: ON HOLD | OUTPATIENT
Start: 2020-10-28 | End: 2021-10-27 | Stop reason: SDUPTHER

## 2020-10-28 NOTE — PROGRESS NOTES
Tyra Shea is a 68 y.o. female evaluated via telephone on 10/28/2020. Consent:  She and/or health care decision maker is aware that that she may receive a bill for this telephone service, depending on her insurance coverage, and has provided verbal consent to proceed: Yes      Documentation:  I communicated with the patient and/or health care decision maker about COPD, chronic resp failure with hypoxemia. Details of this discussion including any medical advice provided: COPD, Oxygen use      I affirm this is a Patient Initiated Episode with an Established Patient who has not had a related appointment within my department in the past 7 days or scheduled within the next 24 hours. Total Time: minutes: 11-20 minutes    Note: not billable if this call serves to triage the patient into an appointment for the relevant concern      Case Mcclelland     Since last clinic visit, the patient reports she has been doing ok. She completed chemotherapy in July. She had flu vaccine in 9/20. Assessment:   -Chronic Obstructive Pulmonary Disease severe  -Chronic Respiratory Failure - hypoxemic, Stable  - Dyspnea-severe  -Erythrocytosis- likely secondary to chronic hypoxemia; myeloproliferative disease also possible, f/b Hem/Onc previously  - bilateral PEs 6/15  - obesity- persistent  LE edema  - B cell lymphoma in chest- improving on CT 3/30/20       Plan:      - Inhaled bronchodilator therapy -  Albuterol mdi and  nebs prn. Could not afford spiriva, tudorza- did not tolerate because of vision changes  - Patient is up to date with Pneumococcal vaccine   - Continue home O2 at night 4l and 6Lwith exertion,- OCD for supplemental O2 ;  Based upon repeat 6MWT.    - has handicap placard  - recommend lifelong anticoagulation- remains on coumadin  - had flu vaccine 9/20  - Oncology follow up- CT f/u per them      Orders  - refill albuterol  - f/u in 6 months

## 2020-10-28 NOTE — PATIENT INSTRUCTIONS
Remember to bring a list of pulmonary medications and any CPAP or BiPAP machines to your next appointment with the office. Please keep all of your future appointments scheduled by [facility] office. Out of respect for other patients and providers, you may be asked to reschedule your appointment if you arrive later than your scheduled appointment time. Appointments cancelled less than 24hrs in advance will be considered a no show. Patients with three missed appointments within 1 year or four missed appointments within 2 years can be dismissed from the practice. You may receive a survey regarding the care you received during your visit. Your input is valuable to us. We encourage you to complete and return your survey. We hope you will choose us in the future for your healthcare needs. Pt instructed of all future appointment dates & times, including radiology, labs, procedures & referrals. If procedures were scheduled preparation instructions provided. Instructions on future appointments with [facility] were given.

## 2020-10-29 ENCOUNTER — OFFICE VISIT (OUTPATIENT)
Dept: FAMILY MEDICINE CLINIC | Age: 80
End: 2020-10-29
Payer: MEDICARE

## 2020-10-29 PROCEDURE — 4040F PNEUMOC VAC/ADMIN/RCVD: CPT | Performed by: NURSE PRACTITIONER

## 2020-10-29 PROCEDURE — 1123F ACP DISCUSS/DSCN MKR DOCD: CPT | Performed by: NURSE PRACTITIONER

## 2020-10-29 PROCEDURE — G8482 FLU IMMUNIZE ORDER/ADMIN: HCPCS | Performed by: NURSE PRACTITIONER

## 2020-10-29 PROCEDURE — G0438 PPPS, INITIAL VISIT: HCPCS | Performed by: NURSE PRACTITIONER

## 2020-10-29 PROCEDURE — G0447 BEHAVIOR COUNSEL OBESITY 15M: HCPCS | Performed by: NURSE PRACTITIONER

## 2020-10-29 ASSESSMENT — PATIENT HEALTH QUESTIONNAIRE - PHQ9
SUM OF ALL RESPONSES TO PHQ QUESTIONS 1-9: 0
SUM OF ALL RESPONSES TO PHQ QUESTIONS 1-9: 0
SUM OF ALL RESPONSES TO PHQ9 QUESTIONS 1 & 2: 0
1. LITTLE INTEREST OR PLEASURE IN DOING THINGS: 0
SUM OF ALL RESPONSES TO PHQ QUESTIONS 1-9: 0
2. FEELING DOWN, DEPRESSED OR HOPELESS: 0

## 2020-10-29 ASSESSMENT — LIFESTYLE VARIABLES: HOW OFTEN DO YOU HAVE A DRINK CONTAINING ALCOHOL: 0

## 2020-10-29 NOTE — PROGRESS NOTES
Preethi Aguirre is a 68 y.o. female evaluated via telephone on 10/29/2020.       Consent:  She and/or health care decision maker is aware that that she may receive a bill for this telephone service, depending on her insurance coverage, and has provided verbal consent to proceed: Yes      Antonella Loss

## 2020-10-29 NOTE — PATIENT INSTRUCTIONS
Body Mass Index: Care Instructions  Your Care Instructions     Body mass index (BMI) can help you see if your weight is raising your risk for health problems. It uses a formula to compare how much you weigh with how tall you are. · A BMI lower than 18.5 is considered underweight. · A BMI between 18.5 and 24.9 is considered healthy. · A BMI between 25 and 29.9 is considered overweight. A BMI of 30 or higher is considered obese. If your BMI is in the normal range, it means that you have a lower risk for weight-related health problems. If your BMI is in the overweight or obese range, you may be at increased risk for weight-related health problems, such as high blood pressure, heart disease, stroke, arthritis or joint pain, and diabetes. If your BMI is in the underweight range, you may be at increased risk for health problems such as fatigue, lower protection (immunity) against illness, muscle loss, bone loss, hair loss, and hormone problems. BMI is just one measure of your risk for weight-related health problems. You may be at higher risk for health problems if you are not active, you eat an unhealthy diet, or you drink too much alcohol or use tobacco products. Follow-up care is a key part of your treatment and safety. Be sure to make and go to all appointments, and call your doctor if you are having problems. It's also a good idea to know your test results and keep a list of the medicines you take. How can you care for yourself at home? · Practice healthy eating habits. This includes eating plenty of fruits, vegetables, whole grains, lean protein, and low-fat dairy. · If your doctor recommends it, get more exercise. Walking is a good choice. Bit by bit, increase the amount you walk every day. Try for at least 30 minutes on most days of the week. · Do not smoke. Smoking can increase your risk for health problems. If you need help quitting, talk to your doctor about stop-smoking programs and medicines. These can increase your chances of quitting for good. · Limit alcohol to 2 drinks a day for men and 1 drink a day for women. Too much alcohol can cause health problems. If you have a BMI higher than 25  · Your doctor may do other tests to check your risk for weight-related health problems. This may include measuring the distance around your waist. A waist measurement of more than 40 inches in men or 35 inches in women can increase the risk of weight-related health problems. · Talk with your doctor about steps you can take to stay healthy or improve your health. You may need to make lifestyle changes to lose weight and stay healthy, such as changing your diet and getting regular exercise. If you have a BMI lower than 18.5  · Your doctor may do other tests to check your risk for health problems. · Talk with your doctor about steps you can take to stay healthy or improve your health. You may need to make lifestyle changes to gain or maintain weight and stay healthy, such as getting more healthy foods in your diet and doing exercises to build muscle. Where can you learn more? Go to https://[email or web address] org and sign in to your [employer] account. Enter S176 in the [employer] box to learn more about \"Body Mass Index: Care Instructions. \"     If you do not have an account, please click on the \"Sign Up Now\" link. Current as of: December 11, 2019               Content Version: 12.6  © 7132-1880 [employer]. Care instructions adapted under license by [facility] ([facility]). If you have questions about a medical condition or this instruction, always ask your healthcare professional. [place] [address] any warranty or liability for your use of this information. Learning About Healthy Weight  What is a healthy weight? A healthy weight is the weight at which you feel good about yourself and have energy for work and play.  It's also one that lowers your risk for health problems. What can you do to stay at a healthy weight? It can be hard to stay at a healthy weight, especially when fast food, vending-machine snacks, and processed foods are so easy to find. And with your busy lifestyle, activity may be low on your list of things to do. But staying at a healthy weight may be easier than you think. Here are some dos and don'ts for staying at a healthy weight:  Do eat healthy foods  The kinds of foods you eat have a big impact on both your weight and your health. Reaching and staying at a healthy weight is not about going on a diet. It's about making healthier food choices every day and changing your diet for good. Healthy eating means eating a variety of foods so that you get all the nutrients you need. Your body needs protein, carbohydrate, and fats for energy. They keep your heart beating, your brain active, and your muscles working. On most days, try to eat from each food group. This means eating a variety of:  · Whole grains, such as whole wheat breads and pastas. · Fruits and vegetables. · Dairy products, such as low-fat milk, yogurt, and cheese. · Lean proteins, such as all types of fish, chicken without the skin, and beans. Don't have too much or too little of one thing. All foods, if eaten in moderation, can be part of healthy eating. Even sweets can be okay. If your favorite foods are high in fat, salt, sugar, or calories, limit how often you eat them. Eat smaller servings, or look for healthy substitutes. Do watch what you eat  Many people eat more than their bodies need. Part of staying at a healthy weight means learning how much food you really need from day to day and not eating more than that. Even with healthy foods, eating too much can make you gain weight. Having a well-balanced diet means that you eat enough, but not too much, and that your food gives you the nutrients you need to stay healthy. So listen to your body. Eat when you're hungry.  Stop when you feel satisfied. It's a good idea to have healthy snacks ready for when you get hungry. Keep healthy snacks with you at work, in your car, and at home. If you have a healthy snack easily available, you'll be less likely to pick a candy bar or bag of chips from a vending machine instead. Some healthy snacks you might want to keep on hand are fruit, low-fat yogurt, string cheese, low-fat microwave popcorn, raisins and other dried fruit, nuts, whole wheat crackers, pretzels, carrots, celery sticks, and broccoli. Do some physical activity   A big part of reaching and staying at a healthy weight is being active. When you're active, you burn calories. This makes it easier to reach and stay at a healthy weight. When you're active on a regular basis, your body burns more calories, even when you're at rest. Being active helps you lose fat and build lean muscle. Try to be active for at least 1 hour every day. This may sound like a lot, but it's okay to be active in smaller blocks of time that add up to 1 hour a day. Any activity that makes your heart beat faster and keeps it there for a while counts. A brisk walk, run, or swim will get your heart beating faster. So will climbing stairs, shooting baskets, or cycling. Even some household chores like vacuuming and mowing the lawn will get your heart rate up. Pick activities that you enjoy--ones that make your heart beat faster, your muscles stronger, and your muscles and joints more flexible. If you find more than one thing you like doing, do them all. You don't have to do the same thing every day. Don't diet  Diets don't work. Diets are temporary. Because you give up so much when you diet, you may be hungry and think about food all the time. And after you stop dieting, you also may overeat to make up for what you missed. Most people who diet end up gaining back the pounds they lost--and more. Remember that healthy bodies come in lots of shapes and sizes. Everyone can get healthier by eating better and being more active. Where can you learn more? Go to https://chpepiceweb.docTrackr. org and sign in to your IntelliBatt account. Enter 216 [id number] in the [facility] box to learn more about \"Learning About Healthy Weight. \"     If you do not have an account, please click on the \"Sign Up Now\" link. Current as of: December 11, 2019               Content Version: 12.6  © 4247-1543 [email or web address], Incorporated. Care instructions adapted under license by [facility] ([facility]). If you have questions about a medical condition or this instruction, always ask your healthcare professional. [address] any warranty or liability for your use of this information. Learning About Low-Carbohydrate Diets  What is a low-carbohydrate diet? A low-carbohydrate (or \"low-carb\") diet limits foods and drinks that have carbohydrates. This includes grains, fruits, milk and yogurt, and starchy vegetables like potatoes, beans, and corn. It also avoids foods and drinks that have added sugar. Instead, low-carb diets include foods that are high in protein and fat. Why might you follow a low-carb diet? Low-carb diets may be used for a variety of reasons, such as for weight loss. People who have diabetes may use a low-carb diet to help manage their blood sugar levels. What should you do before you start the diet? Talk to your doctor before you try any diet. This is even more important if you have health problems like kidney disease, heart disease, or diabetes. Your doctor may suggest that you meet with a registered dietitian. A dietitian can help you make an eating plan that works for you. What foods do you eat on a low-carb diet? On a low-carb diet, you choose foods that are high in protein and fat. Examples of these are:  · Meat, poultry, and fish. · Eggs. · Nuts, such as walnuts, pecans, almonds, and peanuts.   · Peanut butter and other nut butters. · Tofu. · Avocado. · [facility]. · Non-starchy vegetables like broccoli, cauliflower, green beans, mushrooms, peppers, lettuce, and spinach. · Unsweetened non-dairy milks like almond milk and coconut milk. · Cheese, cottage cheese, and cream cheese. Current as of: August 22, 2019               Content Version: 12.6  © 2052-2347 [employer]. Care instructions adapted under license by [facility] [place] ([facility]). If you have questions about a medical condition or this instruction, always ask your healthcare professional. [place] [address] any warranty or liability for your use of this information. Personalized Preventive Plan for [place] - 10/29/2020  Medicare offers a range of preventive health benefits. Some of the tests and screenings are paid in full while other may be subject to a deductible, co-insurance, and/or copay. Some of these benefits include a comprehensive review of your medical history including lifestyle, illnesses that may run in your family, and various assessments and screenings as appropriate. After reviewing your medical record and screening and assessments performed today your provider may have ordered immunizations, labs, imaging, and/or referrals for you. A list of these orders (if applicable) as well as your Preventive Care list are included within your After Visit Summary for your review. Other Preventive Recommendations:    · A preventive eye exam performed by an eye specialist is recommended every 1-2 years to screen for glaucoma; cataracts, macular degeneration, and other eye disorders. · A preventive dental visit is recommended every 6 months. · Try to get at least 150 minutes of exercise per week or 10,000 steps per day on a pedometer . · Order or download the FREE \"Exercise & Physical Activity: Your Everyday Guide\" from [employer].  Call 7-170.277.3858 or search [employer] online. · You need 8335-5715 mg of calcium and 8759-6393 IU of vitamin D per day. It is possible to meet your calcium requirement with diet alone, but a vitamin D supplement is usually necessary to meet this goal.  · When exposed to the sun, use a sunscreen that protects against both UVA and UVB radiation with an SPF of 30 or greater. Reapply every 2 to 3 hours or after sweating, drying off with a towel, or swimming. · Always wear a seat belt when traveling in a car. Always wear a helmet when riding a bicycle or motorcycle. Heart-Healthy Diet   Sodium, Fat, and Cholesterol Controlled Diet       What Is a Heart Healthy Diet? A heart-healthy diet is one that limits sodium , certain types of fat , and cholesterol . This type of diet is recommended for:   People with any form of cardiovascular disease (eg, coronary heart disease , peripheral vascular disease , previous heart attack , previous stroke )   People with risk factors for cardiovascular disease, such as high blood pressure , high cholesterol , or diabetes   Anyone who wants to lower their risk of developing cardiovascular disease   Sodium    Sodium is a mineral found in many foods. In general, most people consume much more sodium than they need. Diets high in sodium can increase blood pressure and lead to edema (water retention). On a heart-healthy diet, you should consume no more than 2,300 mg (milligrams) of sodium per dayabout the amount in one teaspoon of table salt. The foods highest in sodium include table salt (about 50% sodium), processed foods, convenience foods, and preserved foods. Cholesterol    Cholesterol is a fat-like, waxy substance in your blood. Our bodies make some cholesterol. It is also found in animal products, with the highest amounts in fatty meat, egg yolks, whole milk, cheese, shellfish, and organ meats. On a heart-healthy diet, you should limit your cholesterol intake to less than 200 mg per day.    It is normal and important to have some cholesterol in your bloodstream. But too much cholesterol can cause plaque to build up within your arteries, which can eventually lead to a heart attack or stroke. The two types of cholesterol that are most commonly referred to are:   Low-density lipoprotein (LDL) cholesterol  Also known as bad cholesterol, this is the cholesterol that tends to build up along your arteries. Bad cholesterol levels are increased by eating fats that are saturated or hydrogenated. Optimal level of this cholesterol is less than 100. Over 130 starts to get risky for heart disease. High-density lipoprotein (HDL) cholesterol  Also known as good cholesterol, this type of cholesterol actually carries cholesterol away from your arteries and may, therefore, help lower your risk of having a heart attack. You want this level to be high (ideally greater than 60). It is a risk to have a level less than 40. You can raise this good cholesterol by eating olive oil, canola oil, avocados, or nuts. Exercise raises this level, too. Fat    Fat is calorie dense and packs a lot of calories into a small amount of food. Even though fats should be limited due to their high calorie content, not all fats are bad. In fact, some fats are quite healthful. Fat can be broken down into four main types.    The good-for-you fats are:   Monounsaturated fat  found in oils such as olive and canola, avocados, and nuts and natural nut butters; can decrease cholesterol levels, while keeping levels of HDL cholesterol high   Polyunsaturated fat  found in oils such as safflower, sunflower, soybean, corn, and sesame; can decrease total cholesterol and LDL cholesterol   Omega-3 fatty acids  particularly those found in fatty fish (such as salmon, trout, tuna, mackerel, herring, and sardines); can decrease risk of arrhythmias, decrease triglyceride levels, and slightly lower blood pressure   The fats that you want to limit are:   Saturated fat  found in animal products, many fast foods, and a few vegetables; increases total blood cholesterol, including LDL levels   Animal fats that are saturated include: butter, lard, whole-milk dairy products, meat fat, and poultry skin   Vegetable fats that are saturated include: hydrogenated shortening, palm oil, coconut oil, cocoa butter   Hydrogenated or trans fat  found in margarine and vegetable shortening, most shelf stable snack foods, and fried foods; increases LDL and decreases HDL     It is generally recommended that you limit your total fat for the day to less than 30% of your total calories. If you follow an 1800-calorie heart healthy diet, for example, this would mean 60 grams of fat or less per day. Saturated fat and trans fat in your diet raises your blood cholesterol the most, much more than dietary cholesterol does. For this reason, on a heart-healthy diet, less than 7% of your calories should come from saturated fat and ideally 0% from trans fat. On an 1800-calorie diet, this translates into less than 14 grams of saturated fat per day, leaving 46 grams of fat to come from mono- and polyunsaturated fats.    Food Choices on a Heart Healthy Diet   Food Category   Foods Recommended   Foods to Avoid   Grains   Breads and rolls without salted tops Most dry and cooked cereals Unsalted crackers and breadsticks Low-sodium or homemade breadcrumbs or stuffing All rice and pastas   Breads, rolls, and crackers with salted tops High-fat baked goods (eg, muffins, donuts, pastries) Quick breads, self-rising flour, and biscuit mixes Regular bread crumbs Instant hot cereals Commercially prepared rice, pasta, or stuffing mixes   Vegetables   Most fresh, frozen, and low-sodium canned vegetables Low-sodium and salt-free vegetable juices Canned vegetables if unsalted or rinsed   Regular canned vegetables and juices, including sauerkraut and pickled vegetables Frozen vegetables with sauces Commercially prepared potato and vegetable mixes Fruits   Most fresh, frozen, and canned fruits All fruit juices   Fruits processed with salt or sodium   Milk   Nonfat or low-fat (1%) milk Nonfat or low-fat yogurt Cottage cheese, low-fat ricotta, cheeses labeled as low-fat and low-sodium   Whole milk Reduced-fat (2%) milk Malted and chocolate milk Full fat yogurt Most cheeses (unless low-fat and low salt) Buttermilk (no more than 1 cup per week)   Meats and Beans   Lean cuts of fresh or frozen beef, veal, lamb, or pork (look for the word loin) Fresh or frozen poultry without the skin Fresh or frozen fish and some shellfish Egg whites and egg substitutes (Limit whole eggs to three per week) Tofu Nuts or seeds (unsalted, dry-roasted), low-sodium peanut butter Dried peas, beans, and lentils   Any smoked, cured, salted, or canned meat, fish, or poultry (including henning, chipped beef, cold cuts, hot dogs, sausages, sardines, and anchovies) Poultry skins Breaded and/or fried fish or meats Canned peas, beans, and lentils Salted nuts   Fats and Oils   Olive oil and canola oil Low-sodium, low-fat salad dressings and mayonnaise   Butter, margarine, coconut and palm oils, henning fat   Snacks, Sweets, and Condiments   Low-sodium or unsalted versions of broths, soups, soy sauce, and condiments Pepper, herbs, and spices; vinegar, lemon, or lime juice Low-fat frozen desserts (yogurt, sherbet, fruit bars) Sugar, cocoa powder, honey, syrup, jam, and preserves Low-fat, trans-fat free cookies, cakes, and pies Rainer and animal crackers, fig bars, barry snaps   High-fat desserts Broth, soups, gravies, and sauces, made from instant mixes or other high-sodium ingredients Salted snack foods Canned olives Meat tenderizers, seasoning salt, and most flavored vinegars   Beverages   Low-sodium carbonated beverages Tea and coffee in moderation Soy milk   Commercially softened water   Suggestions   Make whole grains, fruits, and vegetables the base of your diet.     Choose heart-healthy fats such as canola, olive, and flaxseed oil, and foods high in heart-healthy fats, such as nuts, seeds, soybeans, tofu, and fish. Eat fish at least twice per week; the fish highest in omega-3 fatty acids and lowest in mercury include salmon, herring, mackerel, sardines, and canned chunk light tuna. If you eat fish less than twice per week or have high triglycerides, talk to your doctor about taking fish oil supplements. Read food labels. For products low in fat and cholesterol, look for fat free, low-fat, cholesterol free, saturated fat free, and trans fat freeAlso scan the Nutrition Facts Label, which lists saturated fat, trans fat, and cholesterol amounts. For products low in sodium, look for sodium free, very low sodium, low sodium, no added salt, and unsalted   Skip the salt when cooking or at the table; if food needs more flavor, get creative and try out different herbs and spices. Garlic and onion also add substantial flavor to foods. Trim any visible fat off meat and poultry before cooking, and drain the fat off after davis. Use cooking methods that require little or no added fat, such as grilling, boiling, baking, poaching, broiling, roasting, steaming, stir-frying, and sauting. Avoid fast food and convenience food. They tend to be high in saturated and trans fat and have a lot of added salt. Talk to a registered dietitian for individualized diet advice. Last Reviewed: March 2011 Esmer Stuart MS, MPH, RD   Updated: 3/29/2011   ·   Patient information: Weight loss treatments    INTRODUCTION -- Obesity is a major international problem, and Americans are among the heaviest people in the world. The percentage of obese people in the [place] has risen steadily. Many people find that although they initially lose weight by dieting, they quickly regain the weight after the diet ends.  Because it so hard to keep weight off over time, it is important to have as much information and support as possible before starting a diet. You are most likely to be successful in losing weight and keeping it off when you believe that your body weight can be controlled. STARTING A WEIGHT LOSS PROGRAM -- Some people like to talk to their doctor or nurse to get help choosing the best plan, monitoring progress, and getting advice and support along the way. To know what treatment (or combination of treatments) will work best, determine your body mass index (BMI) and waist circumference (measurement). The BMI is calculated from your height and weight. A person with a BMI between 25 and 29.9 is considered overweight   A person with a BMI of 30 or greater is considered to be obese  A waist circumference greater than 35 inches (88 cm) in women and 40 inches (102 cm) in men increases the risk of obesity-related complications, such as heart disease and diabetes. People who are obese and who have a larger waist size may need more aggressive weight loss treatment than others. Talk to your doctor or nurse for advice. Types of treatment -- Based on your measurements and your medical history, your doctor or nurse can determine what combination of weight loss treatments would work best for you. Treatments may include changes in lifestyle, exercise, dieting, and, in some cases, weight loss medicines or weight loss surgery. Weight loss surgery, also called bariatric surgery, is reserved for people with severe obesity who have not responded to other weight loss treatments. SETTING A WEIGHT LOSS GOAL -- It is important to set a realistic weight loss goal. Your first goal should be to avoid gaining more weight and staying at your current weight (or within 5 percent). Many people have a \"dream\" weight that is difficult or impossible to achieve.   People at high risk of developing diabetes who are able to lose 5 percent of their body weight and maintain this weight will reduce their risk of developing diabetes by about 50 it can trigger uncontrolled eating in some people. It is important to find a way to get through these difficult times without eating or by eating low-calorie food, like raw vegetables. It may be helpful to imagine a relaxing place that allows you to temporarily escape from stress. With deep breaths and closed eyes, you can imagine this relaxing place for a few minutes. Self-help programs -- Self-help programs like [place] [employer]®, Overeaters Anonymous®, and Take Off [place] ([employer])© work for some people. As with all weight loss programs, you are most likely to be successful with these plans if you make long-term changes in how you eat. CHOOSING A DIET -- A calorie is a unit of energy found in food. Your body needs calories to function. The goal of any diet is to burn up more calories than you eat. How quickly you lose weight depends upon several factors, such as your age, gender, and starting weight. Older people have a slower metabolism than young people, so they lose weight more slowly. Men lose more weight than women of similar height and weight when dieting because they use more energy. People who are extremely overweight lose weight more quickly than those who are only mildly overweight. Try not to drink alcohol or drinks with added sugar, and most sweets (candy, cakes, cookies), since they rarely contain important nutrients. Portion-controlled diets -- One simple way to diet is to buy packaged foods, like frozen low-calorie meals or meal-replacement canned drinks. A typical meal plan for one day may include:  A meal-replacement drink or breakfast bar for breakfast   A meal-replacement drink or a frozen low-calorie (250 to 350 calories) meal for lunch   A frozen low-calorie meal or other prepackaged, calorie-controlled meal, along with extra vegetables for dinner  This would give you 1000 to 1500 calories per day.   Low-fat diet -- To reduce the amount of fat in your diet, you can:  Eat low-fat foods. Low-fat foods are those that contain less than 30 percent of calories from fat. Fat is listed on the food facts label (figure 1). Count fat grams. For a 1500 calorie diet, this would mean about 45 g or fewer of fat per day. Low-carbohydrate diet -- Low- and very-low-carbohydrate diets (eg, Atkins diet, [employer]) have become popular ways to lose weight quickly. With a very-low-carbohydrate diet, you eat between 0 and 60 grams of carbohydrates per day (a standard diet contains 200 to 300 grams of carbohydrates)   With a low-carbohydrate diet, you eat between 60 and 130 grams of carbohydrates per day  Carbohydrates are found in fruits, vegetables, and grains (including breads, rice, pasta, and cereal), alcoholic beverages, and in dairy products. Meat and fish do not contain carbohydrates. Side effects of very-low-carbohydrate diets can include constipation, headache, bad breath, muscle cramps, diarrhea, and weakness. Mediterranean diet -- The term \"Mediterranean diet\" refers to a way of eating that is common in [place] regions around [facility]. Although there is some variation in Mediterranean diets, there are some similarities. Most Mediterranean diets include:  A high level of monounsaturated fats (from olive or canola oil, walnuts, pecans, almonds) and a low level of saturated fats (from butter)   A high amount of vegetables, fruits, legumes, and grains (7 to 10 servings of fruits and vegetables per day)   A moderate amount of milk and dairy products, mostly in the form of cheese. Use low-fat dairy products (skim milk, fat-free yogurt, low-fat cheese). A relatively low amount of red meat and meat products. Substitute fish or poultry for red meat. For those who drink alcohol, a modest amount (mainly as red wine) may help to protect against cardiovascular disease.  A modest amount is up to one (4 ounce) glass per day for women and up to two glasses per day for men.  Which diet is best? -- Studies have compared different diets, including:  Very-low-carbohydrate (Atkins)   Macronutrient balance controlling glycemic load (Zone®)   Reduced-calorie (Weight Watchers®)   Very-low-fat (Ornish)  No one diet is \"best\" for weight loss. Any diet will help you to lose weight if you stick with the diet. Therefore, it is important to choose a diet that includes foods you like. Fad diets -- Fad diets often promise quick weight loss (more than 1 to 2 pounds per week) and may claim that you do not need to exercise or give up favorite foods. Some fad diets cost a lot of money, because you have to pay for seminars or pills. Fad diets generally lack any scientific evidence that they are safe and effective, but instead rely on \"before\" and \"after\" photos or testimonials. Diets that sound too good to be true usually are. These plans are a waste of time and money and are not recommended. A doctor, nurse, or nutritionist can help you find a safe and effective way to lose weight and keep it off. WEIGHT LOSS MEDICINES -- Taking a weight loss medicine may be helpful when used in combination with diet, exercise, and lifestyle changes. However, it is important to understand the risks and benefits of these medicines. It is also important to be realistic about your goal weight using a weight loss medicine; you may not reach your \"dream\" weight, but you may be able to reduce your risk of diabetes or heart disease. Weight loss medicines may be recommended for people who have not been able to lose weight with diet and exercise who have a:  BMI of 30 or more    BMI between 27 and 29.9 and have other medical problems, such as diabetes, high cholesterol, or high blood pressure  Two weight loss medicines are approved in the [place] for long-term use. These are sibutramine and orlistat.   Other weight loss medicines (phentermine, diethylpropion) are available but are only approved for short-term use (up to 12 weeks). Sibutramine -- Sibutramine (Meridia®, Reductil®) is a medicine that reduces your appetite. In people who take the medicine for one year, the average weight loss is 10 percent of the initial body weight (5 percent more than those who took a placebo treatment). Side effects of sibutramine include insomnia, dry mouth, and constipation. Increases in blood pressure can occur. Therefore, blood pressure is usually monitored during treatment. There is no evidence that sibutramine causes heart or lung problems (like dexfenfluramine and fenfluramine (Phen/Fen)). However, experts agree that sibutramine should not used by people with coronary heart disease, heart failure, uncontrolled hypertension, stroke, irregular heart rhythms, or peripheral vascular disease (poor circulation in the legs). Orlistat -- Orlistat (Xenical® 120 mg capsules) is a medicine that reduces the amount of fat your body absorbs from the foods you eat. A lower-dose version is now available without a prescription (Matt® 60 mg capsules) in many countries, including the [place]. The medicine is recommended three times per day, taken with a meal; you can skip a dose if you skip a meal or if the meal contains no fat. After one year of treatment with orlistat, the average weight loss is approximately 8 to 10 percent of initial body weight (4 percent more than in those who took a placebo). Cholesterol levels often improve, and blood pressure sometimes falls. In people with diabetes, orlistat may help control blood sugar levels. Side effects occur in 15 to 10 percent of people and may include stomach cramps, gas, diarrhea, leakage of stool, or oily stools. These problems are more likely when you take orlistat with a high-fat meal (if more than 30 percent of calories in the meal are from fat). Side effects usually improve as you learn to avoid high-fat foods.  Severe liver injury has been reported rarely in patients taking orlistat, but it is not known if orlistat caused the liver problems. Diet supplements -- Diet supplements are widely used by people who are trying to lose weight, although the safety and efficacy of these supplements are often unproven. A few of the more common diet supplements are discussed below; none of these are recommended because they have not been studied carefully, and there is no proof they are safe or effective. Chitosan and wheat dextrin are ineffective for weight loss, and their use is not recommended. Ephedra, a compound related to ephedrine, is no longer available in the [place] due to safety concerns. Many nonprescription diet pills previously contained ephedra. Although some studies have shown that ephedra helps with weight loss, there can be serious side effects (psychiatric symptoms, palpitations, and stomach upset), including death. There are not enough data about the safety and efficacy of chromium, ginseng, glucomannan, green tea, hydroxycitric acid, L carnitine, psyllium, pyruvate supplements, [place] wort, and conjugated linoleic acid. Two supplements from [facility], [address] [place] (also known as the Barby [employer] 15 pill) and Herbathin dietary supplement, have been shown to contain prescription drugs. Hoodia gordonii is a dietary supplement derived from a plant in [place]. It is not recommended because there is no proof that it is safe or effective. Bitter orange (Citrus aurantium) can increase your heart rate and blood pressure and is not recommended.   SHOULD I HAVE SURGERY TO LOSE WEIGHT? -- Weight loss surgery is recommended ONLY for people with one of the following:  Severe obesity (body mass index above 40) (calculator 1 and calculator 2) who have not responded to diet, exercise, or weight loss medicines   Body mass index between 35 and 40, along with a serious medical problem (including diabetes, severe joint pain, or sleep apnea) that would improve with weight loss  You should be sure that you understand the potential risks and benefits of weight loss surgery. You must be motivated and willing to make lifelong changes in how you eat to reach and maintain a healthier weight after surgery. You must also be realistic about weight loss after surgery (see 'Effectiveness of weight loss surgery' below). PREPARING FOR WEIGHT LOSS SURGERY -- Most people who have weight loss surgery will meet with several specialists before surgery is scheduled. This often includes a dietitian, mental health counselor, a doctor who specializes in care of obese people, and a surgeon who performs weight loss surgery (bariatric surgeon). You may need to work with these providers for several weeks or months before surgery. The nutritionist will explain what and how much you will be able to eat after surgery. You may also need to lose a small amount of weight before surgery. The mental health specialist will help you to cope with stress and other factors that can make it harder to lose weight or trigger you to eat   The medical doctor will determine whether you need other tests, counseling, or treatment before surgery. He or she might also help you begin a medical weight loss program so that you can lose some weight before surgery. The bariatric surgeon will meet with you to discuss the surgeries available to treat obesity. He or she will also make sure you are a good candidate for surgery. TYPES OF WEIGHT LOSS SURGERY -- There are several types of weight loss surgeries, the most common being lap banding, gastric bypass, and gastric sleeve. Lap banding -- Laparoscopic adjustable gastric banding (LAGB), or lap banding, is a surgery that uses an adjustable band around the opening to the stomach (figure 1). This reduces the amount of food that you can eat at one time. Lap banding is done through small incisions, with a laparoscope. The band can be adjusted after surgery, allowing you to eat more or less food. Adjustments to the size and tightness of the band are made by using a needle to add or remove fluid from a port (a small container under the skin that is connected to the band). Adding fluid to the band makes it tighter which restricts the amount of food you can eat and may help you to lose more weight. Lap banding is a popular choice because it is relatively simple to perform, can be adjusted or removed, and has a low risk of serious complications immediately after surgery. However, weight loss with the lap band depends on your ability to follow the program closely. You will need to prepare nutritious meals that [facility] with\" the band, not against it. For example, the lap band will not work well if you eat or drink a large amount of liquid calories (like ice cream). The band will not help you to feel full when you eat/drink liquid calories. Weight loss ranges from 45 to 75 percent after two years. As an example, a person who is 120 pounds overweight could expect to lose approximately 54 to 90 pounds in the two years after lap banding. Gastric bypass -- Charlette-en-Y gastric bypass, also called gastric bypass, helps you to lose weight by reducing the amount of food you can eat and reducing the number of calories and nutrients you absorb from the food you eat. To perform gastric bypass, a surgeon creates a small stomach pouch by dividing the stomach and attaching it to the small intestine. This helps you to lose weight in two ways: The smaller stomach can hold less food than before surgery. This causes you to feel full after eating a very small amount of food or liquid. Over time, the pouch might stretch, allowing you to eat more food. The body absorbs fewer calories, since food bypasses most of the stomach as well as the upper small intestine. This new arrangement seems to decrease your appetite and change how you break down foods by changing the release of various hormones.   Gastric bypass can be performed as open surgery (through an incision on the abdomen) or laparoscopically, which uses smaller incisions and smaller instruments. Both the laparoscopic and open techniques have risks and benefits. You and your surgeon should work together to decide which surgery, if any, is right for you. Gastric bypass has a high success rate, and people lose an average of 62 to 68 percent of their excess body weight in the first year. Weight loss typically levels off after one to two years, with an overall excess weight loss between 50 and 75 percent. For a person who is 120 pounds overweight, an average of 60 to 90 pounds of weight loss would be expected. Gastric sleeve -- Gastric sleeve, also known as sleeve gastrectomy, is a surgery that reduces the size of the stomach and makes it into a narrow tube (figure 3). The new stomach is much smaller and produces less of the hormone (ghrelin) that causes hunger, helping you feel satisfied with less food. Sleeve gastrectomy is safer than gastric bypass because the intestines are not rearranged, and there is less chance of malnutrition. It also appears to control hunger better than lap banding. It might be safer than the lap banding because no foreign materials are used. The gastric sleeve has a good success rate, and people lose an average of 33 percent of their excess body weight in the first year. For a person who is 120 pounds overweight, this would mean losing about 40 pounds in the first year. WEIGHT LOSS SURGERY COMPLICATIONS -- A variety of complications can occur with weight loss surgery. The risks of surgery depend upon which surgery you have and any medical problems you had before surgery.  Some of the more common early surgical complications (one to six weeks after surgery) include:  Bleeding   Infection   Blockage or tear in the bowels   Need for further surgery  Important medical complications after surgery can include blood clots in the legs or lungs, heart attack, pneumonia, and urinary tract infection. Complications are less likely when surgery is performed in centers that are experienced in weight loss surgery. In general, centers with experience in weight loss surgery have:  Board-certified doctors and surgeons   A team of support staff (dietitians, counselors, nurses)   Long-term follow-up after surgery   Hospital staff experienced with the care of weight loss patients. This includes nurses who are trained in the care of patients immediately after surgery and anesthesiologists who are experienced in caring for the morbidly obese. EFFECTIVENESS OF WEIGHT LOSS SURGERY -- The goal of weight loss surgery is to reduce the risk of illness or death associated with obesity. Weight loss surgery can also help you to feel and look better, reduce the amount of money you spend on medicines, and cut down on sick days. As an example, weight loss surgery can improve health problems related to obesity (diabetes, high blood pressure, high cholesterol, sleep apnea) to the point that you need less or no medicine. Finally, weight loss surgery might reduce your risk of developing heart disease, cancer, and certain infections. AFTER WEIGHT LOSS SURGERY -- You will need to stay in the hospital until your team feels that it is safe for you to leave (on average, one to three days). Do not drive if you are taking prescription pain medicine. Begin exercising as soon as possible once you have healed; most weight loss centers will design an exercise program for you. Once you are home, it is important to eat and drink exactly what your doctor and dietitian recommend. You will see your doctor, nurse, and dietitian on a regular basis after surgery to monitor your health, diet, and weight loss.    You will be able to slowly increase how much you eat over time, although it will always be important to:  Eat small, frequent meals and not skip meals   Chew your food slowly and completely   Avoid eating while \"distracted\" (such as eating while watching TV)   Stop eating when you feel full   Drink liquids at least 30 minutes before or after eating   Avoid foods high in fat or sugar   Take vitamin supplements, as recommended  It can take several months to learn to listen to your body so that you know when you are hungry and when you are full. You may dislike foods you previously loved, and you may begin to prefer new foods. This can be a frustrating process for some people, so talk to your dietitian if you are having trouble. It usually takes between one and two years to lose weight after surgery. After reaching their goal weight, some people have plastic surgery (called \"body contouring\") to remove excess skin from the body, particularly in the abdominal area. Before you decide to have weight loss surgery, you must commit to staying healthy for life. This includes following up with your healthcare team, exercising most days of the week, and eating a sensible diet every day. It can be difficult to develop new eating and exercise habits after weight loss surgery, and you will have to work hard to stick to your goals. Recovering from surgery and losing weight can be stressful and emotional, and it is important to have the support of family and friends. Working with a , therapist, or support group can help you through the ups and downs. WHERE TO GET MORE INFORMATION -- Your healthcare provider is the best source of information for questions and concerns related to your medical problem. This article will be updated as needed every four months on our Web site ([email or web address])  ·     High-Fiber Diet     What Is Fiber? Dietary fiber is a form of carbohydrate found in plants that cannot be digested by humans. All plants contain fiber, including fruits, vegetables, grains, and legumes. Fiber is often classified into two categories: soluble and insoluble.    Soluble fiber draws water into the bowel and can help slow digestion. Examples of foods that are high in soluble fiber include oatmeal, oat bran, barley, legumes (eg, beans and peas), apples, and strawberries. Insoluble fiber speeds digestion and can add bulk to the stool. Examples of foods that are high in insoluble fiber include whole-wheat products, wheat bran, cauliflower, green beans, and potatoes. Why Follow a High-Fiber Diet? A high-fiber diet is often recommended to prevent and treat constipation , hemorrhoids , diverticulitis , and irritable bowel syndrome . Eating a high-fiber diet can also help improve your cholesterol levels, lower your risk of coronary heart disease , reduce your risk of type 2 diabetes , and lower your weight. For people with type 1 or 2 diabetes, a high-fiber diet can also help stabilize blood sugar levels. How Much Fiber Should I Eat? A high-fiber diet should contain  20-35 grams  of fiber a day. This is actually the amount recommended for the general adult population; however, most Americans eat only 15 grams of fiber per day. Digestion of Fiber   Eating a higher fiber diet than usual can take some getting used to by your body's digestive system. To avoid the side effects of sudden increases in dietary fiber (eg, gas, cramping, bloating, and diarrhea), increase fiber gradually and be sure to drink plenty of fluids every day. Tips for Increasing Fiber Intake   Whenever possible, choose whole grains over refined grains (eg, brown rice instead of white rice, whole-wheat bread instead of white bread). Include a variety of grains in your diet, such as wheat, rye, barley, oats, quinoa, and bulgur. Eat more vegetarian-based meals. Here are some ideas: black bean burgers, eggplant lasagna, and veggie tofu stir-brown. Choose high-fiber snacks, such as fruits, popcorn, whole-grain crackers, and nuts. Make whole-grain cereal or whole-grain toast part of your daily breakfast regime.     When eating out, peas, bran, or oatmeal. If you are following a low-fat diet, use nuts and seeds only in moderation. Fats and Oils   All in moderation   Fats and oils do not provide fiber   Snacks, Sweets, and Condiments   Fruit Nuts Popcorn, whole-wheat pretzels, or trail mix made with dried fruits, nuts, and seeds Cakes, breads, and cookies made with oatmeal or whole-wheat flour   Most snack foods do not provide much fiber. Choose snacks with at least 2 grams of fiber per serving. Last Reviewed: March 2011 Arabella Rivers MS, MPH, RD   Updated: 3/29/2011   ·     Keep Your Memory Vickii De Los Santos       Many factors can affect your ability to remembera hectic lifestyle, aging, stress, chronic disease, and certain medicines. But, there are steps you can take to sharpen your mind and help preserve your memory. Challenge Your Brain   Regularly challenging your mind may help keeps it in top shape. Good mental exercises include:   Crossword puzzlesUse a dictionary if you need it; you will learn more that way. Brainteasers Try some! Crafts, such as wood working and sewing   Hobbies, such as gardening and building model airplanes   SocializingVisit old friends or join groups to meet new ones. Reading   Learning a new language   Taking a class, whether it be art history or can chi   TravelingExperience the food, history, and culture of your destination   Learning to use a computer   Going to museums, the theater, or thought-provoking movies   Changing things in your daily life, such as reversing your pattern in the grocery store or brushing your teeth using your nondominant hand   Use Memory Aids   There is no need to remember every detail on your own. These memory aids can help:   Calendars and day planners   Electronic organizers to store all sorts of helpful informationThese devices can \"beep\" to remind you of appointments.    A book of days to record birthdays, anniversaries, and other occasions that occur on the same date every year Detailed \"to-do\" lists and strategically placed sticky notes   Quick \"study\" sessionsBefore a gathering, review who will be there so their names will be fresh in your mind. Establish routinesFor example, keep your keys, wallet, and umbrella in the same place all the time or take medicine with your 8:00 AM glass of juice   Live a Healthy Life   Many actions that will keep your body strong will do the same for your mind. For example:   Talk to Your Doctor About Herbs and Supplements    Malnutrition and vitamin deficiencies can impair your mental function. For example, vitamin B12 deficiency can cause a range of symptoms, including confusion. But, what if your nutritional needs are being met? Can herbs and supplements still offer a benefit? Researchers have investigated a range of natural remedies, such as ginkgo , ginseng , and the supplement phosphatidylserine (PS). So far, though, the evidence is inconsistent as to whether these products can improve memory or thinking. If you are interested in taking herbs and supplements, talk to your doctor first because they may interact with other medicines that you are taking. Exercise Regularly    Among the many benefits of regular exercise are increased blood flow to the brain and decreased risk of certain diseases that can interfere with memory function. One study found that even moderate exercise has a beneficial effect. Examples of \"moderate\" exercise include:   Playing 18 holes of golf once a week, without a cart   Playing tennis twice a week   Walking one mile per day   Manage Stress    It can be tough to remember what is important when your mind is cluttered. Make time for relaxation. Choose activities that calm you down, and make it routine. Manage Chronic Conditions    Side effects of high blood pressure , diabetes, and heart disease can interfere with mental function. Many of the lifestyle steps discussed here can help manage these conditions.  Strive to eat a healthy diet, exercise regularly, get stress under control, and follow your doctor's advice for your condition. Minimize Medications    Talk to your doctor about the medicines that you take. Some may be unnecessary. Also, healthy lifestyle habits may lower the need for certain drugs. Last Reviewed: April 2010 Efren Whalen MD   Updated: 4/13/2010   ·     [address] [address] a [facility]       As we get older, changes in balance, gait, strength, vision, hearing, and cognition make even the most youthful senior more prone to accidents. Falls are one of the leading health risks for older people. This increased risk of falling is related to:   Aging process (eg, decreased muscle strength, slowed reflexes)   Higher incidence of chronic health problems (eg, arthritis, diabetes) that may limit mobility, agility or sensory awareness   Side effects of medicine (eg, dizziness, blurred vision)especially medicines like prescription pain medicines and drugs used to treat mental health conditions   Depending on the brittleness of your bones, the consequences of a fall can be serious and long lasting. Home Life   Research by the Association of Aging [facility]) shows that some home accidents among older adults can be prevented by making simple lifestyle changes and basic modifications and repairs to the home environment. Here are some lifestyle changes that experts recommend:   Have your hearing and vision checked regularly. Be sure to wear prescription glasses that are right for you. Speak to your doctor or pharmacist about the possible side effects of your medicines. A number of medicines can cause dizziness. If you have problems with sleep, talk to your doctor. Limit your intake of alcohol. If necessary, use a cane or walker to help maintain your balance. Wear supportive, rubber-soled shoes, even at home.  If you live in a region that gets wintry weather, you may want to put special cleats on your shoes to prevent you from slipping on the snow and ice. Exercise regularly to help maintain muscle tone, agility, and balance. Always hold the banister when going up or down stairs. Also, use  bars when getting in or out of the bath or shower, or using the toilet. To avoid dizziness, get up slowly from a lying down position. Sit up first, dangling your legs for a minute or two before rising to a standing position. Overall Home Safety Check   According to the Consumer Product Safety Commision's \"Older Consumer Home Safety Checklist,\" it is important to check for potential hazards in each room. And remember, proper lighting is an essential factor in home safety. If you cannot see clearly, you are more likely to fall. Important questions to ask yourself include:   Are lamp, electric, extension, and telephone cords placed out of the flow of traffic and maintained in good condition? Have frayed cords been replaced? Are all small rugs and runners slip resistant? If not, you can secure them to the floor with a special double-sided carpet tape. Are smoke detectors properly locatedone on every floor of your home and one outside of every sleeping area? Are they in good working order? Are batteries replaced at least once a year? Do you have a well-maintained carbon monoxide detector outside every sleeping are in your home? Does your furniture layout leave plenty of space to maneuver between and around chairs, tables, beds, and sofas? Are hallways, stairs and passages between rooms well lit? Can you reach a lamp without getting out of bed? Are floor surfaces well maintained? Shag rugs, high-pile carpeting, tile floors, and polished wood floors can be particularly slippery. Stairs should always have handrails and be carpeted or fitted with a non-skid tread. Is your telephone easily reachable. Is the cord safely tucked away?    Room by Room   According to the Association of Aging, bathrooms and kriss are the two most potentially hazardous rooms in your home. In the Kitchen    Be sure your stove is in proper working order and always make sure burners and the oven are off before you go out or go to sleep. Keep pots on the back burners, turn handles away from the front of the stove, and keep stove clean and free of grease build-up. Kitchen ventilation systems and range exhausts should be working properly. Keep flammable objects such as towels and pot holders away from the cooking area except when in use. Make sure kitchen curtains are tied back. Move cords and appliances away from the sink and hot surfaces. If extension cords are needed, install wiring guides so they do not hang over the sink, range, or working areas. Look for coffee pots, kettles and toaster ovens with automatic shut-offs. Keep a mop handy in the kitchen so you can wipe up spills instantly. You should also have a small fire extinguisher. Arrange your kitchen with frequently used items on lower shelves to avoid the need to stand on a stepstool to reach them. Make sure countertops are well-lit to avoid injuries while cutting and preparing food. In the Bathroom    Use a non-slip mat or decals in the tub and shower, since wet, soapy tile or porcelain surfaces are extremely slippery. Make sure bathroom rugs are non-skid or tape them firmly to the floor. Bathtubs should have at least one, preferably two, grab bars, firmly attached to structural supports in the wall. (Do not use built-in soap holders or glass shower doors as grab bars.)    Tub seats fitted with non-slip material on the legs allow you to wash sitting down. For people with limited mobility, bathtub transfer benches allow you to slide safely into the tub. Raised toilet seats and toilet safety rails are helpful for those with knee or hip problems. In the [facility]    Make sure you use a nightlight and that the area around your bed is clear of potential obstacles.     Be careful with electric blankets and never go to sleep with a heating pad, which can cause serious burns even if on a low setting. Use fire-resistant mattress covers and pillows, and NEVER smoke in bed. Keep a phone next to the bed that is programmed to dial 911 at the push of a button. If you have a chronic condition, you may want to sign on with an automatic call-in service. Typically the system includes a small pendant that connects directly to an emergency medical voice-response system. You should also make arrangements to stay in contact with someonefriend, neighbor, family memberon a regular schedule. Fire Prevention   According to the [employer]. (Smoke Alarms for Every) [address] [place] [address], senior citizens are one of the two highest risk groups for death and serious injuries due to residential fires. When cooking, wear short-sleeved items, never a bulky long-sleeved robe. The [facility]'s Safety Checklist for Older Consumers emphasizes the importance of checking basements, garages, workshops and storage areas for fire hazards, such as volatile liquids, piles of old rags or clothing and overloaded circuits. Never smoke in bed or when lying down on a couch or recliner chair. Small portable electric or kerosene heaters are responsible for many home fires and should be used cautiously if at all. If you do use one, be sure to keep them away from flammable materials. In case of fire, make sure you have a pre-established emergency exit plan. Have a professional check your fireplace and other fuel-burning appliances yearly. Helping Hands   Baby boomers entering the baltazar years will continue to see the development of new products to help older adults live safely and independently in spite of age-related changes.   Making Life More Livable  , by Win Sinha, lists over 1,000 products for \"living well in the mature years,\" such as bathing and mobility aids, household security devices, ergonomically designed knives and peelers, and faucet valves and knobs for temperature control. Medical supply stores and organizations are good sources of information about products that improve your quality of life and insure your safety.      Last Reviewed: November 2009 Leeann Juarez MD   Updated: 3/7/2011     ·

## 2020-10-29 NOTE — PROGRESS NOTES
Medicare Annual Wellness Visit  Are Name: Jay Monroe Date: 10/29/2020   MRN: <[id number]> Sex: Female   Age: 68 y.o. Ethnicity: Non-/Non    : 1940 Race: [id number] is here for [employer]    Screenings for behavioral, psychosocial and functional/safety risks, and cognitive dysfunction are all negative except as indicated below. These results, as well as other patient data from the [address] form, are documented in Flowsheets linked to this Encounter. Allergies   Allergen Reactions    Crestor [Rosuvastatin]      Muscle aches    Levofloxacin Other (See Comments)     Achiness    Lipitor      Muscle aches    Rocephin In Dextrose [Ceftriaxone Sodium In Dextrose]      Rash,itching    Zocor [Simvastatin]      Muscle aches    Codeine Itching and Nausea And Vomiting    Pravastatin Nausea And Vomiting       Prior to Visit Medications    Medication Sig Taking?  Authorizing Provider   albuterol sulfate HFA (VENTOLIN HFA) 108 (90 Base) MCG/ACT inhaler Inhale 2 puffs into the lungs every 6 hours as needed for Wheezing or Shortness of Breath Yes Ambrose Aguilar MD   polyethylene glycol (GLYCOLAX) 17 g packet Take 17 g by mouth daily Yes TURNER Weaver CNP   traZODone (DESYREL) 50 MG tablet Take 1 tablet by mouth nightly Yes TURNER Weaver CNP   warfarin (COUMADIN) 5 MG tablet TAKE ONE TABLET BY MOUTH DAILY Yes TURNER Weaver CNP   warfarin (COUMADIN) 5 MG tablet Take 5 mg by mouth Except for Wednesday 7.5mg Yes Historical Provider, MD   vitamin B-6 (PYRIDOXINE) 50 MG tablet Take by mouth daily Yes Historical Provider, MD   spironolactone (ALDACTONE) 25 MG tablet Take 1 tablet by mouth daily Yes TURNER Dutton CNP   torsemide (DEMADEX) 20 MG tablet Take 1 tablet by mouth daily Yes Tammi Hunt MD   albuterol (PROVENTIL) (2.5 MG/3ML) 0.083% nebulizer solution Take 3 mLs by nebulization every 6 hours as needed for Wheezing or Shortness of Breath DX COPD J44.9 Yes Jan Monge MD   Acetaminophen (TYLENOL ARTHRITIS PAIN PO) Take by mouth Yes Historical Provider, MD   melatonin 3 MG TABS tablet Take 1 tablet by mouth nightly as needed (insomnia) Yes Esteban Brito MD   OXYGEN 4.5 L Yes Esteban Brito MD   Cholecalciferol (VITAMIN D-3 PO) Take 2,000 Units by mouth daily  Yes Historical Provider, MD       Past Medical History:   Diagnosis Date    Acute exacerbation of chronic obstructive pulmonary disease (COPD) ([address]     Acute on chronic respiratory failure (HCC)     Anticoagulant long-term use     Atrial fibrillation ([address]     Atrial fibrillation with rapid ventricular response ([address] 6/23/2015    B-cell lymphoma ([address]     CAP (community acquired pneumonia) 1/29/2015    Carotid artery stenosis     CHF (congestive heart failure) (HCC)     Chronic back pain     Congenital heart disease     Depression     Diverticulitis     Fistula     anal-vaginal fistula    HCAP (healthcare-associated pneumonia)     Hx of blood clots     Hyperlipidemia     Hypertension     IFG (impaired fasting glucose) 9/13/2018    Non-Hodgkins lymphoma ([address]     Obesity     Panic disorder     Peripheral vascular disease ([address]     Pulmonary embolism ([address]     6/15    Rectal vaginal fistula     Restless legs syndrome     [facility] spotted fever 2013    Sciatic nerve pain     Sepsis ([address] 6/22/2015    Sleep apnea     Tobacco abuse 6/22/2015    Vitamin D deficiency        Past Surgical History:   Procedure Laterality Date    APPENDECTOMY  1963    BRONCHOSCOPY N/A 8/12/2019    EBUS WF W/ANES.  performed by Khadijah Sewell MD at [facility]  8/12/2019    BRONCHOSCOPY ALVEOLAR LAVAGE performed by Khadijah Sewell MD at [facility]  8/12/2019    BRONCHOSCOPY BRUSHINGS performed by Khadijah Sewell MD at [facility]  8/12/2019 BRONCHOSCOPY/TRANSBRONCHIAL LUNG BIOPSY performed by Damián Whalen MD at [address] [place] [address]  8/12/2019    BRONCHOSCOPY/TRANSBRONCHIAL NEEDLE BIOPSY performed by Damián Whalen MD at [address] [place] [address]  8/12/2019    BRONCHOSCOPY/TRANSBRONCHIAL NEEDLE BIOPSY ADDL LOBE performed by Damián Whalen MD at [address] [place] [address]  2003    COLONOSCOPY N/A 9/25/2019    COLONOSCOPY POLYPECTOMY SNARE/COLD BIOPSY showed diverticulilosis performed by Ana Ambrose MD at [facility]      umbilical--Dr Hou    [address] [facility]    PORT SURGERY N/A 1/17/2020    PORT INSERTION performed by Kobi Nunez MD at [facility] TUNNELED VENOUS PORT PLACEMENT Left 01/17/2020    Port Insertion ENRIQUETA Chest w. Dr Chris Schwartz 1/17/20 Vaccess CT Injectable [id number] lot [id number] 3/31/21       Family History   Problem Relation Age of Onset    Cancer Mother 43        lung cancer    Diabetes Sister     Cancer Daughter 43        lung cancer    Asthma Son     Hypertension Son     Asthma Grandchild     Hypertension Son     Emphysema Neg Hx     Heart Failure Neg Hx        CareTeam (Including outside providers/suppliers regularly involved in providing care):   Patient Care Team:  TURNER Baer CNP as PCP - General  TURNER Baer CNP as PCP - [address] [place] [address]  [facility] Provider  Evette Mendiola MD as Consulting Physician (Pulmonology)  Milan Yan MD (Orthopedic Surgery)  Ronel Rey as [place] [facility] [place] [address]  Tania Park MD as Consulting Physician (Obstetrics & Gynecology)  Carter Jose DO as Consulting Physician (Obstetrics & Gynecology)  Jt Martino RN as [address] [place] [address]  Jamar Franco RN as Care Transitions Nurse  Mamie Go MD as Consulting Physician (Cardiology)    Wt Readings from Last 3 Encounters:   10/14/20 270 lb 6.4 oz (122.7 kg)   09/21/20 263 lb (119.3 kg)   06/15/20 256 lb 14.4 oz (116.5 kg)      Patient-Reported Vitals 10/28/2020   Patient-Reported Weight 263 lb   Patient-Reported Height 5ft 2in   Patient-Reported Systolic -   Patient-Reported Diastolic -   Patient-Reported Pulse -      There is no height or weight on file to calculate BMI. Based upon direct observation of the patient, evaluation of cognition reveals recent and remote memory intact. Physical exam  Not applicable due to telephone    Patient's complete Health Risk Assessment and screening values have been reviewed and are found in Flowsheets. The following problems were reviewed today and where indicated follow up appointments were made and/or referrals ordered. Positive Risk Factor Screenings with Interventions:     General Health and ACP:  General  In general, how would you say your health is?: Fair  In the past 7 days, have you experienced any of the following?  New or Increased Pain, New or Increased Fatigue, Loneliness, Social Isolation, Stress or Anger?: (!) New or Increased Pain  Do you get the social and emotional support that you need?: Yes  Do you have a Living Will?: Yes  Advance Directives     Power of  Living Will ACP-Advance Directive ACP-Power of     Not on File [place] on 01/06/17 Filed [address] [place] Risk Interventions:  · Pain issues: patient advised to follow-up in this office for further evaluation and treatment within 3 month(s), Seeing Dr Marita Mcallister 11/5/20 and currently doing PT at home    Health Habits/Nutrition:  Health Habits/Nutrition  Do you exercise for at least 20 minutes 2-3 times per week?: Yes  Have you lost any weight without trying in the past 3 months?: (!) Yes  Do you eat fewer than 2 meals per day?: No  Have you seen a dentist within the past year?: (!) No     Health Habits/Nutrition Interventions:  · Nutritional issues:  educational materials for healthy, well-balanced diet provided  · Dental exam overdue:  patient encouraged to make appointment with his/her dentist    Hearing/Vision:  No exam data present  Hearing/Vision  Do you or your family notice any trouble with your hearing?: (!) Yes  Do you have difficulty driving, watching TV, or doing any of your daily activities because of your eyesight?: No  Have you had an eye exam within the past year?: (!) No  Hearing/Vision Interventions:  · Hearing concerns:  patient declines any further evaluation/treatment for hearing issues  · Vision concerns:  patient encouraged to make appointment with his/her eye specialist    Safety:  Safety  Do you have working smoke detectors?: Yes  Have all throw rugs been removed or fastened?: Yes  Do you have non-slip mats or surfaces in all bathtubs/showers?: Yes  Do all of your stairways have a railing or banister?: Yes  Are your doorways, halls and stairs free of clutter?: Yes  Do you always fasten your seatbelt when you are in a car?: (!) No  Safety Interventions:  · Home safety tips provided    Personalized Preventive Plan   Current Health Maintenance Status  Immunization History   Administered Date(s) Administered    Influenza Virus Vaccine 01/02/2013, 10/01/2013, 09/15/2014, 10/27/2016, 09/22/2017, 11/02/2018, 09/29/2020    Influenza Whole 10/01/2013, 09/15/2014, 09/15/2015    Influenza, Quadv, IM, (6 mo and older Fluzone, Flulaval, Fluarix and 3 yrs and older Afluria) 10/27/2016, 09/22/2017, 11/02/2018    Influenza, Gelene [place], IM, PF (6 mo and older Fluzone, Flulaval, Fluarix, and 3 yrs and older Afluria) 09/04/2015, 09/21/2020    Influenza, Gelene [place], Recombinant, IM PF (Flublok 18 yrs and older) 11/01/2019    Pneumococcal Conjugate 13-valent ([id number]) 09/25/2017    Pneumococcal Conjugate 7-valent (Prevnar7) 09/01/2009    Pneumococcal Polysaccharide ([id number]) 01/29/2015    Zoster Live (Zostavax) 05/30/2013        Health Maintenance   Topic Date Due    DTaP/Tdap/Td vaccine (1 - Tdap) 10/26/1959    Shingles Vaccine (2 of 3) 07/25/2013    Annual Wellness Visit (AWV)  08/13/2019    Potassium monitoring  10/14/2021    Creatinine monitoring  10/14/2021    Flu vaccine  Completed    Pneumococcal 65+ yrs at Risk Vaccine  Completed    DEXA (modify frequency per FRAX score)  Addressed    Hepatitis A vaccine  Aged Out    Hepatitis B vaccine  Aged Out    Hib vaccine  Aged Out    Meningococcal (ACWY) vaccine  Aged Out     Recommendations for [facility] Due: see orders and patient instructions/AVS.  . Recommended screening schedule for the next 5-10 years is provided to the patient in written form: see Patient Stefanie Wilkes was seen today for medicare awv. Diagnoses and all orders for this visit:    Routine general medical examination at a health care facility    BMI 45.0-49.9, adult ([facility]  -     MT Behavior  obesity 15m []   Obesity Counseling: Assessed behavioral health risks and factors affecting choice of behavior. Suggested weight control approaches, including dietary changes behavioral modification and follow up plan. Provided educational and support documentation. Time spent (minutes): 15 minutes    Screening for cardiovascular condition  The 10-year ASCVD risk score (Nargis Mejias, et al., 2013) is: 27.7%    Values used to calculate the score:      Age: 68 years      Sex: Female      Is Non- : No      Diabetic: No      Tobacco smoker: No      Systolic Blood Pressure: 854 mmHg      Is BP treated: Yes      HDL Cholesterol: 53 mg/dL      Total Cholesterol: 231 mg/dL  Cardiovascular Disease Risk Counseling: Assessed the patient's risk to develop cardiovascular disease and reviewed main risk factors.    Reviewed steps to reduce disease risk including:   · Quitting tobacco use, reducing amount smoked, or not starting the habit  · Making healthy food choices  · Being physically active and gradualy increasing activity levels   · Reduce weight and determine a healthy BMI goal  · Monitor blood pressure and treat if higher than 140/90 mmHg  · Maintain blood total cholesterol levels under 5 mmol/l or 190 mg/dl  · Maintain LDL cholesterol levels under 3.0 mmol/l or 115 mg/dl   · Control blood glucose levels  · Consider statin such as lovastatin or livalo since she has not tried either    Provided a follow up plan. Time spent (minutes): 15 minutes      Need for prophylactic vaccination against diphtheria-tetanus-pertussis (DTP)  -     Tdap (ADACEL) 5-2-15.5 LF-MCG/0.5 injection; Inject 0.5 mLs into the muscle once for 1 dose    Will discuss shingles vaccine with oncologist and if okay to proceed she will call office for prescription    Patient to consider pain management after she sees ortho/Dr Clements Danger 11/2020      Demario Samano is a 68 y.o. female being evaluated by a Virtual Visit (phone) encounter to address concerns as mentioned above. A caregiver was present when appropriate. Due to this being a TeleHealth encounter (During SBEYS-13 public health emergency), evaluation of the following organ systems was limited: Vitals/Constitutional/EENT/Resp/CV/GI//MS/Neuro/Skin/Heme-Lymph-Imm. Pursuant to the emergency declaration under the [address] [place] [address] authority and the [employer] Act, this Virtual Visit was conducted with patient's (and/or legal guardian's) consent, to reduce the patient's risk of exposure to COVID-19 and provide necessary medical care. The patient (and/or legal guardian) has also been advised to contact this office for worsening conditions or problems, and seek emergency medical treatment and/or call 911 if deemed necessary. Patient identification was verified at the start of the visit: Yes    Services were provided through phone to substitute for in-person clinic visit. Patient and provider were located at their individual homes.     --Jaziel Nelson APRN - CNP on 10/29/2020 at 10:45 AM    An electronic signature was used to authenticate this note.

## 2020-10-30 ENCOUNTER — TELEPHONE (OUTPATIENT)
Dept: FAMILY MEDICINE CLINIC | Age: 80
End: 2020-10-30

## 2020-10-30 ENCOUNTER — ANTI-COAG VISIT (OUTPATIENT)
Dept: FAMILY MEDICINE CLINIC | Age: 80
End: 2020-10-30

## 2020-10-30 ENCOUNTER — TELEPHONE (OUTPATIENT)
Dept: ORTHOPEDIC SURGERY | Age: 80
End: 2020-10-30

## 2020-10-30 ENCOUNTER — CARE COORDINATION (OUTPATIENT)
Dept: CASE MANAGEMENT | Age: 80
End: 2020-10-30

## 2020-10-30 LAB
INR BLD: 2.6
INR BLD: 2.8

## 2020-10-30 NOTE — TELEPHONE ENCOUNTER
Pt called stating that PCP told her she'd be getting a \"packet\" in the mail and wanting to know if this was sent. Pt also requesting a copy of her shot records and list of surgeries. Pt has appt with Dr. Quin Allen on 11/11/2020 and wanting to make sure they have that documentation. Please advise.

## 2020-10-30 NOTE — CARE COORDINATION
[facility] 45 Transitions Follow Up Call    10/30/2020    Patient: Scar Taveras  Patient : 1940   MRN: [id number]  Reason for Admission: UTI, COPD, abd pain  Discharge Date: 10/14/20 RARS: Readmission Risk Score: 22         Spoke with: Scar Taveras (patient)    Continues with home care therapy. Plans to schedule with Dr Arya Ames at [place] in [place] because of knee pain/buckling that prevents her from participating fully in exercises. States her \"feet are tight\". Weighing daily - 263# this morning, 265# yesterday. Trying to lose weight, but warfarin diet prevents her from eating healthy like she would like to. She is knowledgeable of how food affects her INR. States she also needs to be on a better diet to lower her cholesterol but she also has h/o diverticulitis so she struggles. We discussed Metamucil, low fat daily, red meats. She drinks Vit D milk 2 glasses/day - doesn't like the lower fat options. She is agreeable to dietician referral to discuss further. She did get some education and printed info from the inpatient dietician (Dorinda Gutierrez RD per chart notes). Referral to care transition team dietician [facility] via chart routing at close of this note. Care Transitions Subsequent and Final Call    Schedule Follow Up Appointment with PCP:  Completed  Subsequent and Final Calls  Do you have any ongoing symptoms?:  No  Have your medications changed?:  No  Do you have any questions related to your medications?:  No  Do you currently have any active services?:  Yes  Are you currently active with any services?:  Home Health  Do you have any needs or concerns that I can assist you with?:  No  Identified Barriers:  None  Care Transitions Interventions     Other Services:  Completed   Other Interventions:             Follow Up  Future Appointments   Date Time Provider Allyssa Levine   2021 11:30 AM Ebony Mo MD [facility]       Rohini Collazo RN

## 2020-11-02 ENCOUNTER — CARE COORDINATION (OUTPATIENT)
Dept: CASE MANAGEMENT | Age: 80
End: 2020-11-02

## 2020-11-02 NOTE — CARE COORDINATION
Shortness of Breath DX COPD J44.9 360 mL 5    Acetaminophen (TYLENOL ARTHRITIS PAIN PO) Take by mouth      melatonin 3 MG TABS tablet Take 1 tablet by mouth nightly as needed (insomnia) 15 tablet 0    OXYGEN 4.5 L      Cholecalciferol (VITAMIN D-3 PO) Take 2,000 Units by mouth daily        No current facility-administered medications for this visit. Biochemical Data, Medical Tests and Procedures:    Lab Results   Component Value Date    LABA1C 6.3 06/15/2020     Lab Results   Component Value Date    .1 06/15/2020       Lab Results   Component Value Date    CHOL 231 (H) 06/15/2020    CHOL 228 (H) 04/11/2019    CHOL 171 10/27/2017     Lab Results   Component Value Date    TRIG 234 (H) 06/15/2020    TRIG 163 (H) 04/11/2019    TRIG 102 10/27/2017     Lab Results   Component Value Date    HDL 53 06/15/2020    HDL 57 04/11/2019    HDL 49 10/27/2017     Lab Results   Component Value Date    LDLCALC 131 (H) 06/15/2020    LDLCALC 138 (H) 04/11/2019    LDLCALC 102 (H) 10/27/2017     Lab Results   Component Value Date    LABVLDL 47 06/15/2020    LABVLDL 33 04/11/2019    LABVLDL 20 10/27/2017     No results found for: [facility]    Lab Results   Component Value Date    WBC 4.7 10/14/2020    HGB 10.8 (L) 10/14/2020    HCT 33.5 (L) 10/14/2020    MCV 85.4 10/14/2020     10/14/2020       Lab Results   Component Value Date    CREATININE 0.6 10/14/2020    BUN 9 10/14/2020     10/14/2020    K 4.3 10/14/2020     10/14/2020    CO2 32 10/14/2020         Anthropometric Measurements:    Height: 62 inches (157.5 cm)  Weight: 270 lb (122.7 kg)  BMI: 49.46 (obesity class III)  IBW: 110 lb (50 kg) +-10%  %IBW: 245.5%   AdBW: 174 lb (79.09 kg)    Physical Exam Findings:  Deferred    Nutrition Interview: RD called pt, explained reason for call and role in care. Pt states appetite \"varies,\" typically eats 2 meals/day. See food recall below.  Pt explains she saw a Registered Dietitian in the hospital and was provided with handouts. Pt explains she wants to lose weight but is on Warfarin and a diuretic and also has a history of diverticulosis. Pt asked specifically about what foods she should be eating to help prevent diverticulitis. RD explained the importance of including fiber in our diet. Explained a high fiber diet helps soften and give bulk to the stool, which allows it to pass quickly and easily. Discussed how fiber will help prevent constipation and decrease pressure in the colon, which can help prevent flare ups of diverticulitis. Pt states she drinks Mirlax daily in the morning- RD acknowledged this, encouraged patient to continue drinking Mirlax but to also focus on adding sources of fiber into meals. Discussed high fiber foods such as beans, whole grains (whole wheat bread, oatmeal, brown rice, whole wheat pasta), fruits (apples, bananas, pears) and vegetables (broccoli, carrots, etc.). Explained the importance of slowly increasing fiber intake. Discussed importance of adding fiber gradually to help avoid bloating and abdominal discomfort. Discussed the target daily fiber intake is 25-30 grams. RD explained the importance of drinking fluids as well when increasing fiber intake- fluid will help soften stool. Pt states she is on a 64 ounce fluid restriction. Pt explains she likes vegetables but needs to be consistent in her intake due to warfarin. RD reiterated the importance of consistency with vitamin K intake. Pt asked specifically about the type of bread she eats- per pt she buys Brownberry Honey Oat bread and it has seeds- pt asked specifically if this is okay with diverticulosis, RD discussed it is okay to eat popcorn, nuts and seeds as tolerated when the colon is not inflamed. RD discussed the importance of following a low fiber diet during flare ups of diverticulitis.  RD discussed keeping a food journal with patient to help keep track of which foods patient can tolerate versus which foods patient cannot tolerate. Pt verbalizes understanding. RD explained the importance of setting healthy, realistic goals. Discussed you are more likely to succeed when you set realistic goals for yourself- make small changes step by step and you will see a big impact with time. Explained the importance of eating at least 3 meals/day and making these meals balanced with a variety of food. Discussed the components of a balanced meal using the MyPlate [id number]-2/4 plate fruits and/or vegetables, 1/4 plate protein and 1/4 plate starchy carbohydrates with 8 oz glass of low fat milk if desired. Provided an example of a balanced meal: grilled chicken with broccoli and a serving of brown rice. Discussed watching portion sizes to help manage calorie intake. RD encouraged patient to focus on eating 3 balanced meals a day instead of 2 meals/day. Explained the importance of meeting estimated nutrition needs. Explained if we are not eating enough throughout the day, our body will go into starvation mode and hold onto fat cells. RD reiterated importance of choosing fruits, vegetables, whole grains, lean protein sources and low fat dairy products. RD acknowledged patient's readiness to change and encouraged pt to focus on making small changes one at a time. RD offered to mail educational handouts to pt to reinforce concepts discussed during phone conversation, pt accepted. RD verified address. 24-Hour Diet Recall  Breakfast  Consumed: None- pt states she drank orange juice and drank Mirlax    Lunch  Consumed: Chicken and green beans    Dinner  Consumed: Pt states she will eat whatever is easiest to fix. Examples provided per pt include macaroni and cheese, pasta and peanut butter banana sandwich. Pt explains she will sometimes have a meal that includes a meat, potato and vegetable. Beverages: Pt states she is on a fluid restriction of 64 ounces.      Nutrition Diagnosis:  #1 Problem Overweight/Obesity       Etiology related to excessive energy intake       Signs/Symptoms as evidenced by BMI 49.46 (obesity class III)    #2 Problem Altered GI function       Etiology related to diverticulosis        Signs/Symptoms as evidenced by recent hospital admission 10/10-10/14 and Mirlax     Nutrition Intervention:      Estimated Needs  cardiac diet and low fat, low cholesterol diet providing 1500 kcals to promote wt loss ([address] based on AdBW for obesity class III). Estimated daily CHO Needs: 206 g (based on 45-65% total calorie intake)  Estimated daily Protein Needs: 87-95 g (based on 1.1-1.2 g/kg due to CHF and COPD based on AdBW for obesity class III)  Estimated daily Fluid Needs: 64 oz. #1 Nutrition Information: Provided patient with weight loss tips, eating right with MyPlate, diverticulitis, high fiber/low fiber, vitamin K and mediciations handouts. For reinforcement of concepts discussed during nutrition interview. #2 Nutrition Counseling: Used open-ended questions to assess patients perceived susceptibility and severity of disease state. Discussed potential impact of health threat on patient's lifestyle. Used open-ended questions to assess patient's perception regarding benefits of and barriers to implementation of nutrition therapy. #3 Nutrition Education: Clearly defined the benefits of nutrition therapy. Summarized and affirmed positive aspects of current nutrition patterns. Provided education regarding value of adherence to cardiac diet and low fat, low cholesterol diet. Discussed ways to establish applying concepts of alternatives and choices regarding implementation of diet. Explored ideas for small, incremental goals to initiate behavior change. Monitoring and Evaluation:    Indicator/Goal Criteria   #1 Eat balanced meals consistently throughout the day.   #1 Focus on eating 3 meals/day and making these meals more balanced using the MyPlate [id number]-8/0 plate fruits and/or vegetables, 1/4 plate protein and 1/4 plate starchy carbohydrates with 8 oz glass of low fat milk if desired. #2 Slowly increase fiber intake. #2 Increase fiber intake by incorporating more fruits, vegetables and whole grains into meals. Be consistent in vegetable intake. #3 Keep a food journal to help track foods. #3 Keep a food journal to help track which foods you can tolerate verus which foods you cannot tolerate. Follow Up: RD will call pt in 2 weeks to follow up and make sure pt received handouts in mail. RD will answer any nutrition related questions at this time.      [address] [facility], [address] [place] [address]

## 2020-11-05 ENCOUNTER — CARE COORDINATION (OUTPATIENT)
Dept: CASE MANAGEMENT | Age: 80
End: 2020-11-05

## 2020-11-05 NOTE — CARE COORDINATION
[facility] 45 Transitions Follow Up Call    2020    Patient: Lazarus Clinton  Patient : 1940   MRN: [id number]  Reason for Admission: diverticulitis  Discharge Date: 10/14/20 RARS: Readmission Risk Score: 22       Unable to reach or leave message at this time.      Follow Up  Future Appointments   Date Time Provider Allyssa Levine   2021 11:30 AM Paolo Hines MD [facility]       Angel Riojas RN

## 2020-11-09 ENCOUNTER — ANTI-COAG VISIT (OUTPATIENT)
Dept: FAMILY MEDICINE CLINIC | Age: 80
End: 2020-11-09

## 2020-11-09 LAB — INR BLD: 3.5

## 2020-11-12 ENCOUNTER — ANTI-COAG VISIT (OUTPATIENT)
Dept: FAMILY MEDICINE CLINIC | Age: 80
End: 2020-11-12

## 2020-11-12 LAB — INR BLD: 2.7

## 2020-11-13 ENCOUNTER — CARE COORDINATION (OUTPATIENT)
Dept: CASE MANAGEMENT | Age: 80
End: 2020-11-13

## 2020-11-16 ENCOUNTER — CARE COORDINATION (OUTPATIENT)
Dept: CARE COORDINATION | Age: 80
End: 2020-11-16

## 2020-11-16 NOTE — CARE COORDINATION
[place]  11/16/2020    Registered Dietitian Progress Note for Care Coordination    Assessment: Crystal Ferrell is a 68 y.o. female. RD referred for weight loss, diverticulitis, high cholesterol and warfarin. RD spoke with patient for initial nutrition assessment on 11/2. RD called to follow up with pt today 11/16. Pt states she received the educational handouts in the mail- found the information helpful and no questions. RD discussed previous goals with pt. Patient states things are going \"good\" and she is working on putting it all together. RD acknowledged this and explained the importance of setting healthy, realistic goals for self. Dicussed the importance of small changes and the big impact they will have with time. Reiterated the importance of eating balanced meals consistently throughout the day to help meet estimated nutrition needs. Reviewed the importance of slowly increasing fiber intake- including more fruits, vegetables and whole grains to meals. Pt verbalizes understanding. Patient has no nutrition related questions at this time. Barriers to meeting goals: overwhelmed by complexity of regimen and stress    Action:  RD reiterated the importance of having a variety of foods- choosing fruits, vegetables, whole grains, lean protein sources and low fat dairy products. RD discussed importance of meeting estimated nutrition needs by eating balanced meals consistently. Discussed the importance of fiber. Pt will continue to make small changes- focus on eating 3 balanced meals/day and increasing fiber intake. See assessment above. Nutrition Monitoring and Evaluation  Indicator/Goal Criteria Progress   #1 Eat balanced meals consistently throughout the day. #1 Focus on eating 3 meals/day and making these meals more balanced using the MyPlate UYHUHLUEF-0/0 plate fruits and/or vegetables, 1/4 plate protein and 1/4 plate starchy carbohydrates with 8 oz glass of low fat milk if desired.  #1 Pt is working on eating 3 balanced meals/day. #2  Slowly increase fiber intake. #2  Increase fiber intake by incorporating more fruits, vegetables and whole grains into meals. Be consistent in vegetable intake. #2 Pt is increasing her fiber intake. Pt drinks Mirlax daily in the morning. #3  Keep a food journal to help track foods. #3 Keep a food journal to help track which foods you can tolerate verus which foods you cannot tolerate. #3 Pt is going to start a food journal to help track foods and help with consistency. Plan of Care:  RD encouraged pt to keep working toward goals set. RD will follow up with pt to discuss any questions pt has and check the progress toward goals. Follow Up:    RD will call pt in 2 weeks to follow up and answer any nutrition related questions at that time.      [address] [facility], [address] [place] [address]

## 2020-11-19 ENCOUNTER — ANTI-COAG VISIT (OUTPATIENT)
Dept: FAMILY MEDICINE CLINIC | Age: 80
End: 2020-11-19

## 2020-11-19 LAB — INR BLD: 3.7

## 2020-11-20 ENCOUNTER — ANTI-COAG VISIT (OUTPATIENT)
Dept: FAMILY MEDICINE CLINIC | Age: 80
End: 2020-11-20

## 2020-11-20 LAB — INR BLD: 3.1

## 2020-11-23 ENCOUNTER — ANTI-COAG VISIT (OUTPATIENT)
Dept: FAMILY MEDICINE CLINIC | Age: 80
End: 2020-11-23

## 2020-11-23 LAB — INR BLD: 1.8

## 2020-11-30 ENCOUNTER — CARE COORDINATION (OUTPATIENT)
Dept: CARE COORDINATION | Age: 80
End: 2020-11-30

## 2020-11-30 NOTE — CARE COORDINATION
[place]  11/30/2020    Registered Dietitian Progress Note for Care Coordination    Assessment: Yomi Santos is a 68 y.o. female. RD referred for weight loss, diverticulitis, high cholesterol and warfarin. RD spoke with patient for initial nutrition assessment on 11/2 and first follow up on 11/16. RD called for final follow up with pt today 11/30. Pt states things are going \"fine. \" RD discussed previous goals with pt. Reiterated the importance of eating three balanced meals consistently throughout the day. Reviewed the components of a balanced meal using MyPlate. Reiterated the importance of fiber and fluid. RD acknowledged the changes patient has made and encouraged her to continue making small changes with time. Patient has no nutrition related questions at this time. Barriers to meeting goals: overwhelmed by complexity of regimen and stress     Action:  RD reiterated the importance of having a variety of foods- choosing fruits, vegetables, whole grains, lean protein sources and low fat dairy products. RD discussed importance of meeting estimated nutrition needs by eating balanced meals consistently. Discussed the importance of fiber. Pt will continue to make small changes- focus on eating 3 balanced meals/day and increasing fiber intake. See assessment above.         Nutrition Monitoring and Evaluation  Indicator/Goal Criteria Progress   #1 Eat balanced meals consistently throughout the day.  #1 Focus on eating 3 meals/day and making these meals more balanced using the MyPlate NAYFLDRKA-5/8 plate fruits and/or vegetables, 1/4 plate protein and 1/4 plate starchy carbohydrates with 8 oz glass of low fat milk if desired. #1 Pt is working on eating 3 balanced meals/day. #2  Slowly increase fiber intake.  #2  Increase fiber intake by incorporating more fruits, vegetables and whole grains into meals.  Be consistent in vegetable intake.  #2 Pt is increasing her fiber intake by incorporating more fruits,

## 2020-12-02 ENCOUNTER — OFFICE VISIT (OUTPATIENT)
Dept: ORTHOPEDIC SURGERY | Age: 80
End: 2020-12-02
Payer: MEDICARE

## 2020-12-02 VITALS — RESPIRATION RATE: 16 BRPM | HEIGHT: 62 IN | WEIGHT: 270 LBS | BODY MASS INDEX: 49.69 KG/M2

## 2020-12-02 PROBLEM — M17.0 PRIMARY OSTEOARTHRITIS OF BOTH KNEES: Status: ACTIVE | Noted: 2020-12-02

## 2020-12-02 PROBLEM — M25.561 ACUTE PAIN OF RIGHT KNEE: Status: ACTIVE | Noted: 2020-12-02

## 2020-12-02 PROCEDURE — 99203 OFFICE O/P NEW LOW 30 MIN: CPT | Performed by: ORTHOPAEDIC SURGERY

## 2020-12-02 PROCEDURE — 20610 DRAIN/INJ JOINT/BURSA W/O US: CPT | Performed by: ORTHOPAEDIC SURGERY

## 2020-12-02 RX ORDER — BUPIVACAINE HYDROCHLORIDE 2.5 MG/ML
10 INJECTION, SOLUTION INFILTRATION; PERINEURAL ONCE
Status: COMPLETED | OUTPATIENT
Start: 2020-12-02 | End: 2020-12-02

## 2020-12-02 RX ORDER — METHYLPREDNISOLONE ACETATE 40 MG/ML
80 INJECTION, SUSPENSION INTRA-ARTICULAR; INTRALESIONAL; INTRAMUSCULAR; SOFT TISSUE ONCE
Status: COMPLETED | OUTPATIENT
Start: 2020-12-02 | End: 2020-12-02

## 2020-12-02 RX ADMIN — BUPIVACAINE HYDROCHLORIDE 25 MG: 2.5 INJECTION, SOLUTION INFILTRATION; PERINEURAL at 10:07

## 2020-12-02 RX ADMIN — METHYLPREDNISOLONE ACETATE 80 MG: 40 INJECTION, SUSPENSION INTRA-ARTICULAR; INTRALESIONAL; INTRAMUSCULAR; SOFT TISSUE at 10:08

## 2020-12-02 NOTE — PROGRESS NOTES
KNEE VISIT      HISTORY OF PRESENT ILLNESS    Kemal Winters is a 68 y.o. female who presents for evaluation of bilateral knee pain. She has had pain for years and actually several years ago had been given cortisone into her left knee which worked well but now she has grade seven 8/10 pain in both knees and has difficulty getting around. She says it because of her lungs and her atrial fibrillation she is unable to have surgery. She was hoping for injections and also had a question about Visco supplementation. She denies any history of injury. She does have some grinding that she feels with range of motion. ROS    Well-documented patient history form dated 12/2/2020  All other ROS negative except for above. Past Surgical history    Past Surgical History:   Procedure Laterality Date    APPENDECTOMY  1963    BRONCHOSCOPY N/A 8/12/2019    EBUS WF W/ANES.  performed by Johnny Wallace MD at [address]  8/12/2019    BRONCHOSCOPY ALVEOLAR LAVAGE performed by Johnny Wallace MD at [address]  8/12/2019    BRONCHOSCOPY BRUSHINGS performed by Johnny Wallace MD at [address]  8/12/2019    BRONCHOSCOPY/TRANSBRONCHIAL LUNG BIOPSY performed by Johnny Wallace MD at [address]  8/12/2019    BRONCHOSCOPY/TRANSBRONCHIAL NEEDLE BIOPSY performed by Johnny Wallace MD at [address]  8/12/2019    BRONCHOSCOPY/TRANSBRONCHIAL NEEDLE BIOPSY ADDL LOBE performed by Johnny Wallace MD at [address] [place] [address]  2003    COLONOSCOPY N/A 9/25/2019    COLONOSCOPY POLYPECTOMY SNARE/COLD BIOPSY showed diverticulilosis performed by Rosa Maria Corbin MD at [facility]      umbilical-- [facility] N/A 1/17/2020    PORT INSERTION performed by Maria Del Rosario Berumen MD at [place] [address] TUNNELED VENOUS PORT PLACEMENT Left 01/17/2020    Port Insertion ENRIQUETA Chest wCarlton Chavira 1/17/20 Vaccess CT Injectable [id number] lot [id number] 3/31/21       PAST MEDICAL    Past Medical History:   Diagnosis Date    Acute exacerbation of chronic obstructive pulmonary disease (COPD) ([address]     Acute on chronic respiratory failure (HCC)     Anticoagulant long-term use     Atrial fibrillation (HCC)     Atrial fibrillation with rapid ventricular response ([address] 6/23/2015    B-cell lymphoma ([address]     CAP (community acquired pneumonia) 1/29/2015    Carotid artery stenosis     CHF (congestive heart failure) (HCC)     Chronic back pain     Congenital heart disease     Depression     Diverticulitis     Fistula     anal-vaginal fistula    HCAP (healthcare-associated pneumonia)     Hx of blood clots     Hyperlipidemia     Hypertension     IFG (impaired fasting glucose) 9/13/2018    Non-Hodgkins lymphoma (HCC)     Obesity     Panic disorder     Peripheral vascular disease ([address]     Pulmonary embolism ([address]     6/15    Rectal vaginal fistula     Restless legs syndrome     [facility] spotted fever 2013    Sciatic nerve pain     Sepsis ([address] 6/22/2015    Sleep apnea     Tobacco abuse 6/22/2015    Vitamin D deficiency        Allergies    Allergies   Allergen Reactions    Crestor [Rosuvastatin]      Muscle aches    Levofloxacin Other (See Comments)     Achiness    Lipitor      Muscle aches    Rocephin In Dextrose [Ceftriaxone Sodium In Dextrose]      Rash,itching    Zocor [Simvastatin]      Muscle aches    Codeine Itching and Nausea And Vomiting    Pravastatin Nausea And Vomiting       Meds    Current Outpatient Medications   Medication Sig Dispense Refill    albuterol sulfate HFA (VENTOLIN HFA) 108 (90 Base) MCG/ACT inhaler Inhale 2 puffs into the lungs every 6 hours as needed for Wheezing or Shortness of Breath 2 Inhaler 5    polyethylene glycol (GLYCOLAX) 17 g packet Take 17 g by mouth daily 30 each 1    traZODone (DESYREL) 50 MG tablet Take 1 tablet by mouth nightly 30 tablet 1    warfarin (COUMADIN) 5 MG tablet TAKE ONE TABLET BY MOUTH DAILY 60 tablet 1    warfarin (COUMADIN) 5 MG tablet Take 5 mg by mouth Except for Wednesday 7.5mg      vitamin B-6 (PYRIDOXINE) 50 MG tablet Take by mouth daily      spironolactone (ALDACTONE) 25 MG tablet Take 1 tablet by mouth daily 30 tablet 1    torsemide (DEMADEX) 20 MG tablet Take 1 tablet by mouth daily 30 tablet 3    albuterol (PROVENTIL) (2.5 MG/3ML) 0.083% nebulizer solution Take 3 mLs by nebulization every 6 hours as needed for Wheezing or Shortness of Breath DX COPD J44.9 360 mL 5    Acetaminophen (TYLENOL ARTHRITIS PAIN PO) Take by mouth      melatonin 3 MG TABS tablet Take 1 tablet by mouth nightly as needed (insomnia) 15 tablet 0    OXYGEN 4.5 L      Cholecalciferol (VITAMIN D-3 PO) Take 2,000 Units by mouth daily        No current facility-administered medications for this visit. Social    Social History     Socioeconomic History    Marital status:      Spouse name: Not on file    Number of children: Not on file    Years of education: Not on file    Highest education level: Not on file   Occupational History    Occupation: retired   Social Needs    Financial resource strain: Not very hard    Food insecurity     Worry: Never true     Inability: Never true    Transportation needs     Medical: No     Non-medical: No   Tobacco Use    Smoking status: Former Smoker     Packs/day: 0.33     Years: 50.00     Pack years: 16.50     Types: Cigarettes     Last attempt to quit: 2015     Years since quittin.4    Smokeless tobacco: Never Used   Substance and Sexual Activity    Alcohol use: Not Currently     Alcohol/week: 0.0 standard drinks     Frequency: Never    Drug use: No    Sexual activity: Not Currently   Lifestyle    Physical activity     Days per week: 0 days     Minutes per session: 0 min    Stress:  To some extent Relationships    Social connections     Talks on phone: Not on file     Gets together: Once a week     Attends Oriental orthodox service: Never     Active member of club or organization: No     Attends meetings of clubs or organizations: Never     Relationship status:     Intimate partner violence     Fear of current or ex partner: Not on file     Emotionally abused: Not on file     Physically abused: Not on file     Forced sexual activity: Not on file   Other Topics Concern    Not on file   Social History Narrative    Not on file       Family HISTORY    Family History   Problem Relation Age of Onset    Cancer Mother 43        lung cancer    Diabetes Sister     Cancer Daughter 43        lung cancer    Asthma Son     Hypertension Son     Asthma Grandchild     Hypertension Son     Emphysema Neg Hx     Heart Failure Neg Hx        PHYSICAL EXAM    Vital Signs:  LMP  (LMP Unknown)   General Appearance: Obese body habitus with a BMI of 49.38. Alert and oriented to person, place, and time. Affect:  Normal.   Gait: Antalgic requiring a roller walker for ambulation. Poor balance and coordination. Skin:  Intact. Sensation:  Intact. Strength:  Intact. Reflexes:  Intact. Pulses:  Intact. Knee Exam:    Effusion: Negative    Range of Motion Right Left   Extension -5 -5   Flexion 95 90     Provocative Test Right Left    Positive Negative Positive Negative   Anterior drawer [] [x] [] [x]   Lachman [] [x] [] [x]   Posterior drawer [] [x] [] [x]   Varus testing [] [x] [] [x]   Valgus testing [x] [] [x] []   Joint line tenderness [x] [] [x] []     Additional Exam Comments: Her neurocirculatory and lymphatic exam otherwise is normal symmetric to both lower extremities. She has generalized pain crepitus in her knees with range of motion with no gross instability. IMAGING STUDIES    X-rays 3 views of the right knee demonstrate severe end-stage arthritis in both knees medially.     IMPRESSION Bilateral knee pain secondary to osteoarthritis    PLAN      1. Conservative care options including physical therapy, NSAIDs, bracing, and activity modification were discussed. 2.  The indications for therapeutic injections were discussed. 3.  The indications for additional imaging studies were discussed. 4.  After considering the various options discussed, the patient elected to pursue a course that includes viscosupplementation and cortisone injections to both knees. Recommendation is for viscosupplementation using Monovisc . The left and Right knees were injected with 4 ml of Monovisc from an anterolateral joint line approach using a 22-gauge needle under sterile Betadine prep, using ethyl chloride as a topical refrigerant, for a diagnosis of osteoarthritis. The patient appeared to tolerate it well. The patient should return here in 2 months. Recommendation is for a cortisone injection into the right knee. After informed consent was received from the patient, the right knee was injected with 1 mL(40mg)Depo-Medrol and 4 mL of 0.25% Marcaine  in the syringe from an anterolateral joint line approach, using a 25-gauge needle, under sterile Betadine prep, using ethyl chloride as a topical refrigerant, for a diagnosis of osteoarthritis. The patient appeared to tolerate it well. The patient should return here periodically as needed. Encounter Diagnoses   Name Primary?  Acute pain of right knee     Primary osteoarthritis of both knees Yes        Orders Placed This Encounter   Procedures    XR KNEE RIGHT (3 VIEWS)     Standing Status:   Future     Number of Occurrences:   1     Standing Expiration Date:   12/2/2021        Recommendation is for a cortisone injection into the left knee.  After informed consent was received from the patient, the left knee was injected with 1 mL( 40mg) Depo-Medrol and 4 mL  of 0.25% Marcaine in the syringe from an anterolateral joint line approach, using a 25-gauge needle, under sterile Betadine prep, using ethyl chloride as a topical refrigerant, for a diagnosis of osteoarthritis. The patient appeared to tolerate it well. The patient should return here periodically as needed. Encounter Diagnoses   Name Primary?     Acute pain of right knee     Primary osteoarthritis of both knees Yes        Orders Placed This Encounter   Procedures    XR KNEE RIGHT (3 VIEWS)     Standing Status:   Future     Number of Occurrences:   1     Standing Expiration Date:   12/2/2021

## 2020-12-03 ENCOUNTER — ANTI-COAG VISIT (OUTPATIENT)
Dept: FAMILY MEDICINE CLINIC | Age: 80
End: 2020-12-03

## 2020-12-03 LAB — INR BLD: 2

## 2020-12-10 ENCOUNTER — ANTI-COAG VISIT (OUTPATIENT)
Dept: FAMILY MEDICINE CLINIC | Age: 80
End: 2020-12-10

## 2020-12-10 ENCOUNTER — TELEPHONE (OUTPATIENT)
Dept: FAMILY MEDICINE CLINIC | Age: 80
End: 2020-12-10

## 2020-12-10 LAB — INR BLD: 2.3

## 2020-12-10 NOTE — TELEPHONE ENCOUNTER
Spoke w/ patient - reviewed recommendations.  Advised if she gets a nose bleed that she cannot control to report to the ED

## 2020-12-10 NOTE — TELEPHONE ENCOUNTER
Most likely related to dry heat. Recommend normal saline nasal spray every 2 hours as needed and using a cool mist humidifier.   May also place a small amount of vaseline in bilateral nares

## 2020-12-21 ENCOUNTER — ANTI-COAG VISIT (OUTPATIENT)
Dept: FAMILY MEDICINE CLINIC | Age: 80
End: 2020-12-21

## 2020-12-21 LAB — INR BLD: 1.3

## 2020-12-24 ENCOUNTER — ANTI-COAG VISIT (OUTPATIENT)
Dept: FAMILY MEDICINE CLINIC | Age: 80
End: 2020-12-24

## 2020-12-24 LAB — INR BLD: 1.6

## 2020-12-24 NOTE — PROGRESS NOTES
Take 10 mg of Warfarin tonight and Friday night. Take 5 mg of Warfarin Sat and Sun. Recheck on Monday.

## 2020-12-28 ENCOUNTER — ANTI-COAG VISIT (OUTPATIENT)
Dept: FAMILY MEDICINE CLINIC | Age: 80
End: 2020-12-28

## 2020-12-28 ENCOUNTER — TELEPHONE (OUTPATIENT)
Dept: FAMILY MEDICINE CLINIC | Age: 80
End: 2020-12-28

## 2020-12-28 LAB — INR BLD: 3

## 2020-12-28 NOTE — TELEPHONE ENCOUNTER
Pt states that she has a cough and bringing up greenish gray thick sputum. Was wanting to see if she could get something called in to [facility].

## 2020-12-29 ENCOUNTER — HOSPITAL ENCOUNTER (OUTPATIENT)
Dept: CT IMAGING | Age: 80
Discharge: HOME | End: 2020-12-29
Payer: MEDICARE

## 2020-12-29 ENCOUNTER — HOSPITAL ENCOUNTER (OUTPATIENT)
Age: 80
Discharge: HOME | End: 2020-12-29
Payer: MEDICARE

## 2020-12-29 LAB
BUN BLDV-MCNC: 10 MG/DL (ref 7–20)
CREAT SERPL-MCNC: <0.5 MG/DL (ref 0.6–1.2)
GFR AFRICAN AMERICAN: >60
GFR NON-AFRICAN AMERICAN: >60

## 2020-12-29 PROCEDURE — 6360000004 HC RX CONTRAST MEDICATION: Performed by: INTERNAL MEDICINE

## 2020-12-29 PROCEDURE — 36415 COLL VENOUS BLD VENIPUNCTURE: CPT

## 2020-12-29 PROCEDURE — 84520 ASSAY OF UREA NITROGEN: CPT

## 2020-12-29 PROCEDURE — 74177 CT ABD & PELVIS W/CONTRAST: CPT

## 2020-12-29 PROCEDURE — 82565 ASSAY OF CREATININE: CPT

## 2020-12-29 RX ADMIN — IOPAMIDOL 75 ML: 755 INJECTION, SOLUTION INTRAVENOUS at 10:49

## 2020-12-30 ENCOUNTER — VIRTUAL VISIT (OUTPATIENT)
Dept: FAMILY MEDICINE CLINIC | Age: 80
End: 2020-12-30
Payer: MEDICARE

## 2020-12-30 PROCEDURE — 99212 OFFICE O/P EST SF 10 MIN: CPT | Performed by: NURSE PRACTITIONER

## 2020-12-30 PROCEDURE — G8400 PT W/DXA NO RESULTS DOC: HCPCS | Performed by: NURSE PRACTITIONER

## 2020-12-30 PROCEDURE — 4040F PNEUMOC VAC/ADMIN/RCVD: CPT | Performed by: NURSE PRACTITIONER

## 2020-12-30 PROCEDURE — 1123F ACP DISCUSS/DSCN MKR DOCD: CPT | Performed by: NURSE PRACTITIONER

## 2020-12-30 PROCEDURE — 1090F PRES/ABSN URINE INCON ASSESS: CPT | Performed by: NURSE PRACTITIONER

## 2020-12-30 PROCEDURE — G8427 DOCREV CUR MEDS BY ELIG CLIN: HCPCS | Performed by: NURSE PRACTITIONER

## 2020-12-30 RX ORDER — PREDNISONE 10 MG/1
TABLET ORAL
Qty: 30 TABLET | Refills: 0 | Status: SHIPPED | OUTPATIENT
Start: 2020-12-30 | End: 2021-03-18 | Stop reason: ALTCHOICE

## 2020-12-30 RX ORDER — AZITHROMYCIN 250 MG/1
250 TABLET, FILM COATED ORAL SEE ADMIN INSTRUCTIONS
Qty: 6 TABLET | Refills: 0 | Status: SHIPPED | OUTPATIENT
Start: 2020-12-30 | End: 2021-01-04

## 2020-12-30 ASSESSMENT — ENCOUNTER SYMPTOMS
WHEEZING: 1
SORE THROAT: 0
STRIDOR: 0
VOICE CHANGE: 0
VOMITING: 0
TROUBLE SWALLOWING: 0
SWOLLEN GLANDS: 0
RHINORRHEA: 1
FACIAL SWELLING: 0
NAUSEA: 0
SHORTNESS OF BREATH: 1
CHOKING: 0
CHEST TIGHTNESS: 1
GASTROINTESTINAL NEGATIVE: 1
DIARRHEA: 0
COUGH: 1
ABDOMINAL PAIN: 0
APNEA: 0
SINUS PRESSURE: 0
SINUS PAIN: 0

## 2020-12-30 NOTE — PROGRESS NOTES
Lorren Libman is a 68 y.o. female evaluated via telephone on 12/30/2020. Consent:  She and/or health care decision maker is aware that that she may receive a bill for this telephone service, depending on her insurance coverage, and has provided verbal consent to proceed: Yes      Documentation:  I communicated with the patient and/or health care decision maker about URI. Details of this discussion including any medical advice provided: n/a      I affirm this is a Patient Initiated Episode with a Patient who has not had a related appointment within my department in the past 7 days or scheduled within the next 24 hours.     Patient identification was verified at the start of the visit: Yes    Total Time: minutes: 5-10 minutes    Note: not billable if this call serves to triage the patient into an appointment for the relevant concern      Rudy Odonnell

## 2020-12-30 NOTE — PROGRESS NOTES
predniSONE (DELTASONE) 10 MG tablet Take 40 mg for 3 days then 30 mg for 3 days then 20 mg for 3 days then 10 mg for 3 days Yes TURNER Champion CNP   azithromycin (ZITHROMAX) 250 MG tablet Take 1 tablet by mouth See Admin Instructions for 5 days 500mg on day 1 followed by 250mg on days 2 - 5 Yes TURNER Champion CNP   albuterol sulfate HFA (VENTOLIN HFA) 108 (90 Base) MCG/ACT inhaler Inhale 2 puffs into the lungs every 6 hours as needed for Wheezing or Shortness of Breath Yes Lis Jose MD   polyethylene glycol (GLYCOLAX) 17 g packet Take 17 g by mouth daily Yes TURNER Joy CNP   traZODone (DESYREL) 50 MG tablet Take 1 tablet by mouth nightly Yes TURNER Joy CNP   warfarin (COUMADIN) 5 MG tablet TAKE ONE TABLET BY MOUTH DAILY Yes TURNER Joy CNP   warfarin (COUMADIN) 5 MG tablet Take 5 mg by mouth Except for Wednesday 7.5mg Yes Historical Provider, MD   vitamin B-6 (PYRIDOXINE) 50 MG tablet Take by mouth daily Yes Historical Provider, MD   spironolactone (ALDACTONE) 25 MG tablet Take 1 tablet by mouth daily Yes TURNER Champion CNP   torsemide (DEMADEX) 20 MG tablet Take 1 tablet by mouth daily Yes Riccardo Lancaster MD   albuterol (PROVENTIL) (2.5 MG/3ML) 0.083% nebulizer solution Take 3 mLs by nebulization every 6 hours as needed for Wheezing or Shortness of Breath DX COPD J44.9 Yes Lis Jose MD   Acetaminophen (TYLENOL ARTHRITIS PAIN PO) Take by mouth Yes Historical Provider, MD   melatonin 3 MG TABS tablet Take 1 tablet by mouth nightly as needed (insomnia) Yes Shayy Acuna MD   OXYGEN 4.5 L Yes Shayy Acuna MD   Cholecalciferol (VITAMIN D-3 PO) Take 2,000 Units by mouth daily  Yes Historical Provider, MD       Social History     Tobacco Use    Smoking status: Former Smoker     Packs/day: 0.33     Years: 50.00     Pack years: 16.50     Types: Cigarettes     Quit date: 2015     Years since quittin.5  Smokeless tobacco: Never Used   Substance Use Topics    Alcohol use: Not Currently     Alcohol/week: 0.0 standard drinks     Frequency: Never    Drug use: No        Allergies   Allergen Reactions    Crestor [Rosuvastatin]      Muscle aches    Levofloxacin Other (See Comments)     Achiness    Lipitor      Muscle aches    Rocephin In Dextrose [Ceftriaxone Sodium In Dextrose]      Rash,itching    Zocor [Simvastatin]      Muscle aches    Codeine Itching and Nausea And Vomiting    Pravastatin Nausea And Vomiting   ,   Past Medical History:   Diagnosis Date    Acute exacerbation of chronic obstructive pulmonary disease (COPD) ([address]     Acute on chronic respiratory failure (HCC)     Anticoagulant long-term use     Atrial fibrillation (HCC)     Atrial fibrillation with rapid ventricular response ([address] 6/23/2015    B-cell lymphoma ([address]     CAP (community acquired pneumonia) 1/29/2015    Carotid artery stenosis     CHF (congestive heart failure) (HCC)     Chronic back pain     Congenital heart disease     Depression     Diverticulitis     Fistula     anal-vaginal fistula    HCAP (healthcare-associated pneumonia)     Hx of blood clots     Hyperlipidemia     Hypertension     IFG (impaired fasting glucose) 9/13/2018    Non-Hodgkins lymphoma (HCC)     Obesity     Panic disorder     Peripheral vascular disease (HCC)     Primary osteoarthritis of both knees 12/2/2020    Pulmonary embolism ([address]     6/15    Rectal vaginal fistula     Restless legs syndrome     [facility] spotted [facility]    Sciatic nerve pain     Sepsis ([address] 6/22/2015    Sleep apnea     Tobacco abuse 6/22/2015    Vitamin D deficiency    ,   Past Surgical History:   Procedure Laterality Date    APPENDECTOMY  1963    BRONCHOSCOPY N/A 8/12/2019    EBUS WF W/ANES.  performed by Jesse Williamson MD at [address]  8/12/2019 BRONCHOSCOPY ALVEOLAR LAVAGE performed by Jerod Boyd MD at  [address]  2019    BRONCHOSCOPY BRUSHINGS performed by Jerod Boyd MD at  [address]  2019    BRONCHOSCOPY/TRANSBRONCHIAL LUNG BIOPSY performed by Jerod Boyd MD at  [address]  2019    BRONCHOSCOPY/TRANSBRONCHIAL NEEDLE BIOPSY performed by Jerod Boyd MD at  [address]  2019    BRONCHOSCOPY/TRANSBRONCHIAL NEEDLE BIOPSY ADDL LOBE performed by Jerod Boyd MD at [address] [place] [address]      COLONOSCOPY N/A 2019    COLONOSCOPY POLYPECTOMY SNARE/COLD BIOPSY showed diverticulilosis performed by Irena Fortune MD at [facility]      umbilical-- [address] N/A 2020    PORT INSERTION performed by Maximilian Langford MD at [facility] TUNNELED VENOUS PORT PLACEMENT Left 2020    Port Insertion ENRIQUETA Chest w. Dr Dewayne Killian 20 Vaccess CT Injectable [id number] 3/31/21   ,   Social History     Tobacco Use    Smoking status: Former Smoker     Packs/day: 0.33     Years: 50.00     Pack years: 16.50     Types: Cigarettes     Quit date: 2015     Years since quittin.5    Smokeless tobacco: Never Used   Substance Use Topics    Alcohol use: Not Currently     Alcohol/week: 0.0 standard drinks     Frequency: Never    Drug use: No   ,   Family History   Problem Relation Age of Onset    Cancer Mother 43        lung cancer    Diabetes Sister     Cancer Daughter 43        lung cancer    Asthma Son     Hypertension Son     Asthma Grandchild     Hypertension Son     Emphysema Neg Hx     Heart Failure Neg Hx    ,   Immunization History   Administered Date(s) Administered    Influenza Virus Vaccine 2013, 10/01/2013, 09/15/2014, 10/27/2016, 2017, 2018, 2020 -     predniSONE (DELTASONE) 10 MG tablet; Take 40 mg for 3 days then 30 mg for 3 days then 20 mg for 3 days then 10 mg for 3 days  -     azithromycin (ZITHROMAX) 250 MG tablet; Take 1 tablet by mouth See Admin Instructions for 5 days 500mg on day 1 followed by 250mg on days 2 - 5        No follow-ups on file. [facility] Katelyn is a 68 y.o. female being evaluated by a Virtual Visit (video visit) encounter to address concerns as mentioned above. A caregiver was present when appropriate. Due to this being a TeleHealth encounter ([facility] emergency), evaluation of the following organ systems was limited: Vitals/Constitutional/EENT/Resp/CV/GI//MS/Neuro/Skin/Heme-Lymph-Imm. Pursuant to the emergency declaration under the [address] [place] [address], [address] [place] [address] authority and the [employer] and [employer] Act, this Virtual Visit was conducted with patient's (and/or legal guardian's) consent, to reduce the patient's risk of exposure to COVID-19 and provide necessary medical care. The patient (and/or legal guardian) has also been advised to contact this office for worsening conditions or problems, and seek emergency medical treatment and/or call 911 if deemed necessary. Patient identification was verified at the start of the visit: Yes    Services were provided through a video synchronous discussion virtually to substitute for in-person clinic visit. Patient and provider were located at their individual homes. --TURNER Diaz CNP on 12/30/2020 at 1:15 PM    An electronic signature was used to authenticate this note. Patient should call the office immediately with new or ongoing signs or symptoms or worsening, or proceed to the emergency room. All entries in chief complaint and history of present illness are reviewed and validated by me. No changes in past medical history, past surgical history, social history, or family history were noted during the patient encounter unless specifically listed above. All updates of past medical history, past surgical history, social history, or family history were reviewed personally by me during the office visit. All problems listed in the assessment are stable unless noted otherwise. Medication profile reviewed personally by me during the office visit. Medication side effects and possible impairments from medications were discussed as applicable. Every effort has been made to assure accurate transcription by this voice recognition software. However, mistakes in transcription may still occur    You are being started on a new medication. All medications have the potential for adverse effects. All medications effect each person differently. Please read and review provided information related to medication. If the medication that you have been prescribed has the potential to cause sedation, do not drive or operate car, truck, or heavy machinery until you know how the medication will effect you. If you experience any adverse effects from the medication, please call the office or report to the emergency department.

## 2020-12-31 ENCOUNTER — ANTI-COAG VISIT (OUTPATIENT)
Dept: FAMILY MEDICINE CLINIC | Age: 80
End: 2020-12-31

## 2020-12-31 DIAGNOSIS — I26.99 OTHER PULMONARY EMBOLISM WITHOUT ACUTE COR PULMONALE, UNSPECIFIED CHRONICITY (HCC): ICD-10-CM

## 2020-12-31 LAB — INR BLD: 3.1

## 2021-01-07 ENCOUNTER — ANTI-COAG VISIT (OUTPATIENT)
Dept: FAMILY MEDICINE CLINIC | Age: 81
End: 2021-01-07

## 2021-01-07 DIAGNOSIS — I26.99 OTHER PULMONARY EMBOLISM WITHOUT ACUTE COR PULMONALE, UNSPECIFIED CHRONICITY (HCC): ICD-10-CM

## 2021-01-07 LAB — INR BLD: 1.6

## 2021-01-14 ENCOUNTER — ANTI-COAG VISIT (OUTPATIENT)
Dept: FAMILY MEDICINE CLINIC | Age: 81
End: 2021-01-14

## 2021-01-14 DIAGNOSIS — I26.99 OTHER PULMONARY EMBOLISM WITHOUT ACUTE COR PULMONALE, UNSPECIFIED CHRONICITY (HCC): ICD-10-CM

## 2021-01-14 LAB — INR BLD: 1.7

## 2021-01-18 ENCOUNTER — ANTI-COAG VISIT (OUTPATIENT)
Dept: FAMILY MEDICINE CLINIC | Age: 81
End: 2021-01-18

## 2021-01-18 DIAGNOSIS — I26.99 OTHER PULMONARY EMBOLISM WITHOUT ACUTE COR PULMONALE, UNSPECIFIED CHRONICITY (HCC): ICD-10-CM

## 2021-01-18 LAB — INR BLD: 2.5

## 2021-01-25 ENCOUNTER — ANTI-COAG VISIT (OUTPATIENT)
Dept: FAMILY MEDICINE CLINIC | Age: 81
End: 2021-01-25

## 2021-01-25 DIAGNOSIS — I26.99 OTHER PULMONARY EMBOLISM WITHOUT ACUTE COR PULMONALE, UNSPECIFIED CHRONICITY (HCC): ICD-10-CM

## 2021-01-25 LAB — INR BLD: 4

## 2021-01-28 ENCOUNTER — ANTI-COAG VISIT (OUTPATIENT)
Dept: FAMILY MEDICINE CLINIC | Age: 81
End: 2021-01-28

## 2021-01-28 DIAGNOSIS — I26.99 OTHER PULMONARY EMBOLISM WITHOUT ACUTE COR PULMONALE, UNSPECIFIED CHRONICITY (HCC): ICD-10-CM

## 2021-01-28 LAB — INR BLD: 1.5

## 2021-02-16 ENCOUNTER — ANTI-COAG VISIT (OUTPATIENT)
Dept: FAMILY MEDICINE CLINIC | Age: 81
End: 2021-02-16

## 2021-02-16 DIAGNOSIS — I26.99 OTHER PULMONARY EMBOLISM WITHOUT ACUTE COR PULMONALE, UNSPECIFIED CHRONICITY (HCC): ICD-10-CM

## 2021-02-16 LAB — INR BLD: 2.2

## 2021-02-24 RX ORDER — SPIRONOLACTONE 25 MG/1
25 TABLET ORAL DAILY
Qty: 30 TABLET | Refills: 2 | Status: ON HOLD
Start: 2021-02-24 | End: 2021-09-29 | Stop reason: HOSPADM

## 2021-02-24 RX ORDER — TORSEMIDE 20 MG/1
20 TABLET ORAL DAILY
Qty: 30 TABLET | Refills: 2 | Status: SHIPPED | OUTPATIENT
Start: 2021-02-24 | End: 2021-06-24 | Stop reason: SDUPTHER

## 2021-02-24 RX ORDER — WARFARIN SODIUM 5 MG/1
TABLET ORAL
Qty: 60 TABLET | Refills: 2 | Status: SHIPPED | OUTPATIENT
Start: 2021-02-24 | End: 2021-06-24 | Stop reason: SDUPTHER

## 2021-03-08 ENCOUNTER — HOSPITAL ENCOUNTER (OUTPATIENT)
Dept: CT IMAGING | Age: 81
Discharge: HOME | End: 2021-03-08
Payer: MEDICARE

## 2021-03-08 ENCOUNTER — ANTI-COAG VISIT (OUTPATIENT)
Dept: FAMILY MEDICINE CLINIC | Age: 81
End: 2021-03-08

## 2021-03-08 ENCOUNTER — HOSPITAL ENCOUNTER (OUTPATIENT)
Age: 81
End: 2021-03-08
Payer: MEDICARE

## 2021-03-08 ENCOUNTER — HOSPITAL ENCOUNTER (OUTPATIENT)
Age: 81
Discharge: HOME | End: 2021-03-08
Payer: MEDICARE

## 2021-03-08 DIAGNOSIS — I26.99 OTHER PULMONARY EMBOLISM WITHOUT ACUTE COR PULMONALE, UNSPECIFIED CHRONICITY (HCC): ICD-10-CM

## 2021-03-08 DIAGNOSIS — C82.12 FOLLICULAR LYMPHOMA GRADE II OF INTRATHORACIC LYMPH NODES (HCC): ICD-10-CM

## 2021-03-08 LAB
BUN BLDV-MCNC: 11 MG/DL (ref 7–20)
CREAT SERPL-MCNC: <0.5 MG/DL (ref 0.6–1.2)
GFR AFRICAN AMERICAN: >60
GFR NON-AFRICAN AMERICAN: >60
INR BLD: 1.9

## 2021-03-08 PROCEDURE — 36415 COLL VENOUS BLD VENIPUNCTURE: CPT

## 2021-03-08 PROCEDURE — 84520 ASSAY OF UREA NITROGEN: CPT

## 2021-03-08 PROCEDURE — 6360000004 HC RX CONTRAST MEDICATION: Performed by: NURSE PRACTITIONER

## 2021-03-08 PROCEDURE — 71260 CT THORAX DX C+: CPT

## 2021-03-08 PROCEDURE — 82565 ASSAY OF CREATININE: CPT

## 2021-03-08 RX ADMIN — IOPAMIDOL 60 ML: 755 INJECTION, SOLUTION INTRAVENOUS at 12:25

## 2021-03-10 ENCOUNTER — ANTI-COAG VISIT (OUTPATIENT)
Dept: FAMILY MEDICINE CLINIC | Age: 81
End: 2021-03-10

## 2021-03-10 DIAGNOSIS — I26.99 OTHER PULMONARY EMBOLISM WITHOUT ACUTE COR PULMONALE, UNSPECIFIED CHRONICITY (HCC): ICD-10-CM

## 2021-03-10 LAB — INR BLD: 2.2

## 2021-03-18 ENCOUNTER — CARE COORDINATION (OUTPATIENT)
Dept: CARE COORDINATION | Age: 81
End: 2021-03-18

## 2021-03-18 NOTE — CARE COORDINATION
Ambulatory Care Coordination Note  3/20/2021  CM Risk Score: 10  Charlson 10 Year Mortality Risk Score: 100%     ACC: Nereida Schmidt, RN    Summary Note: ACM spoke with patient for outreach for care coordination. She has been active with me in the past. Concerns today that she is having some increase in her leg edema. No redness or open sores. She has not had any change in her breathing and oxygen levels have maintained. She is not weighing daily, maybe only once a week. She said the weight goes up a little and back down. She said she has gain weight overall with covid restrictions gradually at home. She does tell me she has trouble getting her legs into the shower. She has a shower chair but cant easily step over. A bench will not work in her bathroom. I have discussed PT and OT referrals but she is reluctant at this time. She said PT made her hip pain worse last time. She wants to move better. She is seeing Dr. Aminah Ferraro for injections due to her right knee pain and stated she has bone on bone there. Using walker, no falls. 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 She is managing this well. No skin issue or UTI's. Continued issue with feeling a band like pressure around under her breasts. This is not new but still there. Recent CT on 3/8/21. Follow up with oncology planned. 1. Stable appearance of a subsolid nodular opacity in the superior segment of   the left lower lobe suggestive of infectious/inflammatory etiology.  Similar   opacities in the right upper lobe appear improved since December 2020.   2. Stable areas of consolidation/bandlike atelectasis in the bilateral lungs.    3. No lymphadenopathy identified.             Plan      Follow up in 1 week  Completed SDOH and spiritual assessment next call   Establish Goals  Discuss PT/OT again   Fall safety (bathroom safety concerns)  Dietician referral?    [address] [place] [address] well-being, preventing enjoyment of usual activities   Do you have any other concerns about your patients mental well-being? How would you rate their severity and impact on the patient?: No identified areas of concern   How would you rate their home environment in terms of safety and stability (including domestic violence, insecure housing, neighbor harassment)?: Safe, stable, but with some inconsistency   How do daily activities impact on the patient's well-being? (include current or anticipated unemployment, work, caregiving, access to transportation or other): No identified problems or perceived positive benefits   How would you rate their social network (family, work, friends)?: Adequate participation with social networks   How would you rate their financial resources (including ability to afford all required medical care)?: Financially secure, resources adequate, no identified problems   How wells does the patient now understand their health and well-being (symptoms, signs or risk factors) and what they need to do to manage their health?: Reasonable to good understanding and already engages in managing health or is willing to undertake better management   How well do you think your patient can engage in healthcare discussions? (Barriers include language, deafness, aphasia, alcohol or drug problems, learning difficulties, concentration): Clear and open communication, no identified barriers   Do other services need to be involved to help this patient?: Other care/services not required at this time   Are current services involved with this patient well-coordinated? (Include coordination with other services you are now recommendation):  All required care/services in place and well-coordinated   Suggested Interventions and Community Resources  Fall Risk Prevention: Not Started Medi Set or Pill Pack: Declined   Occupational Therapy: Not Started   Physical Therapy: Not Started   Pulmonary Rehab: Not Started Registered Dietician: Not Started   Senior Services: Declined   Transportation Services: Completed   Zone Management Tools: In Process                  Prior to Admission medications    Medication Sig Start Date End Date Taking?  Authorizing Provider   warfarin (COUMADIN) 5 MG tablet TAKE ONE TABLET BY MOUTH DAILY 2/24/21  Yes TURNER Henao CNP   spironolactone (ALDACTONE) 25 MG tablet Take 1 tablet by mouth daily 2/24/21  Yes TURNER Henao CNP   torsemide (DEMADEX) 20 MG tablet Take 1 tablet by mouth daily 2/24/21  Yes TURNER Henao CNP   albuterol sulfate HFA (VENTOLIN HFA) 108 (90 Base) MCG/ACT inhaler Inhale 2 puffs into the lungs every 6 hours as needed for Wheezing or Shortness of Breath 10/28/20  Yes Mega Escobar MD   polyethylene glycol (GLYCOLAX) 17 g packet Take 17 g by mouth daily 10/23/20  Yes TURNER Henao CNP   traZODone (DESYREL) 50 MG tablet Take 1 tablet by mouth nightly 10/23/20  Yes TURNER Henao CNP   warfarin (COUMADIN) 5 MG tablet Take 5 mg by mouth Except for Wednesday 7.5mg   Yes Historical Provider, MD   vitamin B-6 (PYRIDOXINE) 50 MG tablet Take by mouth daily   Yes Historical Provider, MD   albuterol (PROVENTIL) (2.5 MG/3ML) 0.083% nebulizer solution Take 3 mLs by nebulization every 6 hours as needed for Wheezing or Shortness of Breath DX COPD J44.9 4/13/20  Yes Mega Escobar MD   Acetaminophen (TYLENOL ARTHRITIS PAIN PO) Take by mouth   Yes Historical Provider, MD   melatonin 3 MG TABS tablet Take 1 tablet by mouth nightly as needed (insomnia) 1/5/17  Yes Alicia Rojas MD   OXYGEN 4.5 L 1/5/17  Yes Alicia Rojas MD   Cholecalciferol (VITAMIN D-3 PO) Take 2,000 Units by mouth daily    Yes Historical Provider, MD       Future Appointments   Date Time Provider Allyssa Levine   4/28/2021 11:30 AM [facility]   5/19/2021  1:20 PM TURNER Henao CNP [facility]   6/2/2021 10:10 AM MD ROXANNA Restrepo [facility]     ,   Congestive Heart Failure Assessment    Are you currently restricting fluids?: 2000cc  Do you understand a low sodium diet?: Yes  Do you understand how to read food labels?: Yes  How many restaurant meals do you eat per week?: 1-2  Do you salt your food before tasting it?: No     Swelling (worse than baseline) in hands, feet/legs or around abdomen      Symptoms:  CHF associated leg swelling: Pos (Comment: leg edema is always present but seems a little worse to pt)      Symptom course: stable  Weight trend: fluctuating minimally  Salt intake watch compared to last visit: stable      and   COPD Assessment    Does the patient understand envrionmental exposure?: Yes  Is the patient able to verbalize Rescue vs. Long Acting medications?: Yes  Does the patient have a nebulizer?: Yes  Does the patient use a space with inhaled medications?: No            Symptoms:  None: Yes      Change in chronic cough?: No/At Baseline  Change in sputum?: No/At Baseline  Self Monitoring - SaO2: Yes (Comment: 91-92% on 4.5 l/NC up and moving and 96% at rest)  Have you had a recent diagnosis of pneumonia either by PCP or at a hospital?: No

## 2021-03-19 ENCOUNTER — ANTI-COAG VISIT (OUTPATIENT)
Dept: FAMILY MEDICINE CLINIC | Age: 81
End: 2021-03-19

## 2021-03-19 DIAGNOSIS — I26.99 OTHER PULMONARY EMBOLISM WITHOUT ACUTE COR PULMONALE, UNSPECIFIED CHRONICITY (HCC): ICD-10-CM

## 2021-03-19 LAB — INR BLD: 2.3

## 2021-04-08 ENCOUNTER — ANTI-COAG VISIT (OUTPATIENT)
Dept: FAMILY MEDICINE CLINIC | Age: 81
End: 2021-04-08

## 2021-04-08 DIAGNOSIS — I26.99 OTHER PULMONARY EMBOLISM WITHOUT ACUTE COR PULMONALE, UNSPECIFIED CHRONICITY (HCC): ICD-10-CM

## 2021-04-08 LAB — INR BLD: 1.5

## 2021-04-08 NOTE — PROGRESS NOTES
INR is low - recommend taking 10 mg tonight and tomorrow and then resume normal home dosage of 5 mg Saturday and Sunday. Recheck on Monday.

## 2021-04-12 ENCOUNTER — ANTI-COAG VISIT (OUTPATIENT)
Dept: FAMILY MEDICINE CLINIC | Age: 81
End: 2021-04-12

## 2021-04-12 DIAGNOSIS — I26.99 OTHER PULMONARY EMBOLISM WITHOUT ACUTE COR PULMONALE, UNSPECIFIED CHRONICITY (HCC): ICD-10-CM

## 2021-04-12 LAB — INR BLD: 4.7

## 2021-04-15 ENCOUNTER — TELEPHONE (OUTPATIENT)
Dept: FAMILY MEDICINE CLINIC | Age: 81
End: 2021-04-15

## 2021-04-15 ENCOUNTER — ANTI-COAG VISIT (OUTPATIENT)
Dept: FAMILY MEDICINE CLINIC | Age: 81
End: 2021-04-15

## 2021-04-15 DIAGNOSIS — I26.99 OTHER PULMONARY EMBOLISM WITHOUT ACUTE COR PULMONALE, UNSPECIFIED CHRONICITY (HCC): ICD-10-CM

## 2021-04-15 LAB
INR BLD: 2.4
INR BLD: 2.4

## 2021-04-15 NOTE — TELEPHONE ENCOUNTER
Sorry, for some reason I can not get to the coag encounter to put in return date  Continue same coumadin regimen and recheck inr in 1 week

## 2021-04-28 ENCOUNTER — OFFICE VISIT (OUTPATIENT)
Dept: PULMONOLOGY | Age: 81
End: 2021-04-28
Payer: MEDICARE

## 2021-04-28 ENCOUNTER — TELEPHONE (OUTPATIENT)
Dept: PULMONOLOGY | Age: 81
End: 2021-04-28

## 2021-04-28 VITALS
TEMPERATURE: 97.5 F | HEART RATE: 88 BPM | RESPIRATION RATE: 22 BRPM | HEIGHT: 62 IN | SYSTOLIC BLOOD PRESSURE: 128 MMHG | DIASTOLIC BLOOD PRESSURE: 64 MMHG | WEIGHT: 271 LBS | OXYGEN SATURATION: 94 % | BODY MASS INDEX: 49.87 KG/M2

## 2021-04-28 DIAGNOSIS — C83.02 SMALL B-CELL LYMPHOMA OF INTRATHORACIC LYMPH NODES (HCC): ICD-10-CM

## 2021-04-28 DIAGNOSIS — J44.9 COPD, SEVERE (HCC): Primary | ICD-10-CM

## 2021-04-28 DIAGNOSIS — J96.11 CHRONIC RESPIRATORY FAILURE WITH HYPOXIA (HCC): ICD-10-CM

## 2021-04-28 PROCEDURE — 1090F PRES/ABSN URINE INCON ASSESS: CPT | Performed by: INTERNAL MEDICINE

## 2021-04-28 PROCEDURE — 3023F SPIROM DOC REV: CPT | Performed by: INTERNAL MEDICINE

## 2021-04-28 PROCEDURE — G8427 DOCREV CUR MEDS BY ELIG CLIN: HCPCS | Performed by: INTERNAL MEDICINE

## 2021-04-28 PROCEDURE — 99214 OFFICE O/P EST MOD 30 MIN: CPT | Performed by: INTERNAL MEDICINE

## 2021-04-28 PROCEDURE — G8417 CALC BMI ABV UP PARAM F/U: HCPCS | Performed by: INTERNAL MEDICINE

## 2021-04-28 PROCEDURE — 1036F TOBACCO NON-USER: CPT | Performed by: INTERNAL MEDICINE

## 2021-04-28 PROCEDURE — G8926 SPIRO NO PERF OR DOC: HCPCS | Performed by: INTERNAL MEDICINE

## 2021-04-28 PROCEDURE — 4040F PNEUMOC VAC/ADMIN/RCVD: CPT | Performed by: INTERNAL MEDICINE

## 2021-04-28 PROCEDURE — G8400 PT W/DXA NO RESULTS DOC: HCPCS | Performed by: INTERNAL MEDICINE

## 2021-04-28 PROCEDURE — 1123F ACP DISCUSS/DSCN MKR DOCD: CPT | Performed by: INTERNAL MEDICINE

## 2021-04-28 RX ORDER — PREDNISONE 10 MG/1
TABLET ORAL
Qty: 30 TABLET | Refills: 0 | Status: SHIPPED | OUTPATIENT
Start: 2021-04-28 | End: 2021-05-10

## 2021-04-28 RX ORDER — DIPHENHYDRAMINE HCL 25 MG
25 TABLET ORAL EVERY 6 HOURS PRN
Status: ON HOLD | COMMUNITY
End: 2021-09-29 | Stop reason: HOSPADM

## 2021-04-28 RX ORDER — DOXYCYCLINE HYCLATE 100 MG
100 TABLET ORAL 2 TIMES DAILY
Qty: 14 TABLET | Refills: 0 | Status: SHIPPED | OUTPATIENT
Start: 2021-04-28 | End: 2021-05-05

## 2021-04-28 NOTE — PATIENT INSTRUCTIONS
Prednisone taper - 40 mg for 3 days, then 30 mg for 3 days, then 20 mg for 3 days, then 10 mg for 3 days. Doxycycline 100 mg twice daily for 7 days.

## 2021-04-28 NOTE — PROGRESS NOTES
[facility]    Outpatient Follow Up Note    CHIEF COMPLAINT : Dyspnea    HPI:  The patient is a 69 yo woman with hx of tobacco use, COPD and ILD, PE in 6/15. She has a 50 pack year smoking history. Was in [facility] in 6/15 for bilateral pulmonary emboli. In 8/19,  patient is admitted to the hospital and found to have a left lung mass and mediastinal adenopathy. She underwent bronchoscopy with biopsy revealing B cell lymphoma. Since last clinic visit, the patient has been feeling worse over the last week. She has a productive cough and shortness of breath. She has a runny nose. She has not previously had allergies but she feels like she might have them now. She completed chemotherapy over a month ago. There are no changes to past medical history, family history, social history or review of systems(except as noted in the history section) since prior note (all reviewed with patient).     Current Medications:    Current Outpatient Medications:     diphenhydrAMINE (BENADRYL) 25 MG tablet, Take 25 mg by mouth every 6 hours as needed for Itching, Disp: , Rfl:     warfarin (COUMADIN) 5 MG tablet, TAKE ONE TABLET BY MOUTH DAILY, Disp: 60 tablet, Rfl: 2    spironolactone (ALDACTONE) 25 MG tablet, Take 1 tablet by mouth daily, Disp: 30 tablet, Rfl: 2    torsemide (DEMADEX) 20 MG tablet, Take 1 tablet by mouth daily, Disp: 30 tablet, Rfl: 2    albuterol sulfate HFA (VENTOLIN HFA) 108 (90 Base) MCG/ACT inhaler, Inhale 2 puffs into the lungs every 6 hours as needed for Wheezing or Shortness of Breath, Disp: 2 Inhaler, Rfl: 5    polyethylene glycol (GLYCOLAX) 17 g packet, Take 17 g by mouth daily, Disp: 30 each, Rfl: 1    traZODone (DESYREL) 50 MG tablet, Take 1 tablet by mouth nightly, Disp: 30 tablet, Rfl: 1    vitamin B-6 (PYRIDOXINE) 50 MG tablet, Take by mouth daily, Disp: , Rfl:     albuterol (PROVENTIL) (2.5 MG/3ML) 0.083% nebulizer solution, Take 3 mLs by nebulization every 6 hours as needed for Wheezing or Shortness of Breath DX COPD J44.9, Disp: 360 mL, Rfl: 5    Acetaminophen (TYLENOL ARTHRITIS PAIN PO), Take by mouth, Disp: , Rfl:     melatonin 3 MG TABS tablet, Take 1 tablet by mouth nightly as needed (insomnia), Disp: 15 tablet, Rfl: 0    Cholecalciferol (VITAMIN D-3 PO), Take 2,000 Units by mouth daily , Disp: , Rfl:     warfarin (COUMADIN) 5 MG tablet, Take 5 mg by mouth Except for Wednesday 7.5mg, Disp: , Rfl:     OXYGEN, 4.5 L, Disp: , Rfl:         Objective:   PHYSICAL EXAM:        VITALS:    /64   Pulse 88   Temp 97.5 °F (36.4 °C) (Temporal)   Resp 22   Ht 5' 2\" (1.575 m)   Wt 271 lb (122.9 kg)   LMP  (LMP Unknown)   SpO2 94% Comment: on 4 LPM  BMI 49.57 kg/m²     Gen: In no acute distress. Alert. Obese. Normocephalic, atraumatic. Uncomfortable. Eyes: PERRL. No sclera icterus. No conjunctival injection. ENT: No ocular or auricular discharge. Oropharynx clear. Neck: Trachea midline. Normal thyroid. Resp: No accessory muscle use. No crackles. + wheezes. No rhonchi. No dullness on percussion. Poor air movement. CV: Regular rate. Regular rhythm. No murmur or rub. Normal S1 and S2.  +2 bilateral LE edema  GI: Abdomen non-tender. Non-distended. Soft. NTND. Skin: Warm and dry. No nodules on exposed extremities. No rash on exposed extremities. Lymph: No cervical LAD. No supraclavicular LAD. M/S: No cyanosis. No synovitis or joint deformity. No clubbing. Neuro: Cranial nerves are grossly intact. Moving all extremities. Motor and sensation grossly intact. Psych: Oriented x 3. No anxiety or agitation.        DATA:    LABS:     PFTs 4/30/12  FVC 1.45 (55%) FEV1 1.03 (56%)  FEV1/FVC ratio 71% TLC 4.61 (105%) DLCO 27.5 (159%, ?error), + BD    PFTs 2/27/15  FVC 1.02 (38%) FEV1 0.76 (38%) FEV1/FVC ratio  75%  TLC 5.04 (107 %) DLCO 16.17 (78%)  + BD   6MWT 560 feet, reqd 4L for low sat 84%    12/31/15 160 feet, required 3L for sat 81% 8/12/19:     FINAL DIAGNOSIS:       A. Lymph Node, 11R, Transbronchial Fine Needle Aspirate:       -  No malignant cells identified.       B. Lymph Node, Subcarinal, Transbronchial Fine Needle Aspirate:       -  No malignant cells identified.       C. Lymph Node, 11L, Transbronchial Fine Needle Aspirate:       -  B-cell lymphoma.       -  Please see comment.       D. Lung, Left Lower Lobe, Brushings and Brush Tip:       -  No malignant cells identified.       E. Lung, Left Lower Lobe, Bronchoalveolar Lavage:       -  No malignant cells identified.       F. Lung, Bronchial Washings:       -  No malignant cells identified.     COMMENT:   The corresponding flow cytometric analysis ([facility] shows  a population of abnormal B-cells, consistent with B-cell lymphoma. FINAL DIAGNOSIS:    Left lower lobe lung mass, biopsies:  - Atypical lymphoid infiltrate. - Please see comment. COMMENT:   The current biopsy shows prominent lymphoid aggregates with a  predominance of B-cells. A concurrent flow cytometric analysis of an 11L  lymph node ([facility] shows a population of abnormal B-cells  consistent with B-cell lymphoma (negative for CD5, CD10, and surface and  cytoplasmic light chains). The differential diagnosis includes marginal  zone lymphoma. Although evaluation is somewhat inhibited by crush  artifact, no evidence of large cell lymphoma is identified within this  biopsy specimen. IMAGING:     I personally reviewed and interpreted the following in the office :     3/8/21 Chest CT: Mediastinum: The heart is normal in size without a pericardial effusion. Moderate coronary artery atherosclerosis.  The aorta, arch branches, and main  pulmonary artery are normal in course and caliber.     No discrete pathologically enlarged mediastinal or hilar nodes.     Lungs/pleura: Lungs demonstrate bandlike opacities in the mid to lower lung  zone.  The largest consolidation in the left lower lobe is unchanged. Additionally, there is a subsolid nodular opacity in the superior segment of  the left lower lobe, stable since December 2020 but new since July 2020. Subsolid nodular opacities in the right upper lobe appear improved since  December 2020.  The central airways are patent.  Mild bronchial wall  thickening.  No bronchiectasis. PET-CT 8/29/19: HEAD/NECK: Mucosal thickening of bilateral maxillary sinuses and sphenoid sinus.  Carotid artery calcification.  Mild activity at the vocal cords,  likely physiologic.   CHEST: Small hiatal hernia.  Calcification of the thoracic aorta.  Coronary  artery calcification.  Hypermetabolism is demonstrated of left hilar mass  which measures approximately 3.6 x 2.2 cm, SUV maximum 36.7.  Hypermetabolism  of lymph nodes in the subcarinal region.  No hypermetabolic axillary  adenopathy. Within the left lower lobe, a mass is seen measuring 5.4 x 6.3 cm with  hypermetabolism, SUV maximum 40.2.  Scattered linear and ground-glass opacity  bilaterally, likely atelectasis.  No pneumothorax or pleural effusion.   ABDOMEN/PELVIS: Focal hypermetabolism is demonstrated of the sigmoid colon. Mild mural thickening is seen in this vicinity as well as diverticula.  SUV  maximum of the region is 12.9.  Physiologic activity is favored within  kidneys and bladder.  3.3 cm fluid density lesion near the hepatic dome,  likely reflecting cyst.  Additional smaller low-density hepatic lesions, too  small to characterize.  No marked activity is seen above hepatic background.              Chest CT 8/9/19:No significant change in right hilar adenopathy measuring approximately 3.1 x 1.9 cm.  Subcarinal adenopathy appears mildly increased measuring 2.9 x 1.1  cm. There is increased left hilar adenopathy, soft tissue extending peripherally  measuring approximately 4.6 x 2.3 cm. Lungs/pleura: Masslike area of consolidation involving the posterior aspect of the left lower lobe measuring 6.6 x 7.0 cm in size.  Endobronchial opacification is noted posteriorly to the left lung base. There is volume loss and mild bronchiectasis within the right middle lobe slightly progressed from the comparison.  Mild increase in peribronchial opacity inferiorly at the medial aspect of the right lower lobe also noted. New irregular peripheral nodular opacity measuring 1.1 x 1.1 cm noted image 53 superior segment right lower lobe. Scarring noted at the right lung base. Linear opacities at the periphery of the right lung suggest possible prior  Biopsy. Stable 2 mm nodule at the periphery of the right upper lobe.   Upper Abdomen: Areas of low attenuation within the liver appear similar to  the prior study compatible with cysts and also possible hemangiomata.   Bilateral adrenal glands appear grossly unremarkable in appearance.   Soft Tissues/Bones: No destructive bone lesion is noted. CXR 9/16/16: no airspace opacities, normal cardiomediastinal silhouette     CXR 8/27/15: no focal airspace consolidation; improved c/w 6/15    Chest CT 6/26/15: Large bilateral PEs involving all lobes of the lung. No pneumonia. Mild bibasilar atelectasis worse in the left lower  lobe. Mild to moderate bullous changes. Small right pleural effusion. CXR 4/20/15: improved hazy attenuation; hyperinflation    CXR 1/28/15: Increased groundglass attenuation in the right lower lobe and mild bilateral perihilar airspace opacities may represent asymmetric pulmonary edema, atelectasis or developing pneumonia      CXR 9/25/13: The heart size is normal. Probable hyperinflation the lateral view is unchanged and there is no acute pulmonary infiltrate. A focal opacity in the medial right lung base is unchanged consistent with epicardial fat. Chest CT 2/21/13  IMPRESSION- Interval resolution of groundglass \"infiltrates\" in   both lungs suggesting resolution of infectious or inflammatory   pneumonitis over the past 2 months.  Interval decreased size of mediastinal and hilar lymphadenopathy which was probably reactive   lymphadenopathy. New recurrent linear atelectasis anterior medial aspect left upper   lobe similar to exam of March 2012. Small amount of parenchymal   scarring within the lingular segment and medial aspect right   middle lobe. Chest XR (12/30/12)1. Improving bibasilar airspace disease, representing either   atelectasis or pneumonia. 2. Interval improvement in appearance of pulmonary vascular   congestion. 3. Stable bilateral hilar lymphadenopathy. Chest CT(dated 12/29/12): 1. Limited but negative study for the presence of pulmonary   embolism. 2. Multifocal groundglass infiltrate predominantly in the left   lung, not present on the comparison CT chest from March 29, 2012,   nonspecific but may represent multifocal pneumonitis. 3. Small area of consolidation and possible pneumonia in the   lingula. 4. New hilar and mediastinal adenopathy. 5. Mild enlargement of the main pulmonary artery, stable from   March 2012, suggestive of chronic pulmonary arterial hypertension        ONO2 5/20/13 showed significant nocturnal hypoxemia      Assessment:   -Chronic Obstructive Pulmonary Disease severe with AE  -Chronic Respiratory Failure - hypoxemic, Stable  - Dyspnea-severe  -Erythrocytosis- likely secondary to chronic hypoxemia; myeloproliferative disease also possible, f/b Hem/Onc previously  - bilateral PEs 6/15  - obesity- persistent  LE edema  - B cell lymphoma in chest- improving on CT 3/30/20        Plan:   - pred taper and doxy x 7 days   - Inhaled bronchodilator therapy -  Albuterol mdi and  nebs prn. Could not afford spiriva, tudorza- did not tolerate because of vision changes  - Patient is up to date with Pneumococcal vaccine   - Continue home O2 at night 4l and 6Lwith exertion,- OCD for supplemental O2 ;  Based upon repeat 6MWT.    - has handicap placard  - recommend lifelong anticoagulation- remains on coumadin  - had flu vaccine 9/20  - Covid vaccine ASAP  - Oncology follow up- CT f/u per them

## 2021-04-29 ENCOUNTER — ANTI-COAG VISIT (OUTPATIENT)
Dept: FAMILY MEDICINE CLINIC | Age: 81
End: 2021-04-29

## 2021-04-29 DIAGNOSIS — I26.99 OTHER PULMONARY EMBOLISM WITHOUT ACUTE COR PULMONALE, UNSPECIFIED CHRONICITY (HCC): ICD-10-CM

## 2021-04-29 LAB — INR BLD: 2.3

## 2021-04-29 NOTE — PROGRESS NOTES
Unfortunately everything can interact with coumadin.   Since she was started on prednisone then recheck on monday

## 2021-04-30 NOTE — TELEPHONE ENCOUNTER
It does not appear that pt was tested for covid. Called pt, line rings busy X2. Will try back at another time.

## 2021-05-03 ENCOUNTER — ANTI-COAG VISIT (OUTPATIENT)
Dept: FAMILY MEDICINE CLINIC | Age: 81
End: 2021-05-03

## 2021-05-03 DIAGNOSIS — I26.99 OTHER PULMONARY EMBOLISM WITHOUT ACUTE COR PULMONALE, UNSPECIFIED CHRONICITY (HCC): ICD-10-CM

## 2021-05-03 LAB — INR BLD: 2

## 2021-05-03 NOTE — PROGRESS NOTES
Patient called and informed of warfarin scheduled and aware of need to check her INR at home on 5/19/21

## 2021-05-04 ENCOUNTER — CARE COORDINATION (OUTPATIENT)
Dept: CARE COORDINATION | Age: 81
End: 2021-05-04

## 2021-05-04 SDOH — SOCIAL STABILITY: SOCIAL INSECURITY
WITHIN THE LAST YEAR, HAVE TO BEEN RAPED OR FORCED TO HAVE ANY KIND OF SEXUAL ACTIVITY BY YOUR PARTNER OR EX-PARTNER?: NO

## 2021-05-04 SDOH — SOCIAL STABILITY: SOCIAL INSECURITY
WITHIN THE LAST YEAR, HAVE YOU BEEN KICKED, HIT, SLAPPED, OR OTHERWISE PHYSICALLY HURT BY YOUR PARTNER OR EX-PARTNER?: NO

## 2021-05-04 SDOH — SOCIAL STABILITY: SOCIAL INSECURITY: WITHIN THE LAST YEAR, HAVE YOU BEEN HUMILIATED OR EMOTIONALLY ABUSED IN OTHER WAYS BY YOUR PARTNER OR EX-PARTNER?: NO

## 2021-05-04 SDOH — SOCIAL STABILITY: SOCIAL NETWORK
IN A TYPICAL WEEK, HOW MANY TIMES DO YOU TALK ON THE PHONE WITH FAMILY, FRIENDS, OR NEIGHBORS?: MORE THAN THREE TIMES A WEEK

## 2021-05-04 ASSESSMENT — ENCOUNTER SYMPTOMS: DYSPNEA ASSOCIATED WITH: EXERTION

## 2021-05-04 NOTE — CARE COORDINATION
syndrome     [facility] spotted fever 2013    Sciatic nerve pain     Sepsis ([address] 6/22/2015    Sleep apnea     Tobacco abuse 6/22/2015    Vitamin D deficiency      Plan    follow up call in 2 weeks  Evaluate again safety in bathroom with limited mobility   Nutritional support   Chf and COPD education ongoing         Care Coordination Interventions    Program Enrollment: Complex Care  Referral from Primary Care Provider: No  Suggested Interventions and Community Resources  Fall Risk Prevention: In Process (Comment: discussed bathroom safety, life line options, and potential need for COA referral but declined. 5/4/21 trb)  [facility] [address] or Pill Pack: Declined (Comment: managing her own meds )  Occupational Therapy: Declined  Physical Therapy: Declined  Pulmonary Rehab: Not Started  Registered Dietician:  (Comment: chf history, coumadin, concerns with weight loss needs 5/4/21 trb)  Senior Services: Declined (Comment: continues to decline need for referral 5/4/21 trb)  Transportation Support: Completed  Zone Management Tools: In Process (Comment: chf and copd started. 3/18/21 trb)         Goals Addressed                 This Visit's Progress       Care Coordination     Conditions and Symptoms   No change     I will schedule office visits, as directed by my provider. I will keep my appointment or reschedule if I have to cancel. I will notify my provider of any barriers to my plan of care. I will follow my Zone Management tool to seek urgent or emergent care. I will notify my provider of any symptoms that indicate a worsening of my condition. Barriers: none  Plan for overcoming my barriers: home care, care coordination   Confidence: 6/10  Anticipated Goal Completion Date:goal extended to 6/21    Discussed antibiotic therapy and follow up plan with pulmonary. CHF, COPD zone teaching reviewed.  5/4/21 trb       Nutrition Plan   No change     I will work to decrease my weight and improve my diet to prevent further weight gain. Barriers: lack of motivation and lack of support  Plan for overcoming my barriers: educational support mailed   Confidence: 6/10  Anticipated Goal Completion Date: 7/21    Discussed reducing carbohydrate intake and using more healthy choices. Plan to mail diet information and additionally coumadin teaching. 30 lb weight gain over 6 months. 5/4/21 trb          Lifestyle     follow up   On track      I will schedule the recommended follow up visit when leaving the office today, and agree to keep the appointment or to reschedule when I call to cancel. Prior to Admission medications    Medication Sig Start Date End Date Taking? Authorizing Provider   Melatonin 5 MG CAPS Take 1 capsule by mouth nightly as needed   Yes Historical Provider, MD   diphenhydrAMINE (BENADRYL) 25 MG tablet Take 25 mg by mouth every 6 hours as needed for Itching   Yes Historical Provider, MD   predniSONE (DELTASONE) 10 MG tablet Take 40 mg by mouth for 3 days 30 mg for 3 days 20 mg for 3 days 10 mg for 3 days.  4/28/21 5/10/21 Yes Marsha New MD   doxycycline hyclate (VIBRA-TABS) 100 MG tablet Take 1 tablet by mouth 2 times daily for 7 days 4/28/21 5/5/21 Yes Marsha New MD   warfarin (COUMADIN) 5 MG tablet TAKE ONE TABLET BY MOUTH DAILY 2/24/21  Yes TURNER Leavitt CNP   spironolactone (ALDACTONE) 25 MG tablet Take 1 tablet by mouth daily 2/24/21  Yes TURNER Leavitt CNP   torsemide (DEMADEX) 20 MG tablet Take 1 tablet by mouth daily 2/24/21  Yes TURNER Leavitt CNP   albuterol sulfate HFA (VENTOLIN HFA) 108 (90 Base) MCG/ACT inhaler Inhale 2 puffs into the lungs every 6 hours as needed for Wheezing or Shortness of Breath 10/28/20  Yes Marsha New MD   polyethylene glycol (GLYCOLAX) 17 g packet Take 17 g by mouth daily 10/23/20  Yes TURNER Leavitt CNP   traZODone (DESYREL) 50 MG tablet Take 1 tablet by mouth nightly 10/23/20  Yes TURNER Nova CNP   warfarin (COUMADIN) 5 MG tablet Take 5 mg by mouth Except for Wednesday 7.5mg   Yes Historical Provider, MD   vitamin B-6 (PYRIDOXINE) 50 MG tablet Take by mouth daily   Yes Historical Provider, MD   albuterol (PROVENTIL) (2.5 MG/3ML) 0.083% nebulizer solution Take 3 mLs by nebulization every 6 hours as needed for Wheezing or Shortness of Breath DX COPD J44.9 4/13/20  Yes Mayra Bernabe MD   Acetaminophen (TYLENOL ARTHRITIS PAIN PO) Take by mouth   Yes Historical Provider, MD   OXYGEN 4.5 L 1/5/17  Yes Tia Johansen MD   Cholecalciferol (VITAMIN D-3 PO) Take 2,000 Units by mouth daily    Yes Historical Provider, MD       Future Appointments   Date Time Provider Allyssa Julianna   5/19/2021  1:20 PM TURNER Nova CNP [facility]   6/2/2021 10:10 AM Coleen Cornejo MD [facility]   8/27/2021 11:15 AM [facility]     ,   Congestive Heart Failure Assessment    Are you currently restricting fluids?: 2000cc  Do you understand a low sodium diet?: Yes  Do you understand how to read food labels?: Yes  How many restaurant meals do you eat per week?: 1-2, 0  Do you salt your food before tasting it?: No     Swelling (worse than baseline) in hands, feet/legs or around abdomen      Symptoms:  CHF associated leg swelling: Pos      Symptom course: worsening  Weight trend: fluctuating minimally (Comment: she does report that she has gained overall weight of 30 lbs over the last 6 months)  Salt intake watch compared to last visit: stable      and   COPD Assessment    Does the patient understand envrionmental exposure?: Yes  Is the patient able to verbalize Rescue vs. Long Acting medications?: Yes  Does the patient have a nebulizer?: Yes  Does the patient use a space with inhaled medications?: No     Other symptoms causing concern, Increase in cough         Symptoms:     Symptom course: improving  Breathlessness: exertion  Change in chronic cough?: Increased  Change in sputum?: Increased  Self Monitoring - SaO2: Yes  Baseline SaO2 Reading:  (Comment: 96 -97% on 4l/nc)  Have you had a recent diagnosis of pneumonia either by PCP or at a hospital?: No

## 2021-05-19 ENCOUNTER — OFFICE VISIT (OUTPATIENT)
Dept: FAMILY MEDICINE CLINIC | Age: 81
End: 2021-05-19
Payer: MEDICARE

## 2021-05-19 VITALS
BODY MASS INDEX: 50.24 KG/M2 | HEIGHT: 62 IN | OXYGEN SATURATION: 93 % | HEART RATE: 92 BPM | SYSTOLIC BLOOD PRESSURE: 118 MMHG | DIASTOLIC BLOOD PRESSURE: 64 MMHG | WEIGHT: 273 LBS

## 2021-05-19 DIAGNOSIS — F33.1 MODERATE EPISODE OF RECURRENT MAJOR DEPRESSIVE DISORDER (HCC): Primary | ICD-10-CM

## 2021-05-19 DIAGNOSIS — Z11.59 ENCOUNTER FOR HEPATITIS C SCREENING TEST FOR LOW RISK PATIENT: ICD-10-CM

## 2021-05-19 DIAGNOSIS — F41.9 ANXIETY: ICD-10-CM

## 2021-05-19 DIAGNOSIS — I50.9 CHRONIC CONGESTIVE HEART FAILURE, UNSPECIFIED HEART FAILURE TYPE (HCC): ICD-10-CM

## 2021-05-19 DIAGNOSIS — Z13.31 POSITIVE DEPRESSION SCREENING: ICD-10-CM

## 2021-05-19 DIAGNOSIS — I48.0 PAROXYSMAL ATRIAL FIBRILLATION (HCC): ICD-10-CM

## 2021-05-19 DIAGNOSIS — E55.9 VITAMIN D DEFICIENCY: ICD-10-CM

## 2021-05-19 DIAGNOSIS — E78.5 HYPERLIPIDEMIA, UNSPECIFIED HYPERLIPIDEMIA TYPE: ICD-10-CM

## 2021-05-19 DIAGNOSIS — I20.9 ANGINA PECTORIS (HCC): ICD-10-CM

## 2021-05-19 DIAGNOSIS — I87.2 VENOUS (PERIPHERAL) INSUFFICIENCY: ICD-10-CM

## 2021-05-19 DIAGNOSIS — F51.01 PRIMARY INSOMNIA: ICD-10-CM

## 2021-05-19 LAB
BASOPHILS ABSOLUTE: 0.1 K/UL (ref 0–0.2)
BASOPHILS RELATIVE PERCENT: 0.7 %
EOSINOPHILS ABSOLUTE: 0.2 K/UL (ref 0–0.6)
EOSINOPHILS RELATIVE PERCENT: 3.4 %
HCT VFR BLD CALC: 34.3 % (ref 36–48)
HEMOGLOBIN: 10.7 G/DL (ref 12–16)
INTERNATIONAL NORMALIZATION RATIO, POC: 2.2
LYMPHOCYTES ABSOLUTE: 1.3 K/UL (ref 1–5.1)
LYMPHOCYTES RELATIVE PERCENT: 19.5 %
MCH RBC QN AUTO: 25 PG (ref 26–34)
MCHC RBC AUTO-ENTMCNC: 31.1 G/DL (ref 31–36)
MCV RBC AUTO: 80.4 FL (ref 80–100)
MONOCYTES ABSOLUTE: 0.7 K/UL (ref 0–1.3)
MONOCYTES RELATIVE PERCENT: 10.2 %
NEUTROPHILS ABSOLUTE: 4.5 K/UL (ref 1.7–7.7)
NEUTROPHILS RELATIVE PERCENT: 66.2 %
PDW BLD-RTO: 16.8 % (ref 12.4–15.4)
PLATELET # BLD: 273 K/UL (ref 135–450)
PMV BLD AUTO: 7.1 FL (ref 5–10.5)
PROTHROMBIN TIME, POC: NORMAL
RBC # BLD: 4.27 M/UL (ref 4–5.2)
WBC # BLD: 6.7 K/UL (ref 4–11)

## 2021-05-19 PROCEDURE — G8427 DOCREV CUR MEDS BY ELIG CLIN: HCPCS | Performed by: NURSE PRACTITIONER

## 2021-05-19 PROCEDURE — 1090F PRES/ABSN URINE INCON ASSESS: CPT | Performed by: NURSE PRACTITIONER

## 2021-05-19 PROCEDURE — 85610 PROTHROMBIN TIME: CPT | Performed by: NURSE PRACTITIONER

## 2021-05-19 PROCEDURE — 36415 COLL VENOUS BLD VENIPUNCTURE: CPT | Performed by: NURSE PRACTITIONER

## 2021-05-19 PROCEDURE — G8417 CALC BMI ABV UP PARAM F/U: HCPCS | Performed by: NURSE PRACTITIONER

## 2021-05-19 PROCEDURE — 99214 OFFICE O/P EST MOD 30 MIN: CPT | Performed by: NURSE PRACTITIONER

## 2021-05-19 PROCEDURE — 1036F TOBACCO NON-USER: CPT | Performed by: NURSE PRACTITIONER

## 2021-05-19 PROCEDURE — G8400 PT W/DXA NO RESULTS DOC: HCPCS | Performed by: NURSE PRACTITIONER

## 2021-05-19 PROCEDURE — G8431 POS CLIN DEPRES SCRN F/U DOC: HCPCS | Performed by: NURSE PRACTITIONER

## 2021-05-19 PROCEDURE — 1123F ACP DISCUSS/DSCN MKR DOCD: CPT | Performed by: NURSE PRACTITIONER

## 2021-05-19 PROCEDURE — 4040F PNEUMOC VAC/ADMIN/RCVD: CPT | Performed by: NURSE PRACTITIONER

## 2021-05-19 RX ORDER — BUPROPION HYDROCHLORIDE 150 MG/1
150 TABLET ORAL EVERY MORNING
Qty: 30 TABLET | Refills: 1 | Status: SHIPPED | OUTPATIENT
Start: 2021-05-19 | End: 2021-06-24 | Stop reason: SDUPTHER

## 2021-05-19 RX ORDER — WARFARIN SODIUM 5 MG/1
5 TABLET ORAL DAILY
Qty: 30 TABLET | Refills: 5 | Status: SHIPPED | OUTPATIENT
Start: 2021-05-19 | End: 2021-06-24 | Stop reason: SDUPTHER

## 2021-05-19 ASSESSMENT — PATIENT HEALTH QUESTIONNAIRE - PHQ9
9. THOUGHTS THAT YOU WOULD BE BETTER OFF DEAD, OR OF HURTING YOURSELF: 0
SUM OF ALL RESPONSES TO PHQ9 QUESTIONS 1 & 2: 4
3. TROUBLE FALLING OR STAYING ASLEEP: 3
6. FEELING BAD ABOUT YOURSELF - OR THAT YOU ARE A FAILURE OR HAVE LET YOURSELF OR YOUR FAMILY DOWN: 3
SUM OF ALL RESPONSES TO PHQ QUESTIONS 1-9: 15
10. IF YOU CHECKED OFF ANY PROBLEMS, HOW DIFFICULT HAVE THESE PROBLEMS MADE IT FOR YOU TO DO YOUR WORK, TAKE CARE OF THINGS AT HOME, OR GET ALONG WITH OTHER PEOPLE: 1
SUM OF ALL RESPONSES TO PHQ QUESTIONS 1-9: 15
8. MOVING OR SPEAKING SO SLOWLY THAT OTHER PEOPLE COULD HAVE NOTICED. OR THE OPPOSITE, BEING SO FIGETY OR RESTLESS THAT YOU HAVE BEEN MOVING AROUND A LOT MORE THAN USUAL: 0

## 2021-05-19 ASSESSMENT — ENCOUNTER SYMPTOMS
SHORTNESS OF BREATH: 1
EYES NEGATIVE: 1
GASTROINTESTINAL NEGATIVE: 1
NAUSEA: 0
BLOOD IN STOOL: 0
ANAL BLEEDING: 0
ALLERGIC/IMMUNOLOGIC NEGATIVE: 1
COUGH: 1
ABDOMINAL PAIN: 0

## 2021-05-19 NOTE — PROGRESS NOTES
[address] [place] [address]  [address] [place] [id number]  Dept: [phone]  Dept Fax: [phone]  Loc: Patricia Winters is a 68 y.o. female who presents today for her medical conditions/complaints as noted below. Barbara Richardson is c/o of Anxiety, Depression, Insomnia, and Atrial Fibrillation       Subjective:     Chief Complaint   Patient presents with    Anxiety    Depression    Insomnia    Atrial Fibrillation       HPI   The patient does continue on Coumadin for chronic A. fib. She denies any unusual bruising or bleeding. She continues to watch her diet. Patient continues to struggle with her weight. She states that she knows she overeats because she is bored and is not able to be very physically active. Patient is complaining of bilateral lower extremity edema. She feels like her feet Demadex and spironolactone are no longer working. She states that her edema in her bilateral lower extremities is the same when she goes to bed as when she gets up the next morning. The patient is watching her salt. She does have CHF and has been doing daily weights with no significant change in her weights. She is not using knee-high KATHI hose or elevating her legs on a regular basis. She is also very sedentary because of her pulmonary fibrosis    Hyperlipidemia:  Patient is  following a low fat, low cholesterol diet. She is not exercising regularly. OTC Supplements: none.   The 10-year ASCVD risk score (Gustavo Rosenbaum et al., 2013) is: 24.4%    Values used to calculate the score:      Age: 68 years      Sex: Female      Is Non- : No      Diabetic: No      Tobacco smoker: No      Systolic Blood Pressure: 016 mmHg      Is BP treated: Yes      HDL Cholesterol: 53 mg/dL      Total Cholesterol: 231 mg/dL    Lab Results   Component Value Date    CHOL 231 (H) 06/15/2020    TRIG 234 (H) 06/15/2020    HDL 53 06/15/2020 - 1 2 1 1     Interpretation of Total Score Total Score Depression Severity: 1-4 = Minimal depression, 5-9 = Mild depression, 10-14 = Moderate depression, 15-19 = Moderately severe depression, 20-27 = Severe depression    Past Medical History:   Diagnosis Date    Acute exacerbation of chronic obstructive pulmonary disease (COPD) ([facility]     Acute on chronic respiratory failure (HCC)     Anticoagulant long-term use     Atrial fibrillation ([facility]     Atrial fibrillation with rapid ventricular response ([facility] [facility] 6/23/2015    B-cell lymphoma ([facility]     CAP (community acquired pneumonia) 1/29/2015    Carotid artery stenosis     CHF (congestive heart failure) (HCC)     Chronic back pain     Congenital heart disease     Depression     Diverticulitis     Fistula     anal-vaginal fistula    HCAP (healthcare-associated pneumonia)     Hx of blood clots     Hyperlipidemia     Hypertension     IFG (impaired fasting glucose) 9/13/2018    Non-Hodgkins lymphoma (HCC)     Obesity     Panic disorder     Peripheral vascular disease (HCC)     Primary osteoarthritis of both knees 12/2/2020    Pulmonary embolism ([facility] [address]     6/15    Rectal vaginal fistula     Restless legs syndrome     [facility] spotted fever 2013    Sciatic nerve pain     Sepsis ([facility] [address] [facility] 6/22/2015    Sleep apnea     Tobacco abuse 6/22/2015    Vitamin D deficiency          Review of Systems   Constitutional: Positive for appetite change. Negative for fatigue and unexpected weight change. HENT: Negative. Eyes: Negative. Respiratory: Positive for cough and shortness of breath. Cardiovascular: Positive for leg swelling. Negative for chest pain and palpitations. Gastrointestinal: Negative. Negative for abdominal pain, anal bleeding, blood in stool and nausea. Endocrine: Negative. Genitourinary: Negative. Negative for hematuria. Musculoskeletal: Negative. Skin: Negative. Negative for rash. Allergic/Immunologic: Negative. Neurological: Negative. Negative for dizziness, syncope, light-headedness and numbness. Hematological: Negative. Does not bruise/bleed easily. Psychiatric/Behavioral: Positive for dysphoric mood and sleep disturbance. Negative for self-injury and suicidal ideas. The patient is nervous/anxious. All other systems reviewed and are negative. Past Medical History:   Diagnosis Date    Acute exacerbation of chronic obstructive pulmonary disease (COPD) ([address]     Acute on chronic respiratory failure (HCC)     Anticoagulant long-term use     Atrial fibrillation ([address]     Atrial fibrillation with rapid ventricular response ([address] 6/23/2015    B-cell lymphoma ([address]     CAP (community acquired pneumonia) 1/29/2015    Carotid artery stenosis     CHF (congestive heart failure) (HCC)     Chronic back pain     Congenital heart disease     Depression     Diverticulitis     Fistula     anal-vaginal fistula    HCAP (healthcare-associated pneumonia)     Hx of blood clots     Hyperlipidemia     Hypertension     IFG (impaired fasting glucose) 9/13/2018    Non-Hodgkins lymphoma (HCC)     Obesity     Panic disorder     Peripheral vascular disease (HCC)     Primary osteoarthritis of both knees 12/2/2020    Pulmonary embolism ([address]     6/15    Rectal vaginal fistula     Restless legs syndrome     [facility] spotted fever 2013    Sciatic nerve pain     Sepsis ([address] 6/22/2015    Sleep apnea     Tobacco abuse 6/22/2015    Vitamin D deficiency      Family History   Problem Relation Age of Onset    Cancer Mother 43        lung cancer    Diabetes Sister     Cancer Daughter 43        lung cancer    Asthma Son     Hypertension Son     Asthma Grandchild     Hypertension Son     Emphysema Neg Hx     Heart Failure Neg Hx      Past Surgical History:   Procedure Laterality Date    APPENDECTOMY  1963    BRONCHOSCOPY N/A 8/12/2019    EBUS WF W/ANES.  performed by Vincent [facility] at [facility] ENDOSCOPY    BRONCHOSCOPY  2019    BRONCHOSCOPY ALVEOLAR LAVAGE performed by Aneesh Saavedra MD at [address] [place] [address]  2019    BRONCHOSCOPY BRUSHINGS performed by Aneesh Saavedra MD at [address] [place] [address]  2019    BRONCHOSCOPY/TRANSBRONCHIAL LUNG BIOPSY performed by Aneesh Saavedra MD at [address] [place] [address]  2019    BRONCHOSCOPY/TRANSBRONCHIAL NEEDLE BIOPSY performed by Aneesh Saavedra MD at [address] [place] [address]  2019    BRONCHOSCOPY/TRANSBRONCHIAL NEEDLE BIOPSY ADDL LOBE performed by Aneesh Saavedra MD at [address] [place] [address]      COLONOSCOPY N/A 2019    COLONOSCOPY POLYPECTOMY SNARE/COLD BIOPSY showed diverticulilosis performed by Marilou Peraza MD at [facility]      umbilical--Dr Hou    [address] N/A 2020    PORT INSERTION performed by Obed Whipple MD at [facility] [place] [facility] TUNNELED VENOUS PORT PLACEMENT Left 2020    Port Insertion ENRIQUETA Chest w. Dr Echo Albright 20 Vaccess CT Injectable [id number] 3/31/21     Social History     Socioeconomic History    Marital status:      Spouse name: Not on file    Number of children: 3    Years of education: Not on file    Highest education level: Not on file   Occupational History    Occupation: retired   Tobacco Use    Smoking status: Former Smoker     Packs/day: 0.33     Years: 50.00     Pack years: 16.50     Types: Cigarettes     Quit date: 2015     Years since quittin.9    Smokeless tobacco: Never Used   Vaping Use    Vaping Use: Never used   Substance and Sexual Activity    Alcohol use: Not Currently     Alcohol/week: 0.0 standard drinks    Drug use: No    Sexual activity: Not Currently   Other Topics Concern    Not on file   Social History Narrative    Not on file     Social Determinants of Health     Financial Resource Strain:     Difficulty of Paying Living Expenses:    Food Insecurity:     Worried About Running Out of Food in the Last Year:     [address] in the Last Year:    Transportation Needs:     Lack of Transportation (Medical):      Lack of Transportation (Non-Medical):    Physical Activity:     Days of Exercise per Week:     Minutes of Exercise per Session:    Stress:     Feeling of Stress :    Social Connections: Unknown    Frequency of Communication with Friends and Family: More than three times a week    Frequency of Social Gatherings with Friends and Family: More than three times a week    Attends Congregation Services: Not on file    Active Member of [address] [place] [address] or Organizations: Not on file    Attends Club or Organization Meetings: Not on file    Marital Status: Not on file   Intimate Partner Violence: Not At Risk    Fear of Current or Ex-Partner: No    Emotionally Abused: No    Physically Abused: No    Sexually Abused: No     Current Outpatient Medications   Medication Sig Dispense Refill    Melatonin 5 MG CAPS Take 1 capsule by mouth nightly as needed      diphenhydrAMINE (BENADRYL) 25 MG tablet Take 25 mg by mouth every 6 hours as needed for Itching      warfarin (COUMADIN) 5 MG tablet TAKE ONE TABLET BY MOUTH DAILY 60 tablet 2    spironolactone (ALDACTONE) 25 MG tablet Take 1 tablet by mouth daily 30 tablet 2    torsemide (DEMADEX) 20 MG tablet Take 1 tablet by mouth daily 30 tablet 2    albuterol sulfate HFA (VENTOLIN HFA) 108 (90 Base) MCG/ACT inhaler Inhale 2 puffs into the lungs every 6 hours as needed for Wheezing or Shortness of Breath 2 Inhaler 5    polyethylene glycol (GLYCOLAX) 17 g packet Take 17 g by mouth daily 30 each 1    traZODone (DESYREL) 50 MG tablet Take 1 tablet by mouth nightly 30 tablet 1    warfarin (COUMADIN) 5 MG tablet Take 5 mg by mouth Except for Wednesday 7.5mg      vitamin B-6 (PYRIDOXINE) 50 MG tablet Take by mouth daily      albuterol (PROVENTIL) (2.5 MG/3ML) 0.083% nebulizer solution Take 3 mLs by nebulization every 6 hours as needed for Wheezing or Shortness of Breath DX COPD J44.9 360 mL 5    Acetaminophen (TYLENOL ARTHRITIS PAIN PO) Take by mouth      OXYGEN 4.5 L      Cholecalciferol (VITAMIN D-3 PO) Take 2,000 Units by mouth daily        No current facility-administered medications for this visit. No changes in past medical history, past surgical history, social history, orfamily history were noted during the patient encounter unless specifically listed above. All updates of past medical history, past surgical history, social history, or family history were reviewed personally by me duringthe office visit. All problems listed in the assessment are stable unless noted otherwise. Medication profile reviewed personally by me during the office visit. Medication side effects and possible impairments frommedications were discussed as applicable. Objective:     Physical Exam  Vitals and nursing note reviewed. Constitutional:       General: She is not in acute distress. Appearance: Normal appearance. She is well-developed. She is obese. Interventions: Nasal cannula in place. HENT:      Head: Normocephalic and atraumatic. Right Ear: Tympanic membrane, ear canal and external ear normal.      Left Ear: Tympanic membrane, ear canal and external ear normal.      Nose: Nose normal.      Mouth/Throat:      Pharynx: Uvula midline. No oropharyngeal exudate. Eyes:      General: Lids are normal.      Conjunctiva/sclera: Conjunctivae normal.      Pupils: Pupils are equal, round, and reactive to light. Neck:      Thyroid: No thyromegaly. Vascular: No carotid bruit or JVD. Cardiovascular:      Rate and Rhythm: Normal rate and regular rhythm. Pulses: Normal pulses. Radial pulses are 2+ on the right side and 2+ on the left side. Dorsalis pedis pulses are 2+ on the right side and 2+ on the left side. Posterior tibial pulses are 2+ on the right side and 2+ on the left side. Heart sounds: Normal heart sounds. No murmur heard. No friction rub. No gallop. Pulmonary:      Effort: Pulmonary effort is normal.      Breath sounds: Normal breath sounds. Abdominal:      General: Bowel sounds are normal.      Palpations: Abdomen is soft. There is no mass. Tenderness: There is no abdominal tenderness. Musculoskeletal:         General: Normal range of motion. Cervical back: Normal range of motion and neck supple. Right lower le+ Edema present. Left lower le+ Edema present. Lymphadenopathy:      Head:      Right side of head: No submandibular adenopathy. Left side of head: No submandibular adenopathy. Cervical: No cervical adenopathy. Skin:     General: Skin is warm and dry. Findings: No lesion or rash. Neurological:      Mental Status: She is alert and oriented to person, place, and time. GCS: GCS eye subscore is 4. GCS verbal subscore is 5. GCS motor subscore is 6. Gait: Gait abnormal (Ambulates with walker). Psychiatric:         Speech: Speech normal.         Behavior: Behavior normal.         Thought Content: Thought content normal.         Judgment: Judgment normal.         /64 (Site: Left Upper Arm, Position: Sitting, Cuff Size: Large Adult)   Pulse 92   Ht 5' 2\" (1.575 m)   Wt 273 lb (123.8 kg)   LMP  (LMP Unknown)   SpO2 93%   BMI 49.93 kg/m²   Body mass index is 49.93 kg/m².     BP Readings from Last 2 Encounters:   21 118/64   21 128/64       Wt Readings from Last 3 Encounters:   21 273 lb (123.8 kg)   21 271 lb (122.9 kg)   20 270 lb (122.5 kg)       Lab Review   Anti-coag visit on 2021   Component Date Value    INR 2021 2.00    Anti-coag visit on 2021   Component Date Value    INR 2021 2.30    Anti-coag visit on 04/15/2021   Component Date Value    INR 04/15/2021 2.40 Telephone on 04/15/2021   Component Date Value    INR 04/15/2021 2.40    Anti-coag visit on 04/12/2021   Component Date Value    INR 04/12/2021 4.70    Anti-coag visit on 04/08/2021   Component Date Value    INR 04/08/2021 1.50        No results found for this visit on 05/19/21. Assessment:       1. Moderate episode of recurrent major depressive disorder (HCC)    2. Paroxysmal atrial fibrillation ([address]    3. BMI 45.0-49.9, adult ([address]    4. Chronic congestive heart failure, unspecified heart failure type ([address]    5. Angina pectoris ([address]    6. Anxiety    7. Venous (peripheral) insufficiency    8. Hyperlipidemia, unspecified hyperlipidemia type    9. Primary insomnia    10. Vitamin D deficiency    11. Encounter for hepatitis C screening test for low risk patient    12. Positive depression screening        Results for POC orders placed in visit on 05/19/21   POCT INR   Result Value Ref Range    INR 2.2     Protime              Plan:       [place] was seen today for anxiety, depression, insomnia and atrial fibrillation. Diagnoses and all orders for this visit:    Moderate episode of recurrent major depressive disorder ([address]  Start  -     buPROPion (WELLBUTRIN XL) 150 MG extended release tablet; Take 1 tablet by mouth every morning  Continue trazodone  Educated if patient develops SI/HI/zulay to call 911 or go to ER. Discussed use, benefit, risks and side effects of prescribed medications. Barriers to compliance discussed. All patient questions answered. Pt voiced understanding. Paroxysmal atrial fibrillation (HCC)  -     warfarin (COUMADIN) 5 MG tablet; Take 1 tablet by mouth daily Except for Wednesday 7.5mg  -     POCT INR   INR 2.2. Informed patient of current INR during office visit. Continue current warfarin dosage schedule and return 3 week(s).     BMI 45.0-49.9, adult [facility])   Discussed healthy lifestyle choices to attempt to incorporate into daily routine to attempt to obtain and maintain a healthy weight. General weight loss/lifestyle modification strategies discussed (elicit support from others; identify saboteurs; non-food rewards, etc). Discussed healthy nutritional options as well. Diet interventions: moderate (500 kCal/d) deficit diet. Disscussed trying to incorporate routine physical activity into daily regimen. Informal exercise measures discussed, e.g. taking stairs instead of elevator. Regular aerobic exercise program discussed. Surgical Procedure: was not discussed  Behavioral treatment: discussed commercial programs (Weight Watchers, Nutrisystem,etc), Overeaters Anonymous, Slim Fast, stress management and TOPS. Discussed consequences of obesity in terms of medical conditions that may develop in the future. Patient verbalized understanding. Again the patient does admit to having a hard time not eating because she is bored and not physically able to do a lot. He does try to watch her diet. Hopefully the Wellbutrin being used to help with her depression and anxiety will also help control her appetite since Wellbutrin can target the hunger center in the brain      Chronic congestive heart failure, unspecified heart failure type (HCC)  · Limit sodium to 2,000 milligrams (mg) a day or less (less than 1 teaspoon of salt a day), including all the salt you eat in cooking or in packaged foods. ·  avoid processed foods (they are high in sodium)  · Avoid fast food and restaurant meals because they tend to be very high in sodium. · Fluid restriction:  None at this time  · Weigh self without clothing at the same time each day. · Record  Weight and bring to office visits   · Call  if you have a sudden weight gain, such as more than 2 to 3 pounds in a day or 5 pounds in a week. ·  A sudden weight gain may mean that your heart failure is getting worse. · If you smoke you should quit  · Limit alcohol to 2 drinks a day for men and 1 drink a day for women.   · Avoid getting sick from colds and the flu. Get a pneumococcal vaccine and influenza vaccine. Angina pectoris ([address]  Condition appears stable with current medication regimen. Continue current medications. No reported or noted side effects of medication. Will continue to monitor at routine intervals as appropriate. Anxiety  -     HEPATITIS C ANTIBODY  Start   -     buPROPion (WELLBUTRIN XL) 150 MG extended release tablet; Take 1 tablet by mouth every morning  Patient counselled on proper usage of medication, and was advised of potential side effects and risks associated with this medication. The patient expressed understanding of above and agreed to proceed with treatment. Venous (peripheral) insufficiency  I have explained the etiology of venous insufficiency to the patient today, who expresses understanding. The feet will be elevated above the level of the heart whenever possible, and regular exercise is encouraged. Encouraged to wear venous-support stockings. Avoid greater than 10 minute periods of leaving the feet hang down without getting up to move or elevating feet at/above heart level. Avoid salt/sodium. May need to limit fluid intake (all fluids regardless of composition) to no more than 48 oz in 24 hours. Patient also states that she will get knee-high compression stocks things with a pressure of 20-30 mmhg  She was advised to put on the knee-high stockings first thing in the morning and remove at bedtime    Hyperlipidemia, unspecified hyperlipidemia type  -     Lipid Panel  -     Comprehensive Metabolic Panel  -     CBC Auto Differential  -     TSH with Reflex  The patient is asked to make an attempt to improve diet and exercise patterns to aid in medical management of this problem. Primary insomnia  Discussed sleep hygiene measures including regular sleep schedule, optimal sleep environment, and relaxing presleep rituals. Avoid daytime naps. Avoid caffeine after noon. Avoid excess alcohol.   Avoid tobacco.

## 2021-05-20 LAB
A/G RATIO: 1.7 (ref 1.1–2.2)
ALBUMIN SERPL-MCNC: 3.9 G/DL (ref 3.4–5)
ALP BLD-CCNC: 92 U/L (ref 40–129)
ALT SERPL-CCNC: 13 U/L (ref 10–40)
ANION GAP SERPL CALCULATED.3IONS-SCNC: 13 MMOL/L (ref 3–16)
AST SERPL-CCNC: 15 U/L (ref 15–37)
BILIRUB SERPL-MCNC: 0.3 MG/DL (ref 0–1)
BUN BLDV-MCNC: 11 MG/DL (ref 7–20)
CALCIUM SERPL-MCNC: 8.8 MG/DL (ref 8.3–10.6)
CHLORIDE BLD-SCNC: 97 MMOL/L (ref 99–110)
CHOLESTEROL, TOTAL: 249 MG/DL (ref 0–199)
CO2: 33 MMOL/L (ref 21–32)
CREAT SERPL-MCNC: 0.6 MG/DL (ref 0.6–1.2)
GFR AFRICAN AMERICAN: >60
GFR NON-AFRICAN AMERICAN: >60
GLOBULIN: 2.3 G/DL
GLUCOSE BLD-MCNC: 130 MG/DL (ref 70–99)
HDLC SERPL-MCNC: 63 MG/DL (ref 40–60)
HEPATITIS C ANTIBODY INTERPRETATION: NORMAL
LDL CHOLESTEROL CALCULATED: 147 MG/DL
POTASSIUM SERPL-SCNC: 4.4 MMOL/L (ref 3.5–5.1)
SODIUM BLD-SCNC: 143 MMOL/L (ref 136–145)
TOTAL PROTEIN: 6.2 G/DL (ref 6.4–8.2)
TRIGL SERPL-MCNC: 195 MG/DL (ref 0–150)
TSH REFLEX: 1.66 UIU/ML (ref 0.27–4.2)
VITAMIN D 25-HYDROXY: 14.3 NG/ML
VLDLC SERPL CALC-MCNC: 39 MG/DL

## 2021-05-24 ENCOUNTER — TELEPHONE (OUTPATIENT)
Dept: FAMILY MEDICINE CLINIC | Age: 81
End: 2021-05-24

## 2021-05-25 RX ORDER — EZETIMIBE 10 MG/1
10 TABLET ORAL DAILY
Qty: 30 TABLET | Refills: 5 | Status: SHIPPED | OUTPATIENT
Start: 2021-05-25 | End: 2021-06-24 | Stop reason: SDUPTHER

## 2021-05-25 NOTE — TELEPHONE ENCOUNTER
Pt said she only been taking 1000 iu vit d daily    Pt said she is ok with trying zetia as long as its not to expensive

## 2021-06-02 ENCOUNTER — OFFICE VISIT (OUTPATIENT)
Dept: ORTHOPEDIC SURGERY | Age: 81
End: 2021-06-02
Payer: MEDICARE

## 2021-06-02 VITALS — BODY MASS INDEX: 50.24 KG/M2 | WEIGHT: 273 LBS | HEIGHT: 62 IN

## 2021-06-02 DIAGNOSIS — M17.0 PRIMARY OSTEOARTHRITIS OF BOTH KNEES: Primary | ICD-10-CM

## 2021-06-02 PROCEDURE — 20610 DRAIN/INJ JOINT/BURSA W/O US: CPT | Performed by: ORTHOPAEDIC SURGERY

## 2021-06-02 RX ORDER — METHYLPREDNISOLONE ACETATE 40 MG/ML
80 INJECTION, SUSPENSION INTRA-ARTICULAR; INTRALESIONAL; INTRAMUSCULAR; SOFT TISSUE ONCE
Status: COMPLETED | OUTPATIENT
Start: 2021-06-02 | End: 2021-06-02

## 2021-06-02 RX ORDER — BUPIVACAINE HYDROCHLORIDE 2.5 MG/ML
14 INJECTION, SOLUTION INFILTRATION; PERINEURAL ONCE
Status: COMPLETED | OUTPATIENT
Start: 2021-06-02 | End: 2021-06-02

## 2021-06-02 RX ADMIN — BUPIVACAINE HYDROCHLORIDE 35 MG: 2.5 INJECTION, SOLUTION INFILTRATION; PERINEURAL at 10:15

## 2021-06-02 RX ADMIN — METHYLPREDNISOLONE ACETATE 80 MG: 40 INJECTION, SUSPENSION INTRA-ARTICULAR; INTRALESIONAL; INTRAMUSCULAR; SOFT TISSUE at 10:15

## 2021-06-02 NOTE — PROGRESS NOTES
She presents today requesting cortisone viscosupplementation junctions to both knees. She does have osteoarthritis. Recommendation is for viscosupplementation using Durolane. The left and Right knee were injected with 3 ml of Durolane from an anterolateral joint line approach using a 22-gauge needle under sterile Betadine prep, using ethyl chloride as a topical refrigerant, for a diagnosis of osteoarthritis. The patient appeared to tolerate it well. The patient should return here in 2 months.

## 2021-06-10 ENCOUNTER — ANTI-COAG VISIT (OUTPATIENT)
Dept: FAMILY MEDICINE CLINIC | Age: 81
End: 2021-06-10

## 2021-06-10 DIAGNOSIS — I26.99 OTHER PULMONARY EMBOLISM WITHOUT ACUTE COR PULMONALE, UNSPECIFIED CHRONICITY (HCC): Primary | ICD-10-CM

## 2021-06-10 DIAGNOSIS — R73.09 ELEVATED GLUCOSE LEVEL: ICD-10-CM

## 2021-06-10 LAB — INR BLD: 1.7

## 2021-06-11 ENCOUNTER — CARE COORDINATION (OUTPATIENT)
Dept: CARE COORDINATION | Age: 81
End: 2021-06-11

## 2021-06-11 NOTE — CARE COORDINATION
Ambulatory Care Coordination Note  6/11/2021  CM Risk Score: 6  Charlson 10 Year Mortality Risk Score: 100%     ACC: Betty Benavides RN    Summary Note: ACM completed follow up call with patient. She is continuing to struggle with loss. She has had a progressive weight gain over the last year of 30 lbs. She attributes it boredom and inactivity. She has knee pain bilateral and just received injections from ortho to assist with this. The pain is better. We have discussed options for her to increase her activity such a stationary exercises, PT referral to assist with mobility, or a more formal exercise plan such as [employer]- swimming. She worries about the water classes and where she would place her oxygent tank. She does have silver sneakers  As a benefit to use. She has been trying to stay in out of the heat but move about the apartment more. She also has been trying to walk around he porch and stay outside when weather permits. Patient has concerns about progression to diabetes. She was pre diabetic in past and now has been having excessive thirst. She is scheduled for follow up labs next week     Lab Results   Component Value Date    LABA1C 6.3 06/15/2020    LABA1C 6.2 04/11/2019    LABA1C 6.3 06/13/2018     Lab Results   Component Value Date    .1 06/15/2020    .2 04/11/2019    .1 06/13/2018     Plan   follow up call in 1 week   Continue to working on increasing activity   Assess fall safety   Continue to assess for need to connect to additional community services. Continue with weight loss efforts and dietary changes as indicated  Past history or pre diabetes?     Past Medical History:   Diagnosis Date    Acute exacerbation of chronic obstructive pulmonary disease (COPD) ([address]     Acute on chronic respiratory failure (HCC)     Anticoagulant long-term use     Atrial fibrillation (HCC)     Atrial fibrillation with rapid ventricular response ([address] 6/23/2015    B-cell lymphoma ([address]     CAP (community acquired pneumonia) 1/29/2015    Carotid artery stenosis     CHF (congestive heart failure) (HCC)     Chronic back pain     Congenital heart disease     Depression     Diverticulitis     Fistula     anal-vaginal fistula    HCAP (healthcare-associated pneumonia)     Hx of blood clots     Hyperlipidemia     Hypertension     IFG (impaired fasting glucose) 9/13/2018    Non-Hodgkins lymphoma (HCC)     Obesity     Panic disorder     [facility]     Primary osteoarthritis of both knees 12/2/2020    Pulmonary embolism ([facility]     6/15    Rectal vaginal fistula     Restless legs syndrome     [facility] spotted fever 2013    Sciatic nerve pain     Sepsis ([facility] 6/22/2015    Sleep apnea     Tobacco abuse 6/22/2015    Vitamin D deficiency            Care Coordination Interventions    Program Enrollment: Complex Care  Referral from Primary Care Provider: No  Suggested Interventions and Community Resources  Fall Risk Prevention: In Process (Comment: discussed bathroom safety, life line options, and potential need for COA referral but declined. 6/11/21 trb)  Home Health Services: [address] [place] [address] Dr or Pill Pack: Declined (Comment: managing her own meds )  Occupational Therapy: Declined  Physical Therapy: Declined (Comment: pt has decreased mobility and would benefit from referral but continues to decline 06/11/21 trb)  Pulmonary Rehab: Not Started  Registered Dietician: Completed (Comment: chf history, coumadin, concerns with weight loss needs, resources previously provided 06/11/21 trb)  Senior Services: Declined (Comment: continues to decline need for referral 6/11/21 trb)  Transportation Support: Completed  Zone Management Tools: In Process (Comment: chf and copd started.  3/18/21 trb)         Goals Addressed                 This Visit's Progress       Care Coordination     Conditions and Symptoms   No change     I will schedule office visits, as directed by my provider. I will keep my appointment or reschedule if I have to cancel. I will notify my provider of any barriers to my plan of care. I will follow my Zone Management tool to seek urgent or emergent care. I will notify my provider of any symptoms that indicate a worsening of my condition. Barriers: none  Plan for overcoming my barriers: home care, care coordination   Confidence: 6/10  Anticipated Goal Completion Date:goal extended to 6/21 6/11/21 trb  Reinforced CHF Zone teaching, discussed need for continued f/u with primary care, plans for f/u related to pre-diabetes education and support.  Nutrition Plan   No change     I will work to decrease my weight and improve my diet to prevent further weight gain. Barriers: lack of motivation and lack of support  Plan for overcoming my barriers: educational support mailed   Confidence: 6/10  Anticipated Goal Completion Date: 7/21    Continues to struggle with weight loss, pre-diabetes or progression. She eats out of boredom. 6/11/21 trb             Prior to Admission medications    Medication Sig Start Date End Date Taking?  Authorizing Provider   ezetimibe (ZETIA) 10 MG tablet Take 1 tablet by mouth daily 5/25/21   TURNER Leavitt CNP   warfarin (COUMADIN) 5 MG tablet Take 1 tablet by mouth daily Except for Wednesday 7.5mg 5/19/21   TURNER Leavitt CNP   buPROPion (WELLBUTRIN XL) 150 MG extended release tablet Take 1 tablet by mouth every morning 5/19/21   TURNER Leavitt CNP   Melatonin 5 MG CAPS Take 1 capsule by mouth nightly as needed    Historical Provider, MD   diphenhydrAMINE (BENADRYL) 25 MG tablet Take 25 mg by mouth every 6 hours as needed for Itching    Historical Provider, MD   warfarin (COUMADIN) 5 MG tablet TAKE ONE TABLET BY MOUTH DAILY 2/24/21   TURNER Leavitt CNP   spironolactone (ALDACTONE) 25 MG tablet Take 1 tablet by mouth daily 2/24/21   TURNER Leavitt CNP   torsemide (DEMADEX) 20 MG tablet Take 1 tablet by mouth daily 2/24/21   TURNER Finn CNP   albuterol sulfate HFA (VENTOLIN HFA) 108 (90 Base) MCG/ACT inhaler Inhale 2 puffs into the lungs every 6 hours as needed for Wheezing or Shortness of Breath 10/28/20   Erica Hussein MD   polyethylene glycol [facility]) 17 g packet Take 17 g by mouth daily 10/23/20   TURNER Finn CNP   traZODone (DESYREL) 50 MG tablet Take 1 tablet by mouth nightly 10/23/20   TURNER Finn CNP   vitamin B-6 (PYRIDOXINE) 50 MG tablet Take by mouth daily    Historical Provider, MD   albuterol (PROVENTIL) (2.5 MG/3ML) 0.083% nebulizer solution Take 3 mLs by nebulization every 6 hours as needed for Wheezing or Shortness of Breath DX COPD J44.9 4/13/20   Erica Hussein MD   Acetaminophen (TYLENOL ARTHRITIS PAIN PO) Take by mouth    Historical Provider, MD   OXYGEN 4.5 L 1/5/17   Concepcion De Leon MD   Cholecalciferol (VITAMIN D-3 PO) Take 2,000 Units by mouth daily     Historical Provider, MD       Future Appointments   Date Time Provider Allyssa Levine   6/15/2021  1:00 PM SCHEDULE, [facility]  [place] [facility] [place]   6/24/2021  1:20 PM [facility] Finn [facility]   8/27/2021 11:15 AM Erica Hussein MD [facility]     ,   Congestive Heart Failure Assessment    Are you currently restricting fluids?: 2000cc  Do you understand a low sodium diet?: Yes  Do you understand how to read food labels?: Yes  How many restaurant meals do you eat per week?: 1-2, 0  Do you salt your food before tasting it?: No     No patient-reported symptoms      Symptoms:  CHF associated fatigue: Pos, CHF associated leg swelling: Pos (Comment: chronic issues with edema, no change 06/11/21 trb)      Symptom course: stable  Weight trend: increasing steadily  Salt intake watch compared to last visit: stable      and   COPD Assessment    Does the patient understand envrionmental exposure?: Yes  Is the patient able to verbalize Rescue vs. Long Acting medications?: Yes  Does the patient have a nebulizer?: Yes  Does the patient use a space with inhaled medications?: No     No patient-reported symptoms         Symptoms:  None: Yes

## 2021-06-15 ENCOUNTER — NURSE ONLY (OUTPATIENT)
Dept: FAMILY MEDICINE CLINIC | Age: 81
End: 2021-06-15
Payer: MEDICARE

## 2021-06-15 DIAGNOSIS — R73.09 ELEVATED GLUCOSE LEVEL: ICD-10-CM

## 2021-06-15 PROCEDURE — 36415 COLL VENOUS BLD VENIPUNCTURE: CPT | Performed by: NURSE PRACTITIONER

## 2021-06-16 LAB
ESTIMATED AVERAGE GLUCOSE: 142.7 MG/DL
HBA1C MFR BLD: 6.6 %

## 2021-06-18 ENCOUNTER — ANTI-COAG VISIT (OUTPATIENT)
Dept: FAMILY MEDICINE CLINIC | Age: 81
End: 2021-06-18

## 2021-06-18 ENCOUNTER — VIRTUAL VISIT (OUTPATIENT)
Dept: FAMILY MEDICINE CLINIC | Age: 81
End: 2021-06-18
Payer: MEDICARE

## 2021-06-18 DIAGNOSIS — G47.30 SLEEP APNEA, UNSPECIFIED TYPE: ICD-10-CM

## 2021-06-18 DIAGNOSIS — E11.9 NEW ONSET TYPE 2 DIABETES MELLITUS (HCC): Primary | ICD-10-CM

## 2021-06-18 DIAGNOSIS — I26.99 OTHER PULMONARY EMBOLISM WITHOUT ACUTE COR PULMONALE, UNSPECIFIED CHRONICITY (HCC): Primary | ICD-10-CM

## 2021-06-18 DIAGNOSIS — G25.81 RESTLESS LEGS SYNDROME: ICD-10-CM

## 2021-06-18 DIAGNOSIS — F41.0 PANIC DISORDER: ICD-10-CM

## 2021-06-18 PROBLEM — Z87.19 HISTORY OF DIVERTICULITIS: Status: ACTIVE | Noted: 2020-10-10

## 2021-06-18 PROBLEM — J96.21 ACUTE ON CHRONIC RESPIRATORY FAILURE WITH HYPOXIA (HCC): Status: RESOLVED | Noted: 2017-01-02 | Resolved: 2021-06-18

## 2021-06-18 PROBLEM — N82.4 COLOVAGINAL FISTULA: Status: ACTIVE | Noted: 2021-06-18

## 2021-06-18 PROBLEM — K63.5 POLYP OF COLON: Status: ACTIVE | Noted: 2021-06-18

## 2021-06-18 PROBLEM — R11.0 NAUSEA: Status: ACTIVE | Noted: 2021-06-18

## 2021-06-18 PROBLEM — Z86.711 HISTORY OF PULMONARY EMBOLISM: Status: ACTIVE | Noted: 2021-06-18

## 2021-06-18 PROBLEM — E66.01 MORBID OBESITY WITH BMI OF 45.0-49.9, ADULT (HCC): Status: RESOLVED | Noted: 2020-02-25 | Resolved: 2021-06-18

## 2021-06-18 PROBLEM — F41.8 DEPRESSION WITH ANXIETY: Status: RESOLVED | Noted: 2020-10-23 | Resolved: 2021-06-18

## 2021-06-18 PROBLEM — E66.01 MORBID OBESITY (HCC): Status: ACTIVE | Noted: 2021-06-18

## 2021-06-18 PROBLEM — R42 DIZZINESS: Status: RESOLVED | Noted: 2020-06-14 | Resolved: 2021-06-18

## 2021-06-18 LAB — INR BLD: 3.2

## 2021-06-18 PROCEDURE — 99442 PR PHYS/QHP TELEPHONE EVALUATION 11-20 MIN: CPT | Performed by: NURSE PRACTITIONER

## 2021-06-18 ASSESSMENT — ENCOUNTER SYMPTOMS
BLOOD IN STOOL: 0
ABDOMINAL PAIN: 0
ALLERGIC/IMMUNOLOGIC NEGATIVE: 1
GASTROINTESTINAL NEGATIVE: 1
EYES NEGATIVE: 1
SHORTNESS OF BREATH: 0
ANAL BLEEDING: 0
RESPIRATORY NEGATIVE: 1
NAUSEA: 0

## 2021-06-18 NOTE — PATIENT INSTRUCTIONS
Diabetic Neuropathy: Care Instructions  Your Care Instructions    When you have diabetes, your blood sugar level may get too high. Over time, high blood sugar levels can damage nerves. This is called diabetic neuropathy. Nerve damage can cause pain, burning, tingling, and numbness and may leave you feeling weak. The feet are often affected. When you have nerve damage in your feet, you cannot feel your feet and toes as well as normal and may not notice cuts or sores. Even a small injury can lead to a serious infection. It is very important that you follow your doctor's advice on foot care. Sometimes diabetes damages nerves that help the body function. If this happens, your blood pressure, sweating, digestion, and urination might be affected. Your doctor may give you a target blood sugar level that is higher or lower than you are used to. Try to keep your blood sugar very close to this target level to prevent more damage. Follow-up care is a key part of your treatment and safety. Be sure to make and go to all appointments, and call your doctor if you are having problems. It's also a good idea to know your test results and keep a list of the medicines you take. How can you care for yourself at home? · Take your medicines exactly as prescribed. Call your doctor if you think you are having a problem with your medicine. It is very important that you take your insulin or diabetes pills as your doctor tells you. · Try to keep blood sugar at your target level. ? Eat a variety of healthy foods, with carbohydrate spread out in your meals. A dietitian can help you plan meals. ? Try to get at least 30 minutes of exercise on most days. ? Check your blood sugar as many times each day as your doctor recommends. · Take and record your blood pressure at home if your doctor tells you to. Learn the importance of the two measures of blood pressure (such as 130 over 80, or 130/80). To take your blood pressure at home:  ?  Ask your doctor to check your blood pressure monitor to be sure it is accurate and the cuff fits you. Also ask your doctor to watch you to make sure that you are using it right. ? Do not use medicine known to raise blood pressure (such as some nasal decongestant sprays) before taking your blood pressure. ? Avoid taking your blood pressure if you have just exercised or are nervous or upset. Rest at least 15 minutes before you take a reading. · Take pain medicines exactly as directed. ? If the doctor gave you a prescription medicine for pain, take it as prescribed. ? If you are not taking a prescription pain medicine, ask your doctor if you can take an over-the-counter medicine. · Do not smoke. Smoking can increase your chance for a heart attack or stroke. If you need help quitting, talk to your doctor about stop-smoking programs and medicines. These can increase your chances of quitting for good. · Limit alcohol to 2 drinks a day for men and 1 drink a day for women. Too much alcohol can cause health problems. · Eat small meals often, rather than 2 or 3 large meals a day. To care for your feet  · Prevent injury by wearing shoes at all times, even when you are indoors. · Do foot care as part of your daily routine. Wash your feet and then rub lotion on your feet, but not between your toes. Use a handheld mirror or magnifying mirror to inspect your feet for blisters, cuts, cracks, or sores. · Have your toenails trimmed and filed straight across. · Wear shoes and socks that fit well. Soft shoes that have good support and that fit well (such as tennis shoes) are best for your feet. · Check your shoes for any loose objects or rough edges before you put them on. · Ask your doctor to check your feet during each visit. Your doctor may notice a foot problem you have missed. · Get early treatment for any foot problem, even a minor one. When should you call for help?   Call your doctor now or seek immediate medical care if:    · You have symptoms of infection, such as:  ? Increased pain, swelling, warmth, or redness. ? Red streaks leading from the area. ? Pus draining from the area. ? A fever. · You have new or worse numbness, pain, or tingling in any part of your body. Watch closely for changes in your health, and be sure to contact your doctor if:    · You have a new problem with your feet, such as:  ? A new sore or ulcer. ? A break in the skin that is not healing after several days. ? Bleeding corns or calluses. ? An ingrown toenail. · You do not get better as expected. Where can you learn more? Go to [email or web address]://[email or web address] org and sign in to your [employer]'s Market account. Enter P874 in the Freezing Point box to learn more about \"Diabetic Neuropathy: Care Instructions. \"     If you do not have an account, please click on the \"Sign Up Now\" link. Current as of: December 19, 2019Content Version: 12.4  © 2931-0465 Healthwise, Incorporated. Care instructions adapted under license by [facility] ([facility]). If you have questions about a medical condition or this instruction, always ask your healthcare professional. [address] any warranty or liability for your use of this information. Diabetes Foot Health: Care Instructions  Your Care Instructions    When you have diabetes, your feet need extra care and attention. Diabetes can damage the nerve endings and blood vessels in your feet, making you less likely to notice when your feet are injured. Diabetes also limits your body's ability to fight infection and get blood to areas that need it. If you get a minor foot injury, it could become an ulcer or a serious infection. With good foot care, you can prevent most of these problems. Caring for your feet can be quick and easy. Most of the care can be done when you are bathing or getting ready for bed. Follow-up care is a key part of your treatment and safety.  Be sure to make like gravel or torn linings, which could cause blisters or sores. · Buy shoes that fit well:  ? Look for shoes that have plenty of space around the toes. This helps prevent bunions and blisters. ? Try on shoes while wearing the kind of socks you will usually wear with the shoes. ? Avoid plastic shoes. They may rub your feet and cause blisters. Good shoes should be made of materials that are flexible and breathable, such as leather or cloth. ? Break in new shoes slowly by wearing them for no more than an hour a day for several days. Take extra time to check your feet for red areas, blisters, or other problems after you wear new shoes. · Do not go barefoot. Do not wear sandals, and do not wear shoes with very thin soles. Thin soles are easy to puncture. They also do not protect your feet from hot pavement or cold weather. · Have your doctor check your feet during each visit. If you have a foot problem, see your doctor. Do not try to treat an early foot problem at home. Home remedies or treatments that you can buy without a prescription (such as corn removers) can be harmful. · Always get early treatment for foot problems. A minor irritation can lead to a major problem if not properly cared for early. When should you call for help? Call your doctor now or seek immediate medical care if:    · You have a foot sore, an ulcer or break in the skin that is not healing after 4 days, bleeding corns or calluses, or an ingrown toenail. · You have blue or black areas, which can mean bruising or blood flow problems. · You have peeling skin or tiny blisters between your toes or cracking or oozing of the skin. · You have a fever for more than 24 hours and a foot sore. · You have new numbness or tingling in your feet that does not go away after you move your feet or change positions. · You have unexplained or unusual swelling of the foot or ankle.     Watch closely for changes in your health, and be sure to contact your doctor if:    · You cannot do proper foot care. Where can you learn more? Go to https://[email or web address].[email or web address] org and sign in to your [email or web address] account. Enter A739 in the [facility] box to learn more about \"Diabetes Foot Health: Care Instructions. \"     If you do not have an account, please click on the \"Sign Up Now\" link. Current as of: December 19, 2019Content Version: 12.4  © 1305-3499 Healthwise, Touchbase. Care instructions adapted under license by [facility] ([facility]). If you have questions about a medical condition or this instruction, always ask your healthcare professional. [address] any warranty or liability for your use of this information. Diabetic Retinopathy: Care Instructions  Your Care Instructions    Diabetes can damage the small blood vessels in part of your eye. This part of the eye is called the retina. It detects light that enters the eye. Then it sends signals to your brain about what the eye sees. When this type of eye damage happens, it's called diabetic retinopathy. It can lead to poor vision and even blindness. But if you keep your blood sugar and blood pressure levels in your target range, you can help avoid or slow the damage. Follow-up care is a key part of your treatment and safety. Be sure to make and go to all appointments, and call your doctor if you are having problems. It's also a good idea to know your test results and keep a list of the medicines you take. How can you care for yourself at home? · Have regular eye exams. Tell your doctor about any changes in your vision. · Keep blood sugar in a target range. ? Eat a variety of healthy foods, and spread carbohydrate throughout the day. You may want to have a dietitian help you plan meals. ? If your doctor recommends it, get more exercise. Walking is a good choice. Bit by bit, increase the amount you walk every day.  Try for at least 30 minutes on most days of the week. ? Be safe with medicines. Take your medicine exactly as prescribed. Call your doctor if you think you are having a problem with your medicine. ? Check your blood sugar as often as your doctor recommends. · Eat a low-salt diet. This may help keep your blood pressure at a normal level. You may also need to take medicines to reach your goals. · Do not smoke. Smoking can make this condition worse. If you need help quitting, talk to your doctor about stop-smoking programs and medicines. These can increase your chances of quitting for good. When should you call for help? Call your doctor now or seek immediate medical care if:    · You have vision changes. Watch closely for changes in your health, and be sure to contact your doctor if:    · You do not get better as expected. Where can you learn more? Go to [email or web address]://[email or web address] org and sign in to your Bookigee account. Enter R396 in the deltaDNA box to learn more about \"Diabetic Retinopathy: Care Instructions. \"     If you do not have an account, please click on the \"Sign Up Now\" link. Current as of: December 19, 2019Content Version: 12.4  © 9738-0936 Healthwise, Incorporated. Care instructions adapted under license by [facility] ([facility]). If you have questions about a medical condition or this instruction, always ask your healthcare professional. [address] any warranty or liability for your use of this information. Kidney Disease and Diabetes: Care Instructions  Your Care Instructions    When you have diabetes, your body cannot make enough insulin or use it the way it should. Your body needs insulin to help sugar move from the blood to the cells. Without it, your blood sugar gets too high. High blood sugar damages your kidneys and makes it hard for them to filter blood. This causes fluid and waste to build up in your blood.   If you have diabetes, it is very important to keep your blood sugar in your target range. There are many steps you can take to do this. If you can control your blood sugar, you will have the best chance to slow or stop damage to your kidneys. Follow-up care is a key part of your treatment and safety. Be sure to make and go to all appointments, and call your doctor if you are having problems. It's also a good idea to know your test results and keep a list of the medicines you take. How can you care for yourself at home? To control your diabetes and slow or stop damage to your kidneys  · Keep your blood sugar in your target range. The American Diabetes Association recommends a hemoglobin A1c (Hb A1c) target level of less than 7%. Talk to your doctor about your target. The lower your A1c, the better your chance of stopping kidney damage. · Keep your blood pressure in your target range. Doctors recommend specific types of blood pressure medicines for people who have diabetes and kidney disease. Examples include ACE inhibitors and angiotensin II receptor blockers (ARBs). Your doctor may have you take one of these even if you don't have high blood pressure. · Take all of your medicines. You may have several. For example, you may take medicines for diabetes, cholesterol, and blood pressure. It's very important to take all of them just as your doctor tells you to and to keep taking them. · Make good food choices. Follow an eating plan that is best for your diabetes and your kidneys. You may want to work with a dietitian to make a plan. A good plan will make sure that you spread carbohydrate throughout the day. It will also make sure that you get the right amount of salt (sodium), fluids, and protein. · Stay at a healthy weight. If you need help to lose weight, talk to your doctor or dietitian. Even small changes can make a difference. Try to be aware of your portion sizes, eat more fruits and vegetables, and add some activity to your daily routine. · Exercise.  Get at least 30 minutes of activity on most days of the week. Walking is a great exercise that most people can do. Being more active can help you control your blood sugar and stay at a healthy weight. It also can help you lower cholesterol and blood pressure. To improve your kidney health  · Lower your cholesterol. Keep your LDL less than 100 mg/dL and HDL levels more than 40 mg/dL for men and 50 mg/dL for women. If you have high cholesterol, your doctor may prescribe medicine. He or she may also tell you to eat less saturated fat. · Follow your treatment plan. Check your blood sugar as many times a day as your doctor recommends. Go to all of your follow-up appointments, and be sure to have all the tests your doctor orders. Call your doctor if you think you are having a problem with your medicines. · Take a low-dose aspirin every day if your doctor suggests it. Most doctors believe this can reduce the risk of heart disease and stroke. Your risk of these diseases is much greater than your risk of kidney failure. · Avoid tobacco. Do not smoke or use other tobacco products. If you need help quitting, talk to your doctor about stop-smoking programs and medicines. These can increase your chances of quitting for good. When should you call for help? Call 911 anytime you think you may need emergency care. For example, call if:    · You passed out (lost consciousness). Call your doctor now or seek immediate medical care if:    · You have new or worse nausea and vomiting. · You have much less urine than normal, or you have no urine. · You are feeling confused or cannot think clearly. · You have new or more blood in your urine. · You have new swelling. · You are dizzy or lightheaded, or you feel like you may faint. Watch closely for changes in your health, and be sure to contact your doctor if:    · You do not get better as expected. Where can you learn more? Go to [email or web address] and sign in to your Hobzy account. Enter C726 in the Studio Publishing box to learn more about \"Kidney Disease and Diabetes: Care Instructions. \"     If you do not have an account, please click on the \"Sign Up Now\" link. Current as of: August 11, 2019Content Version: 12.4  © 3595-6754 Healthwise, allyve. Care instructions adapted under license by [facility] ([facility]). If you have questions about a medical condition or this instruction, always ask your healthcare professional. [address] any warranty or liability for your use of this information. Learning About Diabetes and Exercise  Can you exercise if you have diabetes? When you have diabetes, it's important to get regular exercise. This helps control your blood sugar level. You can still play sports, run, ride a bike, go swimming, and do other activities when you have diabetes. How can exercise help you manage diabetes? Your body turns the food you eat into glucose, a type of sugar. You need this sugar for energy. When you have diabetes, the sugar builds up in your blood. But when you exercise, your body uses sugar. This helps keep it from building up in your blood and results in lower blood sugar and better control of diabetes. Exercise may help you in other ways too. It can help you reach and stay at a healthy weight. It also helps improve blood pressure and cholesterol, which can reduce the risk of heart disease. Exercise can make you feel stronger and happier. It can help you relax and sleep better, and give you confidence in other things you do. How can you exercise safely? Before you start a new exercise program, talk to your doctor about how and when to exercise. You may need to have a medical exam and tests before you begin. Some types of exercise can be harmful if your diabetes is causing other problems, such as problems with your feet. Your doctor can tell you what types of exercise are good choices for you. These tips can help you exercise safely when you have diabetes. If your diabetes is controlled by diet or medicine that doesn't lower your blood sugar, you don't need to eat a snack before you exercise. · Check your blood sugar before you exercise. And be careful about what you eat. ? If your blood sugar is less than 100, eat a carbohydrate snack before you exercise. ? Be careful when you exercise if your blood sugar is over 300. High blood sugar can make you dehydrated. And that makes your blood sugar levels go even higher. If you have ketones in your blood or urine and your blood sugar is over 300, do not exercise. · Don't try to do too much at first. Build up your exercise program bit by bit. Try to get at least 30 minutes of exercise on most days of the week. Walking is a good choice. You also may want to do other activities, such as riding a bike or swimming. You might try running or gardening. Try to include muscle-strengthening exercises at least 2 times a week. These exercises include push-ups and weight training. You can also use rubber tubing or stretch bands. You stretch or pull the tubing or band to build muscle strength. If you want to exercise more, slowly increase how hard or long you exercise. · You may get symptoms of low blood sugar during exercise or up to 24 hours later. Some symptoms of low blood sugar, such as sweating, a fast heartbeat, or feeling tired, can be confused with what can happen anytime you exercise. Other symptoms may include feeling anxious, dizzy, weak, or shaky. So it's a good idea to check your blood sugar again. · You can treat low blood sugar by eating or drinking something that has 15 grams of carbohydrate. These should be quick-sugar foods. Quick-sugar foods such as glucose tablets, table sugar, honey, fruit juice, regular (not diet) soda pop, or hard candy can help raise blood sugar.  Check your blood sugar level again 15 minutes after having a quick-sugar food to make sure your level is getting back to your target range. · Drink plenty of water before, during, and after you exercise. · Wear medical alert jewelry that says you have diabetes. You can buy this at most drugstores. · Pay attention to your body. If you are used to exercise and notice that you can't do as much as usual, talk to your doctor. Follow-up care is a key part of your treatment and safety. Be sure to make and go to all appointments, and call your doctor if you are having problems. It's also a good idea to know your test results and keep a list of the medicines you take. Where can you learn more? Go to [email or web address]://[email or web address].[email or web address] [email or web address] Solutions. org and sign in to your Cinemagram account. Enter J258 in the Securlinx Integration Software box to learn more about \"Learning About Diabetes and Exercise. \"     If you do not have an account, please click on the \"Sign Up Now\" link. Current as of: December 19, 2019Content Version: 12.4  © 4797-3511 Healthwise, [employer]. Care instructions adapted under license by [facility] ([facility]). If you have questions about a medical condition or this instruction, always ask your healthcare professional. [address] any warranty or liability for your use of this information. Learning About Diabetes Food Guidelines  Your Care Instructions    Meal planning is important to manage diabetes. It helps keep your blood sugar at a target level (which you set with your doctor). You don't have to eat special foods. You can eat what your family eats, including sweets once in a while. But you do have to pay attention to how often you eat and how much you eat of certain foods. You may want to work with a dietitian or a certified diabetes educator (CDE) to help you plan meals and snacks. A dietitian or CDE can also help you lose weight if that is one of your goals. What should you know about eating carbs?   Managing the amount of carbohydrate (carbs) you eat is an important part of healthy meals when you have diabetes. Carbohydrate is found in many foods. · Learn which foods have carbs. And learn the amounts of carbs in different foods. ? Bread, cereal, pasta, and rice have about 15 grams of carbs in a serving. A serving is 1 slice of bread (1 ounce), ½ cup of cooked cereal, or 1/3 cup of cooked pasta or rice. ? Fruits have 15 grams of carbs in a serving. A serving is 1 small fresh fruit, such as an apple or orange; ½ of a banana; ½ cup of cooked or canned fruit; ½ cup of fruit juice; 1 cup of melon or raspberries; or 2 tablespoons of dried fruit. ? Milk and no-sugar-added yogurt have 15 grams of carbs in a serving. A serving is 1 cup of milk or 2/3 cup of no-sugar-added yogurt. ? Starchy vegetables have 15 grams of carbs in a serving. A serving is ½ cup of mashed potatoes or sweet potato; 1 cup winter squash; ½ of a small baked potato; ½ cup of cooked beans; or ½ cup cooked corn or green peas. · Learn how much carbs to eat each day and at each meal. A dietitian or CDE can teach you how to keep track of the amount of carbs you eat. This is called carbohydrate counting. · If you are not sure how to count carbohydrate grams, use the Plate Method to plan meals. It is a good, quick way to make sure that you have a balanced meal. It also helps you spread carbs throughout the day. ? Divide your plate by types of foods. Put non-starchy vegetables on half the plate, meat or other protein food on one-quarter of the plate, and a grain or starchy vegetable in the final quarter of the plate. To this you can add a small piece of fruit and 1 cup of milk or yogurt, depending on how many carbs you are supposed to eat at a meal.  · Try to eat about the same amount of carbs at each meal. Do not \"save up\" your daily allowance of carbs to eat at one meal.  · Proteins have very little or no carbs per serving.  Examples of proteins are beef, chicken, turkey, fish, eggs, tofu, cheese, cottage cheese, and peanut butter. A serving size of meat is 3 ounces, which is about the size of a deck of cards. Examples of meat substitute serving sizes (equal to 1 ounce of meat) are 1/4 cup of cottage cheese, 1 egg, 1 tablespoon of peanut butter, and ½ cup of tofu. How can you eat out and still eat healthy? · Learn to estimate the serving sizes of foods that have carbohydrate. If you measure food at home, it will be easier to estimate the amount in a serving of restaurant food. · If the meal you order has too much carbohydrate (such as potatoes, corn, or baked beans), ask to have a low-carbohydrate food instead. Ask for a salad or green vegetables. · If you use insulin, check your blood sugar before and after eating out to help you plan how much to eat in the future. · If you eat more carbohydrate at a meal than you had planned, take a walk or do other exercise. This will help lower your blood sugar. What else should you know? · Limit saturated fat, such as the fat from meat and dairy products. This is a healthy choice because people who have diabetes are at higher risk of heart disease. So choose lean cuts of meat and nonfat or low-fat dairy products. Use olive or canola oil instead of butter or shortening when cooking. · Don't skip meals. Your blood sugar may drop too low if you skip meals and take insulin or certain medicines for diabetes. · Check with your doctor before you drink alcohol. Alcohol can cause your blood sugar to drop too low. Alcohol can also cause a bad reaction if you take certain diabetes medicines. Follow-up care is a key part of your treatment and safety. Be sure to make and go to all appointments, and call your doctor if you are having problems. It's also a good idea to know your test results and keep a list of the medicines you take. Where can you learn more? Go to [email or web address] org and sign in to your [email or web address] account.  Enter L823 in the Arachno box

## 2021-06-18 NOTE — PROGRESS NOTES
[address] [facility]  [address] [place] [address]  Dept: [phone]  Dept Fax: [phone]  Loc: [phone]    Sharath Myaa is a 68 y.o. female evaluated via telephone on 6/18/2021. Consent:  She and/or health care decision maker is aware that that she may receive a bill for this telephone service, depending on her insurance coverage, and has provided verbal consent to proceed: Yes    Sharath Maya is a 68 y.o. female who presents today for her medical conditions/complaints as noted below. Sharath Maya is c/o of Results (blood work from 6/15/21)        [address] [facility]  [address] [place] [address]  Dept: [phone]  Dept Fax: [phone]  Loc: [phone] Winters is a 68 y.o. female who presents today for her medical conditions/complaints as noted below. Sharath Maya is c/o of Results (blood work from 6/15/21)       Subjective:     Chief Complaint   Patient presents with    Results     blood work from 6/15/21       HPI   Patient is being seen today to discuss her recent hga1c results showing new onset DM2 with hga1c of 6.6. She has gained weight and is unable to be very physically active. She does have a [address] of diabetes--Sister had diabetes and son is prediabetic  No polyuria, polydipsia, blurry vision, chest pain, dyspnea or claudication. No foot burning, numbness or pain. Taking medication compliantly without noted sided effects.   She is not on asa d/t coumadin use  She is statin intolerant  Not on ACEI or ARB    Past Medical History:   Diagnosis Date    Acute exacerbation of chronic obstructive pulmonary disease (COPD) ([address]     Acute on chronic respiratory failure (HCC)     Anticoagulant long-term use     Atrial fibrillation ([address]     Atrial fibrillation with rapid ventricular response ([address] 6/23/2015    [facility]cell [facility]     CAP (community acquired pneumonia) 1/29/2015    Carotid artery stenosis     CHF (congestive heart failure) (HCC)     Chronic back pain     Congenital heart disease     Depression     Diverticulitis     Fistula     anal-vaginal fistula    HCAP (healthcare-associated pneumonia)     Hx of blood clots     Hyperlipidemia     Hypertension     IFG (impaired fasting glucose) 9/13/2018    Non-Hodgkins lymphoma (HCC)     Obesity     Panic disorder     Peripheral vascular disease [facility]     Primary osteoarthritis of both knees 12/2/2020    Pulmonary embolism ([facility]     6/15    Rectal vaginal fistula     Restless legs syndrome     [facility] spotted fever 2013    Sciatic nerve pain     Sepsis ([facility] 6/22/2015    Sleep apnea     Tobacco abuse 6/22/2015    Vitamin D deficiency          Review of Systems   Constitutional: Negative. Negative for appetite change, fatigue and unexpected weight change. HENT: Negative. Eyes: Negative. Respiratory: Negative. Negative for shortness of breath. Cardiovascular: Negative. Negative for chest pain, palpitations and leg swelling. Gastrointestinal: Negative. Negative for abdominal pain, anal bleeding, blood in stool and nausea. Endocrine: Negative. Genitourinary: Negative. Negative for hematuria. Musculoskeletal: Negative. Skin: Negative. Negative for rash. Allergic/Immunologic: Negative. Neurological: Negative. Negative for dizziness, syncope, light-headedness and numbness. Hematological: Negative. Does not bruise/bleed easily. Psychiatric/Behavioral: Negative. All other systems reviewed and are negative.        Past Medical History:   Diagnosis Date    Acute exacerbation of chronic obstructive pulmonary disease (COPD) ([facility]     Acute on chronic respiratory failure (HCC)     Anticoagulant long-term use     Atrial fibrillation (HCC)     Atrial fibrillation with rapid ventricular response ([facility] 6/23/2015    [facility]-cell lymphoma [facility]     CAP (community acquired pneumonia) 1/29/2015    Carotid artery stenosis     CHF (congestive heart failure) (HCC)     Chronic back pain     Congenital heart disease     Depression     Diverticulitis     Fistula     anal-vaginal fistula    HCAP (healthcare-associated pneumonia)     Hx of blood clots     Hyperlipidemia     Hypertension     IFG (impaired fasting glucose) 9/13/2018    Non-Hodgkins lymphoma (HCC)     Obesity     Panic disorder     Peripheral vascular disease (HCC)     Primary osteoarthritis of both knees 12/2/2020    Pulmonary embolism ([facility]     6/15    Rectal vaginal fistula     Restless legs syndrome     [facility] spotted fever 2013    Sciatic nerve pain     Sepsis ([facility] 6/22/2015    Sleep apnea     Tobacco abuse 6/22/2015    Vitamin D deficiency      Family History   Problem Relation Age of Onset    Cancer Mother 43        lung cancer    Diabetes Sister     Cancer Daughter 43        lung cancer    Asthma Son     Hypertension Son     Asthma Grandchild     Hypertension Son     Emphysema Neg Hx     Heart Failure Neg Hx      Past Surgical History:   Procedure Laterality Date    APPENDECTOMY  1963    BRONCHOSCOPY N/A 8/12/2019    EBUS WF W/ANES.  performed by Adriano Noriega MD at [address]  8/12/2019    BRONCHOSCOPY ALVEOLAR LAVAGE performed by Adriano Noriega MD at [address]  8/12/2019    BRONCHOSCOPY BRUSHINGS performed by Adriano Noriega MD at [address]  8/12/2019    BRONCHOSCOPY/TRANSBRONCHIAL LUNG BIOPSY performed by Adriano Noriega MD at [address]  8/12/2019    BRONCHOSCOPY/TRANSBRONCHIAL NEEDLE BIOPSY performed by Adriano Noriega MD at [address]  8/12/2019    BRONCHOSCOPY/TRANSBRONCHIAL NEEDLE BIOPSY ADDL LOBE performed by Adriano Noriega MD at [address] [place] [address]  2003    COLONOSCOPY N/A 9/25/2019 COLONOSCOPY POLYPECTOMY SNARE/COLD BIOPSY showed diverticulilosis performed by Marilou Peraza MD at [address] [facility]      umbilical--Dr Hou    [facility]    PORT SURGERY N/A 2020    PORT INSERTION performed by Obed Whipple MD at [facility] TUNNELED VENOUS PORT PLACEMENT Left 2020    Port Insertion ENRIQUETA Chest w. Dr Echo Albright 20 V[address] CT Injectable [id number] lot [id number] 3/31/21     Social History     Socioeconomic History    Marital status:      Spouse name: Not on file    Number of children: 3    Years of education: Not on file    Highest education level: Not on file   Occupational History    Occupation: retired   Tobacco Use    Smoking status: Former Smoker     Packs/day: 0.33     Years: 50.00     Pack years: 16.50     Types: Cigarettes     Quit date: 2015     Years since quittin.0    Smokeless tobacco: Never Used   Vaping Use    Vaping Use: Never used   Substance and Sexual Activity    Alcohol use: Not Currently     Alcohol/week: 0.0 standard drinks    Drug use: No    Sexual activity: Not Currently   Other Topics Concern    Not on file   Social History Narrative    Not on file     Social Determinants of Health     Financial Resource Strain:     Difficulty of Paying Living Expenses:    Food Insecurity:     Worried About Running Out of Food in the Last Year:     [address] in the Last Year:    Transportation Needs:     Lack of Transportation (Medical):      Lack of Transportation (Non-Medical):    Physical Activity:     Days of Exercise per Week:     Minutes of Exercise per Session:    Stress:     Feeling of Stress :    Social Connections: Unknown    Frequency of Communication with Friends and Family: More than three times a week    Frequency of Social Gatherings with Friends and Family: More than three times a week    Attends Jainism Services: Not on file   Jordyn Member of Clubs or Organizations: Not on file    Attends Club or Organization Meetings: Not on file    Marital Status: Not on file   Intimate Partner Violence: Not At Risk    Fear of Current or Ex-Partner: No    Emotionally Abused: No    Physically Abused: No    Sexually Abused: No     Current Outpatient Medications   Medication Sig Dispense Refill    ezetimibe (ZETIA) 10 MG tablet Take 1 tablet by mouth daily 30 tablet 5    warfarin (COUMADIN) 5 MG tablet Take 1 tablet by mouth daily Except for Wednesday 7.5mg 30 tablet 5    buPROPion (WELLBUTRIN XL) 150 MG extended release tablet Take 1 tablet by mouth every morning 30 tablet 1    Melatonin 5 MG CAPS Take 1 capsule by mouth nightly as needed      diphenhydrAMINE (BENADRYL) 25 MG tablet Take 25 mg by mouth every 6 hours as needed for Itching      warfarin (COUMADIN) 5 MG tablet TAKE ONE TABLET BY MOUTH DAILY 60 tablet 2    spironolactone (ALDACTONE) 25 MG tablet Take 1 tablet by mouth daily 30 tablet 2    torsemide (DEMADEX) 20 MG tablet Take 1 tablet by mouth daily 30 tablet 2    albuterol sulfate HFA (VENTOLIN HFA) 108 (90 Base) MCG/ACT inhaler Inhale 2 puffs into the lungs every 6 hours as needed for Wheezing or Shortness of Breath 2 Inhaler 5    polyethylene glycol (GLYCOLAX) 17 g packet Take 17 g by mouth daily 30 each 1    traZODone (DESYREL) 50 MG tablet Take 1 tablet by mouth nightly 30 tablet 1    vitamin B-6 (PYRIDOXINE) 50 MG tablet Take by mouth daily      albuterol (PROVENTIL) (2.5 MG/3ML) 0.083% nebulizer solution Take 3 mLs by nebulization every 6 hours as needed for Wheezing or Shortness of Breath DX COPD J44.9 360 mL 5    Acetaminophen (TYLENOL ARTHRITIS PAIN PO) Take by mouth      OXYGEN 4.5 L      Cholecalciferol (VITAMIN D-3 PO) Take 2,000 Units by mouth daily        No current facility-administered medications for this visit.         No changes in past medical history, past surgical history, social history, orfamily history were noted during the patient encounter unless specifically listed above. All updates of past medical history, past surgical history, social history, or family history were reviewed personally by me duringthe office visit. All problems listed in the assessment are stable unless noted otherwise. Medication profile reviewed personally by me during the office visit. Medication side effects and possible impairments frommedications were discussed as applicable. Objective:     Physical Exam  Not applicable due to telephone visit    LMP  (LMP Unknown)   There is no height or weight on file to calculate BMI.     BP Readings from Last 2 Encounters:   05/19/21 118/64   04/28/21 128/64       Wt Readings from Last 3 Encounters:   06/02/21 273 lb (123.8 kg)   05/19/21 273 lb (123.8 kg)   04/28/21 271 lb (122.9 kg)       Lab Review   Nurse Only on 06/15/2021   Component Date Value    Hemoglobin A1C 06/15/2021 6.6     eAG 06/15/2021 142.7    Anti-coag visit on 06/10/2021   Component Date Value    INR 06/10/2021 1.70    Office Visit on 05/19/2021   Component Date Value    Cholesterol, Total 05/19/2021 249*    Triglycerides 05/19/2021 195*    HDL 05/19/2021 63*    LDL Calculated 05/19/2021 147*    VLDL Cholesterol Calcula* 05/19/2021 39     Sodium 05/19/2021 143     Potassium 05/19/2021 4.4     Chloride 05/19/2021 97*    CO2 05/19/2021 33*    Anion Gap 05/19/2021 13     Glucose 05/19/2021 130*    BUN 05/19/2021 11     CREATININE 05/19/2021 0.6     GFR Non- 05/19/2021 >60     GFR  05/19/2021 >60     Calcium 05/19/2021 8.8     Total Protein 05/19/2021 6.2*    Albumin 05/19/2021 3.9     Albumin/Globulin Ratio 05/19/2021 1.7     Total Bilirubin 05/19/2021 0.3     Alkaline Phosphatase 05/19/2021 92     ALT 05/19/2021 13     AST 05/19/2021 15     Globulin 05/19/2021 2.3     Vit D, 25-Hydroxy 05/19/2021 14.3*    Hep C Ab Interp 05/19/2021 Non-reactive     WBC 05/19/2021 6.7     RBC 05/19/2021 4.27     Hemoglobin 05/19/2021 10.7*    Hematocrit 05/19/2021 34.3*    MCV 05/19/2021 80.4     MCH 05/19/2021 25.0*    MCHC 05/19/2021 31.1     RDW 05/19/2021 16.8*    Platelets 68/59/2523 273     MPV 05/19/2021 7.1     Neutrophils % 05/19/2021 66.2     Lymphocytes % 05/19/2021 19.5     Monocytes % 05/19/2021 10.2     Eosinophils % 05/19/2021 3.4     Basophils % 05/19/2021 0.7     Neutrophils Absolute 05/19/2021 4.5     Lymphocytes Absolute 05/19/2021 1.3     Monocytes Absolute 05/19/2021 0.7     Eosinophils Absolute 05/19/2021 0.2     Basophils Absolute 05/19/2021 0.1     TSH 05/19/2021 1.66     INR 05/19/2021 2.2    Anti-coag visit on 05/03/2021   Component Date Value    INR 05/03/2021 2.00    Anti-coag visit on 04/29/2021   Component Date Value    INR 04/29/2021 2.30        No results found for this visit on 06/18/21. Assessment:       1. New onset type 2 diabetes mellitus ([address]        No results found for this visit on 06/18/21. Plan:       [place] was seen today for results. Diagnoses and all orders for this visit:    New onset type 2 diabetes mellitus ([address]  -     [facility] (Non Care Coord Patient), [place]  Statin intolerant  Not on asa due to coumadin use  Discussed metformin to treat diabetes and patient declined. She prefers to try lifestyle modifications, diet and increasing physical activity and see diabetic educator before starting medication. Discussed roll of ACEI/ ARB in diabetes and patient concerned about already having \"low blood pressure\" and does not want to start medication  -     [facility] (Non Care Coord Patient)[place]  Recheck hga1c next visit    Patient has been instructed call the office immediately with new symptoms, change in symptoms or worseningof symptoms. If this is not feasible, patient is instructed to report to the emergency room.  Medication profile reviewed. Medication side effects and possible impairments from medications were discussed as applicable. Allergies were reviewed. Health maintenance was reviewed and updated as appropriate. Return in about 3 months (around 9/18/2021) for DM. (Comment: Please note this report has been produced using a combination of typing and speech recognition software and may contain errors related to that system including errors in grammar, punctuation, and spelling, as well as words and phrases that may be inappropriate. If there are any questions or concerns please feel free to contact the dictating provider for clarification.)      Documentation:  I communicated with the patient and/or health care decision maker about above conditions. Details of this discussion including any medical advice provided: as noted above      I affirm this is a Patient Initiated Episode with a Patient who has not had a related appointment within my department in the past 7 days or scheduled within the next 24 hours.     Patient identification was verified at the start of the visit: Yes    Total Time: minutes: 11-20 minutes    Note: not billable if this call serves to triage the patient into an appointment for the relevant concern      Navdeep Kowalski, TURNER - CNP

## 2021-06-18 NOTE — PROGRESS NOTES
Barbara Richardson is a 68 y.o. female evaluated via telephone on 6/18/2021.       Consent:  She and/or health care decision maker is aware that that she may receive a bill for this telephone service, depending on her insurance coverage, and has provided verbal consent to proceed: Yes      Yo Kong MA

## 2021-06-18 NOTE — PROGRESS NOTES
Patient called and informed to hold dose tonight and then normal dosing on Saturday and Sunday and recheck INR on monday

## 2021-06-21 ENCOUNTER — ANTI-COAG VISIT (OUTPATIENT)
Dept: FAMILY MEDICINE CLINIC | Age: 81
End: 2021-06-21

## 2021-06-21 DIAGNOSIS — I26.99 OTHER PULMONARY EMBOLISM WITHOUT ACUTE COR PULMONALE, UNSPECIFIED CHRONICITY (HCC): Primary | ICD-10-CM

## 2021-06-21 LAB — INR BLD: 2.4

## 2021-06-24 ENCOUNTER — VIRTUAL VISIT (OUTPATIENT)
Dept: FAMILY MEDICINE CLINIC | Age: 81
End: 2021-06-24
Payer: MEDICARE

## 2021-06-24 DIAGNOSIS — F33.1 MODERATE EPISODE OF RECURRENT MAJOR DEPRESSIVE DISORDER (HCC): Primary | ICD-10-CM

## 2021-06-24 DIAGNOSIS — F41.9 ANXIETY: ICD-10-CM

## 2021-06-24 DIAGNOSIS — F51.01 PRIMARY INSOMNIA: ICD-10-CM

## 2021-06-24 PROCEDURE — 99442 PR PHYS/QHP TELEPHONE EVALUATION 11-20 MIN: CPT | Performed by: NURSE PRACTITIONER

## 2021-06-24 RX ORDER — QUETIAPINE FUMARATE 25 MG/1
25 TABLET, FILM COATED ORAL NIGHTLY
Qty: 90 TABLET | Refills: 0 | Status: SHIPPED | OUTPATIENT
Start: 2021-06-24

## 2021-06-24 RX ORDER — TORSEMIDE 20 MG/1
20 TABLET ORAL DAILY
Qty: 90 TABLET | Refills: 2 | Status: ON HOLD
Start: 2021-06-24 | End: 2021-09-29 | Stop reason: HOSPADM

## 2021-06-24 RX ORDER — BUPROPION HYDROCHLORIDE 150 MG/1
150 TABLET ORAL EVERY MORNING
Qty: 90 TABLET | Refills: 1 | Status: SHIPPED | OUTPATIENT
Start: 2021-06-24

## 2021-06-24 RX ORDER — EZETIMIBE 10 MG/1
10 TABLET ORAL DAILY
Qty: 90 TABLET | Refills: 3 | Status: ON HOLD | OUTPATIENT
Start: 2021-06-24 | End: 2021-10-18

## 2021-06-24 RX ORDER — WARFARIN SODIUM 5 MG/1
TABLET ORAL
Qty: 90 TABLET | Refills: 2 | Status: SHIPPED | OUTPATIENT
Start: 2021-06-24

## 2021-06-24 SDOH — ECONOMIC STABILITY: FOOD INSECURITY: WITHIN THE PAST 12 MONTHS, THE FOOD YOU BOUGHT JUST DIDN'T LAST AND YOU DIDN'T HAVE MONEY TO GET MORE.: NEVER TRUE

## 2021-06-24 SDOH — ECONOMIC STABILITY: FOOD INSECURITY: WITHIN THE PAST 12 MONTHS, YOU WORRIED THAT YOUR FOOD WOULD RUN OUT BEFORE YOU GOT MONEY TO BUY MORE.: NEVER TRUE

## 2021-06-24 ASSESSMENT — ENCOUNTER SYMPTOMS
ANAL BLEEDING: 0
RESPIRATORY NEGATIVE: 1
SHORTNESS OF BREATH: 0
BLOOD IN STOOL: 0
ALLERGIC/IMMUNOLOGIC NEGATIVE: 1
ABDOMINAL PAIN: 0
NAUSEA: 0
GASTROINTESTINAL NEGATIVE: 1
EYES NEGATIVE: 1

## 2021-06-24 ASSESSMENT — PATIENT HEALTH QUESTIONNAIRE - PHQ9
9. THOUGHTS THAT YOU WOULD BE BETTER OFF DEAD, OR OF HURTING YOURSELF: 0
4. FEELING TIRED OR HAVING LITTLE ENERGY: 3
SUM OF ALL RESPONSES TO PHQ9 QUESTIONS 1 & 2: 3
SUM OF ALL RESPONSES TO PHQ QUESTIONS 1-9: 11
8. MOVING OR SPEAKING SO SLOWLY THAT OTHER PEOPLE COULD HAVE NOTICED. OR THE OPPOSITE, BEING SO FIGETY OR RESTLESS THAT YOU HAVE BEEN MOVING AROUND A LOT MORE THAN USUAL: 0
5. POOR APPETITE OR OVEREATING: 1
3. TROUBLE FALLING OR STAYING ASLEEP: 3
2. FEELING DOWN, DEPRESSED OR HOPELESS: 2
SUM OF ALL RESPONSES TO PHQ QUESTIONS 1-9: 11
10. IF YOU CHECKED OFF ANY PROBLEMS, HOW DIFFICULT HAVE THESE PROBLEMS MADE IT FOR YOU TO DO YOUR WORK, TAKE CARE OF THINGS AT HOME, OR GET ALONG WITH OTHER PEOPLE: 1
SUM OF ALL RESPONSES TO PHQ QUESTIONS 1-9: 11
1. LITTLE INTEREST OR PLEASURE IN DOING THINGS: 1
7. TROUBLE CONCENTRATING ON THINGS, SUCH AS READING THE NEWSPAPER OR WATCHING TELEVISION: 1
6. FEELING BAD ABOUT YOURSELF - OR THAT YOU ARE A FAILURE OR HAVE LET YOURSELF OR YOUR FAMILY DOWN: 0

## 2021-06-24 ASSESSMENT — SOCIAL DETERMINANTS OF HEALTH (SDOH): HOW HARD IS IT FOR YOU TO PAY FOR THE VERY BASICS LIKE FOOD, HOUSING, MEDICAL CARE, AND HEATING?: NOT HARD AT ALL

## 2021-06-24 NOTE — PROGRESS NOTES
[address]  MEDICINE  [address] [place] [address]  Dept: [phone]  Dept Fax: [phone]  Loc: [phone]    Carolyn Jeffery is a 68 y.o. female evaluated via telephone on 6/24/2021. Consent:  She and/or health care decision maker is aware that that she may receive a bill for this telephone service, depending on her insurance coverage, and has provided verbal consent to proceed: Yes    Carolyn Jeffery is a 68 y.o. female who presents today for her medical conditions/complaints as noted below. Carolyn Jeffery is c/o of [facility] MEDICINE  [address] [place] [address]  Dept: [phone]  Dept Fax: [phone]  Loc: [address] Winters is a 68 y.o. female who presents today for her medical conditions/complaints as noted below. Carolyn Jeffery is c/o of Depression       Subjective:     Chief Complaint   Patient presents with    Depression       HPI  Mood Disorder:  Patient presents for follow-up of depression. Current complaints include: See PHQ9 below . She denies tearfulness, decreased libido, irritability, excessive worry, panic attacks, obsessive thoughts, compulsive behaviors, increased use of drugs or alcohol, suicidal thoughts or behavior, and impaired memory. Symptoms/signs of zulay: none. External stressors: nothing new. Current treatment includes: Wellbutrin-  mg daily started on 5/19/21 and was to continue trazodone. Medication side effects: none with wellbutrin  She is watching diet and wellbutrin has helped with her eating urges. She has lost 3 pounds. Depression is doing better except when she has \"drama\" going on. Uses trazodone but causes daytime fatigue and uses it rarely.      PHQ-9  6/24/2021 5/19/2021 10/29/2020 2/14/2020 4/11/2019 9/13/2018 6/13/2018   Little interest or pleasure in doing things 1 3 0 0 1 1 1 Feeling down, depressed, or hopeless 2 1 0 0 2 2 1   Trouble falling or staying asleep, or sleeping too much 3 3 - - 3 3 1   Feeling tired or having little energy 3 2 - - 3 3 3   Poor appetite or overeating 1 3 - - 2 1 2   Feeling bad about yourself - or that you are a failure or have let yourself or your family down 0 3 - - 1 3 1   Trouble concentrating on things, such as reading the newspaper or watching television 1 0 - - 1 0 0   Moving or speaking so slowly that other people could have noticed. Or the opposite - being so fidgety or restless that you have been moving around a lot more than usual 0 0 - - 0 0 0   Thoughts that you would be better off dead, or of hurting yourself in some way 0 0 - - 0 0 0   PHQ-2 Score 3 4 0 0 3 3 2   PHQ-9 Total Score 11 15 0 0 13 13 9   If you checked off any problems, how difficult have these problems made it for you to do your work, take care of things at home, or get along with other people?  1 1 - - 1 2 1     Interpretation of Total Score Total Score Depression Severity: 1-4 = Minimal depression, 5-9 = Mild depression, 10-14 = Moderate depression, 15-19 = Moderately severe depression, 20-27 = Severe depression    Past Medical History:   Diagnosis Date    Abnormal chest CT 5/9/2013    Abnormal CXR 2/19/2015    Acute exacerbation of chronic obstructive pulmonary disease (COPD) ([address]     Acute on chronic respiratory failure (HCC)     Acute on chronic respiratory failure with hypoxia (HCC) 1/2/2017    Anticoagulant long-term use     Atrial fibrillation (HCC)     Atrial fibrillation with rapid ventricular response ([address] 6/23/2015    B-cell lymphoma ([address]     CAP (community acquired pneumonia) 1/29/2015    Carotid artery stenosis     CHF (congestive heart failure) (HCC)     Chronic back pain     Congenital heart disease     Depression     Diverticulitis     Diverticulitis of colon     Dizziness 6/14/2020    Fistula     anal-vaginal fistula    Gram-negative pneumonia ([address]     HCAP (healthcare-associated pneumonia)     Hx of blood clots     Hyperlipidemia     Hypertension     IFG (impaired fasting glucose) 9/13/2018    Infection due to Stenotrophomonas maltophilia     Non-Hodgkins lymphoma (HCC)     Obesity     Panic disorder     Peripheral vascular disease (HCC)     Primary osteoarthritis of both knees 12/2/2020    Pulmonary embolism ([address]     6/15    Rectal vaginal fistula     Restless legs syndrome     [facility] spotted fever 2013    Sciatic nerve pain     Sepsis ([address] 6/22/2015    Sleep apnea     Swelling of limb 1/5/2016    Tobacco abuse 6/22/2015    Urinary tract infection in female     Vitamin D deficiency          Review of Systems   Constitutional: Positive for fatigue. Negative for appetite change and unexpected weight change. HENT: Negative. Eyes: Negative. Respiratory: Negative. Negative for shortness of breath. Cardiovascular: Negative. Negative for chest pain, palpitations and leg swelling. Gastrointestinal: Negative. Negative for abdominal pain, anal bleeding, blood in stool and nausea. Endocrine: Negative. Genitourinary: Negative. Negative for hematuria. Musculoskeletal: Negative. Skin: Negative. Negative for rash. Allergic/Immunologic: Negative. Neurological: Negative. Negative for dizziness, syncope, light-headedness and numbness. Hematological: Negative. Does not bruise/bleed easily. Psychiatric/Behavioral: Positive for dysphoric mood and sleep disturbance. Negative for self-injury and suicidal ideas. All other systems reviewed and are negative.        Past Medical History:   Diagnosis Date    Abnormal chest CT 5/9/2013    Abnormal CXR 2/19/2015    Acute exacerbation of chronic obstructive pulmonary disease (COPD) ([address]     Acute on chronic respiratory failure (HCC)     Acute on chronic respiratory failure with hypoxia (HCC) 1/2/2017    Anticoagulant long-term use     Atrial fibrillation ([address]     Atrial fibrillation with rapid ventricular response ([address] 6/23/2015    B-cell lymphoma ([address]     CAP (community acquired pneumonia) 1/29/2015    Carotid artery stenosis     CHF (congestive heart failure) (HCC)     Chronic back pain     Congenital heart disease     Depression     Diverticulitis     Diverticulitis of colon     Dizziness 6/14/2020    Fistula     anal-vaginal fistula    Gram-negative pneumonia ([address]     HCAP (healthcare-associated pneumonia)     Hx of blood clots     Hyperlipidemia     Hypertension     IFG (impaired fasting glucose) 9/13/2018    Infection due to Stenotrophomonas maltophilia     Non-Hodgkins lymphoma (HCC)     Obesity     Panic disorder     Peripheral vascular disease ([address]     Primary osteoarthritis of both knees 12/2/2020    Pulmonary embolism ([address]     6/15    Rectal vaginal fistula     Restless legs syndrome     [facility] spotted fever 2013    Sciatic nerve pain     Sepsis ([address] 6/22/2015    Sleep apnea     Swelling of limb 1/5/2016    Tobacco abuse 6/22/2015    Urinary tract infection in female     Vitamin D deficiency      Family History   Problem Relation Age of Onset    Cancer Mother 43        lung cancer    Diabetes Sister     Cancer Daughter 43        lung cancer    Asthma Son     Hypertension Son     Asthma Grandchild     Hypertension Son     Emphysema Neg Hx     Heart Failure Neg Hx      Past Surgical History:   Procedure Laterality Date    APPENDECTOMY  1963    BRONCHOSCOPY N/A 8/12/2019    EBUS WF W/ANES.  performed by Mahsa Rondon MD at [address] [place] [address]  8/12/2019    BRONCHOSCOPY ALVEOLAR LAVAGE performed by Mahsa Rondon MD at [address] [place] [address]  8/12/2019    BRONCHOSCOPY BRUSHINGS performed by Mahsa Rondon MD at [address] [place] [address]  8/12/2019    BRONCHOSCOPY/TRANSBRONCHIAL LUNG BIOPSY performed by Mahsa Rondon MD at [address] [place] [address] BRONCHOSCOPY  2019    BRONCHOSCOPY/TRANSBRONCHIAL NEEDLE BIOPSY performed by Mahsa Rondon MD at [facility]  2019    BRONCHOSCOPY/TRANSBRONCHIAL NEEDLE BIOPSY ADDL LOBE performed by Mahsa Rondon MD at [facility]      COLONOSCOPY N/A 2019    COLONOSCOPY POLYPECTOMY SNARE/COLD BIOPSY showed diverticulilosis performed by Judi De Los Santos MD at [facility]      umbilical--Dr oHu    [facility]    PORT SURGERY N/A 2020    PORT INSERTION performed by Natanael Mcmahon MD at [place] [address] TUNNELED VENOUS PORT PLACEMENT Left 2020    Port Insertion ENRIQUETA Chest w. Dr Brooklynn Van [address] Injectable [id number] lot [id number] 3/31/21     Social History     Socioeconomic History    Marital status:      Spouse name: Not on file    Number of children: 3    Years of education: Not on file    Highest education level: Not on file   Occupational History    Occupation: retired   Tobacco Use    Smoking status: Former Smoker     Packs/day: 0.33     Years: 50.00     Pack years: 16.50     Types: Cigarettes     Quit date: 2015     Years since quittin.0    Smokeless tobacco: Never Used   Vaping Use    Vaping Use: Never used   Substance and Sexual Activity    Alcohol use: Not Currently     Alcohol/week: 0.0 standard drinks    Drug use: No    Sexual activity: Not Currently   Other Topics Concern    Not on file   Social History Narrative    Not on file     Social Determinants of Health     Financial Resource Strain: Low Risk     Difficulty of Paying Living Expenses: Not hard at all   Food Insecurity: No Food Insecurity    Worried About [address] in the Last Year: Never true    [facility] in the Last Year: Never true   Transportation Needs:     Lack of Transportation (Medical):      Lack of Transportation (Non-Medical): Physical Activity:     Days of Exercise per Week:     Minutes of Exercise per Session:    Stress:     Feeling of Stress :    Social Connections: Unknown    Frequency of Communication with Friends and Family: More than three times a week    Frequency of Social Gatherings with Friends and Family: More than three times a week    Attends Judaism Services: Not on file    Active Member of [address] [place] [address] or Organizations: Not on file    Attends Club or Organization Meetings: Not on file    Marital Status: Not on file   Intimate Partner Violence: Not At Risk    Fear of Current or Ex-Partner: No    Emotionally Abused: No    Physically Abused: No    Sexually Abused: No     Current Outpatient Medications   Medication Sig Dispense Refill    ezetimibe (ZETIA) 10 MG tablet Take 1 tablet by mouth daily 30 tablet 5    warfarin (COUMADIN) 5 MG tablet Take 1 tablet by mouth daily Except for Wednesday 7.5mg 30 tablet 5    buPROPion (WELLBUTRIN XL) 150 MG extended release tablet Take 1 tablet by mouth every morning 30 tablet 1    Melatonin 5 MG CAPS Take 1 capsule by mouth nightly as needed      diphenhydrAMINE (BENADRYL) 25 MG tablet Take 25 mg by mouth every 6 hours as needed for Itching      warfarin (COUMADIN) 5 MG tablet TAKE ONE TABLET BY MOUTH DAILY 60 tablet 2    spironolactone (ALDACTONE) 25 MG tablet Take 1 tablet by mouth daily 30 tablet 2    torsemide (DEMADEX) 20 MG tablet Take 1 tablet by mouth daily 30 tablet 2    albuterol sulfate HFA (VENTOLIN HFA) 108 (90 Base) MCG/ACT inhaler Inhale 2 puffs into the lungs every 6 hours as needed for Wheezing or Shortness of Breath 2 Inhaler 5    polyethylene glycol (GLYCOLAX) 17 g packet Take 17 g by mouth daily 30 each 1    traZODone (DESYREL) 50 MG tablet Take 1 tablet by mouth nightly 30 tablet 1    vitamin B-6 (PYRIDOXINE) 50 MG tablet Take by mouth daily      albuterol (PROVENTIL) (2.5 MG/3ML) 0.083% nebulizer solution Take 3 mLs by nebulization every 6 hours Glucose 05/19/2021 130*    BUN 05/19/2021 11     CREATININE 05/19/2021 0.6     GFR Non- 05/19/2021 >60     GFR  05/19/2021 >60     Calcium 05/19/2021 8.8     Total Protein 05/19/2021 6.2*    Albumin 05/19/2021 3.9     Albumin/Globulin Ratio 05/19/2021 1.7     Total Bilirubin 05/19/2021 0.3     Alkaline Phosphatase 05/19/2021 92     ALT 05/19/2021 13     AST 05/19/2021 15     Globulin 05/19/2021 2.3     Vit D, 25-Hydroxy 05/19/2021 14.3*    Hep C Ab Interp 05/19/2021 Non-reactive     WBC 05/19/2021 6.7     RBC 05/19/2021 4.27     Hemoglobin 05/19/2021 10.7*    Hematocrit 05/19/2021 34.3*    MCV 05/19/2021 80.4     MCH 05/19/2021 25.0*    MCHC 05/19/2021 31.1     RDW 05/19/2021 16.8*    Platelets 90/68/0245 273     MPV 05/19/2021 7.1     Neutrophils % 05/19/2021 66.2     Lymphocytes % 05/19/2021 19.5     Monocytes % 05/19/2021 10.2     Eosinophils % 05/19/2021 3.4     Basophils % 05/19/2021 0.7     Neutrophils Absolute 05/19/2021 4.5     Lymphocytes Absolute 05/19/2021 1.3     Monocytes Absolute 05/19/2021 0.7     Eosinophils Absolute 05/19/2021 0.2     Basophils Absolute 05/19/2021 0.1     TSH 05/19/2021 1.66     INR 05/19/2021 2.2    Anti-coag visit on 05/03/2021   Component Date Value    INR 05/03/2021 2.00    Anti-coag visit on 04/29/2021   Component Date Value    INR 04/29/2021 2.30        No results found for this visit on 06/24/21. Assessment:       1. Moderate episode of recurrent major depressive disorder ([facility]    2. Anxiety    3. Primary insomnia        No results found for this visit on 06/24/21. Plan:       [place] was seen today for depression. Diagnoses and all orders for this visit:    Moderate episode of recurrent major depressive disorder ([facility]  Feels she has had improvement on wellbutrin and would like to continue  refill  -     buPROPion (WELLBUTRIN XL) 150 MG extended release tablet;  Take 1 tablet by mouth every morning  Educated if patient develops SI/HI/zulay to call 911 or go to ER. Discussed use, benefit, risks and side effects of prescribed medications. Barriers to compliance discussed. All patient questions answered. Pt voiced understanding. Anxiety  Start   -     QUEtiapine (SEROQUEL) 25 MG tablet; Take 1 tablet by mouth nightly  continue  -     buPROPion (WELLBUTRIN XL) 150 MG extended release tablet; Take 1 tablet by mouth every morning    Primary insomnia  Trazodone causes too much sedation/\"hang over effect\" the next day  Discontinue trazodone  Trial of (also to help w/anxiety and depression)  -     QUEtiapine (SEROQUEL) 25 MG tablet; Take 1 tablet by mouth nightly  Patient counselled on proper usage of medication, and was advised of potential side effects and risks associated with this medication. The patient expressed understanding of above and agreed to proceed with treatment. Other orders  -     warfarin (COUMADIN) 5 MG tablet; TAKE ONE TABLET BY MOUTH DAILY  -     torsemide (DEMADEX) 20 MG tablet; Take 1 tablet by mouth daily  -     ezetimibe (ZETIA) 10 MG tablet; Take 1 tablet by mouth daily        Patient has been instructed call the office immediately with new symptoms, change in symptoms or worseningof symptoms. If this is not feasible, patient is instructed to report to the emergency room. Medication profile reviewed. Medication side effects and possible impairments from medications were discussed as applicable. Allergies were reviewed. Health maintenance was reviewed and updated as appropriate. No follow-ups on file. (Comment: Please note this report has been produced using a combination of typing and speech recognition software and may contain errors related to that system including errors in grammar, punctuation, and spelling, as well as words and phrases that may be inappropriate.  If there are any questions or concerns please feel free to contact the dictating provider for clarification.)            Documentation:  I communicated with the patient and/or health care decision maker about above conditions. Details of this discussion including any medical advice provided: as noted above      I affirm this is a Patient Initiated Episode with a Patient who has not had a related appointment within my department in the past 7 days or scheduled within the next 24 hours.     Patient identification was verified at the start of the visit: Yes    Total Time: minutes: 11-20 minutes    Note: not billable if this call serves to triage the patient into an appointment for the relevant concern      TURNER Morgan - CNP

## 2021-06-24 NOTE — PROGRESS NOTES
Mayra Bean is a 68 y.o. female evaluated via telephone on 6/24/2021.       Consent:  She and/or health care decision maker is aware that that she may receive a bill for this telephone service, depending on her insurance coverage, and has provided verbal consent to proceed: Yes    Reynaldo Stanton MA

## 2021-07-01 ENCOUNTER — ANTI-COAG VISIT (OUTPATIENT)
Dept: FAMILY MEDICINE CLINIC | Age: 81
End: 2021-07-01

## 2021-07-01 DIAGNOSIS — I26.99 OTHER PULMONARY EMBOLISM WITHOUT ACUTE COR PULMONALE, UNSPECIFIED CHRONICITY (HCC): Primary | ICD-10-CM

## 2021-07-01 LAB — INR BLD: 1.4

## 2021-07-08 ENCOUNTER — ANTI-COAG VISIT (OUTPATIENT)
Dept: FAMILY MEDICINE CLINIC | Age: 81
End: 2021-07-08

## 2021-07-08 LAB — INR BLD: 2.2

## 2021-07-08 NOTE — PROGRESS NOTES
Pt informed and will recheck in 2 weeks Alar Island Pedicle Flap Text: The defect edges were debeveled with a #15 scalpel blade.  Given the location of the defect, shape of the defect and the proximity to the alar rim an island pedicle advancement flap was deemed most appropriate.  Using a sterile surgical marker, an appropriate advancement flap was drawn incorporating the defect, outlining the appropriate donor tissue and placing the expected incisions within the nasal ala running parallel to the alar rim. The area thus outlined was incised with a #15 scalpel blade.  The skin margins were undermined minimally to an appropriate distance in all directions around the primary defect and laterally outward around the island pedicle utilizing iris scissors.  There was minimal undermining beneath the pedicle flap.

## 2021-07-22 ENCOUNTER — ANTI-COAG VISIT (OUTPATIENT)
Dept: FAMILY MEDICINE CLINIC | Age: 81
End: 2021-07-22

## 2021-07-22 DIAGNOSIS — I26.99 OTHER PULMONARY EMBOLISM WITHOUT ACUTE COR PULMONALE, UNSPECIFIED CHRONICITY (HCC): Primary | ICD-10-CM

## 2021-07-22 LAB — INR BLD: 1.2

## 2021-07-28 ENCOUNTER — CARE COORDINATION (OUTPATIENT)
Dept: CARE COORDINATION | Age: 81
End: 2021-07-28

## 2021-07-28 SDOH — HEALTH STABILITY: PHYSICAL HEALTH: ON AVERAGE, HOW MANY DAYS PER WEEK DO YOU ENGAGE IN MODERATE TO STRENUOUS EXERCISE (LIKE A BRISK WALK)?: 0 DAYS

## 2021-07-28 SDOH — ECONOMIC STABILITY: INCOME INSECURITY: IN THE LAST 12 MONTHS, WAS THERE A TIME WHEN YOU WERE NOT ABLE TO PAY THE MORTGAGE OR RENT ON TIME?: NO

## 2021-07-28 SDOH — HEALTH STABILITY: PHYSICAL HEALTH: ON AVERAGE, HOW MANY MINUTES DO YOU ENGAGE IN EXERCISE AT THIS LEVEL?: 0 MIN

## 2021-07-28 SDOH — ECONOMIC STABILITY: HOUSING INSECURITY
IN THE LAST 12 MONTHS, WAS THERE A TIME WHEN YOU DID NOT HAVE A STEADY PLACE TO SLEEP OR SLEPT IN A SHELTER (INCLUDING NOW)?: NO

## 2021-07-28 ASSESSMENT — SOCIAL DETERMINANTS OF HEALTH (SDOH)
WITHIN THE LAST YEAR, HAVE YOU BEEN KICKED, HIT, SLAPPED, OR OTHERWISE PHYSICALLY HURT BY YOUR PARTNER OR EX-PARTNER?: NO
WITHIN THE LAST YEAR, HAVE YOU BEEN AFRAID OF YOUR PARTNER OR EX-PARTNER?: NO
HOW HARD IS IT FOR YOU TO PAY FOR THE VERY BASICS LIKE FOOD, HOUSING, MEDICAL CARE, AND HEATING?: NOT HARD AT ALL
WITHIN THE LAST YEAR, HAVE TO BEEN RAPED OR FORCED TO HAVE ANY KIND OF SEXUAL ACTIVITY BY YOUR PARTNER OR EX-PARTNER?: NO
WITHIN THE LAST YEAR, HAVE YOU BEEN HUMILIATED OR EMOTIONALLY ABUSED IN OTHER WAYS BY YOUR PARTNER OR EX-PARTNER?: NO

## 2021-07-28 NOTE — CARE COORDINATION
Ambulatory Care Coordination Note  7/28/2021  CM Risk Score: 6  Charlson 10 Year Mortality Risk Score: 100%     ACC: Jose C Solitario, RN    Summary Note: ACM  Completed call with patient for care coordination. She is having some increased aches and pains in her legs. She also spoke to me about concerns about excessive thick skin on her heels. She has been trying to use a Pumice stone and feels like one foot is sore now. She does perform  foot checks with a mirror and denies any open sores. [facility]  [facility]. I have contacted them to lengthen the visit to include a diabetic foot exam with nail trimming and for them to assess the skin on her heels. Reinforced foot care. She continues to deny interest in help with bathing or HHA. She feels she is able to still manage. She has a shower chair, handrails and is able to perform her own care still but slowly. Discussed vaccine status. She is not vaccinated and has concerns with getting it and the safety of the vaccine. I have again advised her on getting the vaccine due to her chronic conditions. Patient has been having difficulty with breathing outside in the humidity. She is using her AC and staying in. Aware of copd zone teaching. Her activity level has been down with humidity. Reports her weight is down 3 lbs. She is trying to eat less carbs and healthier. Plan      Continue to working on increasing activity   Assess fall safety   Continue to assess for need to connect to additional community services. Continue with weight loss efforts and dietary changes as indicated         Care Coordination Interventions    Program Enrollment: Complex Care  Referral from Primary Care Provider: No  Suggested Interventions and Community Resources  Fall Risk Prevention: In Process (Comment: discussed bathroom safety, life line options, and potential need for COA referral but declined.  6/11/21 trb)  Home Health Services: medications    Medication Sig Start Date End Date Taking?  Authorizing Provider   warfarin (COUMADIN) 5 MG tablet TAKE ONE TABLET BY MOUTH DAILY 6/24/21   TURNER Cade - BOOKER   torsemide [facility]) 20 MG tablet Take 1 tablet by mouth daily 6/24/21   Shreyas Kwong, APRN - CNP   QUEtiapine (SEROQUEL) 25 MG tablet Take 1 tablet by mouth nightly 6/24/21   Shreyas Kwong, TURNER - CNP   ezetimibe (ZETIA) 10 MG tablet Take 1 tablet by mouth daily 6/24/21   Shreyas Kwong, APRN - CNP   buPROPion (WELLBUTRIN XL) 150 MG extended release tablet Take 1 tablet by mouth every morning 6/24/21   Shreyas Kwong, TURNER - CNP   Melatonin 5 MG CAPS Take 1 capsule by mouth nightly as needed    Historical Provider, MD   diphenhydrAMINE (BENADRYL) 25 MG tablet Take 25 mg by mouth every 6 hours as needed for Itching    Historical Provider, MD   spironolactone (ALDACTONE) 25 MG tablet Take 1 tablet by mouth daily 2/24/21   Shreyas Kwong APRN - CNP   albuterol sulfate HFA (VENTOLIN HFA) 108 (90 Base) MCG/ACT inhaler Inhale 2 puffs into the lungs every 6 hours as needed for Wheezing or Shortness of Breath 10/28/20   Case Mcclelland MD   polyethylene glycol (GLYCOLAX) 17 g packet Take 17 g by mouth daily 10/23/20   Shreyas Kwong APRN - CNP   vitamin B-6 (PYRIDOXINE) 50 MG tablet Take by mouth daily    Historical Provider, MD   albuterol (PROVENTIL) (2.5 MG/3ML) 0.083% nebulizer solution Take 3 mLs by nebulization every 6 hours as needed for Wheezing or Shortness of Breath DX COPD J44.9 4/13/20   Case Mcclelland MD   Acetaminophen (TYLENOL ARTHRITIS PAIN PO) Take by mouth    Historical Provider, MD   OXYGEN 4.5 L 1/5/17   Uriah Man MD   Cholecalciferol (VITAMIN D-3 PO) Take 2,000 Units by mouth daily     Historical Provider, MD       Future Appointments   Date Time Provider Allyssa Levine   8/27/2021 11:15 AM [facility]   9/23/2021  1:00 PM Shreyas Kwong APRN - CNP [facility] - [facility]

## 2021-08-04 ENCOUNTER — ANTI-COAG VISIT (OUTPATIENT)
Dept: FAMILY MEDICINE CLINIC | Age: 81
End: 2021-08-04

## 2021-08-04 DIAGNOSIS — I26.99 OTHER PULMONARY EMBOLISM WITHOUT ACUTE COR PULMONALE, UNSPECIFIED CHRONICITY (HCC): Primary | ICD-10-CM

## 2021-08-04 LAB — INR BLD: 2.6

## 2021-08-04 NOTE — PROGRESS NOTES
Patient called and informed to keep same coumadin dosing and to recheck INR in one week. Patient acknowledged.

## 2021-08-12 LAB — INR BLD: 3.3

## 2021-08-13 ENCOUNTER — ANTI-COAG VISIT (OUTPATIENT)
Dept: FAMILY MEDICINE CLINIC | Age: 81
End: 2021-08-13

## 2021-08-13 DIAGNOSIS — I27.82 OTHER CHRONIC PULMONARY EMBOLISM WITHOUT ACUTE COR PULMONALE (HCC): Primary | ICD-10-CM

## 2021-08-19 ENCOUNTER — TELEPHONE (OUTPATIENT)
Dept: FAMILY MEDICINE CLINIC | Age: 81
End: 2021-08-19

## 2021-08-19 NOTE — TELEPHONE ENCOUNTER
Pt called stating that she's coughing up that greenish/gray stuff again and wanting to know if PCP would be able to send in the Rx again to [facility].  Call back [phone]

## 2021-08-20 ENCOUNTER — VIRTUAL VISIT (OUTPATIENT)
Dept: FAMILY MEDICINE CLINIC | Age: 81
End: 2021-08-20
Payer: MEDICARE

## 2021-08-20 DIAGNOSIS — J44.1 COPD WITH ACUTE EXACERBATION (HCC): Primary | ICD-10-CM

## 2021-08-20 PROCEDURE — 99442 PR PHYS/QHP TELEPHONE EVALUATION 11-20 MIN: CPT | Performed by: NURSE PRACTITIONER

## 2021-08-20 RX ORDER — DOXYCYCLINE HYCLATE 100 MG/1
100 CAPSULE ORAL 2 TIMES DAILY
Qty: 14 CAPSULE | Refills: 0 | Status: SHIPPED | OUTPATIENT
Start: 2021-08-20 | End: 2021-08-27

## 2021-08-20 RX ORDER — PREDNISONE 10 MG/1
TABLET ORAL
Qty: 30 TABLET | Refills: 0 | Status: ON HOLD
Start: 2021-08-20 | End: 2021-09-29 | Stop reason: HOSPADM

## 2021-08-20 ASSESSMENT — ENCOUNTER SYMPTOMS
CHEST TIGHTNESS: 0
EYES NEGATIVE: 1
ALLERGIC/IMMUNOLOGIC NEGATIVE: 1
BLOOD IN STOOL: 0
ABDOMINAL PAIN: 0
NAUSEA: 0
SWOLLEN GLANDS: 0
SHORTNESS OF BREATH: 1
VOMITING: 0
SINUS PAIN: 1
WHEEZING: 0
GASTROINTESTINAL NEGATIVE: 1
SORE THROAT: 0
ANAL BLEEDING: 0
DIARRHEA: 0
COUGH: 1
RHINORRHEA: 0

## 2021-08-20 NOTE — PROGRESS NOTES
[address] [facility]  [address] [place] [address]  Dept: [phone]  Dept Fax: [phone]  Loc: [phone]    Mercy Kasper is a 68 y.o. female evaluated via telephone on 8/20/2021. Consent:  She and/or health care decision maker is aware that that she may receive a bill for this telephone service, depending on her insurance coverage, and has provided verbal consent to proceed: Yes    Mercy Kasper is a 68 y.o. female who presents today for her medical conditions/complaints as noted below. Mercy Kasper is c/o of Cough and Head Congestion        [address] [facility]  [address] [facility] [address]  Dept: [phone]  Dept Fax: [phone]  Loc: [phone] Winters is a 68 y.o. female who presents today for her medical conditions/complaints as noted below. Mercy Kasper is c/o of Cough and Head Congestion       Subjective:     Chief Complaint   Patient presents with    Cough    Head Congestion       URI   This is a new problem. The current episode started in the past 7 days. There has been no fever. Associated symptoms include congestion, coughing (very thich green mucous) and sinus pain. Pertinent negatives include no abdominal pain, chest pain, diarrhea, dysuria, ear pain, headaches, joint pain, joint swelling, nausea, neck pain, plugged ear sensation, rash, rhinorrhea, sneezing, sore throat, swollen glands, vomiting or wheezing. She has tried increased fluids for the symptoms. The treatment provided no relief.        Past Medical History:   Diagnosis Date    Abnormal chest CT 5/9/2013    Abnormal CXR 2/19/2015    Acute exacerbation of chronic obstructive pulmonary disease (COPD) [facility]     Acute on chronic respiratory failure (HCC)     Acute on chronic respiratory failure with hypoxia (HCC) 1/2/2017    Anticoagulant long-term use     Atrial fibrillation [facility])     Atrial fibrillation with rapid ventricular response ([address] 6/23/2015    B-cell lymphoma ([address]     CAP (community acquired pneumonia) 1/29/2015    Carotid artery stenosis     CHF (congestive heart failure) (HCC)     Chronic back pain     Congenital heart disease     Depression     Diverticulitis     Diverticulitis of colon     Dizziness 6/14/2020    Fistula     anal-vaginal fistula    Gram-negative pneumonia ([address]     HCAP (healthcare-associated pneumonia)     Hx of blood clots     Hyperlipidemia     Hypertension     IFG (impaired fasting glucose) 9/13/2018    Infection due to Stenotrophomonas maltophilia     Non-Hodgkins lymphoma (HCC)     Obesity     Panic disorder     Peripheral vascular disease ([address]     Primary osteoarthritis of both knees 12/2/2020    Pulmonary embolism ([address]     6/15    Rectal vaginal fistula     Restless legs syndrome     [facility] spotted fever 2013    Sciatic nerve pain     Sepsis ([address] 6/22/2015    Sleep apnea     Swelling of limb 1/5/2016    Tobacco abuse 6/22/2015    Urinary tract infection in female     Vitamin D deficiency          Review of Systems   Constitutional: Positive for fatigue. Negative for appetite change, chills, fever and unexpected weight change. HENT: Positive for congestion and sinus pain. Negative for ear pain, rhinorrhea, sneezing and sore throat. Eyes: Negative. Respiratory: Positive for cough (very thich green mucous) and shortness of breath (chronic but at baseline with O2 sats around 95-97%). Negative for chest tightness and wheezing. Cardiovascular: Negative. Negative for chest pain, palpitations and leg swelling. Gastrointestinal: Negative. Negative for abdominal pain, anal bleeding, blood in stool, diarrhea, nausea and vomiting. Endocrine: Negative. Genitourinary: Negative. Negative for dysuria and hematuria. Musculoskeletal: Negative. Negative for joint pain and neck pain.    Skin: Negative. Negative for rash. Allergic/Immunologic: Negative. Neurological: Negative. Negative for dizziness, syncope, light-headedness, numbness and headaches. Hematological: Negative. Does not bruise/bleed easily. Psychiatric/Behavioral: Negative. All other systems reviewed and are negative.        Past Medical History:   Diagnosis Date    Abnormal chest CT 5/9/2013    Abnormal CXR 2/19/2015    Acute exacerbation of chronic obstructive pulmonary disease (COPD) ([address]     Acute on chronic respiratory failure (HCC)     Acute on chronic respiratory failure with hypoxia (HCC) 1/2/2017    Anticoagulant long-term use     Atrial fibrillation (HCC)     Atrial fibrillation with rapid ventricular response ([address] 6/23/2015    B-cell lymphoma ([address]     CAP (community acquired pneumonia) 1/29/2015    Carotid artery stenosis     CH[facility]congest[facility] heart failure) [facility]     Chronic back pain     Congenital heart disease     Depression     Diverticulitis     Diverticulitis of colon     Dizziness 6/14/2020    Fistula     anal-vaginal fistula    Gram-negative pneumonia ([address]     HCAP (healthcare-associated pneumonia)     Hx of blood clots     Hyperlipidemia     Hypertension     IFG (impaired fasting glucose) 9/13/2018    Infection due to Stenotrophomonas maltophilia     Non-Hodgkins lymphoma (HCC)     Obesity     Panic disorder     Peripheral vascular disease ([address]     Primary osteoarthritis of both knees 12/2/2020    Pulmonary embolism ([address]     6/15    Rectal vaginal fistula     Restless legs syndrome     [facility] spotted fever 2013    Sciatic nerve pain     Sepsis ([address] 6/22/2015    Sleep apnea     Swelling of limb 1/5/2016    Tobacco abuse 6/22/2015    Urinary tract infection in female     Vitamin D deficiency      Family History   Problem Relation Age of Onset    Cancer Mother 43        lung cancer    Diabetes Sister     Cancer Daughter 43        lung cancer    Asthma Son     Hypertension Son     Asthma Grandchild     Hypertension Son     Emphysema Neg Hx     Heart Failure Neg Hx      Past Surgical History:   Procedure Laterality Date    APPENDECTOMY  1963    BRONCHOSCOPY N/A 2019    EBUS WF W/ANES.  performed by Chanda Kearney MD at [address] [place] [address]  2019    BRONCHOSCOPY ALVEOLAR LAVAGE performed by Chanda Kearney MD at [address] [place] [address]  2019    BRONCHOSCOPY BRUSHINGS performed by Chanda Kearney MD at [address] [place] [address]  2019    BRONCHOSCOPY/TRANSBRONCHIAL LUNG BIOPSY performed by Chanda Kearney MD at [address] [place] [address]  2019    BRONCHOSCOPY/TRANSBRONCHIAL NEEDLE BIOPSY performed by Chanda Kearney MD at [address] [place] [address]  2019    BRONCHOSCOPY/TRANSBRONCHIAL NEEDLE BIOPSY ADDL LOBE performed by Chanda Kearney MD at [address] [place] [address]      COLONOSCOPY N/A 2019    COLONOSCOPY POLYPECTOMY SNARE/COLD BIOPSY showed diverticulilosis performed by Zainab Pan MD at [address] [place] [address]      umbilical--Dr Hou    [address] N/A 2020    PORT INSERTION performed by Jaycob Lomas MD at [facility] TUNNELED VENOUS PORT PLACEMENT Left 2020    Port Insertion ENRIQUETA Chest w. Dr Zia Damico 20 Vaccess CT Injectable [id number] 3/31/21     Social History     Socioeconomic History    Marital status:      Spouse name: Not on file    Number of children: 3    Years of education: Not on file    Highest education level: Not on file   Occupational History    Occupation: retired   Tobacco Use    Smoking status: Former Smoker     Packs/day: 0.33     Years: 50.00     Pack years: 16.50     Types: Cigarettes     Quit date: 2015     Years since quittin.1    Smokeless tobacco: Never Used   Vaping Use    Vaping Use: Never used   Substance and Sexual Activity    Alcohol use: Not Currently     Alcohol/week: 0.0 standard drinks    Drug use: No    Sexual activity: Not Currently   Other Topics Concern    Not on file   Social History Narrative    Not on file     Social Determinants of Health     Financial Resource Strain: Low Risk     Difficulty of Paying Living Expenses: Not hard at all   Food Insecurity: No Food Insecurity    Worried About Running Out of Food in the Last Year: Never true    Jordyn of Food in the Last Year: Never true   Transportation Needs: No Transportation Needs    Lack of Transportation (Medical): No    Lack of Transportation (Non-Medical):  No   Physical Activity: Inactive    Days of Exercise per Week: 0 days    Minutes of Exercise per Session: 0 min   Stress:     Feeling of Stress :    Social Connections: Unknown    Frequency of Communication with Friends and Family: More than three times a week    Frequency of Social Gatherings with Friends and Family: More than three times a week    Attends Anglican Services: Not on file   [employer] or Organizations: Not on file    Attends Club or Organization Meetings: Not on file    Marital Status: Not on file   Intimate Partner Violence: Not At Risk    Fear of Current or Ex-Partner: No    Emotionally Abused: No    Physically Abused: No    Sexually Abused: No     Current Outpatient Medications   Medication Sig Dispense Refill    warfarin (COUMADIN) 5 MG tablet TAKE ONE TABLET BY MOUTH DAILY 90 tablet 2    torsemide (DEMADEX) 20 MG tablet Take 1 tablet by mouth daily 90 tablet 2    QUEtiapine (SEROQUEL) 25 MG tablet Take 1 tablet by mouth nightly (Patient taking differently: Take 25 mg by mouth nightly As needed) 90 tablet 0    buPROPion (WELLBUTRIN XL) 150 MG extended release tablet Take 1 tablet by mouth every morning (Patient taking differently: Take 150 mg by mouth every morning Takes as needed) 90 tablet 1    Melatonin 5 MG CAPS Take 1 capsule by mouth nightly as needed      diphenhydrAMINE (BENADRYL) 25 MG tablet Take 25 mg by mouth every 6 hours as needed for Itching      spironolactone (ALDACTONE) 25 MG tablet Take 1 tablet by mouth daily 30 tablet 2    albuterol sulfate HFA (VENTOLIN HFA) 108 (90 Base) MCG/ACT inhaler Inhale 2 puffs into the lungs every 6 hours as needed for Wheezing or Shortness of Breath 2 Inhaler 5    polyethylene glycol (GLYCOLAX) 17 g packet Take 17 g by mouth daily 30 each 1    vitamin B-6 (PYRIDOXINE) 50 MG tablet Take by mouth daily      albuterol (PROVENTIL) (2.5 MG/3ML) 0.083% nebulizer solution Take 3 mLs by nebulization every 6 hours as needed for Wheezing or Shortness of Breath DX COPD J44.9 360 mL 5    Acetaminophen (TYLENOL ARTHRITIS PAIN PO) Take by mouth      OXYGEN 4.5 L      Cholecalciferol (VITAMIN D-3 PO) Take 2,000 Units by mouth daily       ezetimibe (ZETIA) 10 MG tablet Take 1 tablet by mouth daily (Patient not taking: Reported on 8/20/2021) 90 tablet 3     No current facility-administered medications for this visit. No changes in past medical history, past surgical history, social history, orfamily history were noted during the patient encounter unless specifically listed above. All updates of past medical history, past surgical history, social history, or family history were reviewed personally by me duringthe office visit. All problems listed in the assessment are stable unless noted otherwise. Medication profile reviewed personally by me during the office visit. Medication side effects and possible impairments frommedications were discussed as applicable. Objective:     Physical Exam  Not applicable d/t telephone visit    LMP  (LMP Unknown)   There is no height or weight on file to calculate BMI.     BP Readings from Last 2 Encounters:   05/19/21 118/64   04/28/21 128/64       Wt Readings from Last 3 Encounters:   06/02/21 273 lb (123.8 kg)   05/19/21 273 lb (123.8 kg)   04/28/21 271 lb (122.9 kg)       Lab Review   Anti-coag visit on 08/13/2021   Component Date Value    INR 08/12/2021 3.30    Anti-coag visit on 08/04/2021   Component Date Value    INR 08/04/2021 2.60    Anti-coag visit on 07/22/2021   Component Date Value    INR 07/22/2021 1.20    Anti-coag visit on 07/08/2021   Component Date Value    INR 07/08/2021 2.20    Anti-coag visit on 07/01/2021   Component Date Value    INR 07/01/2021 1.40    Anti-coag visit on 06/21/2021   Component Date Value    INR 06/21/2021 2.40        No results found for this visit on 08/20/21. Assessment:       1. COPD with acute exacerbation ([facility]        No results found for this visit on 08/20/21. Plan:       [place] was seen today for cough and head congestion. Diagnoses and all orders for this visit:    COPD with acute exacerbation ([facility] [address] [facility]  -     predniSONE (DELTASONE) 10 MG tablet; Take 40 mg for 3 days then 30 mg for 3 days then 20 mg for 3 days then 10 mg for 3 days  -     doxycycline hyclate (VIBRAMYCIN) 100 MG capsule; Take 1 capsule by mouth 2 times daily for 7 days    patient on coumadin and will need inr checked every 2-3 days while on atb therapy  Patient will have inr checked on monday    Patient has been instructed call the office immediately with new symptoms, change in symptoms or worseningof symptoms. If this is not feasible, patient is instructed to report to the emergency room. Medication profile reviewed. Medication side effects and possible impairments from medications were discussed as applicable. Allergies were reviewed. Health maintenance was reviewed and updated as appropriate. Return if symptoms worsen or fail to improve.     (Comment: Please note this report has been produced using a combination of typing and speech recognition software and may contain errors related to that system including errors in grammar, punctuation, and spelling, as well as words and phrases that may be inappropriate. If there are any questions or concerns please feel free to contact the dictating provider for clarification.)            Documentation:  I communicated with the patient and/or health care decision maker about above conditions. Details of this discussion including any medical advice provided: as noted above      I affirm this is a Patient Initiated Episode with a Patient who has not had a related appointment within my department in the past 7 days or scheduled within the next 24 hours.     Patient identification was verified at the start of the visit: Yes    Total Time: minutes: 11-20 minutes    Note: not billable if this call serves to triage the patient into an appointment for the relevant concern      Alan Ayon, APRN - CNP

## 2021-08-23 ENCOUNTER — ANTI-COAG VISIT (OUTPATIENT)
Dept: FAMILY MEDICINE CLINIC | Age: 81
End: 2021-08-23

## 2021-08-23 ENCOUNTER — TELEPHONE (OUTPATIENT)
Dept: FAMILY MEDICINE CLINIC | Age: 81
End: 2021-08-23

## 2021-08-23 DIAGNOSIS — I26.99 OTHER PULMONARY EMBOLISM WITHOUT ACUTE COR PULMONALE, UNSPECIFIED CHRONICITY (HCC): Primary | ICD-10-CM

## 2021-08-23 LAB — INR BLD: 1.3

## 2021-08-23 NOTE — TELEPHONE ENCOUNTER
----- Message from Jorden Bowers sent at 8/23/2021  3:39 PM EDT -----  Subject: Message to Provider    QUESTIONS  Information for Provider? PT want to report INR. PT states it was 1.3   ---------------------------------------------------------------------------  --------------  CALL BACK INFO  What is the best way for the office to contact you? OK to leave message on   [employer], OK to respond with electronic message via [employer] portal (only   for patients who have registered [employer] account)  Preferred Call Back Phone Number? [phone]  ---------------------------------------------------------------------------  --------------  SCRIPT ANSWERS  Relationship to Patient?  Self

## 2021-08-25 LAB — INR BLD: 2.4

## 2021-08-26 ENCOUNTER — ANTI-COAG VISIT (OUTPATIENT)
Dept: FAMILY MEDICINE CLINIC | Age: 81
End: 2021-08-26

## 2021-08-26 DIAGNOSIS — I26.99 OTHER PULMONARY EMBOLISM WITHOUT ACUTE COR PULMONALE, UNSPECIFIED CHRONICITY (HCC): Primary | ICD-10-CM

## 2021-08-26 NOTE — PROGRESS NOTES
The patient should continue on Coumadin as noted on her tracker and recheck INR on Monday since she is on antibiotic therapy

## 2021-08-30 ENCOUNTER — ANTI-COAG VISIT (OUTPATIENT)
Dept: FAMILY MEDICINE CLINIC | Age: 81
End: 2021-08-30

## 2021-08-30 DIAGNOSIS — I26.99 OTHER PULMONARY EMBOLISM WITHOUT ACUTE COR PULMONALE, UNSPECIFIED CHRONICITY (HCC): Primary | ICD-10-CM

## 2021-08-30 LAB — INR BLD: 2.9

## 2021-09-10 ENCOUNTER — TELEPHONE (OUTPATIENT)
Dept: FAMILY MEDICINE CLINIC | Age: 81
End: 2021-09-10

## 2021-09-10 DIAGNOSIS — Z20.822 SUSPECTED COVID-19 VIRUS INFECTION: Primary | ICD-10-CM

## 2021-09-10 NOTE — TELEPHONE ENCOUNTER
Pt was wanting to see if she could get a covid test sent to [address] [place] [address]. She has a fever ,achy, cough please give her a call when sent.

## 2021-09-10 NOTE — TELEPHONE ENCOUNTER
Attempted to reach patient in regards to covid 19 test results as being positive.  Unable to reach so LVM informing to call back

## 2021-09-13 ENCOUNTER — TELEPHONE (OUTPATIENT)
Dept: FAMILY MEDICINE CLINIC | Age: 81
End: 2021-09-13

## 2021-09-13 NOTE — TELEPHONE ENCOUNTER
----- Message from La Nena Ward sent at 9/13/2021  3:42 PM EDT -----  Subject: Message to Provider    QUESTIONS  Information for Provider? Pt is positive for Covid-19 and she wants to   know when/ if she can get the vaccine.  ---------------------------------------------------------------------------  --------------  CALL BACK INFO  What is the best way for the office to contact you? OK to leave message on   voicemail  Preferred Call Back Phone Number? [phone]  ---------------------------------------------------------------------------  --------------  SCRIPT ANSWERS  Relationship to Patient?  Self

## 2021-09-14 ENCOUNTER — APPOINTMENT (OUTPATIENT)
Dept: GENERAL RADIOLOGY | Age: 81
DRG: 177 | End: 2021-09-14
Payer: MEDICARE

## 2021-09-14 ENCOUNTER — HOSPITAL ENCOUNTER (INPATIENT)
Age: 81
LOS: 15 days | Discharge: NURSING, REHAB OR LONG-TERM CARE | DRG: 177 | End: 2021-09-29
Attending: EMERGENCY MEDICINE | Admitting: INTERNAL MEDICINE
Payer: MEDICARE

## 2021-09-14 DIAGNOSIS — Z79.01 ANTICOAGULATED ON COUMADIN: ICD-10-CM

## 2021-09-14 DIAGNOSIS — I48.91 ATRIAL FIBRILLATION WITH RVR (HCC): ICD-10-CM

## 2021-09-14 DIAGNOSIS — R09.02 HYPOXIA: ICD-10-CM

## 2021-09-14 DIAGNOSIS — J44.1 COPD EXACERBATION (HCC): ICD-10-CM

## 2021-09-14 DIAGNOSIS — F41.9 ANXIETY: ICD-10-CM

## 2021-09-14 DIAGNOSIS — E66.01 MORBID OBESITY (HCC): ICD-10-CM

## 2021-09-14 DIAGNOSIS — T45.511A POISONING BY WARFARIN SODIUM, ACCIDENTAL OR UNINTENTIONAL, INITIAL ENCOUNTER: ICD-10-CM

## 2021-09-14 DIAGNOSIS — U07.1 COVID-19: Primary | ICD-10-CM

## 2021-09-14 PROBLEM — J96.00 ACUTE RESPIRATORY FAILURE DUE TO COVID-19 (HCC): Status: ACTIVE | Noted: 2021-09-14

## 2021-09-14 PROBLEM — J96.01 ACUTE HYPOXEMIC RESPIRATORY FAILURE DUE TO COVID-19 (HCC): Status: ACTIVE | Noted: 2021-09-14

## 2021-09-14 LAB
A/G RATIO: 1 (ref 1.1–2.2)
ALBUMIN SERPL-MCNC: 3.5 G/DL (ref 3.4–5)
ALP BLD-CCNC: 81 U/L (ref 40–129)
ALT SERPL-CCNC: 20 U/L (ref 10–40)
ANION GAP SERPL CALCULATED.3IONS-SCNC: 10 MMOL/L (ref 3–16)
AST SERPL-CCNC: 34 U/L (ref 15–37)
BASE EXCESS ARTERIAL: 6.3 MMOL/L (ref -3–3)
BASOPHILS ABSOLUTE: 0 K/UL (ref 0–0.2)
BASOPHILS RELATIVE PERCENT: 0.4 %
BILIRUB SERPL-MCNC: 0.3 MG/DL (ref 0–1)
BUN BLDV-MCNC: 13 MG/DL (ref 7–20)
CALCIUM SERPL-MCNC: 8.3 MG/DL (ref 8.3–10.6)
CARBOXYHEMOGLOBIN ARTERIAL: 0.3 % (ref 0–1.5)
CHLORIDE BLD-SCNC: 96 MMOL/L (ref 99–110)
CO2: 33 MMOL/L (ref 21–32)
CREAT SERPL-MCNC: 0.7 MG/DL (ref 0.6–1.2)
EKG ATRIAL RATE: 150 BPM
EKG DIAGNOSIS: NORMAL
EKG Q-T INTERVAL: 266 MS
EKG QRS DURATION: 68 MS
EKG QTC CALCULATION (BAZETT): 420 MS
EKG R AXIS: -18 DEGREES
EKG T AXIS: 86 DEGREES
EKG VENTRICULAR RATE: 150 BPM
EOSINOPHILS ABSOLUTE: 0 K/UL (ref 0–0.6)
EOSINOPHILS RELATIVE PERCENT: 0 %
GFR AFRICAN AMERICAN: >60
GFR NON-AFRICAN AMERICAN: >60
GLOBULIN: 3.4 G/DL
GLUCOSE BLD-MCNC: 164 MG/DL (ref 70–99)
HCO3 ARTERIAL: 33.1 MMOL/L (ref 21–29)
HCT VFR BLD CALC: 31.7 % (ref 36–48)
HEMOGLOBIN, ART, EXTENDED: 10.7 G/DL (ref 12–16)
HEMOGLOBIN: 10 G/DL (ref 12–16)
INR BLD: 6.51 (ref 0.88–1.12)
LACTIC ACID: 1.9 MMOL/L (ref 0.4–2)
LYMPHOCYTES ABSOLUTE: 1.2 K/UL (ref 1–5.1)
LYMPHOCYTES RELATIVE PERCENT: 19.8 %
MCH RBC QN AUTO: 24.4 PG (ref 26–34)
MCHC RBC AUTO-ENTMCNC: 31.3 G/DL (ref 31–36)
MCV RBC AUTO: 77.8 FL (ref 80–100)
METHEMOGLOBIN ARTERIAL: 0.3 %
MONOCYTES ABSOLUTE: 0.8 K/UL (ref 0–1.3)
MONOCYTES RELATIVE PERCENT: 12.8 %
NEUTROPHILS ABSOLUTE: 4.1 K/UL (ref 1.7–7.7)
NEUTROPHILS RELATIVE PERCENT: 67 %
O2 SAT, ARTERIAL: 99.8 %
O2 THERAPY: ABNORMAL
PCO2 ARTERIAL: 60.1 MMHG (ref 35–45)
PDW BLD-RTO: 16.4 % (ref 12.4–15.4)
PH ARTERIAL: 7.36 (ref 7.35–7.45)
PLATELET # BLD: 271 K/UL (ref 135–450)
PMV BLD AUTO: 6.6 FL (ref 5–10.5)
PO2 ARTERIAL: 367.5 MMHG (ref 75–108)
POTASSIUM REFLEX MAGNESIUM: 4.5 MMOL/L (ref 3.5–5.1)
PROTHROMBIN TIME: 77.1 SEC (ref 9.9–12.7)
RBC # BLD: 4.08 M/UL (ref 4–5.2)
SODIUM BLD-SCNC: 139 MMOL/L (ref 136–145)
TCO2 ARTERIAL: 35 MMOL/L
TOTAL PROTEIN: 6.9 G/DL (ref 6.4–8.2)
TROPONIN: <0.01 NG/ML
WBC # BLD: 6.1 K/UL (ref 4–11)

## 2021-09-14 PROCEDURE — 85610 PROTHROMBIN TIME: CPT

## 2021-09-14 PROCEDURE — 83605 ASSAY OF LACTIC ACID: CPT

## 2021-09-14 PROCEDURE — 36415 COLL VENOUS BLD VENIPUNCTURE: CPT

## 2021-09-14 PROCEDURE — 96375 TX/PRO/DX INJ NEW DRUG ADDON: CPT

## 2021-09-14 PROCEDURE — 2500000003 HC RX 250 WO HCPCS

## 2021-09-14 PROCEDURE — 71045 X-RAY EXAM CHEST 1 VIEW: CPT

## 2021-09-14 PROCEDURE — 2580000003 HC RX 258: Performed by: INTERNAL MEDICINE

## 2021-09-14 PROCEDURE — 85025 COMPLETE CBC W/AUTO DIFF WBC: CPT

## 2021-09-14 PROCEDURE — 87636 SARSCOV2 & INF A&B AMP PRB: CPT

## 2021-09-14 PROCEDURE — 2500000003 HC RX 250 WO HCPCS: Performed by: EMERGENCY MEDICINE

## 2021-09-14 PROCEDURE — 2580000003 HC RX 258: Performed by: PHYSICIAN ASSISTANT

## 2021-09-14 PROCEDURE — 94660 CPAP INITIATION&MGMT: CPT

## 2021-09-14 PROCEDURE — 2000000000 HC ICU R&B

## 2021-09-14 PROCEDURE — 2500000003 HC RX 250 WO HCPCS: Performed by: INTERNAL MEDICINE

## 2021-09-14 PROCEDURE — 87040 BLOOD CULTURE FOR BACTERIA: CPT

## 2021-09-14 PROCEDURE — 93010 ELECTROCARDIOGRAM REPORT: CPT | Performed by: INTERNAL MEDICINE

## 2021-09-14 PROCEDURE — 84145 PROCALCITONIN (PCT): CPT

## 2021-09-14 PROCEDURE — 96365 THER/PROPH/DIAG IV INF INIT: CPT

## 2021-09-14 PROCEDURE — 6370000000 HC RX 637 (ALT 250 FOR IP): Performed by: PHYSICIAN ASSISTANT

## 2021-09-14 PROCEDURE — 84484 ASSAY OF TROPONIN QUANT: CPT

## 2021-09-14 PROCEDURE — 80053 COMPREHEN METABOLIC PANEL: CPT

## 2021-09-14 PROCEDURE — 96366 THER/PROPH/DIAG IV INF ADDON: CPT

## 2021-09-14 PROCEDURE — 99285 EMERGENCY DEPT VISIT HI MDM: CPT

## 2021-09-14 PROCEDURE — 83036 HEMOGLOBIN GLYCOSYLATED A1C: CPT

## 2021-09-14 PROCEDURE — 93005 ELECTROCARDIOGRAM TRACING: CPT | Performed by: EMERGENCY MEDICINE

## 2021-09-14 PROCEDURE — 6360000002 HC RX W HCPCS: Performed by: EMERGENCY MEDICINE

## 2021-09-14 PROCEDURE — 82803 BLOOD GASES ANY COMBINATION: CPT

## 2021-09-14 PROCEDURE — 02HV33Z INSERTION OF INFUSION DEVICE INTO SUPERIOR VENA CAVA, PERCUTANEOUS APPROACH: ICD-10-PCS | Performed by: EMERGENCY MEDICINE

## 2021-09-14 RX ORDER — QUETIAPINE FUMARATE 25 MG/1
25 TABLET, FILM COATED ORAL NIGHTLY
Status: DISCONTINUED | OUTPATIENT
Start: 2021-09-14 | End: 2021-09-21

## 2021-09-14 RX ORDER — ACETAMINOPHEN 650 MG/1
650 SUPPOSITORY RECTAL EVERY 6 HOURS PRN
Status: DISCONTINUED | OUTPATIENT
Start: 2021-09-14 | End: 2021-09-29 | Stop reason: HOSPADM

## 2021-09-14 RX ORDER — DILTIAZEM HYDROCHLORIDE 5 MG/ML
10 INJECTION INTRAVENOUS ONCE
Status: DISCONTINUED | OUTPATIENT
Start: 2021-09-15 | End: 2021-09-14 | Stop reason: SDUPTHER

## 2021-09-14 RX ORDER — VITAMIN B COMPLEX
2000 TABLET ORAL DAILY
Status: DISCONTINUED | OUTPATIENT
Start: 2021-09-15 | End: 2021-09-29 | Stop reason: HOSPADM

## 2021-09-14 RX ORDER — TORSEMIDE 20 MG/1
20 TABLET ORAL DAILY
Status: DISCONTINUED | OUTPATIENT
Start: 2021-09-15 | End: 2021-09-27

## 2021-09-14 RX ORDER — ALBUTEROL SULFATE 90 UG/1
2 AEROSOL, METERED RESPIRATORY (INHALATION) EVERY 6 HOURS PRN
Status: DISCONTINUED | OUTPATIENT
Start: 2021-09-14 | End: 2021-09-15

## 2021-09-14 RX ORDER — GUAIFENESIN/DEXTROMETHORPHAN 100-10MG/5
5 SYRUP ORAL EVERY 4 HOURS PRN
Status: DISCONTINUED | OUTPATIENT
Start: 2021-09-14 | End: 2021-09-29 | Stop reason: HOSPADM

## 2021-09-14 RX ORDER — DEXAMETHASONE SODIUM PHOSPHATE 10 MG/ML
10 INJECTION, SOLUTION INTRAMUSCULAR; INTRAVENOUS ONCE
Status: COMPLETED | OUTPATIENT
Start: 2021-09-14 | End: 2021-09-14

## 2021-09-14 RX ORDER — LANOLIN ALCOHOL/MO/W.PET/CERES
50 CREAM (GRAM) TOPICAL DAILY
Status: DISCONTINUED | OUTPATIENT
Start: 2021-09-15 | End: 2021-09-29 | Stop reason: HOSPADM

## 2021-09-14 RX ORDER — POLYETHYLENE GLYCOL 3350 17 G/17G
17 POWDER, FOR SOLUTION ORAL DAILY
Status: DISCONTINUED | OUTPATIENT
Start: 2021-09-15 | End: 2021-09-29 | Stop reason: HOSPADM

## 2021-09-14 RX ORDER — BUPROPION HYDROCHLORIDE 150 MG/1
150 TABLET ORAL EVERY MORNING
Status: DISCONTINUED | OUTPATIENT
Start: 2021-09-15 | End: 2021-09-29 | Stop reason: HOSPADM

## 2021-09-14 RX ORDER — ACETAMINOPHEN 325 MG/1
650 TABLET ORAL EVERY 6 HOURS PRN
Status: DISCONTINUED | OUTPATIENT
Start: 2021-09-14 | End: 2021-09-29 | Stop reason: HOSPADM

## 2021-09-14 RX ORDER — SODIUM CHLORIDE 0.9 % (FLUSH) 0.9 %
5-40 SYRINGE (ML) INJECTION PRN
Status: DISCONTINUED | OUTPATIENT
Start: 2021-09-14 | End: 2021-09-29 | Stop reason: HOSPADM

## 2021-09-14 RX ORDER — SODIUM CHLORIDE 0.9 % (FLUSH) 0.9 %
5-40 SYRINGE (ML) INJECTION EVERY 12 HOURS SCHEDULED
Status: DISCONTINUED | OUTPATIENT
Start: 2021-09-14 | End: 2021-09-29 | Stop reason: HOSPADM

## 2021-09-14 RX ORDER — DILTIAZEM HYDROCHLORIDE 5 MG/ML
INJECTION INTRAVENOUS
Status: DISCONTINUED
Start: 2021-09-14 | End: 2021-09-14

## 2021-09-14 RX ORDER — SPIRONOLACTONE 25 MG/1
25 TABLET ORAL DAILY
Status: DISCONTINUED | OUTPATIENT
Start: 2021-09-15 | End: 2021-09-21

## 2021-09-14 RX ORDER — DILTIAZEM HYDROCHLORIDE 5 MG/ML
INJECTION INTRAVENOUS
Status: COMPLETED
Start: 2021-09-14 | End: 2021-09-14

## 2021-09-14 RX ORDER — POLYETHYLENE GLYCOL 3350 17 G/17G
17 POWDER, FOR SOLUTION ORAL DAILY PRN
Status: DISCONTINUED | OUTPATIENT
Start: 2021-09-14 | End: 2021-09-29 | Stop reason: HOSPADM

## 2021-09-14 RX ORDER — MECOBALAMIN 5000 MCG
1 TABLET,DISINTEGRATING ORAL NIGHTLY PRN
Status: DISCONTINUED | OUTPATIENT
Start: 2021-09-14 | End: 2021-09-29 | Stop reason: HOSPADM

## 2021-09-14 RX ORDER — SODIUM CHLORIDE 9 MG/ML
INJECTION, SOLUTION INTRAVENOUS CONTINUOUS
Status: DISCONTINUED | OUTPATIENT
Start: 2021-09-14 | End: 2021-09-15

## 2021-09-14 RX ORDER — ONDANSETRON 4 MG/1
4 TABLET, ORALLY DISINTEGRATING ORAL EVERY 8 HOURS PRN
Status: DISCONTINUED | OUTPATIENT
Start: 2021-09-14 | End: 2021-09-29 | Stop reason: HOSPADM

## 2021-09-14 RX ORDER — SODIUM CHLORIDE 9 MG/ML
25 INJECTION, SOLUTION INTRAVENOUS PRN
Status: DISCONTINUED | OUTPATIENT
Start: 2021-09-14 | End: 2021-09-29 | Stop reason: HOSPADM

## 2021-09-14 RX ORDER — SODIUM CHLORIDE 9 MG/ML
INJECTION, SOLUTION INTRAVENOUS
Status: DISCONTINUED
Start: 2021-09-14 | End: 2021-09-14

## 2021-09-14 RX ORDER — ONDANSETRON 2 MG/ML
4 INJECTION INTRAMUSCULAR; INTRAVENOUS EVERY 6 HOURS PRN
Status: DISCONTINUED | OUTPATIENT
Start: 2021-09-14 | End: 2021-09-29 | Stop reason: HOSPADM

## 2021-09-14 RX ORDER — DILTIAZEM HYDROCHLORIDE 5 MG/ML
10 INJECTION INTRAVENOUS ONCE
Status: COMPLETED | OUTPATIENT
Start: 2021-09-14 | End: 2021-09-14

## 2021-09-14 RX ADMIN — DILTIAZEM HYDROCHLORIDE 15 MG/HR: 5 INJECTION, SOLUTION INTRAVENOUS at 23:55

## 2021-09-14 RX ADMIN — DILTIAZEM HYDROCHLORIDE 10 MG: 5 INJECTION INTRAVENOUS at 14:10

## 2021-09-14 RX ADMIN — ACETAMINOPHEN 650 MG: 325 TABLET ORAL at 19:14

## 2021-09-14 RX ADMIN — Medication 10 MG/HR: at 14:21

## 2021-09-14 RX ADMIN — SODIUM CHLORIDE, PRESERVATIVE FREE 10 ML: 5 INJECTION INTRAVENOUS at 23:57

## 2021-09-14 RX ADMIN — DEXAMETHASONE SODIUM PHOSPHATE 10 MG: 10 INJECTION, SOLUTION INTRAMUSCULAR; INTRAVENOUS at 14:25

## 2021-09-14 RX ADMIN — SODIUM CHLORIDE: 9 INJECTION, SOLUTION INTRAVENOUS at 23:55

## 2021-09-14 ASSESSMENT — ENCOUNTER SYMPTOMS
NAUSEA: 0
COUGH: 1
CHEST TIGHTNESS: 1
ABDOMINAL PAIN: 0
ABDOMINAL DISTENTION: 0
TROUBLE SWALLOWING: 0
PHOTOPHOBIA: 0
SINUS PAIN: 0
SINUS PRESSURE: 0
VOMITING: 0
SORE THROAT: 0
SHORTNESS OF BREATH: 1
DIARRHEA: 0

## 2021-09-14 ASSESSMENT — PAIN DESCRIPTION - PAIN TYPE
TYPE: ACUTE PAIN
TYPE: ACUTE PAIN
TYPE: CHRONIC PAIN

## 2021-09-14 ASSESSMENT — PAIN SCALES - GENERAL
PAINLEVEL_OUTOF10: 8

## 2021-09-14 NOTE — ED NOTES
MD spoke with Hospitalist, pt accepted at [facility] ICU. Awaiting bed at this time.      Brittany Farfan RN  09/14/21 1580

## 2021-09-14 NOTE — ED NOTES
[address] phoned with [facility] bed assignment given     Meggan Renee RN  09/14/21 1931

## 2021-09-14 NOTE — ED NOTES
Bed: 01  Expected date:   Expected time:   Means of arrival:   Comments:  [address], RN  09/14/21 1402

## 2021-09-14 NOTE — TELEPHONE ENCOUNTER
The patient must be out of quarantine, and ALL symptoms must have resolved   If patient treated for COVID-19 with monoclonal antibodies or convalescent plasma, they need to  wait 90 days before getting a COVID-19 vaccine.

## 2021-09-14 NOTE — Clinical Note
Patient Class: Inpatient [101]   REQUIRED: Diagnosis: Acute hypoxemic respiratory failure due to COVID-19 [facility]) [[id number]]   Estimated Length of Stay: Estimated stay of more than 2 midnights   Telemetry/Cardiac Monitoring Required?: Yes

## 2021-09-14 NOTE — ED NOTES
Placed call to transfer center. Transfer center states there are no ICU beds in [facility]. States they will continue to call around for ICU beds.      Zac Velasco RN  09/14/21 [id number]

## 2021-09-14 NOTE — PLAN OF CARE
Acute respiratory failure with hypoxia placed on BiPAP at [facility]  Reported COVID 19 + last week- result not available to review in EMR- STAT COVID 19 NAAT ordered for admit   - Pulm C/s  - Decadron  - further COVID tx once confirmed Positive  - check PCT    Atrial fib with RVR  - cardizem gtt started in ER- continue   - cardio c/s     Supratherapeutic INR  - hold warfarin, daily PT INR monitoring     ICU admission

## 2021-09-14 NOTE — ED PROVIDER NOTES
[facility]      Pt Name: Mame Fenton  MRN: [id number]  [place] 1940  Date of evaluation: 9/14/2021  Provider: Asiya Hoffman MD    [address] [place] [address]       Chief Complaint   Patient presents with    Shortness of Breath     pt c/o SOB x4 days, states on 4L O2 at home, states she was diagnosed with COVID last Friday         HISTORY OF PRESENT ILLNESS   (Location/Symptom, Timing/Onset, Context/Setting, Quality, Duration, Modifying Factors, Severity)  Note limiting factors. Mame Fenton is a 68 y.o. female who presents to the emergency department     This patient has a long history of COPD she apparently had a pulmonary embolism about 4 to 5 years ago and has been anticoagulated since then  She is aware of the term atrial fib and that is in her chart but she says she is never known to be in it  She is anticoagulated on Coumadin  She has history of paroxysmal atrial fib  She has history of severe morbid obesity history of anxiety  History of obstructive sleep apnea she is on Demadex Coumadin and prednisone apparently late last week she started feeling worse with increasing fever chills and coughing and went on Friday to get a Covid test which was performed that apparently grew out [facility] department on Friday and it came back positive she has been trying to manage at home she normally is oxygen dependent anyways and is anticoagulated she does not take anything for rate control that she is aware of  She is not aware of being in atrial fib is mention  She presents with atrial fib pulse ox of 74% positive Covid test  She was placed on a nonrebreather this was then switch to CPAP and then it was placed on BiPAP after she got here by EMS on  Chest x-ray was performed please see below arterial blood gas was obtained please see below blood work obtained  Patient remains on BiPAP    The history is provided by the patient.        Nursing Notes were reviewed. REVIEW OF SYSTEMS    (2-9 systems for level 4, 10 or more for level 5)     Review of Systems   Constitutional: Positive for activity change, appetite change, chills, fatigue and fever. HENT: Positive for congestion. Negative for ear discharge, ear pain, sinus pressure, sinus pain, sneezing, sore throat and trouble swallowing. Eyes: Negative for photophobia and visual disturbance. Respiratory: Positive for cough, chest tightness and shortness of breath. Cardiovascular: Negative for chest pain and leg swelling. Gastrointestinal: Negative for abdominal distention, abdominal pain, diarrhea, nausea and vomiting. Genitourinary: Negative for difficulty urinating, flank pain, pelvic pain and urgency. Allergic/Immunologic: Negative for immunocompromised state. Neurological: Positive for weakness and light-headedness. Negative for dizziness and facial asymmetry. All other systems reviewed and are negative. Except as noted above the remainder of the review of systems was reviewed and negative.        PAST MEDICAL HISTORY     Past Medical History:   Diagnosis Date    Abnormal chest CT 5/9/2013    Abnormal CXR 2/19/2015    Acute exacerbation of chronic obstructive pulmonary disease (COPD) ([address]     Acute on chronic respiratory failure (HCC)     Acute on chronic respiratory failure with hypoxia (HCC) 1/2/2017    Anticoagulant long-term use     Atrial fibrillation (HCC)     Atrial fibrillation with rapid ventricular response ([address] 6/23/2015    B-cell lymphoma ([address]     CAP (community acquired pneumonia) 1/29/2015    Carotid artery stenosis     CHF (congestive heart failure) (HCC)     Chronic back pain     Congenital heart disease     Depression     Diverticulitis     Diverticulitis of colon     Dizziness 6/14/2020    Fistula     anal-vaginal fistula    Gram-negative pneumonia ([address]     HCAP (healthcare-associated pneumonia)     Hx of blood clots     Hyperlipidemia     Hypertension     IFG (impaired fasting glucose) 9/13/2018    Infection due to Stenotrophomonas maltophilia     Non-Hodgkins lymphoma (HCC)     Obesity     Panic disorder     Peripheral vascular disease (HCC)     Primary osteoarthritis of both knees 12/2/2020    Pulmonary embolism ([facility]     6/15    Rectal vaginal fistula     Restless legs syndrome     [facility] spotted fever 2013    Sciatic nerve pain     Sepsis ([facility] 6/22/2015    Sleep apnea     Swelling of limb 1/5/2016    Tobacco abuse 6/22/2015    Urinary tract infection in female     Vitamin D deficiency          SURGICAL HISTORY       Past Surgical History:   Procedure Laterality Date    APPENDECTOMY  1963    BRONCHOSCOPY N/A 8/12/2019    EBUS WF W/ANES.  performed by Alden Aquino MD at [address]  8/12/2019    BRONCHOSCOPY ALVEOLAR LAVAGE performed by Alden Aquino MD at [address]  8/12/2019    BRONCHOSCOPY BRUSHINGS performed by Alden Aquino MD at [address]  8/12/2019    BRONCHOSCOPY/TRANSBRONCHIAL LUNG BIOPSY performed by Alden Aquino MD at [address]  8/12/2019    BRONCHOSCOPY/TRANSBRONCHIAL NEEDLE BIOPSY performed by Alden Aquino MD at [address]  8/12/2019    BRONCHOSCOPY/TRANSBRONCHIAL NEEDLE BIOPSY ADDL LOBE performed by Alden Aquino MD at [address] [place] [address]  2003    COLONOSCOPY N/A 9/25/2019    COLONOSCOPY POLYPECTOMY SNARE/COLD BIOPSY showed diverticulilosis performed by Starr Ratliff MD at [facility]      umbilical-- [facility] N/A 1/17/2020    PORT INSERTION performed by Mara Solis MD at [facility] TUNNELED VENOUS PORT PLACEMENT Left 01/17/2020    Port Insertion ENRIQUETA Chest w. Dr Svetlana Monsivais 1/17/20 Vaccess CT Injectable [id number] lot [id number] 3/31/21         CURRENT MEDICATIONS       Previous Medications    ACETAMINOPHEN (TYLENOL ARTHRITIS PAIN PO)    Take by mouth    ALBUTEROL (PROVENTIL) (2.5 MG/3ML) 0.083% NEBULIZER SOLUTION    Take 3 mLs by nebulization every 6 hours as needed for Wheezing or Shortness of Breath DX COPD J44.9    ALBUTEROL SULFATE HFA (VENTOLIN HFA) 108 (90 BASE) MCG/ACT INHALER    Inhale 2 puffs into the lungs every 6 hours as needed for Wheezing or Shortness of Breath    BUPROPION (WELLBUTRIN XL) 150 MG EXTENDED RELEASE TABLET    Take 1 tablet by mouth every morning    CHOLECALCIFEROL (VITAMIN D-3 PO)    Take 2,000 Units by mouth daily     DIPHENHYDRAMINE (BENADRYL) 25 MG TABLET    Take 25 mg by mouth every 6 hours as needed for Itching    EZETIMIBE (ZETIA) 10 MG TABLET    Take 1 tablet by mouth daily    MELATONIN 5 MG CAPS    Take 1 capsule by mouth nightly as needed    OXYGEN    4.5 L    POLYETHYLENE GLYCOL (GLYCOLAX) 17 G PACKET    Take 17 g by mouth daily    PREDNISONE (DELTASONE) 10 MG TABLET    Take 40 mg for 3 days then 30 mg for 3 days then 20 mg for 3 days then 10 mg for 3 days    QUETIAPINE (SEROQUEL) 25 MG TABLET    Take 1 tablet by mouth nightly    SPIRONOLACTONE (ALDACTONE) 25 MG TABLET    Take 1 tablet by mouth daily    TORSEMIDE (DEMADEX) 20 MG TABLET    Take 1 tablet by mouth daily    VITAMIN B-6 (PYRIDOXINE) 50 MG TABLET    Take by mouth daily    WARFARIN (COUMADIN) 5 MG TABLET    TAKE ONE TABLET BY MOUTH DAILY       ALLERGIES     Crestor [rosuvastatin], Levofloxacin, Lipitor, Rocephin in dextrose [ceftriaxone sodium in dextrose], Zocor [simvastatin], Codeine, and Pravastatin    FAMILY HISTORY       Family History   Problem Relation Age of Onset    Cancer Mother 43        lung cancer    Diabetes Sister     Cancer Daughter 43        lung cancer    Asthma Son     Hypertension Son     Asthma Grandchild     Hypertension Son     Emphysema Neg Hx     Heart Failure Neg Hx           SOCIAL HISTORY Obeys commands  [place] Coma Scale Score: 15          PHYSICAL EXAM    (up to 7 for level 4, 8 or more for level 5)     ED Triage Vitals [09/14/21 1353]   BP Temp Temp Source Pulse Resp SpO2 Height Weight   129/75 99.1 °F (37.3 °C) Axillary 140 (!) 35 (!) 78 % 5' 2\" (1.575 m) 270 lb (122.5 kg)       Physical Exam  Vitals and nursing note reviewed. Constitutional:       General: She is in acute distress. Appearance: She is obese. She is ill-appearing. HENT:      Head: Normocephalic. Mouth/Throat:      Mouth: Mucous membranes are moist.      Pharynx: Oropharynx is clear. Eyes:      Extraocular Movements: Extraocular movements intact. Pupils: Pupils are equal, round, and reactive to light. Cardiovascular:      Rate and Rhythm: Tachycardia present. Pulses: Normal pulses. Heart sounds: Normal heart sounds. Pulmonary:      Effort: Tachypnea present. Breath sounds: Decreased breath sounds, wheezing, rhonchi and rales present. Abdominal:      General: Bowel sounds are normal.      Palpations: Abdomen is soft. Musculoskeletal:         General: Normal range of motion. Cervical back: Normal range of motion. Right lower leg: No edema. Left lower leg: No tenderness. No edema. Skin:     Capillary Refill: Capillary refill takes less than 2 seconds. Neurological:      General: No focal deficit present. Mental Status: She is oriented to person, place, and time.          DIAGNOSTIC RESULTS     EKG: All EKG's are interpreted by the Emergency Department Physician who either signs or Co-signs this chart in the absence of a cardiologist.  Rate 150  Rhythm atrial fib  No acute ischemic or injury pattern  No other ectopy  Atrial fib with RVR      RADIOLOGY:   Non-plain film images such as CT, Ultrasound and MRI are read by the radiologist. Plain radiographic images are visualized and preliminarily interpreted by the emergency physician with the below findings:        Interpretation per the Radiologist below, if available at the time of this note:    XR CHEST PORTABLE   Final Result   Bilateral interstitial groundglass opacities, likely representing COVID 19   pneumonia.                  LABS:  Results for orders placed or performed during the hospital encounter of 09/14/21   CBC auto differential   Result Value Ref Range    WBC 6.1 4.0 - 11.0 K/uL    RBC 4.08 4.00 - 5.20 M/uL    Hemoglobin 10.0 (L) 12.0 - 16.0 g/dL    Hematocrit 31.7 (L) 36.0 - 48.0 %    MCV 77.8 (L) 80.0 - 100.0 fL    MCH 24.4 (L) 26.0 - 34.0 pg    MCHC 31.3 31.0 - 36.0 g/dL    RDW 16.4 (H) 12.4 - 15.4 %    Platelets 869 858 - 277 K/uL    MPV 6.6 5.0 - 10.5 fL    Neutrophils % 67.0 %    Lymphocytes % 19.8 %    Monocytes % 12.8 %    Eosinophils % 0.0 %    Basophils % 0.4 %    Neutrophils Absolute 4.1 1.7 - 7.7 K/uL    Lymphocytes Absolute 1.2 1.0 - 5.1 K/uL    Monocytes Absolute 0.8 0.0 - 1.3 K/uL    Eosinophils Absolute 0.0 0.0 - 0.6 K/uL    Basophils Absolute 0.0 0.0 - 0.2 K/uL   Comprehensive Metabolic Panel w/ Reflex to MG   Result Value Ref Range    Sodium 139 136 - 145 mmol/L    Potassium reflex Magnesium 4.5 3.5 - 5.1 mmol/L    Chloride 96 (L) 99 - 110 mmol/L    CO2 33 (H) 21 - 32 mmol/L    Anion Gap 10 3 - 16    Glucose 164 (H) 70 - 99 mg/dL    BUN 13 7 - 20 mg/dL    CREATININE 0.7 0.6 - 1.2 mg/dL    GFR Non-African American >60 >60    GFR African American >60 >60    Calcium 8.3 8.3 - 10.6 mg/dL    Total Protein 6.9 6.4 - 8.2 g/dL    Albumin 3.5 3.4 - 5.0 g/dL    Albumin/Globulin Ratio 1.0 (L) 1.1 - 2.2    Total Bilirubin 0.3 0.0 - 1.0 mg/dL    Alkaline Phosphatase 81 40 - 129 U/L    ALT 20 10 - 40 U/L    AST 34 15 - 37 U/L    Globulin 3.4 g/dL   Troponin   Result Value Ref Range    Troponin <0.01 <0.01 ng/mL   Blood Gas, Arterial   Result Value Ref Range    pH, Arterial 7.359 7.350 - 7.450    pCO2, Arterial 60.1 (H) 35.0 - 45.0 mmHg    pO2, Arterial 367.5 (H) 75.0 - 108.0 mmHg    HCO3, Arterial 33.1 (H) 21.0 - 29.0 mmol/L    Base Excess, Arterial 6.3 (H) -3.0 - 3.0 mmol/L    Hemoglobin, Art, Extended 10.7 (L) 12.0 - 16.0 g/dL    O2 Sat, Arterial 99.8 >92 %    Carboxyhgb, Arterial 0.3 0.0 - 1.5 %    Methemoglobin, Arterial 0.3 <1.5 %    TCO2, Arterial 35.0 Not Established mmol/L    O2 Therapy Unknown    Protime-INR   Result Value Ref Range    Protime 77.1 (H) 9.9 - 12.7 sec    INR 6.51 (HH) 0.88 - 1.12   Lactic Acid, Plasma   Result Value Ref Range    Lactic Acid 1.9 0.4 - 2.0 mmol/L   EKG 12 Lead   Result Value Ref Range    Ventricular Rate 150 BPM    Atrial Rate 150 BPM    QRS Duration 68 ms    Q-T Interval 266 ms    QTc Calculation (Bazett) 420 ms    R Axis -18 degrees    T Axis 86 degrees    Diagnosis       Atrial fibrillation with rapid ventricular responseSeptal infarct , age undeterminedAbnormal ECGConfirmed by Michelle Figueroa MD, Alda Vines ([id number]) on 9/14/2021 3:08:11 PM            EMERGENCY DEPARTMENT COURSE and DIFFERENTIAL DIAGNOSIS/MDM:     Vitals:    09/14/21 1353 09/14/21 1429 09/14/21 1445 09/14/21 1500   BP: 129/75 (!) 145/61 120/74 132/70   Pulse: 140 158 139 111   Resp: (!) 35 19 16 16   Temp: 99.1 °F (37.3 °C)      TempSrc: Axillary      SpO2: (!) 78% 99% 98%    Weight: 270 lb (122.5 kg)      Height: 5' 2\" (1.575 m)              MDM      REASSESSMENT          CRITICAL CARE TIME   Patient presented and received least 50 minutes of critical care time (+5 minutes of procedure time for arterial blood gas)  The patient presented in respiratory distress with hypoxemia she was on a CPAP this was switched to our BiPAP this was placed after she her pulse ox was 74% on room air at home we knew that she was positive for Covid she is noted to be in atrial fib around 160 at this point with RVR no ischemic or injury pattern noted.   Arterial blood gas was ordered obtained and interpreted by myself  White count came back 6000 she was slightly Coumadin toxic with an INR of 6.5  White count was normal with a fairly normal differential  Patient at this point underwent labs ordered and reviewed  Patient white culturing ordered and reviewed  Chest x-ray ordered and reviewed  EKG ordered and interpreted  Patient was started on intravenous Cardizem 10 bolus followed by 10 an hour patient Coumadin will be held  Patient's Covid pneumonia will be treated with Decadron and supplemental oxygen and conservative protocol possibly antibiotic treatment when she arrives at her final destination patient rate is slowing down on the Cardizem arterial blood gas was interpreted which revealed a mild to moderate respiratory acidosis/failure with PCO2 of 60 and a pH of 7.35 her PO2 fortunately is good we will taper that down. From 100% I did review her x-ray and it shows a pretty impressive white out especially on the right side very concerning for Covid status I suspect she may do worse than her present situation she will need to be very watched very closely in an intensive setting she at this point is kept on BiPAP was inspiratory pressure of 14 and an expiratory pressure of 6    Patient did undergo 50 minutes of critical care time and 5 minutes for procedure time of the arterial blood gas CONSULTS:  None      PROCEDURES:     ABG draw    Date/Time: 9/14/2021 3:18 PM  Performed by: David Limon MD  Authorized by: David Limon MD     Consent:     Consent obtained:  Verbal    Consent given by:  Patient  Anesthesia (see MAR for exact dosages): Anesthesia method:  None  Procedure details:     Location:  R radial    Jose C's test performed: no      Number of attempts:  1  Post-procedure details:     Bleeding:  Hemostasis achieved    Circulation, movement, and sensation:  Normal    Patient tolerance of procedure:   Tolerated well, no immediate complications        MEDICATIONS GIVEN THIS VISIT:  Medications   dilTIAZem 125 mg in dextrose 5% 125 mL infusion (10 mg/hr IntraVENous New Bag 9/14/21 1421)   sodium chloride 0.9 % infusion (has no administration in time range)   dilTIAZem injection 10 mg (10 mg IntraVENous Given 9/14/21 1410)   dexamethasone (PF) (DECADRON) injection 10 mg (10 mg IntraVENous Given 9/14/21 1425)        FINAL IMPRESSION      1. COVID-19    2. COPD exacerbation ([address]    3. Hypoxia    4. Atrial fibrillation with RVR ([facility]    5. Morbid obesity ([facility]    6. Anticoagulated on Coumadin    7. Poisoning by warfarin sodium, accidental or unintentional, initial encounter            DISPOSITION/PLAN   DISPOSITION Decision To Admit 09/14/2021 03:07:12 PM      PATIENT REFERRED TO:  No follow-up provider specified. DISCHARGE MEDICATIONS:  New Prescriptions    No medications on file       Controlled Substances Monitoring  No flowsheet data found. (Please note that portions of this note were completed with a voice recognition program.  Efforts were made to edit the dictations but occasionally words are mis-transcribed.)    Patient was advised to return to the Emergency Department if there was any worsening.     Cristi Burciaga MD (electronically signed)  Attending Emergency Physician          Christiano Obrien MD  09/14/21 1636

## 2021-09-15 PROBLEM — J12.82 PNEUMONIA DUE TO COVID-19 VIRUS: Status: ACTIVE | Noted: 2021-09-14

## 2021-09-15 LAB
A/G RATIO: 1 (ref 1.1–2.2)
ALBUMIN SERPL-MCNC: 3.1 G/DL (ref 3.4–5)
ALP BLD-CCNC: 70 U/L (ref 40–129)
ALT SERPL-CCNC: 17 U/L (ref 10–40)
ANION GAP SERPL CALCULATED.3IONS-SCNC: 10 MMOL/L (ref 3–16)
AST SERPL-CCNC: 29 U/L (ref 15–37)
BASOPHILS ABSOLUTE: 0 K/UL (ref 0–0.2)
BASOPHILS RELATIVE PERCENT: 0.5 %
BILIRUB SERPL-MCNC: <0.2 MG/DL (ref 0–1)
BUN BLDV-MCNC: 22 MG/DL (ref 7–20)
C-REACTIVE PROTEIN: 228 MG/L (ref 0–5.1)
CALCIUM SERPL-MCNC: 7.9 MG/DL (ref 8.3–10.6)
CHLORIDE BLD-SCNC: 97 MMOL/L (ref 99–110)
CO2: 30 MMOL/L (ref 21–32)
CREAT SERPL-MCNC: 0.9 MG/DL (ref 0.6–1.2)
EOSINOPHILS ABSOLUTE: 0 K/UL (ref 0–0.6)
EOSINOPHILS RELATIVE PERCENT: 0 %
GFR AFRICAN AMERICAN: >60
GFR NON-AFRICAN AMERICAN: >60
GLOBULIN: 3 G/DL
GLUCOSE BLD-MCNC: 221 MG/DL (ref 70–99)
GLUCOSE BLD-MCNC: 242 MG/DL (ref 70–99)
GLUCOSE BLD-MCNC: 244 MG/DL (ref 70–99)
GLUCOSE BLD-MCNC: 281 MG/DL (ref 70–99)
GLUCOSE BLD-MCNC: 336 MG/DL (ref 70–99)
HCT VFR BLD CALC: 29.6 % (ref 36–48)
HEMOGLOBIN: 9.4 G/DL (ref 12–16)
INFLUENZA A: NOT DETECTED
INFLUENZA B: NOT DETECTED
INR BLD: 6.99 (ref 0.88–1.12)
LYMPHOCYTES ABSOLUTE: 0.6 K/UL (ref 1–5.1)
LYMPHOCYTES RELATIVE PERCENT: 11.5 %
MCH RBC QN AUTO: 24.9 PG (ref 26–34)
MCHC RBC AUTO-ENTMCNC: 31.8 G/DL (ref 31–36)
MCV RBC AUTO: 78.1 FL (ref 80–100)
MONOCYTES ABSOLUTE: 0.2 K/UL (ref 0–1.3)
MONOCYTES RELATIVE PERCENT: 4.5 %
NEUTROPHILS ABSOLUTE: 4.1 K/UL (ref 1.7–7.7)
NEUTROPHILS RELATIVE PERCENT: 83.5 %
PDW BLD-RTO: 16.9 % (ref 12.4–15.4)
PERFORMED ON: ABNORMAL
PLATELET # BLD: 255 K/UL (ref 135–450)
PMV BLD AUTO: 7 FL (ref 5–10.5)
POTASSIUM REFLEX MAGNESIUM: 4.7 MMOL/L (ref 3.5–5.1)
PROCALCITONIN: 0.22 NG/ML (ref 0–0.15)
PROTHROMBIN TIME: 85.6 SEC (ref 9.9–12.7)
RBC # BLD: 3.78 M/UL (ref 4–5.2)
SARS-COV-2 RNA, RT PCR: DETECTED
SODIUM BLD-SCNC: 137 MMOL/L (ref 136–145)
TOTAL PROTEIN: 6.1 G/DL (ref 6.4–8.2)
TSH REFLEX FT4: 0.71 UIU/ML (ref 0.27–4.2)
WBC # BLD: 4.9 K/UL (ref 4–11)

## 2021-09-15 PROCEDURE — 8E0ZXY6 ISOLATION: ICD-10-PCS | Performed by: INTERNAL MEDICINE

## 2021-09-15 PROCEDURE — 99222 1ST HOSP IP/OBS MODERATE 55: CPT | Performed by: INTERNAL MEDICINE

## 2021-09-15 PROCEDURE — XW033H5 INTRODUCTION OF TOCILIZUMAB INTO PERIPHERAL VEIN, PERCUTANEOUS APPROACH, NEW TECHNOLOGY GROUP 5: ICD-10-PCS | Performed by: INTERNAL MEDICINE

## 2021-09-15 PROCEDURE — 99223 1ST HOSP IP/OBS HIGH 75: CPT | Performed by: INTERNAL MEDICINE

## 2021-09-15 PROCEDURE — 2700000000 HC OXYGEN THERAPY PER DAY

## 2021-09-15 PROCEDURE — 84443 ASSAY THYROID STIM HORMONE: CPT

## 2021-09-15 PROCEDURE — 6370000000 HC RX 637 (ALT 250 FOR IP): Performed by: PHYSICIAN ASSISTANT

## 2021-09-15 PROCEDURE — 6360000002 HC RX W HCPCS: Performed by: INTERNAL MEDICINE

## 2021-09-15 PROCEDURE — 36415 COLL VENOUS BLD VENIPUNCTURE: CPT

## 2021-09-15 PROCEDURE — 2580000003 HC RX 258: Performed by: INTERNAL MEDICINE

## 2021-09-15 PROCEDURE — 6370000000 HC RX 637 (ALT 250 FOR IP): Performed by: INTERNAL MEDICINE

## 2021-09-15 PROCEDURE — 99291 CRITICAL CARE FIRST HOUR: CPT | Performed by: INTERNAL MEDICINE

## 2021-09-15 PROCEDURE — 6360000002 HC RX W HCPCS: Performed by: PHYSICIAN ASSISTANT

## 2021-09-15 PROCEDURE — 94761 N-INVAS EAR/PLS OXIMETRY MLT: CPT

## 2021-09-15 PROCEDURE — 85610 PROTHROMBIN TIME: CPT

## 2021-09-15 PROCEDURE — 2500000003 HC RX 250 WO HCPCS: Performed by: INTERNAL MEDICINE

## 2021-09-15 PROCEDURE — 2060000000 HC ICU INTERMEDIATE R&B

## 2021-09-15 PROCEDURE — 85025 COMPLETE CBC W/AUTO DIFF WBC: CPT

## 2021-09-15 PROCEDURE — 94640 AIRWAY INHALATION TREATMENT: CPT

## 2021-09-15 PROCEDURE — 80053 COMPREHEN METABOLIC PANEL: CPT

## 2021-09-15 PROCEDURE — 2580000003 HC RX 258: Performed by: PHYSICIAN ASSISTANT

## 2021-09-15 PROCEDURE — 86140 C-REACTIVE PROTEIN: CPT

## 2021-09-15 PROCEDURE — XW033E5 INTRODUCTION OF REMDESIVIR ANTI-INFECTIVE INTO PERIPHERAL VEIN, PERCUTANEOUS APPROACH, NEW TECHNOLOGY GROUP 5: ICD-10-PCS | Performed by: INTERNAL MEDICINE

## 2021-09-15 RX ORDER — ALBUTEROL SULFATE 90 UG/1
2 AEROSOL, METERED RESPIRATORY (INHALATION) 4 TIMES DAILY
Status: DISCONTINUED | OUTPATIENT
Start: 2021-09-15 | End: 2021-09-29 | Stop reason: HOSPADM

## 2021-09-15 RX ORDER — DEXTROSE MONOHYDRATE 50 MG/ML
100 INJECTION, SOLUTION INTRAVENOUS PRN
Status: DISCONTINUED | OUTPATIENT
Start: 2021-09-15 | End: 2021-09-29 | Stop reason: HOSPADM

## 2021-09-15 RX ORDER — METHYLPREDNISOLONE SODIUM SUCCINATE 40 MG/ML
40 INJECTION, POWDER, LYOPHILIZED, FOR SOLUTION INTRAMUSCULAR; INTRAVENOUS EVERY 12 HOURS
Status: DISCONTINUED | OUTPATIENT
Start: 2021-09-15 | End: 2021-09-20

## 2021-09-15 RX ORDER — NICOTINE POLACRILEX 4 MG
15 LOZENGE BUCCAL PRN
Status: DISCONTINUED | OUTPATIENT
Start: 2021-09-15 | End: 2021-09-29 | Stop reason: HOSPADM

## 2021-09-15 RX ORDER — DEXTROSE MONOHYDRATE 25 G/50ML
12.5 INJECTION, SOLUTION INTRAVENOUS PRN
Status: DISCONTINUED | OUTPATIENT
Start: 2021-09-15 | End: 2021-09-29 | Stop reason: HOSPADM

## 2021-09-15 RX ORDER — ALBUTEROL SULFATE 90 UG/1
2 AEROSOL, METERED RESPIRATORY (INHALATION) EVERY 4 HOURS PRN
Status: DISCONTINUED | OUTPATIENT
Start: 2021-09-15 | End: 2021-09-29 | Stop reason: HOSPADM

## 2021-09-15 RX ORDER — DIGOXIN 0.25 MG/ML
250 INJECTION INTRAMUSCULAR; INTRAVENOUS EVERY 4 HOURS
Status: COMPLETED | OUTPATIENT
Start: 2021-09-15 | End: 2021-09-15

## 2021-09-15 RX ADMIN — INSULIN LISPRO 9 UNITS: 100 INJECTION, SOLUTION INTRAVENOUS; SUBCUTANEOUS at 17:45

## 2021-09-15 RX ADMIN — TOCILIZUMAB: 180 INJECTION, SOLUTION SUBCUTANEOUS at 17:04

## 2021-09-15 RX ADMIN — DEXAMETHASONE 6 MG: 4 TABLET ORAL at 09:22

## 2021-09-15 RX ADMIN — MUPIROCIN: 20 OINTMENT TOPICAL at 20:40

## 2021-09-15 RX ADMIN — POLYETHYLENE GLYCOL (3350) 17 G: 17 POWDER, FOR SOLUTION ORAL at 09:22

## 2021-09-15 RX ADMIN — Medication 2 PUFF: at 18:30

## 2021-09-15 RX ADMIN — Medication 2000 UNITS: at 09:22

## 2021-09-15 RX ADMIN — QUETIAPINE FUMARATE 25 MG: 25 TABLET ORAL at 20:40

## 2021-09-15 RX ADMIN — BUPROPION HYDROCHLORIDE 150 MG: 150 TABLET, EXTENDED RELEASE ORAL at 09:22

## 2021-09-15 RX ADMIN — DOXYCYCLINE 100 MG: 100 INJECTION, POWDER, LYOPHILIZED, FOR SOLUTION INTRAVENOUS at 15:23

## 2021-09-15 RX ADMIN — METHYLPREDNISOLONE SODIUM SUCCINATE 40 MG: 40 INJECTION, POWDER, FOR SOLUTION INTRAMUSCULAR; INTRAVENOUS at 20:39

## 2021-09-15 RX ADMIN — ACETAMINOPHEN 650 MG: 325 TABLET ORAL at 11:21

## 2021-09-15 RX ADMIN — PYRIDOXINE HCL TAB 50 MG 50 MG: 50 TAB at 09:23

## 2021-09-15 RX ADMIN — REMDESIVIR 200 MG: 100 INJECTION, POWDER, LYOPHILIZED, FOR SOLUTION INTRAVENOUS at 13:56

## 2021-09-15 RX ADMIN — SPIRONOLACTONE 25 MG: 25 TABLET ORAL at 09:22

## 2021-09-15 RX ADMIN — MUPIROCIN: 20 OINTMENT TOPICAL at 09:29

## 2021-09-15 RX ADMIN — DILTIAZEM HYDROCHLORIDE 15 MG/HR: 5 INJECTION, SOLUTION INTRAVENOUS at 07:00

## 2021-09-15 RX ADMIN — TORSEMIDE 20 MG: 20 TABLET ORAL at 09:22

## 2021-09-15 RX ADMIN — DILTIAZEM HYDROCHLORIDE 15 MG/HR: 5 INJECTION, SOLUTION INTRAVENOUS at 17:42

## 2021-09-15 RX ADMIN — DIGOXIN 250 MCG: 250 INJECTION, SOLUTION INTRAMUSCULAR; INTRAVENOUS; PARENTERAL at 16:59

## 2021-09-15 RX ADMIN — DIGOXIN 250 MCG: 250 INJECTION, SOLUTION INTRAMUSCULAR; INTRAVENOUS; PARENTERAL at 12:32

## 2021-09-15 RX ADMIN — Medication 2 PUFF: at 08:24

## 2021-09-15 RX ADMIN — SODIUM CHLORIDE, PRESERVATIVE FREE 10 ML: 5 INJECTION INTRAVENOUS at 09:23

## 2021-09-15 RX ADMIN — INSULIN LISPRO 6 UNITS: 100 INJECTION, SOLUTION INTRAVENOUS; SUBCUTANEOUS at 12:33

## 2021-09-15 RX ADMIN — INSULIN LISPRO 6 UNITS: 100 INJECTION, SOLUTION INTRAVENOUS; SUBCUTANEOUS at 21:04

## 2021-09-15 ASSESSMENT — PAIN SCALES - GENERAL
PAINLEVEL_OUTOF10: 4
PAINLEVEL_OUTOF10: 4
PAINLEVEL_OUTOF10: 7
PAINLEVEL_OUTOF10: 0
PAINLEVEL_OUTOF10: 7
PAINLEVEL_OUTOF10: 2

## 2021-09-15 ASSESSMENT — PAIN DESCRIPTION - PAIN TYPE
TYPE: CHRONIC PAIN
TYPE: CHRONIC PAIN

## 2021-09-15 NOTE — ED NOTES
Called and spoke with pt's son Kamar Kingston at this time to notify him of pt transfer. Also notified him that his mother wanted him to call Srinivasa Mancera and update her on situation.      Compa Tracy RN  09/14/21 4537

## 2021-09-15 NOTE — CARE COORDINATION
Case Management Assessment  Initial Evaluation      Patient Name: [facility]  YOB: 1940  Diagnosis: Morbid obesity ([facility] [E66.01]  Hypoxia [R09.02]  COPD exacerbation ([facility] [J44.1]  Atrial fibrillation with RVR ([facility] [I48.91]  Anticoagulated on Coumadin [Z79.01]  Poisoning by warfarin sodium, accidental or unintentional, initial encounter [T45.511A]  Acute hypoxemic respiratory failure due to COVID-19 ([facility] [U07.1, J96.01]  COVID-19 [U07.1]  Acute respiratory failure due to COVID-19 ([facility] [U07.1, J96.00]  Date / Time: 9/14/2021  1:49 PM    Admission status/Date: 9/14/21 inpt  Chart Reviewed: Yes      Patient Interviewed: No   Family Interviewed:  Yes - [facility]      Hospitalization in the last 30 days:  No      Health Care Decision Maker :   Primary Decision Maker: Johnson Castillo - Child - [phone]    Secondary Decision Maker: Laine Leblanc - Child - [phone]    (CM - must 1st enter selection under Navigator - emergency contact- [address] Relationship and pick relationship)   Who do you trust or have selected to make healthcare decisions for you      Met with: Spoke with pt [facility] via phone  Kae Long conducted  (bedside/phone):    Current PCP: Shazia Leonardo required for SNF :  N          3 night stay required - Y    ADLS  Support Systems/Care Needs:    Transportation: family  /Senior services  Meal Preparation:  Self    Housing  Living Arrangements: lives 1 story home  Steps: 3-4  Intent for return to present living arrangements: Yes  Identified Issues:     [address] with [address] : No Agency:(Services)     Passport/Waiver : No  :                      Phone Number:    Passport/Waiver Services: N/A          Durable Medical Equiptment   DME Provider: [facility]  Equipment:   Walker_x__Cane___RTS___ BSC___Shower Chair___Hospital Bed___W/C____Other lift chair  02 at _?___Liter(s)---wears(frequency)_continuous____ HHN ___ CPAP___ BiPap___   N/A____      Home O2 Use :  Yes    If No for home O2---if presently on O2 during hospitalization:  Yes  if yes CM to follow for potential DC O2 need  Informed of need for care provider to bring portable home O2 tank on day of discharge for nursing to connect prior to leaving:   Yes  Verbalized agreement/Understanding:   Yes    Community Service Affiliation  Dialysis:  No    · Agency:  · Location:  · Dialysis Schedule:  · Phone:   · Fax: Other Community Services: (ex:PT/OT,Mental Health,Wound Clinic, Cardio/Pul [address]) None    DISCHARGE PLAN: Explained Case Management role/services. Reviewed chart and spoke with pt Son Torito Garcia via phone as pt is in covid isolation in ICU. Role of CM explained. Noted pt has Living will/DPOA on file from 1/6/17 and Son Torito Garcia is primary decision maker. Per Ovi Bishop lives home alone in 1 story home and does note drive. States family or Senior s transport to appts and other activities. States pt mostly stays home. States she is IPTA with Care but may need help with everyday ADL's and are looking at pvt duty housekeeping but have not set up. Discussed [facility] [employer] and son is aware of agency. Discussed HHC at [facility] and that writer will F/U with pt once able to talk on phone. Plan will be return home. Per Son pt has home O2 with [facility]. Per Son no in home services currently. Will follow. Will need PT/OT eval when appropriate.

## 2021-09-15 NOTE — PROGRESS NOTES
Patient transferred to unit in stable condition. O2 10L high flow. Patient is short of breath, O2 saturation at 92%. Fan provided for comfort. Dinner tray set up for patient and patient repositioned.

## 2021-09-15 NOTE — PROGRESS NOTES
09/15/21 1830   Treatment   Treatment Type MDI   $Treatment Type $Inhaled Therapy/Meds   Medications Albuterol   Pre-Tx Pulse 97   Pre-Tx Resps 20   Breath Sounds Pre-Tx SHEILA Diminished   Breath Sounds Pre-Tx LLL Diminished   Breath Sounds Pre-Tx RUL Diminished   Breath Sounds Pre-Tx RML Diminished   Breath Sounds Pre-Tx RLL Diminished   Delivery Source Other (Comment)   Position Semi-[facility]   Tx Tolerance Well   Is patient on O2?  Y   Oxygen Therapy/Pulse Ox   O2 Therapy Oxygen humidified   $Oxygen $Daily Charge   O2 Device High flow nasal cannula   O2 Flow Rate (L/min) 10 L/min   SpO2 91 %   $Pulse Oximeter $Spot check (multiple/continuous)

## 2021-09-15 NOTE — PROGRESS NOTES
RESPIRATORY THERAPY ASSESSMENT    Name:  [facility] Record Number:  [id number]  Age: 68 y.o. Gender: female  : 1940  Today's Date:  9/15/2021  Room:  [id number]    Assessment     Is the patient being admitted for a COPD or Asthma exacerbation? No   (If yes the patient will be seen every 4 hours for the first 24 hours and then reassessed)    Patient Admission Diagnosis      Allergies  Allergies   Allergen Reactions    Crestor [Rosuvastatin]      Muscle aches    Levofloxacin Other (See Comments)     Achiness    Lipitor      Muscle aches    Rocephin In Dextrose [Ceftriaxone Sodium In Dextrose]      Rash,itching    Zocor [Simvastatin]      Muscle aches    Codeine Itching and Nausea And Vomiting    Pravastatin Nausea And Vomiting       Minimum Predicted Vital Capacity:               Actual Vital Capacity:                    Pulmonary History:CHF/Pulmonary Edema  Home Oxygen Therapy:  4 liters/min via nasal cannula  Home Respiratory Therapy:Albuterol   Current Respiratory Therapy:  Albuterol 2 puff Q6 PRN          Respiratory Severity Index(RSI)   Patients with orders for inhalation medications, oxygen, or any therapeutic treatment modality will be placed on Respiratory Protocol. They will be assessed with the first treatment and at least every 72 hours thereafter. The following severity scale will be used to determine frequency of treatment intervention.     Smoking History: Pulmonary Disease or Smoking History, Greater than 15 pack year = 2    Social History  Social History     Tobacco Use    Smoking status: Former Smoker     Packs/day: 0.33     Years: 50.00     Pack years: 16.50     Types: Cigarettes     Quit date: 2015     Years since quittin.2    Smokeless tobacco: Never Used   Vaping Use    Vaping Use: Never used   Substance Use Topics    Alcohol use: Not Currently     Alcohol/week: 0.0 standard drinks    Drug use: No       Recent Surgical History: None = 0  Past Surgical History  Past Surgical History:   Procedure Laterality Date    APPENDECTOMY  1963    BRONCHOSCOPY N/A 8/12/2019    EBUS WF W/ANES.  performed by Laura Gilliland MD at [address] [place] [address]  8/12/2019    BRONCHOSCOPY ALVEOLAR LAVAGE performed by Laura Gilliland MD at [address] [place] [address]  8/12/2019    BRONCHOSCOPY BRUSHINGS performed by Laura Gilliland MD at [address] [place] [address]  8/12/2019    BRONCHOSCOPY/TRANSBRONCHIAL LUNG BIOPSY performed by Laura Gilliland MD at [address] [place] [address]  8/12/2019    BRONCHOSCOPY/TRANSBRONCHIAL NEEDLE BIOPSY performed by Laura Gilliland MD at [address] [place] [address]  8/12/2019    BRONCHOSCOPY/TRANSBRONCHIAL NEEDLE BIOPSY ADDL LOBE performed by Laura Gilliland MD at [address] [place] [address]  2003    COLONOSCOPY N/A 9/25/2019    COLONOSCOPY POLYPECTOMY SNARE/COLD BIOPSY showed diverticulilosis performed by Boone Aguero MD at [address] [place] [address]      umbilical--Dr Hou    [address] N/A 1/17/2020    PORT INSERTION performed by Le Sumner MD at [place] [address] TUNNELED VENOUS PORT PLACEMENT Left 01/17/2020    Port Insertion ENRIQUETA Chest w. Dr Darcy Elaine 1/17/20 Vaccess CT Injectable [id number] lot [id number] 3/31/21       Level of Consciousness: Alert, Oriented, and Cooperative = 0    Level of Activity: Walking with assistance = 1    Respiratory Pattern: Dyspnea with exertion;Irregular pattern;or RR less than 6 = 2    Breath Sounds: Diminshed bilaterally and/or crackles = 2    Sputum   ,  ,    Cough: Strong, spontaneous, non-productive = 0    Vital Signs   BP (!) 99/51   Pulse 88   Temp 97.3 °F (36.3 °C) (Bladder)   Resp 13   Ht 5' 2\" (1.575 m)   Wt 268 lb 1.6 oz (121.6 kg)   LMP  (LMP Unknown)   SpO2 95%   BMI 49.04 kg/m²   SPO2 (COPD values may differ): Less than 86% on room air or greater than 92% on FiO2 greater than 50% = 4    Peak Flow (asthma only): not applicable = 0    RSI: 54-00 = Q6H or QID and Q4HPRN for dyspnea        Plan       Goals: medication delivery, mobilize retained secretions, volume expansion and improve oxygenation    Patient/caregiver was educated on the proper method of use for Respiratory Care Devices:  Yes      Level of patient/caregiver understanding able to:   ? Verbalize understanding   ? Demonstrate understanding       ? Teach back        ? Needs reinforcement       ? No available caregiver               ? Other:     Response to education:  Very Good     Is patient being placed on Home Treatment Regimen? NA     Does the patient have everything they need prior to discharge? NA     Comments: pt assessed & chart reviewed    Plan of Care: Albuterol 2 puff QID, Albuterol 2 puff Q4 PRN    Electronically signed by Juanita James RCP on 9/15/2021 at 5:34 AM    Respiratory Protocol Guidelines     1. Assessment and treatment by Respiratory Therapy will be initiated for medication and therapeutic interventions upon initiation of aerosolized medication. 2. Physician will be contacted for respiratory rate (RR) greater than 35 breaths per minute. Therapy will be held for heart rate (HR) greater than 140 beats per minute, pending direction from physician. 3. Bronchodilators will be administered via Metered Dose Inhaler (MDI) with spacer when the following criteria are met:  a. Alert and cooperative     b. HR < 140 bpm  c. RR < 30 bpm                d. Can demonstrate a 23 second inspiratory hold  4. Bronchodilators will be administered via Hand Held Nebulizer PASCUAL [facility]3]) to patients when ANY of the following criteria are met  a. Incognizant or uncooperative          b. Patients treated with HHN at Home        c. Unable to demonstrate proper use of MDI with spacer     d. RR > 30 bpm   5.  Bronchodilators will be delivered via Metered Dose Inhaler (MDI), HHN, Aerogen to intubated patients on mechanical ventilation. 6. Inhalation medication orders will be delivered and/or substituted as outlined below. Aerosolized Medications Ordering and Administration Guidelines:    1. All Medications will be ordered by a physician, and their frequency and/or modality will be adjusted as defined by the patients Respiratory Severity Index (RSI) score. 2. If the patient does not have documented COPD, consider discontinuing anticholinergics when RSI is less than 9.  3. If the bronchospasm worsens (increased RSI), then the bronchodilator frequency can be increased to a maximum of every 4 hours. If greater than every 4 hours is required, the physician will be contacted. 4. If the bronchospasm improves, the frequency of the bronchodilator can be decreased, based on the patient's RSI, but not less than home treatment regimen frequency. 5. Bronchodilator(s) will be discontinued if patient has a RSI less than 9 and has received no scheduled or as needed treatment for 72  Hrs. Patients Ordered on a Mucolytic Agent:    1. Must always be administered with a bronchodilator. 2. Discontinue if patient experiences worsened bronchospasm, or secretions have lessened to the point that the patient is able to clear them with a cough. Anti-inflammatory and Combination Medications:    1. If the patient lacks prior history of lung disease, is not using inhaled anti-inflammatory medication at home, and lacks wheezing by examination or by history for at least 24 hours, contact physician for possible discontinuation.

## 2021-09-15 NOTE — PROGRESS NOTES
4 Eyes Skin Assessment     The patient is being assess for   Admission    I agree that 2 RN's have performed a thorough Head to Toe Skin Assessment on the patient. ALL assessment sites listed below have been assessed. Areas assessed for pressure by both nurses:   [x]   Head, Face, and Ears   [x]   Shoulders, Back, and Chest, Abdomen  [x]   Arms, Elbows, and Hands   [x]   Coccyx, Sacrum, and Ischium  [x]   Legs, Feet, and Heels        Skin Assessed Under all Medical Devices by both nurses:  Bipap mask and matias              All Mepilex Borders were peeled back and area peeked at by both nurses:  No: no  Please list where Mepilex Borders are located:nose              **SHARE this note so that the co-signing nurse is able to place an eSignature**    Co-signer eSignature: Electronically signed by Oscar Gonzalez RN on 9/15/21 at 2:14 AM EDT    Does the Patient have Skin Breakdown related to pressure?   No          Jefe Prevention initiatedNO:  Wound Care Orders initiated:  No      [facility] nurse consulted for Pressure Injury (Stage 3,4, Unstageable, DTI, NWPT, Complex wounds)and New or Established Ostomies:  No      Primary Nurse eSignature: Electronically signed by Nabila Hodges RN on 9/15/21 at 1:54 AM EDT

## 2021-09-15 NOTE — PROGRESS NOTES
Pulmonology consult called to answering service, 9/15/21 @ [address]    Cardiology consult called left message on voicemail, 9/15/21 @ [phone]

## 2021-09-15 NOTE — CONSULTS
[facility]  [phone]        Reason for Consultation/Chief Complaint: \"I have been asked to see this patient for afib RVR. \"  She has h/o parox afib d- CHF and B cell lymphoma pulmonary embolism  History of Present Illness:  Vicky Ness is a 68 y.o. patient who presented to the hospital with complaints of shortness of breath. She is admitted in ICU with COVID pneumonia acute on chronic hypoxic resp failure. I have been asked to provide consultation regarding  afib RVR. Past significant of COPD Afib  CHRONIC ANTICOAGULATION B cell lymphoma CHF HLD HBP  H/o pulmonary embolism, PAD   Past Medical History:   has a past medical history of Abnormal chest CT, Abnormal CXR, Acute exacerbation of chronic obstructive pulmonary disease (COPD) ([address] Acute on chronic respiratory failure (HCC), Acute on chronic respiratory failure with hypoxia (HCC), Anticoagulant long-term use, Atrial fibrillation ([address] Atrial fibrillation with rapid ventricular response ([address] B-cell lymphoma ([address] CAP (community acquired pneumonia), Carotid artery stenosis, CHF (congestive heart failure) ([address] Chronic back pain, Congenital heart disease, COVID-19, Depression, Diverticulitis, Diverticulitis of colon, Dizziness, Fistula, Gram-negative pneumonia ([address] HCAP (healthcare-associated pneumonia), Hx of blood clots, Hyperlipidemia, Hypertension, IFG (impaired fasting glucose), Infection due to Stenotrophomonas maltophilia, Non-Hodgkins lymphoma ([address] Obesity, Panic disorder, Peripheral vascular disease ([address] Primary osteoarthritis of both knees, Pulmonary embolism ([address] Rectal vaginal fistula, Restless legs syndrome, [facility] spotted fever, Sciatic nerve pain, Sepsis ([address] Sleep apnea, Swelling of limb, Tobacco abuse, Urinary tract infection in female, and Vitamin D deficiency.     Surgical History:   has a past surgical history that includes Appendectomy (1963); partial hysterectomy (cervix not removed) (1980); hernia repair; bronchoscopy (N/A, 8/12/2019); bronchoscopy (8/12/2019); bronchoscopy (8/12/2019); bronchoscopy (8/12/2019); bronchoscopy (8/12/2019); bronchoscopy (8/12/2019); Tunneled venous port placement (Left, 01/17/2020); [address] Surgery (N/A, 1/17/2020); Cardiac catheterization (2003); Colonoscopy (N/A, 9/25/2019); and Tonsillectomy (1965). Social History:   reports that she quit smoking about 6 years ago. Her smoking use included cigarettes. She has a 16.50 pack-year smoking history. She has never used smokeless tobacco. She reports previous alcohol use. She reports that she does not use drugs. Family History:  family history includes Asthma in her grandchild and son; Cancer (age of onset: 43) in her daughter and mother; Diabetes in her sister; Hypertension in her son and son. Home Medications:  Were reviewed and are listed in nursing record. and/or listed below  Prior to Admission medications    Medication Sig Start Date End Date Taking?  Authorizing Provider   predniSONE (DELTASONE) 10 MG tablet Take 40 mg for 3 days then 30 mg for 3 days then 20 mg for 3 days then 10 mg for 3 days 8/20/21   Darilyn Blood, APRN - CNP   warfarin (COUMADIN) 5 MG tablet TAKE ONE TABLET BY MOUTH DAILY 6/24/21   Darilyn Blood, APRN - CNP   torsemide (DEMADEX) 20 MG tablet Take 1 tablet by mouth daily 6/24/21   Darilyn Blood, APRN - CNP   QUEtiapine (SEROQUEL) 25 MG tablet Take 1 tablet by mouth nightly  Patient taking differently: Take 25 mg by mouth nightly As needed 6/24/21   Darilyn Blood, APRN - CNP   ezetimibe (ZETIA) 10 MG tablet Take 1 tablet by mouth daily  Patient not taking: Reported on 8/20/2021 6/24/21   Darilyn Blood, APRN - CNP   buPROPion (WELLBUTRIN XL) 150 MG extended release tablet Take 1 tablet by mouth every morning  Patient taking differently: Take 150 mg by mouth every morning Takes as needed 6/24/21   Darilyn Blood, APRN - CNP   Melatonin 5 MG CAPS Take 1 capsule by mouth nightly as needed    Historical Provider, MD   diphenhydrAMINE (BENADRYL) 25 MG tablet Take 25 mg by mouth every 6 hours as needed for Itching    Historical Provider, MD   spironolactone (ALDACTONE) 25 MG tablet Take 1 tablet by mouth daily 2/24/21   TURNER Rojas CNP   albuterol sulfate HFA (VENTOLIN HFA) 108 (90 Base) MCG/ACT inhaler Inhale 2 puffs into the lungs every 6 hours as needed for Wheezing or Shortness of Breath 10/28/20   Eleuterio Strauss MD   polyethylene glycol (GLYCOLAX) 17 g packet Take 17 g by mouth daily 10/23/20   TURNER Rojas CNP   vitamin B-6 (PYRIDOXINE) 50 MG tablet Take by mouth daily    Historical Provider, MD   albuterol (PROVENTIL) (2.5 MG/3ML) 0.083% nebulizer solution Take 3 mLs by nebulization every 6 hours as needed for Wheezing or Shortness of Breath DX COPD J44.9 4/13/20   Eleuterio Strauss MD   Acetaminophen (TYLENOL ARTHRITIS PAIN PO) Take by mouth    Historical Provider, MD   OXYGEN 4.5 L 1/5/17   Enriqueta Hill MD   Cholecalciferol (VITAMIN D-3 PO) Take 2,000 Units by mouth daily     Historical Provider, MD        Allergies:  Crestor [rosuvastatin], Levofloxacin, Lipitor, Rocephin in dextrose [ceftriaxone sodium in dextrose], Zocor [simvastatin], Codeine, and Pravastatin     Review of Systems: NA      Physical Examination:    Vitals:    09/15/21 0900   BP: (!) 111/58   Pulse: 113   Resp: 13   Temp:    SpO2: 95%    Weight: 268 lb 1.6 oz (121.6 kg)         General Appearance:  Due to the current efforts to prevent transmission of COVID-19 and also the need to preserve PPE for other caregivers, a face-to-face encounter with the patient was not performed. That being said, all relevant records and diagnostic tests were reviewed, including laboratory results and imaging. Please reference any relevant documentation elsewhere. Care will be coordinated with the primary service.      Head:     Eyes:         Nose:    Throat: Neck:        Lungs:      Chest Wall:     Heart:     Abdomen:              Extremities:    Pulses:    Skin:    Pysch:    Neurologic:         Labs  CBC:   Lab Results   Component Value Date    WBC 4.9 09/15/2021    RBC 3.78 09/15/2021    HGB 9.4 09/15/2021    HCT 29.6 09/15/2021    MCV 78.1 09/15/2021    RDW 16.9 09/15/2021     09/15/2021     CMP:    Lab Results   Component Value Date     09/15/2021    K 4.7 09/15/2021    CL 97 09/15/2021    CO2 30 09/15/2021    BUN 22 09/15/2021    CREATININE 0.9 09/15/2021    GFRAA >60 09/15/2021    GFRAA >60 02/12/2013    AGRATIO 1.0 09/15/2021    LABGLOM >60 09/15/2021    GLUCOSE 221 09/15/2021    PROT 6.1 09/15/2021    PROT 7.5 02/12/2013    CALCIUM 7.9 09/15/2021    BILITOT <0.2 09/15/2021    ALKPHOS 70 09/15/2021    AST 29 09/15/2021    ALT 17 09/15/2021     PT/INR:  No results found for: PTINR  Lab Results   Component Value Date    TROPONINI <0.01 09/14/2021       EKG:  I have reviewed EKG with the following interpretation:  Impression:Atrial fibrillation with rapid ventricular responseSeptal infarct , age undeterminedAbnormal ECGConfirmed by Cole Hooks MD, Peng Domínguez ([id number]) on 9/14/2021 3:08:11 PM    Chest xray 9.14.21         Impression   Bilateral interstitial groundglass opacities, likely representing COVID 19   pneumonia.           Echo doppler 6.15.20   Summary   Technically difficult examination. Normal left ventricular systolic function with ejection fraction of 55-60%. Moderate concentric left ventricular hypertrophy. No regional wall motion abnormalites are seen. Grade I diastolic dysfunction with normal filling pressure. Compared to previous study from 6- no changes noted in left   ventricular function. nuclear stress test 6.27.19        Summary    There is no evidence of stress induced ischemia. LV function is normal with    uniform wall motion and ejection fraction of 69%. Low risk study.               Assessment  1.  Paroxysmal atrial per hospitalist team and critical care team  I will check thyroid function  Complete echo doppler in 6 weeks I will be happy to follow her for that if she decides. I will address the patient's cardiac risk factors and adjusted pharmacologic treatment as needed. In addition, I have reinforced the need for patient directed risk factor modification. Thank you for allowing to us to participate in the [address]. Further evaluation will be based upon the patient's clinical course and testing results. All questions and concerns were addressed to the patient/family. Alternatives to my treatment were discussed. The note was completed using EMR. Every effort was made to ensure accuracy; however, inadvertent computerized transcription errors may be present.

## 2021-09-15 NOTE — PROGRESS NOTES
09/14/21 232   NIV Type   $NIV $Daily Charge   Equipment Type v60   Mode Bilevel   Mask Type Full face mask   Mask Size Medium   Settings/Measurements   IPAP 16 cmH20   CPAP/EPAP 8 cmH2O   Rate Ordered 12   FiO2  60 %   Vt Exhaled 446 mL   Minute Volume 10.8 Liters   Mask Leak (lpm) 7 lpm   Comfort Level Good   Using Accessory Muscles No   Breath Sounds   Right Upper Lobe Diminished   Right Middle Lobe Diminished   Right Lower Lobe Diminished   Left Upper Lobe Diminished   Left Lower Lobe Diminished   Alarm Settings   Alarms On Y   Press Low Alarm 8 cmH2O   High Pressure Alarm 40 cmH2O   Delay Alarm 20 sec(s)   Resp Rate Low Alarm 10   High Respiratory Rate 40 br/min

## 2021-09-15 NOTE — ACP (ADVANCE CARE PLANNING)
Advance Care Planning   Healthcare Decision Maker:    Primary Decision Maker: Isael Alcantar Child - [phone]    Secondary Decision Maker: Philippa Litten Child - [phone]     Pt has Living WIll /DPOA on file dated 3/4/29    Click here to complete Healthcare Decision Makers including selection of the Healthcare Decision Maker Relationship (ie \"Primary\").

## 2021-09-15 NOTE — CONSULTS
Patient is being seen at the request of Kahlil Ortega for a consultation for COVID-19 and respiratory failure in an unvaccinated patient      HISTORY OF PRESENT ILLNESS: CHF, Afib RVR, & NHL presented to the ED from home after testing positive for COVID on Friday, developed severe shortness of breath associated with cough, worse with exertion. She was started on BIPAP in ED at [facility].   Mentation improved with BiPAP     PAST MEDICAL HISTORY:  Past Medical History:   Diagnosis Date    Abnormal chest CT 5/9/2013    Abnormal CXR 2/19/2015    Acute exacerbation of chronic obstructive pulmonary disease (COPD) ([address]     Acute on chronic respiratory failure (HCC)     Acute on chronic respiratory failure with hypoxia (HCC) 1/2/2017    Anticoagulant long-term use     Atrial fibrillation (HCC)     Atrial fibrillation with rapid ventricular response ([address] 6/23/2015    B-cell lymphoma ([address]     CAP (community acquired pneumonia) 1/29/2015    Carotid artery stenosis     CHF (congestive heart failure) (HCC)     Chronic back pain     Congenital heart disease     Depression     Diverticulitis     Diverticulitis of colon     Dizziness 6/14/2020    Fistula     anal-vaginal fistula    Gram-negative pneumonia ([address]     HCAP (healthcare-associated pneumonia)     Hx of blood clots     Hyperlipidemia     Hypertension     IFG (impaired fasting glucose) 9/13/2018    Infection due to Stenotrophomonas maltophilia     Non-Hodgkins lymphoma (HCC)     Obesity     Panic disorder     Peripheral vascular disease ([address]     Primary osteoarthritis of both knees 12/2/2020    Pulmonary embolism ([address]     6/15    Rectal vaginal fistula     Restless legs syndrome     [facility] spotted fever 2013    Sciatic nerve pain     Sepsis ([address] 6/22/2015    Sleep apnea     Swelling of limb 1/5/2016    Tobacco abuse 6/22/2015    Urinary tract infection in female     Vitamin D deficiency      PAST SURGICAL HISTORY:  Past Surgical History:   Procedure Laterality Date    APPENDECTOMY  1963    BRONCHOSCOPY N/A 8/12/2019    EBUS WF W/ANES. performed by Johny Marie MD at [address] [place] [address]  8/12/2019    BRONCHOSCOPY ALVEOLAR LAVAGE performed by Johny Marie MD at [address] [place] [address]  8/12/2019    BRONCHOSCOPY BRUSHINGS performed by Johny Marie MD at [address] [place] [address]  8/12/2019    BRONCHOSCOPY/TRANSBRONCHIAL LUNG BIOPSY performed by Johny Marie MD at [address] [place] [address]  8/12/2019    BRONCHOSCOPY/TRANSBRONCHIAL NEEDLE BIOPSY performed by Johny Marie MD at [address] [place] [address]  8/12/2019    BRONCHOSCOPY/TRANSBRONCHIAL NEEDLE BIOPSY ADDL LOBE performed by Johny Marie MD at [address] [place] [address]  2003    COLONOSCOPY N/A 9/25/2019    COLONOSCOPY POLYPECTOMY SNARE/COLD BIOPSY showed diverticulilosis performed by Meseret Rao MD at [address] [place] [address]      [facility] N/A 1/17/2020    PORT INSERTION performed by Henry Andrea MD at [place] [address] TUNNELED VENOUS PORT PLACEMENT Left 01/17/2020    Port Insertion ENRIQUETA Chest w. Dr Vincent Florida 1/17/20 Vaccess CT Injectable [id number] lot [id number] 3/31/21       FAMILY HISTORY:  family history includes Asthma in her grandchild and son; Cancer (age of onset: 43) in her daughter and mother; Diabetes in her sister; Hypertension in her son and son. SOCIAL HISTORY:   reports that she quit smoking about 6 years ago. Her smoking use included cigarettes. She has a 16.50 pack-year smoking history.  She has never used smokeless tobacco.    Scheduled Meds:   albuterol sulfate HFA  2 puff Inhalation 4x daily    mupirocin   Nasal BID    buPROPion  150 mg Oral QAM    polyethylene glycol  17 g Oral Daily    QUEtiapine  25 mg Oral Nightly    spironolactone  25 mg Oral Daily    torsemide  20 mg Oral Daily    vitamin B-6  50 mg Oral Daily    sodium chloride flush  5-40 mL IntraVENous 2 times per day    dexamethasone  6 mg Oral Daily    Vitamin D  2,000 Units Oral Daily     Continuous Infusions:   sodium chloride      sodium chloride 50 mL/hr at 09/14/21 2355    dilTIAZem 15 mg/hr (09/15/21 0700)     PRN Meds:  albuterol sulfate HFA, melatonin, sodium chloride flush, sodium chloride, ondansetron **OR** ondansetron, polyethylene glycol, acetaminophen **OR** acetaminophen, guaiFENesin-dextromethorphan    ALLERGIES:  Patient is allergic to crestor [rosuvastatin], levofloxacin, lipitor, rocephin in dextrose [ceftriaxone sodium in dextrose], zocor [simvastatin], codeine, and pravastatin. REVIEW OF SYSTEMS:  Constitutional: Negative for fever  HENT: Negative for sore throat  Eyes: Negative for redness   Respiratory: + for dyspnea, cough  Cardiovascular: Negative for chest pain  Gastrointestinal: Negative for vomiting, diarrhea   Genitourinary: Negative for hematuria   Musculoskeletal: Negative for arthralgias   Skin: Negative for rash  Neurological: Negative for syncope  Hematological: Negative for adenopathy  Psychiatric/Behavorial: Negative for anxiety    PHYSICAL EXAM:  Blood pressure 90/63, pulse 98, temperature 97.3 °F (36.3 °C), temperature source Bladder, resp. rate 13, height 5' 2\" (1.575 m), weight 268 lb 1.6 oz (121.6 kg), SpO2 95 %, not currently breastfeeding.' on BiPAP;taken off BiPAP by me   Gen: No distress. Eyes: PERRL. No sclera icterus. No conjunctival injection. ENT: No discharge. Pharynx clear. Neck: Trachea midline. No obvious mass. Resp: No accessory muscle use. + crackles. No wheezes. No rhonchi. No dullness on percussion. CV: Regular rate. Regular rhythm. No murmur or rub. No edema. Peripheral pulses are 2+. Capillary refill is less than 3 seconds. GI: Non-tender. Non-distended. No hernia. Skin: Warm and dry. No nodule on exposed extremities. Lymph: No cervical LAD. No supraclavicular LAD. M/S: No cyanosis. No joint deformity. No clubbing. Neuro: Awake. Alert. Moves all four extremities. Psych: Oriented x 3. No anxiety. LABS:  CBC:   Recent Labs     09/14/21  1400 09/15/21  0416   WBC 6.1 4.9   HGB 10.0* 9.4*   HCT 31.7* 29.6*   MCV 77.8* 78.1*    255     BMP:   Recent Labs     09/14/21  1400 09/15/21  0416    137   K 4.5 4.7   CL 96* 97*   CO2 33* 30   BUN 13 22*   CREATININE 0.7 0.9     LIVER PROFILE:   Recent Labs     09/14/21  1400 09/15/21  0416   AST 34 29   ALT 20 17   BILITOT 0.3 <0.2   ALKPHOS 81 70     PT/INR:   Recent Labs     09/14/21  1400 09/15/21  0416   PROTIME 77.1* 85.6*   INR 6.51* 6.99*     APTT: No results for input(s): APTT in the last 72 hours. UA:No results for input(s): NITRITE, COLORU, PHUR, LABCAST, WBCUA, RBCUA, MUCUS, TRICHOMONAS, YEAST, BACTERIA, CLARITYU, SPECGRAV, LEUKOCYTESUR, UROBILINOGEN, BILIRUBINUR, BLOODU, GLUCOSEU, AMORPHOUS in the last 72 hours.     Invalid input(s): KETONESU  Recent Labs     09/14/21  1414   PHART 7.359   EXB6USJ 60.1*   PO2ART 367.5*       Micro:  9/14/2021 SARS-CoV-2 positive  9/14/2021 blood sent    Imaging:  Chest imaging was reviewed by me and showed   CXR 9/14/2021 bilateral infiltrates consistent with Covid pneumonia    ASSESSMENT:  · Acute on chronic hypoxemic and hypercapnic respiratory failure, prescribed home 4 L HS and 6 L with exertion  · COVID-19 pneumonia in an unvaccinated patient   · A. fib with RVR, h/o paroxysmal atrial fibrillation  · Severe COPD with acute exacerbation, f/b Dr. Saba Smiley previously   · Chronic diastolic CHF  · H/O PE on coumadin, now with supratherapeutic INR  · B cell lymphoma   · SHANNON   · Hyperglycemia     PLAN:   COVID-19 isolation, droplet plus   Bilevel non-invasive positive pressure ventilation for life threatening respiratory failure; HFNO as tolerated for breaks    Remdesevir D#1, LFT monitoring for high risk medication   IV steroids day #2, changed to Medrol 40 BID     Tocilizumab X 1    Doxycycline D#1/7     Cardizem infusion @ 15   Insulin H-SSI   Inhaled bronchodilators only as needed, MDI preferred    On chronic Coumadin, currently supratherapeutic   Prophylaxis: Bactroban

## 2021-09-15 NOTE — CONSULTS
Pharmacy to dose actemra and remdesivir per Dr. Adeola Grace. Please give actemra 810mg ivpb x1 dose. Please give remdesivir 200mg ivpb x1 dose, then 100mg ivpb q24h x4 days. Ast 29  Alt 17  Cr 0.9  Platelet 415  Luther NEWTON Ph.  9/15/2021 12:00 PM

## 2021-09-15 NOTE — ED NOTES
Pt report called to [facility], spoke to [facility]. RN states bed is not yet ready but ICU staff would call when bed ready. Informed [facility] transport team that pt would now not be able to be picked up until later tonight. Leila Montoya from [facility] states [facility] and to inform them when bed is ready. End of shift report given to HEAVEN Ogden. Eri Leon aware of bed and transportation status. Pt care transferred at this time.      Ashley Dotson RN  09/14/21 2024

## 2021-09-15 NOTE — H&P
Hospital Medicine History & Physical      PCP: Guerrero Slater, APRN - CNP    Date of Admission: 9/14/2021    Date of Service: Pt seen/examined on 9/15/2021     Chief Complaint:    Chief Complaint   Patient presents with    Shortness of Breath     pt c/o SOB x4 days, states on 4L O2 at home, states she was diagnosed with COVID last Friday         History Of Present Illness: The patient is a 68 y.o. female with a PMH of chronic Hypoxic Respiratory Failure, COPD, Hx of Anovaginal fistula, H/o PE, PAF, chronic diastolic CHF, B cell lymphoma who presented to [facility] ED with complaint of shortness of breath of 2 days duration. She tested positive for Covid a few days ago when she developed some mild respiratory symptoms. She was found to be very hypoxic in the emergency room, placed on BiPAP and admitted to the ICU. Currently on high flow nasal cannula.     Past Medical History:        Diagnosis Date    Abnormal chest CT 5/9/2013    Abnormal CXR 2/19/2015    Acute exacerbation of chronic obstructive pulmonary disease (COPD) ([address]     Acute on chronic respiratory failure (HCC)     Acute on chronic respiratory failure with hypoxia (HCC) 1/2/2017    Anticoagulant long-term use     Atrial fibrillation (HCC)     Atrial fibrillation with rapid ventricular response ([address] 6/23/2015    B-cell lymphoma ([address]     CAP (community acquired pneumonia) 1/29/2015    Carotid artery stenosis     CHF (congestive heart failure) (HCC)     Chronic back pain     Congenital heart disease     Depression     Diverticulitis     Diverticulitis of colon     Dizziness 6/14/2020    Fistula     anal-vaginal fistula    Gram-negative pneumonia ([address]     HCAP (healthcare-associated pneumonia)     Hx of blood clots     Hyperlipidemia     Hypertension     IFG (impaired fasting glucose) 9/13/2018    Infection due to Stenotrophomonas maltophilia     Non-Hodgkins lymphoma (HCC)     Obesity     Panic disorder     [facility]ipheral [facility]     Primary osteoarthritis of both knees 12/2/2020    Pulmonary embolism ([facility]     6/15    Rectal vaginal fistula     Restless legs syndrome     [facility] spotted fever 2013    Sciatic nerve pain     Sepsis ([facility] 6/22/2015    Sleep apnea     Swelling of limb 1/5/2016    Tobacco abuse 6/22/2015    Urinary tract infection in female     Vitamin D deficiency        Past Surgical History:        Procedure Laterality Date    APPENDECTOMY  1963    BRONCHOSCOPY N/A 8/12/2019    EBUS WF W/ANES. performed by Brianna Mosqueda MD at [address] [place] [address]  8/12/2019    BRONCHOSCOPY ALVEOLAR LAVAGE performed by Brianna Mosqueda MD at [address] [place] [address]  8/12/2019    BRONCHOSCOPY BRUSHINGS performed by Brianna Mosqueda MD at [address] [place] [address]  8/12/2019    BRONCHOSCOPY/TRANSBRONCHIAL LUNG BIOPSY performed by Brianna Mosqueda MD at [address] [place] [address]  8/12/2019    BRONCHOSCOPY/TRANSBRONCHIAL NEEDLE BIOPSY performed by Brianna Mosqueda MD at [address] [place] [address]  8/12/2019    BRONCHOSCOPY/TRANSBRONCHIAL NEEDLE BIOPSY ADDL LOBE performed by Brianna Mosqueda MD at [address] [place] [address]  2003    COLONOSCOPY N/A 9/25/2019    COLONOSCOPY POLYPECTOMY SNARE/COLD BIOPSY showed diverticulilosis performed by Sharad Tovar MD at [facility]      umbilical-- [facility] N/A 1/17/2020    PORT INSERTION performed by Barrington Perdomo MD at [place] [address] TUNNELED VENOUS PORT PLACEMENT Left 01/17/2020    Port Insertion ENRIQUETA Chest w. Dr Dae Parra 1/17/20 Vaccess CT Injectable [id number] lot [id number] 3/31/21       Medications Prior to Admission:    Prior to Admission medications    Medication Sig Start Date End Date Taking?  Authorizing Provider   predniSONE (DELTASONE) 10 MG tablet Take 40 mg for 3 days then 30 mg for 3 days then 20 mg for 3 days then 10 mg for 3 days 8/20/21   TURNER Ford CNP   warfarin (COUMADIN) 5 MG tablet TAKE ONE TABLET BY MOUTH DAILY 6/24/21   TURNER Ford CNP   torsemide [facility]) 20 MG tablet Take 1 tablet by mouth daily 6/24/21   TURNER Ford CNP   QUEtiapine (SEROQUEL) 25 MG tablet Take 1 tablet by mouth nightly  Patient taking differently: Take 25 mg by mouth nightly As needed 6/24/21   TURNER Ford CNP   ezetimibe (ZETIA) 10 MG tablet Take 1 tablet by mouth daily  Patient not taking: Reported on 8/20/2021 6/24/21   TURNER Ford CNP   buPROPion (WELLBUTRIN XL) 150 MG extended release tablet Take 1 tablet by mouth every morning  Patient taking differently: Take 150 mg by mouth every morning Takes as needed 6/24/21   TURNER Ford CNP   Melatonin 5 MG CAPS Take 1 capsule by mouth nightly as needed    Historical Provider, MD   diphenhydrAMINE (BENADRYL) 25 MG tablet Take 25 mg by mouth every 6 hours as needed for Itching    Historical Provider, MD   spironolactone (ALDACTONE) 25 MG tablet Take 1 tablet by mouth daily 2/24/21   TURNER Ford CNP   albuterol sulfate HFA (VENTOLIN HFA) 108 (90 Base) MCG/ACT inhaler Inhale 2 puffs into the lungs every 6 hours as needed for Wheezing or Shortness of Breath 10/28/20   Shannon Sanderson MD   polyethylene glycol (GLYCOLAX) 17 g packet Take 17 g by mouth daily 10/23/20   Anna [facility], APRN - CNP   vitamin B-6 (PYRIDOXINE) 50 MG tablet Take by mouth daily    Historical Provider, MD   albuterol (PROVENTIL) (2.5 MG/3ML) 0.083% nebulizer solution Take 3 mLs by nebulization every 6 hours as needed for Wheezing or Shortness of Breath DX COPD J44.9 4/13/20   Shannon Sanderson MD   Acetaminophen (TYLENOL ARTHRITIS PAIN PO) Take by mouth    Historical Provider, MD   OXYGEN 4.5 L 1/5/17   Isela Noguera MD   Cholecalciferol (VITAMIN D-3 PO) Take 2,000 Units by mouth daily     Historical Provider, MD       Allergies:  Crestor [rosuvastatin], Levofloxacin, Lipitor, Rocephin in dextrose [ceftriaxone sodium in dextrose], Zocor [simvastatin], Codeine, and Pravastatin    Social History:  The patient currently lives at home. TOBACCO:   reports that she quit smoking about 6 years ago. Her smoking use included cigarettes. She has a 16.50 pack-year smoking history. She has never used smokeless tobacco.  ETOH:   reports previous alcohol use. Family History:   Positive as follows:        Problem Relation Age of Onset    Cancer Mother 43        lung cancer    Diabetes Sister     Cancer Daughter 43        lung cancer    Asthma Son     Hypertension Son     Asthma Grandchild     Hypertension Son     Emphysema Neg Hx     Heart Failure Neg Hx        REVIEW OF SYSTEMS:       Constitutional: Negative for fever   HENT: Negative for sore throat   Eyes: Negative for redness   Respiratory: Positive for dyspnea, cough   Cardiovascular: Negative for chest pain   Gastrointestinal: Negative for vomiting, diarrhea   Genitourinary: Negative for hematuria   Musculoskeletal: Negative for arthralgias   Skin: Negative for rash   Neurological: Negative for syncope   Hematological: Negative for adenopathy     PHYSICAL EXAM:    BP 90/63   Pulse 98   Temp 97.3 °F (36.3 °C) (Bladder)   Resp 13   Ht 5' 2\" (1.575 m)   Wt 268 lb 1.6 oz (121.6 kg)   LMP  (LMP Unknown)   SpO2 95%   BMI 49.04 kg/m²     Gen: Mild respiratory distress  Eyes: PERRL. No sclera icterus. No conjunctival injection. ENT: No discharge. Pharynx clear. Neck: No JVD. No Carotid Bruit. Trachea midline. Resp: No accessory muscle use. Bibasilar crackles. No wheezes. No rhonchi. CV: Regular rate. Regular rhythm. No murmur. No rub. No edema. Capillary Refill: Brisk,< 3 seconds   Peripheral Pulses: +2 palpable, equal bilaterally   GI: Non-tender. Non-distended. No masses. No organomegaly. Normal bowel sounds.  No hernia. Skin: Warm and dry. No nodule on exposed extremities. No rash on exposed extremities. M/S: No cyanosis. No joint deformity. No clubbing. Neuro: Awake. Grossly nonfocal    Psych: Oriented x 3. No anxiety or agitation. CBC:   Recent Labs     09/14/21  1400 09/15/21  0416   WBC 6.1 4.9   HGB 10.0* 9.4*   HCT 31.7* 29.6*   MCV 77.8* 78.1*    255     BMP:   Recent Labs     09/14/21  1400 09/15/21  0416    137   K 4.5 4.7   CL 96* 97*   CO2 33* 30   BUN 13 22*   CREATININE 0.7 0.9     LIVER PROFILE:   Recent Labs     09/14/21  1400 09/15/21  0416   AST 34 29   ALT 20 17   BILITOT 0.3 <0.2   ALKPHOS 81 70     PT/INR:   Recent Labs     09/14/21  1400 09/15/21  0416   PROTIME 77.1* 85.6*   INR 6.51* 6.99*     CARDIAC ENZYMES  Recent Labs     09/14/21  1400   TROPONINI <0.01       U/A:    Lab Results   Component Value Date    NITRITE negative 05/01/2017    COLORU Yellow 10/10/2020    WBCUA 3-5 10/10/2020    RBCUA 3-4 10/10/2020    BACTERIA 4+ 10/10/2020    CLARITYU CLOUDY 10/10/2020    SPECGRAV 1.010 10/10/2020    LEUKOCYTESUR LARGE 10/10/2020    BLOODU SMALL 10/10/2020    GLUCOSEU Negative 10/10/2020    AMORPHOUS 4+ 09/01/2016       ABG    Lab Results   Component Value Date    TQO9YJO 33.1 09/14/2021    BEART 6.3 09/14/2021    L5HBZORJ 99.8 09/14/2021    PHART 7.359 09/14/2021    THGBART 17.8 01/01/2013    PDB5MQZ 60.1 09/14/2021    PO2ART 367.5 09/14/2021    LQB1QGZ 35.0 09/14/2021       CULTURES    Blood cx x2: pending    COVID 19 PCR: DETECTED    Rapid Influenza A/B: negative      EKG:  I have reviewed the EKG with the following interpretation:   Atrial fibrillation with rapid ventricular response rate of 150  Septal infarct , age undetermined  Abnormal ECG  Confirmed by Rosy Langford MD, Karlene Galindo ([id number]) on 9/14/2021 3:08:11 PM    RADIOLOGY    XR CHEST PORTABLE   Final Result   Bilateral interstitial groundglass opacities, likely representing COVID 19   pneumonia.            Pertinent previous results reviewed     TTE 6/15/2020  Summary   Technically difficult examination. Normal left ventricular systolic function with ejection fraction of 55-60%. Moderate concentric left ventricular hypertrophy. No regional wall motion abnormalites are seen. Grade I diastolic dysfunction with normal filling pressure. Compared to previous study from 6- no changes noted in left   ventricular function. ASSESSMENT/PLAN:    COVID 19 PNA  - CXR with bilateral interstitial ground glass opacities  - Admitted to ICU, telemetry  - PCT 0.22  - start Decadron D#2(she also has severe COPD), remdesivir  Tocilizumab x1    - pulmonology consult    Acute on Chronic Hypoxic Respiratory Failure  - wears 4L NC at baseline  - likely 2/2 COVID and uncontrolled afib  - on BiPAP  - mgmt per pulmonology, control HR as below and mgmt of COVID as above  Currently on 10 L of oxygen. Atrial Fibrillation with RVR  - hx of PAF, trop neg  - AC on Coumadin - currently supratherapeutic  - s/p dilt bolus > now on Dilt gtt  - cardiology consult    COPD  With acute exacerbation. Continue steroids inhaled bronchodilators(MDI)    Hyperglycemia  - pt is on steroids  - last Hgb A1c: 6.6, check Hgb A1c   - added low dose SSI and PRN hypoglycemia protocol  - she is currently NPO    Supratherapeutic INR  - 6.51 > 6.99  - hold Coumadin  - daily INR    Microcytic Anemia   - Hgb 10 > 9.4  - this is chronic  - stable    Chronic Diastolic CHF  - appears compensated   - last echo 6/2020: normal EF, grade I DD  - daily weights, I&Os  - cont Torsemide    Hx of PE  - AC on Coumadin - currently supra therapeutic  - daily PT/INR, holding Coumadin    B cell Lymphoma  - f/w Dr. Calista Turk    H/o Anovaginal Fistula  - noted per patient on prior admission    Morbid Obesity  - Body mass index is 49.04 kg/m². - Complicating assessment and treatment.  Placing patient at risk for multiple co-morbidities as well as early death and contributing to the patient's presentation.   - Counseled on weight loss. DVT Prophylaxis: AC on Coumadin - supratherapeutic & on hold  Diet: Diet NPO  Code Status: Full Code    Total critical care time spent is 35 minutes    CHRISTIANNE Mullen.

## 2021-09-15 NOTE — PROGRESS NOTES
09/15/21 0344   NIV Type   Equipment Type v60   Mode Bilevel   Mask Type Full face mask   Mask Size Medium   Settings/Measurements   IPAP 14 cmH20   CPAP/EPAP 6 cmH2O   Rate Ordered 12   Resp 14   FiO2  60 %   Vt Exhaled 550 mL   Minute Volume 12.6 Liters   Mask Leak (lpm) 17 lpm   Comfort Level Good   Using Accessory Muscles No   SpO2 95   Alarm Settings   Alarms On Y   Press Low Alarm 8 cmH2O   High Pressure Alarm 40 cmH2O   Delay Alarm 20 sec(s)   Resp Rate Low Alarm 10   High Respiratory Rate 40 br/min

## 2021-09-16 LAB
A/G RATIO: 0.9 (ref 1.1–2.2)
ALBUMIN SERPL-MCNC: 3 G/DL (ref 3.4–5)
ALP BLD-CCNC: 72 U/L (ref 40–129)
ALT SERPL-CCNC: 18 U/L (ref 10–40)
ANION GAP SERPL CALCULATED.3IONS-SCNC: 9 MMOL/L (ref 3–16)
AST SERPL-CCNC: 32 U/L (ref 15–37)
BASOPHILS ABSOLUTE: 0 K/UL (ref 0–0.2)
BASOPHILS RELATIVE PERCENT: 0.2 %
BILIRUB SERPL-MCNC: <0.2 MG/DL (ref 0–1)
BUN BLDV-MCNC: 32 MG/DL (ref 7–20)
CALCIUM SERPL-MCNC: 8.1 MG/DL (ref 8.3–10.6)
CHLORIDE BLD-SCNC: 100 MMOL/L (ref 99–110)
CO2: 32 MMOL/L (ref 21–32)
CREAT SERPL-MCNC: 0.8 MG/DL (ref 0.6–1.2)
EOSINOPHILS ABSOLUTE: 0 K/UL (ref 0–0.6)
EOSINOPHILS RELATIVE PERCENT: 0 %
ESTIMATED AVERAGE GLUCOSE: 151.3 MG/DL
GFR AFRICAN AMERICAN: >60
GFR NON-AFRICAN AMERICAN: >60
GLOBULIN: 3.2 G/DL
GLUCOSE BLD-MCNC: 194 MG/DL (ref 70–99)
GLUCOSE BLD-MCNC: 213 MG/DL (ref 70–99)
GLUCOSE BLD-MCNC: 243 MG/DL (ref 70–99)
GLUCOSE BLD-MCNC: 301 MG/DL (ref 70–99)
GLUCOSE BLD-MCNC: 323 MG/DL (ref 70–99)
HBA1C MFR BLD: 6.9 %
HCT VFR BLD CALC: 29.5 % (ref 36–48)
HEMOGLOBIN: 9.4 G/DL (ref 12–16)
INR BLD: 8.38 (ref 0.88–1.12)
LYMPHOCYTES ABSOLUTE: 0.5 K/UL (ref 1–5.1)
LYMPHOCYTES RELATIVE PERCENT: 5.6 %
MCH RBC QN AUTO: 24.7 PG (ref 26–34)
MCHC RBC AUTO-ENTMCNC: 31.8 G/DL (ref 31–36)
MCV RBC AUTO: 77.7 FL (ref 80–100)
MONOCYTES ABSOLUTE: 0.5 K/UL (ref 0–1.3)
MONOCYTES RELATIVE PERCENT: 5.7 %
NEUTROPHILS ABSOLUTE: 7.5 K/UL (ref 1.7–7.7)
NEUTROPHILS RELATIVE PERCENT: 88.5 %
PDW BLD-RTO: 16.5 % (ref 12.4–15.4)
PERFORMED ON: ABNORMAL
PLATELET # BLD: 336 K/UL (ref 135–450)
PMV BLD AUTO: 7.2 FL (ref 5–10.5)
POTASSIUM REFLEX MAGNESIUM: 4.5 MMOL/L (ref 3.5–5.1)
PROTHROMBIN TIME: 103.5 SEC (ref 9.9–12.7)
RBC # BLD: 3.79 M/UL (ref 4–5.2)
SODIUM BLD-SCNC: 141 MMOL/L (ref 136–145)
TOTAL PROTEIN: 6.2 G/DL (ref 6.4–8.2)
WBC # BLD: 8.4 K/UL (ref 4–11)

## 2021-09-16 PROCEDURE — 2700000000 HC OXYGEN THERAPY PER DAY

## 2021-09-16 PROCEDURE — 85610 PROTHROMBIN TIME: CPT

## 2021-09-16 PROCEDURE — 85025 COMPLETE CBC W/AUTO DIFF WBC: CPT

## 2021-09-16 PROCEDURE — 94640 AIRWAY INHALATION TREATMENT: CPT

## 2021-09-16 PROCEDURE — 6360000002 HC RX W HCPCS: Performed by: INTERNAL MEDICINE

## 2021-09-16 PROCEDURE — 6370000000 HC RX 637 (ALT 250 FOR IP): Performed by: INTERNAL MEDICINE

## 2021-09-16 PROCEDURE — 2580000003 HC RX 258: Performed by: INTERNAL MEDICINE

## 2021-09-16 PROCEDURE — 2060000000 HC ICU INTERMEDIATE R&B

## 2021-09-16 PROCEDURE — 99291 CRITICAL CARE FIRST HOUR: CPT | Performed by: INTERNAL MEDICINE

## 2021-09-16 PROCEDURE — 2500000003 HC RX 250 WO HCPCS: Performed by: INTERNAL MEDICINE

## 2021-09-16 PROCEDURE — 99232 SBSQ HOSP IP/OBS MODERATE 35: CPT | Performed by: INTERNAL MEDICINE

## 2021-09-16 PROCEDURE — 6370000000 HC RX 637 (ALT 250 FOR IP): Performed by: PHYSICIAN ASSISTANT

## 2021-09-16 PROCEDURE — 2580000003 HC RX 258: Performed by: PHYSICIAN ASSISTANT

## 2021-09-16 PROCEDURE — 99233 SBSQ HOSP IP/OBS HIGH 50: CPT | Performed by: INTERNAL MEDICINE

## 2021-09-16 PROCEDURE — 80053 COMPREHEN METABOLIC PANEL: CPT

## 2021-09-16 PROCEDURE — 94660 CPAP INITIATION&MGMT: CPT

## 2021-09-16 PROCEDURE — 94761 N-INVAS EAR/PLS OXIMETRY MLT: CPT

## 2021-09-16 PROCEDURE — 6370000000 HC RX 637 (ALT 250 FOR IP)

## 2021-09-16 PROCEDURE — 36415 COLL VENOUS BLD VENIPUNCTURE: CPT

## 2021-09-16 RX ORDER — DIMETHICONE, OXYBENZONE, AND PADIMATE O 2; 2.5; 6.6 G/100G; G/100G; G/100G
STICK TOPICAL
Status: COMPLETED
Start: 2021-09-16 | End: 2021-09-16

## 2021-09-16 RX ORDER — DIGOXIN 125 MCG
125 TABLET ORAL DAILY
Status: DISCONTINUED | OUTPATIENT
Start: 2021-09-16 | End: 2021-09-29 | Stop reason: HOSPADM

## 2021-09-16 RX ADMIN — DIGOXIN 125 MCG: 125 TABLET ORAL at 12:43

## 2021-09-16 RX ADMIN — INSULIN LISPRO 12 UNITS: 100 INJECTION, SOLUTION INTRAVENOUS; SUBCUTANEOUS at 08:00

## 2021-09-16 RX ADMIN — Medication 2000 UNITS: at 08:41

## 2021-09-16 RX ADMIN — METOPROLOL TARTRATE 25 MG: 25 TABLET, FILM COATED ORAL at 12:43

## 2021-09-16 RX ADMIN — Medication 2 PUFF: at 14:41

## 2021-09-16 RX ADMIN — Medication 2 PUFF: at 10:53

## 2021-09-16 RX ADMIN — QUETIAPINE FUMARATE 25 MG: 25 TABLET ORAL at 22:37

## 2021-09-16 RX ADMIN — METHYLPREDNISOLONE SODIUM SUCCINATE 40 MG: 40 INJECTION, POWDER, FOR SOLUTION INTRAMUSCULAR; INTRAVENOUS at 08:41

## 2021-09-16 RX ADMIN — Medication 5 MG: at 22:37

## 2021-09-16 RX ADMIN — Medication 2 PUFF: at 07:35

## 2021-09-16 RX ADMIN — REMDESIVIR 100 MG: 100 INJECTION, POWDER, LYOPHILIZED, FOR SOLUTION INTRAVENOUS at 12:47

## 2021-09-16 RX ADMIN — INSULIN LISPRO 2 UNITS: 100 INJECTION, SOLUTION INTRAVENOUS; SUBCUTANEOUS at 22:48

## 2021-09-16 RX ADMIN — TORSEMIDE 20 MG: 20 TABLET ORAL at 08:41

## 2021-09-16 RX ADMIN — Medication: at 09:57

## 2021-09-16 RX ADMIN — ACETAMINOPHEN 650 MG: 325 TABLET ORAL at 18:54

## 2021-09-16 RX ADMIN — DOXYCYCLINE 100 MG: 100 INJECTION, POWDER, LYOPHILIZED, FOR SOLUTION INTRAVENOUS at 01:51

## 2021-09-16 RX ADMIN — SPIRONOLACTONE 25 MG: 25 TABLET ORAL at 08:41

## 2021-09-16 RX ADMIN — Medication 2 PUFF: at 19:34

## 2021-09-16 RX ADMIN — DOXYCYCLINE 100 MG: 100 INJECTION, POWDER, LYOPHILIZED, FOR SOLUTION INTRAVENOUS at 12:51

## 2021-09-16 RX ADMIN — DILTIAZEM HYDROCHLORIDE 10 MG/HR: 5 INJECTION, SOLUTION INTRAVENOUS at 22:44

## 2021-09-16 RX ADMIN — PYRIDOXINE HCL TAB 50 MG 50 MG: 50 TAB at 08:41

## 2021-09-16 RX ADMIN — INSULIN LISPRO 12 UNITS: 100 INJECTION, SOLUTION INTRAVENOUS; SUBCUTANEOUS at 13:02

## 2021-09-16 RX ADMIN — SODIUM CHLORIDE, PRESERVATIVE FREE 10 ML: 5 INJECTION INTRAVENOUS at 22:38

## 2021-09-16 RX ADMIN — SODIUM CHLORIDE, PRESERVATIVE FREE 10 ML: 5 INJECTION INTRAVENOUS at 08:42

## 2021-09-16 RX ADMIN — METHYLPREDNISOLONE SODIUM SUCCINATE 40 MG: 40 INJECTION, POWDER, FOR SOLUTION INTRAMUSCULAR; INTRAVENOUS at 22:37

## 2021-09-16 RX ADMIN — DILTIAZEM HYDROCHLORIDE 15 MG/HR: 5 INJECTION, SOLUTION INTRAVENOUS at 13:19

## 2021-09-16 RX ADMIN — INSULIN LISPRO 6 UNITS: 100 INJECTION, SOLUTION INTRAVENOUS; SUBCUTANEOUS at 18:55

## 2021-09-16 RX ADMIN — DILTIAZEM HYDROCHLORIDE 15 MG/HR: 5 INJECTION, SOLUTION INTRAVENOUS at 02:27

## 2021-09-16 ASSESSMENT — PAIN SCALES - GENERAL
PAINLEVEL_OUTOF10: 0
PAINLEVEL_OUTOF10: 7

## 2021-09-16 NOTE — PROGRESS NOTES
Shift assessment complete. See flow sheet. Scheduled meds given. See MAR. Patients head-toe complete, VS are logged, and active bowel sound noted in all four quadrants. No further needs  noted at this time. Call light and bedside table are within reach. The bed is locked and is in the lowest position.

## 2021-09-16 NOTE — PROGRESS NOTES
09/16/21 1900   NIV Type   $NIV $Daily Charge   NIV Started/Stopped On   Equipment Type v60   Mode Bilevel   Mask Type Full face mask   Mask Size Medium   Settings/Measurements   IPAP 14 cmH20   CPAP/EPAP 6 cmH2O   Rate Ordered 12   Resp 18   FiO2  90 %   Vt Exhaled 542 mL   Minute Volume 12.7 Liters   Mask Leak (lpm) 6 lpm   Comfort Level Good   Using Accessory Muscles No   SpO2 94   Alarm Settings   Alarms On Y   Press Low Alarm 8 cmH2O   High Pressure Alarm 40 cmH2O   Delay Alarm 20 sec(s)   Resp Rate Low Alarm 12   High Respiratory Rate 40 br/min

## 2021-09-16 NOTE — PROGRESS NOTES
Admit: 2021    Name:  Olivia Miller  Room:  /0311-01  MRN:    [id number]    Daily Progress Note for 2021   Admitted with Covid pneumonia and acute hypoxic respiratory failure  Interval History:   She has not been able to tolerate the BiPAP she has been pulling the mask off  Currently on 15 L high flow satting in the mid 80s  Scheduled Meds:   digoxin  125 mcg Oral Daily    metoprolol tartrate  25 mg Oral BID    albuterol sulfate HFA  2 puff Inhalation 4x daily    mupirocin   Nasal BID    remdesivir IVPB  100 mg IntraVENous Q24H    methylPREDNISolone  40 mg IntraVENous Q12H    doxycycline (VIBRAMYCIN) IV  100 mg IntraVENous Q12H    insulin lispro  0-18 Units SubCUTAneous TID WC    insulin lispro  0-9 Units SubCUTAneous Nightly    buPROPion  150 mg Oral QAM    polyethylene glycol  17 g Oral Daily    QUEtiapine  25 mg Oral Nightly    spironolactone  25 mg Oral Daily    torsemide  20 mg Oral Daily    vitamin B-6  50 mg Oral Daily    sodium chloride flush  5-40 mL IntraVENous 2 times per day    Vitamin D  2,000 Units Oral Daily       Continuous Infusions:   dextrose      sodium chloride      dilTIAZem 15 mg/hr (21 022)       PRN Meds:  albuterol sulfate HFA, glucose, dextrose, glucagon (rDNA), dextrose, melatonin, sodium chloride flush, sodium chloride, ondansetron **OR** ondansetron, polyethylene glycol, acetaminophen **OR** acetaminophen, guaiFENesin-dextromethorphan                  Objective:     Temp  Av.9 °F (36.6 °C)  Min: 97.2 °F (36.2 °C)  Max: 98.3 °F (36.8 °C)  Pulse  Av  Min: 95  Max: 120  BP  Min: 88/51  Max: 138/64  SpO2  Av.1 %  Min: 90 %  Max: 95 %  FiO2   Av.5 %  Min: 9 %  Max: 90 %  Patient Vitals for the past 4 hrs:   Resp SpO2   21 0855 27 95 %   21 0736 22          Intake/Output Summary (Last 24 hours) at 2021 1037  Last data filed at 2021 0334  Gross per 24 hour   Intake 360 ml   Output 2150 ml   Net -6298 ml       Physical Exam:  Gen: Mild respiratory distress  Eyes: PERRL. No sclera icterus. No conjunctival injection. ENT: No discharge. Pharynx clear. Neck: No JVD. No Carotid Bruit. Trachea midline. Resp: No accessory muscle use. Bibasilar crackles. No wheezes. No rhonchi. CV: Regular rate. Regular rhythm. No murmur. No rub. No edema. Capillary Refill: Brisk,< 3 seconds   Peripheral Pulses: +2 palpable, equal bilaterally   GI: Non-tender. Non-distended. No masses. No organomegaly. Normal bowel sounds. No hernia. Skin: Warm and dry. No nodule on exposed extremities. No rash on exposed extremities. M/S: No cyanosis. No joint deformity. No clubbing. Neuro: Awake. Grossly nonfocal    Psych: Oriented x 3. No anxiety or agitation. Lab Data:  CBC:   Recent Labs     09/14/21  1400 09/15/21  0416 09/16/21  0441   WBC 6.1 4.9 8.4   RBC 4.08 3.78* 3.79*   HGB 10.0* 9.4* 9.4*   HCT 31.7* 29.6* 29.5*   MCV 77.8* 78.1* 77.7*   RDW 16.4* 16.9* 16.5*    255 336     BMP:   Recent Labs     09/14/21  1400 09/15/21  0416 09/16/21  0441    137 141   K 4.5 4.7 4.5   CL 96* 97* 100   CO2 33* 30 32   BUN 13 22* 32*   CREATININE 0.7 0.9 0.8     BNP: No results for input(s): BNP in the last 72 hours. PT/INR:   Recent Labs     09/14/21  1400 09/15/21  0416 09/16/21  0441   PROTIME 77.1* 85.6* 103.5*   INR 6.51* 6.99* 8.38*     APTT:No results for input(s): APTT in the last 72 hours. CARDIAC ENZYMES:   Recent Labs     09/14/21 1400   TROPONINI <0.01     FASTING LIPID PANEL:  Lab Results   Component Value Date    CHOL 249 05/19/2021    HDL 63 05/19/2021    TRIG 195 05/19/2021     LIVER PROFILE:   Recent Labs     09/14/21  1400 09/15/21  0416 09/16/21  0441   AST 34 29 32   ALT 20 17 18   BILITOT 0.3 <0.2 <0.2   ALKPHOS 81 70 72         XR CHEST PORTABLE   Final Result   Bilateral interstitial groundglass opacities, likely representing COVID 19   pneumonia.                Assessment & Plan:     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    Nausea 06/18/2021    Polyp of colon 06/18/2021    Morbid obesity ([address] 06/18/2021    Sleep apnea     Restless legs syndrome     Chronic back pain     Panic disorder     Primary osteoarthritis of both knees 12/02/2020    Acute pain of right knee 12/02/2020    Moderate episode of recurrent major depressive disorder ([address] 10/23/2020    Primary insomnia 10/23/2020    Elevated lactic acid level     History of diverticulitis 10/10/2020    HTN (hypertension), benign     Dyslipidemia     TIA (transient ischemic attack) 06/14/2020    CHF (congestive heart failure) ([address]     PVD (peripheral vascular disease) with claudication ([address] 11/01/2019    Non-Hodgkin lymphoma ([address] 09/06/2019    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)     Pulmonary nodule     Atrial fibrillation, chronic (HCC)     Lung mass     Mediastinal lymphadenopathy     IFG (impaired fasting glucose) 09/13/2018    Erythrocytosis 08/22/2018    Angina pectoris (HCC)     Paroxysmal atrial fibrillation (HCC)     Bilateral edema of lower extremity 01/18/2017    Acute on chronic respiratory failure with hypoxia ([address] 27/32/0477    Diastolic dysfunction     Shortness of breath 05/02/2016    Venous (peripheral) insufficiency 01/05/2016    Lymphedema 01/05/2016    Chronic angle-closure glaucoma of both eyes 01/05/2016    Arthritis of hips and knees 01/05/2016    Other pulmonary embolism without acute cor pulmonale ([address] 06/27/2015    Atrial fibrillation with RVR ([address] 06/23/2015    Carotid stenosis 10/27/2014    Vitamin D deficiency     COPD, severe ([facility] 05/09/2013    Anxiety 02/08/2013    Hyperlipidemia 02/08/2013    Diverticulosis 02/08/2013    Chronic airway obstruction, not elsewhere classified 01/10/2013    Chronic respiratory failure with hypoxia ([facility] 03/29/2012     COVID 19 PNA  - CXR with bilateral interstitial ground glass opacities  - Admitted to ICU, telemetry  - PCT 0.22  - start solumedrol  D#3 she also has severe COPD), remdesivir day 2   Tocilizumab x1   - pulmonology consult     Acute on Chronic Hypoxic Respiratory Failure  - wears 4L NC at baseline  - likely 2/2 COVID and uncontrolled afib  - on BiPAP  - mgmt per pulmonology, control HR as below and mgmt of COVID as above  Currently on 15 L of oxygen. She was not tolerating the BiPAP. Now she tells that she will try the BiPAP. Will consider switching to Vapotherm if she cannot tolerate BiPAP     Atrial Fibrillation with RVR  - hx of PAF, trop neg  - AC on Coumadin - currently supratherapeutic  - s/p dilt bolus > now on Dilt gtt  - cardiology consult  She is currently on digoxin and metoprolol     COPD  With acute exacerbation. Continue steroids inhaled bronchodilators(MDI)     Hyperglycemia  - pt is on steroids  - last Hgb A1c: 6.6, check Hgb A1c   - added low dose SSI and PRN hypoglycemia protocol  - she is currently NPO     Supratherapeutic INR  - 6.51 > 6.99  - hold Coumadin  - daily INR     Microcytic Anemia   - Hgb 10 > 9.4  - this is chronic  - stable     Chronic Diastolic CHF  - appears compensated   - last echo 6/2020: normal EF, grade I DD  - daily weights, I&Os  - cont Torsemide     Hx of PE  - AC on Coumadin - currently supra therapeutic  - daily PT/INR, holding Coumadin     B cell Lymphoma  - f/w Dr. Calista Turk     H/o Anovaginal Fistula  - noted per patient on prior admission     Morbid Obesity  - Body mass index is 49.04 kg/m². - Complicating assessment and treatment.  Placing patient at risk for multiple co-morbidities as well as early death and contributing to the patient's presentation.   - Counseled on weight loss.      DVT Prophylaxis: AC on Coumadin - supratherapeutic & on hold  Diet: Diet NPO  Code Status: Full Code      Jaime Obrien MD

## 2021-09-16 NOTE — PROGRESS NOTES
Patient not tolerating BiPAP. Requesting to be taken off. Returned patient to 15L high flow nasal cannula. Will continue to monitor. Lip moisturizer administered for comfort and water provided.

## 2021-09-16 NOTE — PROGRESS NOTES
Care Plan, Education, Fall Risk, Jefe Scale, and Lift Assessment complete. Patient is resting and reports no needs at this time.

## 2021-09-16 NOTE — PROGRESS NOTES
Pt's Spo2 is between 83% and 90% on 15L HF NC. Placed Pt. Back on BIPAP 14/6, 90%. Pt. Tolerating well.

## 2021-09-16 NOTE — PLAN OF CARE
Problem: Gas Exchange - Impaired  Goal: Absence of hypoxia  Outcome: Ongoing     Problem: Gas Exchange - Impaired  Goal: Promote optimal lung function  Outcome: Ongoing     Problem: Breathing Pattern - Ineffective  Goal: Ability to achieve and maintain a regular respiratory rate  Outcome: Ongoing     Problem: Airway Clearance - Ineffective  Goal: Achieve or maintain patent airway  Outcome: Ongoing     Problem:  Body Temperature -  Risk of, Imbalanced  Goal: Will regain or maintain usual level of consciousness  Outcome: Ongoing

## 2021-09-16 NOTE — PROGRESS NOTES
Physical Therapy/Occupational Therapy    Patient will be on hold for PT/OT evaluation today. Patient was on BiPAP this AM due to low SpO2 levels and currently in Afib and not appropriate to participate in PT/OT evaluation today. Patient will be followed up later as schedule permits. No charge.     Man Horn, MS PT, # [id number]

## 2021-09-16 NOTE — PROGRESS NOTES
[facility] Daily Progress Note      Admit Date:  9/14/2021    Subjective:  Ms. Nida Damon is seen for cardiology follow up  She is on BIPAP due to acute hypoxic resp failure  She remains inafib RVR BP stable in normal range  She is on cardizem 15mg per hr drip      Reason for Consultation/Chief Complaint: \"I have been asked to see this patient for afib RVR. \"  She has h/o parox afib d- CHF and B cell lymphoma pulmonary embolism  History of Present Illness:  Leila Acosta is a 68 y.o. patient who presented to the hospital with complaints of shortness of breath. She is admitted in ICU with COVID pneumonia acute on chronic hypoxic resp failure. I have been asked to provide consultation regarding  afib RVR.   Past significant of COPD Afib  CHRONIC ANTICOAGULATION B cell lymphoma CHF HLD HBP  H/o pulmonary embolism, PAD   ROS:  12 point ROS negative in all areas as listed below except as follows  NA she is on BIPAP  I spoke to her nurse Tin Garrison no new issues    Past Medical History:   Diagnosis Date    Abnormal chest CT 05/09/2013    Abnormal CXR 02/19/2015    Acute exacerbation of chronic obstructive pulmonary disease (COPD) ([address]     Acute on chronic respiratory failure ([address]     Acute on chronic respiratory failure with hypoxia ([address] 01/02/2017    Anticoagulant long-term use     Atrial fibrillation (HCC)     Atrial fibrillation with rapid ventricular response ([address] 06/23/2015    B-cell lymphoma ([address]     CAP (community acquired pneumonia) 01/29/2015    Carotid artery stenosis     CHF (congestive heart failure) ([address]     Chronic back pain     Congenital heart disease     COVID-19 09/14/2021    Depression     Diverticulitis     Diverticulitis of colon     Dizziness 06/14/2020    Fistula     anal-vaginal fistula    Gram-negative pneumonia ([address]     HCAP (healthcare-associated pneumonia)     Hx of blood clots     Hyperlipidemia     Hypertension     IFG (impaired fasting glucose) 09/13/2018    Infection due to Stenotrophomonas maltophilia     Non-Hodgkins lymphoma (HCC)     Obesity     Panic disorder     Peripheral vascular disease (HCC)     Primary osteoarthritis of both knees 12/02/2020    Pulmonary embolism ([facility]     6/15    Rectal vaginal fistula     Restless legs syndrome     [facility] spotted fever 2013    Sciatic nerve pain     Sepsis ([facility] 06/22/2015    Sleep apnea     Swelling of limb 01/05/2016    Tobacco abuse 06/22/2015    Urinary tract infection in female     Vitamin D deficiency      Past Surgical History:   Procedure Laterality Date    APPENDECTOMY  1963    BRONCHOSCOPY N/A 8/12/2019    EBUS WF W/ANES.  performed by Michelle Niño MD at [address] [place] [address]  8/12/2019    BRONCHOSCOPY ALVEOLAR LAVAGE performed by Michelle Niño MD at [address] [place] [address]  8/12/2019    BRONCHOSCOPY BRUSHINGS performed by Michelle Niño MD at [address] [place] [address]  8/12/2019    BRONCHOSCOPY/TRANSBRONCHIAL LUNG BIOPSY performed by Michelle Niño MD at [address] [place] [address]  8/12/2019    BRONCHOSCOPY/TRANSBRONCHIAL NEEDLE BIOPSY performed by Michelle Niño MD at [address] [place] [address]  8/12/2019    BRONCHOSCOPY/TRANSBRONCHIAL NEEDLE BIOPSY ADDL LOBE performed by Michelle Niño MD at [address] [place] [address]  2003    COLONOSCOPY N/A 9/25/2019    COLONOSCOPY POLYPECTOMY SNARE/COLD BIOPSY showed diverticulilosis performed by Miky Car MD at [address] [place] [address]      umbilical--Dr Hou    [address] N/A 1/17/2020    PORT INSERTION performed by Aisha Zazueta MD at [facility] TUNNELED VENOUS PORT PLACEMENT Left 01/17/2020    Port Insertion ENRIQUETA Chest w. Dr Kemar Farrell 1/17/20 Vaccess CT Injectable [id number] lot [id number] 3/31/21       Objective:   /64   Pulse 118   Temp 98.3 °F (36.8 °C) (Oral)   Resp 27   Ht 5' 2\" (1.575 m)   Wt 270 lb 3.2 oz (122.6 kg)   LMP  (LMP Unknown)   SpO2 95%   BMI 49.42 kg/m²       Intake/Output Summary (Last 24 hours) at 9/16/2021 0915  Last data filed at 9/16/2021 0334  Gross per 24 hour   Intake 360 ml   Output 2150 ml   Net -1790 ml       TELEMETRY:  afib RVR  Physical Exam:Due to the current efforts to prevent transmission of COVID-19 and also the need to preserve PPE for other caregivers, a face-to-face encounter with the patient was not performed. That being said, all relevant records and diagnostic tests were reviewed, including laboratory results and imaging. Please reference any relevant documentation elsewhere. Care will be coordinated with the primary service.     Medications:    albuterol sulfate HFA  2 puff Inhalation 4x daily    mupirocin   Nasal BID    remdesivir IVPB  100 mg IntraVENous Q24H    methylPREDNISolone  40 mg IntraVENous Q12H    doxycycline (VIBRAMYCIN) IV  100 mg IntraVENous Q12H    insulin lispro  0-18 Units SubCUTAneous TID WC    insulin lispro  0-9 Units SubCUTAneous Nightly    buPROPion  150 mg Oral QAM    polyethylene glycol  17 g Oral Daily    QUEtiapine  25 mg Oral Nightly    spironolactone  25 mg Oral Daily    torsemide  20 mg Oral Daily    vitamin B-6  50 mg Oral Daily    sodium chloride flush  5-40 mL IntraVENous 2 times per day    Vitamin D  2,000 Units Oral Daily      dextrose      sodium chloride      dilTIAZem 15 mg/hr (09/16/21 0227)     albuterol sulfate HFA, glucose, dextrose, glucagon (rDNA), dextrose, melatonin, sodium chloride flush, sodium chloride, ondansetron **OR** ondansetron, polyethylene glycol, acetaminophen **OR** acetaminophen, guaiFENesin-dextromethorphan    Lab Data:  CBC:   Recent Labs     09/14/21  1400 09/15/21  0416 09/16/21  0441   WBC 6.1 4.9 8.4   HGB 10.0* 9.4* 9.4*   HCT 31.7* 29.6* 29.5*   MCV 77.8* 78.1* 77.7*    255 336     BMP:   Recent Labs     09/14/21  1400 09/15/21  0416 09/16/21  0441    137 141   K 4.5 4.7 4.5   CL 96* 97* 100   CO2 33* 30 32   BUN 13 22* 32*   CREATININE 0.7 0.9 0.8     LIVER PROFILE:   Recent Labs     09/14/21  1400 09/15/21  0416 09/16/21  0441   AST 34 29 32   ALT 20 17 18   BILITOT 0.3 <0.2 <0.2   ALKPHOS 81 70 72     PT/INR:   Recent Labs     09/14/21  1400 09/15/21  0416 09/16/21 0441   PROTIME 77.1* 85.6* 103.5*   INR 6.51* 6.99* 8.38*     APTT: No results for input(s): APTT in the last 72 hours. BNP:  No results for input(s): BNP in the last 72 hours. IMAGING:    Kiera Quarles <0.01 09/14/2021         EKG:  I have reviewed EKG with the following interpretation:  Impression:Atrial fibrillation with rapid ventricular responseSeptal infarct , age undeterminedAbnormal ECGConfirmed by Naomi Anton MD, Varinder Khanna ([id number]) on 9/14/2021 3:08:11 PM    Chest xray 9.14.21          Impression   Bilateral interstitial groundglass opacities, likely representing COVID 19   pneumonia.             Echo doppler 6.15.20   Summary   Technically difficult examination.   Normal left ventricular systolic function with ejection fraction of 55-60%.   Moderate concentric left ventricular hypertrophy.   No regional wall motion abnormalites are seen.   Grade I diastolic dysfunction with normal filling pressure.   Compared to previous study from 6- no changes noted in left   ventricular function.   nuclear stress test 6.27.19          Summary    There is no evidence of stress induced ischemia. LV function is normal with    uniform wall motion and ejection fraction of 69%. Low risk study. Assessment:  1. Paroxysmal atrial fibrillations, she is euthyroid goal is to control rate and anticoagulate. If remains in afib in 4 weeks will consider cardioversion  I suspect she has arrhythmia due to hypoxia and resp illness. I will add daily dig and beta blockers try to wean off cardizem drip. Anticoagulation  2. Acute resp failure with hypoxia on BIPAP  3.  History of diastolic CHF   3. Acute pulmonary infection with COVID  1.    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    Nausea 06/18/2021    Polyp of colon 06/18/2021    Morbid obesity ([address] 06/18/2021    Sleep apnea     Restless legs syndrome     Chronic back pain     Panic disorder     Primary osteoarthritis of both knees 12/02/2020    Acute pain of right knee 12/02/2020    Moderate episode of recurrent major depressive disorder ([address] 10/23/2020    Primary insomnia 10/23/2020    Elevated lactic acid level     History of diverticulitis 10/10/2020    HTN (hypertension), benign     Dyslipidemia     TIA (transient ischemic attack) 06/14/2020    CHF (congestive heart failure) ([address]     PVD (peripheral vascular disease) with claudication ([address] 11/01/2019    Non-Hodgkin lymphoma ([address] 09/06/2019    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)     Pulmonary nodule     Atrial fibrillation, chronic (HCC)     Lung mass     Mediastinal lymphadenopathy     IFG (impaired fasting glucose) 09/13/2018    Erythrocytosis 08/22/2018    Angina pectoris (HCC)     Paroxysmal atrial fibrillation (HCC)     Bilateral edema of lower extremity 01/18/2017    Acute on chronic respiratory failure with hypoxia ([address] 30/30/1426    Diastolic dysfunction     Shortness of breath 05/02/2016    Venous (peripheral) insufficiency 01/05/2016    Lymphedema 01/05/2016    Chronic angle-closure glaucoma of both eyes 01/05/2016    Arthritis of hips and knees 01/05/2016    Other pulmonary embolism without acute cor pulmonale ([address] 06/27/2015    Atrial fibrillation with RVR ([address] 06/23/2015    Carotid stenosis 10/27/2014    Vitamin D deficiency     COPD, severe ([facility] 05/09/2013    Anxiety 02/08/2013    Hyperlipidemia 02/08/2013    Diverticulosis 02/08/2013    Chronic airway obstruction, not elsewhere classified 01/10/2013    Chronic respiratory failure with hypoxia ([facility] 03/29/2012           Martha Valverde MD, MD 9/16/2021 9:15 AM

## 2021-09-17 LAB
A/G RATIO: 1 (ref 1.1–2.2)
ALBUMIN SERPL-MCNC: 3.2 G/DL (ref 3.4–5)
ALP BLD-CCNC: 73 U/L (ref 40–129)
ALT SERPL-CCNC: 20 U/L (ref 10–40)
ANION GAP SERPL CALCULATED.3IONS-SCNC: 9 MMOL/L (ref 3–16)
AST SERPL-CCNC: 30 U/L (ref 15–37)
BASOPHILS ABSOLUTE: 0 K/UL (ref 0–0.2)
BASOPHILS RELATIVE PERCENT: 0 %
BILIRUB SERPL-MCNC: <0.2 MG/DL (ref 0–1)
BUN BLDV-MCNC: 37 MG/DL (ref 7–20)
CALCIUM SERPL-MCNC: 8.4 MG/DL (ref 8.3–10.6)
CHLORIDE BLD-SCNC: 102 MMOL/L (ref 99–110)
CO2: 32 MMOL/L (ref 21–32)
CREAT SERPL-MCNC: 1.1 MG/DL (ref 0.6–1.2)
EOSINOPHILS ABSOLUTE: 0 K/UL (ref 0–0.6)
EOSINOPHILS RELATIVE PERCENT: 0 %
GFR AFRICAN AMERICAN: 58
GFR NON-AFRICAN AMERICAN: 48
GLOBULIN: 3.3 G/DL
GLUCOSE BLD-MCNC: 205 MG/DL (ref 70–99)
GLUCOSE BLD-MCNC: 223 MG/DL (ref 70–99)
GLUCOSE BLD-MCNC: 286 MG/DL (ref 70–99)
GLUCOSE BLD-MCNC: 343 MG/DL (ref 70–99)
HCT VFR BLD CALC: 30.2 % (ref 36–48)
HEMOGLOBIN: 9.5 G/DL (ref 12–16)
INR BLD: 7.53 (ref 0.88–1.12)
LYMPHOCYTES ABSOLUTE: 0.5 K/UL (ref 1–5.1)
LYMPHOCYTES RELATIVE PERCENT: 4.5 %
MCH RBC QN AUTO: 24.6 PG (ref 26–34)
MCHC RBC AUTO-ENTMCNC: 31.4 G/DL (ref 31–36)
MCV RBC AUTO: 78.2 FL (ref 80–100)
MONOCYTES ABSOLUTE: 0.4 K/UL (ref 0–1.3)
MONOCYTES RELATIVE PERCENT: 3.9 %
NEUTROPHILS ABSOLUTE: 9.3 K/UL (ref 1.7–7.7)
NEUTROPHILS RELATIVE PERCENT: 91.6 %
PDW BLD-RTO: 16.7 % (ref 12.4–15.4)
PERFORMED ON: ABNORMAL
PLATELET # BLD: 374 K/UL (ref 135–450)
PMV BLD AUTO: 7.1 FL (ref 5–10.5)
POTASSIUM REFLEX MAGNESIUM: 4.2 MMOL/L (ref 3.5–5.1)
PROTHROMBIN TIME: 92.5 SEC (ref 9.9–12.7)
RBC # BLD: 3.86 M/UL (ref 4–5.2)
SODIUM BLD-SCNC: 143 MMOL/L (ref 136–145)
TOTAL PROTEIN: 6.5 G/DL (ref 6.4–8.2)
WBC # BLD: 10.2 K/UL (ref 4–11)

## 2021-09-17 PROCEDURE — 2700000000 HC OXYGEN THERAPY PER DAY

## 2021-09-17 PROCEDURE — 2060000000 HC ICU INTERMEDIATE R&B

## 2021-09-17 PROCEDURE — 2500000003 HC RX 250 WO HCPCS: Performed by: INTERNAL MEDICINE

## 2021-09-17 PROCEDURE — 2580000003 HC RX 258: Performed by: PHYSICIAN ASSISTANT

## 2021-09-17 PROCEDURE — 94660 CPAP INITIATION&MGMT: CPT

## 2021-09-17 PROCEDURE — 2580000003 HC RX 258: Performed by: INTERNAL MEDICINE

## 2021-09-17 PROCEDURE — 6370000000 HC RX 637 (ALT 250 FOR IP): Performed by: INTERNAL MEDICINE

## 2021-09-17 PROCEDURE — 85610 PROTHROMBIN TIME: CPT

## 2021-09-17 PROCEDURE — 85025 COMPLETE CBC W/AUTO DIFF WBC: CPT

## 2021-09-17 PROCEDURE — 36415 COLL VENOUS BLD VENIPUNCTURE: CPT

## 2021-09-17 PROCEDURE — 94760 N-INVAS EAR/PLS OXIMETRY 1: CPT

## 2021-09-17 PROCEDURE — 94640 AIRWAY INHALATION TREATMENT: CPT

## 2021-09-17 PROCEDURE — 6360000002 HC RX W HCPCS: Performed by: INTERNAL MEDICINE

## 2021-09-17 PROCEDURE — 99232 SBSQ HOSP IP/OBS MODERATE 35: CPT | Performed by: INTERNAL MEDICINE

## 2021-09-17 PROCEDURE — 99233 SBSQ HOSP IP/OBS HIGH 50: CPT | Performed by: INTERNAL MEDICINE

## 2021-09-17 PROCEDURE — 6370000000 HC RX 637 (ALT 250 FOR IP): Performed by: PHYSICIAN ASSISTANT

## 2021-09-17 PROCEDURE — 80053 COMPREHEN METABOLIC PANEL: CPT

## 2021-09-17 RX ORDER — METOPROLOL TARTRATE 50 MG/1
50 TABLET, FILM COATED ORAL 2 TIMES DAILY
Status: DISCONTINUED | OUTPATIENT
Start: 2021-09-17 | End: 2021-09-19

## 2021-09-17 RX ORDER — INSULIN GLARGINE 100 [IU]/ML
20 INJECTION, SOLUTION SUBCUTANEOUS NIGHTLY
Status: DISCONTINUED | OUTPATIENT
Start: 2021-09-17 | End: 2021-09-18

## 2021-09-17 RX ADMIN — Medication 5 MG: at 21:58

## 2021-09-17 RX ADMIN — BUPROPION HYDROCHLORIDE 150 MG: 150 TABLET, EXTENDED RELEASE ORAL at 09:24

## 2021-09-17 RX ADMIN — Medication 2 PUFF: at 07:21

## 2021-09-17 RX ADMIN — INSULIN LISPRO 6 UNITS: 100 INJECTION, SOLUTION INTRAVENOUS; SUBCUTANEOUS at 09:17

## 2021-09-17 RX ADMIN — QUETIAPINE FUMARATE 25 MG: 25 TABLET ORAL at 21:58

## 2021-09-17 RX ADMIN — Medication 2 PUFF: at 11:09

## 2021-09-17 RX ADMIN — SODIUM CHLORIDE, PRESERVATIVE FREE 10 ML: 5 INJECTION INTRAVENOUS at 21:57

## 2021-09-17 RX ADMIN — METOPROLOL TARTRATE 50 MG: 50 TABLET, FILM COATED ORAL at 21:58

## 2021-09-17 RX ADMIN — Medication 2 PUFF: at 15:09

## 2021-09-17 RX ADMIN — DOXYCYCLINE 100 MG: 100 INJECTION, POWDER, LYOPHILIZED, FOR SOLUTION INTRAVENOUS at 14:34

## 2021-09-17 RX ADMIN — TORSEMIDE 20 MG: 20 TABLET ORAL at 09:23

## 2021-09-17 RX ADMIN — DOXYCYCLINE 100 MG: 100 INJECTION, POWDER, LYOPHILIZED, FOR SOLUTION INTRAVENOUS at 01:15

## 2021-09-17 RX ADMIN — REMDESIVIR 100 MG: 100 INJECTION, POWDER, LYOPHILIZED, FOR SOLUTION INTRAVENOUS at 12:32

## 2021-09-17 RX ADMIN — DILTIAZEM HYDROCHLORIDE 10 MG/HR: 5 INJECTION, SOLUTION INTRAVENOUS at 14:34

## 2021-09-17 RX ADMIN — ACETAMINOPHEN 650 MG: 325 TABLET ORAL at 12:27

## 2021-09-17 RX ADMIN — Medication 2 PUFF: at 19:30

## 2021-09-17 RX ADMIN — PYRIDOXINE HCL TAB 50 MG 50 MG: 50 TAB at 09:23

## 2021-09-17 RX ADMIN — INSULIN LISPRO 9 UNITS: 100 INJECTION, SOLUTION INTRAVENOUS; SUBCUTANEOUS at 12:36

## 2021-09-17 RX ADMIN — INSULIN GLARGINE 20 UNITS: 100 INJECTION, SOLUTION SUBCUTANEOUS at 21:59

## 2021-09-17 RX ADMIN — METHYLPREDNISOLONE SODIUM SUCCINATE 40 MG: 40 INJECTION, POWDER, FOR SOLUTION INTRAMUSCULAR; INTRAVENOUS at 09:25

## 2021-09-17 RX ADMIN — INSULIN LISPRO 6 UNITS: 100 INJECTION, SOLUTION INTRAVENOUS; SUBCUTANEOUS at 21:59

## 2021-09-17 RX ADMIN — Medication 2000 UNITS: at 09:24

## 2021-09-17 RX ADMIN — METHYLPREDNISOLONE SODIUM SUCCINATE 40 MG: 40 INJECTION, POWDER, FOR SOLUTION INTRAMUSCULAR; INTRAVENOUS at 21:58

## 2021-09-17 RX ADMIN — ACETAMINOPHEN 650 MG: 325 TABLET ORAL at 23:12

## 2021-09-17 RX ADMIN — SPIRONOLACTONE 25 MG: 25 TABLET ORAL at 09:24

## 2021-09-17 RX ADMIN — POLYETHYLENE GLYCOL (3350) 17 G: 17 POWDER, FOR SOLUTION ORAL at 09:24

## 2021-09-17 RX ADMIN — INSULIN LISPRO 6 UNITS: 100 INJECTION, SOLUTION INTRAVENOUS; SUBCUTANEOUS at 16:30

## 2021-09-17 RX ADMIN — SODIUM CHLORIDE, PRESERVATIVE FREE 10 ML: 5 INJECTION INTRAVENOUS at 09:27

## 2021-09-17 RX ADMIN — METOPROLOL TARTRATE 25 MG: 25 TABLET, FILM COATED ORAL at 09:23

## 2021-09-17 RX ADMIN — DIGOXIN 125 MCG: 125 TABLET ORAL at 09:24

## 2021-09-17 ASSESSMENT — PAIN DESCRIPTION - PAIN TYPE
TYPE: CHRONIC PAIN
TYPE: ACUTE PAIN
TYPE: CHRONIC PAIN

## 2021-09-17 ASSESSMENT — PAIN - FUNCTIONAL ASSESSMENT: PAIN_FUNCTIONAL_ASSESSMENT: PREVENTS OR INTERFERES SOME ACTIVE ACTIVITIES AND ADLS

## 2021-09-17 ASSESSMENT — PAIN DESCRIPTION - DESCRIPTORS
DESCRIPTORS: ACHING
DESCRIPTORS: CONSTANT

## 2021-09-17 ASSESSMENT — PAIN SCALES - GENERAL
PAINLEVEL_OUTOF10: 7
PAINLEVEL_OUTOF10: 3
PAINLEVEL_OUTOF10: 7
PAINLEVEL_OUTOF10: 5
PAINLEVEL_OUTOF10: 5

## 2021-09-17 ASSESSMENT — PAIN DESCRIPTION - FREQUENCY
FREQUENCY: CONTINUOUS
FREQUENCY: CONTINUOUS

## 2021-09-17 ASSESSMENT — PAIN DESCRIPTION - PROGRESSION: CLINICAL_PROGRESSION: NOT CHANGED

## 2021-09-17 ASSESSMENT — PAIN DESCRIPTION - DIRECTION
RADIATING_TOWARDS: LEG
RADIATING_TOWARDS: SHOULDER

## 2021-09-17 ASSESSMENT — PAIN DESCRIPTION - LOCATION
LOCATION: GENERALIZED
LOCATION: BACK
LOCATION: BACK

## 2021-09-17 ASSESSMENT — PAIN DESCRIPTION - ONSET: ONSET: ON-GOING

## 2021-09-17 NOTE — PROGRESS NOTES
Pulmonary & Critical Care Medicine ICU Progress Note    CC: COVID-19, respiratory failure    Events of Last 24 hours: Requiring BiPAP for refractory respiratory failure    Vascular lines: IV: Peripheral    MV: Noninvasive mechanical ventilation     / / /FiO2 : 90 %  Recent Labs     21  1414   PHART 7.359   [id number] 60.1*   PO2ART 367.5*       IV:   dextrose      sodium chloride      dilTIAZem 15 mg/hr (21 1319)       Vitals:  Blood pressure (!) 112/45, pulse 85, temperature 98 °F (36.7 °C), temperature source Axillary, resp. rate 19, height 5' 2\" (1.575 m), weight 270 lb 3.2 oz (122.6 kg), SpO2 98 %, not currently breastfeeding. on BiPAP 90%  Temp  Av.1 °F (36.7 °C)  Min: 97.8 °F (36.6 °C)  Max: 98.3 °F (36.8 °C)    Intake/Output Summary (Last 24 hours) at 2021 2131  Last data filed at 2021 1721  Gross per 24 hour   Intake 440 ml   Output 2300 ml   Net -1860 ml     PE:  General: ill appearing    Eyes: PERRL. No sclera icterus. No conjunctival injection. ENT: No discharge. Pharynx clear. Neck: Trachea midline. Normal thyroid. Resp: + accessory muscle use. No crackles. No wheezing. No rhonchi. No dullness on percussion. CV: Regular rate. Regular rhythm. No mumur or rub. + edema. Peripheral pulses are 2+. Capillary refill is less than 3 seconds. GI: Non-tender. Non-distended. No masses. No organomegaly. Normal bowel sounds. No hernia. Skin: Warm and dry. No nodule on exposed extremities. No rash on exposed extremities. Lymph: No cervical LAD. No supraclavicular LAD. M/S: No cyanosis. No joint deformity. No clubbing. Neuro: Alert and oriented to person and place.  Patellar reflexes are symmetric  Psych: No agitation, no anxiety, affect is full     Scheduled Meds:   digoxin  125 mcg Oral Daily    metoprolol tartrate  25 mg Oral BID    albuterol sulfate HFA  2 puff Inhalation 4x daily    mupirocin   Nasal BID    remdesivir IVPB  100 mg IntraVENous Q24H    methylPREDNISolone  40 mg IntraVENous Q12H    doxycycline (VIBRAMYCIN) IV  100 mg IntraVENous Q12H    insulin lispro  0-18 Units SubCUTAneous TID WC    insulin lispro  0-9 Units SubCUTAneous Nightly    buPROPion  150 mg Oral QAM    polyethylene glycol  17 g Oral Daily    QUEtiapine  25 mg Oral Nightly    spironolactone  25 mg Oral Daily    torsemide  20 mg Oral Daily    vitamin B-6  50 mg Oral Daily    sodium chloride flush  5-40 mL IntraVENous 2 times per day    Vitamin D  2,000 Units Oral Daily       Data:  CBC:   Recent Labs     09/14/21  1400 09/15/21  0416 09/16/21  0441   WBC 6.1 4.9 8.4   HGB 10.0* 9.4* 9.4*   HCT 31.7* 29.6* 29.5*   MCV 77.8* 78.1* 77.7*    255 336     BMP:   Recent Labs     09/14/21  1400 09/15/21  0416 09/16/21  0441    137 141   K 4.5 4.7 4.5   CL 96* 97* 100   CO2 33* 30 32   BUN 13 22* 32*   CREATININE 0.7 0.9 0.8     LIVER PROFILE:   Recent Labs     09/14/21  1400 09/15/21  0416 09/16/21  0441   AST 34 29 32   ALT 20 17 18   BILITOT 0.3 <0.2 <0.2   ALKPHOS 81 70 72     Micro:  9/14/2021 SARS-CoV-2 positive  9/14/2021 blood sent    Imaging:  Chest imaging was reviewed by me and showed   CXR 9/14/2021 bilateral infiltrates consistent with Covid pneumonia    ASSESSMENT:  · Acute on chronic hypoxemic and hypercapnic respiratory failure, prescribed home 4 L HS and 6 L with exertion  · COVID-19 pneumonia in an unvaccinated patient   · A. fib with RVR, h/o paroxysmal atrial fibrillation  · Severe COPD with acute exacerbation, f/b Dr. Mary Piper previously   · Chronic diastolic CHF  · H/O PE on coumadin, now with supratherapeutic INR  · B cell lymphoma   · SHANNON   · Hyperglycemia     PLAN:   COVID-19 isolation, droplet plus   Bilevel non-invasive positive pressure ventilation for life threatening respiratory failure; HFNO as tolerated for breaks    Remdesevir D#2, LFT monitoring for high risk medication   IV steroids day #3, on medrol 40 BID     Tocilizumab X 1  Doxycycline D#2/7     Cardizem infusion     Insulin H-SSI   On chronic Coumadin, currently supratherapeutic   I discussed CODE STATUS, patient says that she is supposed to be DNR, but she wanted me to confirm with Jonnie Silva, her son. She also was deciding that she wished to forego invasive mechanical ventilation, should it be required, and instead pursue palliation in the event of refractory respiratory failure. I d/w her son Jonnie Silva this evening. He confirmed that mom should be DNR and that she should be DNI. This is c/w their prior conversations. I changed CODE STATUS. I also have reached out to her nurse to discuss this.  This patient's adult children are permitted to visit her. I specifically discussed the risk of exposure to the Covid virus but as we may be approaching the end of life, family should be permitted to visit. Total critical care time caring for this patient with life threatening, unstable organ failure, including direct patient contact, management of life support systems, review of data including imaging and labs, discussions with other team members and physicians is 50 minutes so far today, excluding procedures.

## 2021-09-17 NOTE — PROGRESS NOTES
Patient lying in bed in semi fowlers position in no apparent distress. Patient attempted BM. Unable to defecate. Denies any pain other than \"chronic back pain. \" Francesca Wilks RN.

## 2021-09-17 NOTE — PROGRESS NOTES
09/17/21 0200   NIV Type   NIV Started/Stopped On   Equipment Type v60   Mode Bilevel   Mask Type Full face mask   Mask Size Medium   Settings/Measurements   IPAP 14 cmH20   CPAP/EPAP 6 cmH2O   Rate Ordered 12   Resp 19   FiO2  75 %   Vt Exhaled 485 mL   Minute Volume 12 Liters   Mask Leak (lpm) 8 lpm   Comfort Level Good   Using Accessory Muscles No   SpO2 94

## 2021-09-17 NOTE — PROGRESS NOTES
[address] Daily Progress Note      Admit Date:  9/14/2021    Subjective:  Ms. Dina Jay is seen for cardiology follow up she is in [place] isolation   She is on BIPAP due to acute hypoxic resp failure  She remains inafib RVR BP stable in normal range  She is on cardizem 15mg per hr drip      Reason for Consultation/Chief Complaint: \"I have been asked to see this patient for afib RVR. \"  She has h/o parox afib d- CHF and B cell lymphoma pulmonary embolism  History of Present Illness:  Gerhardt Nanny is a 68 y.o. patient who presented to the hospital with complaints of shortness of breath. She is admitted in ICU with COVID pneumonia acute on chronic hypoxic resp failure. I have been asked to provide consultation regarding  afib RVR.   Past significant of COPD Afib  CHRONIC ANTICOAGULATION B cell lymphoma CHF HLD HBP  H/o pulmonary embolism, PAD   ROS:  12 point ROS negative in all areas as listed below except as follows  NA     Past Medical History:   Diagnosis Date    Abnormal chest CT 05/09/2013    Abnormal CXR 02/19/2015    Acute exacerbation of chronic obstructive pulmonary disease (COPD) ([address]     Acute on chronic respiratory failure (HCC)     Acute on chronic respiratory failure with hypoxia (HCC) 01/02/2017    Anticoagulant long-term use     Atrial fibrillation (HCC)     Atrial fibrillation with rapid ventricular response ([address] 06/23/2015    B-cell lymphoma ([address]     CAP (community acquired pneumonia) 01/29/2015    Carotid artery stenosis     CHF (congestive heart failure) (HCC)     Chronic back pain     Congenital heart disease     COVID-19 09/14/2021    Depression     Diverticulitis     Diverticulitis of colon     Dizziness 06/14/2020    Fistula     anal-vaginal fistula    Gram-negative pneumonia ([address]     HCAP (healthcare-associated pneumonia)     Hx of blood clots     Hyperlipidemia     Hypertension     IFG (impaired fasting glucose) 09/13/2018    Infection due to Stenotrophomonas maltophilia     Non-Hodgkins lymphoma (HCC)     Obesity     Panic disorder     Peripheral vascular disease (HCC)     Primary osteoarthritis of both knees 12/02/2020    Pulmonary embolism ([facility]     6/15    Rectal vaginal fistula     Restless legs syndrome     [facility] spotted fever 2013    Sciatic nerve pain     Sepsis ([facility] 06/22/2015    Sleep apnea     Swelling of limb 01/05/2016    Tobacco abuse 06/22/2015    Urinary tract infection in female     Vitamin D deficiency      Past Surgical History:   Procedure Laterality Date    APPENDECTOMY  1963    BRONCHOSCOPY N/A 8/12/2019    EBUS WF W/ANES.  performed by Mamie Barfield MD at [address]   8/12/2019    BRONCHOSCOPY ALVEOLAR LAVAGE performed by Mamie Barfield MD at [address]   8/12/2019    BRONCHOSCOPY BRUSHINGS performed by Mamie Barfield MD at [address]   8/12/2019    BRONCHOSCOPY/TRANSBRONCHIAL LUNG BIOPSY performed by Mamie Barfield MD at [address]   8/12/2019    BRONCHOSCOPY/TRANSBRONCHIAL NEEDLE BIOPSY performed by Mamie Barfield MD at [address]   8/12/2019    BRONCHOSCOPY/TRANSBRONCHIAL NEEDLE BIOPSY ADDL LOBE performed by Mamie Barfield MD at [address] [place] [address]  2003    COLONOSCOPY N/A 9/25/2019    COLONOSCOPY POLYPECTOMY SNARE/COLD BIOPSY showed diverticulilosis performed by Sav Beaulieu MD at [address] [place] [address]      umbilical--Dr Hou    [address] N/A 1/17/2020    PORT INSERTION performed by Ian Barton MD at [facility] TUNNELED VENOUS PORT PLACEMENT Left 01/17/2020    Port Insertion ENRIQUETA Chest w. Dr Estelita Pennington 1/17/20 Vaccess CT Injectable [id number] lot [id number] 3/31/21       Objective:   BP (!) 134/91   Pulse 102   Temp 97 °F (36.1 °C) (Oral)   Resp 22   Ht 5' 2\" (1.575 m)   Wt 270 lb 3.2 oz (122.6 kg)   LMP  (LMP Unknown)   SpO2 90%   BMI 49.42 kg/m²       Intake/Output Summary (Last 24 hours) at 9/17/2021 0951  Last data filed at 9/17/2021 7007  Gross per 24 hour   Intake 680 ml   Output 1300 ml   Net -620 ml       TELEMETRY:  afib RVR  Physical Exam:Due to the current efforts to prevent transmission of COVID-19 and also the need to preserve PPE for other caregivers, a face-to-face encounter with the patient was not performed. That being said, all relevant records and diagnostic tests were reviewed, including laboratory results and imaging. Please reference any relevant documentation elsewhere. Care will be coordinated with the primary service.     Medications:    insulin glargine  20 Units SubCUTAneous Nightly    digoxin  125 mcg Oral Daily    metoprolol tartrate  25 mg Oral BID    albuterol sulfate HFA  2 puff Inhalation 4x daily    mupirocin   Nasal BID    remdesivir IVPB  100 mg IntraVENous Q24H    methylPREDNISolone  40 mg IntraVENous Q12H    doxycycline (VIBRAMYCIN) IV  100 mg IntraVENous Q12H    insulin lispro  0-18 Units SubCUTAneous TID WC    insulin lispro  0-9 Units SubCUTAneous Nightly    buPROPion  150 mg Oral QAM    polyethylene glycol  17 g Oral Daily    QUEtiapine  25 mg Oral Nightly    spironolactone  25 mg Oral Daily    torsemide  20 mg Oral Daily    vitamin B-6  50 mg Oral Daily    sodium chloride flush  5-40 mL IntraVENous 2 times per day    Vitamin D  2,000 Units Oral Daily      dextrose      sodium chloride      dilTIAZem 10 mg/hr (09/16/21 2244)     albuterol sulfate HFA, glucose, dextrose, glucagon (rDNA), dextrose, melatonin, sodium chloride flush, sodium chloride, ondansetron **OR** ondansetron, polyethylene glycol, acetaminophen **OR** acetaminophen, guaiFENesin-dextromethorphan    Lab Data:  CBC:   Recent Labs     09/15/21  0416 09/16/21  0441 09/17/21  0453   WBC 4.9 8.4 10.2   HGB 9.4* 9.4* 9.5*   HCT 29.6* 29.5* 30.2* MCV 78.1* 77.7* 78.2*    336 374     BMP:   Recent Labs     09/15/21  0416 09/16/21 0441 09/17/21  0453    141 143   K 4.7 4.5 4.2   CL 97* 100 102   CO2 30 32 32   BUN 22* 32* 37*   CREATININE 0.9 0.8 1.1     LIVER PROFILE:   Recent Labs     09/15/21  0416 09/16/21 0441 09/17/21  0453   AST 29 32 30   ALT 17 18 20   BILITOT <0.2 <0.2 <0.2   ALKPHOS 70 72 73     PT/INR:   Recent Labs     09/15/21  0416 09/16/21 0441 09/17/21  0453   PROTIME 85.6* 103.5* 92.5*   INR 6.99* 8.38* 7.53*     APTT: No results for input(s): APTT in the last 72 hours. BNP:  No results for input(s): BNP in the last 72 hours. IMAGING:    Evaristochico Ray <0.01 09/14/2021         EKG:  I have reviewed EKG with the following interpretation:  Impression:Atrial fibrillation with rapid ventricular responseSeptal infarct , age undeterminedAbnormal ECGConfirmed by Junior Muse MD, [facility] ([id number]) on 9/14/2021 3:08:11 PM    Chest xray 9.14.21          Impression   Bilateral interstitial groundglass opacities, likely representing COVID 19   pneumonia.             Echo doppler 6.15.20   Summary   Technically difficult examination.   Normal left ventricular systolic function with ejection fraction of 55-60%.   Moderate concentric left ventricular hypertrophy.   No regional wall motion abnormalites are seen.   Grade I diastolic dysfunction with normal filling pressure.   Compared to previous study from 6- no changes noted in left   ventricular function.   nuclear stress test 6.27.19          Summary    There is no evidence of stress induced ischemia. LV function is normal with    uniform wall motion and ejection fraction of 69%. Low risk study. Assessment:  1. Paroxysmal atrial fibrillations, she is euthyroid goal is to control rate and anticoagulate. If remains in afib in 4 weeks will consider cardioversion  I suspect she has arrhythmia due to hypoxia and resp illness.  I will continue daily dig and  increase the dose of beta blockers. Anticoagulation  2. Acute resp failure with hypoxia on BIPAP  3. History of diastolic CHF   3.  Acute pulmonary infection with COVID    Patient Active Problem List    Diagnosis Date Noted    COPD exacerbation 03/28/2012    Acute on chronic respiratory failure with hypoxia and hypercapnia (HCC)     Anticoagulated on Coumadin     Acute hypoxemic respiratory failure due to COVID-19 ([facility] 09/14/2021    Pneumonia due to COVID-19 virus 09/14/2021    History of pulmonary embolism 06/18/2021    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    Nausea 06/18/2021    Polyp of colon 06/18/2021    Morbid obesity ([address] 06/18/2021    Sleep apnea     Restless legs syndrome     Chronic back pain     Panic disorder     Primary osteoarthritis of both knees 12/02/2020    Acute pain of right knee 12/02/2020    Moderate episode of recurrent major depressive disorder ([facility] 10/23/2020    Primary insomnia 10/23/2020    Elevated lactic acid level     History of diverticulitis 10/10/2020    HTN (hypertension), benign     Dyslipidemia     TIA (transient ischemic attack) 06/14/2020    CHF (congestive heart failure) ([facility]     PVD (peripheral vascular disease) with claudication ([facility] 11/01/2019    Non-Hodgkin lymphoma ([facility] 09/06/2019    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)     Pulmonary nodule     Atrial fibrillation, chronic (HCC)     Lung mass     Mediastinal lymphadenopathy     IFG (impaired fasting glucose) 09/13/2018    Erythrocytosis 08/22/2018    Angina pectoris (HCC)     Paroxysmal atrial fibrillation (HCC)     Bilateral edema of lower extremity 01/18/2017    Acute on chronic respiratory failure with hypoxia ([facility] 87/58/7971    Diastolic dysfunction     Shortness of breath 05/02/2016    Venous (peripheral) insufficiency 01/05/2016    Lymphedema 01/05/2016    Chronic angle-closure glaucoma of both eyes 01/05/2016    Arthritis of hips and knees 01/05/2016    Other pulmonary embolism without acute cor pulmonale (HCC) 06/27/2015    Atrial fibrillation with RVR (HCC) 06/23/2015    Carotid stenosis 10/27/2014    Vitamin D deficiency     COPD, severe ([facility] 05/09/2013    Anxiety 02/08/2013    Hyperlipidemia 02/08/2013    Diverticulosis 02/08/2013    Chronic airway obstruction, not elsewhere classified 01/10/2013    Chronic respiratory failure with hypoxia ([facility] 03/29/2012           Daina Turner MD, MD 9/17/2021 9:51 AM

## 2021-09-17 NOTE — PROGRESS NOTES
Shift assessment complete, morning medications given, patient resting with  complaints of pain 7 out of 10 on scale,patient visiting with family at bedside,will continue to monitor.  Anthoney Severance, RN

## 2021-09-17 NOTE — PROGRESS NOTES
Occupational/Physical Therapy    Patient had family visiting her this morning hence unavailable for PT evaluation as per RN. Patient will be followed up later as appropriate to participate. No charge. Man Horn, MS PT, # [id number]      Per MD at [phone], hold patient this date due to status. Reattempt 9/18/21 as appropriate.   Yajaira Jaramillo, OTR/L [id number]  Man Horn, MS PT, # [id number]

## 2021-09-17 NOTE — PROGRESS NOTES
dilTIAZem 10 mg/hr (09/17/21 1434)     PRN Meds:  albuterol sulfate HFA, glucose, dextrose, glucagon (rDNA), dextrose, melatonin, sodium chloride flush, sodium chloride, ondansetron **OR** ondansetron, polyethylene glycol, acetaminophen **OR** acetaminophen, guaiFENesin-dextromethorphan    Labs:  CBC:   Recent Labs     09/15/21  0416 09/16/21 0441 09/17/21  0453   WBC 4.9 8.4 10.2   HGB 9.4* 9.4* 9.5*   HCT 29.6* 29.5* 30.2*   MCV 78.1* 77.7* 78.2*    336 374     BMP:   Recent Labs     09/15/21  0416 09/16/21  0441 09/17/21  0453    141 143   K 4.7 4.5 4.2   CL 97* 100 102   CO2 30 32 32   BUN 22* 32* 37*   CREATININE 0.9 0.8 1.1     Micro:  9/14/2021 SARS-CoV-2 positive  9/14/2021 blood cultures NGTD    Imaging:  Chest imaging was reviewed by me and showed   CXR 9/14/2021 bilateral infiltrates consistent with Covid pneumonia    ASSESSMENT:  · Acute on chronic hypoxemic and hypercapnic respiratory failure, prescribed home 4 L HS and 6 L with exertion  · COVID-19 pneumonia in an unvaccinated patient   · A. fib with RVR, h/o paroxysmal atrial fibrillation  · Severe COPD with acute exacerbation, f/b Dr. Bruce Payan previously   · Chronic diastolic CHF  · H/O PE on coumadin, now with supratherapeutic INR  · B cell lymphoma   · SHANNON   · Hyperglycemia     PLAN:   COVID-19 isolation, droplet plus   Bilevel non-invasive positive pressure ventilation for life threatening respiratory failure; HFNO as tolerated for breaks    Remdesevir D#3, LFT monitoring for high risk medication   IV Solu-Medrol D#3, 40 BID     Tocilizumab x1 on 9/15/21   Doxycycline D#3/7     Cardizem infusion     Insulin H-SSI   On chronic Coumadin, currently supratherapeutic   CODE STATUS -limited. DNR/DNI, yes to resuscitative medications.

## 2021-09-17 NOTE — PROGRESS NOTES
icterus. No conjunctival injection. ENT: No discharge. Pharynx clear. Neck: No JVD. No Carotid Bruit. Trachea midline. Resp: No accessory muscle use. Bibasilar crackles. No wheezes. No rhonchi. CV: Regular rate. Regular rhythm. No murmur. No rub. No edema. Capillary Refill: Brisk,< 3 seconds   Peripheral Pulses: +2 palpable, equal bilaterally   GI: Non-tender. Non-distended. No masses. No organomegaly. Normal bowel sounds. No hernia. Skin: Warm and dry. No nodule on exposed extremities. No rash on exposed extremities. M/S: No cyanosis. No joint deformity. No clubbing. Neuro: Awake. Grossly nonfocal    Psych: Oriented x 3. No anxiety or agitation. Lab Data:  CBC:   Recent Labs     09/15/21  0416 09/16/21  0441 09/17/21  0453   WBC 4.9 8.4 10.2   RBC 3.78* 3.79* 3.86*   HGB 9.4* 9.4* 9.5*   HCT 29.6* 29.5* 30.2*   MCV 78.1* 77.7* 78.2*   RDW 16.9* 16.5* 16.7*    336 374     BMP:   Recent Labs     09/15/21  0416 09/16/21  0441 09/17/21  0453    141 143   K 4.7 4.5 4.2   CL 97* 100 102   CO2 30 32 32   BUN 22* 32* 37*   CREATININE 0.9 0.8 1.1     BNP: No results for input(s): BNP in the last 72 hours. PT/INR:   Recent Labs     09/15/21  0416 09/16/21  0441 09/17/21  0453   PROTIME 85.6* 103.5* 92.5*   INR 6.99* 8.38* 7.53*     APTT:No results for input(s): APTT in the last 72 hours. CARDIAC ENZYMES:   Recent Labs     09/14/21  1400   TROPONINI <0.01     FASTING LIPID PANEL:  Lab Results   Component Value Date    CHOL 249 05/19/2021    HDL 63 05/19/2021    TRIG 195 05/19/2021     LIVER PROFILE:   Recent Labs     09/15/21  0416 09/16/21  0441 09/17/21  0453   AST 29 32 30   ALT 17 18 20   BILITOT <0.2 <0.2 <0.2   ALKPHOS 70 72 73         XR CHEST PORTABLE   Final Result   Bilateral interstitial groundglass opacities, likely representing COVID 19   pneumonia.                Assessment & Plan:     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    Nausea 06/18/2021    Polyp of colon 06/18/2021    Morbid obesity ([address] 06/18/2021    Sleep apnea     Restless legs syndrome     Chronic back pain     Panic disorder     Primary osteoarthritis of both knees 12/02/2020    Acute pain of right knee 12/02/2020    Moderate episode of recurrent major depressive disorder ([address] 10/23/2020    Primary insomnia 10/23/2020    Elevated lactic acid level     History of diverticulitis 10/10/2020    HTN (hypertension), benign     Dyslipidemia     TIA (transient ischemic attack) 06/14/2020    CHF (congestive heart failure) ([address]     PVD (peripheral vascular disease) with claudication ([address] 11/01/2019    Non-Hodgkin lymphoma ([address] 09/06/2019    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)     Pulmonary nodule     Atrial fibrillation, chronic (HCC)     Lung mass     Mediastinal lymphadenopathy     IFG (impaired fasting glucose) 09/13/2018    Erythrocytosis 08/22/2018    Angina pectoris (HCC)     Paroxysmal atrial fibrillation (HCC)     Bilateral edema of lower extremity 01/18/2017    Acute on chronic respiratory failure with hypoxia (HCC) 51/10/9605    Diastolic dysfunction     Shortness of breath 05/02/2016    Venous (peripheral) insufficiency 01/05/2016    Lymphedema 01/05/2016    Chronic angle-closure glaucoma of both eyes 01/05/2016    Arthritis of hips and knees 01/05/2016    Other pulmonary embolism without acute cor pulmonale ([address] 06/27/2015    Atrial fibrillation with RVR ([address] 06/23/2015    Carotid stenosis 10/27/2014    Vitamin D deficiency     COPD, severe ([address] 05/09/2013    Anxiety 02/08/2013    Hyperlipidemia 02/08/2013 bedside         Becki Madrid MD

## 2021-09-17 NOTE — CARE COORDINATION
INTERDISCIPLINARY PLAN OF CARE CONFERENCE    Date/Time: 9/17/2021 1:18 PM  Completed by:  PARVEEN Cadet. Case Management      Patient Name:  [facility]  YOB: 1940  Admitting Diagnosis: Morbid obesity ([address] [E66.01]  Hypoxia [R09.02]  COPD exacerbation ([address] [J44.1]  Atrial fibrillation with RVR ([facility] [facility] [I48.91]  Anticoagulated on Coumadin [Z79.01]  Poisoning by warfarin sodium, accidental or unintentional, initial encounter [T45.511A]  Acute hypoxemic respiratory failure due to COVID-19 ([address] [U07.1, J96.01]  COVID-19 [U07.1]  Acute respiratory failure due to COVID-19 ([address] [U07.1, J96.00]     Admit Date/Time:  9/14/2021  1:49 PM    Chart reviewed. Interdisciplinary team contacted or reviewed plan related to patient progress and discharge plans. Disciplines included Case Management, Nursing, and Dietitian. Current Status:Ongoing. Covid +. 15 liters HNC,. Bipap  PT/OT recommendation for discharge plan of care: held today     Expected D/C Disposition:  TBD    Discharge Plan Comments: Chart review completed. Pt has family at bedside per chart review. Pt lives at home alone. CM will continue to follow and assist. Please notify CM if needs or concerns arise.      Home O2 in place on admit: Yes

## 2021-09-17 NOTE — PROGRESS NOTES
Shift assessment complete, see flowsheets. Medications given, see MAR. Pt taken off BiPAP to take pills and sips of water. Hair brush. Positioned for comfort. Dentures taken out. New bag of Cardizem hung at 10ml/hr. No other needs or discomforts stated at this time. Call light in easy reach, bedside table in easy reach, and bed in lowest position.   Pia Mclean RN

## 2021-09-18 LAB
A/G RATIO: 0.9 (ref 1.1–2.2)
ALBUMIN SERPL-MCNC: 3 G/DL (ref 3.4–5)
ALP BLD-CCNC: 82 U/L (ref 40–129)
ALT SERPL-CCNC: 50 U/L (ref 10–40)
ANION GAP SERPL CALCULATED.3IONS-SCNC: 10 MMOL/L (ref 3–16)
AST SERPL-CCNC: 76 U/L (ref 15–37)
BASOPHILS ABSOLUTE: 0 K/UL (ref 0–0.2)
BASOPHILS RELATIVE PERCENT: 0.1 %
BILIRUB SERPL-MCNC: 0.3 MG/DL (ref 0–1)
BUN BLDV-MCNC: 40 MG/DL (ref 7–20)
CALCIUM SERPL-MCNC: 8.4 MG/DL (ref 8.3–10.6)
CHLORIDE BLD-SCNC: 100 MMOL/L (ref 99–110)
CO2: 32 MMOL/L (ref 21–32)
CREAT SERPL-MCNC: 0.9 MG/DL (ref 0.6–1.2)
EOSINOPHILS ABSOLUTE: 0 K/UL (ref 0–0.6)
EOSINOPHILS RELATIVE PERCENT: 0 %
GFR AFRICAN AMERICAN: >60
GFR NON-AFRICAN AMERICAN: >60
GLOBULIN: 3.2 G/DL
GLUCOSE BLD-MCNC: 222 MG/DL (ref 70–99)
GLUCOSE BLD-MCNC: 223 MG/DL (ref 70–99)
GLUCOSE BLD-MCNC: 234 MG/DL (ref 70–99)
GLUCOSE BLD-MCNC: 251 MG/DL (ref 70–99)
GLUCOSE BLD-MCNC: 253 MG/DL (ref 70–99)
GLUCOSE BLD-MCNC: 269 MG/DL (ref 70–99)
GLUCOSE BLD-MCNC: 287 MG/DL (ref 70–99)
HCT VFR BLD CALC: 29.8 % (ref 36–48)
HEMOGLOBIN: 9.3 G/DL (ref 12–16)
INR BLD: 6.53 (ref 0.88–1.12)
LYMPHOCYTES ABSOLUTE: 0.4 K/UL (ref 1–5.1)
LYMPHOCYTES RELATIVE PERCENT: 4.6 %
MCH RBC QN AUTO: 24.3 PG (ref 26–34)
MCHC RBC AUTO-ENTMCNC: 31.1 G/DL (ref 31–36)
MCV RBC AUTO: 78.1 FL (ref 80–100)
MONOCYTES ABSOLUTE: 0.4 K/UL (ref 0–1.3)
MONOCYTES RELATIVE PERCENT: 4.3 %
NEUTROPHILS ABSOLUTE: 8.8 K/UL (ref 1.7–7.7)
NEUTROPHILS RELATIVE PERCENT: 91 %
PDW BLD-RTO: 16.7 % (ref 12.4–15.4)
PERFORMED ON: ABNORMAL
PLATELET # BLD: 388 K/UL (ref 135–450)
PMV BLD AUTO: 7 FL (ref 5–10.5)
POTASSIUM REFLEX MAGNESIUM: 4.6 MMOL/L (ref 3.5–5.1)
PROTHROMBIN TIME: 79.8 SEC (ref 9.9–12.7)
RBC # BLD: 3.82 M/UL (ref 4–5.2)
SODIUM BLD-SCNC: 142 MMOL/L (ref 136–145)
TOTAL PROTEIN: 6.2 G/DL (ref 6.4–8.2)
WBC # BLD: 9.7 K/UL (ref 4–11)

## 2021-09-18 PROCEDURE — 36415 COLL VENOUS BLD VENIPUNCTURE: CPT

## 2021-09-18 PROCEDURE — 2500000003 HC RX 250 WO HCPCS: Performed by: INTERNAL MEDICINE

## 2021-09-18 PROCEDURE — 6370000000 HC RX 637 (ALT 250 FOR IP): Performed by: INTERNAL MEDICINE

## 2021-09-18 PROCEDURE — 99233 SBSQ HOSP IP/OBS HIGH 50: CPT | Performed by: INTERNAL MEDICINE

## 2021-09-18 PROCEDURE — 94660 CPAP INITIATION&MGMT: CPT

## 2021-09-18 PROCEDURE — 2580000003 HC RX 258: Performed by: INTERNAL MEDICINE

## 2021-09-18 PROCEDURE — 94640 AIRWAY INHALATION TREATMENT: CPT

## 2021-09-18 PROCEDURE — 99232 SBSQ HOSP IP/OBS MODERATE 35: CPT | Performed by: INTERNAL MEDICINE

## 2021-09-18 PROCEDURE — 85610 PROTHROMBIN TIME: CPT

## 2021-09-18 PROCEDURE — 6360000002 HC RX W HCPCS: Performed by: INTERNAL MEDICINE

## 2021-09-18 PROCEDURE — 80053 COMPREHEN METABOLIC PANEL: CPT

## 2021-09-18 PROCEDURE — 85025 COMPLETE CBC W/AUTO DIFF WBC: CPT

## 2021-09-18 PROCEDURE — 6370000000 HC RX 637 (ALT 250 FOR IP): Performed by: PHYSICIAN ASSISTANT

## 2021-09-18 PROCEDURE — 2700000000 HC OXYGEN THERAPY PER DAY

## 2021-09-18 PROCEDURE — 94761 N-INVAS EAR/PLS OXIMETRY MLT: CPT

## 2021-09-18 PROCEDURE — 2060000000 HC ICU INTERMEDIATE R&B

## 2021-09-18 PROCEDURE — 2580000003 HC RX 258: Performed by: PHYSICIAN ASSISTANT

## 2021-09-18 RX ORDER — INSULIN GLARGINE 100 [IU]/ML
25 INJECTION, SOLUTION SUBCUTANEOUS NIGHTLY
Status: DISCONTINUED | OUTPATIENT
Start: 2021-09-18 | End: 2021-09-29 | Stop reason: HOSPADM

## 2021-09-18 RX ADMIN — METHYLPREDNISOLONE SODIUM SUCCINATE 40 MG: 40 INJECTION, POWDER, FOR SOLUTION INTRAMUSCULAR; INTRAVENOUS at 09:31

## 2021-09-18 RX ADMIN — BUPROPION HYDROCHLORIDE 150 MG: 150 TABLET, EXTENDED RELEASE ORAL at 09:32

## 2021-09-18 RX ADMIN — TORSEMIDE 20 MG: 20 TABLET ORAL at 09:32

## 2021-09-18 RX ADMIN — DOXYCYCLINE 100 MG: 100 INJECTION, POWDER, LYOPHILIZED, FOR SOLUTION INTRAVENOUS at 00:41

## 2021-09-18 RX ADMIN — INSULIN LISPRO 9 UNITS: 100 INJECTION, SOLUTION INTRAVENOUS; SUBCUTANEOUS at 12:08

## 2021-09-18 RX ADMIN — PYRIDOXINE HCL TAB 50 MG 50 MG: 50 TAB at 09:32

## 2021-09-18 RX ADMIN — INSULIN LISPRO 5 UNITS: 100 INJECTION, SOLUTION INTRAVENOUS; SUBCUTANEOUS at 21:52

## 2021-09-18 RX ADMIN — INSULIN GLARGINE 25 UNITS: 100 INJECTION, SOLUTION SUBCUTANEOUS at 21:53

## 2021-09-18 RX ADMIN — REMDESIVIR 100 MG: 100 INJECTION, POWDER, LYOPHILIZED, FOR SOLUTION INTRAVENOUS at 13:15

## 2021-09-18 RX ADMIN — SODIUM CHLORIDE 25 ML: 9 INJECTION, SOLUTION INTRAVENOUS at 00:40

## 2021-09-18 RX ADMIN — INSULIN LISPRO 9 UNITS: 100 INJECTION, SOLUTION INTRAVENOUS; SUBCUTANEOUS at 16:28

## 2021-09-18 RX ADMIN — DOXYCYCLINE 100 MG: 100 INJECTION, POWDER, LYOPHILIZED, FOR SOLUTION INTRAVENOUS at 14:54

## 2021-09-18 RX ADMIN — DILTIAZEM HYDROCHLORIDE 10 MG/HR: 5 INJECTION, SOLUTION INTRAVENOUS at 03:36

## 2021-09-18 RX ADMIN — DIGOXIN 125 MCG: 125 TABLET ORAL at 09:32

## 2021-09-18 RX ADMIN — QUETIAPINE FUMARATE 25 MG: 25 TABLET ORAL at 21:52

## 2021-09-18 RX ADMIN — SPIRONOLACTONE 25 MG: 25 TABLET ORAL at 09:32

## 2021-09-18 RX ADMIN — METHYLPREDNISOLONE SODIUM SUCCINATE 40 MG: 40 INJECTION, POWDER, FOR SOLUTION INTRAMUSCULAR; INTRAVENOUS at 21:52

## 2021-09-18 RX ADMIN — Medication 2 PUFF: at 08:45

## 2021-09-18 RX ADMIN — Medication 2000 UNITS: at 09:32

## 2021-09-18 RX ADMIN — Medication 2 PUFF: at 10:28

## 2021-09-18 RX ADMIN — Medication 2 PUFF: at 19:23

## 2021-09-18 RX ADMIN — INSULIN LISPRO 6 UNITS: 100 INJECTION, SOLUTION INTRAVENOUS; SUBCUTANEOUS at 08:13

## 2021-09-18 RX ADMIN — Medication 2 PUFF: at 14:19

## 2021-09-18 RX ADMIN — SODIUM CHLORIDE, PRESERVATIVE FREE 10 ML: 5 INJECTION INTRAVENOUS at 09:32

## 2021-09-18 NOTE — PROGRESS NOTES
09/18/21 0328   NIV Type   Equipment Type v60   Mode Bilevel   Mask Type Full face mask   Mask Size Medium   Settings/Measurements   IPAP 14 cmH20   CPAP/EPAP 6 cmH2O   Rate Ordered 12   Resp 22   FiO2  75 %   Vt Exhaled 866 mL   Minute Volume 18.6 Liters   Mask Leak (lpm) 46 lpm   Comfort Level Good   Using Accessory Muscles No   SpO2 95   Patient Observation   Observations spo2 95% on 75% bipap   Oxygen Therapy/Pulse Ox   O2 Device PAP (positive airway pressure)   SpO2 96 %

## 2021-09-18 NOTE — PROGRESS NOTES
09/17/21 1163   NIV Type   $NIV $Daily Charge   Equipment Type v60   Mode Bilevel   Mask Type Full face mask   Mask Size Medium   Settings/Measurements   IPAP 14 cmH20   CPAP/EPAP 6 cmH2O   Rate Ordered 12   Resp (!) 37   FiO2  75 %   Vt Exhaled 799 mL   Minute Volume 17.1 Liters   Mask Leak (lpm) 38 lpm   Comfort Level Good   Using Accessory Muscles No   SpO2 95   Patient Observation   Observations spo2 95% on 75% bipap

## 2021-09-18 NOTE — PROGRESS NOTES
Continuous Infusions:   dextrose      sodium chloride 25 mL (09/18/21 0040)    dilTIAZem 10 mg/hr (09/18/21 0400)     PRN Meds:  albuterol sulfate HFA, glucose, dextrose, glucagon (rDNA), dextrose, melatonin, sodium chloride flush, sodium chloride, ondansetron **OR** ondansetron, polyethylene glycol, acetaminophen **OR** acetaminophen, guaiFENesin-dextromethorphan    Labs:  CBC:   Recent Labs     09/16/21 0441 09/17/21 0453 09/18/21  0454   WBC 8.4 10.2 9.7   HGB 9.4* 9.5* 9.3*   HCT 29.5* 30.2* 29.8*   MCV 77.7* 78.2* 78.1*    374 388     BMP:   Recent Labs     09/16/21 0441 09/17/21 0453 09/18/21  0454    143 142   K 4.5 4.2 4.6    102 100   CO2 32 32 32   BUN 32* 37* 40*   CREATININE 0.8 1.1 0.9     Micro:  9/14/2021 SARS-CoV-2 positive  9/14/2021 blood cultures NGTD    Imaging:  Chest imaging was reviewed by me and showed   CXR 9/14/2021 bilateral infiltrates consistent with Covid pneumonia    ASSESSMENT:  · Acute on chronic hypoxemic and hypercapnic respiratory failure, baseline 4 L qhs and 6 L with exertion  · COVID-19 pneumonia in an unvaccinated patient   · Severe COPD with acute exacerbation, f/b Dr. Stefania Weber previously   · Elevated LFTs, doubled from yesterday  · Anemia  · A. fib with RVR, h/o paroxysmal atrial fibrillation  · Chronic diastolic CHF, seen by cards  · H/O PE on coumadin, now with supratherapeutic INR, improving  · B cell lymphoma   · SHANNON   · Hyperglycemia     PLAN:   COVID-19 isolation, droplet plus   Bilevel non-invasive positive pressure ventilation for life threatening respiratory failure; HFNO as tolerated for breaks    Remdesevir D#4, LFT monitoring for high risk medication   IV Solu-Medrol D#3, 40 BID     Tocilizumab x1 on 9/15/21   Doxycycline D#4/7     Amiodarone drip, Cardizem infusion, per cardiology   Insulin H-SSI   On chronic Coumadin, currently supratherapeutic   CODE STATUS -limited. DNR/DNI, yes to resuscitative medications.

## 2021-09-18 NOTE — PROGRESS NOTES
RESPIRATORY THERAPY ASSESSMENT    Name:  Kolby [facility] Record Number:  [id number]  Age: 68 y.o. Gender: female  : 1940  Today's Date:  2021  Room:  /0311-01    Assessment     Is the patient being admitted for a COPD or Asthma exacerbation? No   (If yes the patient will be seen every 4 hours for the first 24 hours and then reassessed)    Patient Admission Diagnosis      Allergies  Allergies   Allergen Reactions    Crestor [Rosuvastatin]      Muscle aches    Levofloxacin Other (See Comments)     Achiness    Lipitor      Muscle aches    Rocephin In Dextrose [Ceftriaxone Sodium In Dextrose]      Rash,itching    Zocor [Simvastatin]      Muscle aches    Codeine Itching and Nausea And Vomiting    Pravastatin Nausea And Vomiting       Minimum Predicted Vital Capacity:               Actual Vital Capacity:                    Pulmonary History:chf  Home Oxygen Therapy:  4L  Home Respiratory Therapy:Albuterol   Current Respiratory Therapy:  Albuterol 4 times daily  Treatment Type: MDI  Medications: Albuterol    Respiratory Severity Index(RSI)   Patients with orders for inhalation medications, oxygen, or any therapeutic treatment modality will be placed on Respiratory Protocol. They will be assessed with the first treatment and at least every 72 hours thereafter. The following severity scale will be used to determine frequency of treatment intervention.     Smoking History: Pulmonary Disease or Smoking History, Greater than 15 pack year = 2    Social History  Social History     Tobacco Use    Smoking status: Former Smoker     Packs/day: 0.33     Years: 50.00     Pack years: 16.50     Types: Cigarettes     Quit date: 2015     Years since quittin.2    Smokeless tobacco: Never Used   Vaping Use    Vaping Use: Never used   Substance Use Topics    Alcohol use: Not Currently     Alcohol/week: 0.0 standard drinks    Drug use: No       Recent Surgical History: None = 0  Past Surgical History  Past Surgical History:   Procedure Laterality Date    APPENDECTOMY  1963    BRONCHOSCOPY N/A 8/12/2019    EBUS WF W/ANES.  performed by Brianna Mosqueda MD at [address]  8/12/2019    BRONCHOSCOPY ALVEOLAR LAVAGE performed by Brianna Mosqueda MD at [address]  8/12/2019    BRONCHOSCOPY BRUSHINGS performed by Brianna Mosqueda MD at [address]  8/12/2019    BRONCHOSCOPY/TRANSBRONCHIAL LUNG BIOPSY performed by Brianna Mosqueda MD at [address]  8/12/2019    BRONCHOSCOPY/TRANSBRONCHIAL NEEDLE BIOPSY performed by Brianna Mosqueda MD at [address]  8/12/2019    BRONCHOSCOPY/TRANSBRONCHIAL NEEDLE BIOPSY ADDL LOBE performed by Brianna Mosqueda MD at [address] [place] [address]  2003    COLONOSCOPY N/A 9/25/2019    COLONOSCOPY POLYPECTOMY SNARE/COLD BIOPSY showed diverticulilosis performed by Sharad Tovar MD at [address] [facility]      umbilical--Dr [facility] N/A 1/17/2020    PORT INSERTION performed by Barrington Perdomo MD at [place] [address] TUNNELED VENOUS PORT PLACEMENT Left 01/17/2020    Port Insertion ENRIQUETA Chest w. Dr Dae Parra 1/17/20 Vaccess CT Injectable [id number] lot [id number] 3/31/21       Level of Consciousness: Alert, Oriented, and Cooperative = 0    Level of Activity: Walking with assistance = 1    Respiratory Pattern: Dyspnea with exertion;Irregular pattern;or RR less than 6 = 2    Breath Sounds: Diminshed bilaterally and/or crackles = 2    Sputum   ,  ,    Cough: Strong, spontaneous, non-productive = 0    Vital Signs   /60   Pulse 112   Temp 98.6 °F (37 °C) (Oral)   Resp (!) 37   Ht 5' 2\" (1.575 m)   Wt 270 lb 3.2 oz (122.6 kg)   LMP  (LMP Unknown)   SpO2 96%   BMI 49.42 kg/m²   SPO2 (COPD values may differ): Less than 86% on room air or greater than 92% on FiO2 greater than 50% = 4    Peak Flow (asthma only): not applicable = 0    RSI: 14-59 = Q6H or QID and Q4HPRN for dyspnea        Plan       Goals: medication delivery and improve oxygenation    Patient/caregiver was educated on the proper method of use for Respiratory Care Devices:  Yes      Level of patient/caregiver understanding able to:   ? Verbalize understanding   ? Demonstrate understanding       ? Teach back        ? Needs reinforcement       ? No available caregiver               ? Other:     Response to education:  Good     Is patient being placed on Home Treatment Regimen? No     Does the patient have everything they need prior to discharge? NA     Comments: chart reviewed and patient assessed    Plan of Care: keep albuterol qid per assessment    Electronically signed by Rosalin Peabody, RCP on 9/18/2021 at 1:24 AM    Respiratory Protocol Guidelines     1. Assessment and treatment by Respiratory Therapy will be initiated for medication and therapeutic interventions upon initiation of aerosolized medication. 2. Physician will be contacted for respiratory rate (RR) greater than 35 breaths per minute. Therapy will be held for heart rate (HR) greater than 140 beats per minute, pending direction from physician. 3. Bronchodilators will be administered via Metered Dose Inhaler (MDI) with spacer when the following criteria are met:  a. Alert and cooperative     b. HR < 140 bpm  c. RR < 30 bpm                d. Can demonstrate a 23 second inspiratory hold  4. Bronchodilators will be administered via Hand Held Nebulizer PASCUAL [facility]) to patients when ANY of the following criteria are met  a. Incognizant or uncooperative          b. Patients treated with HHN at Home        c. Unable to demonstrate proper use of MDI with spacer     d. RR > 30 bpm   5. Bronchodilators will be delivered via Metered Dose Inhaler (MDI), HHN, Aerogen to intubated patients on mechanical ventilation.   6. Inhalation medication orders will be delivered and/or substituted as outlined below. Aerosolized Medications Ordering and Administration Guidelines:    1. All Medications will be ordered by a physician, and their frequency and/or modality will be adjusted as defined by the patients Respiratory Severity Index (RSI) score. 2. If the patient does not have documented COPD, consider discontinuing anticholinergics when RSI is less than 9.  3. If the bronchospasm worsens (increased RSI), then the bronchodilator frequency can be increased to a maximum of every 4 hours. If greater than every 4 hours is required, the physician will be contacted. 4. If the bronchospasm improves, the frequency of the bronchodilator can be decreased, based on the patient's RSI, but not less than home treatment regimen frequency. 5. Bronchodilator(s) will be discontinued if patient has a RSI less than 9 and has received no scheduled or as needed treatment for 72  Hrs. Patients Ordered on a Mucolytic Agent:    1. Must always be administered with a bronchodilator. 2. Discontinue if patient experiences worsened bronchospasm, or secretions have lessened to the point that the patient is able to clear them with a cough. Anti-inflammatory and Combination Medications:    1. If the patient lacks prior history of lung disease, is not using inhaled anti-inflammatory medication at home, and lacks wheezing by examination or by history for at least 24 hours, contact physician for possible discontinuation.

## 2021-09-18 NOTE — FLOWSHEET NOTE
09/18/21 1445   Vital Signs   Temp 97.8 °F (36.6 °C)   Temp Source Axillary   Pulse 68   Heart Rate Source Monitor   Resp 18   BP (!) 107/54   BP Location Right upper arm   Patient Position Semi fowlers   Level of Consciousness Alert (0)   MEWS Score 1   Patient Currently in Pain Denies   Oxygen Therapy   SpO2 96 %   O2 Device PAP (positive airway pressure)   FiO2  85 %     Afternoon vitals completed. Pt BiPap settings are as shown above. Pt tolerating well. Meds given per MAR. Pt stable.     Felicitas Hernadez, RN

## 2021-09-18 NOTE — PROGRESS NOTES
Patient agreeable to Bi-PAP. Resting quietly with continuous pulse oximetry mid 90%'s with Bi-PAP FIO2 75%. Call in easy reach. Bed alarm on for patient's safety. Continue to monitor closely.   Britni Murphy RN

## 2021-09-18 NOTE — PROGRESS NOTES
Shift report given to nurse Hansa Ford at bedside. Patient care handed off in stable condition at this time.  Sav Marie, RN

## 2021-09-18 NOTE — PROGRESS NOTES
Cardiology Progress Note     Admit Date: 2021     Reason for follow up: Atrial fibrillation and heart failure with preserved ejection fraction    Interval History:   - Patient seen and examined. - Clinical notes reviewed. - Telemetry reviewed. Rate controlled atrial fibrillation  - No major events overnight.   - Continues to be short of breath intermittently. Physical Examination:  Vitals:    21 1036   BP:    Pulse:    Resp: 22   Temp:    SpO2:         Intake/Output Summary (Last 24 hours) at 2021 1113  Last data filed at 2021 0941  Gross per 24 hour   Intake 520 ml   Output 2350 ml   Net -1830 ml     In: 520 [P.O.:340; I.V.:80]  Out: 1800    Wt Readings from Last 3 Encounters:   21 270 lb 5 oz (122.6 kg)   21 273 lb (123.8 kg)   21 273 lb (123.8 kg)     Temp  Av.2 °F (36.8 °C)  Min: 97.6 °F (36.4 °C)  Max: 98.6 °F (37 °C)  Pulse  Av.4  Min: 70  Max: 112  BP  Min: 106/58  Max: 118/60  SpO2  Av.1 %  Min: 87 %  Max: 96 %  FiO2   Av.5 %  Min: 75 %  Max: 85 %    · Telemetry: Rate controlled atrial fibrillation   · Constitutional: Alert. Oriented to person, place, and time. No distress. · Head: Normocephalic and atraumatic. · Eyes: Conjunctivae normal. EOM are normal.   · Neck: Neck supple. No lymphadenopathy. No rigidity. No JVD present. · Cardiovascular: Irregular rate and rhythm w/o M/G/R  · Pulmonary/Chest: Bilateral respiratory sounds present. No respiratory accessory muscle use. On BiPAP. · Abdominal: Soft. Normal bowel sounds present. No distension, No tenderness. No splenomegaly. No hernia. · Musculoskeletal: No tenderness. Trace edema    · Neurological: Alert and oriented. Cranial nerve II-XII grossly intact. · Skin: Skin is warm and dry. No rash, lesions, ulcerations noted. · Psychiatric: No anxiety nor agitation. Labs, diagnostic and imaging results reviewed. Reviewed.    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    Nausea 06/18/2021    Polyp of colon 06/18/2021    Morbid obesity ([address] 06/18/2021    Sleep apnea     Restless legs syndrome     Chronic back pain     Panic disorder     Primary osteoarthritis of both knees 12/02/2020    Acute pain of right knee 12/02/2020    Moderate episode of recurrent major depressive disorder ([address] 10/23/2020    Primary insomnia 10/23/2020    Elevated lactic acid level     History of diverticulitis 10/10/2020    HTN (hypertension), benign     Dyslipidemia     TIA (transient ischemic attack) 06/14/2020    CHF (congestive heart failure) ([address]     PVD (peripheral vascular disease) with claudication ([address] 11/01/2019    Non-Hodgkin lymphoma ([address] 09/06/2019    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)     Pulmonary nodule     Atrial fibrillation, chronic (HCC)     Lung mass     Mediastinal lymphadenopathy     IFG (impaired fasting glucose) 09/13/2018    Erythrocytosis 08/22/2018    Angina pectoris (HCC)     Paroxysmal atrial fibrillation (HCC)     Bilateral edema of lower extremity 01/18/2017    Acute on chronic respiratory failure with hypoxia ([address] 59/06/9665    Diastolic dysfunction     Shortness of breath 05/02/2016    Venous (peripheral) insufficiency 01/05/2016    Lymphedema 01/05/2016    Chronic angle-closure glaucoma of both eyes 01/05/2016    Arthritis of hips and knees 01/05/2016    Other pulmonary embolism without acute cor pulmonale ([address] 06/27/2015    Atrial fibrillation with RVR ([address] [facility] 06/23/2015    Carotid stenosis 10/27/2014    Vitamin D deficiency     COPD, severe ([address] 05/09/2013    Anxiety 02/08/2013    Hyperlipidemia 02/08/2013    Diverticulosis 02/08/2013    Chronic airway obstruction, not elsewhere classified 01/10/2013    Chronic respiratory failure with hypoxia ([address] 03/29/2012      Active Hospital Problems    Diagnosis Date Noted    COPD exacerbation [J44.1] 03/28/2012     Priority: High    Acute on chronic respiratory failure with hypoxia and hypercapnia (HCC) [J96.21, J96.22]     Anticoagulated on Coumadin [Z79.01]     Acute hypoxemic respiratory failure due to COVID-19 ([address] [U07.1, J96.01] 09/14/2021    Pneumonia due to COVID-19 virus [U07.1, J12.82] 09/14/2021    Acute on chronic respiratory failure with hypoxia ([address] [J96.21] 01/02/2017    [facility] fibrillation with RVR ([facility] [I48.91] 06/23/2015     Mrs. Robert Zafar is a pleasant 68year old female with a medical history significant for persistent atrial fibrillation, severe COPD, heart failure with preserved ejection fraction, cerebral vascular disease, peripheral vascular disease and hypertension who presents from home with acute on chronic respiratory failure secondary to COVID-19 pneumonia. Assessment and Plan:   1. Persistent atrial fibrillation ([id number] score 4). Patient is a pleasant 68year old female with a medical history significant for persistent atrial fibrillation, heart failure with preserved ejection fraction, COPD, peripheral vascular disease, and hypertension who presented from home with acute on chronic respiratory failure secondary to COVID-19 pneumonia and acute on chronic diastolic heart failure exacerbation. She continues in rate controlled atrial fibrillation. Her INR is elevated so her warfarin is on hold. - Continue digoxin. - Continue metoprolol.  - Restart warfarin once INR less than or near 3.  - We will continue to follow along with you.     2. Heart failure with preserved ejection fraction. Patient presented with some with some volume overload. Appears euvolemic on exam.  She may tolerate more diuresis if other providers would like but I'm ok with keeping her on her torsemide.  - Continue torsemide.  - We will continue to follow along with you. 3. COVID-19 pneumonia. - Per primary team.    4. Acute on chronic respiratory failure. - Plan as per above. Thank you for allowing me to participate in the care of this patient. If you have any questions, please do not hesitate to contact me.     Jeff Barajas MD  Cardiac Electrophysiology  [facility]  [phone] [facility]

## 2021-09-18 NOTE — PROGRESS NOTES
Occupational Therapy/Physical Therapy Attempt    Unit: PCU   Date:  9/18/2021  Patient Name:    [facility]  Admitting diagnosis:  Morbid obesity ([facility] [E66.01]  Hypoxia [R09.02]  COPD exacerbation ([facility] [J44.1]  Atrial fibrillation with RVR ([facility] [I48.91]  Anticoagulated on Coumadin [Z79.01]  Poisoning by warfarin sodium, accidental or unintentional, initial encounter [T45.511A]  Acute hypoxemic respiratory failure due to COVID-19 ([facility] [U07.1, J96.01]  COVID-19 [U07.1]  Acute respiratory failure due to COVID-19 ([facility] [U07.1, J96.00]  Admit Date:  9/14/2021    Attempt @ 1340: Hold. Pt placed back on BiPAP. RN requests hold at this time. Will f/u tomorrow as pt is appropriate and agreeable. Thank you.      Raj Hughes, MOT, OTR/L   [id number]  [facility], PT, DPT, OMT-C [id number]    No Charge

## 2021-09-18 NOTE — PROGRESS NOTES
Patient placed on bipap for desat on 15L high flow n/c on settings of 15/6 85% to get o2 sats greater than 90%

## 2021-09-18 NOTE — PROGRESS NOTES
Hospitalist Progress Note      PCP: Clotilde Apley, APRN - CNP    Date of Admission: 9/14/2021    Subjective: placed on high flow but still O2 staying in med 80s, placed on BIPAP    Medications:  Reviewed    Infusion Medications    dextrose      sodium chloride 25 mL (09/18/21 0040)    dilTIAZem 10 mg/hr (09/18/21 1535)     Scheduled Medications    insulin glargine  25 Units SubCUTAneous Nightly    metoprolol tartrate  50 mg Oral BID    metoprolol tartrate  25 mg Oral Once    digoxin  125 mcg Oral Daily    albuterol sulfate HFA  2 puff Inhalation 4x daily    mupirocin   Nasal BID    remdesivir IVPB  100 mg IntraVENous Q24H    methylPREDNISolone  40 mg IntraVENous Q12H    doxycycline (VIBRAMYCIN) IV  100 mg IntraVENous Q12H    insulin lispro  0-18 Units SubCUTAneous TID WC    insulin lispro  0-9 Units SubCUTAneous Nightly    buPROPion  150 mg Oral QAM    polyethylene glycol  17 g Oral Daily    QUEtiapine  25 mg Oral Nightly    spironolactone  25 mg Oral Daily    torsemide  20 mg Oral Daily    vitamin B-6  50 mg Oral Daily    sodium chloride flush  5-40 mL IntraVENous 2 times per day    Vitamin D  2,000 Units Oral Daily     PRN Meds: albuterol sulfate HFA, glucose, dextrose, glucagon (rDNA), dextrose, melatonin, sodium chloride flush, sodium chloride, ondansetron **OR** ondansetron, polyethylene glycol, acetaminophen **OR** acetaminophen, guaiFENesin-dextromethorphan      Intake/Output Summary (Last 24 hours) at 9/18/2021 1853  Last data filed at 9/18/2021 1808  Gross per 24 hour   Intake 1593.98 ml   Output 4800 ml   Net -3206.02 ml       Physical Exam Performed:    BP (!) 107/54   Pulse 68   Temp 97.8 °F (36.6 °C) (Axillary)   Resp 18   Ht 5' 2\" (1.575 m)   Wt 270 lb 5 oz (122.6 kg)   LMP  (LMP Unknown)   SpO2 96%   BMI 49.44 kg/m²     Gen: Mild respiratory distress  Eyes: PERRL. No sclera icterus. No conjunctival injection. ENT: No discharge. Pharynx clear. Neck: No JVD.  No Carotid Bruit. Trachea midline. Resp: No accessory muscle use.  Bibasilar crackles. No wheezes. No rhonchi. CV: Regular rate. Regular rhythm. No murmur.  No rub. No edema. Capillary Refill: Brisk,< 3 seconds   Peripheral Pulses: +2 palpable, equal bilaterally   GI: Non-tender. Non-distended. No masses. No organomegaly. Normal bowel sounds. No hernia. Skin: Warm and dry. No nodule on exposed extremities. No rash on exposed extremities. M/S: No cyanosis. No joint deformity. No clubbing. Neuro: Awake. Grossly nonfocal    Psych: Oriented x 3. No anxiety or agitation. Labs:   Recent Labs     09/16/21 0441 09/17/21 0453 09/18/21 0454   WBC 8.4 10.2 9.7   HGB 9.4* 9.5* 9.3*   HCT 29.5* 30.2* 29.8*    374 388     Recent Labs     09/16/21 0441 09/17/21 0453 09/18/21 0454    143 142   K 4.5 4.2 4.6    102 100   CO2 32 32 32   BUN 32* 37* 40*   CREATININE 0.8 1.1 0.9   CALCIUM 8.1* 8.4 8.4     Recent Labs     09/16/21 0441 09/17/21 0453 09/18/21 0454   AST 32 30 76*   ALT 18 20 50*   BILITOT <0.2 <0.2 0.3   ALKPHOS 72 73 82     Recent Labs     09/16/21 0441 09/17/21 0453 09/18/21 0453   INR 8.38* 7.53* 6.53*     No results for input(s): Pamella Fuchs in the last 72 hours. Urinalysis:      Lab Results   Component Value Date    NITRU Negative 10/10/2020    WBCUA 3-5 10/10/2020    BACTERIA 4+ 10/10/2020    RBCUA 3-4 10/10/2020    BLOODU SMALL 10/10/2020    SPECGRAV 1.010 10/10/2020    GLUCOSEU Negative 10/10/2020       Radiology:  XR CHEST PORTABLE   Final Result   Bilateral interstitial groundglass opacities, likely representing COVID 19   pneumonia.                  Assessment/Plan:    Active Hospital Problems    Diagnosis     COPD exacerbation [J44.1]      Priority: High    Acute on chronic respiratory failure with hypoxia and hypercapnia (HCC) [J96.21, J96.22]     Anticoagulated on Coumadin [Z79.01]     Acute hypoxemic respiratory failure due to COVID-19 [facility]) [U07.1, J96.01]     Pneumonia due to COVID-19 virus [U07.1, J12.82]     Acute on chronic respiratory failure with hypoxia (HCC) [J96.21]     Atrial fibrillation with RVR (HCC) [I48.91]             COVID 19 PNA  - CXR with bilateral interstitial ground glass opacities  - Admitted to ICU, telemetry  - PCT 0.22  - started solumedrol  D#5 (she also has severe COPD), remdesivir day 4  Tocilizumab x1 recieved   - pulmonology consulted     Acute on Chronic Hypoxic Respiratory Failure  - wears 4L NC at baseline  - likely 2/2 COVID and uncontrolled afib  - on BiPAP  - mgmt per pulmonology, control HR as below and mgmt of COVID as above  Currently on BiPAP all night and during the day with breaks  - O2 at 15L     Atrial Fibrillation with RVR  - hx of PAF, trop neg  - AC on Coumadin - currently supratherapeutic  - s/p dilt bolus > now on Dilt gtt  - cardiology consult  She is currently on digoxin and metoprolol  CVR now    Transaminitis  - LFT slightly increased today  - cont to monitor LFT  - on remdesivir     COPD  With acute exacerbation. Continue steroids, inhaled bronchodilators(MDI)     Hyperglycemia - suspect early DM II  - pt is on steroids  - last Hgb A1c: 6.6, check Hgb A1c   - added low dose SSI and PRN hypoglycemia protocol  Added lantus nightly         Supratherapeutic INR  - hold Coumadin  - daily INR     Microcytic Anemia   - Hgb 10 > 9.4  - this is chronic  - stable     Chronic Diastolic CHF  - appears compensated   - last echo 6/2020: normal EF, grade I DD  - daily weights, I&Os  - cont Torsemide     Hx of PE  - AC on Coumadin - currently supra therapeutic  - daily PT/INR, holding Coumadin     B cell Lymphoma  - f/w Dr. Allison Dose     H/o Anovaginal Fistula  - noted per patient on prior admission     Morbid Obesity  - Body mass index is 49.04 kg/m². - Complicating assessment and treatment. Placing patient at risk for multiple co-morbidities as well as early death and contributing to the patient's presentation. - Counseled on weight loss.        DVT Prophylaxis: AC on Coumadin - supratherapeutic & on hold  Diet: ADULT DIET;  Regular; 4 carb choices (60 gm/meal)  Code Status: Limited    PT/OT Eval Status: ordered    Catracho Burden MD

## 2021-09-18 NOTE — PROGRESS NOTES
09/18/21 1949   NIV Type   $NIV $Daily Charge   Skin Assessment Clean, dry, & intact   Equipment Type v60   Mode Bilevel   Mask Type Full face mask   Mask Size Medium   Settings/Measurements   IPAP 15 cmH20   CPAP/EPAP 6 cmH2O   Rate Ordered 12   Resp 21   FiO2  85 %   Vt Exhaled 685 mL   Minute Volume 14 Liters   Mask Leak (lpm) 0 lpm   Comfort Level Good   Using Accessory Muscles No   SpO2 94   Patient Observation   Observations spo2 94% on 85% bipap

## 2021-09-18 NOTE — FLOWSHEET NOTE
09/18/21 0800   Vital Signs   Temp 98.4 °F (36.9 °C)   Temp Source Oral   Pulse 70   Heart Rate Source Monitor   Resp 18   BP (!) 106/58   BP Location Right upper arm   Patient Position Semi fowlers   Level of Consciousness Alert (0)   MEWS Score 1   Patient Currently in Pain Denies   Oxygen Therapy   SpO2 95 %   O2 Device High flow nasal cannula   O2 Flow Rate (L/min) 15 L/min     Pt assessment complete; see flow sheets. Vitals completed. Meds given per MAR. Pt stable at this time.     Patrick Lehman RN

## 2021-09-18 NOTE — PROGRESS NOTES
Bedside report and transfer of care given to [facility]. Pt currently resting in bed with the call light within reach. Pt denies any other care needs at this time. Pt stable at this time.     Onur Mccarty RN

## 2021-09-18 NOTE — PROGRESS NOTES
Nursing handoff to Char Cardenas, [facility].   Britni Murphy RN

## 2021-09-18 NOTE — PROGRESS NOTES
Pt taken off BiPAP at this time for lunch. Will place back on Pt after she eats.     Faith Bansal RN

## 2021-09-19 LAB
A/G RATIO: 1.1 (ref 1.1–2.2)
ALBUMIN SERPL-MCNC: 3.2 G/DL (ref 3.4–5)
ALP BLD-CCNC: 94 U/L (ref 40–129)
ALT SERPL-CCNC: 70 U/L (ref 10–40)
ANION GAP SERPL CALCULATED.3IONS-SCNC: 7 MMOL/L (ref 3–16)
AST SERPL-CCNC: 70 U/L (ref 15–37)
BANDED NEUTROPHILS RELATIVE PERCENT: 1 % (ref 0–7)
BASOPHILS ABSOLUTE: 0 K/UL (ref 0–0.2)
BASOPHILS RELATIVE PERCENT: 0 %
BILIRUB SERPL-MCNC: 0.4 MG/DL (ref 0–1)
BLOOD CULTURE, ROUTINE: NORMAL
BUN BLDV-MCNC: 41 MG/DL (ref 7–20)
CALCIUM SERPL-MCNC: 8.5 MG/DL (ref 8.3–10.6)
CHLORIDE BLD-SCNC: 97 MMOL/L (ref 99–110)
CO2: 37 MMOL/L (ref 21–32)
CREAT SERPL-MCNC: 0.8 MG/DL (ref 0.6–1.2)
CULTURE, BLOOD 2: NORMAL
EOSINOPHILS ABSOLUTE: 0 K/UL (ref 0–0.6)
EOSINOPHILS RELATIVE PERCENT: 0 %
GFR AFRICAN AMERICAN: >60
GFR NON-AFRICAN AMERICAN: >60
GLOBULIN: 2.9 G/DL
GLUCOSE BLD-MCNC: 203 MG/DL (ref 70–99)
GLUCOSE BLD-MCNC: 233 MG/DL (ref 70–99)
GLUCOSE BLD-MCNC: 245 MG/DL (ref 70–99)
GLUCOSE BLD-MCNC: 245 MG/DL (ref 70–99)
GLUCOSE BLD-MCNC: 315 MG/DL (ref 70–99)
HCT VFR BLD CALC: 30 % (ref 36–48)
HEMOGLOBIN: 9.3 G/DL (ref 12–16)
INR BLD: 5.24 (ref 0.88–1.12)
LYMPHOCYTES ABSOLUTE: 0.6 K/UL (ref 1–5.1)
LYMPHOCYTES RELATIVE PERCENT: 5 %
MCH RBC QN AUTO: 24.5 PG (ref 26–34)
MCHC RBC AUTO-ENTMCNC: 31.1 G/DL (ref 31–36)
MCV RBC AUTO: 78.7 FL (ref 80–100)
MONOCYTES ABSOLUTE: 0.5 K/UL (ref 0–1.3)
MONOCYTES RELATIVE PERCENT: 4 %
NEUTROPHILS ABSOLUTE: 10.5 K/UL (ref 1.7–7.7)
NEUTROPHILS RELATIVE PERCENT: 90 %
NUCLEATED RED BLOOD CELLS: 1 /100 WBC
PDW BLD-RTO: 16.7 % (ref 12.4–15.4)
PERFORMED ON: ABNORMAL
PLATELET # BLD: 453 K/UL (ref 135–450)
PLATELET SLIDE REVIEW: ABNORMAL
PMV BLD AUTO: 7 FL (ref 5–10.5)
POLYCHROMASIA: ABNORMAL
POTASSIUM REFLEX MAGNESIUM: 4.3 MMOL/L (ref 3.5–5.1)
PROTHROMBIN TIME: 63.4 SEC (ref 9.9–12.7)
RBC # BLD: 3.81 M/UL (ref 4–5.2)
SLIDE REVIEW: ABNORMAL
SODIUM BLD-SCNC: 141 MMOL/L (ref 136–145)
TOTAL PROTEIN: 6.1 G/DL (ref 6.4–8.2)
WBC # BLD: 11.5 K/UL (ref 4–11)

## 2021-09-19 PROCEDURE — 2700000000 HC OXYGEN THERAPY PER DAY

## 2021-09-19 PROCEDURE — 2500000003 HC RX 250 WO HCPCS: Performed by: INTERNAL MEDICINE

## 2021-09-19 PROCEDURE — 6370000000 HC RX 637 (ALT 250 FOR IP): Performed by: INTERNAL MEDICINE

## 2021-09-19 PROCEDURE — 99233 SBSQ HOSP IP/OBS HIGH 50: CPT | Performed by: INTERNAL MEDICINE

## 2021-09-19 PROCEDURE — 6360000002 HC RX W HCPCS: Performed by: INTERNAL MEDICINE

## 2021-09-19 PROCEDURE — 2060000000 HC ICU INTERMEDIATE R&B

## 2021-09-19 PROCEDURE — 94761 N-INVAS EAR/PLS OXIMETRY MLT: CPT

## 2021-09-19 PROCEDURE — 99232 SBSQ HOSP IP/OBS MODERATE 35: CPT | Performed by: INTERNAL MEDICINE

## 2021-09-19 PROCEDURE — 85025 COMPLETE CBC W/AUTO DIFF WBC: CPT

## 2021-09-19 PROCEDURE — 85610 PROTHROMBIN TIME: CPT

## 2021-09-19 PROCEDURE — 2580000003 HC RX 258: Performed by: INTERNAL MEDICINE

## 2021-09-19 PROCEDURE — 80053 COMPREHEN METABOLIC PANEL: CPT

## 2021-09-19 PROCEDURE — 36415 COLL VENOUS BLD VENIPUNCTURE: CPT

## 2021-09-19 PROCEDURE — 6370000000 HC RX 637 (ALT 250 FOR IP): Performed by: PHYSICIAN ASSISTANT

## 2021-09-19 PROCEDURE — 2580000003 HC RX 258: Performed by: PHYSICIAN ASSISTANT

## 2021-09-19 PROCEDURE — 6360000002 HC RX W HCPCS

## 2021-09-19 PROCEDURE — 94640 AIRWAY INHALATION TREATMENT: CPT

## 2021-09-19 RX ORDER — LORAZEPAM 2 MG/ML
0.5 INJECTION INTRAMUSCULAR EVERY 4 HOURS PRN
Status: DISCONTINUED | OUTPATIENT
Start: 2021-09-19 | End: 2021-09-19

## 2021-09-19 RX ORDER — LORAZEPAM 2 MG/ML
1 INJECTION INTRAMUSCULAR
Status: DISCONTINUED | OUTPATIENT
Start: 2021-09-19 | End: 2021-09-20

## 2021-09-19 RX ORDER — LORAZEPAM 2 MG/ML
0.5 INJECTION INTRAMUSCULAR
Status: DISCONTINUED | OUTPATIENT
Start: 2021-09-19 | End: 2021-09-20

## 2021-09-19 RX ADMIN — Medication 2 PUFF: at 19:07

## 2021-09-19 RX ADMIN — SODIUM CHLORIDE, PRESERVATIVE FREE 10 ML: 5 INJECTION INTRAVENOUS at 09:07

## 2021-09-19 RX ADMIN — METOPROLOL SUCCINATE 75 MG: 50 TABLET, EXTENDED RELEASE ORAL at 22:44

## 2021-09-19 RX ADMIN — INSULIN LISPRO 12 UNITS: 100 INJECTION, SOLUTION INTRAVENOUS; SUBCUTANEOUS at 11:39

## 2021-09-19 RX ADMIN — MUPIROCIN: 20 OINTMENT TOPICAL at 09:23

## 2021-09-19 RX ADMIN — DILTIAZEM HYDROCHLORIDE 10 MG/HR: 5 INJECTION, SOLUTION INTRAVENOUS at 19:42

## 2021-09-19 RX ADMIN — QUETIAPINE FUMARATE 25 MG: 25 TABLET ORAL at 22:44

## 2021-09-19 RX ADMIN — METOPROLOL TARTRATE 50 MG: 50 TABLET, FILM COATED ORAL at 09:06

## 2021-09-19 RX ADMIN — Medication 5 MG: at 22:44

## 2021-09-19 RX ADMIN — METHYLPREDNISOLONE SODIUM SUCCINATE 40 MG: 40 INJECTION, POWDER, FOR SOLUTION INTRAMUSCULAR; INTRAVENOUS at 09:07

## 2021-09-19 RX ADMIN — Medication 2000 UNITS: at 09:06

## 2021-09-19 RX ADMIN — Medication 2 PUFF: at 07:31

## 2021-09-19 RX ADMIN — METHYLPREDNISOLONE SODIUM SUCCINATE 40 MG: 40 INJECTION, POWDER, FOR SOLUTION INTRAMUSCULAR; INTRAVENOUS at 22:45

## 2021-09-19 RX ADMIN — LORAZEPAM 1 MG: 2 INJECTION INTRAMUSCULAR; INTRAVENOUS at 22:43

## 2021-09-19 RX ADMIN — LORAZEPAM 0.5 MG: 2 INJECTION INTRAMUSCULAR; INTRAVENOUS at 12:41

## 2021-09-19 RX ADMIN — DIGOXIN 125 MCG: 125 TABLET ORAL at 09:05

## 2021-09-19 RX ADMIN — SODIUM CHLORIDE, PRESERVATIVE FREE 10 ML: 5 INJECTION INTRAVENOUS at 16:57

## 2021-09-19 RX ADMIN — INSULIN LISPRO 6 UNITS: 100 INJECTION, SOLUTION INTRAVENOUS; SUBCUTANEOUS at 17:03

## 2021-09-19 RX ADMIN — PYRIDOXINE HCL TAB 50 MG 50 MG: 50 TAB at 09:06

## 2021-09-19 RX ADMIN — INSULIN LISPRO 3 UNITS: 100 INJECTION, SOLUTION INTRAVENOUS; SUBCUTANEOUS at 22:50

## 2021-09-19 RX ADMIN — REMDESIVIR 100 MG: 100 INJECTION, POWDER, LYOPHILIZED, FOR SOLUTION INTRAVENOUS at 11:36

## 2021-09-19 RX ADMIN — POLYETHYLENE GLYCOL (3350) 17 G: 17 POWDER, FOR SOLUTION ORAL at 09:07

## 2021-09-19 RX ADMIN — DILTIAZEM HYDROCHLORIDE 10 MG/HR: 5 INJECTION, SOLUTION INTRAVENOUS at 07:00

## 2021-09-19 RX ADMIN — BUPROPION HYDROCHLORIDE 150 MG: 150 TABLET, EXTENDED RELEASE ORAL at 09:06

## 2021-09-19 RX ADMIN — SPIRONOLACTONE 25 MG: 25 TABLET ORAL at 09:06

## 2021-09-19 RX ADMIN — Medication 2 PUFF: at 11:04

## 2021-09-19 RX ADMIN — DOXYCYCLINE 100 MG: 100 INJECTION, POWDER, LYOPHILIZED, FOR SOLUTION INTRAVENOUS at 12:50

## 2021-09-19 RX ADMIN — INSULIN GLARGINE 25 UNITS: 100 INJECTION, SOLUTION SUBCUTANEOUS at 22:50

## 2021-09-19 RX ADMIN — DOXYCYCLINE 100 MG: 100 INJECTION, POWDER, LYOPHILIZED, FOR SOLUTION INTRAVENOUS at 01:48

## 2021-09-19 RX ADMIN — INSULIN LISPRO 6 UNITS: 100 INJECTION, SOLUTION INTRAVENOUS; SUBCUTANEOUS at 09:07

## 2021-09-19 RX ADMIN — LORAZEPAM 0.5 MG: 2 INJECTION INTRAMUSCULAR; INTRAVENOUS at 16:59

## 2021-09-19 RX ADMIN — TORSEMIDE 20 MG: 20 TABLET ORAL at 09:11

## 2021-09-19 ASSESSMENT — PAIN DESCRIPTION - PROGRESSION
CLINICAL_PROGRESSION: NOT CHANGED

## 2021-09-19 NOTE — PROGRESS NOTES
Assessment completed, see flowsheets. Night meds given. Pt still on non rebreather, saturation 93%. 5 units ss humalog, and 25 units lantus given for fsbs 251. Pt with no reports of pain at this time. Bed/chair alarm on, bed in lowest position with call light in reach.     Bernarda Eason RN

## 2021-09-19 NOTE — PROGRESS NOTES
Shift report given to [facility]. Pt. Denies needs at this time, call light within reach.

## 2021-09-19 NOTE — PROGRESS NOTES
Call from pts. Granddaughter Oswaldo Hooks who is inquiring about information. Spoke with pt. Regarding granddaughter not being on the emergency contact list. Pt. States that she does not want this RN to give out information and to have granddaughter call her son Pastor Gonzalse. Oswaldo Hooks updated to call her uncle Pastor Gonzales for information.  Larayne Blizzard, RN

## 2021-09-19 NOTE — PROGRESS NOTES
Hospitalist Progress Note      PCP: Lety Lopez APRN - CNP    Date of Admission: 9/14/2021    Subjective: diuresed 9L, still on 15L O2    Medications:  Reviewed    Infusion Medications    dextrose      sodium chloride 25 mL (09/18/21 0040)    dilTIAZem 10 mg/hr (09/19/21 0700)     Scheduled Medications    metoprolol succinate  75 mg Oral BID    insulin glargine  25 Units SubCUTAneous Nightly    metoprolol tartrate  25 mg Oral Once    digoxin  125 mcg Oral Daily    albuterol sulfate HFA  2 puff Inhalation 4x daily    mupirocin   Nasal BID    methylPREDNISolone  40 mg IntraVENous Q12H    doxycycline (VIBRAMYCIN) IV  100 mg IntraVENous Q12H    insulin lispro  0-18 Units SubCUTAneous TID WC    insulin lispro  0-9 Units SubCUTAneous Nightly    buPROPion  150 mg Oral QAM    polyethylene glycol  17 g Oral Daily    QUEtiapine  25 mg Oral Nightly    spironolactone  25 mg Oral Daily    torsemide  20 mg Oral Daily    vitamin B-6  50 mg Oral Daily    sodium chloride flush  5-40 mL IntraVENous 2 times per day    Vitamin D  2,000 Units Oral Daily     PRN Meds: LORazepam, LORazepam, HYDROmorphone, albuterol sulfate HFA, glucose, dextrose, glucagon (rDNA), dextrose, melatonin, sodium chloride flush, sodium chloride, ondansetron **OR** ondansetron, polyethylene glycol, acetaminophen **OR** acetaminophen, guaiFENesin-dextromethorphan      Intake/Output Summary (Last 24 hours) at 9/19/2021 1923  Last data filed at 9/19/2021 1635  Gross per 24 hour   Intake    Output 3300 ml   Net -3300 ml       Physical Exam Performed:    BP (!) 136/50   Pulse 92   Temp 97.6 °F (36.4 °C) (Oral)   Resp 20   Ht 5' 2\" (1.575 m)   Wt 272 lb (123.4 kg)   LMP  (LMP Unknown)   SpO2 91%   BMI 49.75 kg/m²     Gen: Mild respiratory distress  Eyes: PERRL. No sclera icterus. No conjunctival injection. ENT: No discharge. Pharynx clear. Neck: No JVD.  No Carotid Bruit. Trachea midline. Resp: No accessory muscle use.  Acute on chronic respiratory failure with hypoxia (HCC) [J96.21]     Atrial fibrillation with RVR (HCC) [I48.91]          COVID 19 PNA  - CXR with bilateral interstitial ground glass opacities  - Admitted to ICU, telemetry  - PCT 0.22  - started solumedrol  D#6 (she also has severe COPD), remdesivir day 5  Tocilizumab x1 recieved   - pulmonology consulted  - on doxy     Acute on Chronic Hypoxic Respiratory Failure  - wears 4L NC at baseline  - likely 2/2 COVID and uncontrolled afib  - on BiPAP  - mgmt per pulmonology, control HR as below and mgmt of COVID as above  Currently on BiPAP all night and during the day with breaks  - O2 at 15L     Atrial Fibrillation with RVR  - hx of PAF, trop neg  - AC on Coumadin - currently supratherapeutic  - s/p dilt bolus > now on Dilt gtt - weaning  - cardiology consulted  She is currently on digoxin and metoprolol  CVR now     Transaminitis  - LFT slightly increased today  - cont to monitor LFT  - on remdesivir     COPD  With acute exacerbation. Continue steroids, inhaled bronchodilators(MDI)     Hyperglycemia - suspect early DM II  - pt is on steroids  - last Hgb A1c: 6.6, check Hgb A1c   - added low dose SSI and PRN hypoglycemia protocol  Added lantus nightly         Supratherapeutic INR  - hold Coumadin  - daily INR     Microcytic Anemia   - Hgb 10 > 9.4  - this is chronic  - stable     Chronic Diastolic CHF  - appears compensated   - last echo 6/2020: normal EF, grade I DD  - daily weights, I&Os  - cont Torsemide     Hx of PE  - AC on Coumadin - currently supra therapeutic  - daily PT/INR, holding Coumadin     B cell Lymphoma  - f/w Dr. Anya Felix     H/o Anovaginal Fistula  - noted per patient on prior admission     Morbid Obesity  - Body mass index is 49.04 kg/m². - Complicating assessment and treatment.  Placing patient at risk for multiple co-morbidities as well as early death and contributing to the patient's presentation.   - Counseled on weight loss.         DVT Prophylaxis: AC on Coumadin - supratherapeutic & on hold  Diet: ADULT DIET;  Regular; 4 carb choices (60 gm/meal)  Code Status: DNR-CC    PT/OT Eval Status: ordered    Oliverio Rendon MD

## 2021-09-19 NOTE — PROGRESS NOTES
Cardiology Progress Note     Admit Date: 2021     Reason for follow up: Atrial fibrillation and heart failure with preserved ejection fraction    Interval History:   - Patient seen and examined. - Clinical notes reviewed. - Telemetry reviewed. Rate controlled atrial fibrillation on diltiazem drip.  - No major events overnight.   - Continues to be short of breath intermittently. Physical Examination:  Vitals:    21 0845   BP: (!) 136/50   Pulse: 92   Resp: 18   Temp: 97.6 °F (36.4 °C)   SpO2: 93%        Intake/Output Summary (Last 24 hours) at 2021 0949  Last data filed at 2021 0829  Gross per 24 hour   Intake 1073.98 ml   Output 4425 ml   Net -3351.02 ml     In: 552 [P.O.:240; I.V.:115.5]  Out: 1775    Wt Readings from Last 3 Encounters:   21 272 lb (123.4 kg)   21 273 lb (123.8 kg)   21 273 lb (123.8 kg)     Temp  Av.9 °F (36.6 °C)  Min: 97.6 °F (36.4 °C)  Max: 98.1 °F (36.7 °C)  Pulse  Av  Min: 68  Max: 98  BP  Min: 107/54  Max: 136/50  SpO2  Av.4 %  Min: 85 %  Max: 96 %  FiO2   Av %  Min: 85 %  Max: 85 %    · Telemetry: Rate controlled atrial fibrillation   · Constitutional: Alert. Oriented to person, place, and time. No distress. · Head: Normocephalic and atraumatic. · Eyes: Conjunctivae normal. EOM are normal.   · Neck: Neck supple. No lymphadenopathy. No rigidity. No JVD present. · Cardiovascular: Irregular rate and rhythm w/o M/G/R  · Pulmonary/Chest: Moving air well. · Abdominal: Soft. Normal bowel sounds present. No distension, No tenderness. · Musculoskeletal: No tenderness. Trace edema    · Neurological: Alert and oriented. Cranial nerve II-XII grossly intact. · Skin: Skin is warm and dry. No rash, lesions, ulcerations noted. · Psychiatric: No anxiety nor agitation. Labs, diagnostic and imaging results reviewed. Reviewed.    Recent Labs     21  0453 21  0454 21  0433    142 141   K 4.2 4.6 4.3   CL 102 100 97*   CO2 32 32 37*   BUN 37* 40* 41*   CREATININE 1.1 0.9 0.8     Recent Labs     09/17/21  0453 09/18/21  0454 09/19/21  0433   WBC 10.2 9.7 11.5*   HGB 9.5* 9.3* 9.3*   HCT 30.2* 29.8* 30.0*   MCV 78.2* 78.1* 78.7*    388 453*     Lab Results   Component Value Date    TROPONINI <0.01 09/14/2021     Estimated Creatinine Clearance: 73 mL/min (based on SCr of 0.8 mg/dL).    Lab Results   Component Value Date    BNP 72 12/28/2012    BNP 21.9 03/28/2012     Lab Results   Component Value Date    PROTIME 63.4 09/19/2021    PROTIME 79.8 09/18/2021    PROTIME 92.5 09/17/2021    INR 5.24 09/19/2021    INR 6.53 09/18/2021    INR 7.53 09/17/2021     Lab Results   Component Value Date    CHOL 249 05/19/2021    HDL 63 05/19/2021    TRIG 195 05/19/2021       Scheduled Meds:   insulin glargine  25 Units SubCUTAneous Nightly    metoprolol tartrate  50 mg Oral BID    metoprolol tartrate  25 mg Oral Once    digoxin  125 mcg Oral Daily    albuterol sulfate HFA  2 puff Inhalation 4x daily    mupirocin   Nasal BID    remdesivir IVPB  100 mg IntraVENous Q24H    methylPREDNISolone  40 mg IntraVENous Q12H    doxycycline (VIBRAMYCIN) IV  100 mg IntraVENous Q12H    insulin lispro  0-18 Units SubCUTAneous TID WC    insulin lispro  0-9 Units SubCUTAneous Nightly    buPROPion  150 mg Oral QAM    polyethylene glycol  17 g Oral Daily    QUEtiapine  25 mg Oral Nightly    spironolactone  25 mg Oral Daily    torsemide  20 mg Oral Daily    vitamin B-6  50 mg Oral Daily    sodium chloride flush  5-40 mL IntraVENous 2 times per day    Vitamin D  2,000 Units Oral Daily     Continuous Infusions:   dextrose      sodium chloride 25 mL (09/18/21 0040)    dilTIAZem 10 mg/hr (09/19/21 0700)     PRN Meds:albuterol sulfate HFA, glucose, dextrose, glucagon (rDNA), dextrose, melatonin, sodium chloride flush, sodium chloride, ondansetron **OR** ondansetron, polyethylene glycol, acetaminophen **OR** acetaminophen, guaiFENesin-dextromethorphan     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    Nausea 06/18/2021    Polyp of colon 06/18/2021    Morbid obesity ([address] 06/18/2021    Sleep apnea     Restless legs syndrome     Chronic back pain     Panic disorder     Primary osteoarthritis of both knees 12/02/2020    Acute pain of right knee 12/02/2020    Moderate episode of recurrent major depressive disorder ([address] 10/23/2020    Primary insomnia 10/23/2020    Elevated lactic acid level     History of diverticulitis 10/10/2020    HTN (hypertension), benign     Dyslipidemia     TIA (transient ischemic attack) 06/14/2020    CHF (congestive heart failure) ([address]     PVD (peripheral vascular disease) with claudication ([address] 11/01/2019    Non-Hodgkin lymphoma ([address] 09/06/2019    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)     Pulmonary nodule     Atrial fibrillation, chronic (HCC)     Lung mass     Mediastinal lymphadenopathy     IFG (impaired fasting glucose) 09/13/2018    Erythrocytosis 08/22/2018    Angina pectoris (HCC)     Paroxysmal atrial fibrillation (HCC)     Bilateral edema of lower extremity 01/18/2017    Acute on chronic respiratory failure with hypoxia ([address] 90/00/4351    Diastolic dysfunction     Shortness of breath 05/02/2016    Venous (peripheral) insufficiency 01/05/2016    Lymphedema 01/05/2016    Chronic angle-closure glaucoma of both eyes 01/05/2016    Arthritis of hips and knees 01/05/2016    Other pulmonary embolism without acute cor pulmonale ([address] 06/27/2015    Atrial fibrillation with RVR ([address] 06/23/2015    Carotid stenosis 10/27/2014    Vitamin D deficiency     COPD, severe ([facility] 05/09/2013    Anxiety 02/08/2013    Hyperlipidemia 02/08/2013    Diverticulosis 02/08/2013    Chronic airway obstruction, not elsewhere classified 01/10/2013    Chronic respiratory failure with hypoxia ([facility] [address] 03/29/2012      Active Hospital Problems    Diagnosis Date Noted    COPD exacerbation [J44.1] 03/28/2012     Priority: High    Acute on chronic respiratory failure with hypoxia and hypercapnia (HCC) [J96.21, J96.22]     Anticoagulated on Coumadin [Z79.01]     Acute hypoxemic respiratory failure due to COVID-19 ([facility] [address] [U07.1, J96.01] 09/14/2021    Pneumonia due to COVID-19 virus [U07.1, J12.82] 09/14/2021    Acute on chronic respiratory failure with hypoxia ([facility] [address] [J96.21] 01/02/2017    [facility] fibrillation with RVR [facility] [I48.91] 06/23/2015     Mrs. Darryl Powers is a pleasant 68year old female with a medical history significant for persistent atrial fibrillation, severe COPD, heart failure with preserved ejection fraction, cerebral vascular disease, peripheral vascular disease and hypertension who presents from home with acute on chronic respiratory failure secondary to COVID-19 pneumonia. Assessment and Plan:   1. Persistent atrial fibrillation ([id number] score 4). 09/18/2021  Patient is a pleasant 68year old female with a medical history significant for persistent atrial fibrillation, heart failure with preserved ejection fraction, COPD, peripheral vascular disease, and hypertension who presented from home with acute on chronic respiratory failure secondary to COVID-19 pneumonia and acute on chronic diastolic heart failure exacerbation. She continues in rate controlled atrial fibrillation. Her INR is elevated so her warfarin is on hold. - Continue digoxin. - Continue metoprolol.  - Restart warfarin once INR less than or near 3.  - We will continue to follow along with you.    09/19/2021  INR continues to trend lower. Rates controlled. Now taking PO.    - Start to wean diltiazem and increase metoprolol (transition to succinate). - Continue digoxin. - We will continue to follow along with you. 2. Heart failure with preserved ejection fraction. 09/18/2021  Patient presented with some with some volume overload. Appears euvolemic on exam.  She may tolerate more diuresis if other providers would like but I'm ok with keeping her on her torsemide.  - Continue torsemide.  - We will continue to follow along with you.    09/19/2021  Stable. - Continue torsemide. 3. COVID-19 pneumonia. - Per primary team.    4. Acute on chronic respiratory failure. - Plan as per above. Thank you for allowing me to participate in the care of this patient. If you have any questions, please do not hesitate to contact me.     Hiwot Fung MD  Cardiac Electrophysiology  [address] [facility]  [phone] [facility]

## 2021-09-19 NOTE — PROGRESS NOTES
Pt. Shift assessment completed - see flowsheet. Pt. Denies needs at this time. Call light within reach.

## 2021-09-19 NOTE — PROGRESS NOTES
Patient refusing bipap at this time, states it is \"killing her throat\" Placed back on 15lpm high flow n/c. 94% o2 sat at this time.      Ruperto Lindquist RN

## 2021-09-19 NOTE — PROGRESS NOTES
09/19/21 0307   NIV Type   Equipment Type v60   Mode Bilevel   Mask Type Full face mask   Mask Size Medium   Settings/Measurements   IPAP 15 cmH20   CPAP/EPAP 6 cmH2O   Rate Ordered 12   Resp 14   FiO2  85 %   Vt Exhaled 557 mL   Minute Volume 8.1 Liters   Mask Leak (lpm) 0 lpm   Comfort Level Good   Using Accessory Muscles No   SpO2 94   Patient Observation   Observations spo2 94% on 85% bipap

## 2021-09-19 NOTE — PROGRESS NOTES
Pulmonary Progress Note  CC: COVID-19, respiratory failure    Subjective:    BiPAP d/t desat to 85% on 15 L O2 HFNC  BiPAP hurts her throat. Was switched to nonrebreather  Uncomfortable and anxious  She is requesting comfort medications and recognizes that the end of life is probably near. She is asked that her son be called. IV line: peripheral    EXAM:   /70   Pulse 98   Temp 98.1 °F (36.7 °C) (Axillary)   Resp 14   Ht 5' 2\" (1.575 m)   Wt 272 lb (123.4 kg)   LMP  (LMP Unknown)   SpO2 93%   BMI 49.75 kg/m²  on HFNO  General: NAD. Appears worse, uncomfortable, and ill. Eyes: PERRL. No sclera icterus. No conjunctival injection. ENT: No discharge. Pharynx clear. Neck: Trachea midline. Normal thyroid. Resp: Increased work of breathing. Few crackles. No wheezing. No rhonchi. No dullness on percussion. CV: Regular rate. Regular rhythm. No mumur or rub. No edema. GI: Non-tender. Non-distended. No masses. No organomegaly. Normal bowel sounds. No hernia. Skin: Warm and dry. No nodule on exposed extremities. No rash on exposed extremities. Lymph: No cervical LAD. No supraclavicular LAD. M/S: No cyanosis. No joint deformity. No clubbing. Neuro: Alert and oriented to person and events. Patellar reflexes are symmetric. Psych: No agitation, + anxiety, affect is full.      Scheduled Meds:   insulin glargine  25 Units SubCUTAneous Nightly    metoprolol tartrate  50 mg Oral BID    metoprolol tartrate  25 mg Oral Once    digoxin  125 mcg Oral Daily    albuterol sulfate HFA  2 puff Inhalation 4x daily    mupirocin   Nasal BID    remdesivir IVPB  100 mg IntraVENous Q24H    methylPREDNISolone  40 mg IntraVENous Q12H    doxycycline (VIBRAMYCIN) IV  100 mg IntraVENous Q12H    insulin lispro  0-18 Units SubCUTAneous TID WC    insulin lispro  0-9 Units SubCUTAneous Nightly    buPROPion  150 mg Oral QAM    polyethylene glycol  17 g Oral Daily    QUEtiapine  25 mg Oral Nightly    spironolactone  25 mg Oral Daily    torsemide  20 mg Oral Daily    vitamin B-6  50 mg Oral Daily    sodium chloride flush  5-40 mL IntraVENous 2 times per day    Vitamin D  2,000 Units Oral Daily     Continuous Infusions:   dextrose      sodium chloride 25 mL (09/18/21 0040)    dilTIAZem 10 mg/hr (09/19/21 0700)     PRN Meds:  albuterol sulfate HFA, glucose, dextrose, glucagon (rDNA), dextrose, melatonin, sodium chloride flush, sodium chloride, ondansetron **OR** ondansetron, polyethylene glycol, acetaminophen **OR** acetaminophen, guaiFENesin-dextromethorphan    Labs:  CBC:   Recent Labs     09/17/21 0453 09/18/21 0454 09/19/21 0433   WBC 10.2 9.7 11.5*   HGB 9.5* 9.3* 9.3*   HCT 30.2* 29.8* 30.0*   MCV 78.2* 78.1* 78.7*    388 453*     BMP:   Recent Labs     09/17/21 0453 09/18/21 0454 09/19/21 0433    142 141   K 4.2 4.6 4.3    100 97*   CO2 32 32 37*   BUN 37* 40* 41*   CREATININE 1.1 0.9 0.8     Micro:  9/14/2021 SARS-CoV-2 positive  9/14/2021 blood cultures NGTD    Imaging:  Chest imaging was reviewed by me and showed   CXR 9/14/2021 bilateral infiltrates consistent with Covid pneumonia    ASSESSMENT:  · Acute on chronic hypoxemic and hypercapnic respiratory failure, baseline 4 L qhs and 6 L with exertion  · COVID-19 pneumonia in an unvaccinated patient   · Severe COPD with acute exacerbation, f/b Dr. Halie Espinoza previously   · Leukocytosis, new  · Elevated LFTs  · Anemia  · A. fib with RVR, h/o paroxysmal atrial fibrillation  · Chronic diastolic CHF, seen by cards  · H/O PE on coumadin, supratherapeutic INR - improving  · B cell lymphoma   · SHANNON     PLAN:   COVID-19 isolation, droplet plus   Initiate Airvo.  May de-escalate to nc if pt desires   Remdesevir D#5, LFT monitoring for high risk medication   IV Solu-Medrol D#5, 40 BID     Tocilizumab x1 on 9/15/21   Doxycycline D#5/7     Amiodarone drip, Cardizem infusion, per cardiology   On chronic Coumadin, currently supratherapeutic   CODE STATUS DNR CCA.  I personally spoke with her son on the phone after speaking with her at the bedside about initiating comfort care. Ativan 0.5 prn anxiety; son coming in. After she has a chance to visit with family, we will offer additional comfort medications as needed.

## 2021-09-19 NOTE — PROGRESS NOTES
09/18/21 2312   NIV Type   Equipment Type v60   Mode Bilevel   Mask Type Full face mask   Mask Size Medium   Settings/Measurements   IPAP 15 cmH20   CPAP/EPAP 6 cmH2O   Rate Ordered 12   Resp 27   FiO2  85 %   Vt Exhaled 575 mL   Minute Volume 14.6 Liters   Mask Leak (lpm) 21 lpm   Comfort Level Good   Using Accessory Muscles No   SpO2 96   Patient Observation   Observations spo2 96% on 85% bipap        09/18/21 2312   NIV Type   Equipment Type v60   Mode Bilevel   Mask Type Full face mask   Mask Size Medium   Settings/Measurements   IPAP 15 cmH20   CPAP/EPAP 6 cmH2O   Rate Ordered 12   Resp 27   FiO2  85 %   Vt Exhaled 575 mL   Minute Volume 14.6 Liters   Mask Leak (lpm) 21 lpm   Comfort Level Good   Using Accessory Muscles No   SpO2 96   Patient Observation   Observations spo2 96% on 85% bipap

## 2021-09-20 ENCOUNTER — CARE COORDINATION (OUTPATIENT)
Dept: CARE COORDINATION | Age: 81
End: 2021-09-20

## 2021-09-20 LAB
A/G RATIO: 1 (ref 1.1–2.2)
ALBUMIN SERPL-MCNC: 3 G/DL (ref 3.4–5)
ALP BLD-CCNC: 74 U/L (ref 40–129)
ALT SERPL-CCNC: 93 U/L (ref 10–40)
ANION GAP SERPL CALCULATED.3IONS-SCNC: 9 MMOL/L (ref 3–16)
ANISOCYTOSIS: ABNORMAL
AST SERPL-CCNC: 70 U/L (ref 15–37)
ATYPICAL LYMPHOCYTE RELATIVE PERCENT: 1 % (ref 0–6)
BANDED NEUTROPHILS RELATIVE PERCENT: 2 % (ref 0–7)
BASOPHILS ABSOLUTE: 0 K/UL (ref 0–0.2)
BASOPHILS RELATIVE PERCENT: 0 %
BILIRUB SERPL-MCNC: 0.4 MG/DL (ref 0–1)
BUN BLDV-MCNC: 39 MG/DL (ref 7–20)
CALCIUM SERPL-MCNC: 8.3 MG/DL (ref 8.3–10.6)
CHLORIDE BLD-SCNC: 97 MMOL/L (ref 99–110)
CO2: 35 MMOL/L (ref 21–32)
CREAT SERPL-MCNC: 0.9 MG/DL (ref 0.6–1.2)
EOSINOPHILS ABSOLUTE: 0 K/UL (ref 0–0.6)
EOSINOPHILS RELATIVE PERCENT: 0 %
FERRITIN: 150 NG/ML (ref 15–150)
FOLATE: 13.23 NG/ML (ref 4.78–24.2)
GFR AFRICAN AMERICAN: >60
GFR NON-AFRICAN AMERICAN: >60
GLOBULIN: 3 G/DL
GLUCOSE BLD-MCNC: 192 MG/DL (ref 70–99)
GLUCOSE BLD-MCNC: 213 MG/DL (ref 70–99)
GLUCOSE BLD-MCNC: 239 MG/DL (ref 70–99)
GLUCOSE BLD-MCNC: 305 MG/DL (ref 70–99)
GLUCOSE BLD-MCNC: 353 MG/DL (ref 70–99)
HCT VFR BLD CALC: 30.9 % (ref 36–48)
HEMOGLOBIN: 9.7 G/DL (ref 12–16)
INR BLD: 5.1 (ref 0.88–1.12)
IRON SATURATION: 14 % (ref 15–50)
IRON: 40 UG/DL (ref 37–145)
LYMPHOCYTES ABSOLUTE: 0.5 K/UL (ref 1–5.1)
LYMPHOCYTES RELATIVE PERCENT: 3 %
MCH RBC QN AUTO: 24.7 PG (ref 26–34)
MCHC RBC AUTO-ENTMCNC: 31.5 G/DL (ref 31–36)
MCV RBC AUTO: 78.4 FL (ref 80–100)
MONOCYTES ABSOLUTE: 0.3 K/UL (ref 0–1.3)
MONOCYTES RELATIVE PERCENT: 2 %
NEUTROPHILS ABSOLUTE: 12.2 K/UL (ref 1.7–7.7)
NEUTROPHILS RELATIVE PERCENT: 92 %
PDW BLD-RTO: 16.9 % (ref 12.4–15.4)
PERFORMED ON: ABNORMAL
PLATELET # BLD: 497 K/UL (ref 135–450)
PLATELET SLIDE REVIEW: ABNORMAL
PMV BLD AUTO: 6.9 FL (ref 5–10.5)
POIKILOCYTES: ABNORMAL
POLYCHROMASIA: ABNORMAL
POTASSIUM REFLEX MAGNESIUM: 4.6 MMOL/L (ref 3.5–5.1)
PROTHROMBIN TIME: 61.7 SEC (ref 9.9–12.7)
RBC # BLD: 3.94 M/UL (ref 4–5.2)
SLIDE REVIEW: ABNORMAL
SODIUM BLD-SCNC: 141 MMOL/L (ref 136–145)
TOTAL IRON BINDING CAPACITY: 295 UG/DL (ref 260–445)
TOTAL PROTEIN: 6 G/DL (ref 6.4–8.2)
VITAMIN B-12: 1422 PG/ML (ref 211–911)
WBC # BLD: 13 K/UL (ref 4–11)

## 2021-09-20 PROCEDURE — 82728 ASSAY OF FERRITIN: CPT

## 2021-09-20 PROCEDURE — 36415 COLL VENOUS BLD VENIPUNCTURE: CPT

## 2021-09-20 PROCEDURE — 94761 N-INVAS EAR/PLS OXIMETRY MLT: CPT

## 2021-09-20 PROCEDURE — 82607 VITAMIN B-12: CPT

## 2021-09-20 PROCEDURE — 2500000003 HC RX 250 WO HCPCS: Performed by: INTERNAL MEDICINE

## 2021-09-20 PROCEDURE — 94640 AIRWAY INHALATION TREATMENT: CPT

## 2021-09-20 PROCEDURE — 82746 ASSAY OF FOLIC ACID SERUM: CPT

## 2021-09-20 PROCEDURE — 6370000000 HC RX 637 (ALT 250 FOR IP): Performed by: INTERNAL MEDICINE

## 2021-09-20 PROCEDURE — 2580000003 HC RX 258: Performed by: PHYSICIAN ASSISTANT

## 2021-09-20 PROCEDURE — 85025 COMPLETE CBC W/AUTO DIFF WBC: CPT

## 2021-09-20 PROCEDURE — 2700000000 HC OXYGEN THERAPY PER DAY

## 2021-09-20 PROCEDURE — 99232 SBSQ HOSP IP/OBS MODERATE 35: CPT | Performed by: INTERNAL MEDICINE

## 2021-09-20 PROCEDURE — 85610 PROTHROMBIN TIME: CPT

## 2021-09-20 PROCEDURE — 99233 SBSQ HOSP IP/OBS HIGH 50: CPT | Performed by: INTERNAL MEDICINE

## 2021-09-20 PROCEDURE — 2580000003 HC RX 258: Performed by: INTERNAL MEDICINE

## 2021-09-20 PROCEDURE — 83540 ASSAY OF IRON: CPT

## 2021-09-20 PROCEDURE — 6370000000 HC RX 637 (ALT 250 FOR IP): Performed by: PHYSICIAN ASSISTANT

## 2021-09-20 PROCEDURE — 6360000002 HC RX W HCPCS: Performed by: INTERNAL MEDICINE

## 2021-09-20 PROCEDURE — 2060000000 HC ICU INTERMEDIATE R&B

## 2021-09-20 PROCEDURE — 94660 CPAP INITIATION&MGMT: CPT

## 2021-09-20 PROCEDURE — 83550 IRON BINDING TEST: CPT

## 2021-09-20 PROCEDURE — 80053 COMPREHEN METABOLIC PANEL: CPT

## 2021-09-20 RX ORDER — LORAZEPAM 0.5 MG/1
0.5 TABLET ORAL EVERY 6 HOURS PRN
Status: DISCONTINUED | OUTPATIENT
Start: 2021-09-20 | End: 2021-09-29 | Stop reason: HOSPADM

## 2021-09-20 RX ORDER — PREDNISONE 20 MG/1
40 TABLET ORAL DAILY
Status: DISCONTINUED | OUTPATIENT
Start: 2021-09-21 | End: 2021-09-25

## 2021-09-20 RX ORDER — DILTIAZEM HYDROCHLORIDE 60 MG/1
30 TABLET, FILM COATED ORAL EVERY 6 HOURS SCHEDULED
Status: DISCONTINUED | OUTPATIENT
Start: 2021-09-20 | End: 2021-09-21

## 2021-09-20 RX ORDER — LORAZEPAM 2 MG/ML
1 INJECTION INTRAMUSCULAR EVERY 4 HOURS PRN
Status: DISCONTINUED | OUTPATIENT
Start: 2021-09-20 | End: 2021-09-21

## 2021-09-20 RX ORDER — BUSPIRONE HYDROCHLORIDE 5 MG/1
5 TABLET ORAL 3 TIMES DAILY
Status: DISCONTINUED | OUTPATIENT
Start: 2021-09-20 | End: 2021-09-29 | Stop reason: HOSPADM

## 2021-09-20 RX ADMIN — INSULIN LISPRO 15 UNITS: 100 INJECTION, SOLUTION INTRAVENOUS; SUBCUTANEOUS at 13:23

## 2021-09-20 RX ADMIN — DILTIAZEM HYDROCHLORIDE 5 MG/HR: 5 INJECTION, SOLUTION INTRAVENOUS at 18:07

## 2021-09-20 RX ADMIN — METHYLPREDNISOLONE SODIUM SUCCINATE 40 MG: 40 INJECTION, POWDER, FOR SOLUTION INTRAMUSCULAR; INTRAVENOUS at 08:16

## 2021-09-20 RX ADMIN — INSULIN GLARGINE 25 UNITS: 100 INJECTION, SOLUTION SUBCUTANEOUS at 22:51

## 2021-09-20 RX ADMIN — SPIRONOLACTONE 25 MG: 25 TABLET ORAL at 08:16

## 2021-09-20 RX ADMIN — Medication 2 PUFF: at 10:56

## 2021-09-20 RX ADMIN — BUSPIRONE HYDROCHLORIDE 5 MG: 5 TABLET ORAL at 13:20

## 2021-09-20 RX ADMIN — DILTIAZEM HYDROCHLORIDE 30 MG: 60 TABLET, FILM COATED ORAL at 17:57

## 2021-09-20 RX ADMIN — SODIUM CHLORIDE, PRESERVATIVE FREE 10 ML: 5 INJECTION INTRAVENOUS at 22:47

## 2021-09-20 RX ADMIN — Medication 10 ML: at 09:32

## 2021-09-20 RX ADMIN — DOXYCYCLINE 100 MG: 100 INJECTION, POWDER, LYOPHILIZED, FOR SOLUTION INTRAVENOUS at 13:19

## 2021-09-20 RX ADMIN — BUSPIRONE HYDROCHLORIDE 5 MG: 5 TABLET ORAL at 09:32

## 2021-09-20 RX ADMIN — METOPROLOL SUCCINATE 75 MG: 50 TABLET, EXTENDED RELEASE ORAL at 08:19

## 2021-09-20 RX ADMIN — LORAZEPAM 1 MG: 2 INJECTION INTRAMUSCULAR; INTRAVENOUS at 15:46

## 2021-09-20 RX ADMIN — Medication 2 PUFF: at 18:39

## 2021-09-20 RX ADMIN — METOPROLOL SUCCINATE 75 MG: 50 TABLET, EXTENDED RELEASE ORAL at 22:48

## 2021-09-20 RX ADMIN — SODIUM CHLORIDE, PRESERVATIVE FREE 10 ML: 5 INJECTION INTRAVENOUS at 08:20

## 2021-09-20 RX ADMIN — INSULIN LISPRO 3 UNITS: 100 INJECTION, SOLUTION INTRAVENOUS; SUBCUTANEOUS at 16:54

## 2021-09-20 RX ADMIN — POLYETHYLENE GLYCOL (3350) 17 G: 17 POWDER, FOR SOLUTION ORAL at 08:16

## 2021-09-20 RX ADMIN — BUPROPION HYDROCHLORIDE 150 MG: 150 TABLET, EXTENDED RELEASE ORAL at 08:16

## 2021-09-20 RX ADMIN — QUETIAPINE FUMARATE 25 MG: 25 TABLET ORAL at 22:49

## 2021-09-20 RX ADMIN — INSULIN LISPRO 12 UNITS: 100 INJECTION, SOLUTION INTRAVENOUS; SUBCUTANEOUS at 08:24

## 2021-09-20 RX ADMIN — PYRIDOXINE HCL TAB 50 MG 50 MG: 50 TAB at 08:17

## 2021-09-20 RX ADMIN — Medication 2000 UNITS: at 08:16

## 2021-09-20 RX ADMIN — DOXYCYCLINE 100 MG: 100 INJECTION, POWDER, LYOPHILIZED, FOR SOLUTION INTRAVENOUS at 02:01

## 2021-09-20 RX ADMIN — TORSEMIDE 20 MG: 20 TABLET ORAL at 08:17

## 2021-09-20 RX ADMIN — Medication 2 PUFF: at 07:24

## 2021-09-20 RX ADMIN — DILTIAZEM HYDROCHLORIDE 30 MG: 60 TABLET, FILM COATED ORAL at 13:20

## 2021-09-20 RX ADMIN — DILTIAZEM HYDROCHLORIDE 10 MG/HR: 5 INJECTION, SOLUTION INTRAVENOUS at 06:49

## 2021-09-20 RX ADMIN — LORAZEPAM 1 MG: 2 INJECTION INTRAMUSCULAR; INTRAVENOUS at 22:47

## 2021-09-20 RX ADMIN — BUSPIRONE HYDROCHLORIDE 5 MG: 5 TABLET ORAL at 22:49

## 2021-09-20 RX ADMIN — LORAZEPAM 1 MG: 2 INJECTION INTRAMUSCULAR; INTRAVENOUS at 09:31

## 2021-09-20 RX ADMIN — DIGOXIN 125 MCG: 125 TABLET ORAL at 08:20

## 2021-09-20 RX ADMIN — HYDROMORPHONE HYDROCHLORIDE 0.5 MG: 1 INJECTION, SOLUTION INTRAMUSCULAR; INTRAVENOUS; SUBCUTANEOUS at 15:46

## 2021-09-20 RX ADMIN — INSULIN LISPRO 3 UNITS: 100 INJECTION, SOLUTION INTRAVENOUS; SUBCUTANEOUS at 22:51

## 2021-09-20 ASSESSMENT — PAIN - FUNCTIONAL ASSESSMENT: PAIN_FUNCTIONAL_ASSESSMENT: PREVENTS OR INTERFERES SOME ACTIVE ACTIVITIES AND ADLS

## 2021-09-20 ASSESSMENT — PAIN DESCRIPTION - PROGRESSION
CLINICAL_PROGRESSION: GRADUALLY WORSENING
CLINICAL_PROGRESSION: NOT CHANGED

## 2021-09-20 ASSESSMENT — PAIN DESCRIPTION - ONSET: ONSET: ON-GOING

## 2021-09-20 ASSESSMENT — PAIN DESCRIPTION - FREQUENCY: FREQUENCY: CONTINUOUS

## 2021-09-20 ASSESSMENT — PAIN DESCRIPTION - LOCATION: LOCATION: BACK

## 2021-09-20 ASSESSMENT — PAIN DESCRIPTION - PAIN TYPE: TYPE: ACUTE PAIN

## 2021-09-20 ASSESSMENT — PAIN DESCRIPTION - ORIENTATION: ORIENTATION: MID;RIGHT;LEFT

## 2021-09-20 ASSESSMENT — PAIN DESCRIPTION - DESCRIPTORS: DESCRIPTORS: CONSTANT

## 2021-09-20 ASSESSMENT — PAIN SCALES - GENERAL: PAINLEVEL_OUTOF10: 7

## 2021-09-20 NOTE — PROGRESS NOTES
Pt. Assessment completed - see flowsheet. PT. Denies any needs at this time, call light within reach.

## 2021-09-20 NOTE — PROGRESS NOTES
Pt. Very anxious at this time. States she can not get comfortable. Pt. Repositioned for comfort. PRN Ativan given per [facility] order. Pt. denies further needs at this time. Will continue to monitor. Call light is within reach.   Alessio Andrews RN

## 2021-09-20 NOTE — PROGRESS NOTES
Pulmonary Progress Note  CC: COVID-19, respiratory failure    Subjective:    airVO 100/35  Continues to be very uncomfortable & anxious @ times  Yesterday, comfort medications were initiated and son was called   Pt recognizes that end of life is probably near. IV line: peripheral    EXAM:   BP (!) 110/56   Pulse 85   Temp 98 °F (36.7 °C) (Oral)   Resp 18   Ht 5' 2\" (1.575 m)   Wt 272 lb (123.4 kg)   LMP  (LMP Unknown)   SpO2 93%   BMI 49.75 kg/m²  on airVO 100/35  General: NAD. Did receive ativan  Eyes: PERRL. No sclera icterus. No conjunctival injection. ENT: No discharge. Pharynx clear. Neck: Trachea midline. Normal thyroid. Resp: Some increased work of breathing. Few crackles. No wheezing. No rhonchi. No dullness on percussion. CV: Regular rate. Regular rhythm. No mumur or rub. No edema. GI: Non-tender. Non-distended. No masses. No organomegaly. Normal bowel sounds. No hernia. Skin: Warm and dry. No nodule on exposed extremities. No rash on exposed extremities. Lymph: No cervical LAD. No supraclavicular LAD. M/S: No cyanosis. No joint deformity. No clubbing. Neuro: Alert and oriented to person and events. Patellar reflexes are symmetric. Psych: No agitation, + anxiety, affect is full.      Scheduled Meds:   busPIRone  5 mg Oral TID    [START ON 9/21/2021] predniSONE  40 mg Oral Daily    metoprolol succinate  75 mg Oral BID    insulin glargine  25 Units SubCUTAneous Nightly    digoxin  125 mcg Oral Daily    albuterol sulfate HFA  2 puff Inhalation 4x daily    doxycycline (VIBRAMYCIN) IV  100 mg IntraVENous Q12H    insulin lispro  0-18 Units SubCUTAneous TID WC    insulin lispro  0-9 Units SubCUTAneous Nightly    buPROPion  150 mg Oral QAM    polyethylene glycol  17 g Oral Daily    QUEtiapine  25 mg Oral Nightly    spironolactone  25 mg Oral Daily    torsemide  20 mg Oral Daily    vitamin B-6  50 mg Oral Daily    sodium chloride flush  5-40 mL IntraVENous 2 times per day  Vitamin D  2,000 Units Oral Daily     Continuous Infusions:   dextrose      sodium chloride 25 mL (09/18/21 0040)    dilTIAZem 10 mg/hr (09/20/21 0649)     PRN Meds:  LORazepam, HYDROmorphone, LORazepam, albuterol sulfate HFA, glucose, dextrose, glucagon (rDNA), dextrose, melatonin, sodium chloride flush, sodium chloride, ondansetron **OR** ondansetron, polyethylene glycol, acetaminophen **OR** acetaminophen, guaiFENesin-dextromethorphan    Labs:  CBC:   Recent Labs     09/18/21 0454 09/19/21 0433 09/20/21 0437   WBC 9.7 11.5* 13.0*   HGB 9.3* 9.3* 9.7*   HCT 29.8* 30.0* 30.9*   MCV 78.1* 78.7* 78.4*    453* 497*     BMP:   Recent Labs     09/18/21 0454 09/19/21 0433 09/20/21 0437    141 141   K 4.6 4.3 4.6    97* 97*   CO2 32 37* 35*   BUN 40* 41* 39*   CREATININE 0.9 0.8 0.9     Micro:  9/14/2021 SARS-CoV-2 positive  9/14/2021 blood cultures NGTD    Imaging:  Chest imaging was reviewed by me and showed   CXR 9/14/2021 bilateral infiltrates consistent with Covid pneumonia    ASSESSMENT:  · Acute on chronic hypoxemic & hypercapnic respiratory failure, baseline 4 L qhs and 6 L with exertion  · COVID-19 pneumonia in an unvaccinated patient   · Severe COPD with acute exacerbation, f/b Dr. Keya Parmar previously   · Leukocytosis, new  · Elevated LFTs  · Anemia  · A. fib with RVR, h/o paroxysmal a. fib  · Chronic diastolic CHF, seen by cards  · H/O PE on coumadin, supratherapeutic INR - improving  · B cell lymphoma   · SHANONN     PLAN:   COVID-19 isolation, droplet plus   Airvo HHFNO for life threatening respiratory failure. No plan to escalate care.  Completed Remdesevir 5/5 days on 9/19/21   Steroid D#&, was IV Solu-Medrol & now changed to PO prednisone    Tocilizumab x1 on 9/15/21   Doxycycline D#6/7     Amiodarone drip, weaning Diltiazem infusion, per cardiology   On chronic Coumadin, currently supratherapeutic   CODE STATUS DNR CCA.     Patient and her son/POA have requested comfort medication when patient with difficulty breathing. Ativan 0.5 prn anxiety. Family visited. Plan to offer additional comfort medications as needed.

## 2021-09-20 NOTE — FLOWSHEET NOTE
09/20/21 1546   Pain Assessment   Pain Assessment 0-10   Pain Level 7   Pain Type Acute pain   Pain Location Back   Pain Orientation Mid;Right;Left   Pain Descriptors Constant   Pain Frequency Continuous   Pain Onset On-going   Clinical Progression Gradually worsening   Functional Pain Assessment Prevents or interferes some active activities and ADLs     Pt. Assisted onto bedpan. Small BM noted. Pt. Cleansed, matias care performed and pt. Repositioned. Pt. C/o pain as shown above. PRN Dilaudid given per [facility] order. Pt. Very anxious at this time. PRN ativan given per [facility] order. Pt. denies further needs at this time. Will continue to monitor. Call light is within reach.   Holly Noonan RN

## 2021-09-20 NOTE — FLOWSHEET NOTE
09/20/21 1226   Encounter Summary   Services provided to: Family; Patient   Referral/Consult From: [facility] Family members   Continue Visiting   (9/20 initial, sppt and prayer w/ family, then w/ Pt)   Complexity of Encounter Moderate   Length of Encounter 30 minutes   Spiritual/Advent   Type Spiritual support   Assessment Approachable; Anxious; Coping   Intervention Active listening;Explored feelings, thoughts, concerns; Discussed death;Discussed afterlife;Prayer;Discussed relationship with God;Discussed illness/injury and it's impact   Outcome Expressed gratitude;Engaged in conversation;Expressed feelings/needs/concerns;Receptive

## 2021-09-20 NOTE — PROGRESS NOTES
Occupational Therapy and Physical Therapy  Per medical team, patient is not appropriate for OT/PT at this time. Patient is transitioning to comfort care. Will discontinue OT/PT orders.        Fernie Pittman, OTR/L #[id number]

## 2021-09-20 NOTE — PROGRESS NOTES
Hospitalist Progress Note      PCP: TURNER Yee - CNP    Date of Admission: 9/14/2021    covid 19  Infection, hypoxia    Subjective:     Ms Kemar Silva seen up in bed, on diuresed 11 L, still on high oxygen requirements- on airVO2 at 35 %        After family discussion, comfort meds initiated yesterday by pulm    HR improving , remains on  cardizem gtt though     Medications:  Reviewed    Infusion Medications    dextrose      sodium chloride 25 mL (09/18/21 0040)    dilTIAZem 10 mg/hr (09/20/21 0649)     Scheduled Medications    metoprolol succinate  75 mg Oral BID    insulin glargine  25 Units SubCUTAneous Nightly    metoprolol tartrate  25 mg Oral Once    digoxin  125 mcg Oral Daily    albuterol sulfate HFA  2 puff Inhalation 4x daily    mupirocin   Nasal BID    methylPREDNISolone  40 mg IntraVENous Q12H    doxycycline (VIBRAMYCIN) IV  100 mg IntraVENous Q12H    insulin lispro  0-18 Units SubCUTAneous TID WC    insulin lispro  0-9 Units SubCUTAneous Nightly    buPROPion  150 mg Oral QAM    polyethylene glycol  17 g Oral Daily    QUEtiapine  25 mg Oral Nightly    spironolactone  25 mg Oral Daily    torsemide  20 mg Oral Daily    vitamin B-6  50 mg Oral Daily    sodium chloride flush  5-40 mL IntraVENous 2 times per day    Vitamin D  2,000 Units Oral Daily     PRN Meds: LORazepam, LORazepam, HYDROmorphone, albuterol sulfate HFA, glucose, dextrose, glucagon (rDNA), dextrose, melatonin, sodium chloride flush, sodium chloride, ondansetron **OR** ondansetron, polyethylene glycol, acetaminophen **OR** acetaminophen, guaiFENesin-dextromethorphan      Intake/Output Summary (Last 24 hours) at 9/20/2021 0717  Last data filed at 9/20/2021 0359  Gross per 24 hour   Intake    Output 3650 ml   Net -3650 ml       Physical Exam Performed:    BP (!) 121/57   Pulse 92   Temp 97.4 °F (36.3 °C) (Axillary)   Resp 18   Ht 5' 2\" (1.575 m)   Wt 272 lb (123.4 kg)   LMP  (LMP Unknown)   SpO2 90%   BMI 49.75 kg/m²         General: elderly female up in bed , on Airvo2 today   Awake, alert and oriented. Appears to be not in any distress  Mucous Membranes:  Pink , anicteric  Neck: No JVD, no carotid bruit, no thyromegaly  Chest:  Clear to auscultation bilaterally, diminished in bases   Cardiovascular:  Irregular  S1S2 heard, no murmurs or gallops  Abdomen:  Soft, undistended, non tender, no organomegaly, BS present  Extremities: resolving edema now trace, diistal pulses well felt  Neurological : grossly normal        Labs:   Recent Labs     09/18/21  0454 09/19/21  0433 09/20/21  0437   WBC 9.7 11.5* 13.0*   HGB 9.3* 9.3* 9.7*   HCT 29.8* 30.0* 30.9*    453* 497*     Recent Labs     09/18/21  0454 09/19/21  0433 09/20/21  0437    141 141   K 4.6 4.3 4.6    97* 97*   CO2 32 37* 35*   BUN 40* 41* 39*   CREATININE 0.9 0.8 0.9   CALCIUM 8.4 8.5 8.3     Recent Labs     09/18/21  0454 09/19/21  0433 09/20/21  0437   AST 76* 70* 70*   ALT 50* 70* 93*   BILITOT 0.3 0.4 0.4   ALKPHOS 82 94 74     Recent Labs     09/18/21  0453 09/19/21  0433 09/20/21  0437   INR 6.53* 5.24* 5.10*     No results for input(s): CKTOTAL, TROPONINI in the last 72 hours. Urinalysis:      Lab Results   Component Value Date    NITRU Negative 10/10/2020    WBCUA 3-5 10/10/2020    BACTERIA 4+ 10/10/2020    RBCUA 3-4 10/10/2020    BLOODU SMALL 10/10/2020    SPECGRAV 1.010 10/10/2020    GLUCOSEU Negative 10/10/2020       Radiology:  XR CHEST PORTABLE   Final Result   Bilateral interstitial groundglass opacities, likely representing COVID 19   pneumonia.            covid 19 infection         Assessment/Plan:    COVID 19 PNA  Acute on Chronic Hypoxic Respiratory Failure    - CXR with bilateral interstitial ground glass opacities  - Admitted to ICU, telemetry  - PCT 0.22  - started solumedrol IV  D#6 (she also has severe COPD), completed remdesivir   S/p - Tocilizumab x1   - wears 4.5 L at home and now worsened with CHF, copd and covid pna. Needing bipap    - pulmonology consulted  - persistent hypoxia remains , now on AirVo2 at 35 %  - after family discussion, comfort meds initiated for anxiety  - code status changed to [facility]          -   COPD  With acute exacerbation. Continue steroids, doxy ,inhaled bronchodilators(MDI)     Atrial Fibrillation with RVR  - hx of PAF, trop neg  - s/p dilt bolus > now on Dilt gtt - weaning  - also on digoxin and metoprolol- BB dose increased  - cardiology consulted  HR controlled now   -- AC on Coumadin - currently supratherapeutic  - ongoing diuresis with home demadex     Transaminitis  - cont to monitor LFT  - likely with covid infection and remdesivir use       Hyperglycemia - suspect early DM II  - pt is on steroids  - last Hgb A1c: 6.6, check Hgb A1c   - added low dose SSI and PRN hypoglycemia protocol  Added lantus nightly         Supratherapeutic INR  - hold Coumadin  - daily INR     Microcytic Anemia   - Hgb 10 > 9.4  -check iron studies    Chronic Diastolic CHF  - appears compensated   - last echo 6/2020: normal EF, grade I DD  - daily weights, I&Os  - cont Torsemide     Hx of PE  - AC on Coumadin - currently supra therapeutic  - daily PT/INR, holding Coumadin     B cell Lymphoma  - f/w Dr. Will Storm     H/o Anovaginal Fistula  - noted per patient on prior admission     Morbid Obesity  - Body mass index is 49.04 kg/m². - Complicating assessment and treatment. Placing patient at risk for multiple co-morbidities as well as early death and contributing to the patient's presentation.   - Counseled on weight loss.         DVT Prophylaxis: AC on Coumadin - supratherapeutic & on hold  Diet: ADULT DIET;  Regular; 4 carb choices (60 gm/meal)  Code Status: DNR-CC    PT/OT Eval Status: ordered    Comfort meds on board  Poor prognosis     Rj Hernandez MD

## 2021-09-20 NOTE — PROGRESS NOTES
09/20/21 1840   NIV Type   $NIV $Daily Charge   NIV Started/Stopped On   Equipment Type V60   Mode Bilevel   Mask Type Full face mask   Mask Size Medium   Settings/Measurements   IPAP 15 cmH20   CPAP/EPAP 6 cmH2O   Rate Ordered 12   Resp 18   O2 Flow Rate (L/min) 35 L/min   FiO2  100 %   Vt Exhaled 592 mL   Minute Volume 15 Liters   Mask Leak (lpm) 1 lpm   Comfort Level Good   Using Accessory Muscles No   SpO2 93   Alarm Settings   Alarms On Y   Press Low Alarm 8 cmH2O   High Pressure Alarm 40 cmH2O   Delay Alarm 20 sec(s)   Resp Rate Low Alarm 12   High Respiratory Rate 40 br/min   Oxygen Therapy/Pulse Ox   O2 Therapy Oxygen humidified   $Oxygen $Daily Charge   O2 Device Heated high flow cannula   SpO2 90 %   $Pulse Oximeter $Spot check (multiple/continuous)

## 2021-09-20 NOTE — PROGRESS NOTES
Shift assessment complete, see flowsheets. Medications given, see MAR. Pt anxious and requesting PRN. Positioned for comfort. Remains on heated high flow oxygen. Lung sounds diminished. Pt remains on cardizem gtt @10ml/hr. No needs or discomforts stated at this time. Call light in easy reach, bedside table in easy reach, and bed in lowest position.   Mode Snider RN

## 2021-09-20 NOTE — PROGRESS NOTES
[address] Daily Progress Note      Admit Date:  9/14/2021    Subjective:  Ms. Tae Russ is being seen today for f/u atrial fibrillation. She denies specific complaints today.  Tele reviewed showing afib 94bpm.    Objective:   BP (!) 110/56   Pulse 85   Temp 98 °F (36.7 °C) (Oral)   Resp 18   Ht 5' 2\" (1.575 m)   Wt 272 lb (123.4 kg)   LMP  (LMP Unknown)   SpO2 93%   BMI 49.75 kg/m²     Intake/Output Summary (Last 24 hours) at 9/20/2021 1050  Last data filed at 9/20/2021 0359  Gross per 24 hour   Intake    Output 2150 ml   Net -2150 ml       TELEMETRY: Atrial fibrillation     Physical Exam:  General:  Awake, alert, NAD  Skin:  Warm and dry  Neck:  JVD none  Chest:  Soft breath sounds, respiration easy  Cardiovascular: +irregularly irregular; S1S2  Abdomen:  Soft NT  Extremities:  Trace BLE edema    Medications:    busPIRone  5 mg Oral TID    [START ON 9/21/2021] predniSONE  40 mg Oral Daily    metoprolol succinate  75 mg Oral BID    insulin glargine  25 Units SubCUTAneous Nightly    digoxin  125 mcg Oral Daily    albuterol sulfate HFA  2 puff Inhalation 4x daily    doxycycline (VIBRAMYCIN) IV  100 mg IntraVENous Q12H    insulin lispro  0-18 Units SubCUTAneous TID WC    insulin lispro  0-9 Units SubCUTAneous Nightly    buPROPion  150 mg Oral QAM    polyethylene glycol  17 g Oral Daily    QUEtiapine  25 mg Oral Nightly    spironolactone  25 mg Oral Daily    torsemide  20 mg Oral Daily    vitamin B-6  50 mg Oral Daily    sodium chloride flush  5-40 mL IntraVENous 2 times per day    Vitamin D  2,000 Units Oral Daily      dextrose      sodium chloride 25 mL (09/18/21 0040)    dilTIAZem 10 mg/hr (09/20/21 0649)     LORazepam, HYDROmorphone, LORazepam, albuterol sulfate HFA, glucose, dextrose, glucagon (rDNA), dextrose, melatonin, sodium chloride flush, sodium chloride, ondansetron **OR** ondansetron, polyethylene glycol, acetaminophen **OR** acetaminophen, 06/18/2021    Nausea 06/18/2021    Polyp of colon 06/18/2021    Morbid obesity ([address] 06/18/2021    Sleep apnea     Restless legs syndrome     Chronic back pain     Panic disorder     Primary osteoarthritis of both knees 12/02/2020    Acute pain of right knee 12/02/2020    Moderate episode of recurrent major depressive disorder ([address] 10/23/2020    Primary insomnia 10/23/2020    Elevated lactic acid level     History of diverticulitis 10/10/2020    HTN (hypertension), benign     Dyslipidemia     TIA (transient ischemic attack) 06/14/2020    CHF (congestive heart failure) (HCC)     PVD (peripheral vascular disease) with claudication ([address] 11/01/2019    Non-Hodgkin lymphoma [facility] 09/06/2019    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)     Pulmonary nodule     Atrial fibrillation, chronic (HCC)     Lung mass     Mediastinal lymphadenopathy     IFG (impaired fasting glucose) 09/13/2018    Erythrocytosis 08/22/2018    Angina pectoris (HCC)     Paroxysmal atrial fibrillation (HCC)     Bilateral edema of lower extremity 01/18/2017    Acute on chronic respiratory failure with hypoxia ([facility]) 21/41/7156    Diastolic dysfunction     Shortness of breath 05/02/2016    Venous (peripheral) insufficiency 01/05/2016    Lymphedema 01/05/2016    Chronic angle-closure glaucoma of both eyes 01/05/2016    Arthritis of hips and knees 01/05/2016    Other pulmonary embolism without acute cor pulmonale (HCC) 06/27/2015    Atrial fibrillation with RVR ([address] 06/23/2015    Carotid stenosis 10/27/2014    Vitamin D deficiency     COPD, severe ([address] 05/09/2013    Anxiety 02/08/2013    Hyperlipidemia 02/08/2013    Diverticulosis 02/08/2013    Chronic airway obstruction, not elsewhere classified 01/10/2013    Chronic respiratory failure with hypoxia ([address] 03/29/2012       Chikis Kiser MD, MD 9/20/2021 10:50 AM

## 2021-09-20 NOTE — PROGRESS NOTES
Physical Therapy/Occupational Therapy    As per hospitalist patient did not warrant PT/OT evaluation since patient is on comfort care with DNR. PT/OT orders will be discontinues. Please order rehab services again as need arises and when medically appropriate for the patient. No charge.      Rosey Sampson, MS PT, # [id number]

## 2021-09-21 LAB
A/G RATIO: 1.4 (ref 1.1–2.2)
ALBUMIN SERPL-MCNC: 3.3 G/DL (ref 3.4–5)
ALP BLD-CCNC: 72 U/L (ref 40–129)
ALT SERPL-CCNC: 76 U/L (ref 10–40)
ANION GAP SERPL CALCULATED.3IONS-SCNC: 10 MMOL/L (ref 3–16)
AST SERPL-CCNC: 32 U/L (ref 15–37)
BASOPHILS ABSOLUTE: 0 K/UL (ref 0–0.2)
BASOPHILS RELATIVE PERCENT: 0.2 %
BILIRUB SERPL-MCNC: 0.4 MG/DL (ref 0–1)
BUN BLDV-MCNC: 44 MG/DL (ref 7–20)
CALCIUM SERPL-MCNC: 8.2 MG/DL (ref 8.3–10.6)
CHLORIDE BLD-SCNC: 95 MMOL/L (ref 99–110)
CO2: 36 MMOL/L (ref 21–32)
CREAT SERPL-MCNC: 1 MG/DL (ref 0.6–1.2)
EOSINOPHILS ABSOLUTE: 0 K/UL (ref 0–0.6)
EOSINOPHILS RELATIVE PERCENT: 0 %
GFR AFRICAN AMERICAN: >60
GFR NON-AFRICAN AMERICAN: 53
GLOBULIN: 2.3 G/DL
GLUCOSE BLD-MCNC: 195 MG/DL (ref 70–99)
GLUCOSE BLD-MCNC: 200 MG/DL (ref 70–99)
GLUCOSE BLD-MCNC: 254 MG/DL (ref 70–99)
GLUCOSE BLD-MCNC: 269 MG/DL (ref 70–99)
GLUCOSE BLD-MCNC: 278 MG/DL (ref 70–99)
GLUCOSE BLD-MCNC: 295 MG/DL (ref 70–99)
GLUCOSE BLD-MCNC: 309 MG/DL (ref 70–99)
HCT VFR BLD CALC: 31.6 % (ref 36–48)
HEMOGLOBIN: 9.8 G/DL (ref 12–16)
INR BLD: 4.51 (ref 0.88–1.12)
LYMPHOCYTES ABSOLUTE: 0.7 K/UL (ref 1–5.1)
LYMPHOCYTES RELATIVE PERCENT: 4.9 %
MCH RBC QN AUTO: 24.4 PG (ref 26–34)
MCHC RBC AUTO-ENTMCNC: 31.1 G/DL (ref 31–36)
MCV RBC AUTO: 78.4 FL (ref 80–100)
MONOCYTES ABSOLUTE: 1 K/UL (ref 0–1.3)
MONOCYTES RELATIVE PERCENT: 7.4 %
NEUTROPHILS ABSOLUTE: 11.6 K/UL (ref 1.7–7.7)
NEUTROPHILS RELATIVE PERCENT: 87.5 %
PDW BLD-RTO: 16.7 % (ref 12.4–15.4)
PERFORMED ON: ABNORMAL
PLATELET # BLD: 498 K/UL (ref 135–450)
PMV BLD AUTO: 7 FL (ref 5–10.5)
POTASSIUM REFLEX MAGNESIUM: 4.3 MMOL/L (ref 3.5–5.1)
PROTHROMBIN TIME: 54.3 SEC (ref 9.9–12.7)
RBC # BLD: 4.03 M/UL (ref 4–5.2)
SODIUM BLD-SCNC: 141 MMOL/L (ref 136–145)
TOTAL PROTEIN: 5.6 G/DL (ref 6.4–8.2)
WBC # BLD: 13.3 K/UL (ref 4–11)

## 2021-09-21 PROCEDURE — 6360000002 HC RX W HCPCS: Performed by: INTERNAL MEDICINE

## 2021-09-21 PROCEDURE — 6370000000 HC RX 637 (ALT 250 FOR IP): Performed by: INTERNAL MEDICINE

## 2021-09-21 PROCEDURE — 36415 COLL VENOUS BLD VENIPUNCTURE: CPT

## 2021-09-21 PROCEDURE — 85025 COMPLETE CBC W/AUTO DIFF WBC: CPT

## 2021-09-21 PROCEDURE — 2580000003 HC RX 258: Performed by: PHYSICIAN ASSISTANT

## 2021-09-21 PROCEDURE — 99233 SBSQ HOSP IP/OBS HIGH 50: CPT | Performed by: INTERNAL MEDICINE

## 2021-09-21 PROCEDURE — 2700000000 HC OXYGEN THERAPY PER DAY

## 2021-09-21 PROCEDURE — 2580000003 HC RX 258: Performed by: INTERNAL MEDICINE

## 2021-09-21 PROCEDURE — 6370000000 HC RX 637 (ALT 250 FOR IP): Performed by: PHYSICIAN ASSISTANT

## 2021-09-21 PROCEDURE — 85610 PROTHROMBIN TIME: CPT

## 2021-09-21 PROCEDURE — 94761 N-INVAS EAR/PLS OXIMETRY MLT: CPT

## 2021-09-21 PROCEDURE — 2060000000 HC ICU INTERMEDIATE R&B

## 2021-09-21 PROCEDURE — 80053 COMPREHEN METABOLIC PANEL: CPT

## 2021-09-21 PROCEDURE — 2500000003 HC RX 250 WO HCPCS: Performed by: INTERNAL MEDICINE

## 2021-09-21 PROCEDURE — 94640 AIRWAY INHALATION TREATMENT: CPT

## 2021-09-21 RX ORDER — DOXYCYCLINE HYCLATE 100 MG
100 TABLET ORAL EVERY 12 HOURS SCHEDULED
Status: DISCONTINUED | OUTPATIENT
Start: 2021-09-21 | End: 2021-09-22

## 2021-09-21 RX ORDER — DILTIAZEM HYDROCHLORIDE 120 MG/1
120 CAPSULE, COATED, EXTENDED RELEASE ORAL DAILY
Status: DISCONTINUED | OUTPATIENT
Start: 2021-09-21 | End: 2021-09-29

## 2021-09-21 RX ORDER — LORAZEPAM 2 MG/ML
0.5 INJECTION INTRAMUSCULAR EVERY 4 HOURS PRN
Status: DISCONTINUED | OUTPATIENT
Start: 2021-09-21 | End: 2021-09-22

## 2021-09-21 RX ORDER — SPIRONOLACTONE 25 MG/1
25 TABLET ORAL DAILY
Status: DISCONTINUED | OUTPATIENT
Start: 2021-09-21 | End: 2021-09-27

## 2021-09-21 RX ADMIN — DILTIAZEM HYDROCHLORIDE 120 MG: 120 CAPSULE, COATED, EXTENDED RELEASE ORAL at 08:18

## 2021-09-21 RX ADMIN — Medication 2000 UNITS: at 08:18

## 2021-09-21 RX ADMIN — SPIRONOLACTONE 25 MG: 25 TABLET ORAL at 08:18

## 2021-09-21 RX ADMIN — BUSPIRONE HYDROCHLORIDE 5 MG: 5 TABLET ORAL at 20:33

## 2021-09-21 RX ADMIN — DILTIAZEM HYDROCHLORIDE 30 MG: 60 TABLET, FILM COATED ORAL at 07:07

## 2021-09-21 RX ADMIN — SODIUM CHLORIDE, PRESERVATIVE FREE 5 ML: 5 INJECTION INTRAVENOUS at 09:00

## 2021-09-21 RX ADMIN — TORSEMIDE 20 MG: 20 TABLET ORAL at 08:18

## 2021-09-21 RX ADMIN — BUSPIRONE HYDROCHLORIDE 5 MG: 5 TABLET ORAL at 13:32

## 2021-09-21 RX ADMIN — METOPROLOL SUCCINATE 75 MG: 50 TABLET, EXTENDED RELEASE ORAL at 10:20

## 2021-09-21 RX ADMIN — METOPROLOL SUCCINATE 75 MG: 50 TABLET, EXTENDED RELEASE ORAL at 20:32

## 2021-09-21 RX ADMIN — LORAZEPAM 0.5 MG: 0.5 TABLET ORAL at 20:33

## 2021-09-21 RX ADMIN — INSULIN LISPRO 9 UNITS: 100 INJECTION, SOLUTION INTRAVENOUS; SUBCUTANEOUS at 17:00

## 2021-09-21 RX ADMIN — BUPROPION HYDROCHLORIDE 150 MG: 150 TABLET, EXTENDED RELEASE ORAL at 08:18

## 2021-09-21 RX ADMIN — PYRIDOXINE HCL TAB 50 MG 50 MG: 50 TAB at 08:18

## 2021-09-21 RX ADMIN — INSULIN LISPRO 6 UNITS: 100 INJECTION, SOLUTION INTRAVENOUS; SUBCUTANEOUS at 09:00

## 2021-09-21 RX ADMIN — PREDNISONE 40 MG: 20 TABLET ORAL at 08:19

## 2021-09-21 RX ADMIN — SODIUM CHLORIDE, PRESERVATIVE FREE 10 ML: 5 INJECTION INTRAVENOUS at 20:33

## 2021-09-21 RX ADMIN — DILTIAZEM HYDROCHLORIDE 30 MG: 60 TABLET, FILM COATED ORAL at 00:30

## 2021-09-21 RX ADMIN — INSULIN LISPRO 9 UNITS: 100 INJECTION, SOLUTION INTRAVENOUS; SUBCUTANEOUS at 12:05

## 2021-09-21 RX ADMIN — DIGOXIN 125 MCG: 125 TABLET ORAL at 08:18

## 2021-09-21 RX ADMIN — BUSPIRONE HYDROCHLORIDE 5 MG: 5 TABLET ORAL at 08:19

## 2021-09-21 RX ADMIN — Medication 5 MG: at 20:33

## 2021-09-21 RX ADMIN — DOXYCYCLINE 100 MG: 100 INJECTION, POWDER, LYOPHILIZED, FOR SOLUTION INTRAVENOUS at 00:30

## 2021-09-21 RX ADMIN — INSULIN LISPRO 6 UNITS: 100 INJECTION, SOLUTION INTRAVENOUS; SUBCUTANEOUS at 20:34

## 2021-09-21 RX ADMIN — INSULIN GLARGINE 25 UNITS: 100 INJECTION, SOLUTION SUBCUTANEOUS at 20:34

## 2021-09-21 RX ADMIN — DOXYCYCLINE 100 MG: 100 INJECTION, POWDER, LYOPHILIZED, FOR SOLUTION INTRAVENOUS at 13:34

## 2021-09-21 RX ADMIN — LORAZEPAM 1 MG: 2 INJECTION INTRAMUSCULAR; INTRAVENOUS at 04:34

## 2021-09-21 RX ADMIN — LORAZEPAM 0.5 MG: 0.5 TABLET ORAL at 10:18

## 2021-09-21 RX ADMIN — DOXYCYCLINE HYCLATE 100 MG: 100 TABLET, COATED ORAL at 20:32

## 2021-09-21 RX ADMIN — Medication 2 PUFF: at 23:15

## 2021-09-21 ASSESSMENT — PAIN DESCRIPTION - LOCATION: LOCATION: BACK

## 2021-09-21 NOTE — PROGRESS NOTES
Pulmonary Progress Note  CC: COVID-19, respiratory failure    Subjective:    BiPAP overnight, doesn't feel well overall    IV line: peripheral    EXAM:   BP (!) 112/52   Pulse 92   Temp 98.4 °F (36.9 °C) (Axillary)   Resp 20   Ht 5' 2\" (1.575 m)   Wt 272 lb (123.4 kg)   LMP  (LMP Unknown)   SpO2 90%   BMI 49.75 kg/m²  on airVO 100/35  General: NAD. Did receive ativan  Eyes: PERRL. No sclera icterus. No conjunctival injection. ENT: No discharge. Pharynx clear. Neck: Trachea midline. Normal thyroid. Resp: Some increased work of breathing. Few crackles. No wheezing. No rhonchi. No dullness on percussion. CV: Regular rate. Regular rhythm. No mumur or rub. + edema. GI: Non-tender. Non-distended. No masses. No organomegaly. Normal bowel sounds. No hernia. Skin: Warm and dry. No nodule on exposed extremities. No rash on exposed extremities. Lymph: No cervical LAD. No supraclavicular LAD. M/S: No cyanosis. No joint deformity. No clubbing. Neuro: Alert and oriented to person and events. Patellar reflexes are symmetric. Psych: No agitation, + anxiety, affect is full.      Scheduled Meds:   dilTIAZem  120 mg Oral Daily    busPIRone  5 mg Oral TID    predniSONE  40 mg Oral Daily    metoprolol succinate  75 mg Oral BID    insulin glargine  25 Units SubCUTAneous Nightly    digoxin  125 mcg Oral Daily    albuterol sulfate HFA  2 puff Inhalation 4x daily    doxycycline (VIBRAMYCIN) IV  100 mg IntraVENous Q12H    insulin lispro  0-18 Units SubCUTAneous TID WC    insulin lispro  0-9 Units SubCUTAneous Nightly    buPROPion  150 mg Oral QAM    polyethylene glycol  17 g Oral Daily    QUEtiapine  25 mg Oral Nightly    spironolactone  25 mg Oral Daily    torsemide  20 mg Oral Daily    vitamin B-6  50 mg Oral Daily    sodium chloride flush  5-40 mL IntraVENous 2 times per day    Vitamin D  2,000 Units Oral Daily     Continuous Infusions:   dextrose      sodium chloride 25 mL (09/18/21 0040) PRN Meds:  LORazepam, HYDROmorphone, LORazepam, albuterol sulfate HFA, glucose, dextrose, glucagon (rDNA), dextrose, melatonin, sodium chloride flush, sodium chloride, ondansetron **OR** ondansetron, polyethylene glycol, acetaminophen **OR** acetaminophen, guaiFENesin-dextromethorphan    Labs:  CBC:   Recent Labs     09/19/21  0433 09/20/21  0437 09/21/21  0555   WBC 11.5* 13.0* 13.3*   HGB 9.3* 9.7* 9.8*   HCT 30.0* 30.9* 31.6*   MCV 78.7* 78.4* 78.4*   * 497* 498*     BMP:   Recent Labs     09/19/21 0433 09/20/21 0437 09/21/21  0555    141 141   K 4.3 4.6 4.3   CL 97* 97* 95*   CO2 37* 35* 36*   BUN 41* 39* 44*   CREATININE 0.8 0.9 1.0     Micro:  9/14/2021 SARS-CoV-2 positive  9/14/2021 blood cultures NGTD    Imaging:  CXR 9/14/2021 bilateral infiltrates consistent with Covid pneumonia    ASSESSMENT:  · Acute on chronic hypoxemic & hypercapnic respiratory failure, baseline 4 L qhs and 6 L with exertion  · COVID-19 pneumonia in an unvaccinated patient   · Severe COPD with acute exacerbation, f/b Dr. Saba Smiley previously   · Leukocytosis   · Elevated LFTs - improved  · Anemia  · A. fib with RVR, h/o paroxysmal a. fib  · Chronic diastolic CHF   · H/O PE on coumadin, supratherapeutic INR - improving  · B cell lymphoma   · SHANNON     PLAN:   COVID-19 isolation, droplet plus   Airvo HHFNO for life threatening respiratory failure. No plan to escalate care.  Completed Remdesevir & Tocilizumab   Steroid D#7, was IV Solu-Medrol & now changed to PO prednisone    Doxycycline D#7/7     PO metoprolol and diltiazem per cardiology   On chronic Coumadin, currently supratherapeutic   CODE STATUS DNR CCA.  Patient and her son/POA have requested comfort medication when patient with difficulty breathing. Ativan 0.5 prn anxiety.

## 2021-09-21 NOTE — PROGRESS NOTES
RESPIRATORY THERAPY ASSESSMENT    Name:  [facility] Record Number:  [id number]  Age: 68 y.o. Gender: female  : 1940  Today's Date:  2021  Room:  /0311-01    Assessment     Is the patient being admitted for a COPD or Asthma exacerbation? No   (If yes the patient will be seen every 4 hours for the first 24 hours and then reassessed)    Patient Admission Diagnosis      Allergies  Allergies   Allergen Reactions    Crestor [Rosuvastatin]      Muscle aches    Levofloxacin Other (See Comments)     Achiness    Lipitor      Muscle aches    Rocephin In Dextrose [Ceftriaxone Sodium In Dextrose]      Rash,itching    Zocor [Simvastatin]      Muscle aches    Codeine Itching and Nausea And Vomiting    Pravastatin Nausea And Vomiting       Minimum Predicted Vital Capacity:               Actual Vital Capacity:                    Pulmonary History:CHF/Pulmonary Edema and sleep apnea  Home Oxygen Therapy:  4.5 liters/min via nasal cannula  Home Respiratory Therapy:Albuterol   Current Respiratory Therapy:  Albuterol qid  Treatment Type: MDI  Medications: Albuterol    Respiratory Severity Index(RSI)   Patients with orders for inhalation medications, oxygen, or any therapeutic treatment modality will be placed on Respiratory Protocol. They will be assessed with the first treatment and at least every 72 hours thereafter. The following severity scale will be used to determine frequency of treatment intervention.     Smoking History: Pulmonary Disease or Smoking History, Greater than 15 pack year = 2    Social History  Social History     Tobacco Use    Smoking status: Former Smoker     Packs/day: 0.33     Years: 50.00     Pack years: 16.50     Types: Cigarettes     Quit date: 2015     Years since quittin.2    Smokeless tobacco: Never Used   Vaping Use    Vaping Use: Never used   Substance Use Topics    Alcohol use: Not Currently     Alcohol/week: 0.0 standard drinks    Drug use: No       Recent Surgical History: None = 0  Past Surgical History  Past Surgical History:   Procedure Laterality Date    APPENDECTOMY  1963    BRONCHOSCOPY N/A 8/12/2019    EBUS WF W/ANES.  performed by Greg Webb MD at [facility]  8/12/2019    BRONCHOSCOPY ALVEOLAR LAVAGE performed by Greg Webb MD at [facility]  8/12/2019    BRONCHOSCOPY BRUSHINGS performed by Greg Webb MD at [place] [address]  8/12/2019    BRONCHOSCOPY/TRANSBRONCHIAL LUNG BIOPSY performed by Greg Webb MD at [place] [address]  8/12/2019    BRONCHOSCOPY/TRANSBRONCHIAL NEEDLE BIOPSY performed by Greg Webb MD at [place] [address]  8/12/2019    BRONCHOSCOPY/TRANSBRONCHIAL NEEDLE BIOPSY ADDL LOBE performed by Greg Webb MD at [address] [place] [address]    COLONOSCOPY N/A 9/25/2019    COLONOSCOPY POLYPECTOMY SNARE/COLD BIOPSY showed diverticulilosis performed by Marixa Flores MD at [address] [place] [address]      umbilical--Dr Hou    [address] N/A 1/17/2020    PORT INSERTION performed by Norma Chávez MD at [facility] [address] Left 01/17/2020    Port Insertion ENRIQUETA Chest w. Dr Alois Moritz 1/17/20 Vaccess CT Injectable [id number] lot [id number] 3/31/21       Level of Consciousness: Alert, Oriented, and Cooperative = 0    Level of Activity: Bedridden, unresponsive or quadriplegic = 4    Respiratory Pattern: Regular Pattern; RR 8-20 = 0    Breath Sounds: Diminshed bilaterally and/or crackles = 2    Sputum   ,  ,    Cough: Strong, spontaneous, non-productive = 0    Vital Signs   BP (!) 117/54   Pulse 92   Temp 98.4 °F (36.9 °C) (Axillary)   Resp 21   Ht 5' 2\" (1.575 m)   Wt 272 lb (123.4 kg)   LMP  (LMP Unknown)   SpO2 92%   BMI 49.75 kg/m²   SPO2 (COPD values may differ): Less than 86% on ventilation. 6. Inhalation medication orders will be delivered and/or substituted as outlined below. Aerosolized Medications Ordering and Administration Guidelines:    1. All Medications will be ordered by a physician, and their frequency and/or modality will be adjusted as defined by the patients Respiratory Severity Index (RSI) score. 2. If the patient does not have documented COPD, consider discontinuing anticholinergics when RSI is less than 9.  3. If the bronchospasm worsens (increased RSI), then the bronchodilator frequency can be increased to a maximum of every 4 hours. If greater than every 4 hours is required, the physician will be contacted. 4. If the bronchospasm improves, the frequency of the bronchodilator can be decreased, based on the patient's RSI, but not less than home treatment regimen frequency. 5. Bronchodilator(s) will be discontinued if patient has a RSI less than 9 and has received no scheduled or as needed treatment for 72  Hrs. Patients Ordered on a Mucolytic Agent:    1. Must always be administered with a bronchodilator. 2. Discontinue if patient experiences worsened bronchospasm, or secretions have lessened to the point that the patient is able to clear them with a cough. Anti-inflammatory and Combination Medications:    1. If the patient lacks prior history of lung disease, is not using inhaled anti-inflammatory medication at home, and lacks wheezing by examination or by history for at least 24 hours, contact physician for possible discontinuation.

## 2021-09-21 NOTE — PROGRESS NOTES
Dr Aleksey Ramon answering service informed of family request to talk to Dr Shira Medina for updates and what the plan will be for the patient. Answering service states that on call pulmonary doc is Dr Lisette Graham. Dr Lisette Graham states that the hospitalist will contact the family to give updates. Dr Elen Simons  Informed of this and of Johnson's request (patient son).

## 2021-09-21 NOTE — PROGRESS NOTES
09/21/21 0319   NIV Type   NIV Started/Stopped On   Equipment Type v60   Mode Bilevel   Mask Type Full face mask   Mask Size Medium   Settings/Measurements   IPAP 15 cmH20   CPAP/EPAP 6 cmH2O   Rate Ordered 12   Resp 20   FiO2  85 %   Vt Exhaled 512 mL   Minute Volume 12.4 Liters   Mask Leak (lpm) 4 lpm   Comfort Level Good   Using Accessory Muscles No   SpO2 91

## 2021-09-21 NOTE — PROGRESS NOTES
09/20/21 2303   NIV Type   NIV Started/Stopped On   Equipment Type v60   Mode Bilevel   Mask Type Full face mask   Mask Size Medium   Settings/Measurements   IPAP 15 cmH20   CPAP/EPAP 6 cmH2O   Rate Ordered 12   Resp 23   FiO2  85 %   Vt Exhaled 608 mL   Minute Volume 14.5 Liters   Mask Leak (lpm) 2 lpm   Comfort Level Good   Using Accessory Muscles No   SpO2 91

## 2021-09-21 NOTE — PROGRESS NOTES
Shift assessment complete, see flowsheets. Medication given, see MAR. Pt requesting PRN for anxiety. Cardizem remains at 5ml/hr. Positioned for comfort. Pt wearing BiPAP. No needs or discomforts stated at this time. Call light in easy reach, bedside table in easy reach, and bed in lowest position.   Cipriano Stover RN

## 2021-09-21 NOTE — PROGRESS NOTES
Hospitalist Progress Note      PCP: Srinivas Weston, APRN - CNP    Date of Admission: 9/14/2021    covid 19  Infection, hypoxia    Subjective:     Ms Bel Lagos seen up in bed, on diuresed 13 L, still on high oxygen requirements- on airVO2 at 35 %    Used bipap overnight   Pt reports she is doing ok today except for fatigue    After family discussion, comfort meds initiated yesterday by pulm    HR improved, off cardizem gtt    Medications:  Reviewed    Infusion Medications    dextrose      sodium chloride 25 mL (09/18/21 0040)    dilTIAZem Stopped (09/21/21 0039)     Scheduled Medications    busPIRone  5 mg Oral TID    predniSONE  40 mg Oral Daily    dilTIAZem  30 mg Oral 4 times per day    metoprolol succinate  75 mg Oral BID    insulin glargine  25 Units SubCUTAneous Nightly    digoxin  125 mcg Oral Daily    albuterol sulfate HFA  2 puff Inhalation 4x daily    doxycycline (VIBRAMYCIN) IV  100 mg IntraVENous Q12H    insulin lispro  0-18 Units SubCUTAneous TID WC    insulin lispro  0-9 Units SubCUTAneous Nightly    buPROPion  150 mg Oral QAM    polyethylene glycol  17 g Oral Daily    QUEtiapine  25 mg Oral Nightly    spironolactone  25 mg Oral Daily    torsemide  20 mg Oral Daily    vitamin B-6  50 mg Oral Daily    sodium chloride flush  5-40 mL IntraVENous 2 times per day    Vitamin D  2,000 Units Oral Daily     PRN Meds: LORazepam, HYDROmorphone, LORazepam, albuterol sulfate HFA, glucose, dextrose, glucagon (rDNA), dextrose, melatonin, sodium chloride flush, sodium chloride, ondansetron **OR** ondansetron, polyethylene glycol, acetaminophen **OR** acetaminophen, guaiFENesin-dextromethorphan      Intake/Output Summary (Last 24 hours) at 9/21/2021 0723  Last data filed at 9/21/2021 0301  Gross per 24 hour   Intake 1029.93 ml   Output 2425 ml   Net -1395.07 ml       Physical Exam Performed:    BP (!) 112/52   Pulse 92   Temp 98.4 °F (36.9 °C) (Axillary)   Resp 20   Ht 5' 2\" (1.575 m)   Wt 272 lb (123.4 kg)   LMP  (LMP Unknown)   SpO2 92%   BMI 49.75 kg/m²         General: elderly female up in bed , on Airvo2 today   Awake, alert and oriented. Appears to be not in any distress  Mucous Membranes:  Pink , anicteric  Neck: No JVD, no carotid bruit, no thyromegaly  Chest:  Clear to auscultation bilaterally, diminished in bases   Cardiovascular:  Irregular  S1S2 heard, no murmurs or gallops  Abdomen:  Soft, undistended, non tender, no organomegaly, BS present  Extremities: resolving edema to LE ,  now trace, diistal pulses well felt  Neurological : grossly normal        Labs:   Recent Labs     09/19/21 0433 09/20/21  0437 09/21/21  0555   WBC 11.5* 13.0* 13.3*   HGB 9.3* 9.7* 9.8*   HCT 30.0* 30.9* 31.6*   * 497* 498*     Recent Labs     09/19/21 0433 09/20/21  0437 09/21/21  0555    141 141   K 4.3 4.6 4.3   CL 97* 97* 95*   CO2 37* 35* 36*   BUN 41* 39* 44*   CREATININE 0.8 0.9 1.0   CALCIUM 8.5 8.3 8.2*     Recent Labs     09/19/21 0433 09/20/21  0437 09/21/21  0555   AST 70* 70* 32   ALT 70* 93* 76*   BILITOT 0.4 0.4 0.4   ALKPHOS 94 74 72     Recent Labs     09/19/21 0433 09/20/21  0437 09/21/21  0555   INR 5.24* 5.10* 4.51*     No results for input(s): CKTOTAL, TROPONINI in the last 72 hours. Urinalysis:      Lab Results   Component Value Date    NITRU Negative 10/10/2020    WBCUA 3-5 10/10/2020    BACTERIA 4+ 10/10/2020    RBCUA 3-4 10/10/2020    BLOODU SMALL 10/10/2020    SPECGRAV 1.010 10/10/2020    GLUCOSEU Negative 10/10/2020       Radiology:  XR CHEST PORTABLE   Final Result   Bilateral interstitial groundglass opacities, likely representing COVID 19   pneumonia.            covid 19 infection         Assessment/Plan:    COVID 19 PNA  Acute on Chronic Hypoxic Respiratory Failure    - CXR with bilateral interstitial ground glass opacities  - Admitted to ICU, telemetry  - PCT 0.22  - started solumedrol IV  and changed to prednisone taper (she also has severe COPD), completed remdesivir   S/p - Tocilizumab x1   - wears 4.5 L at home and now worsened with CHF, copd and covid pna. Needing bipap    - pulmonology consulted  - persistent hypoxia remains , now on AirVo2 at 35 %  - after family discussion, comfort meds initiated for anxiety  - code status changed to [facility]        COPD  With acute exacerbation. Continue steroids, doxy ,inhaled bronchodilators(MDI)     Atrial Fibrillation with RVR  - hx of PAF, trop neg  - s/p dilt gtt and transitioned to oral cardizem  - also on digoxin and metoprolol- BB dose increased  - cardiology consulted  HR controlled now   -- AC on Coumadin - currently supratherapeutic  - ongoing diuresis with home demadex     Transaminitis  - cont to monitor LFT  - likely with covid infection and remdesivir use       Hyperglycemia - suspect early DM II  - pt is on steroids  - last Hgb A1c: 6.6, check Hgb A1c   - added low dose SSI and PRN hypoglycemia protocol  Added lantus nightly         Supratherapeutic INR  - hold Coumadin  - daily INR , now at 4     Microcytic Anemia - chronic   - Hgb 10 > 9.4  - iron studies wnl     Chronic Diastolic CHF  - appears compensated   - last echo 6/2020: normal EF, grade I DD  - daily weights, I&Os  - cont Torsemide     Hx of PE  - AC on Coumadin - currently supra therapeutic  - daily PT/INR, holding Coumadin     B cell Lymphoma  - f/w Dr. Doris Martinez     H/o Anovaginal Fistula  - noted per patient on prior admission     Morbid Obesity  - Body mass index is 49.04 kg/m². - Complicating assessment and treatment. Placing patient at risk for multiple co-morbidities as well as early death and contributing to the patient's presentation.   - Counseled on weight loss.         DVT Prophylaxis: AC on Coumadin - supratherapeutic & on hold  Diet: ADULT DIET;  Regular; 4 carb choices (60 gm/meal)  Code Status: DNR-CC    PT/OT Eval Status: ordered    Comfort meds on board  Poor prognosis     Ira Pike MD

## 2021-09-21 NOTE — PROGRESS NOTES
Patient assessed, VS obtained, AM medications given. Patient resting comfortably in bed. Denies needs at this time. Call light in reach. Bed alarm on, ecompanion in room.

## 2021-09-21 NOTE — PROGRESS NOTES
Nutrition Assessment    Type and Reason for Visit: Initial, RD Nutrition Re-Screen/LOS (LOS x 7)    Nutrition Recommendations/Plan:   1. Recommend liberalizing patient diet to ADULT DIET; Regular with no restrictions d/t pt seeking comfort care measures at this time. 2. Monitor patients plan of care. Nutrition Assessment:  patient is nutritionally compromised AEB poor po intake x 7 days admission and increased nutrient needs r/t PNA d/t COVID-19 virus and COPD exacerbation and she remains at risk for further compromise d/t worsening respiratory function + comfort care measures initiated; Will continue ADULT DIET; Regular; 4 carb choices and monitor POC    Malnutrition Assessment:  Malnutrition Status: At risk for malnutrition (Comment)    Estimated Daily Nutrient Needs:  Energy (kcal): 5162-8336 kcals based on 12-15 kcals/kg/CBW; Weight Used for Energy Requirements:  Current     Protein (g): 100-110 g protein based on 2-2.2 g/kg/IBW; Weight Used for Protein Requirements:  Ideal        Fluid (ml/day): 3908-9629 ml; Weight Used for Fluid Requirements:  1 ml/kcal      Nutrition Related Findings: Noted comfort care measures were initiated on 9/19. Will follow patient at a low risk level. Current Nutrition Therapies:    ADULT DIET;  Regular; 4 carb choices (60 gm/meal)    Anthropometric Measures:  · Height: 5' 2\" (157.5 cm)  · Current Body Wt: 272 lb (123.4 kg) (obtained 9/21; actual weight)   · BMI: 49.7    Nutrition Diagnosis:   · Increased nutrient needs related to inadequate protein-energy intake, impaired respiratory function, increase demand for energy/nutrients as evidenced by intake 0-25%, intake 26-50%, intake 51-75%, other (comment) (PNA d/t COVID-19+, COPD exacerbation)    Nutrition Interventions:   Food and/or Nutrient Delivery:  Continue Current Diet  Nutrition Education/Counseling:  No recommendation at this time   Coordination of Nutrition Care:  Continue to monitor while inpatient    Goals:  patient will consume 75% or greater of meals on ADULT DIET: Regular; 4 carb choices       Nutrition Monitoring and Evaluation:   Behavioral-Environmental Outcomes:  None Identified   Food/Nutrient Intake Outcomes:  Food and Nutrient Intake, Supplement Intake  Physical Signs/Symptoms Outcomes:  Biochemical Data, Weight, Other (Comment) (goals of care)     Discharge Planning:    Continue current diet     Electronically signed by Clyde Guidry RD, LD on 9/21/21 at 1:10 PM EDT    Contact: [phone]

## 2021-09-22 LAB
A/G RATIO: 1.1 (ref 1.1–2.2)
ALBUMIN SERPL-MCNC: 3 G/DL (ref 3.4–5)
ALP BLD-CCNC: 76 U/L (ref 40–129)
ALT SERPL-CCNC: 63 U/L (ref 10–40)
ANION GAP SERPL CALCULATED.3IONS-SCNC: 9 MMOL/L (ref 3–16)
AST SERPL-CCNC: 24 U/L (ref 15–37)
BASOPHILS ABSOLUTE: 0 K/UL (ref 0–0.2)
BASOPHILS RELATIVE PERCENT: 0.2 %
BILIRUB SERPL-MCNC: 0.4 MG/DL (ref 0–1)
BUN BLDV-MCNC: 37 MG/DL (ref 7–20)
CALCIUM SERPL-MCNC: 8.4 MG/DL (ref 8.3–10.6)
CHLORIDE BLD-SCNC: 96 MMOL/L (ref 99–110)
CO2: 38 MMOL/L (ref 21–32)
CREAT SERPL-MCNC: 0.8 MG/DL (ref 0.6–1.2)
EOSINOPHILS ABSOLUTE: 0 K/UL (ref 0–0.6)
EOSINOPHILS RELATIVE PERCENT: 0.4 %
GFR AFRICAN AMERICAN: >60
GFR NON-AFRICAN AMERICAN: >60
GLOBULIN: 2.8 G/DL
GLUCOSE BLD-MCNC: 146 MG/DL (ref 70–99)
GLUCOSE BLD-MCNC: 150 MG/DL (ref 70–99)
GLUCOSE BLD-MCNC: 233 MG/DL (ref 70–99)
GLUCOSE BLD-MCNC: 364 MG/DL (ref 70–99)
GLUCOSE BLD-MCNC: 84 MG/DL (ref 70–99)
GLUCOSE BLD-MCNC: 84 MG/DL (ref 70–99)
HCT VFR BLD CALC: 34.9 % (ref 36–48)
HEMOGLOBIN: 10.7 G/DL (ref 12–16)
INR BLD: 3.49 (ref 0.88–1.12)
LYMPHOCYTES ABSOLUTE: 0.9 K/UL (ref 1–5.1)
LYMPHOCYTES RELATIVE PERCENT: 8.1 %
MCH RBC QN AUTO: 24.1 PG (ref 26–34)
MCHC RBC AUTO-ENTMCNC: 30.7 G/DL (ref 31–36)
MCV RBC AUTO: 78.4 FL (ref 80–100)
MONOCYTES ABSOLUTE: 1.4 K/UL (ref 0–1.3)
MONOCYTES RELATIVE PERCENT: 11.5 %
NEUTROPHILS ABSOLUTE: 9.3 K/UL (ref 1.7–7.7)
NEUTROPHILS RELATIVE PERCENT: 79.8 %
PDW BLD-RTO: 16.9 % (ref 12.4–15.4)
PERFORMED ON: ABNORMAL
PERFORMED ON: NORMAL
PLATELET # BLD: 546 K/UL (ref 135–450)
PMV BLD AUTO: 7 FL (ref 5–10.5)
POTASSIUM REFLEX MAGNESIUM: 4 MMOL/L (ref 3.5–5.1)
PROTHROMBIN TIME: 41.6 SEC (ref 9.9–12.7)
RBC # BLD: 4.45 M/UL (ref 4–5.2)
SODIUM BLD-SCNC: 143 MMOL/L (ref 136–145)
TOTAL PROTEIN: 5.8 G/DL (ref 6.4–8.2)
WBC # BLD: 11.7 K/UL (ref 4–11)

## 2021-09-22 PROCEDURE — 80053 COMPREHEN METABOLIC PANEL: CPT

## 2021-09-22 PROCEDURE — 99233 SBSQ HOSP IP/OBS HIGH 50: CPT | Performed by: INTERNAL MEDICINE

## 2021-09-22 PROCEDURE — 97530 THERAPEUTIC ACTIVITIES: CPT

## 2021-09-22 PROCEDURE — 36415 COLL VENOUS BLD VENIPUNCTURE: CPT

## 2021-09-22 PROCEDURE — 97166 OT EVAL MOD COMPLEX 45 MIN: CPT

## 2021-09-22 PROCEDURE — 85025 COMPLETE CBC W/AUTO DIFF WBC: CPT

## 2021-09-22 PROCEDURE — 97110 THERAPEUTIC EXERCISES: CPT

## 2021-09-22 PROCEDURE — 6370000000 HC RX 637 (ALT 250 FOR IP): Performed by: PHYSICIAN ASSISTANT

## 2021-09-22 PROCEDURE — 97161 PT EVAL LOW COMPLEX 20 MIN: CPT

## 2021-09-22 PROCEDURE — 2060000000 HC ICU INTERMEDIATE R&B

## 2021-09-22 PROCEDURE — 97535 SELF CARE MNGMENT TRAINING: CPT

## 2021-09-22 PROCEDURE — 6370000000 HC RX 637 (ALT 250 FOR IP): Performed by: INTERNAL MEDICINE

## 2021-09-22 PROCEDURE — 99232 SBSQ HOSP IP/OBS MODERATE 35: CPT | Performed by: INTERNAL MEDICINE

## 2021-09-22 PROCEDURE — 94640 AIRWAY INHALATION TREATMENT: CPT

## 2021-09-22 PROCEDURE — 97112 NEUROMUSCULAR REEDUCATION: CPT

## 2021-09-22 PROCEDURE — 94660 CPAP INITIATION&MGMT: CPT

## 2021-09-22 PROCEDURE — 2700000000 HC OXYGEN THERAPY PER DAY

## 2021-09-22 PROCEDURE — 85610 PROTHROMBIN TIME: CPT

## 2021-09-22 PROCEDURE — 2580000003 HC RX 258: Performed by: PHYSICIAN ASSISTANT

## 2021-09-22 RX ORDER — TRAMADOL HYDROCHLORIDE 50 MG/1
50 TABLET ORAL EVERY 6 HOURS PRN
Status: DISCONTINUED | OUTPATIENT
Start: 2021-09-22 | End: 2021-09-29 | Stop reason: HOSPADM

## 2021-09-22 RX ADMIN — INSULIN LISPRO 3 UNITS: 100 INJECTION, SOLUTION INTRAVENOUS; SUBCUTANEOUS at 12:38

## 2021-09-22 RX ADMIN — BUSPIRONE HYDROCHLORIDE 5 MG: 5 TABLET ORAL at 08:24

## 2021-09-22 RX ADMIN — PYRIDOXINE HCL TAB 50 MG 50 MG: 50 TAB at 08:24

## 2021-09-22 RX ADMIN — TRAMADOL HYDROCHLORIDE 50 MG: 50 TABLET, FILM COATED ORAL at 20:54

## 2021-09-22 RX ADMIN — Medication 5 MG: at 20:54

## 2021-09-22 RX ADMIN — Medication 2000 UNITS: at 08:24

## 2021-09-22 RX ADMIN — POLYETHYLENE GLYCOL (3350) 17 G: 17 POWDER, FOR SOLUTION ORAL at 08:26

## 2021-09-22 RX ADMIN — BUPROPION HYDROCHLORIDE 150 MG: 150 TABLET, EXTENDED RELEASE ORAL at 08:24

## 2021-09-22 RX ADMIN — Medication 2 PUFF: at 07:39

## 2021-09-22 RX ADMIN — BUSPIRONE HYDROCHLORIDE 5 MG: 5 TABLET ORAL at 20:54

## 2021-09-22 RX ADMIN — SODIUM CHLORIDE, PRESERVATIVE FREE 10 ML: 5 INJECTION INTRAVENOUS at 20:55

## 2021-09-22 RX ADMIN — TRAMADOL HYDROCHLORIDE 50 MG: 50 TABLET, FILM COATED ORAL at 12:25

## 2021-09-22 RX ADMIN — METOPROLOL SUCCINATE 75 MG: 50 TABLET, EXTENDED RELEASE ORAL at 20:54

## 2021-09-22 RX ADMIN — SODIUM CHLORIDE, PRESERVATIVE FREE 5 ML: 5 INJECTION INTRAVENOUS at 08:26

## 2021-09-22 RX ADMIN — BUSPIRONE HYDROCHLORIDE 5 MG: 5 TABLET ORAL at 14:09

## 2021-09-22 RX ADMIN — METOPROLOL SUCCINATE 75 MG: 50 TABLET, EXTENDED RELEASE ORAL at 12:20

## 2021-09-22 RX ADMIN — Medication 2 PUFF: at 20:02

## 2021-09-22 RX ADMIN — INSULIN LISPRO 6 UNITS: 100 INJECTION, SOLUTION INTRAVENOUS; SUBCUTANEOUS at 17:57

## 2021-09-22 RX ADMIN — INSULIN GLARGINE 25 UNITS: 100 INJECTION, SOLUTION SUBCUTANEOUS at 21:02

## 2021-09-22 RX ADMIN — Medication 2 PUFF: at 11:01

## 2021-09-22 RX ADMIN — PREDNISONE 40 MG: 20 TABLET ORAL at 08:24

## 2021-09-22 RX ADMIN — INSULIN LISPRO 7 UNITS: 100 INJECTION, SOLUTION INTRAVENOUS; SUBCUTANEOUS at 21:02

## 2021-09-22 RX ADMIN — DIGOXIN 125 MCG: 125 TABLET ORAL at 08:24

## 2021-09-22 RX ADMIN — Medication 2 PUFF: at 16:01

## 2021-09-22 ASSESSMENT — PAIN SCALES - GENERAL
PAINLEVEL_OUTOF10: 7
PAINLEVEL_OUTOF10: 7
PAINLEVEL_OUTOF10: 5
PAINLEVEL_OUTOF10: 8

## 2021-09-22 ASSESSMENT — PAIN DESCRIPTION - LOCATION: LOCATION: BACK

## 2021-09-22 ASSESSMENT — PAIN DESCRIPTION - PAIN TYPE: TYPE: ACUTE PAIN

## 2021-09-22 NOTE — PROGRESS NOTES
Patient sitting up in bed eating breakfast on airvo at 100%. Patient is drowsy, but oriented. Lungs are crackles with expiratory wheezes. Patient states her back is hurting and she thinks she has to have a bowel movement but has been able to. Stool softener given. Attending orders tramadol 50 q6 for pain. BP is 95/55, reported to attending Dr Dot Wilkins. Per Dr Dot Wilkins hold cardizem, aldactone, demadex, and doxycycline, and wait on toprol and reassess BP. Assessment complete. Call light in reach.

## 2021-09-22 NOTE — PROGRESS NOTES
Pharmacy Note  Warfarin Consult  Dx: Afib  Goal INR range 2-3   Home Warfarin dose:5mg daily      Date  INR  Warfarin  9/22               3.49                  hold  Recommend holding Warfarin mg tonight x1. Daily INR ordered. Rx will continue to manage therapy per consult order.   Deon Guadalupe Pharm D 9/22/202112:06 PM  .

## 2021-09-22 NOTE — CARE COORDINATION
INTERDISCIPLINARY PLAN OF CARE CONFERENCE    Date/Time: 9/22/2021 4:30 PM  Completed by: Lakisha Bolivar RN, Case Management      Patient Name:  Vicky Ness  YOB: 1940  Admitting Diagnosis: Morbid obesity ([facility] [address] [E66.01]  Hypoxia [R09.02]  COPD exacerbation ([facility] [address] [J44.1]  Atrial fibrillation with RVR ([facility] [address] [I48.91]  Anticoagulated on Coumadin [Z79.01]  Poisoning by warfarin sodium, accidental or unintentional, initial encounter [T45.511A]  Acute hypoxemic respiratory failure due to COVID-19 ([facility] [address] [U07.1, J96.01]  COVID-19 [U07.1]  Acute respiratory failure due to COVID-19 ([facility] [address] [U07.1, J96.00]     Admit Date/Time:  9/14/2021  1:49 PM    Chart reviewed. Interdisciplinary team contacted or reviewed plan related to patient progress and discharge plans. Disciplines included Case Management, Nursing, and Dietitian. Current Status:ongoing  PT/OT recommendation for discharge plan of care: SNF, if refused to go to SNF will need home with 24hr assist with home PT and a RW    Expected D/C Disposition:  Skilled nursing facility  Confirmed plan with patient and/or family Yes confirmed with: (name) pt  Met with:pt  Discharge Plan Comments: Reviewed chart. Role of discharge planner explained and patient verbalized understanding. Pt is from home alone. Pt was independent prior to admission. Pt has home O2. CM spoke with pt about PT//OT recommending SNF. She states she does not have 24/7 assistance at home. CM discussed SNF and she was agreeable. Pt states that she wanted to go to [facility] [place]. Pt asked for CM to call her son, Slime Diaz, to make sure that there were no other SNFs. CM called Slime Diaz ([phone]) and he agreed with [facility] [place] (1), [facility] (2), and [facility] (3). CM called Lyly Renee with [facility] [place] and she states that she will look at referral and let CM know if able to accept.        Home O2 in place on admit: Yes  Pt informed of need to bring portable home O2 tank on day of discharge for nursing to connect prior to leaving:  Yes  Verbalized agreement/Understanding:   Yes

## 2021-09-22 NOTE — PROGRESS NOTES
Inpatient Occupational Therapy  Evaluation and Treatment    Unit: PCU  Date:  9/22/2021  Patient Name:    [facility]  Admitting diagnosis:  Morbid obesity ([facility] [E66.01]  Hypoxia [R09.02]  COPD exacerbation ([facility] [J44.1]  Atrial fibrillation with RVR ([facility] [I48.91]  Anticoagulated on Coumadin [Z79.01]  Poisoning by warfarin sodium, accidental or unintentional, initial encounter [T45.511A]  Acute hypoxemic respiratory failure due to COVID-19 ([facility] [U07.1, J96.01]  COVID-19 [U07.1]  Acute respiratory failure due to COVID-19 ([facility] [U07.1, J96.00]  Admit Date:  9/14/2021  Precautions/Restrictions/WB Status/ Lines/ Wounds/ Oxygen: fall risk, IV, bed/chair alarm, matias catheter , telemetry, continuous pulse ox and Droplet Plus precautions (+ COVID 19), airvo    Treatment Time:  9:52-10:50  Treatment Number: 1     Billable Treatment Time: 48 minutes   Total Treatment Time:  58 minutes    Patient Goals for Therapy:  \" to go home \"      Discharge Recommendations: SNF   DME needs for discharge: defer to facility       Therapy recommendations for staff:   Assist of 2 with use of PAM ENGLISH for all transfers to/from BSC/chair    History of Present Illness: 68 y.o. female with a PMH of chronic Hypoxic Respiratory Failure, COPD, Hx of Anovaginal fistula, H/o PE, PAF, chronic diastolic CHF, B cell lymphoma who presented to [facility] ED with complaint of shortness of breath of 2 days duration. She tested positive for Covid a few days ago when she developed some mild respiratory symptoms. She was found to be very hypoxic in the emergency room, placed on BiPAP and admitted to the ICU. Home Health S4 Level Recommendation:  Level 3 Safety  AM-PAC Score: AM-PAC Inpatient Daily Activity Raw Score: 14    Preadmission Environment    Pt.  Lives Alone and 24/7 Assist Available   Home environment:  mobile home/trailer  Steps to enter first floor:   3 steps to enter with HRs  Bathroom:  Tub/Shower unit, Grab bars, Tub Transfer Bench and Raised toilet seat w/o arms  Equipment owned:  SPC, Rollator , lift chair, home O2 (4.5 L) continuous and pulse ox     Preadmission Status / PLOF:  History of falls   No  Pt. Able to drive   Yes  Pt Fully independent with ADL's  Yes  Pt. Required assistance from family for: Independent PTA    Pt. Fully independent for transfers and gait and walked with: Rollator    Pain  Yes  Ratin  Location: back pain  Pain Medicine Status: Denies need      Cognition    A&O x4   Able to follow 2 step commands    Subjective  Patient lying supine in bed with no family present - no visitors present due to COVID-19 restrictions  Pt agreeable to this OT eval & tx. Upper Extremity ROM:    WFL,  pt able to perform all bed mobility, transfers, and gait without ROM limitation. Upper Extremity Strength:    BUE strength impaired but not formally assessed w/ MMT    Upper Extremity Sensation    WFL    Upper Extremity Proprioception:  Impaired    Coordination and Tone  Impaired    Balance  Functional Sitting Balance:  WFL  Functional Standing Balance:Impaired (CGA for static standing, min-mod A for dynamic standing)    Bed mobility:    Supine to sit:   SBA with HOB raised and use of BRs. Sit to supine:   Not Tested  Rolling:    SBA  Scooting in sitting:  SBA  Scooting to head of bed:   Not Tested    Bridging:   Not Tested    Transfers:    Sit to stand:  CGA  Stand to sit:  CGA  Bed to BSC:   Min A for stand pivot  BSC to chair:   Total A with use of STEDY. Used assistive device for energy conservation during pericare    Dressing:      UE:   Not Tested  LE:    Mod A    Bathing:    UE:  Not Tested  LE:  Not Tested    Eating:   setup    Toileting: Max A    Activity Tolerance   Pt completed therapy session with SOB noted with activity  SpO2: 95% after transfer to [facility]. Dropped to 87% while standing during pericare. HR: 95  BP:     Positioning Needs:   Reclined in chair with call light and needs in reach.      Exercise / Activities Initiated:   N/A    Patient/Family Education:   Role of OT  Recommendations for DC    Assessment of Deficits: Pt seen for Occupational therapy evaluation in acute care setting. Pt demonstrated decreased Activity tolerance, ADLs, IADLs, Balance , Bathing, Bed mobility, Dressing, ROM, Safety Awareness, Strength, Transfers and Coping Skills. Pt functioning below baseline and will likely benefit from skilled occupational therapy services to maximize safety and independence. Goal(s) : To be met in 3 Visits:  1). Bed to toilet/BSC: CGA    To be met in 5 Visits:  1). Supine to/from Sit:  Independent  2). Upper Body Bathing:   SBA  3). Lower Body Bathing:   Min A  4). Upper Body Dressing:  SBA  5). Lower Body Dressing:  Min A  6). Pt to demonstrate UE exs x 15 reps with minimal cues    Rehabilitation Potential:  Good for goals listed above. Strengths for achieving goals include: Pt motivated, PLOF and Pt cooperative  Barriers to achieving goals include:  Complexity of condition     Plan: To be seen 3-5 x/wk while in acute care setting for therapeutic exercises, bed mobility, transfers, dressing, bathing, family/patient education, ADL/IADL retraining, energy conservation training.        Fela Martinez OTR/L #[id number]      If patient discharges from this facility prior to next visit, this note will serve as the Discharge Summary

## 2021-09-22 NOTE — PROGRESS NOTES
Inpatient Physical Therapy Evaluation and Treatment    Unit: PCU  Date:  9/22/2021  Patient Name:    [facility]  Admitting diagnosis:  Morbid obesity ([facility] [E66.01]  Hypoxia [R09.02]  COPD exacerbation ([facility] [J44.1]  Atrial fibrillation with RVR ([facility] [I48.91]  Anticoagulated on Coumadin [Z79.01]  Poisoning by warfarin sodium, accidental or unintentional, initial encounter [T45.511A]  Acute hypoxemic respiratory failure due to COVID-19 ([facility] [U07.1, J96.01]  COVID-19 [U07.1]  Acute respiratory failure due to COVID-19 ([facility] [U07.1, J96.00]  Admit Date:  9/14/2021  Precautions/Restrictions/WB Status/ Lines/ Wounds/ Oxygen: Fall risk, Bed/chair alarm, Lines -IV, Supplemental O2 (on Airvo; FiO2 100%, 35L/min) and [place] catheter, Teller (hard of hearing), Telemetry, Continuous pulse oximetry and Isolation Precautions: Droplet Plus - COVID    Treatment Time: 0954 - 6601  Treatment Number:  1   Timed Code Treatment Minutes: 43 minutes  Total Treatment Minutes:  53  minutes    Patient Goals for Therapy: \" Go home \"          Discharge Recommendations: SNF, if refused to go to SNF will need home with 24hr assist with home PT  DME needs for discharge: RW       Therapy recommendation for EMS Transport: requires transport by cot due to need of lift equipment and 2 person assist for bed to chair transfers    Therapy recommendations for staff:   Assist of 2 with use of PAM STEDY for all transfers to/from  [address] of 2 with stand pivot from bed to chair    History of Present Illness: H & P as per Bjorn Cantu MD's note dated 9/15/2021  The patient is a 68 y.o. female with a PMH of chronic Hypoxic Respiratory Failure, COPD, Hx of Anovaginal fistula, H/o PE, PAF, chronic diastolic CHF, B cell lymphoma who presented to [facility] ED with complaint of shortness of breath of 2 days duration. She tested positive for Covid a few days ago when she developed some mild respiratory symptoms.  She was found to be very hypoxic in the emergency room, placed on BiPAP and admitted to the ICU. Currently on high flow nasal cannula.       Home Health S4 Level Recommendation:  Level 3 Safety  AM-PAC Mobility Score    AM-PAC Inpatient Mobility Raw Score : 14  AM-PAC Inpatient Mobility without Stair Climbing Raw Score : 13    Preadmission Environment    Pt. Lives Alone and 24/7 Assist Available   Home environment:    mobile home/trailer  Steps to enter first floor:   3 steps to enter with HRs  Bathroom:       Tub/Shower unit, Grab bars, Tub Transfer Bench and Raised toilet seat w/o arms  Equipment owned:      SPC, Rollator , lift chair, home O2 (4.5 L) continuous and pulse ox      Preadmission Status / PLOF:  History of falls             No  Pt. Able to drive          Yes  Pt Fully independent with ADL's         Yes  Pt. Required assistance from family for: Independent PTA    Pt. Fully independent for transfers and gait and walked with: Rollator    Pain   Yes  Location: low back (chronic pain)  Ratin /10  Pain Medicine Status: Denies need    Cognition    A&O x4   Able to follow 2 step commands    Subjective  Patient lying supine in bed with no family present. Pt agreeable to this PT eval & tx. Upper Extremity ROM/Strength  Please see OT evaluation.       Lower Extremity ROM / Strength   AROM WFL: Yes  ROM limitations: N/A    Strength Assessment (measured on a 0-5 scale):  R LE   Quad   3+   Ant Tib  4   Hamstring 3+   Iliopsoas 3+  L LE  Quad   3+   Ant Tib  4   Hamstring 3+   Iliopsoas 3+    Lower Extremity Sensation    WFL    Lower Extremity Proprioception:   WFL    Coordination and Tone  WFL    Balance  Sitting:  Fair +; SBA  Comments:     Standing: Fair -; Min A   Comments: ~2 min + 1 min (Patient needed STEDY support to maintain 2 min standing during moon care after using BSC)    Bed Mobility   Supine to Sit:    SBA with HOB elevated  Sit to Supine:   Not Tested  Rolling:   Not Tested  Scooting in sitting: CGA  Scooting in patient and family refuses SNF, patient will need home 24hr assist with home PT and RW since rollator will be unsafe at this time. Goals : To be met in 3 visits:  1). Independent with LE Ex x 10 reps    To be met in 6 visits:  1). Supine to/from sit: Supervision  2). Sit to/from stand: SBA  3). Bed to chair: SBA  4). Gait: Ambulate 10 ft.   with  SBA and use of LRAD (least restrictive assistive device)  5). Tolerate B LE exercises 3 sets of 10-15 reps  6). Ascend/descend 3 steps with SBA with use of hand rail bilateral and LRAD (least restrictive assistive device)    Rehabilitation Potential: Good  Strengths for achieving goals include:   Pt motivated, PLOF, Family Support and Pt cooperative   Barriers to achieving goals include:    Complexity of condition    Plan    To be seen 3-5 x / week  while in acute care setting for therapeutic exercises, bed mobility, transfers, progressive gait training, balance training, and family/patient education. Signature: Tomas Lopez MS PT, # [id number]    If patient discharges from this facility prior to next visit, this note will serve as the Discharge Summary. Two to three times per week

## 2021-09-22 NOTE — PROGRESS NOTES
Shift assessment complete, see flowsheet. Medications given, see MAR. Pt repositioned. Pt currently on Airvo. Does not currently want to wear BiPAP. Requesting PRN for anxiety, PO Ativan given. No other needs or discomforts stated at this time. Call light in easy reach, bedside table in easy reach, and bed in lowest position.   Naina Antunez RN

## 2021-09-22 NOTE — PROGRESS NOTES
Hospitalist Progress Note      PCP: TURNER Cooney - CNP    Date of Admission: 9/14/2021    covid 19  Infection, hypoxia    Subjective:     Ms Leroy Hernandez seen up in bed, on diuresed 15 L, still on high oxygen requirements- on airVO2 at 35 % but feels better today       Did not use bipap overnight   Pt reports she is doing ok today except for low back pain     After family discussion, comfort meds initiated yesterday by pulm    HR improved, off cardizem gtt    Medications:  Reviewed    Infusion Medications    dextrose      sodium chloride 25 mL (09/18/21 0040)     Scheduled Medications    dilTIAZem  120 mg Oral Daily    spironolactone  25 mg Oral Daily    doxycycline hyclate  100 mg Oral 2 times per day    busPIRone  5 mg Oral TID    predniSONE  40 mg Oral Daily    metoprolol succinate  75 mg Oral BID    insulin glargine  25 Units SubCUTAneous Nightly    digoxin  125 mcg Oral Daily    albuterol sulfate HFA  2 puff Inhalation 4x daily    insulin lispro  0-18 Units SubCUTAneous TID WC    insulin lispro  0-9 Units SubCUTAneous Nightly    buPROPion  150 mg Oral QAM    polyethylene glycol  17 g Oral Daily    torsemide  20 mg Oral Daily    vitamin B-6  50 mg Oral Daily    sodium chloride flush  5-40 mL IntraVENous 2 times per day    Vitamin D  2,000 Units Oral Daily     PRN Meds: LORazepam, HYDROmorphone, LORazepam, albuterol sulfate HFA, glucose, dextrose, glucagon (rDNA), dextrose, melatonin, sodium chloride flush, sodium chloride, ondansetron **OR** ondansetron, polyethylene glycol, acetaminophen **OR** acetaminophen, guaiFENesin-dextromethorphan      Intake/Output Summary (Last 24 hours) at 9/22/2021 0711  Last data filed at 9/22/2021 0247  Gross per 24 hour   Intake 444 ml   Output 3225 ml   Net -2781 ml       Physical Exam Performed:    /70   Pulse 83   Temp 96.5 °F (35.8 °C) (Axillary)   Resp 20   Ht 5' 2\" (1.575 m)   Wt 263 lb 4.8 oz (119.4 kg)   LMP  (LMP Unknown) SpO2 94%   BMI 48.16 kg/m²         General: elderly female up in bed , on Airvo2 today   Awake, alert and oriented. Appears to be not in any distress  Mucous Membranes:  Pink , anicteric  Neck: No JVD, no carotid bruit, no thyromegaly  Chest:  Clear to auscultation bilaterally, diminished in bases   Cardiovascular:  Irregular  S1S2 heard, no murmurs or gallops  Abdomen:  Soft, undistended, non tender, no organomegaly, BS present  Extremities: resolved edema to LE ,  now trace, diistal pulses well felt  Neurological : grossly normal        Labs:   Recent Labs     09/20/21  0437 09/21/21  0555 09/22/21  0509   WBC 13.0* 13.3* 11.7*   HGB 9.7* 9.8* 10.7*   HCT 30.9* 31.6* 34.9*   * 498* 546*     Recent Labs     09/20/21  0437 09/21/21  0555 09/22/21  0510    141 143   K 4.6 4.3 4.0   CL 97* 95* 96*   CO2 35* 36* 38*   BUN 39* 44* 37*   CREATININE 0.9 1.0 0.8   CALCIUM 8.3 8.2* 8.4     Recent Labs     09/20/21  0437 09/21/21  0555 09/22/21  0510   AST 70* 32 24   ALT 93* 76* 63*   BILITOT 0.4 0.4 0.4   ALKPHOS 74 72 76     Recent Labs     09/20/21  0437 09/21/21  0555 09/22/21  0509   INR 5.10* 4.51* 3.49*     No results for input(s): CKTOTAL, TROPONINI in the last 72 hours. Urinalysis:      Lab Results   Component Value Date    NITRU Negative 10/10/2020    WBCUA 3-5 10/10/2020    BACTERIA 4+ 10/10/2020    RBCUA 3-4 10/10/2020    BLOODU SMALL 10/10/2020    SPECGRAV 1.010 10/10/2020    GLUCOSEU Negative 10/10/2020       Radiology:  XR CHEST PORTABLE   Final Result   Bilateral interstitial groundglass opacities, likely representing COVID 19   pneumonia.            covid 19 infection         Assessment/Plan:    COVID 19 PNA  Acute on Chronic Hypoxic Respiratory Failure    - CXR with bilateral interstitial ground glass opacities  - Admitted to ICU, telemetry  - PCT 0.22  - started solumedrol IV  and changed to prednisone taper (she also has severe COPD), completed remdesivir   S/p - Tocilizumab x1   - wears 4.5 L at home and now worsened with CHF, copd and covid pna. Needing bipap    - pulmonology consulted  - persistent hypoxia remains , now on AirVo2 at 35 %  - after family discussion, comfort meds initiated for anxiety  - code status changed to [facility]        COPD  With acute exacerbation. Continue steroids, doxy ,inhaled bronchodilators(MDI)     Atrial Fibrillation with RVR  - hx of PAF, trop neg  - s/p dilt gtt and transitioned to oral cardizem  - also on digoxin and metoprolol- BB dose increased  - cardiology consulted  HR controlled now - hold meds today for borderline BP   -- AC on Coumadin - currently supratherapeutic  - ongoing diuresis with home demadex     Transaminitis  - cont to monitor LFT  - likely with covid infection and remdesivir use       Hyperglycemia - suspect early DM II  - pt is on steroids  - last Hgb A1c: 6.6, check Hgb A1c   - added low dose SSI and PRN hypoglycemia protocol  Added lantus nightly         Supratherapeutic INR  - hold Coumadin  - daily INR , now at 3.4     Microcytic Anemia - chronic   - Hgb 10 > 9.4  - iron studies wnl     Chronic Diastolic CHF  - appears compensated   - last echo 6/2020: normal EF, grade I DD  - daily weights, I&Os  - cont Torsemide     Hx of PE  - AC on Coumadin - currently supra therapeutic  - daily PT/INR, holding Coumadin     B cell Lymphoma  - f/w Dr. Doris Martinez     H/o Anovaginal Fistula  - noted per patient on prior admission     Morbid Obesity  - Body mass index is 49.04 kg/m². - Complicating assessment and treatment. Placing patient at risk for multiple co-morbidities as well as early death and contributing to the patient's presentation.   - Counseled on weight loss.         DVT Prophylaxis: AC on Coumadin - supratherapeutic & on hold  Diet: ADULT DIET;  Regular; 4 carb choices (60 gm/meal)  Code Status: DNR-CC    PT/OT Eval Status: ordered    Comfort meds on board  Poor prognosis     Ira Pike MD

## 2021-09-23 LAB
A/G RATIO: 1.2 (ref 1.1–2.2)
ALBUMIN SERPL-MCNC: 3 G/DL (ref 3.4–5)
ALP BLD-CCNC: 81 U/L (ref 40–129)
ALT SERPL-CCNC: 54 U/L (ref 10–40)
ANION GAP SERPL CALCULATED.3IONS-SCNC: 7 MMOL/L (ref 3–16)
AST SERPL-CCNC: 25 U/L (ref 15–37)
BASOPHILS ABSOLUTE: 0.1 K/UL (ref 0–0.2)
BASOPHILS RELATIVE PERCENT: 0.6 %
BILIRUB SERPL-MCNC: 0.4 MG/DL (ref 0–1)
BUN BLDV-MCNC: 34 MG/DL (ref 7–20)
CALCIUM SERPL-MCNC: 8.5 MG/DL (ref 8.3–10.6)
CHLORIDE BLD-SCNC: 97 MMOL/L (ref 99–110)
CO2: 37 MMOL/L (ref 21–32)
CREAT SERPL-MCNC: 0.7 MG/DL (ref 0.6–1.2)
EOSINOPHILS ABSOLUTE: 0.1 K/UL (ref 0–0.6)
EOSINOPHILS RELATIVE PERCENT: 0.7 %
GFR AFRICAN AMERICAN: >60
GFR NON-AFRICAN AMERICAN: >60
GLOBULIN: 2.5 G/DL
GLUCOSE BLD-MCNC: 144 MG/DL (ref 70–99)
GLUCOSE BLD-MCNC: 149 MG/DL (ref 70–99)
GLUCOSE BLD-MCNC: 149 MG/DL (ref 70–99)
GLUCOSE BLD-MCNC: 160 MG/DL (ref 70–99)
GLUCOSE BLD-MCNC: 82 MG/DL (ref 70–99)
GLUCOSE BLD-MCNC: 89 MG/DL (ref 70–99)
HCT VFR BLD CALC: 35.5 % (ref 36–48)
HEMOGLOBIN: 10.9 G/DL (ref 12–16)
INR BLD: 2.4 (ref 0.88–1.12)
LYMPHOCYTES ABSOLUTE: 1.1 K/UL (ref 1–5.1)
LYMPHOCYTES RELATIVE PERCENT: 9.7 %
MCH RBC QN AUTO: 24.1 PG (ref 26–34)
MCHC RBC AUTO-ENTMCNC: 30.6 G/DL (ref 31–36)
MCV RBC AUTO: 78.7 FL (ref 80–100)
MONOCYTES ABSOLUTE: 1.3 K/UL (ref 0–1.3)
MONOCYTES RELATIVE PERCENT: 11.9 %
NEUTROPHILS ABSOLUTE: 8.7 K/UL (ref 1.7–7.7)
NEUTROPHILS RELATIVE PERCENT: 77.1 %
PDW BLD-RTO: 17.2 % (ref 12.4–15.4)
PERFORMED ON: ABNORMAL
PERFORMED ON: NORMAL
PLATELET # BLD: 510 K/UL (ref 135–450)
PMV BLD AUTO: 6.9 FL (ref 5–10.5)
POTASSIUM REFLEX MAGNESIUM: 4.2 MMOL/L (ref 3.5–5.1)
PROTHROMBIN TIME: 28.1 SEC (ref 9.9–12.7)
RBC # BLD: 4.51 M/UL (ref 4–5.2)
SODIUM BLD-SCNC: 141 MMOL/L (ref 136–145)
TOTAL PROTEIN: 5.5 G/DL (ref 6.4–8.2)
WBC # BLD: 11.2 K/UL (ref 4–11)

## 2021-09-23 PROCEDURE — 99233 SBSQ HOSP IP/OBS HIGH 50: CPT | Performed by: INTERNAL MEDICINE

## 2021-09-23 PROCEDURE — 2060000000 HC ICU INTERMEDIATE R&B

## 2021-09-23 PROCEDURE — 6370000000 HC RX 637 (ALT 250 FOR IP): Performed by: INTERNAL MEDICINE

## 2021-09-23 PROCEDURE — 80053 COMPREHEN METABOLIC PANEL: CPT

## 2021-09-23 PROCEDURE — 85025 COMPLETE CBC W/AUTO DIFF WBC: CPT

## 2021-09-23 PROCEDURE — 94660 CPAP INITIATION&MGMT: CPT

## 2021-09-23 PROCEDURE — 6370000000 HC RX 637 (ALT 250 FOR IP): Performed by: PHYSICIAN ASSISTANT

## 2021-09-23 PROCEDURE — 94640 AIRWAY INHALATION TREATMENT: CPT

## 2021-09-23 PROCEDURE — 94761 N-INVAS EAR/PLS OXIMETRY MLT: CPT

## 2021-09-23 PROCEDURE — 2580000003 HC RX 258: Performed by: PHYSICIAN ASSISTANT

## 2021-09-23 PROCEDURE — 36415 COLL VENOUS BLD VENIPUNCTURE: CPT

## 2021-09-23 PROCEDURE — 85610 PROTHROMBIN TIME: CPT

## 2021-09-23 PROCEDURE — 2700000000 HC OXYGEN THERAPY PER DAY

## 2021-09-23 RX ORDER — WARFARIN SODIUM 2.5 MG/1
2.5 TABLET ORAL
Status: COMPLETED | OUTPATIENT
Start: 2021-09-23 | End: 2021-09-23

## 2021-09-23 RX ADMIN — INSULIN LISPRO 3 UNITS: 100 INJECTION, SOLUTION INTRAVENOUS; SUBCUTANEOUS at 12:41

## 2021-09-23 RX ADMIN — BUPROPION HYDROCHLORIDE 150 MG: 150 TABLET, EXTENDED RELEASE ORAL at 09:03

## 2021-09-23 RX ADMIN — ACETAMINOPHEN 650 MG: 325 TABLET ORAL at 10:38

## 2021-09-23 RX ADMIN — Medication 2 PUFF: at 15:36

## 2021-09-23 RX ADMIN — TRAMADOL HYDROCHLORIDE 50 MG: 50 TABLET, FILM COATED ORAL at 12:43

## 2021-09-23 RX ADMIN — POLYETHYLENE GLYCOL (3350) 17 G: 17 POWDER, FOR SOLUTION ORAL at 09:04

## 2021-09-23 RX ADMIN — TORSEMIDE 20 MG: 20 TABLET ORAL at 09:04

## 2021-09-23 RX ADMIN — DIGOXIN 125 MCG: 125 TABLET ORAL at 09:03

## 2021-09-23 RX ADMIN — SODIUM CHLORIDE, PRESERVATIVE FREE 10 ML: 5 INJECTION INTRAVENOUS at 09:09

## 2021-09-23 RX ADMIN — WARFARIN SODIUM 2.5 MG: 2.5 TABLET ORAL at 17:09

## 2021-09-23 RX ADMIN — SPIRONOLACTONE 25 MG: 25 TABLET ORAL at 09:03

## 2021-09-23 RX ADMIN — METOPROLOL SUCCINATE 75 MG: 50 TABLET, EXTENDED RELEASE ORAL at 09:03

## 2021-09-23 RX ADMIN — PYRIDOXINE HCL TAB 50 MG 50 MG: 50 TAB at 09:02

## 2021-09-23 RX ADMIN — BUSPIRONE HYDROCHLORIDE 5 MG: 5 TABLET ORAL at 22:14

## 2021-09-23 RX ADMIN — BUSPIRONE HYDROCHLORIDE 5 MG: 5 TABLET ORAL at 09:03

## 2021-09-23 RX ADMIN — INSULIN GLARGINE 25 UNITS: 100 INJECTION, SOLUTION SUBCUTANEOUS at 22:14

## 2021-09-23 RX ADMIN — PREDNISONE 40 MG: 20 TABLET ORAL at 09:03

## 2021-09-23 RX ADMIN — BUSPIRONE HYDROCHLORIDE 5 MG: 5 TABLET ORAL at 12:43

## 2021-09-23 RX ADMIN — Medication 2 PUFF: at 11:02

## 2021-09-23 RX ADMIN — DILTIAZEM HYDROCHLORIDE 120 MG: 120 CAPSULE, COATED, EXTENDED RELEASE ORAL at 09:03

## 2021-09-23 RX ADMIN — Medication 2 PUFF: at 19:47

## 2021-09-23 RX ADMIN — Medication 2000 UNITS: at 09:02

## 2021-09-23 RX ADMIN — INSULIN LISPRO 3 UNITS: 100 INJECTION, SOLUTION INTRAVENOUS; SUBCUTANEOUS at 17:10

## 2021-09-23 RX ADMIN — Medication 5 MG: at 22:14

## 2021-09-23 ASSESSMENT — PAIN SCALES - GENERAL
PAINLEVEL_OUTOF10: 6
PAINLEVEL_OUTOF10: 7
PAINLEVEL_OUTOF10: 5
PAINLEVEL_OUTOF10: 4
PAINLEVEL_OUTOF10: 2

## 2021-09-23 NOTE — PROGRESS NOTES
Patient reports low back pain and coccyx pain. Patient repositioned for comfort. Pain medication given as ordered. Patient resting on airvo at this time. Patient denies any further needs. Will continue to monitor.

## 2021-09-23 NOTE — PROGRESS NOTES
Pulmonary Progress Note  CC: COVID-19, respiratory failure    Subjective:    Continues on HFNO  C/O pain     IV line: peripheral    EXAM:   /62   Pulse 72   Temp 97.6 °F (36.4 °C) (Oral)   Resp 22   Ht 5' 2\" (1.575 m)   Wt 260 lb 4.8 oz (118.1 kg)   LMP  (LMP Unknown)   SpO2 (!) 88%   BMI 47.61 kg/m²  on airVO 100/35  General: NAD. NAD  Eyes: PERRL. No sclera icterus. No conjunctival injection. ENT: No discharge. Pharynx clear. Neck: Trachea midline. Normal thyroid. Resp: Some increased work of breathing. Few crackles. No wheezing. No rhonchi. No dullness on percussion. CV: Regular rate. Regular rhythm. No mumur or rub. + edema. GI: Non-tender. Non-distended. No masses. No organomegaly. Normal bowel sounds. No hernia. Skin: Warm and dry. No nodule on exposed extremities. No rash on exposed extremities. Lymph: No cervical LAD. No supraclavicular LAD. M/S: No cyanosis. No joint deformity. No clubbing. Neuro: Alert and oriented to person and events. Patellar reflexes are symmetric. Psych: No agitation, + anxiety, affect is full.      Scheduled Meds:   warfarin (COUMADIN) daily dosing (placeholder)   Other RX Placeholder    dilTIAZem  120 mg Oral Daily    spironolactone  25 mg Oral Daily    busPIRone  5 mg Oral TID    predniSONE  40 mg Oral Daily    metoprolol succinate  75 mg Oral BID    insulin glargine  25 Units SubCUTAneous Nightly    digoxin  125 mcg Oral Daily    albuterol sulfate HFA  2 puff Inhalation 4x daily    insulin lispro  0-18 Units SubCUTAneous TID WC    insulin lispro  0-9 Units SubCUTAneous Nightly    buPROPion  150 mg Oral QAM    polyethylene glycol  17 g Oral Daily    torsemide  20 mg Oral Daily    vitamin B-6  50 mg Oral Daily    sodium chloride flush  5-40 mL IntraVENous 2 times per day    Vitamin D  2,000 Units Oral Daily     Continuous Infusions:   dextrose      sodium chloride 25 mL (09/18/21 0040)     PRN Meds:  traMADol, LORazepam, albuterol

## 2021-09-23 NOTE — PROGRESS NOTES
Occupational/Physical Therapy  Hold today per care team, goals of care meeting planned for this date. Will continue 9/24/21 as appropriate.   Milagro Martínez, OTR/L [id number]  Bekah Ramirez, MS PT, # [id number]

## 2021-09-23 NOTE — FLOWSHEET NOTE
09/23/21 0211   Vital Signs   Temp 97.7 °F (36.5 °C)   Temp Source Oral   Pulse 89   Heart Rate Source Monitor   Resp 18   /63   BP Location Right upper arm   Patient Position Semi fowlers   Oxygen Therapy   SpO2 94 %   O2 Device Heated high flow cannula   FiO2  100 %   O2 Flow Rate (L/min) 35 L/min   Height and Weight   Weight 260 lb 4.8 oz (118.1 kg)   Weight Method Bed scale   BMI (Calculated) 47.7   Vitals as shown above. Pt resting quietly, no needs expressed at this time.

## 2021-09-23 NOTE — PROGRESS NOTES
Patient resting comfortably in bed. NO signs or symptoms of distress noted. Patient continues to wear airvo at 45L 100%.

## 2021-09-23 NOTE — PROGRESS NOTES
Assessment completed, see flowsheets. Night meds given. FSBS 364, lantus 25 given, humalog ss 7 units given per mar. PT reports pain 7/10 generalized, tramadol given prn per mar. Bed/chair alarm on, bed in lowest position with call light in reach.     Hiro Sinclair RN

## 2021-09-23 NOTE — PROGRESS NOTES
Report given to 888 Clarissa Ho RN at patient bedside. Pt is stable, call light in reach, with no needs at this time. Care transferred at this time.

## 2021-09-23 NOTE — PROGRESS NOTES
Hospitalist Progress Note      PCP: TURNER Lindsay - CNP    Date of Admission: 9/14/2021    covid 19  Infection, hypoxia    Subjective:     Ms Christen Sanchez seen up in bed,  Feels sob with any movements     still on high oxygen requirements- on airVO2 at 35% oxygen    Did not use bipap overnight       After family discussion, comfort meds initiated yesterday by pulm    HR improved, off cardizem gtt    Medications:  Reviewed    Infusion Medications    dextrose      sodium chloride 25 mL (09/18/21 0040)     Scheduled Medications    warfarin (COUMADIN) daily dosing (placeholder)   Other RX Placeholder    dilTIAZem  120 mg Oral Daily    spironolactone  25 mg Oral Daily    busPIRone  5 mg Oral TID    predniSONE  40 mg Oral Daily    metoprolol succinate  75 mg Oral BID    insulin glargine  25 Units SubCUTAneous Nightly    digoxin  125 mcg Oral Daily    albuterol sulfate HFA  2 puff Inhalation 4x daily    insulin lispro  0-18 Units SubCUTAneous TID WC    insulin lispro  0-9 Units SubCUTAneous Nightly    buPROPion  150 mg Oral QAM    polyethylene glycol  17 g Oral Daily    torsemide  20 mg Oral Daily    vitamin B-6  50 mg Oral Daily    sodium chloride flush  5-40 mL IntraVENous 2 times per day    Vitamin D  2,000 Units Oral Daily     PRN Meds: traMADol, LORazepam, albuterol sulfate HFA, glucose, dextrose, glucagon (rDNA), dextrose, melatonin, sodium chloride flush, sodium chloride, ondansetron **OR** ondansetron, polyethylene glycol, acetaminophen **OR** acetaminophen, guaiFENesin-dextromethorphan      Intake/Output Summary (Last 24 hours) at 9/23/2021 0949  Last data filed at 9/23/2021 0750  Gross per 24 hour   Intake    Output 1575 ml   Net -1575 ml       Physical Exam Performed:    /62   Pulse 72   Temp 97.6 °F (36.4 °C) (Oral)   Resp 22   Ht 5' 2\" (1.575 m)   Wt 260 lb 4.8 oz (118.1 kg)   LMP  (LMP Unknown)   SpO2 (!) 88%   BMI 47.61 kg/m²         General: elderly female up in bed , on Airvo2 today   Awake, alert and oriented. Appears to be not in any distress  Mucous Membranes:  Pink , anicteric  Neck: No JVD, no carotid bruit, no thyromegaly  Chest:  Bilateral air entry , diminished in bases   Occasional wheeze mushtaq   Cardiovascular:  Irregular  S1S2 heard, no murmurs or gallops  Abdomen:  Soft, undistended, non tender, no organomegaly, BS present  Extremities: resolved edema to LE ,  now trace, diistal pulses well felt  Neurological : grossly normal        Labs:   Recent Labs     09/21/21  0555 09/22/21  0509 09/23/21  0541   WBC 13.3* 11.7* 11.2*   HGB 9.8* 10.7* 10.9*   HCT 31.6* 34.9* 35.5*   * 546* 510*     Recent Labs     09/21/21  0555 09/22/21  0510 09/23/21  0541    143 141   K 4.3 4.0 4.2   CL 95* 96* 97*   CO2 36* 38* 37*   BUN 44* 37* 34*   CREATININE 1.0 0.8 0.7   CALCIUM 8.2* 8.4 8.5     Recent Labs     09/21/21  0555 09/22/21  0510 09/23/21  0541   AST 32 24 25   ALT 76* 63* 54*   BILITOT 0.4 0.4 0.4   ALKPHOS 72 76 81     Recent Labs     09/21/21  0555 09/22/21  0509 09/23/21  0541   INR 4.51* 3.49* 2.40*     No results for input(s): CKTOTAL, TROPONINI in the last 72 hours. Urinalysis:      Lab Results   Component Value Date    NITRU Negative 10/10/2020    WBCUA 3-5 10/10/2020    BACTERIA 4+ 10/10/2020    RBCUA 3-4 10/10/2020    BLOODU SMALL 10/10/2020    SPECGRAV 1.010 10/10/2020    GLUCOSEU Negative 10/10/2020       Radiology:  XR CHEST PORTABLE   Final Result   Bilateral interstitial groundglass opacities, likely representing COVID 19   pneumonia.            covid 19 infection         Assessment/Plan:    COVID 19 PNA  Acute on Chronic Hypoxic Respiratory Failure    - CXR with bilateral interstitial ground glass opacities  - Admitted to ICU, telemetry  - PCT 0.22  - started solumedrol IV  and changed to prednisone taper (she also has severe COPD), completed remdesivir   S/p - Tocilizumab x1   - wears 4.5 L at home and now worsened with CHF, copd and covid pna. Needing bipap    - pulmonology consulted  - persistent hypoxia remains , now on AirVo2 at 35%  - after family discussion, comfort meds initiated for anxiety  - code status changed to [facility]        COPD  With acute exacerbation. Continue steroids, doxy ,inhaled bronchodilators(MDI)     Atrial Fibrillation with RVR  - hx of PAF, trop neg  - s/p dilt gtt and transitioned to oral cardizem  - also on digoxin and metoprolol- BB dose increased  - cardiology consulted  HR controlled now - hold meds today for borderline BP   -- AC on Coumadin - currently therapeutic  - ongoing diuresis with home demadex     Transaminitis  - cont to monitor LFT  - likely with covid infection and remdesivir use  - improving      Hyperglycemia - suspect early DM II  - pt is on steroids  - last Hgb A1c: 6.6, check Hgb A1c   - added low dose SSI and PRN hypoglycemia protocol  Added lantus nightly         Supratherapeutic INR  - hold Coumadin  - daily INR , now at 2.4     Microcytic Anemia - chronic   - Hgb 10 .9  - iron studies wnl     Chronic Diastolic CHF  - appears compensated   - last echo 6/2020: normal EF, grade I DD  - daily weights, I&Os  - cont Torsemide     Hx of PE  - AC on Coumadin -   - daily PT/INR,      B cell Lymphoma  - f/w Dr. Stacy Jacob     H/o Anovaginal Fistula  - noted per patient on prior admission     Morbid Obesity  - Body mass index is 49.04 kg/m². - Complicating assessment and treatment. Placing patient at risk for multiple co-morbidities as well as early death and contributing to the patient's presentation.   - Counseled on weight loss.         DVT Prophylaxis: AC on Coumadin -    Diet: ADULT DIET;  Regular; 4 carb choices (60 gm/meal)  Code Status: DNR-CC    PT/OT Eval Status: ordered    Comfort meds on board  Poor prognosis     Kota Currie MD

## 2021-09-23 NOTE — CARE COORDINATION
INTERDISCIPLINARY PLAN OF CARE CONFERENCE    Date/Time: 9/23/2021 9:49 AM  Completed by: Elizabeth Mcbride RN, Case Management      Patient Name:  Lesley Nails  YOB: 1940  Admitting Diagnosis: Morbid obesity ([facility] [E66.01]  Hypoxia [R09.02]  COPD exacerbation ([facility] [J44.1]  Atrial fibrillation with RVR ([facility] [I48.91]  Anticoagulated on Coumadin [Z79.01]  Poisoning by warfarin sodium, accidental or unintentional, initial encounter [T45.511A]  Acute hypoxemic respiratory failure due to COVID-19 ([facility] [U07.1, J96.01]  COVID-19 [U07.1]  Acute respiratory failure due to COVID-19 ([facility] [U07.1, J96.00]     Admit Date/Time:  9/14/2021  1:49 PM    Chart reviewed. Interdisciplinary team contacted or reviewed plan related to patient progress and discharge plans. Disciplines included Case Management, Nursing, and Dietitian. Current Status:ongoing  PT/OT recommendation for discharge plan of care: SNF    Expected D/C Disposition:  Skilled nursing facility  Confirmed plan with patient and/or family Yes confirmed with: (name) pt and son Heather Farias  Met with: pt and son Heather Farias  Discharge Plan Comments: Reviewed chart. Role of discharge planner explained and patient verbalized understanding. CM confirmed with pt's son, Heather Farias on 9/22. Pt is from home alone and is aware that PT/OT recommended SNF. Pt chose [facility] (1). CM made referral to Bethany Gabriel with [facility] and she states that she cannot accept pt unless she is minimally 14 days out from initially covid pos dx ---initial dx was 9/14, therefore it would be after 9/28 that they could accept pt and if they have a bed available at that time. CM believes pt will discharge to SNF prior to this. RAKESH spoke with Heather Farias on 9/22, as pt wanted Heatherindu Farias to suggest other options, and he chose [facility] (2), [facility] (3). CM called and LM for Willetta Kick with [facility] and sent referral for her via confidential VM. Awaiting to hear if able to accept. Has home O2.      Addendum 9/23/2021 1032: Calin Barreto with [facility] states she is reviewing the referral and will let CM know via VM. Cm spoke with Dr. Leroy Fitzgerald and River Dye, RN and pt is on Airvo at 35%. Therefore pt is not SNF appropriate. CM called Zainab Steward back with [facility] to stop the referral with verbalized understanding. Dr. Leroy Fitzgerald or pulmonology to speak with son, Jhoana Sloan, and pt regarding all of this today      Home O2 in place on admit: Yes  Pt informed of need to bring portable home O2 tank on day of discharge for nursing to connect prior to leaving:  Yes  Verbalized agreement/Understanding:   Yes

## 2021-09-23 NOTE — PROGRESS NOTES
Dr Christie Victor at bedside. Patient laying down in bed, airvo in place. Patient continues to be SOB. Spo2 94-95%. Patient repositioned. Roscoe Farrell continue to monitor.

## 2021-09-23 NOTE — PROGRESS NOTES
Patient sitting up in bed. Patient SOB on airvo 35L 100% spo2 82-88%. Patient repositioned in bed. Patient is alert and orientedx3.

## 2021-09-23 NOTE — PROGRESS NOTES
Pharmacy Note  Warfarin Consult  Dx: Afib  Goal INR range 2-3   Home Warfarin dose:5mg daily      Date  INR  Warfarin  9/22               3.49                  hold  9/23               2.40                  2.5  Recommend  Warfarin 2.5mg tonight x1. Daily INR ordered. Rx will continue to manage therapy per consult order. Black Menjivar Pharm D 1/49/107080:87 AM  .      .

## 2021-09-23 NOTE — PROGRESS NOTES
Patient reports low back pain at this time. Tylenol given as ordered. Patient resting in bed. No signs or symptoms of distress noted at this time.

## 2021-09-24 LAB
ANION GAP SERPL CALCULATED.3IONS-SCNC: 6 MMOL/L (ref 3–16)
BUN BLDV-MCNC: 33 MG/DL (ref 7–20)
CALCIUM SERPL-MCNC: 8.2 MG/DL (ref 8.3–10.6)
CHLORIDE BLD-SCNC: 95 MMOL/L (ref 99–110)
CO2: 38 MMOL/L (ref 21–32)
CREAT SERPL-MCNC: 0.8 MG/DL (ref 0.6–1.2)
GFR AFRICAN AMERICAN: >60
GFR NON-AFRICAN AMERICAN: >60
GLUCOSE BLD-MCNC: 122 MG/DL (ref 70–99)
GLUCOSE BLD-MCNC: 168 MG/DL (ref 70–99)
GLUCOSE BLD-MCNC: 214 MG/DL (ref 70–99)
GLUCOSE BLD-MCNC: 395 MG/DL (ref 70–99)
GLUCOSE BLD-MCNC: 86 MG/DL (ref 70–99)
GLUCOSE BLD-MCNC: 97 MG/DL (ref 70–99)
INR BLD: 2.13 (ref 0.88–1.12)
PERFORMED ON: ABNORMAL
PERFORMED ON: NORMAL
POTASSIUM SERPL-SCNC: 4 MMOL/L (ref 3.5–5.1)
PROTHROMBIN TIME: 24.8 SEC (ref 9.9–12.7)
SODIUM BLD-SCNC: 139 MMOL/L (ref 136–145)

## 2021-09-24 PROCEDURE — 6370000000 HC RX 637 (ALT 250 FOR IP): Performed by: PHYSICIAN ASSISTANT

## 2021-09-24 PROCEDURE — 80048 BASIC METABOLIC PNL TOTAL CA: CPT

## 2021-09-24 PROCEDURE — 6370000000 HC RX 637 (ALT 250 FOR IP): Performed by: INTERNAL MEDICINE

## 2021-09-24 PROCEDURE — 85610 PROTHROMBIN TIME: CPT

## 2021-09-24 PROCEDURE — 97535 SELF CARE MNGMENT TRAINING: CPT

## 2021-09-24 PROCEDURE — 97530 THERAPEUTIC ACTIVITIES: CPT

## 2021-09-24 PROCEDURE — 99232 SBSQ HOSP IP/OBS MODERATE 35: CPT | Performed by: INTERNAL MEDICINE

## 2021-09-24 PROCEDURE — 99233 SBSQ HOSP IP/OBS HIGH 50: CPT | Performed by: INTERNAL MEDICINE

## 2021-09-24 PROCEDURE — 2060000000 HC ICU INTERMEDIATE R&B

## 2021-09-24 PROCEDURE — 36415 COLL VENOUS BLD VENIPUNCTURE: CPT

## 2021-09-24 PROCEDURE — 94640 AIRWAY INHALATION TREATMENT: CPT

## 2021-09-24 PROCEDURE — 2700000000 HC OXYGEN THERAPY PER DAY

## 2021-09-24 PROCEDURE — 2580000003 HC RX 258: Performed by: PHYSICIAN ASSISTANT

## 2021-09-24 PROCEDURE — 94761 N-INVAS EAR/PLS OXIMETRY MLT: CPT

## 2021-09-24 RX ORDER — WARFARIN SODIUM 5 MG/1
5 TABLET ORAL
Status: COMPLETED | OUTPATIENT
Start: 2021-09-24 | End: 2021-09-24

## 2021-09-24 RX ADMIN — Medication 2 PUFF: at 15:15

## 2021-09-24 RX ADMIN — INSULIN LISPRO 3 UNITS: 100 INJECTION, SOLUTION INTRAVENOUS; SUBCUTANEOUS at 13:00

## 2021-09-24 RX ADMIN — SPIRONOLACTONE 25 MG: 25 TABLET ORAL at 10:24

## 2021-09-24 RX ADMIN — INSULIN GLARGINE 25 UNITS: 100 INJECTION, SOLUTION SUBCUTANEOUS at 21:32

## 2021-09-24 RX ADMIN — METOPROLOL SUCCINATE 75 MG: 50 TABLET, EXTENDED RELEASE ORAL at 21:31

## 2021-09-24 RX ADMIN — PREDNISONE 40 MG: 20 TABLET ORAL at 10:25

## 2021-09-24 RX ADMIN — DILTIAZEM HYDROCHLORIDE 120 MG: 120 CAPSULE, COATED, EXTENDED RELEASE ORAL at 10:24

## 2021-09-24 RX ADMIN — BUPROPION HYDROCHLORIDE 150 MG: 150 TABLET, EXTENDED RELEASE ORAL at 10:25

## 2021-09-24 RX ADMIN — BUSPIRONE HYDROCHLORIDE 5 MG: 5 TABLET ORAL at 14:00

## 2021-09-24 RX ADMIN — BUSPIRONE HYDROCHLORIDE 5 MG: 5 TABLET ORAL at 10:24

## 2021-09-24 RX ADMIN — WARFARIN SODIUM 5 MG: 5 TABLET ORAL at 19:32

## 2021-09-24 RX ADMIN — Medication 2000 UNITS: at 10:25

## 2021-09-24 RX ADMIN — INSULIN LISPRO 7 UNITS: 100 INJECTION, SOLUTION INTRAVENOUS; SUBCUTANEOUS at 21:31

## 2021-09-24 RX ADMIN — PYRIDOXINE HCL TAB 50 MG 50 MG: 50 TAB at 10:25

## 2021-09-24 RX ADMIN — TORSEMIDE 20 MG: 20 TABLET ORAL at 10:25

## 2021-09-24 RX ADMIN — BUSPIRONE HYDROCHLORIDE 5 MG: 5 TABLET ORAL at 21:31

## 2021-09-24 RX ADMIN — DIGOXIN 125 MCG: 125 TABLET ORAL at 10:24

## 2021-09-24 RX ADMIN — SODIUM CHLORIDE, PRESERVATIVE FREE 10 ML: 5 INJECTION INTRAVENOUS at 21:30

## 2021-09-24 RX ADMIN — INSULIN LISPRO 6 UNITS: 100 INJECTION, SOLUTION INTRAVENOUS; SUBCUTANEOUS at 16:45

## 2021-09-24 RX ADMIN — Medication 2 PUFF: at 07:45

## 2021-09-24 RX ADMIN — Medication 2 PUFF: at 11:47

## 2021-09-24 RX ADMIN — Medication 2 PUFF: at 19:29

## 2021-09-24 NOTE — PROGRESS NOTES
Report given to [facility] RN at patient bedside. Pt is stable, call light in reach, with no needs at this time. Care transferred at this time.

## 2021-09-24 NOTE — PLAN OF CARE
Problem: Airway Clearance - Ineffective  Goal: Achieve or maintain patent airway  Outcome: Ongoing     Problem: Breathing Pattern - Ineffective  Goal: Ability to achieve and maintain a regular respiratory rate  Outcome: Ongoing     Problem:  Body Temperature -  Risk of, Imbalanced  Goal: Ability to maintain a body temperature within defined limits  Outcome: Ongoing  Goal: Will regain or maintain usual level of consciousness  Outcome: Ongoing  Goal: Complications related to the disease process, condition or treatment will be avoided or minimized  Outcome: Ongoing

## 2021-09-24 NOTE — PROGRESS NOTES
Hospitalist Progress Note      PCP: TURNER Mcqueen - CNP    Date of Admission: 9/14/2021    covid 19  Infection, hypoxia    Subjective:     Ms Mitchell Natalia seen up in bed,  Feels sob with any movements  Reports low back pain from laying in bed for several days   still on high oxygen requirements- on airVO2 at 35 L     Did not use bipap overnight       After family discussion, comfort meds initiated yesterday by pulm    HR improved, off cardizem gtt    Medications:  Reviewed    Infusion Medications    dextrose      sodium chloride 25 mL (09/18/21 0040)     Scheduled Medications    warfarin (COUMADIN) daily dosing (placeholder)   Other RX Placeholder    dilTIAZem  120 mg Oral Daily    spironolactone  25 mg Oral Daily    busPIRone  5 mg Oral TID    predniSONE  40 mg Oral Daily    metoprolol succinate  75 mg Oral BID    insulin glargine  25 Units SubCUTAneous Nightly    digoxin  125 mcg Oral Daily    albuterol sulfate HFA  2 puff Inhalation 4x daily    insulin lispro  0-18 Units SubCUTAneous TID WC    insulin lispro  0-9 Units SubCUTAneous Nightly    buPROPion  150 mg Oral QAM    polyethylene glycol  17 g Oral Daily    torsemide  20 mg Oral Daily    vitamin B-6  50 mg Oral Daily    sodium chloride flush  5-40 mL IntraVENous 2 times per day    Vitamin D  2,000 Units Oral Daily     PRN Meds: traMADol, LORazepam, albuterol sulfate HFA, glucose, dextrose, glucagon (rDNA), dextrose, melatonin, sodium chloride flush, sodium chloride, ondansetron **OR** ondansetron, polyethylene glycol, acetaminophen **OR** acetaminophen, guaiFENesin-dextromethorphan      Intake/Output Summary (Last 24 hours) at 9/24/2021 0722  Last data filed at 9/24/2021 0415  Gross per 24 hour   Intake 540 ml   Output 2975 ml   Net -2435 ml       Physical Exam Performed:    /67   Pulse 91   Temp 98.2 °F (36.8 °C) (Oral)   Resp 20   Ht 5' 2\" (1.575 m)   Wt 257 lb 3.2 oz (116.7 kg)   LMP  (LMP Unknown)   SpO2 90%   BMI 47.04 kg/m²         General: elderly female up in bed , on Airvo2 today   Awake, alert and oriented. Appears to be not in any distress  Mucous Membranes:  Pink , anicteric  Neck: No JVD, no carotid bruit, no thyromegaly  Chest:  Bilateral air entry , improving bilateral with minimal crackles in bases   Occasional wheeze mushtaq   Cardiovascular:  Irregular  S1S2 heard, no murmurs or gallops  Abdomen:  Soft, undistended, non tender, no organomegaly, BS present  Extremities: resolved edema to LE ,  now trace, diistal pulses well felt  Neurological : grossly normal        Labs:   Recent Labs     09/22/21  0509 09/23/21  0541   WBC 11.7* 11.2*   HGB 10.7* 10.9*   HCT 34.9* 35.5*   * 510*     Recent Labs     09/22/21  0510 09/23/21  0541 09/24/21  0442    141 139   K 4.0 4.2 4.0   CL 96* 97* 95*   CO2 38* 37* 38*   BUN 37* 34* 33*   CREATININE 0.8 0.7 0.8   CALCIUM 8.4 8.5 8.2*     Recent Labs     09/22/21  0510 09/23/21  0541   AST 24 25   ALT 63* 54*   BILITOT 0.4 0.4   ALKPHOS 76 81     Recent Labs     09/22/21  0509 09/23/21  0541 09/24/21  0442   INR 3.49* 2.40* 2.13*     No results for input(s): CKTOTAL, TROPONINI in the last 72 hours. Urinalysis:      Lab Results   Component Value Date    NITRU Negative 10/10/2020    WBCUA 3-5 10/10/2020    BACTERIA 4+ 10/10/2020    RBCUA 3-4 10/10/2020    BLOODU SMALL 10/10/2020    SPECGRAV 1.010 10/10/2020    GLUCOSEU Negative 10/10/2020       Radiology:  XR CHEST PORTABLE   Final Result   Bilateral interstitial groundglass opacities, likely representing COVID 19   pneumonia.            covid 19 infection         Assessment/Plan:    COVID 19 PNA  Acute on Chronic Hypoxic Respiratory Failure    - CXR with bilateral interstitial ground glass opacities  - Admitted to ICU, telemetry  - PCT 0.22  - started solumedrol IV  and changed to prednisone taper (she also has severe COPD), completed remdesivir   S/p - Tocilizumab x1   - wears 4.5 L at home and now worsened with CHF, copd and covid pna. Needing bipap    - pulmonology consulted  - persistent hypoxia remains , now on AirVo2 at 35%  - after family discussion, comfort meds initiated for anxiety  - code status changed to [facility]        COPD  With acute exacerbation. Continue steroids, doxy ,inhaled bronchodilators(MDI)     Atrial Fibrillation with RVR  - hx of PAF, trop neg  - s/p dilt gtt and transitioned to oral cardizem  - also on digoxin and metoprolol- BB dose increased  - cardiology consulted  HR controlled now - hold meds today for borderline BP   -- AC on Coumadin - currently therapeutic  - ongoing diuresis with home demadex     Transaminitis  - cont to monitor LFT  - likely with covid infection and remdesivir use  - improving      Hyperglycemia - suspect early DM II  - pt is on steroids  - last Hgb A1c: 6.6, check Hgb A1c   - added low dose SSI and PRN hypoglycemia protocol  Added lantus nightly         Supratherapeutic INR  - hold Coumadin  - daily INR , now at 2.4     Microcytic Anemia - chronic   - Hgb 10 .9  - iron studies wnl     Chronic Diastolic CHF  - appears compensated   - last echo 6/2020: normal EF, grade I DD  - daily weights, I&Os  - cont Torsemide     Hx of PE  - AC on Coumadin -   - daily PT/INR,      B cell Lymphoma  - f/w Dr. Timi Srinivasan     H/o Anovaginal Fistula  - noted per patient on prior admission     Morbid Obesity  - Body mass index is 49.04 kg/m². - Complicating assessment and treatment. Placing patient at risk for multiple co-morbidities as well as early death and contributing to the patient's presentation.   - Counseled on weight loss.         DVT Prophylaxis: AC on Coumadin -    Diet: ADULT DIET;  Regular; 4 carb choices (60 gm/meal)  Code Status: DNR-CC    PT/OT Eval Status: ordered    Comfort meds on board  Poor prognosis     Padmaja Gaitan MD

## 2021-09-24 NOTE — FLOWSHEET NOTE
09/23/21 2207   Vital Signs   /65   BP Location Right upper arm   Patient Position Semi fowlers   BP as shown after pt back in bed from commode. Will continue to monitor.      Alan Paiz RN

## 2021-09-24 NOTE — PROGRESS NOTES
Occupational Therapy Daily Treatment Note    Unit: PCU  Date:  9/24/2021  Patient Name:    [facility]  Admitting diagnosis:  Morbid obesity ([facility] [E66.01]  Hypoxia [R09.02]  COPD exacerbation ([facility] [J44.1]  Atrial fibrillation with RVR ([facility] [I48.91]  Anticoagulated on Coumadin [Z79.01]  Poisoning by warfarin sodium, accidental or unintentional, initial encounter [T45.511A]  Acute hypoxemic respiratory failure due to COVID-19 ([facility] [U07.1, J96.01]  COVID-19 [U07.1]  Acute respiratory failure due to COVID-19 ([facility] [U07.1, J96.00]  Admit Date:  9/14/2021  Precautions/Restrictions:   fall risk, IV, bed/chair alarm, matias catheter , telemetry, continuous pulse ox and Droplet Plus precautions (+ COVID 19), airvo       Discharge Recommendations: SNF  DME needs for discharge: defer to facility       Therapy recommendations for staff:   Assist of 1 (minimal assist) with use of gait belt for stand pivot transfer to chair and BSC    AM-PAC Score: AM-PAC Inpatient Daily Activity Raw Score: 14  Home Health S4 Level: Level 3- Safety        Treatment Time:  15:10-15:55  Treatment number:  2    Total Treatment Time:   45 minutes    History of Present Illness: 68 y. o. female with a PMH of chronic Hypoxic Respiratory Failure, COPD, Hx of Anovaginal fistula, H/o PE, PAF, chronic diastolic CHF, B cell lymphoma who presented to [facility] with complaint of shortness of breath of 2 days duration.  She tested positive for Covid a few days ago when she developed some mild respiratory symptoms. She was found to be very hypoxic in the emergency room, placed on BiPAP and admitted to the ICU. Subjective:  Patient supine in bed and agreeable to treatment. Patient requesting to get out of bed. Incontinence upon therapist arrival. Patient not aware of bowel movement.      Pain   No  Rating: NA  Location:  Pain Medicine Status: Denies need      Bed Mobility:   Supine to Sit:  SBA  Sit to Supine:  Not Tested  Rolling: SBA  Scooting:        SBA    Transfer Training:   Sit to stand:   Min A  Stand to sit:  Min A  Bed to Chair:  Min A/ hand held assist for stand pivot to chair. Bed to [facility]:   Not Tested  Standard toilet:   Not Tested    Activity Tolerance   Pt completed therapy session with SOB noted with activity  SpO2: 87-95% on airvo  HR: 120-135  BP:     ADL Training:   Upper body dressing:  Min A  Upper body bathing:  Not Tested  Lower body dressing:  Max A  Lower body bathing:  Not Tested  Toileting: Max A  Grooming/Hygiene:  Not Tested    Therapeutic Exercise:   N/A    Patient Education:   Role of OT  Recommendations for DC    Positioning Needs:   Reclined in chair with call light and needs in reach. Family Present:  No    Assessment: Patient limited by increased fatigue and back pain in chair. Patient is motivated to get out of bed daily. Will continue to provide [facility] [email or web address] as appropriate. GOALS  To be met in 3 Visits:  1). Bed to toilet/BSC: CGA     To be met in 5 Visits:  1). Supine to/from Sit:             Independent  2). Upper Body Bathing:         SBA  3). Lower Body Bathing:         Min A  4). Upper Body Dressing:       SBA  5). Lower Body Dressing:       Min A  6).  Pt to demonstrate UE exs x 15 reps with minimal cues      Plan: cont with [facility], OTR/L #[id number]      If patient discharges from this facility prior to next visit, this note will serve as the Discharge Summary

## 2021-09-24 NOTE — CARE COORDINATION
INTERDISCIPLINARY PLAN OF CARE CONFERENCE    Date/Time: 9/24/2021 10:58 AM  Completed by: Georgia Bennett RN, Case Management      Patient Name:  Edson Duenas  YOB: 1940  Admitting Diagnosis: Morbid obesity ([facility] [E66.01]  Hypoxia [R09.02]  COPD exacerbation ([facility] [J44.1]  Atrial fibrillation with RVR ([facility] [I48.91]  Anticoagulated on Coumadin [Z79.01]  Poisoning by warfarin sodium, accidental or unintentional, initial encounter [T45.511A]  Acute hypoxemic respiratory failure due to COVID-19 ([facility] [U07.1, J96.01]  COVID-19 [U07.1]  Acute respiratory failure due to COVID-19 ([facility] [U07.1, J96.00]     Admit Date/Time:  9/14/2021  1:49 PM    Chart reviewed. Interdisciplinary team contacted or reviewed plan related to patient progress and discharge plans. Disciplines included Case Management, Nursing, and Dietitian. Current Status:ongoing  PT/OT recommendation for discharge plan of care: SNF, but PT/OT currently on hold    Expected D/C Disposition:  TBD  Confirmed plan with patient and/or family Yes confirmed with: (name) pt  Met with:pt  Discharge Plan Comments: Reviewed chart. Role of discharge planner explained and patient verbalized understanding. Pt is from home alone. CM spoke with pt and let her know that at this time she valadez snot meet criteria for SNF d/t O2 requirements. Pt verbalized understanding. Pt asked [facility] to call her son, Sachi Patterson. CM spoke with Sachi Ricardochester and informed him of this, as well, with verbalized understanding. [facility] call back from MD. RAKESH did inform Dr. Arnold Doyle that [facility] was wanting a call back and provided [facility] number for Dr. Edith Ramos pos as of 9/14/2021. Pt is on home O2. Home O2 in place on admit: Yes  Pt informed of need to bring portable home O2 tank on day of discharge for nursing to connect prior to leaving:  Yes  Verbalized agreement/Understanding:   Yes

## 2021-09-24 NOTE — PROGRESS NOTES
Pulmonary Progress Note  CC: COVID-19, respiratory failure    Subjective:    Continues on HFNO   C/O anxiety    IV line: peripheral    EXAM:   /67   Pulse 91   Temp 98.2 °F (36.8 °C) (Oral)   Resp 20   Ht 5' 2\" (1.575 m)   Wt 257 lb 3.2 oz (116.7 kg)   LMP  (LMP Unknown)   SpO2 90%   BMI 47.04 kg/m²  on airVO 100/35  General: NAD. NAD  Eyes: PERRL. No sclera icterus. No conjunctival injection. ENT: No discharge. Pharynx clear. Neck: Trachea midline. Normal thyroid. Resp: Some increased work of breathing. Few crackles. No wheezing. No rhonchi. No dullness on percussion. CV: Regular rate. Regular rhythm. No mumur or rub. + edema. GI: Non-tender. Non-distended. No masses. No organomegaly. Normal bowel sounds. No hernia. Skin: Warm and dry. No nodule on exposed extremities. No rash on exposed extremities. Lymph: No cervical LAD. No supraclavicular LAD. M/S: No cyanosis. No joint deformity. No clubbing. Neuro: Alert and oriented to person and events. Patellar reflexes are symmetric. Psych: No agitation, + anxiety, affect is full.      Scheduled Meds:   warfarin (COUMADIN) daily dosing (placeholder)   Other RX Placeholder    dilTIAZem  120 mg Oral Daily    spironolactone  25 mg Oral Daily    busPIRone  5 mg Oral TID    predniSONE  40 mg Oral Daily    metoprolol succinate  75 mg Oral BID    insulin glargine  25 Units SubCUTAneous Nightly    digoxin  125 mcg Oral Daily    albuterol sulfate HFA  2 puff Inhalation 4x daily    insulin lispro  0-18 Units SubCUTAneous TID WC    insulin lispro  0-9 Units SubCUTAneous Nightly    buPROPion  150 mg Oral QAM    polyethylene glycol  17 g Oral Daily    torsemide  20 mg Oral Daily    vitamin B-6  50 mg Oral Daily    sodium chloride flush  5-40 mL IntraVENous 2 times per day    Vitamin D  2,000 Units Oral Daily     Continuous Infusions:   dextrose      sodium chloride 25 mL (09/18/21 0040)     PRN Meds:  traMADol, LORazepam, albuterol sulfate HFA, glucose, dextrose, glucagon (rDNA), dextrose, melatonin, sodium chloride flush, sodium chloride, ondansetron **OR** ondansetron, polyethylene glycol, acetaminophen **OR** acetaminophen, guaiFENesin-dextromethorphan    Labs:  CBC:   Recent Labs     09/22/21  0509 09/23/21  0541   WBC 11.7* 11.2*   HGB 10.7* 10.9*   HCT 34.9* 35.5*   MCV 78.4* 78.7*   * 510*     BMP:   Recent Labs     09/22/21  0510 09/23/21  0541 09/24/21  0442    141 139   K 4.0 4.2 4.0   CL 96* 97* 95*   CO2 38* 37* 38*   BUN 37* 34* 33*   CREATININE 0.8 0.7 0.8     Micro:  9/14/2021 SARS-CoV-2 positive  9/14/2021 blood cultures NGTD    Imaging:  CXR 9/14/2021 bilateral infiltrates consistent with Covid pneumonia    ASSESSMENT:  · Acute on chronic hypoxemic & hypercapnic respiratory failure, baseline 4 L qhs and 6 L with exertion  · COVID-19 pneumonia in an unvaccinated patient   · Severe COPD with acute exacerbation, f/b Dr. Shy Guajardo previously   · A. fib with RVR, h/o paroxysmal a. fib  · Chronic diastolic CHF   · H/O PE on coumadin  · B cell lymphoma   · SHANNON, tells me she does not use BiPAP at home     PLAN:   COVID-19 isolation, droplet plus   Airvo [facility] for life threatening respiratory failure. No plan to escalate care.    Completed Remdesevir & Tocilizumab   Steroid D#11, now PO prednisone    Completed 7 days doxycycline    Rate control for AFIB    On chronic Coumadin    CODE STATUS DNR-CC, no plan for escalation of care, DNI

## 2021-09-24 NOTE — PROGRESS NOTES
Pt up to bedside commode attempting to have a bm. BP reads 78/41, pt states she did feel dizzy while getting up. Will recheck once pt is back in bed.      Dav Wilkins RN

## 2021-09-24 NOTE — PROGRESS NOTES
02 saturation dropped to 83% while getting back to bed from bedside commode. On 45L airvo @ 100%. Placed on non rebreather at 15 lpm high flow to recover. Pt in bed, 02 saturation 95%. NRB off at this time.      Bernarda Eason RN

## 2021-09-24 NOTE — PROGRESS NOTES
Pharmacy Note  Warfarin Consult  Dx: Afib  Goal INR range 2-3   Home Warfarin dose:5mg daily      Date  INR  Warfarin  9/22               3.49                  hold  9/23               2.40                  2.5  9/24               2.13                  5.0mg  Recommend  Warfarin 5mg tonight x1. Daily INR ordered. Rx will continue to manage therapy per consult order. Faiza Jacobs Pharm D 8/18/301169:60 AM  .      . Merced Lewis

## 2021-09-25 ENCOUNTER — APPOINTMENT (OUTPATIENT)
Dept: GENERAL RADIOLOGY | Age: 81
DRG: 177 | End: 2021-09-25
Payer: MEDICARE

## 2021-09-25 LAB
ANION GAP SERPL CALCULATED.3IONS-SCNC: 8 MMOL/L (ref 3–16)
BUN BLDV-MCNC: 32 MG/DL (ref 7–20)
CALCIUM SERPL-MCNC: 8.2 MG/DL (ref 8.3–10.6)
CHLORIDE BLD-SCNC: 91 MMOL/L (ref 99–110)
CO2: 38 MMOL/L (ref 21–32)
CREAT SERPL-MCNC: 0.8 MG/DL (ref 0.6–1.2)
GFR AFRICAN AMERICAN: >60
GFR NON-AFRICAN AMERICAN: >60
GLUCOSE BLD-MCNC: 102 MG/DL (ref 70–99)
GLUCOSE BLD-MCNC: 102 MG/DL (ref 70–99)
GLUCOSE BLD-MCNC: 139 MG/DL (ref 70–99)
GLUCOSE BLD-MCNC: 203 MG/DL (ref 70–99)
GLUCOSE BLD-MCNC: 266 MG/DL (ref 70–99)
GLUCOSE BLD-MCNC: 98 MG/DL (ref 70–99)
INR BLD: 2.38 (ref 0.88–1.12)
PERFORMED ON: ABNORMAL
POTASSIUM SERPL-SCNC: 3.9 MMOL/L (ref 3.5–5.1)
PROTHROMBIN TIME: 27.9 SEC (ref 9.9–12.7)
SODIUM BLD-SCNC: 137 MMOL/L (ref 136–145)

## 2021-09-25 PROCEDURE — 94640 AIRWAY INHALATION TREATMENT: CPT

## 2021-09-25 PROCEDURE — 2580000003 HC RX 258: Performed by: PHYSICIAN ASSISTANT

## 2021-09-25 PROCEDURE — 6370000000 HC RX 637 (ALT 250 FOR IP): Performed by: INTERNAL MEDICINE

## 2021-09-25 PROCEDURE — 6370000000 HC RX 637 (ALT 250 FOR IP): Performed by: PHYSICIAN ASSISTANT

## 2021-09-25 PROCEDURE — 85610 PROTHROMBIN TIME: CPT

## 2021-09-25 PROCEDURE — 80048 BASIC METABOLIC PNL TOTAL CA: CPT

## 2021-09-25 PROCEDURE — 2060000000 HC ICU INTERMEDIATE R&B

## 2021-09-25 PROCEDURE — 97530 THERAPEUTIC ACTIVITIES: CPT

## 2021-09-25 PROCEDURE — 71045 X-RAY EXAM CHEST 1 VIEW: CPT

## 2021-09-25 PROCEDURE — 99233 SBSQ HOSP IP/OBS HIGH 50: CPT | Performed by: INTERNAL MEDICINE

## 2021-09-25 PROCEDURE — 99232 SBSQ HOSP IP/OBS MODERATE 35: CPT | Performed by: INTERNAL MEDICINE

## 2021-09-25 PROCEDURE — 97110 THERAPEUTIC EXERCISES: CPT

## 2021-09-25 PROCEDURE — 36415 COLL VENOUS BLD VENIPUNCTURE: CPT

## 2021-09-25 RX ORDER — WARFARIN SODIUM 2.5 MG/1
2.5 TABLET ORAL
Status: COMPLETED | OUTPATIENT
Start: 2021-09-25 | End: 2021-09-25

## 2021-09-25 RX ADMIN — INSULIN LISPRO 9 UNITS: 100 INJECTION, SOLUTION INTRAVENOUS; SUBCUTANEOUS at 17:59

## 2021-09-25 RX ADMIN — BUSPIRONE HYDROCHLORIDE 5 MG: 5 TABLET ORAL at 13:14

## 2021-09-25 RX ADMIN — BUPROPION HYDROCHLORIDE 150 MG: 150 TABLET, EXTENDED RELEASE ORAL at 09:21

## 2021-09-25 RX ADMIN — Medication 2 PUFF: at 20:54

## 2021-09-25 RX ADMIN — SODIUM CHLORIDE, PRESERVATIVE FREE 10 ML: 5 INJECTION INTRAVENOUS at 21:38

## 2021-09-25 RX ADMIN — SPIRONOLACTONE 25 MG: 25 TABLET ORAL at 09:20

## 2021-09-25 RX ADMIN — BUSPIRONE HYDROCHLORIDE 5 MG: 5 TABLET ORAL at 09:21

## 2021-09-25 RX ADMIN — PREDNISONE 30 MG: 20 TABLET ORAL at 09:35

## 2021-09-25 RX ADMIN — LORAZEPAM 0.5 MG: 0.5 TABLET ORAL at 21:38

## 2021-09-25 RX ADMIN — TORSEMIDE 20 MG: 20 TABLET ORAL at 09:21

## 2021-09-25 RX ADMIN — Medication 2 PUFF: at 07:29

## 2021-09-25 RX ADMIN — WARFARIN SODIUM 2.5 MG: 2.5 TABLET ORAL at 17:59

## 2021-09-25 RX ADMIN — Medication 2 PUFF: at 14:47

## 2021-09-25 RX ADMIN — Medication 2000 UNITS: at 09:21

## 2021-09-25 RX ADMIN — INSULIN GLARGINE 25 UNITS: 100 INJECTION, SOLUTION SUBCUTANEOUS at 21:39

## 2021-09-25 RX ADMIN — SODIUM CHLORIDE, PRESERVATIVE FREE 10 ML: 5 INJECTION INTRAVENOUS at 09:23

## 2021-09-25 RX ADMIN — PYRIDOXINE HCL TAB 50 MG 50 MG: 50 TAB at 09:21

## 2021-09-25 RX ADMIN — DILTIAZEM HYDROCHLORIDE 120 MG: 120 CAPSULE, COATED, EXTENDED RELEASE ORAL at 09:21

## 2021-09-25 RX ADMIN — INSULIN LISPRO 3 UNITS: 100 INJECTION, SOLUTION INTRAVENOUS; SUBCUTANEOUS at 21:39

## 2021-09-25 RX ADMIN — LORAZEPAM 0.5 MG: 0.5 TABLET ORAL at 06:21

## 2021-09-25 RX ADMIN — METOPROLOL SUCCINATE 75 MG: 50 TABLET, EXTENDED RELEASE ORAL at 21:38

## 2021-09-25 RX ADMIN — Medication 5 MG: at 01:35

## 2021-09-25 RX ADMIN — BUSPIRONE HYDROCHLORIDE 5 MG: 5 TABLET ORAL at 21:37

## 2021-09-25 RX ADMIN — Medication 5 MG: at 21:38

## 2021-09-25 RX ADMIN — DIGOXIN 125 MCG: 125 TABLET ORAL at 09:21

## 2021-09-25 RX ADMIN — METOPROLOL SUCCINATE 75 MG: 50 TABLET, EXTENDED RELEASE ORAL at 09:21

## 2021-09-25 RX ADMIN — POLYETHYLENE GLYCOL (3350) 17 G: 17 POWDER, FOR SOLUTION ORAL at 09:20

## 2021-09-25 RX ADMIN — TRAMADOL HYDROCHLORIDE 50 MG: 50 TABLET, FILM COATED ORAL at 09:35

## 2021-09-25 ASSESSMENT — PAIN SCALES - GENERAL
PAINLEVEL_OUTOF10: 4
PAINLEVEL_OUTOF10: 4
PAINLEVEL_OUTOF10: 6

## 2021-09-25 NOTE — PROGRESS NOTES
Shift assessment complete. Call light and bedside table within reach. No further needs at this time.

## 2021-09-25 NOTE — PROGRESS NOTES
Patient resting in bed. Patient repositioned. Assessment completed. Patient continues to wear airvo at 45L 100%.

## 2021-09-25 NOTE — PROGRESS NOTES
Pt at bedside. Patient is alert and oriented. resp labored with minimal exertion. Patient continues to wear airvo. Denies any needs at this time.

## 2021-09-25 NOTE — PROGRESS NOTES
Assisted patient to bed side commode. Patient SOB with minimal exertion. Patient repositioned in bed. Call light in reach. Will continue to monitor.

## 2021-09-25 NOTE — PROGRESS NOTES
Hospitalist Progress Note      PCP: Alyssa Castro, TURNER - CNP    Date of Admission: 9/14/2021    covid 19  Infection, hypoxia. remains on high flow O2 . DNR CC  After family discussion, comfort meds initiated  by pulm  No escalation of care    Subjective:     Ms George Castillo seen. Awake,alert. Oriented    up in bed,  Feels sob with any movements  Reports low back pain from laying in bed for several days  on high oxygen requirements- on airVO2 at 35 L -> 45 L.  fi O2 100 %    HR improved, off cardizem gtt    Medications:  Reviewed    Infusion Medications    dextrose      sodium chloride 25 mL (09/18/21 0040)     Scheduled Medications    warfarin  2.5 mg Oral Once    predniSONE  30 mg Oral Daily    warfarin (COUMADIN) daily dosing (placeholder)   Other RX Placeholder    dilTIAZem  120 mg Oral Daily    [Held by provider] spironolactone  25 mg Oral Daily    busPIRone  5 mg Oral TID    metoprolol succinate  75 mg Oral BID    insulin glargine  25 Units SubCUTAneous Nightly    digoxin  125 mcg Oral Daily    albuterol sulfate HFA  2 puff Inhalation 4x daily    insulin lispro  0-18 Units SubCUTAneous TID WC    insulin lispro  0-9 Units SubCUTAneous Nightly    buPROPion  150 mg Oral QAM    polyethylene glycol  17 g Oral Daily    [Held by provider] torsemide  20 mg Oral Daily    vitamin B-6  50 mg Oral Daily    sodium chloride flush  5-40 mL IntraVENous 2 times per day    Vitamin D  2,000 Units Oral Daily     PRN Meds: traMADol, LORazepam, albuterol sulfate HFA, glucose, dextrose, glucagon (rDNA), dextrose, melatonin, sodium chloride flush, sodium chloride, ondansetron **OR** ondansetron, polyethylene glycol, acetaminophen **OR** acetaminophen, guaiFENesin-dextromethorphan      Intake/Output Summary (Last 24 hours) at 9/25/2021 1727  Last data filed at 9/25/2021 1622  Gross per 24 hour   Intake 875 ml   Output 1075 ml   Net -200 ml       Physical Exam Performed:    /61   Pulse 82   Temp 97.4 °F (36.3 °C) (Oral)   Resp 18   Ht 5' 2\" (1.575 m)   Wt 251 lb 12.8 oz (114.2 kg)   LMP  (LMP Unknown)   SpO2 97%   BMI 46.05 kg/m²   Pt in droplet plus precautions   General: elderly female up in bed , on Airvo2    Awake, alert and oriented. Appears to be not in any distress  Mucous Membranes:  Pink , anicteric  Neck: No JVD, no carotid bruit, no thyromegaly  Chest:  Bilateral diminished BS,  minimal crackles in bases   Occasional wheeze mushtaq   Cardiovascular:  Irregular  S1S2 heard, no murmurs or gallops  Abdomen:  Soft, undistended, non tender, no organomegaly, BS present  Extremities: resolved edema to LE ,  now trace, diistal pulses well felt  Neurological : grossly normal        Labs:   Recent Labs     09/23/21  0541   WBC 11.2*   HGB 10.9*   HCT 35.5*   *     Recent Labs     09/23/21  0541 09/24/21  0442 09/25/21  0438    139 137   K 4.2 4.0 3.9   CL 97* 95* 91*   CO2 37* 38* 38*   BUN 34* 33* 32*   CREATININE 0.7 0.8 0.8   CALCIUM 8.5 8.2* 8.2*     Recent Labs     09/23/21  0541   AST 25   ALT 54*   BILITOT 0.4   ALKPHOS 81     Recent Labs     09/23/21  0541 09/24/21  0442 09/25/21  0438   INR 2.40* 2.13* 2.38*     No results for input(s): CKTOTAL, TROPONINI in the last 72 hours. Urinalysis:      Lab Results   Component Value Date    NITRU Negative 10/10/2020    WBCUA 3-5 10/10/2020    BACTERIA 4+ 10/10/2020    RBCUA 3-4 10/10/2020    BLOODU SMALL 10/10/2020    SPECGRAV 1.010 10/10/2020    GLUCOSEU Negative 10/10/2020       Radiology:  XR CHEST 1 VIEW   Final Result   Dense right upper lobe consolidation with mild volume loss. Findings are   suggestive of a bacterial pneumonia, possibly postobstructive. Patchy perihilar opacification with some interval improvement. Left basilar   atelectasis. XR CHEST PORTABLE   Final Result   Bilateral interstitial groundglass opacities, likely representing COVID 19   pneumonia.            covid 19 infection Assessment/Plan:    COVID 19 PNA  Acute on Chronic Hypoxic Respiratory Failure  - CXR with bilateral interstitial ground glass opacities  - Admitted to ICU, telemetry  - PCT 0.22  - started solumedrol IV  and changed to prednisone taper (she also has severe COPD), completed remdesivir   S/p - Tocilizumab x1   - wears 4.5 L at home and now worsened with CHF, copd and covid pna. Needing bipap    - pulmonology consulted  - persistent hypoxia remains , now on AirVo2 at 35L-> 45 L, fiO2 100 %  - after family discussion, comfort meds initiated for anxiety  - code status changed to [facility]        COPD  -With acute exacerbation.  -Continue steroids, doxy ,inhaled bronchodilators(MDI)     Atrial Fibrillation with RVR  - hx of PAF, trop neg  - s/p dilt gtt and transitioned to oral cardizem  - also on digoxin and metoprolol- BB dose increased  - cardiology consulted  HR controlled now - hold meds today for borderline BP   -- AC on Coumadin - currently therapeutic  - ongoing diuresis with home demadex     Transaminitis  - cont to monitor LFT  - likely with covid infection and remdesivir use  - improving      Hyperglycemia - suspect early DM II  - pt is on steroids  - last Hgb A1c: 6.6, check Hgb A1c   - added low dose SSI and PRN hypoglycemia protocol  Added lantus nightly      Supratherapeutic INR  - hold Coumadin  - daily INR , now at 2.4     Microcytic Anemia - chronic   - Hgb 10 .9  - iron studies wnl     Chronic Diastolic CHF  - appears compensated   - last echo 6/2020: normal EF, grade I DD  - daily weights, I&Os  - cont Torsemide     Hx of PE  - AC on Coumadin    - daily PT/INR,      B cell Lymphoma  - f/w Dr. Wilfredo Eli     H/o Anovaginal Fistula  - noted per patient on prior admission     Morbid Obesity  - Body mass index is 49.04 kg/m². - Complicating assessment and treatment.  Placing patient at risk for multiple co-morbidities as well as early death and contributing to the patient's presentation.   - Counseled on weight loss.         DVT Prophylaxis: AC on Coumadin   Diet: ADULT DIET;  Regular; 4 carb choices (60 gm/meal)  Code Status: DNR-CC    PT/OT Eval Status: ordered    Comfort meds on board  Poor prognosis      LTAC referral       Maile Burgess MD    9/25/2021

## 2021-09-25 NOTE — PROGRESS NOTES
Patient assisted to bedside commode. Patient is SOB with exertion. Patient remains on airvo, tolerating well.

## 2021-09-25 NOTE — PROGRESS NOTES
Inpatient Physical Therapy Daily Treatment Note    Unit: PCU  Date:  9/25/2021  Patient Name:    [facility]  Admitting diagnosis:  Morbid obesity ([facility] [E66.01]  Hypoxia [R09.02]  COPD exacerbation ([facility] [J44.1]  Atrial fibrillation with RVR ([facility] [I48.91]  Anticoagulated on Coumadin [Z79.01]  Poisoning by warfarin sodium, accidental or unintentional, initial encounter [T45.511A]  Acute hypoxemic respiratory failure due to COVID-19 ([facility] [U07.1, J96.01]  COVID-19 [U07.1]  Acute respiratory failure due to COVID-19 ([facility] [U07.1, J96.00]  Admit Date:  9/14/2021  Precautions/Restrictions:  Fall risk, Bed/chair alarm, Lines -IV, Supplemental O2 (on Airvo; FiO2 100%, 45L/min) and [place] catheter, [place] (hard of hearing), Telemetry, Continuous pulse oximetry and Isolation Precautions: Droplet Plus - COVID      Discharge Recommendations: SNF Vs LTAC  DME needs for discharge: Needs Met       Therapy recommendation for EMS Transport: requires transport by cot due to requires transport by cot due to need of lift equipment and 2 person assist for bed to chair transfers    Therapy recommendations for staff:   Assist of 2 with use of rolling walker (RW) and gait belt/ stand pivot method for all transfers and ambulation to/from [facility]  to/from chair    History of Present Illness: H & P as per Whitney Camacho MD's note dated 9/15/2021  The patient is a 68 y. o. female with a PMH of chronic Hypoxic Respiratory Failure, COPD, Hx of Anovaginal fistula, H/o PE, PAF, chronic diastolic CHF, B cell lymphoma who presented to [facility] with complaint of shortness of breath of 2 days duration.  She tested positive for Covid a few days ago when she developed some mild respiratory symptoms. She was found to be very hypoxic in the emergency room, placed on BiPAP and admitted to the ICU.  Currently on high flow nasal cannula.       Home Health S4 Level Recommendation: Level 3 Safety  AM-PAC Mobility Score   AM-PAC Inpatient Mobility Raw Score : 14  AM-PAC Inpatient Mobility without Stair Climbing Raw Score : 13    Treatment Time: 1045 - 1123  Treatment number: 2  Timed Code Treatment Minutes: 38 minutes  Total Treatment Minutes:  38  minutes    Cognition    A&O x4   Able to follow 2 step commands    Subjective  Patient lying supine in bed with no family present   Pt agreeable to this PT tx. Pain   No  Location: N/A  Rating:    NA/10  Pain Medicine Status: Denies need     Bed Mobility   Supine to Sit:    CGA  Sit to Supine:   CGA  Rolling:   CGA  Scooting:   Min A  in sitting and supine    Transfer Training (Patient refused to participate in transfer training due to fatigue)    Sit to stand:   Not Tested  Stand to sit:   Not Tested  Bed to Chair:   Not Tested with use of N/A    Gait Training gait deferred due to fatigue; pt ambulated 0 ft. Distance:      0 ft  Deviations (firm surface/linoleum):  none  Assistive Device Used:    N/A  Level of Assist:    Not Tested  Comment:     Stair Training deferred, pt unsafe/not appropriate to complete stairs at this time  # of Steps:   N/A  Level of Assist:  Not Tested  UE Support:  NA  Assistive Device:  N/A  Pattern:   N/A  Comments:     Therapeutic Exercise all completed bilaterally unless indicated  Ankle Pumps x 20 reps  Heel slides x 5 reps x 2 sets  LAQ x 5 reps x 2 sets  Clamshells x 5 reps x 2 sets  Thoracic expansion exercises and pursed lip breathing exercises x 5 reps x 2 sets in side lying position on either side. Segmental breathing exercises in sitting position. Balance  Sitting:  Fair +; CGA  Comments:     Standing: Not tested; Not Tested  Comments:     Patient Education      Role of PT, POC, Discharge recommendations, DC recommendations, safety awareness, transfer techniques, pursed lip breathing, energy conservation, pacing activity and calling for assist with mobility.      Positioning Needs       Pt in bed, alarm set, positioned in proper neutral alignment and pressure relief provided. Call light provided and all needs within reach    ROM Measurements N/A  Knee Flexion:  Knee Extension:     Activity Tolerance   Pt completed therapy session with SOB noted with all functional mobility and exercises. SpO2: at rest 94%  With bed side exercises 90%  With bed mobility 85% recovered to 91% within 3 min  In side lying position: SpO2 improved from 88 to 92% with thoracic expansion exercises. Other  N/A    Assessment :  Patient tolerated the session with regular and frequent rest breaks after each activity and exercise set. Recommending SNF vs LTAC upon discharge as patient functioning well below baseline, needing high levels of oxygen currently (on AIRVO), demonstrates good rehab potential and unable to return home due to limited or no family support, inability to negotiate stairs to enter home/bedroom/bathroom, burden of care beyond caregiver ability, home environment not conducive to patient recovery and limited safety awareness. Goals (all goals ongoing unless otherwise indicated)  To be met in 3 visits:  1). Independent with LE Ex x 10 reps     To be met in 6 visits:  1). Supine to/from sit: Supervision  2). Sit to/from stand: SBA  3). Bed to chair: SBA  4). Gait: Ambulate 10 ft.   with  SBA and use of LRAD (least restrictive assistive device)  5). Tolerate B LE exercises 3 sets of 10-15 reps  6). Ascend/descend 3 steps with SBA with use of hand rail bilateral and LRAD (least restrictive assistive device)    Plan   Continue with plan of care. Signature: Padilla Knight, MS PT, # [id number]    If patient discharges from this facility prior to next visit, this note will serve as the Discharge Summary.

## 2021-09-25 NOTE — PROGRESS NOTES
Pulmonary Progress Note  CC: COVID-19, respiratory failure    Subjective:    Continues on HFNO       EXAM:   BP (!) 90/51   Pulse 75   Temp 98 °F (36.7 °C) (Oral)   Resp 20   Ht 5' 2\" (1.575 m)   Wt 251 lb 12.8 oz (114.2 kg)   LMP  (LMP Unknown)   SpO2 95%   BMI 46.05 kg/m²  on airVO 100/35  General: NAD. NAD  Eyes: PERRL. No sclera icterus. No conjunctival injection. ENT: No discharge. Pharynx clear. Neck: Trachea midline. Normal thyroid. Resp: Some increased work of breathing. Few crackles. No wheezing. No rhonchi. CV: Regular rate. Regular rhythm. No mumur or rub. + edema. GNeuro: Alert and oriented to person and events. Patellar reflexes are symmetric. Psych: No agitation, + anxiety, affect is full.      Scheduled Meds:   warfarin  2.5 mg Oral Once    predniSONE  30 mg Oral Daily    warfarin (COUMADIN) daily dosing (placeholder)   Other RX Placeholder    dilTIAZem  120 mg Oral Daily    spironolactone  25 mg Oral Daily    busPIRone  5 mg Oral TID    metoprolol succinate  75 mg Oral BID    insulin glargine  25 Units SubCUTAneous Nightly    digoxin  125 mcg Oral Daily    albuterol sulfate HFA  2 puff Inhalation 4x daily    insulin lispro  0-18 Units SubCUTAneous TID WC    insulin lispro  0-9 Units SubCUTAneous Nightly    buPROPion  150 mg Oral QAM    polyethylene glycol  17 g Oral Daily    torsemide  20 mg Oral Daily    vitamin B-6  50 mg Oral Daily    sodium chloride flush  5-40 mL IntraVENous 2 times per day    Vitamin D  2,000 Units Oral Daily     Continuous Infusions:   dextrose      sodium chloride 25 mL (09/18/21 0040)     PRN Meds:  traMADol, LORazepam, albuterol sulfate HFA, glucose, dextrose, glucagon (rDNA), dextrose, melatonin, sodium chloride flush, sodium chloride, ondansetron **OR** ondansetron, polyethylene glycol, acetaminophen **OR** acetaminophen, guaiFENesin-dextromethorphan    Labs:  CBC:   Recent Labs     09/23/21  0541   WBC 11.2*   HGB 10.9*   HCT 35.5* MCV 78.7*   *     BMP:   Recent Labs     09/23/21  0541 09/24/21  0442 09/25/21  0438    139 137   K 4.2 4.0 3.9   CL 97* 95* 91*   CO2 37* 38* 38*   BUN 34* 33* 32*   CREATININE 0.7 0.8 0.8     Micro:  9/14/2021 SARS-CoV-2 positive  9/14/2021 blood cultures NGTD    Imaging:  CXR 9/14/2021 bilateral infiltrates consistent with Covid pneumonia    ASSESSMENT:  · Acute on chronic hypoxemic & hypercapnic respiratory failure, baseline 4 L qhs and 6 L with exertion  · COVID-19 pneumonia in an unvaccinated patient   · Severe COPD with acute exacerbation, f/b Dr. Candi Boggs previously   · A. fib with RVR, h/o paroxysmal a. fib  · Chronic diastolic CHF   · H/O PE on coumadin  · B cell lymphoma   · SHANNON, tells me she does not use BiPAP at home     PLAN:   COVID-19 isolation, droplet plus   Airvo [facility] for life threatening respiratory failure. No plan to escalate care.    Completed Remdesevir & Tocilizumab   Steroid D#12, now PO prednisone    Completed 7 days doxycycline    Patient appears dry and hold her diuretics tomorrow   on chronic Coumadin    no plan for escalation of care, DNI   Social work for [facility] placement -she should qualify based on high oxygen requirement

## 2021-09-25 NOTE — PROGRESS NOTES
Pharmacy Note  Warfarin Consult  Dx: Afib  Goal INR range 2-3   Home Warfarin dose:5mg daily      Date  INR  Warfarin  9/22               3.49                  hold  9/23               2.40                  2.5  9/24               2.13                  5mg  9/25               2.38                  2.5mg     Recommend  Warfarin 2.5mg tonight x1. Daily INR ordered. Rx will continue to manage therapy per consult order.   MARCELINO Stout [facility] PharmD 9/25/2021 7:17 AM

## 2021-09-25 NOTE — PROGRESS NOTES
Patient continues to wear airvo. Patient complaining of pain to lower back. Patient given pain medication as ordered. No signs or symptoms of distress noted. Patient resting with eyes closed.

## 2021-09-26 LAB
ANION GAP SERPL CALCULATED.3IONS-SCNC: 15 MMOL/L (ref 3–16)
BUN BLDV-MCNC: 34 MG/DL (ref 7–20)
CALCIUM SERPL-MCNC: 8.4 MG/DL (ref 8.3–10.6)
CHLORIDE BLD-SCNC: 92 MMOL/L (ref 99–110)
CO2: 33 MMOL/L (ref 21–32)
CREAT SERPL-MCNC: 0.9 MG/DL (ref 0.6–1.2)
GFR AFRICAN AMERICAN: >60
GFR NON-AFRICAN AMERICAN: >60
GLUCOSE BLD-MCNC: 101 MG/DL (ref 70–99)
GLUCOSE BLD-MCNC: 134 MG/DL (ref 70–99)
GLUCOSE BLD-MCNC: 215 MG/DL (ref 70–99)
GLUCOSE BLD-MCNC: 304 MG/DL (ref 70–99)
GLUCOSE BLD-MCNC: 76 MG/DL (ref 70–99)
GLUCOSE BLD-MCNC: 86 MG/DL (ref 70–99)
INR BLD: 3.39 (ref 0.88–1.12)
PERFORMED ON: ABNORMAL
PERFORMED ON: NORMAL
POTASSIUM SERPL-SCNC: 3.8 MMOL/L (ref 3.5–5.1)
PROTHROMBIN TIME: 40.3 SEC (ref 9.9–12.7)
SODIUM BLD-SCNC: 140 MMOL/L (ref 136–145)

## 2021-09-26 PROCEDURE — 6370000000 HC RX 637 (ALT 250 FOR IP): Performed by: INTERNAL MEDICINE

## 2021-09-26 PROCEDURE — 99233 SBSQ HOSP IP/OBS HIGH 50: CPT | Performed by: INTERNAL MEDICINE

## 2021-09-26 PROCEDURE — 36415 COLL VENOUS BLD VENIPUNCTURE: CPT

## 2021-09-26 PROCEDURE — 6370000000 HC RX 637 (ALT 250 FOR IP): Performed by: NURSE PRACTITIONER

## 2021-09-26 PROCEDURE — 85610 PROTHROMBIN TIME: CPT

## 2021-09-26 PROCEDURE — 94640 AIRWAY INHALATION TREATMENT: CPT

## 2021-09-26 PROCEDURE — 6370000000 HC RX 637 (ALT 250 FOR IP): Performed by: PHYSICIAN ASSISTANT

## 2021-09-26 PROCEDURE — 2060000000 HC ICU INTERMEDIATE R&B

## 2021-09-26 PROCEDURE — 2580000003 HC RX 258: Performed by: PHYSICIAN ASSISTANT

## 2021-09-26 PROCEDURE — 94761 N-INVAS EAR/PLS OXIMETRY MLT: CPT

## 2021-09-26 PROCEDURE — 2700000000 HC OXYGEN THERAPY PER DAY

## 2021-09-26 PROCEDURE — 80048 BASIC METABOLIC PNL TOTAL CA: CPT

## 2021-09-26 PROCEDURE — 99232 SBSQ HOSP IP/OBS MODERATE 35: CPT | Performed by: INTERNAL MEDICINE

## 2021-09-26 RX ORDER — CASTOR OIL AND BALSAM, PERU 788; 87 MG/G; MG/G
OINTMENT TOPICAL 2 TIMES DAILY
Status: DISCONTINUED | OUTPATIENT
Start: 2021-09-26 | End: 2021-09-29 | Stop reason: HOSPADM

## 2021-09-26 RX ADMIN — BUSPIRONE HYDROCHLORIDE 5 MG: 5 TABLET ORAL at 09:25

## 2021-09-26 RX ADMIN — Medication 5 MG: at 20:36

## 2021-09-26 RX ADMIN — DIGOXIN 125 MCG: 125 TABLET ORAL at 09:25

## 2021-09-26 RX ADMIN — Medication 2 PUFF: at 20:22

## 2021-09-26 RX ADMIN — PYRIDOXINE HCL TAB 50 MG 50 MG: 50 TAB at 09:25

## 2021-09-26 RX ADMIN — Medication: at 21:00

## 2021-09-26 RX ADMIN — Medication 2 PUFF: at 07:15

## 2021-09-26 RX ADMIN — SODIUM CHLORIDE, PRESERVATIVE FREE 10 ML: 5 INJECTION INTRAVENOUS at 09:27

## 2021-09-26 RX ADMIN — METOPROLOL SUCCINATE 75 MG: 50 TABLET, EXTENDED RELEASE ORAL at 20:24

## 2021-09-26 RX ADMIN — SODIUM CHLORIDE, PRESERVATIVE FREE 10 ML: 5 INJECTION INTRAVENOUS at 20:25

## 2021-09-26 RX ADMIN — Medication 2000 UNITS: at 09:25

## 2021-09-26 RX ADMIN — PREDNISONE 30 MG: 20 TABLET ORAL at 09:25

## 2021-09-26 RX ADMIN — Medication 2 PUFF: at 16:17

## 2021-09-26 RX ADMIN — INSULIN LISPRO 12 UNITS: 100 INJECTION, SOLUTION INTRAVENOUS; SUBCUTANEOUS at 16:18

## 2021-09-26 RX ADMIN — INSULIN GLARGINE 25 UNITS: 100 INJECTION, SOLUTION SUBCUTANEOUS at 20:46

## 2021-09-26 RX ADMIN — INSULIN LISPRO 3 UNITS: 100 INJECTION, SOLUTION INTRAVENOUS; SUBCUTANEOUS at 20:25

## 2021-09-26 RX ADMIN — LORAZEPAM 0.5 MG: 0.5 TABLET ORAL at 20:36

## 2021-09-26 RX ADMIN — Medication 2 PUFF: at 12:07

## 2021-09-26 RX ADMIN — BUPROPION HYDROCHLORIDE 150 MG: 150 TABLET, EXTENDED RELEASE ORAL at 09:25

## 2021-09-26 RX ADMIN — LORAZEPAM 0.5 MG: 0.5 TABLET ORAL at 14:04

## 2021-09-26 NOTE — PROGRESS NOTES
Pulmonary Progress Note  CC: COVID-19, respiratory failure    Subjective:    Continues on HFNO  80%/35lpm      EXAM:   BP 92/61   Pulse 87   Temp 97.3 °F (36.3 °C) (Oral)   Resp 20   Ht 5' 2\" (1.575 m)   Wt 250 lb 14.4 oz (113.8 kg)   LMP  (LMP Unknown)   SpO2 95%   BMI 45.89 kg/m²  on airVO  General: NAD. NAD  Eyes: PERRL. No sclera icterus. No conjunctival injection. ENT: No discharge. Pharynx clear. Neck: Trachea midline. Normal thyroid. Resp: Some increased work of breathing. Few crackles. No wheezing. No rhonchi. CV: Regular rate. Regular rhythm. No mumur or rub. + edema. GNeuro: Alert and oriented to person and events. Patellar reflexes are symmetric. Psych: No agitation, + anxiety, affect is full.      Scheduled Meds:   predniSONE  30 mg Oral Daily    warfarin (COUMADIN) daily dosing (placeholder)   Other RX Placeholder    dilTIAZem  120 mg Oral Daily    [Held by provider] spironolactone  25 mg Oral Daily    busPIRone  5 mg Oral TID    metoprolol succinate  75 mg Oral BID    insulin glargine  25 Units SubCUTAneous Nightly    digoxin  125 mcg Oral Daily    albuterol sulfate HFA  2 puff Inhalation 4x daily    insulin lispro  0-18 Units SubCUTAneous TID WC    insulin lispro  0-9 Units SubCUTAneous Nightly    buPROPion  150 mg Oral QAM    polyethylene glycol  17 g Oral Daily    [Held by provider] torsemide  20 mg Oral Daily    vitamin B-6  50 mg Oral Daily    sodium chloride flush  5-40 mL IntraVENous 2 times per day    Vitamin D  2,000 Units Oral Daily     Continuous Infusions:   dextrose      sodium chloride 25 mL (09/18/21 0040)     PRN Meds:  traMADol, LORazepam, albuterol sulfate HFA, glucose, dextrose, glucagon (rDNA), dextrose, melatonin, sodium chloride flush, sodium chloride, ondansetron **OR** ondansetron, polyethylene glycol, acetaminophen **OR** acetaminophen, guaiFENesin-dextromethorphan    Labs:  CBC:   No results for input(s): WBC, HGB, HCT, MCV, PLT in the last 72 hours. BMP:   Recent Labs     09/24/21  0442 09/25/21  0438 09/26/21  0458    137 140   K 4.0 3.9 3.8   CL 95* 91* 92*   CO2 38* 38* 33*   BUN 33* 32* 34*   CREATININE 0.8 0.8 0.9     Micro:  9/14/2021 SARS-CoV-2 positive  9/14/2021 blood cultures NGTD    Imaging:  CXR 9/14/2021 bilateral infiltrates consistent with Covid pneumonia    ASSESSMENT:  · Acute on chronic hypoxemic & hypercapnic respiratory failure, baseline 4 L qhs and 6 L with exertion  · COVID-19 pneumonia in an unvaccinated patient   · Severe COPD with acute exacerbation, f/b Dr. Stefania Weber previously   · A. fib with RVR, h/o paroxysmal a. fib  · Chronic diastolic CHF   · H/O PE on coumadin  · B cell lymphoma   · SHANNON, tells me she does not use BiPAP at home     PLAN:   COVID-19 isolation, droplet plus   Airvo [facility] for life threatening respiratory failure. No plan to escalate care.    Completed Remdesevir & Tocilizumab   Steroid D#13, now PO prednisone    Completed 7 days doxycycline    Patient appears dry and will  her diuretics   on chronic Coumadin    DNI   PT/OT   pending LTAC placement

## 2021-09-26 NOTE — PROGRESS NOTES
End of shift report given to [facility]. Pt in stable condition, care transferred at this time.  Electronically signed by Aroldo Valenzuela RN on 9/26/2021 at 7:57 AM

## 2021-09-26 NOTE — PROGRESS NOTES
Hospitalist Progress Note      PCP: Darian Bañuelos, APRN - CNP    Date of Admission: 9/14/2021    covid 19  Infection, hypoxia. remains on high flow O2 . DNR CC  After family discussion, comfort meds initiated  by pulm  No escalation of care. .    Discussed with pulmonology. Recommending LTAC placement    Subjective:     Ms Alva Comment seen. Awake,alert. Oriented . Episodes of anxiety today  Feels sob with any movements.     on high oxygen requirements- on airVO2 at 35 L -> 45 L-> 35 L. fi O2 100 % -> 80%    HR improved, off cardizem gtt      Medications:  Reviewed    Infusion Medications    dextrose      sodium chloride 25 mL (09/18/21 0040)     Scheduled Medications    Venelex   Topical BID    predniSONE  30 mg Oral Daily    warfarin (COUMADIN) daily dosing (placeholder)   Other RX Placeholder    dilTIAZem  120 mg Oral Daily    [Held by provider] spironolactone  25 mg Oral Daily    busPIRone  5 mg Oral TID    metoprolol succinate  75 mg Oral BID    insulin glargine  25 Units SubCUTAneous Nightly    digoxin  125 mcg Oral Daily    albuterol sulfate HFA  2 puff Inhalation 4x daily    insulin lispro  0-18 Units SubCUTAneous TID WC    insulin lispro  0-9 Units SubCUTAneous Nightly    buPROPion  150 mg Oral QAM    polyethylene glycol  17 g Oral Daily    [Held by provider] torsemide  20 mg Oral Daily    vitamin B-6  50 mg Oral Daily    sodium chloride flush  5-40 mL IntraVENous 2 times per day    Vitamin D  2,000 Units Oral Daily     PRN Meds: traMADol, LORazepam, albuterol sulfate HFA, glucose, dextrose, glucagon (rDNA), dextrose, melatonin, sodium chloride flush, sodium chloride, ondansetron **OR** ondansetron, polyethylene glycol, acetaminophen **OR** acetaminophen, guaiFENesin-dextromethorphan      Intake/Output Summary (Last 24 hours) at 9/26/2021 1514  Last data filed at 9/26/2021 1333  Gross per 24 hour   Intake 870 ml   Output 700 ml   Net 170 ml       Physical Exam Performed:    BP (!) 90/48   Pulse 83   Temp 97.6 °F (36.4 °C) (Oral)   Resp 20   Ht 5' 2\" (1.575 m)   Wt 250 lb 14.4 oz (113.8 kg)   LMP  (LMP Unknown)   SpO2 93%   BMI 45.89 kg/m²   Pt in droplet plus precautions   General: elderly female up in bed , on Airvo2    Awake, alert and oriented. Appears to be not in any distress  Mucous Membranes:  Pink , anicteric  Neck: No JVD, no carotid bruit, no thyromegaly  Chest:  Bilateral diminished BS,  minimal crackles in bases   Occasional wheeze mushtaq   Cardiovascular:  Irregular  S1S2 heard, no murmurs or gallops  Abdomen:  Soft, undistended, non tender, no organomegaly, BS present  Extremities: resolved edema to LE ,  now trace, diistal pulses well felt  Neurological : grossly normal        Labs:   No results for input(s): WBC, HGB, HCT, PLT in the last 72 hours. Recent Labs     09/24/21  0442 09/25/21  0438 09/26/21  0458    137 140   K 4.0 3.9 3.8   CL 95* 91* 92*   CO2 38* 38* 33*   BUN 33* 32* 34*   CREATININE 0.8 0.8 0.9   CALCIUM 8.2* 8.2* 8.4     No results for input(s): AST, ALT, BILIDIR, BILITOT, ALKPHOS in the last 72 hours. Recent Labs     09/24/21  0442 09/25/21  0438 09/26/21  0458   INR 2.13* 2.38* 3.39*     No results for input(s): CKTOTAL, TROPONINI in the last 72 hours. Urinalysis:      Lab Results   Component Value Date    NITRU Negative 10/10/2020    WBCUA 3-5 10/10/2020    BACTERIA 4+ 10/10/2020    RBCUA 3-4 10/10/2020    BLOODU SMALL 10/10/2020    SPECGRAV 1.010 10/10/2020    GLUCOSEU Negative 10/10/2020       Radiology:  XR CHEST 1 VIEW   Final Result   Dense right upper lobe consolidation with mild volume loss. Findings are   suggestive of a bacterial pneumonia, possibly postobstructive. Patchy perihilar opacification with some interval improvement. Left basilar   atelectasis. XR CHEST PORTABLE   Final Result   Bilateral interstitial groundglass opacities, likely representing COVID 19   pneumonia.            covid 19 infection         Assessment/Plan:    COVID 19 PNA  Acute on Chronic Hypoxic Respiratory Failure  - CXR with bilateral interstitial ground glass opacities  - Admitted to ICU, telemetry  - PCT 0.22  - started solumedrol IV  and changed to prednisone taper (she also has severe COPD)  - completed remdesivir   - S/p - Tocilizumab x1   - wears 4.5 L at home and now worsened with CHF, copd and covid pna. Was on bipap -> on high flow oxygen now   - pulmonology consulted  - persistent hypoxia remains , now on AirVo2 at 35 L, fiO2 100 %-> 80%  - after family discussion, comfort meds initiated for anxiety  - code status changed to [facility]      COPD  -With acute exacerbation.  -Continue steroids, doxy ,inhaled bronchodilators(MDI)     Atrial Fibrillation with RVR  - hx of PAF, trop neg  - s/p dilt gtt and transitioned to oral cardizem  - also on digoxin and metoprolol- BB dose increased  - cardiology consulted  HR controlled now - hold meds today for borderline BP   -- AC on Coumadin - currently therapeutic  - ongoing diuresis with home demadex     Transaminitis  - cont to monitor LFT  - likely with covid infection and remdesivir use  - improving      Hyperglycemia - suspect early DM II  - pt is on steroids  - last Hgb A1c: 6.6, check Hgb A1c   - added low dose SSI and PRN hypoglycemia protocol  Added lantus nightly      Supratherapeutic INR  - hold Coumadin  - daily INR , now at 2.4     Microcytic Anemia - chronic   - Hgb 10 .9  - iron studies wnl     Chronic Diastolic CHF  - appears compensated   - last echo 6/2020: normal EF, grade I DD  - daily weights, I&Os  - cont Torsemide     Hx of PE  - AC on Coumadin    - daily PT/INR,      B cell Lymphoma  - f/w Dr. Samir Amaya     H/o Anovaginal Fistula  - noted per patient on prior admission     Morbid Obesity  - Body mass index is 49.04 kg/m². - Complicating assessment and treatment.  Placing patient at risk for multiple co-morbidities as well as early death and contributing to the

## 2021-09-26 NOTE — PROGRESS NOTES
Pharmacy Note  Warfarin Consult  Dx: Afib  Goal INR range 2-3   Home Warfarin dose:5mg daily      Date  INR  Warfarin  9/22               3.49                  hold  9/23               2.40                  2.5  9/24               2.13                  5mg  9/25               2.38                  2.5mg  9/26               3.39                  Hold     Recommend  holding Warfarin tonight x1. Daily INR ordered. Rx will continue to manage therapy per consult order.   [facility] HOSP [facility] PharmD 9/26/2021 8:11 AM

## 2021-09-26 NOTE — PROGRESS NOTES
Patient incontinent of XL soft brown stool. Hygiene needs completed, and patient repositioned for comfort. PRN po Ativan administered per [facility] (verified with Dr. Nikhil umana to administer with BP 90/48). Call light in easy reach. Bed alarm on.

## 2021-09-26 NOTE — PROGRESS NOTES
Shift assessment complete. Call light and bedside table within reach.  Electronically signed by Jillian Kumar RN on 9/25/2021 at 9:36 PM

## 2021-09-27 LAB
ANION GAP SERPL CALCULATED.3IONS-SCNC: 8 MMOL/L (ref 3–16)
BASOPHILS ABSOLUTE: 0.1 K/UL (ref 0–0.2)
BASOPHILS RELATIVE PERCENT: 0.7 %
BUN BLDV-MCNC: 28 MG/DL (ref 7–20)
CALCIUM SERPL-MCNC: 8.5 MG/DL (ref 8.3–10.6)
CHLORIDE BLD-SCNC: 93 MMOL/L (ref 99–110)
CO2: 39 MMOL/L (ref 21–32)
CREAT SERPL-MCNC: 0.8 MG/DL (ref 0.6–1.2)
EOSINOPHILS ABSOLUTE: 0.1 K/UL (ref 0–0.6)
EOSINOPHILS RELATIVE PERCENT: 0.7 %
GFR AFRICAN AMERICAN: >60
GFR NON-AFRICAN AMERICAN: >60
GLUCOSE BLD-MCNC: 131 MG/DL (ref 70–99)
GLUCOSE BLD-MCNC: 242 MG/DL (ref 70–99)
GLUCOSE BLD-MCNC: 258 MG/DL (ref 70–99)
GLUCOSE BLD-MCNC: 70 MG/DL (ref 70–99)
GLUCOSE BLD-MCNC: 74 MG/DL (ref 70–99)
GLUCOSE BLD-MCNC: 84 MG/DL (ref 70–99)
HCT VFR BLD CALC: 34.7 % (ref 36–48)
HEMOGLOBIN: 10.6 G/DL (ref 12–16)
INR BLD: 3.4 (ref 0.88–1.12)
LYMPHOCYTES ABSOLUTE: 1.2 K/UL (ref 1–5.1)
LYMPHOCYTES RELATIVE PERCENT: 9.6 %
MCH RBC QN AUTO: 24.5 PG (ref 26–34)
MCHC RBC AUTO-ENTMCNC: 30.4 G/DL (ref 31–36)
MCV RBC AUTO: 80.7 FL (ref 80–100)
MONOCYTES ABSOLUTE: 1.1 K/UL (ref 0–1.3)
MONOCYTES RELATIVE PERCENT: 8.3 %
NEUTROPHILS ABSOLUTE: 10.3 K/UL (ref 1.7–7.7)
NEUTROPHILS RELATIVE PERCENT: 80.7 %
PDW BLD-RTO: 17.8 % (ref 12.4–15.4)
PERFORMED ON: ABNORMAL
PERFORMED ON: NORMAL
PERFORMED ON: NORMAL
PLATELET # BLD: 303 K/UL (ref 135–450)
PMV BLD AUTO: 7.5 FL (ref 5–10.5)
POTASSIUM SERPL-SCNC: 3.9 MMOL/L (ref 3.5–5.1)
PROCALCITONIN: 0.04 NG/ML (ref 0–0.15)
PROTHROMBIN TIME: 40.4 SEC (ref 9.9–12.7)
RBC # BLD: 4.3 M/UL (ref 4–5.2)
SODIUM BLD-SCNC: 140 MMOL/L (ref 136–145)
WBC # BLD: 12.7 K/UL (ref 4–11)

## 2021-09-27 PROCEDURE — 6370000000 HC RX 637 (ALT 250 FOR IP): Performed by: INTERNAL MEDICINE

## 2021-09-27 PROCEDURE — 85610 PROTHROMBIN TIME: CPT

## 2021-09-27 PROCEDURE — 80048 BASIC METABOLIC PNL TOTAL CA: CPT

## 2021-09-27 PROCEDURE — 2060000000 HC ICU INTERMEDIATE R&B

## 2021-09-27 PROCEDURE — 85025 COMPLETE CBC W/AUTO DIFF WBC: CPT

## 2021-09-27 PROCEDURE — 2700000000 HC OXYGEN THERAPY PER DAY

## 2021-09-27 PROCEDURE — 97530 THERAPEUTIC ACTIVITIES: CPT

## 2021-09-27 PROCEDURE — 36415 COLL VENOUS BLD VENIPUNCTURE: CPT

## 2021-09-27 PROCEDURE — 99232 SBSQ HOSP IP/OBS MODERATE 35: CPT | Performed by: INTERNAL MEDICINE

## 2021-09-27 PROCEDURE — 2580000003 HC RX 258: Performed by: PHYSICIAN ASSISTANT

## 2021-09-27 PROCEDURE — 97112 NEUROMUSCULAR REEDUCATION: CPT

## 2021-09-27 PROCEDURE — 94640 AIRWAY INHALATION TREATMENT: CPT

## 2021-09-27 PROCEDURE — 84145 PROCALCITONIN (PCT): CPT

## 2021-09-27 PROCEDURE — 97116 GAIT TRAINING THERAPY: CPT

## 2021-09-27 PROCEDURE — 94761 N-INVAS EAR/PLS OXIMETRY MLT: CPT

## 2021-09-27 PROCEDURE — 99233 SBSQ HOSP IP/OBS HIGH 50: CPT | Performed by: INTERNAL MEDICINE

## 2021-09-27 PROCEDURE — 97110 THERAPEUTIC EXERCISES: CPT

## 2021-09-27 PROCEDURE — 6370000000 HC RX 637 (ALT 250 FOR IP): Performed by: PHYSICIAN ASSISTANT

## 2021-09-27 RX ORDER — METOPROLOL TARTRATE 5 MG/5ML
5 INJECTION INTRAVENOUS EVERY 6 HOURS PRN
Status: DISCONTINUED | OUTPATIENT
Start: 2021-09-27 | End: 2021-09-29 | Stop reason: HOSPADM

## 2021-09-27 RX ADMIN — LORAZEPAM 0.5 MG: 0.5 TABLET ORAL at 14:24

## 2021-09-27 RX ADMIN — DIGOXIN 125 MCG: 125 TABLET ORAL at 09:43

## 2021-09-27 RX ADMIN — PREDNISONE 30 MG: 20 TABLET ORAL at 09:43

## 2021-09-27 RX ADMIN — Medication 2000 UNITS: at 09:43

## 2021-09-27 RX ADMIN — PYRIDOXINE HCL TAB 50 MG 50 MG: 50 TAB at 09:43

## 2021-09-27 RX ADMIN — Medication 2 PUFF: at 16:27

## 2021-09-27 RX ADMIN — Medication: at 09:44

## 2021-09-27 RX ADMIN — BUSPIRONE HYDROCHLORIDE 5 MG: 5 TABLET ORAL at 09:43

## 2021-09-27 RX ADMIN — Medication 2 PUFF: at 07:34

## 2021-09-27 RX ADMIN — Medication 2 PUFF: at 19:29

## 2021-09-27 RX ADMIN — SODIUM CHLORIDE, PRESERVATIVE FREE 10 ML: 5 INJECTION INTRAVENOUS at 20:29

## 2021-09-27 RX ADMIN — INSULIN GLARGINE 25 UNITS: 100 INJECTION, SOLUTION SUBCUTANEOUS at 20:51

## 2021-09-27 RX ADMIN — BUPROPION HYDROCHLORIDE 150 MG: 150 TABLET, EXTENDED RELEASE ORAL at 09:43

## 2021-09-27 RX ADMIN — LORAZEPAM 0.5 MG: 0.5 TABLET ORAL at 20:30

## 2021-09-27 RX ADMIN — Medication 5 MG: at 20:30

## 2021-09-27 RX ADMIN — INSULIN LISPRO 5 UNITS: 100 INJECTION, SOLUTION INTRAVENOUS; SUBCUTANEOUS at 20:33

## 2021-09-27 RX ADMIN — Medication 2 PUFF: at 11:41

## 2021-09-27 RX ADMIN — DILTIAZEM HYDROCHLORIDE 120 MG: 120 CAPSULE, COATED, EXTENDED RELEASE ORAL at 09:44

## 2021-09-27 RX ADMIN — Medication: at 20:30

## 2021-09-27 NOTE — PROGRESS NOTES
Hand off report given to Jung Farias RN. Patient stable, with no other needs. Care transferred at this time.

## 2021-09-27 NOTE — PROGRESS NOTES
RESPIRATORY THERAPY ASSESSMENT    Name:  Kolby [facility] Record Number:  [id number]  Age: 68 y.o. Gender: female  : 1940  Today's Date:  2021  Room:  /0311-01    Assessment     Is the patient being admitted for a COPD or Asthma exacerbation? No   (If yes the patient will be seen every 4 hours for the first 24 hours and then reassessed)    Patient Admission Diagnosis      Allergies  Allergies   Allergen Reactions    Crestor [Rosuvastatin]      Muscle aches    Levofloxacin Other (See Comments)     Achiness    Lipitor      Muscle aches    Rocephin In Dextrose [Ceftriaxone Sodium In Dextrose]      Rash,itching    Zocor [Simvastatin]      Muscle aches    Codeine Itching and Nausea And Vomiting    Pravastatin Nausea And Vomiting       Minimum Predicted Vital Capacity:               Actual Vital Capacity:                    Pulmonary History:chf/sleep apnea  Home Oxygen Therapy:  4.5L  Home Respiratory Therapy:Albuterol   Current Respiratory Therapy:  Albuterol 4 times daily  Treatment Type: MDI  Medications: Albuterol    Respiratory Severity Index(RSI)   Patients with orders for inhalation medications, oxygen, or any therapeutic treatment modality will be placed on Respiratory Protocol. They will be assessed with the first treatment and at least every 72 hours thereafter. The following severity scale will be used to determine frequency of treatment intervention.     Smoking History: Pulmonary Disease or Smoking History, Greater than 15 pack year = 2    Social History  Social History     Tobacco Use    Smoking status: Former Smoker     Packs/day: 0.33     Years: 50.00     Pack years: 16.50     Types: Cigarettes     Quit date: 2015     Years since quittin.2    Smokeless tobacco: Never Used   Vaping Use    Vaping Use: Never used   Substance Use Topics    Alcohol use: Not Currently     Alcohol/week: 0.0 standard drinks    Drug use: No       Recent Surgical History: None = 0  Past Surgical History  Past Surgical History:   Procedure Laterality Date    APPENDECTOMY  1963    BRONCHOSCOPY N/A 8/12/2019    EBUS WF W/ANES.  performed by Mehrdad Johns MD at [address]  8/12/2019    BRONCHOSCOPY ALVEOLAR LAVAGE performed by Mehrdad Johns MD at [address]  8/12/2019    BRONCHOSCOPY BRUSHINGS performed by Mehrdad Johns MD at [address]  8/12/2019    BRONCHOSCOPY/TRANSBRONCHIAL LUNG BIOPSY performed by Mehrdad Johns MD at [address]  8/12/2019    BRONCHOSCOPY/TRANSBRONCHIAL NEEDLE BIOPSY performed by Mehrdad Johns MD at [address]  8/12/2019    BRONCHOSCOPY/TRANSBRONCHIAL NEEDLE BIOPSY ADDL LOBE performed by Mehrdad Johns MD at [address] [place] [address]  2003    COLONOSCOPY N/A 9/25/2019    COLONOSCOPY POLYPECTOMY SNARE/COLD BIOPSY showed diverticulilosis performed by True Cedeño MD at [address] [facility]      umbilical-- [facility] N/A 1/17/2020    PORT INSERTION performed by Che Silva MD at [place] [address] TUNNELED VENOUS PORT PLACEMENT Left 01/17/2020    Port Insertion ENRIQUETA Chest w. Dr Concetta Bernheim 1/17/20 Vaccess CT Injectable [id number] lot [id number] 3/31/21       Level of Consciousness: Alert, Oriented, and Cooperative = 0    Level of Activity: Non weight bearing- transfers bed to chair only = 3    Respiratory Pattern: Dyspnea with exertion;Irregular pattern;or RR less than 6 = 2    Breath Sounds: Diminshed bilaterally and/or crackles = 2    Sputum  Sputum Color: Brown,  ,    Cough: Strong, spontaneous, non-productive = 0    Vital Signs   /66   Pulse 87   Temp 97.4 °F (36.3 °C) (Oral)   Resp 20   Ht 5' 2\" (1.575 m)   Wt 250 lb 14.4 oz (113.8 kg)   LMP  (LMP Unknown)   SpO2 92%   BMI 45.89 kg/m²   SPO2 (COPD values may differ): Less than 86% on room air or greater than 92% on FiO2 greater than 50% = 4    Peak Flow (asthma only): not applicable = 0    RSI: 07-04 = Q6H or QID and Q4HPRN for dyspnea        Plan       Goals: medication delivery and improve oxygenation    Patient/caregiver was educated on the proper method of use for Respiratory Care Devices:  Yes      Level of patient/caregiver understanding able to:   ? Verbalize understanding   ? Demonstrate understanding       ? Teach back        ? Needs reinforcement       ? No available caregiver               ? Other:     Response to education:  Good     Is patient being placed on Home Treatment Regimen? No     Does the patient have everything they need prior to discharge? NA     Comments: chart reviewed and patient assessed    Plan of Care: keep albuterol scheduled 4 times daily per assessment    Electronically signed by Austen Montano RCP on 9/27/2021 at 1:16 AM    Respiratory Protocol Guidelines     1. Assessment and treatment by Respiratory Therapy will be initiated for medication and therapeutic interventions upon initiation of aerosolized medication. 2. Physician will be contacted for respiratory rate (RR) greater than 35 breaths per minute. Therapy will be held for heart rate (HR) greater than 140 beats per minute, pending direction from physician. 3. Bronchodilators will be administered via Metered Dose Inhaler (MDI) with spacer when the following criteria are met:  a. Alert and cooperative     b. HR < 140 bpm  c. RR < 30 bpm                d. Can demonstrate a 23 second inspiratory hold  4. Bronchodilators will be administered via Hand Held Nebulizer PASCUAL [facility]) to patients when ANY of the following criteria are met  a. Incognizant or uncooperative          b. Patients treated with HHN at Home        c. Unable to demonstrate proper use of MDI with spacer     d. RR > 30 bpm   5.  Bronchodilators will be delivered via Metered Dose Inhaler (MDI), HHN, Aerogen to intubated patients on mechanical ventilation. 6. Inhalation medication orders will be delivered and/or substituted as outlined below. Aerosolized Medications Ordering and Administration Guidelines:    1. All Medications will be ordered by a physician, and their frequency and/or modality will be adjusted as defined by the patients Respiratory Severity Index (RSI) score. 2. If the patient does not have documented COPD, consider discontinuing anticholinergics when RSI is less than 9.  3. If the bronchospasm worsens (increased RSI), then the bronchodilator frequency can be increased to a maximum of every 4 hours. If greater than every 4 hours is required, the physician will be contacted. 4. If the bronchospasm improves, the frequency of the bronchodilator can be decreased, based on the patient's RSI, but not less than home treatment regimen frequency. 5. Bronchodilator(s) will be discontinued if patient has a RSI less than 9 and has received no scheduled or as needed treatment for 72  Hrs. Patients Ordered on a Mucolytic Agent:    1. Must always be administered with a bronchodilator. 2. Discontinue if patient experiences worsened bronchospasm, or secretions have lessened to the point that the patient is able to clear them with a cough. Anti-inflammatory and Combination Medications:    1. If the patient lacks prior history of lung disease, is not using inhaled anti-inflammatory medication at home, and lacks wheezing by examination or by history for at least 24 hours, contact physician for possible discontinuation.

## 2021-09-27 NOTE — PROGRESS NOTES
Hospitalist Progress Note      PCP: Marleni Mackenzie, APRN - CNP    Date of Admission: 9/14/2021    covid 19  Infection, hypoxia. remains on high flow O2 . DNR CC  After family discussion, comfort meds initiated  by pulm  No escalation of care. .    Discussed with pulmonology. Recommending LTAC placement    Subjective:     Ms John Vick seen. Awake,alert. Oriented . Episodes of anxiety today  Feels sob with any movements. on high oxygen requirements- on airVO2 at 35 L -> 45 L-> 35 L-> 30L . fi O2 100 % -> 80%    HR improved, off cardizem gtt     stable.  Plan for LTAC     Remains on Vapotherm FiO2 80% 30 L        Medications:  Reviewed    Infusion Medications    dextrose      sodium chloride 25 mL (09/18/21 0040)     Scheduled Medications    Venelex   Topical BID    predniSONE  30 mg Oral Daily    warfarin (COUMADIN) daily dosing (placeholder)   Other RX Placeholder    dilTIAZem  120 mg Oral Daily    [Held by provider] spironolactone  25 mg Oral Daily    busPIRone  5 mg Oral TID    metoprolol succinate  75 mg Oral BID    insulin glargine  25 Units SubCUTAneous Nightly    digoxin  125 mcg Oral Daily    albuterol sulfate HFA  2 puff Inhalation 4x daily    insulin lispro  0-18 Units SubCUTAneous TID WC    insulin lispro  0-9 Units SubCUTAneous Nightly    buPROPion  150 mg Oral QAM    polyethylene glycol  17 g Oral Daily    [Held by provider] torsemide  20 mg Oral Daily    vitamin B-6  50 mg Oral Daily    sodium chloride flush  5-40 mL IntraVENous 2 times per day    Vitamin D  2,000 Units Oral Daily     PRN Meds: traMADol, LORazepam, albuterol sulfate HFA, glucose, dextrose, glucagon (rDNA), dextrose, melatonin, sodium chloride flush, sodium chloride, ondansetron **OR** ondansetron, polyethylene glycol, acetaminophen **OR** acetaminophen, guaiFENesin-dextromethorphan      Intake/Output Summary (Last 24 hours) at 9/27/2021 1429  Last data filed at 9/27/2021 1319  Gross per 24 hour Intake 480 ml   Output 900 ml   Net -420 ml       Physical Exam Performed:    BP (!) 96/51   Pulse 87   Temp 97 °F (36.1 °C) (Oral)   Resp 20   Ht 5' 2\" (1.575 m)   Wt 250 lb 14.4 oz (113.8 kg)   LMP  (LMP Unknown)   SpO2 94%   BMI 45.89 kg/m²   Pt in droplet plus precautions   General: elderly female up in bed , on Airvo2    Awake, alert and oriented. Appears to be not in any distress  Mucous Membranes:  Pink , anicteric  Neck: No JVD, no carotid bruit, no thyromegaly  Chest:  Bilateral diminished BS,  minimal crackles in bases   Occasional wheeze mushtaq   Cardiovascular:  Irregular  S1S2 heard, no murmurs or gallops  Abdomen:  Soft, undistended, non tender, no organomegaly, BS present  Extremities: resolved edema to LE ,  now trace, diistal pulses well felt  Neurological : grossly normal        Labs:   No results for input(s): WBC, HGB, HCT, PLT in the last 72 hours. Recent Labs     09/25/21 0438 09/26/21 0458 09/27/21 0439    140 140   K 3.9 3.8 3.9   CL 91* 92* 93*   CO2 38* 33* 39*   BUN 32* 34* 28*   CREATININE 0.8 0.9 0.8   CALCIUM 8.2* 8.4 8.5     No results for input(s): AST, ALT, BILIDIR, BILITOT, ALKPHOS in the last 72 hours. Recent Labs     09/25/21 0438 09/26/21 0458 09/27/21 0439   INR 2.38* 3.39* 3.40*     No results for input(s): CKTOTAL, TROPONINI in the last 72 hours. Urinalysis:      Lab Results   Component Value Date    NITRU Negative 10/10/2020    WBCUA 3-5 10/10/2020    BACTERIA 4+ 10/10/2020    RBCUA 3-4 10/10/2020    BLOODU SMALL 10/10/2020    SPECGRAV 1.010 10/10/2020    GLUCOSEU Negative 10/10/2020       Radiology:  XR CHEST 1 VIEW   Final Result   Dense right upper lobe consolidation with mild volume loss. Findings are   suggestive of a bacterial pneumonia, possibly postobstructive. Patchy perihilar opacification with some interval improvement. Left basilar   atelectasis.          XR CHEST PORTABLE   Final Result   Bilateral interstitial groundglass opacities, likely representing COVID 19   pneumonia. covid 19 infection         Assessment/Plan:    COVID 19 PNA  Acute on Chronic Hypoxic Respiratory Failure  - CXR with bilateral interstitial ground glass opacities  - Admitted to ICU, telemetry  - PCT 0.22  - started solumedrol IV  and changed to prednisone taper (she also has severe COPD)  - completed remdesivir   - S/p - Tocilizumab x1   - wears 4.5 L at home and now worsened with CHF, copd and covid pna. Was on bipap -> on high flow oxygen now   - pulmonology consulted  - persistent hypoxia remains , now on AirVo2 at 35 L, fiO2 100 %-> 80%  - after family discussion, comfort meds initiated for anxiety  - code status changed to [facility]      COPD  -With acute exacerbation.  -Continue steroids, doxy ,inhaled bronchodilators(MDI)     Atrial Fibrillation with RVR  - hx of PAF, trop neg  - s/p dilt gtt and transitioned to oral cardizem  - also on digoxin and metoprolol- BB dose increased  - cardiology consulted  HR controlled now - hold meds today for borderline BP   -- AC on Coumadin - currently therapeutic  - ongoing diuresis with home demadex     Transaminitis  - cont to monitor LFT  - likely with covid infection and remdesivir use  - improving      Hyperglycemia - suspect early DM II  - pt is on steroids  - last Hgb A1c: 6.6, check Hgb A1c   - added low dose SSI and PRN hypoglycemia protocol  Added lantus nightly      Supratherapeutic INR  - hold Coumadin  - daily INR , now at 2.4     Microcytic Anemia - chronic   - Hgb 10 .9  - iron studies wnl     Chronic Diastolic CHF  - appears compensated   - last echo 6/2020: normal EF, grade I DD  - daily weights, I&Os  - cont Torsemide     Hx of PE  - AC on Coumadin    - daily PT/INR,      B cell Lymphoma  - f/w Dr. Beth Cain     H/o Anovaginal Fistula  - noted per patient on prior admission     Morbid Obesity  - Body mass index is 49.04 kg/m². - Complicating assessment and treatment.  Placing patient at risk for multiple co-morbidities as well as early death and contributing to the patient's presentation.   - Counseled on weight loss.         DVT Prophylaxis: AC on Coumadin   Diet: ADULT DIET; Regular; 4 carb choices (60 gm/meal)  Code Status: DNR-CC    PT/OT Eval Status: ordered    Comfort meds on board  Poor prognosis      LTAC referral       Plan is to discharge to LTAC in [facility].     Rodolfo Loo MD    9/27/2021

## 2021-09-27 NOTE — CARE COORDINATION
INTERDISCIPLINARY PLAN OF CARE CONFERENCE    Date/Time: 9/27/2021 9:36 AM  Completed by: Moshe White RN, Case Management      Patient Name:  Kristyn Cruz  YOB: 1940  Admitting Diagnosis: Morbid obesity ([facility] [E66.01]  Hypoxia [R09.02]  COPD exacerbation ([address] [J44.1]  Atrial fibrillation with RVR ([facility] [I48.91]  Anticoagulated on Coumadin [Z79.01]  Poisoning by warfarin sodium, accidental or unintentional, initial encounter [T45.511A]  Acute hypoxemic respiratory failure due to COVID-19 ([facility] [U07.1, J96.01]  COVID-19 [U07.1]  Acute respiratory failure due to COVID-19 ([address] [U07.1, J96.00]     Admit Date/Time:  9/14/2021  1:49 PM    Chart reviewed. Interdisciplinary team contacted or reviewed plan related to patient progress and discharge plans. Disciplines included Case Management, Nursing, and Dietitian. Current Status:ongoing  PT/OT recommendation for discharge plan of care: LTACH    Expected D/C Disposition:  LTACH  Confirmed plan with patient and/or family Yes confirmed with: (name) pt and Johnson  Met with:pt and Vishnu Vizcarra  Discharge Plan Comments: Reviewed chart. Role of discharge planner explained and patient and Vishnu Vizcarra verbalized understanding. Pt is from home alone. PT/OT recommends LTACH, as pt's O2 requirement are that pt remains on Airvo. Dr. Lorna Philippe (pul) also recommends [facility]. RAKESH spoke with [place] with [facility] and she states that every thing sounds good, but that she will look at pt's  Days and then let CM know if able to accept. [facility] was given both Vishnu Vizcarra and Terry's numbers (sons) and well as pt's number to her room to call pt, as pt is alert and oriented. RAKESH spoke with Vishnu Vizcarra and he states that although [facility] and [employer] are preferred, wherever there is a bed available is acceptable. Await call back from [place] with [facility] regarding if able to accept and which location is accepting.      Pt is on home O2     Pt is Covid Pos as of

## 2021-09-27 NOTE — PLAN OF CARE
Problem: Airway Clearance - Ineffective  Goal: Achieve or maintain patent airway  Outcome: Ongoing   Coughing/deep breathing.    Problem: Gas Exchange - Impaired  Goal: Absence of hypoxia  Outcome: Ongoing     Problem: Breathing Pattern - Ineffective  Goal: Ability to achieve and maintain a regular respiratory rate  Outcome: Ongoing     Problem: Fatigue  Goal: Verbalize increase energy and improved vitality  Outcome: Ongoing  Pt up to [facility] to increase activity tolerance      Problem: Falls - Risk of:  Goal: Will remain free from falls  Description: Will remain free from falls  Outcome: Ongoing

## 2021-09-27 NOTE — FLOWSHEET NOTE
09/26/21 2010   Assessment   Charting Type Shift assessment   Neurological   Neuro (WDL) WDL   Level of Consciousness Alert (0)   Swallow Screening   Is the patient able to remain alert for testing? Yes   Was the Patient Eating a Modified Diet Prior to being Admitted? No   Is there an Existing PEG or Abdominal Feeding Tube? No   Does the patient have a Head of Bed [facility] restriction <30°? No   Kirstin Coma Scale   Eye Opening 4   Best Verbal Response 5   Best Motor Response 6   Maysel Coma Scale Score 15   NIHSS Stroke Scale   NIHSS Stroke Scale Assessed No   HEENT   HEENT (WDL) X   Teeth Dentures upper;Dentures lower   Right Eye Impaired vision   Left Eye Impaired vision   Right Ear Impaired hearing   Left Ear Impaired hearing   Respiratory   Respiratory (WDL) X   Respiratory Pattern Regular   Respiratory Depth Shallow   Respiratory Quality/Effort Dyspnea with exertion;Dyspnea at rest   Chest Assessment Chest expansion symmetrical   L Breath Sounds Diminished   R Breath Sounds Diminished   Breath Sounds   Right Upper Lobe Diminished; Inspiratory Wheezes   Right Middle Lobe Diminished   Right Lower Lobe Diminished   Left Upper Lobe Diminished; Inspiratory Wheezes   Left Lower Lobe Diminished   Cough/Sputum   Cough Productive   Frequency Frequent   Sputum Amount Moderate   Sputum Color Brown   Incentive Spirometry Tx   Respiratory Effort Dyspnea at rest;Dyspnea with Exertion   Cardiac   Cardiac (WDL) X   Cardiac Regularity Irregular   Cardiac Rhythm Atrial fib   Rhythm Interpretation   Pulse 87   Cardiac Monitor   Telemetry Monitor On Yes   Telemetry Audible Yes   Telemetry Alarms Set Yes   Telemetry Box Number PCU   Telemetry Monitor Alarm Parameters PCU   Gastrointestinal   Abdominal (WDL) X   Abdomen Inspection Rounded; Soft   Tenderness Soft;Nontender   RUQ Bowel Sounds Active   LUQ Bowel Sounds Active   RLQ Bowel Sounds Active   LLQ Bowel Sounds Active   Peripheral Vascular   Peripheral Vascular (WDL) X   Edema Generalized   Skin Color/Condition   Skin Color/Condition (WDL) X   Skin Color Appropriate for ethnicity;Pale   Skin Condition/Temp Dry;Swollen   Skin Integrity   Skin Integrity (WDL) X  (SDTI L. ear )   Skin Integrity Bruising; Other (Comment)  (scattered)   Musculoskeletal   Musculoskeletal (WDL) X   RUE Full movement   LUE Full movement   RL Extremity Swelling   LL Extremity Swelling   Genitourinary   Genitourinary (WDL) X  (matias catheter secured via stat lock )   Flank Tenderness No   Suprapubic Tenderness No   Dysuria KORY   Anus/Rectum   Anus/Rectum (WDL) X  (fistula )   Wound 09/26/21 Ear Left SDTI behind left ear (airvo oxygen tubing)   Date First Assessed/Time First Assessed: 09/26/21 0900   Primary Wound Type: Pressure Injury  Location: Ear  Wound Location Orientation: Left  Wound Description (Comments): SDTI behind left ear (airvo oxygen tubing)   Wound Etiology   (SDTI from o2 tubing )   Dressing/Treatment Other (comment)  (GAIL, venelex )   Urethral Catheter Temperature probe   Placement Date/Time: 09/15/21 0000   Catheter Type: Temperature probe  Catheter Balloon Size: 10 mL  Collection Container: Standard  Securement Method: Securing device (Describe)  Urine Returned: Yes   Catheter Indications Need for fluid volume management of the critically ill patient in a critical care setting   Securement Device Date Changed 09/26/21   Psychosocial   Psychosocial (WDL) WDL   Shift assessment completed. Medications given. Prn melatonin and ativan given. Bath wipes provided. Left arm IV's removed. New left hand IV replaced. Patient tolerated well. Not other needs at this time.   Ana Rosa Pelayo RN

## 2021-09-27 NOTE — PROGRESS NOTES
Pharmacy Note  Warfarin Consult  Dx: Afib  Goal INR range 2-3   Home Warfarin dose:5mg daily      Date  INR  Warfarin  9/22               3.49                  hold  9/23               2.40                  2.5  9/24               2.13                  5mg  9/25               2.38                  2.5mg  9/26               3.39                  Hold  9/27               3.40                  hold     Recommend  holding Warfarin tonight x1. Daily INR ordered. Rx will continue to manage therapy per consult order. Rebecca Hernandez Pharm D 9/27/20219:00 AM  .            . Enedina Saucedo

## 2021-09-27 NOTE — PROGRESS NOTES
Inpatient Physical Therapy Daily Treatment Note    Unit: PCU  Date:  9/27/2021  Patient Name:    [facility]  Admitting diagnosis:  Morbid obesity ([facility] [E66.01]  Hypoxia [R09.02]  COPD exacerbation ([facility] [J44.1]  Atrial fibrillation with RVR ([facility] [I48.91]  Anticoagulated on Coumadin [Z79.01]  Poisoning by warfarin sodium, accidental or unintentional, initial encounter [T45.511A]  Acute hypoxemic respiratory failure due to COVID-19 ([facility] [U07.1, J96.01]  COVID-19 [U07.1]  Acute respiratory failure due to COVID-19 ([facility] [U07.1, J96.00]  Admit Date:  9/14/2021  Precautions/Restrictions:  Fall risk, Bed/chair alarm, Lines -IV, Supplemental O2 (on Airvo; FiO2 80%, 30L/min) and [place] catheter, [place] (hard of hearing), Telemetry, Continuous pulse oximetry and Isolation Precautions: Droplet Plus - COVID      Discharge Recommendations: SNF Vs LTAC  DME needs for discharge: Needs Met       Therapy recommendation for EMS Transport: can transport by wheelchair    Therapy recommendations for staff:   Stand by assist with use of No AD and gait belt/ stand pivot method for all transfers and ambulation to/from [facility]  to/from chair    History of Present Illness: H & P as per Harrison Gonzalez MD's note dated 9/15/2021  The patient is a 68 y. o. female with a PMH of chronic Hypoxic Respiratory Failure, COPD, Hx of Anovaginal fistula, H/o PE, PAF, chronic diastolic CHF, B cell lymphoma who presented to [facility] with complaint of shortness of breath of 2 days duration.  She tested positive for Covid a few days ago when she developed some mild respiratory symptoms. She was found to be very hypoxic in the emergency room, placed on BiPAP and admitted to the ICU.  Currently on high flow nasal cannula.       Home Health S4 Level Recommendation: Level 3 Safety  AM-PAC Mobility Score   AM-PAC Inpatient Mobility Raw Score : 18  AM-PAC Inpatient Mobility without Stair Climbing Raw Score : 16    Treatment Time: 1500 - 1610  Treatment number: 3  Timed Code Treatment Minutes: 70 minutes  Total Treatment Minutes: 70 minutes    Cognition    A&O x4   Able to follow 2 step commands    Subjective  Patient lying supine in bed with no family present   Pt agreeable to this PT tx. Pain   No  Location: N/A  Rating:    NA/10  Pain Medicine Status: Denies need     Bed Mobility   Supine to Sit:    Supervision  Sit to Supine:   SBA  Rolling:   Supervision  Scooting:   SBA in sitting and supine    Transfer Training    Sit to stand:   CGA  Stand to sit:   CGA  Bed to Chair:   CGA with use of No AD and gait belt    Gait Training gait completed as indicated below  Distance:      5 ft  Deviations (firm surface/linoleum):  decreased jaimie, increased GIOVANI and forward flexed posture  Assistive Device Used:    No AD, gait belt and hand held assist  Level of Assist:    CGA  Comment: Patient was limited in walking distance due to fatigue. Stair Training deferred, pt unsafe/not appropriate to complete stairs at this time  # of Steps:   N/A  Level of Assist:  Not Tested  UE Support:  NA  Assistive Device:  N/A  Pattern:   N/A  Comments:     Therapeutic Exercise all completed bilaterally unless indicated  Ankle Pumps x 20 reps  Heel slides x 5 reps x 2 sets  LAQ x 5 reps x 2 sets  SLR x 7 reps    Balance  Sitting:  Good - ; SBA  Comments:     Standing: Fair ; CGA  Comments: ~4 min    Patient Education      Role of PT, POC, Discharge recommendations, DC recommendations, safety awareness, transfer techniques, pursed lip breathing, energy conservation, pacing activity and calling for assist with mobility. Positioning Needs       Pt in bed, alarm set, positioned in proper neutral alignment and pressure relief provided. Call light provided and all needs within reach    ROM Measurements N/A  Knee Flexion:  Knee Extension:     Activity Tolerance   Pt completed therapy session with SOB noted with all functional mobility and exercises.   SpO2: at rest 95% on Airvo  With bed side exercises 90%, quickly recovered to 94%  With EOB sitting 91% - 94%    HR: 89 bpm at rest, 115 bpm EOB, quickly recovered to 98 bpm    BP: 88/52 supine, 89/56 sitting EOB without dizziness. Other  N/A    Assessment :  Patient tolerated the session with regular and frequent rest breaks after each activity and exercise set. Patient needed decreased level of assist for bed mobility (CGA to SBA), functional transfers (from 2 persons to 1 person SBA). RN notified about the treatment response. Recommending SNF vs LTAC upon discharge as patient functioning well below baseline, needing high levels of oxygen currently (on AIRVO), demonstrates good rehab potential and unable to return home due to limited or no family support, inability to negotiate stairs to enter home/bedroom/bathroom, burden of care beyond caregiver ability, home environment not conducive to patient recovery and limited safety awareness. Goals (all goals ongoing unless otherwise indicated)  To be met in 3 visits:  1). Independent with LE Ex x 10 reps     To be met in 6 visits:  1). Supine to/from sit: Supervision  2). Sit to/from stand: SBA  3). Bed to chair: SBA  4). Gait: Ambulate 10 ft.   with  SBA and use of LRAD (least restrictive assistive device)  5). Tolerate B LE exercises 3 sets of 10-15 reps  6). Ascend/descend 3 steps with SBA with use of hand rail bilateral and LRAD (least restrictive assistive device)    Plan   Continue with plan of care. Signature: Tomas Lopez MS PT, # [id number]    If patient discharges from this facility prior to next visit, this note will serve as the Discharge Summary.

## 2021-09-27 NOTE — PROGRESS NOTES
Pulmonary Progress Note  CC: COVID-19, respiratory failure    Subjective:    Continues on Vapotherm 80%/30lpm  Congested  Cough with yellow/green sputum      Intake/Output Summary (Last 24 hours) at 9/27/2021 1428  Last data filed at 9/27/2021 1319  Gross per 24 hour   Intake 480 ml   Output 900 ml   Net -420 ml           EXAM:   BP (!) 96/56   Pulse 82   Temp 97 °F (36.1 °C) (Oral)   Resp 20   Ht 5' 2\" (1.575 m)   Wt 250 lb 14.4 oz (113.8 kg)   LMP  (LMP Unknown)   SpO2 95%   BMI 45.89 kg/m²  on airVO  Gen: + Distress. Ill-appearing  Eyes: PERRL. No sclera icterus. No conjunctival injection. ENT: No discharge. Pharynx clear. Neck: Trachea midline. No obvious mass. Resp: + Accessory muscle use. Bibasilar crackles. No wheezes. Few rhonchi. No dullness on percussion. Good air entry. CV: Regular rate. Regular rhythm. No murmur or rub. No edema. GI: Non-tender. Non-distended. No hernia. Skin: Warm and dry. No nodule on exposed extremities. Lymph: No cervical LAD. No supraclavicular LAD. M/S: No cyanosis. No joint deformity. No clubbing. Neuro: Awake. Alert. Moves all four extremities. Psych: Oriented x 3. No anxiety.      Scheduled Meds:   Venelex   Topical BID    predniSONE  30 mg Oral Daily    warfarin (COUMADIN) daily dosing (placeholder)   Other RX Placeholder    dilTIAZem  120 mg Oral Daily    [Held by provider] spironolactone  25 mg Oral Daily    busPIRone  5 mg Oral TID    metoprolol succinate  75 mg Oral BID    insulin glargine  25 Units SubCUTAneous Nightly    digoxin  125 mcg Oral Daily    albuterol sulfate HFA  2 puff Inhalation 4x daily    insulin lispro  0-18 Units SubCUTAneous TID WC    insulin lispro  0-9 Units SubCUTAneous Nightly    buPROPion  150 mg Oral QAM    polyethylene glycol  17 g Oral Daily    [Held by provider] torsemide  20 mg Oral Daily    vitamin B-6  50 mg Oral Daily    sodium chloride flush  5-40 mL IntraVENous 2 times per day    Vitamin D 2,000 Units Oral Daily     Continuous Infusions:   dextrose      sodium chloride 25 mL (09/18/21 0040)     PRN Meds:  traMADol, LORazepam, albuterol sulfate HFA, glucose, dextrose, glucagon (rDNA), dextrose, melatonin, sodium chloride flush, sodium chloride, ondansetron **OR** ondansetron, polyethylene glycol, acetaminophen **OR** acetaminophen, guaiFENesin-dextromethorphan    Labs:  CBC:   No results for input(s): WBC, HGB, HCT, MCV, PLT in the last 72 hours. BMP:   Recent Labs     09/25/21  0438 09/26/21  0458 09/27/21  0439    140 140   K 3.9 3.8 3.9   CL 91* 92* 93*   CO2 38* 33* 39*   BUN 32* 34* 28*   CREATININE 0.8 0.9 0.8     Micro:  9/14/2021 SARS-CoV-2 positive  9/14/2021 blood cultures NGTD    Imaging:  CXR 9/25/2021 imaging reviewed by me and showed  Worsening RUL consolidation  Improvement RLL/left-sided airspace disease    ASSESSMENT:  · Acute on chronic hypoxemic hypercapnic respiratory failure, baseline 4 L qhs and 6 L with exertion  · COVID-19 pneumonia in an unvaccinated patient   · Severe COPD with acute exacerbation, f/b Dr. Ashley Munson previously   · A. fib with RVR, h/o paroxysmal a. fib  · Chronic diastolic CHF   · H/O PE on coumadin  · B cell lymphoma   · SHANNON, tells me she does not use BiPAP at home     PLAN:   COVID-19 isolation, droplet plus   Airvo [facility] for life threatening respiratory failure. No plan to escalate care.    Completed Remdesevir & Tocilizumab   Steroid D#14, now PO prednisone -30 mg   Completed 7 days doxycycline    Check WBC, procalcitonin   Continue Coumadin    DNI   PT/OT   Pending LTAC placement

## 2021-09-27 NOTE — PROGRESS NOTES
Occupational Therapy  Attempted to see patient this pm, patient working with another provider and unavailable at this time. Will continue to follow.   María Rodriguez, OTR/L [id number]

## 2021-09-28 LAB
ANION GAP SERPL CALCULATED.3IONS-SCNC: 7 MMOL/L (ref 3–16)
BASOPHILS ABSOLUTE: 0.1 K/UL (ref 0–0.2)
BASOPHILS RELATIVE PERCENT: 0.5 %
BUN BLDV-MCNC: 25 MG/DL (ref 7–20)
CALCIUM SERPL-MCNC: 8.4 MG/DL (ref 8.3–10.6)
CHLORIDE BLD-SCNC: 93 MMOL/L (ref 99–110)
CO2: 37 MMOL/L (ref 21–32)
CREAT SERPL-MCNC: 0.7 MG/DL (ref 0.6–1.2)
EOSINOPHILS ABSOLUTE: 0.1 K/UL (ref 0–0.6)
EOSINOPHILS RELATIVE PERCENT: 0.6 %
GFR AFRICAN AMERICAN: >60
GFR NON-AFRICAN AMERICAN: >60
GLUCOSE BLD-MCNC: 131 MG/DL (ref 70–99)
GLUCOSE BLD-MCNC: 136 MG/DL (ref 70–99)
GLUCOSE BLD-MCNC: 138 MG/DL (ref 70–99)
GLUCOSE BLD-MCNC: 205 MG/DL (ref 70–99)
GLUCOSE BLD-MCNC: 232 MG/DL (ref 70–99)
GLUCOSE BLD-MCNC: 74 MG/DL (ref 70–99)
GLUCOSE BLD-MCNC: 83 MG/DL (ref 70–99)
HCT VFR BLD CALC: 33.7 % (ref 36–48)
HEMOGLOBIN: 10.4 G/DL (ref 12–16)
INR BLD: 2.23 (ref 0.88–1.12)
LYMPHOCYTES ABSOLUTE: 0.8 K/UL (ref 1–5.1)
LYMPHOCYTES RELATIVE PERCENT: 8 %
MCH RBC QN AUTO: 24.6 PG (ref 26–34)
MCHC RBC AUTO-ENTMCNC: 31 G/DL (ref 31–36)
MCV RBC AUTO: 79.6 FL (ref 80–100)
MONOCYTES ABSOLUTE: 1.2 K/UL (ref 0–1.3)
MONOCYTES RELATIVE PERCENT: 11 %
NEUTROPHILS ABSOLUTE: 8.4 K/UL (ref 1.7–7.7)
NEUTROPHILS RELATIVE PERCENT: 79.9 %
PDW BLD-RTO: 17.9 % (ref 12.4–15.4)
PERFORMED ON: ABNORMAL
PERFORMED ON: NORMAL
PLATELET # BLD: 296 K/UL (ref 135–450)
PMV BLD AUTO: 7.4 FL (ref 5–10.5)
POTASSIUM SERPL-SCNC: 3.9 MMOL/L (ref 3.5–5.1)
PROTHROMBIN TIME: 26.1 SEC (ref 9.9–12.7)
RBC # BLD: 4.23 M/UL (ref 4–5.2)
SODIUM BLD-SCNC: 137 MMOL/L (ref 136–145)
WBC # BLD: 10.5 K/UL (ref 4–11)

## 2021-09-28 PROCEDURE — 6360000002 HC RX W HCPCS: Performed by: INTERNAL MEDICINE

## 2021-09-28 PROCEDURE — 94761 N-INVAS EAR/PLS OXIMETRY MLT: CPT

## 2021-09-28 PROCEDURE — 6370000000 HC RX 637 (ALT 250 FOR IP): Performed by: INTERNAL MEDICINE

## 2021-09-28 PROCEDURE — 99232 SBSQ HOSP IP/OBS MODERATE 35: CPT | Performed by: PHYSICIAN ASSISTANT

## 2021-09-28 PROCEDURE — 85610 PROTHROMBIN TIME: CPT

## 2021-09-28 PROCEDURE — 99233 SBSQ HOSP IP/OBS HIGH 50: CPT | Performed by: INTERNAL MEDICINE

## 2021-09-28 PROCEDURE — 2700000000 HC OXYGEN THERAPY PER DAY

## 2021-09-28 PROCEDURE — 97530 THERAPEUTIC ACTIVITIES: CPT

## 2021-09-28 PROCEDURE — 2580000003 HC RX 258: Performed by: PHYSICIAN ASSISTANT

## 2021-09-28 PROCEDURE — 94640 AIRWAY INHALATION TREATMENT: CPT

## 2021-09-28 PROCEDURE — 97535 SELF CARE MNGMENT TRAINING: CPT

## 2021-09-28 PROCEDURE — 80048 BASIC METABOLIC PNL TOTAL CA: CPT

## 2021-09-28 PROCEDURE — 6370000000 HC RX 637 (ALT 250 FOR IP): Performed by: PHYSICIAN ASSISTANT

## 2021-09-28 PROCEDURE — 85025 COMPLETE CBC W/AUTO DIFF WBC: CPT

## 2021-09-28 PROCEDURE — 2060000000 HC ICU INTERMEDIATE R&B

## 2021-09-28 PROCEDURE — 36415 COLL VENOUS BLD VENIPUNCTURE: CPT

## 2021-09-28 RX ORDER — FUROSEMIDE 10 MG/ML
40 INJECTION INTRAMUSCULAR; INTRAVENOUS ONCE
Status: COMPLETED | OUTPATIENT
Start: 2021-09-28 | End: 2021-09-28

## 2021-09-28 RX ORDER — PREDNISONE 20 MG/1
20 TABLET ORAL DAILY
Status: DISCONTINUED | OUTPATIENT
Start: 2021-09-29 | End: 2021-09-29 | Stop reason: HOSPADM

## 2021-09-28 RX ORDER — 0.9 % SODIUM CHLORIDE 0.9 %
250 INTRAVENOUS SOLUTION INTRAVENOUS ONCE
Status: COMPLETED | OUTPATIENT
Start: 2021-09-28 | End: 2021-09-28

## 2021-09-28 RX ORDER — WARFARIN SODIUM 2 MG/1
2 TABLET ORAL
Status: COMPLETED | OUTPATIENT
Start: 2021-09-28 | End: 2021-09-28

## 2021-09-28 RX ADMIN — BUPROPION HYDROCHLORIDE 150 MG: 150 TABLET, EXTENDED RELEASE ORAL at 08:39

## 2021-09-28 RX ADMIN — FUROSEMIDE 40 MG: 10 INJECTION, SOLUTION INTRAMUSCULAR; INTRAVENOUS at 16:04

## 2021-09-28 RX ADMIN — Medication: at 21:38

## 2021-09-28 RX ADMIN — Medication 2 PUFF: at 10:49

## 2021-09-28 RX ADMIN — BUSPIRONE HYDROCHLORIDE 5 MG: 5 TABLET ORAL at 21:38

## 2021-09-28 RX ADMIN — WARFARIN SODIUM 2 MG: 2 TABLET ORAL at 17:33

## 2021-09-28 RX ADMIN — SODIUM CHLORIDE, PRESERVATIVE FREE 10 ML: 5 INJECTION INTRAVENOUS at 08:40

## 2021-09-28 RX ADMIN — INSULIN LISPRO 6 UNITS: 100 INJECTION, SOLUTION INTRAVENOUS; SUBCUTANEOUS at 17:34

## 2021-09-28 RX ADMIN — BUSPIRONE HYDROCHLORIDE 5 MG: 5 TABLET ORAL at 14:05

## 2021-09-28 RX ADMIN — LORAZEPAM 0.5 MG: 0.5 TABLET ORAL at 21:38

## 2021-09-28 RX ADMIN — Medication 2 PUFF: at 07:45

## 2021-09-28 RX ADMIN — Medication 5 MG: at 21:38

## 2021-09-28 RX ADMIN — Medication 2000 UNITS: at 08:39

## 2021-09-28 RX ADMIN — BUSPIRONE HYDROCHLORIDE 5 MG: 5 TABLET ORAL at 08:39

## 2021-09-28 RX ADMIN — SODIUM CHLORIDE, PRESERVATIVE FREE 10 ML: 5 INJECTION INTRAVENOUS at 21:38

## 2021-09-28 RX ADMIN — DILTIAZEM HYDROCHLORIDE 120 MG: 120 CAPSULE, COATED, EXTENDED RELEASE ORAL at 08:39

## 2021-09-28 RX ADMIN — Medication: at 08:40

## 2021-09-28 RX ADMIN — Medication 2 PUFF: at 14:39

## 2021-09-28 RX ADMIN — PYRIDOXINE HCL TAB 50 MG 50 MG: 50 TAB at 08:39

## 2021-09-28 RX ADMIN — Medication 2 PUFF: at 19:26

## 2021-09-28 RX ADMIN — SODIUM CHLORIDE 250 ML: 9 INJECTION, SOLUTION INTRAVENOUS at 14:05

## 2021-09-28 RX ADMIN — DIGOXIN 125 MCG: 125 TABLET ORAL at 08:39

## 2021-09-28 RX ADMIN — PREDNISONE 30 MG: 20 TABLET ORAL at 08:39

## 2021-09-28 ASSESSMENT — PAIN SCALES - GENERAL: PAINLEVEL_OUTOF10: 0

## 2021-09-28 NOTE — PROGRESS NOTES
Bedside report and transfer of care given to [facility], RN. Pt currently resting in bed with the call light within reach. Pt denies any other care needs at this time. Pt stable at this time.     Dorothy Last RN

## 2021-09-28 NOTE — PLAN OF CARE
Problem: Airway Clearance - Ineffective  Goal: Achieve or maintain patent airway  Outcome: Ongoing     Problem: Gas Exchange - Impaired  Goal: Absence of hypoxia  Outcome: Ongoing     Problem: Falls - Risk of:  Goal: Will remain free from falls  Description: Will remain free from falls  Outcome: Ongoing     Problem: Pain:  Goal: Control of chronic pain  Description: Control of chronic pain  Outcome: Ongoing

## 2021-09-28 NOTE — PROGRESS NOTES
Hand off report given to IVAN Neves. Patient stable, with no other needs. Care transferred at this time.

## 2021-09-28 NOTE — PROGRESS NOTES
Pulmonary Progress Note  CC: COVID-19, respiratory failure    Subjective:    Continues on Vapotherm 85% / 35 L/min  Slightly less congested      Intake/Output Summary (Last 24 hours) at 9/28/2021 0747  Last data filed at 9/28/2021 0158  Gross per 24 hour   Intake 420 ml   Output 1800 ml   Net -1380 ml           EXAM:   BP (!) 98/55   Pulse 94   Temp 97.2 °F (36.2 °C) (Oral)   Resp 18   Ht 5' 2\" (1.575 m)   Wt 252 lb 8 oz (114.5 kg)   LMP  (LMP Unknown)   SpO2 95%   BMI 46.18 kg/m²  on airVO  Gen: Mild distress. Ill-appearing  Eyes: PERRL. No sclera icterus. No conjunctival injection. ENT: No discharge. Pharynx clear. Neck: Trachea midline. No obvious mass. Resp: + Accessory muscle use. Few crackles. No wheezes. Few rhonchi. No dullness on percussion. Good air entry. CV: Regular rate. Regular rhythm. No murmur or rub. No edema. GI: Non-tender. Non-distended. No hernia. Skin: Warm and dry. No nodule on exposed extremities. Lymph: No cervical LAD. No supraclavicular LAD. M/S: No cyanosis. No joint deformity. No clubbing. Neuro: Awake. Alert. Moves all four extremities. Psych: Oriented x 3. No anxiety.      Scheduled Meds:   Venelex   Topical BID    predniSONE  30 mg Oral Daily    warfarin (COUMADIN) daily dosing (placeholder)   Other RX Placeholder    dilTIAZem  120 mg Oral Daily    busPIRone  5 mg Oral TID    insulin glargine  25 Units SubCUTAneous Nightly    digoxin  125 mcg Oral Daily    albuterol sulfate HFA  2 puff Inhalation 4x daily    insulin lispro  0-18 Units SubCUTAneous TID WC    insulin lispro  0-9 Units SubCUTAneous Nightly    buPROPion  150 mg Oral QAM    polyethylene glycol  17 g Oral Daily    vitamin B-6  50 mg Oral Daily    sodium chloride flush  5-40 mL IntraVENous 2 times per day    Vitamin D  2,000 Units Oral Daily     Continuous Infusions:   dextrose      sodium chloride 25 mL (09/18/21 0040)     PRN Meds:  metoprolol, traMADol, LORazepam, albuterol sulfate HFA, glucose, dextrose, glucagon (rDNA), dextrose, melatonin, sodium chloride flush, sodium chloride, ondansetron **OR** ondansetron, polyethylene glycol, acetaminophen **OR** acetaminophen, guaiFENesin-dextromethorphan    Labs:  CBC:   Recent Labs     09/27/21  0439 09/28/21  0513   WBC 12.7* 10.5   HGB 10.6* 10.4*   HCT 34.7* 33.7*   MCV 80.7 79.6*    296     BMP:   Recent Labs     09/26/21  0458 09/27/21  0439 09/28/21  0513    140 137   K 3.8 3.9 3.9   CL 92* 93* 93*   CO2 33* 39* 37*   BUN 34* 28* 25*   CREATININE 0.9 0.8 0.7     Micro:  9/14/2021 SARS-CoV-2 positive  9/14/2021 blood cultures NGTD    Imaging:  CXR 9/25/2021 imaging reviewed by me and showed  Worsening RUL consolidation  Improvement RLL/left-sided airspace disease    ASSESSMENT:  · Acute on chronic hypoxemic hypercapnic respiratory failure, baseline 4 L qhs and 6 L with exertion  · COVID-19 pneumonia in an unvaccinated patient   · Severe COPD with acute exacerbation, f/b Dr. Lemos Aid previously   · A. fib with RVR, h/o paroxysmal a. fib  · Chronic diastolic CHF   · H/O PE on coumadin  · B cell lymphoma   · SHANNON, tells me she does not use BiPAP at home     PLAN:   COVID-19 isolation, droplet plus   Continue Airvo [facility] for life threatening respiratory failure. No plan to escalate care.    Completed Remdesevir & Tocilizumab   Steroid D#15, now PO prednisone -20 mg   Completed 7 days doxycycline    Continue Coumadin    Lasix 40 IV x1   DNI   PT/OT   Pending LTAC placement

## 2021-09-28 NOTE — PROGRESS NOTES
Hospitalist Progress Note      PCP: TURNER Jones - CNP    Date of Admission: 9/14/2021    covid 19  Infection, hypoxia. remains on high flow O2 . DNR CC  After family discussion, comfort meds initiated  by pulm  No escalation of care. .    Discussed with pulmonology. Recommending LTAC placement    Subjective:     Ms Sukhi Cowan seen. Awake,alert. Oriented . Episodes of anxiety today  Feels sob with any movements.     on high oxygen requirements- on airVO2 at 35 L and fiO2 85%    HR improved, off cardizem gtt    BP mildly low this AM    Waiting for LTAC bed to open      Medications:  Reviewed    Infusion Medications    dextrose      sodium chloride 25 mL (09/18/21 0040)     Scheduled Medications    warfarin  2 mg Oral Once    sodium chloride  250 mL IntraVENous Once    Venelex   Topical BID    predniSONE  30 mg Oral Daily    warfarin (COUMADIN) daily dosing (placeholder)   Other RX Placeholder    [Held by provider] dilTIAZem  120 mg Oral Daily    busPIRone  5 mg Oral TID    insulin glargine  25 Units SubCUTAneous Nightly    digoxin  125 mcg Oral Daily    albuterol sulfate HFA  2 puff Inhalation 4x daily    insulin lispro  0-18 Units SubCUTAneous TID WC    insulin lispro  0-9 Units SubCUTAneous Nightly    buPROPion  150 mg Oral QAM    polyethylene glycol  17 g Oral Daily    vitamin B-6  50 mg Oral Daily    sodium chloride flush  5-40 mL IntraVENous 2 times per day    Vitamin D  2,000 Units Oral Daily     PRN Meds: metoprolol, traMADol, LORazepam, albuterol sulfate HFA, glucose, dextrose, glucagon (rDNA), dextrose, melatonin, sodium chloride flush, sodium chloride, ondansetron **OR** ondansetron, polyethylene glycol, acetaminophen **OR** acetaminophen, guaiFENesin-dextromethorphan      Intake/Output Summary (Last 24 hours) at 9/28/2021 1513  Last data filed at 9/28/2021 0839  Gross per 24 hour   Intake 250 ml   Output 700 ml   Net -450 ml       Physical Exam Performed:    BP (!) 85/55   Pulse 86   Temp 97 °F (36.1 °C) (Oral)   Resp 20   Ht 5' 2\" (1.575 m)   Wt 252 lb 8 oz (114.5 kg)   LMP  (LMP Unknown)   SpO2 93%   BMI 46.18 kg/m²   Pt in droplet plus precautions   General: elderly female up in bed , on Airvo2    Awake, alert and oriented. Appears to be not in any distress  Mucous Membranes:  Pink , anicteric  Neck: No JVD, trachea midline   Chest:  Bilateral diminished BS,  minimal crackles in bases   Occasional wheeze mushtaq   Cardiovascular:  Irregular  S1S2 heard, no murmurs or gallops  Abdomen:  Soft, undistended, non tender, no organomegaly, BS present  Extremities: no deformities noted   Neurological : grossly normal        Labs:   Recent Labs     09/27/21  0439 09/28/21  0513   WBC 12.7* 10.5   HGB 10.6* 10.4*   HCT 34.7* 33.7*    296     Recent Labs     09/26/21  0458 09/27/21  0439 09/28/21  0513    140 137   K 3.8 3.9 3.9   CL 92* 93* 93*   CO2 33* 39* 37*   BUN 34* 28* 25*   CREATININE 0.9 0.8 0.7   CALCIUM 8.4 8.5 8.4     No results for input(s): AST, ALT, BILIDIR, BILITOT, ALKPHOS in the last 72 hours. Recent Labs     09/26/21 0458 09/27/21  0439 09/28/21  0514   INR 3.39* 3.40* 2.23*     No results for input(s): CKTOTAL, TROPONINI in the last 72 hours. Urinalysis:      Lab Results   Component Value Date    NITRU Negative 10/10/2020    WBCUA 3-5 10/10/2020    BACTERIA 4+ 10/10/2020    RBCUA 3-4 10/10/2020    BLOODU SMALL 10/10/2020    SPECGRAV 1.010 10/10/2020    GLUCOSEU Negative 10/10/2020       Radiology:  XR CHEST 1 VIEW   Final Result   Dense right upper lobe consolidation with mild volume loss. Findings are   suggestive of a bacterial pneumonia, possibly postobstructive. Patchy perihilar opacification with some interval improvement. Left basilar   atelectasis. XR CHEST PORTABLE   Final Result   Bilateral interstitial groundglass opacities, likely representing COVID 19   pneumonia.            covid 19 infection Assessment/Plan:    COVID 19 PNA  Acute on Chronic Hypoxic Respiratory Failure  - CXR with bilateral interstitial ground glass opacities  - PCT 0.22  - started solumedrol IV  and changed to prednisone taper (she also has severe COPD)  - completed remdesivir   - S/p - Tocilizumab x1   - wears 4.5 L at home and now worsened with CHF, copd and covid pna. Was on bipap -> on high flow oxygen now   - pulmonology consulted  - persistent hypoxia remains , now on AirVo2 at 35 L, fiO2 100 %-> 80%  - after family discussion, comfort meds initiated for anxiety  - code status changed to [facility]      COPD  -With acute exacerbation.  -Continue steroids, doxy ,inhaled bronchodilators(MDI)     Atrial Fibrillation with RVR  - hx of PAF, trop neg  - s/p dilt gtt and transitioned to oral cardizem  - also on digoxin and metoprolol- BB dose increased  - cardiology consulted  HR controlled now - hold meds today for borderline BP   - AC on Coumadin - currently therapeutic  - hold home demadex      Transaminitis  - cont to monitor LFT  - likely with covid infection and remdesivir use  - improving      Hyperglycemia - suspect early DM II  - pt is on steroids  - last Hgb A1c: 6.6, check Hgb A1c - 6.9%  - added low dose SSI and PRN hypoglycemia protocol  Added lantus nightly      Supratherapeutic INR  - cont Coumadin  - daily INR , now at 2.23     Microcytic Anemia - chronic   - Hgb 10 .9  - iron studies wnl     Chronic Diastolic CHF  - appears compensated   - last echo 6/2020: normal EF, grade I DD  - daily weights, I&Os  - demadex on hold      Hx of PE  - AC on Coumadin       B cell Lymphoma  - f/w Dr. Jodi Ortega     H/o Anovaginal Fistula  - noted per patient on prior admission     Morbid Obesity  - Body mass index is 49.04 kg/m². - Complicating assessment and treatment.  Placing patient at risk for multiple co-morbidities as well as early death and contributing to the patient's presentation.   - Counseled on weight loss.         DVT Prophylaxis: AC on Coumadin   Diet: ADULT DIET; Regular; 4 carb choices (60 gm/meal)  Code Status: DNR-CC      Comfort meds on board  Poor prognosis      LTAC referral     Dispo: dc to LTACT when bed available.   CM following     Grant Gustafson PA-C  9/28/2021 3:14 PM

## 2021-09-28 NOTE — CARE COORDINATION
INTERDISCIPLINARY PLAN OF CARE CONFERENCE    Date/Time: 9/28/2021 8:44 AM  Completed by: Elizabeth Mcbride RN, Case Management      Patient Name:  Lelsey Nails  YOB: 1940  Admitting Diagnosis: Morbid obesity ([facility] [E66.01]  Hypoxia [R09.02]  COPD exacerbation ([facility] [J44.1]  At[facility] fibrillation with RVR ([facility] [I48.91]  Anticoagulated on Coumadin [Z79.01]  Poisoning by warfarin sodium, accidental or unintentional, initial encounter [T45.511A]  Acute hypoxemic respiratory failure due to COVID-19 ([facility] [U07.1, J96.01]  COVID-19 [U07.1]  Acute respiratory failure due to COVID-19 ([facility] [U07.1, J96.00]     Admit Date/Time:  9/14/2021  1:49 PM    Chart reviewed. Interdisciplinary team contacted or reviewed plan related to patient progress and discharge plans. Disciplines included Case Management, Nursing, and Dietitian. Current Status:ongoing  PT/OT recommendation for discharge plan of care: LTACH    Expected D/C Disposition:  LTACH  Confirmed plan with patient and/or family Yes confirmed with: (name) pt  Met with:pt  Discharge Plan Comments: Reviewed chart. Role of discharge planner explained and patient verbalized understanding. Pt is from home alone  Awaiting bed at [facility]. CM called and left a  for [facility] to inquire if bed available for pt for transfer today. C19 pos as of 9/14. Pt is on home O2. Pt is currently on Airvo 35L, FiO2 at 85%    Awaiting call back from [facility] with [facility]. Addendum 9/28/2021 0904:  called [place] with [facility] again and she states that she is awaiting to hear about her discharges and then will call  back to let CM know I able to accept to today based on bed status. Awaiting to hear if can accept today. Maria Esther Daugherty RN and Dr. Rhys Reeder aware. Addendum 9/28/2021 1126: Per [facility] with [facility], no confirmation on a d/c at [facility] and she will let CM know if a bed becomes available. Addendum 9/28/2021 7156;  Cm called [facility] with

## 2021-09-28 NOTE — PROGRESS NOTES
Occupational Therapy Daily Treatment Note    Unit: PCU  Date:  9/28/2021  Patient Name:    [facility]  Admitting diagnosis:  Morbid obesity ([facility] [E66.01]  Hypoxia [R09.02]  COP[facility] exacerbation ([facility] [J44.1]  Atrial fibrillation with RVR ([facility] [I48.91]  Anticoagulated on Coumadin [Z79.01]  Poisoning by warfarin sodium, accidental or unintentional, initial encounter [T45.511A]  Acute hypoxemic respiratory failure due to COVID-19 ([facility] [U07.1, J96.01]  COVID-19 [U07.1]  Acute respiratory failure due to COVID-19 ([facility] [U07.1, J96.00]  Admit Date:  9/14/2021  Precautions/Restrictions:   fall risk, IV, bed/chair alarm, matias catheter , telemetry, continuous pulse ox and Droplet Plus precautions (+ COVID 19), airvo       Discharge Recommendations: SNF  DME needs for discharge: defer to facility       Therapy recommendations for staff:   Assist of 1 (minimal assist) with use of gait belt for stand pivot transfer to chair and BSC    AM-PAC Score: AM-PAC Inpatient Daily Activity Raw Score: 14  Home Health S4 Level: Level 3- Safety if going home       Treatment Time:  3722-9576  Treatment number:  3    Total Treatment Time:   45 minutes    History of Present Illness: 68 y. o. female with a PMH of chronic Hypoxic Respiratory Failure, COPD, Hx of Anovaginal fistula, H/o PE, PAF, chronic diastolic CHF, B cell lymphoma who presented to [facility] with complaint of shortness of breath of 2 days duration.  She tested positive for Covid a few days ago when she developed some mild respiratory symptoms. She was found to be very hypoxic in the emergency room, placed on BiPAP and admitted to the ICU. Subjective:  Patient supine in bed and agreeable to treatment. Patient requesting to get out of bed.      Pain   No  Rating: NA  Location:  Pain Medicine Status: Denies need      Bed Mobility:   Supine to Sit:  SBA  Sit to Supine:  Not Tested  Rolling:           Not Tested  Scooting:        SBA    Transfer Training:   Sit to stand: Min A with cues for hand placement  Stand to sit:  Min A  Bed to Chair:  Min A hand held assist for stand pivot to chair with gait belt and assist with multiple lines  Bed to [facility]:   Not Tested  Standard toilet:   Not Tested    Activity Tolerance   Pt completed therapy session with SOB noted with activity  SpO2: 93-95% on airvo  HR:     ADL Training:   Upper body dressing:  Not Tested  Upper body bathing:  Not Tested  Lower body dressing:  Max A don/doff socks  Lower body bathing:  Not Tested  Toileting:   Not Tested  Grooming/Hygiene:  Max A use shampoo/conditioner cap. Requested to keep cap on for maximum management of tangles. Son present and will assist with removing cap/combing hair after a bit more time in cap. Therapeutic Exercise:   N/A    Patient Education:   Role of OT  Recommendations for DC    Positioning Needs:   Reclined in chair with call light and needs in reach. Family Present:  Yes, son    Assessment: Patient making progress toward goals, good motivation. GOALS  To be met in 3 Visits:  1). Bed to toilet/BSC: CGA     To be met in 5 Visits:  1). Supine to/from Sit:             Independent  2). Upper Body Bathing:         SBA  3). Lower Body Bathing:         Min A  4). Upper Body Dressing:       SBA  5). Lower Body Dressing:       Min A  6).  Pt to demonstrate UE exs x 15 reps with minimal cues      Plan: cont with [address], OTR/L 3325        If patient discharges from this facility prior to next visit, this note will serve as the Discharge Summary

## 2021-09-28 NOTE — FLOWSHEET NOTE
09/28/21 1312   Vital Signs   Temp 97 °F (36.1 °C)   Temp Source Oral   Pulse 86   Heart Rate Source Monitor   Resp 20   BP (!) 85/55   BP Location Left lower arm   Patient Position Semi fowlers   Level of Consciousness Alert (0)   MEWS Score 2   Patient Currently in Pain Denies   Oxygen Therapy   SpO2 91 %   Pulse Oximeter Device Mode Continuous   O2 Device Heated high flow cannula   FiO2  85 %   O2 Flow Rate (L/min) 35 L/min     Afternoon vitals completed. Low BP; bolus ordered and started at this time. Buspar given per [facility] order. Pt stable.     Buena Hammans, RN

## 2021-09-28 NOTE — FLOWSHEET NOTE
09/28/21 0815   Vital Signs   Temp 98.6 °F (37 °C)   Temp Source Oral   Pulse 94   Heart Rate Source Monitor   Resp 19   BP (!) 84/52   BP Location Left upper arm   Patient Position Semi fowlers   Level of Consciousness Alert (0)   MEWS Score 2   Patient Currently in Pain Denies   Pain Assessment   Pain Assessment 0-10   Pain Level 0   Oxygen Therapy   SpO2 92 %   O2 Device Heated high flow cannula   FiO2  85 %   O2 Flow Rate (L/min) 35 L/min     Pt assessment complete; see flow sheets. Vitals completed. Hypotensive but asymptomatic. Meds given per MAR. Coumadin on hold due to Elevated INR. Advised Pt to do pedal pumps to prevent blood clots. Spoke with son, Shoshana Sun this morning; updated on care plan for the day. Addressed all patient and family concerns at this time. Pt repositioned for comfort. Pt denies any other care needs at this time. Pt stable at this time.     Marlen Reese RN

## 2021-09-28 NOTE — FLOWSHEET NOTE
09/28/21 1600   Vital Signs   Temp Source Oral   Pulse 88   Heart Rate Source Monitor   /68   Patient Position Sitting   Level of Consciousness Alert (0)   Patient Currently in Pain Denies   BP after bolus. Pt stable at this time.     Marcelino Owens, RN

## 2021-09-28 NOTE — FLOWSHEET NOTE
09/27/21 1931   Vital Signs   Temp 98 °F (36.7 °C)   Temp Source Oral   Pulse 95   Heart Rate Source Monitor   Resp 18   BP (!) 97/54   BP Location Left lower arm   Patient Position Semi fowlers   Level of Consciousness Alert (0)   MEWS Score 2   Oxygen Therapy   SpO2 94 %   Pulse Oximeter Device Mode Continuous   Pulse Oximeter Device Location Finger   O2 Device Heated high flow cannula   Skin Assessment Clean, dry, & intact   FiO2  80 %   Patient Observation   Observations spo2 90% on 85% airvo 35L     Shift assessment completed see, flow sheet. Patient in bed alert and oriented X4. Evening medications given per order. PRN ativan, PRN melatonin given per MAR. Patient provided fresh water and mee crackers. No other needs at this time. Call light in reach.

## 2021-09-28 NOTE — FLOWSHEET NOTE
09/28/21 0158   Vital Signs   Temp 97.2 °F (36.2 °C)   Temp Source Oral   Pulse 94   Heart Rate Source Monitor   Resp 18   BP (!) 85/53   BP Location Left lower arm   Patient Position High fowlers   Level of Consciousness Alert (0)  (Simultaneous filing. User may not have seen previous data.)   MEWS Score 2   Patient Currently in Pain Yes   Oxygen Therapy   SpO2 93 %   O2 Device Heated high flow cannula   O2 Flow Rate (L/min) 35 L/min   Height and Weight   Weight 252 lb 8 oz (114.5 kg)   BMI (Calculated) 46.3     Patients BP reassessed now 98/55. Patient asleep and stable.   Yasmine Saucedo RN

## 2021-09-29 VITALS
DIASTOLIC BLOOD PRESSURE: 52 MMHG | HEIGHT: 62 IN | SYSTOLIC BLOOD PRESSURE: 92 MMHG | BODY MASS INDEX: 46.46 KG/M2 | WEIGHT: 252.5 LBS | TEMPERATURE: 97.3 F | OXYGEN SATURATION: 91 % | RESPIRATION RATE: 18 BRPM | HEART RATE: 130 BPM

## 2021-09-29 LAB
ANION GAP SERPL CALCULATED.3IONS-SCNC: 9 MMOL/L (ref 3–16)
BASOPHILS ABSOLUTE: 0 K/UL (ref 0–0.2)
BASOPHILS RELATIVE PERCENT: 0.6 %
BUN BLDV-MCNC: 23 MG/DL (ref 7–20)
CALCIUM SERPL-MCNC: 7.9 MG/DL (ref 8.3–10.6)
CHLORIDE BLD-SCNC: 97 MMOL/L (ref 99–110)
CO2: 36 MMOL/L (ref 21–32)
CREAT SERPL-MCNC: 0.8 MG/DL (ref 0.6–1.2)
EOSINOPHILS ABSOLUTE: 0.1 K/UL (ref 0–0.6)
EOSINOPHILS RELATIVE PERCENT: 0.8 %
GFR AFRICAN AMERICAN: >60
GFR NON-AFRICAN AMERICAN: >60
GLUCOSE BLD-MCNC: 100 MG/DL (ref 70–99)
GLUCOSE BLD-MCNC: 134 MG/DL (ref 70–99)
GLUCOSE BLD-MCNC: 185 MG/DL (ref 70–99)
GLUCOSE BLD-MCNC: 95 MG/DL (ref 70–99)
HCT VFR BLD CALC: 33 % (ref 36–48)
HEMOGLOBIN: 10.2 G/DL (ref 12–16)
INR BLD: 1.87 (ref 0.88–1.12)
LYMPHOCYTES ABSOLUTE: 1 K/UL (ref 1–5.1)
LYMPHOCYTES RELATIVE PERCENT: 11.8 %
MCH RBC QN AUTO: 24.8 PG (ref 26–34)
MCHC RBC AUTO-ENTMCNC: 30.8 G/DL (ref 31–36)
MCV RBC AUTO: 80.5 FL (ref 80–100)
MONOCYTES ABSOLUTE: 1.1 K/UL (ref 0–1.3)
MONOCYTES RELATIVE PERCENT: 13.2 %
NEUTROPHILS ABSOLUTE: 6.1 K/UL (ref 1.7–7.7)
NEUTROPHILS RELATIVE PERCENT: 73.6 %
PDW BLD-RTO: 17.9 % (ref 12.4–15.4)
PERFORMED ON: ABNORMAL
PLATELET # BLD: 279 K/UL (ref 135–450)
PMV BLD AUTO: 7.4 FL (ref 5–10.5)
POTASSIUM SERPL-SCNC: 3.9 MMOL/L (ref 3.5–5.1)
PROTHROMBIN TIME: 21.7 SEC (ref 9.9–12.7)
RBC # BLD: 4.09 M/UL (ref 4–5.2)
SODIUM BLD-SCNC: 142 MMOL/L (ref 136–145)
WBC # BLD: 8.3 K/UL (ref 4–11)

## 2021-09-29 PROCEDURE — 94761 N-INVAS EAR/PLS OXIMETRY MLT: CPT

## 2021-09-29 PROCEDURE — 6370000000 HC RX 637 (ALT 250 FOR IP): Performed by: INTERNAL MEDICINE

## 2021-09-29 PROCEDURE — 36415 COLL VENOUS BLD VENIPUNCTURE: CPT

## 2021-09-29 PROCEDURE — 80048 BASIC METABOLIC PNL TOTAL CA: CPT

## 2021-09-29 PROCEDURE — 6360000002 HC RX W HCPCS: Performed by: INTERNAL MEDICINE

## 2021-09-29 PROCEDURE — 6370000000 HC RX 637 (ALT 250 FOR IP): Performed by: PHYSICIAN ASSISTANT

## 2021-09-29 PROCEDURE — 85610 PROTHROMBIN TIME: CPT

## 2021-09-29 PROCEDURE — 2700000000 HC OXYGEN THERAPY PER DAY

## 2021-09-29 PROCEDURE — 85025 COMPLETE CBC W/AUTO DIFF WBC: CPT

## 2021-09-29 PROCEDURE — 99233 SBSQ HOSP IP/OBS HIGH 50: CPT | Performed by: INTERNAL MEDICINE

## 2021-09-29 PROCEDURE — 2580000003 HC RX 258: Performed by: PHYSICIAN ASSISTANT

## 2021-09-29 PROCEDURE — 94640 AIRWAY INHALATION TREATMENT: CPT

## 2021-09-29 PROCEDURE — 99239 HOSP IP/OBS DSCHRG MGMT >30: CPT | Performed by: PHYSICIAN ASSISTANT

## 2021-09-29 RX ORDER — LORAZEPAM 0.5 MG/1
0.5 TABLET ORAL EVERY 6 HOURS PRN
Qty: 12 TABLET | Refills: 0 | Status: SHIPPED | OUTPATIENT
Start: 2021-09-29 | End: 2021-10-02

## 2021-09-29 RX ORDER — PREDNISONE 10 MG/1
TABLET ORAL
Qty: 9 TABLET | Refills: 0 | DISCHARGE
Start: 2021-09-29 | End: 2021-10-05

## 2021-09-29 RX ORDER — DILTIAZEM HYDROCHLORIDE 60 MG/1
30 TABLET, FILM COATED ORAL EVERY 6 HOURS SCHEDULED
Status: DISCONTINUED | OUTPATIENT
Start: 2021-09-29 | End: 2021-09-29 | Stop reason: HOSPADM

## 2021-09-29 RX ORDER — FUROSEMIDE 10 MG/ML
40 INJECTION INTRAMUSCULAR; INTRAVENOUS ONCE
Status: COMPLETED | OUTPATIENT
Start: 2021-09-29 | End: 2021-09-29

## 2021-09-29 RX ORDER — BUSPIRONE HYDROCHLORIDE 5 MG/1
5 TABLET ORAL 3 TIMES DAILY
Qty: 90 TABLET | Refills: 0 | DISCHARGE
Start: 2021-09-29

## 2021-09-29 RX ORDER — DIGOXIN 125 MCG
125 TABLET ORAL DAILY
Qty: 30 TABLET | Refills: 3 | Status: ON HOLD | DISCHARGE
Start: 2021-09-30 | End: 2021-10-27 | Stop reason: HOSPADM

## 2021-09-29 RX ORDER — WARFARIN SODIUM 5 MG/1
5 TABLET ORAL
Status: DISCONTINUED | OUTPATIENT
Start: 2021-09-29 | End: 2021-09-29 | Stop reason: HOSPADM

## 2021-09-29 RX ADMIN — Medication 2000 UNITS: at 08:55

## 2021-09-29 RX ADMIN — PREDNISONE 20 MG: 20 TABLET ORAL at 08:56

## 2021-09-29 RX ADMIN — Medication 2 PUFF: at 07:19

## 2021-09-29 RX ADMIN — Medication 2 PUFF: at 12:42

## 2021-09-29 RX ADMIN — BUPROPION HYDROCHLORIDE 150 MG: 150 TABLET, EXTENDED RELEASE ORAL at 08:56

## 2021-09-29 RX ADMIN — PYRIDOXINE HCL TAB 50 MG 50 MG: 50 TAB at 08:56

## 2021-09-29 RX ADMIN — INSULIN LISPRO 3 UNITS: 100 INJECTION, SOLUTION INTRAVENOUS; SUBCUTANEOUS at 11:51

## 2021-09-29 RX ADMIN — DIGOXIN 125 MCG: 125 TABLET ORAL at 08:55

## 2021-09-29 RX ADMIN — DILTIAZEM HYDROCHLORIDE 30 MG: 60 TABLET, FILM COATED ORAL at 12:47

## 2021-09-29 RX ADMIN — SODIUM CHLORIDE, PRESERVATIVE FREE 10 ML: 5 INJECTION INTRAVENOUS at 08:55

## 2021-09-29 RX ADMIN — Medication: at 08:55

## 2021-09-29 RX ADMIN — LORAZEPAM 0.5 MG: 0.5 TABLET ORAL at 14:30

## 2021-09-29 RX ADMIN — BUSPIRONE HYDROCHLORIDE 5 MG: 5 TABLET ORAL at 08:56

## 2021-09-29 RX ADMIN — FUROSEMIDE 40 MG: 10 INJECTION, SOLUTION INTRAMUSCULAR; INTRAVENOUS at 08:55

## 2021-09-29 ASSESSMENT — PAIN SCALES - GENERAL: PAINLEVEL_OUTOF10: 0

## 2021-09-29 NOTE — DISCHARGE SUMMARY
Name:  Viviana Jimenez  Room:  /9036-08  MRN:    [id number]    Discharge Summary      This discharge summary is in conjunction with a complete physical exam done on the day of discharge. Discharging Provider: Tanvir Lan PA-C    Admit: 9/14/2021  Discharge:   9/29/2021     HPI taken from admission H&P:     The patient is a 68 y.o. female with a PMH of chronic Hypoxic Respiratory Failure, COPD, Hx of Anovaginal fistula, H/o PE, PAF, chronic diastolic CHF, B cell lymphoma who presented to [facility] ED with complaint of shortness of breath of 2 days duration. She tested positive for Covid a few days ago when she developed some mild respiratory symptoms. She was found to be very hypoxic in the emergency room, placed on BiPAP and admitted to the ICU. Currently on high flow nasal cannula. Diagnoses this Admission and Hospital Course     COVID 19 PNA  Acute on Chronic Hypoxic Respiratory Failure  - CXR with bilateral interstitial ground glass opacities  - PCT 0.22  - started solumedrol IV  and changed to prednisone taper (she also has severe COPD)  - completed remdesivir   - S/p - Tocilizumab x1   - wears 4.5 L at home and now worsened with CHF, copd and covid pna. Was on bipap -> on high flow oxygen now   - pulmonology consulted  - persistent hypoxia remains , now on AirVo2 at 35 L, fiO2 100 %-> 80%  - after family discussion, comfort meds initiated for anxiety  - code status changed to [facility]      COPD  -With acute exacerbation.  -Continue steroids, completed doxy , scheduled inhaled bronchodilators(MDI)     Atrial Fibrillation with RVR  - hx of PAF, trop neg  - s/p dilt gtt and transitioned to oral cardizem  - also on digoxin   - metoprolol stopped with low  BPs  - cardiology consulted  - AC on Coumadin - currently therapeutic  - dc on digoxin, and immediate release cardizem with holding parameters  to help monitor BP     Transaminitis  - likely 2/2 COVID.   Trended down     Hyperglycemia - suspect early DM II  - pt is on steroids  - check Hgb A1c - 6.9%  - added low dose SSI and PRN hypoglycemia protocol  - by time of dc BG well controlled, not requiring insulin- not continued on discharge      Supratherapeutic INR  - cont Coumadin     Microcytic Anemia - chronic   - Hgb 10 .9  - iron studies wnl      Chronic Diastolic CHF  - appears compensated   - last echo 6/2020: normal EF, grade I DD  - daily weights, I&Os  - demadex on hold      Hx of PE  - AC on Coumadin       [facility]     H/o Anovaginal Fistula  - noted per patient on prior admission     Morbid Obesity  - Body mass index is 49.04 kg/m². - Complicating assessment and treatment. Placing patient at risk for multiple co-morbidities as well as early death and contributing to the patient's presentation.   - Counseled on weight loss.     Procedures (Please Review Full Report for Details)  None    Consults    Pulm  Cardio      Physical Exam at Discharge:    BP (!) 108/52   Pulse 99   Temp 97.3 °F (36.3 °C) (Oral)   Resp 19   Ht 5' 2\" (1.575 m)   Wt 252 lb 8 oz (114.5 kg)   LMP  (LMP Unknown)   SpO2 92%   BMI 46.18 kg/m²   Pt in droplet plus precautions   General: elderly female up in bed , on Airvo2    Awake, alert and oriented.  Appears to be not in any distress  Mucous Membranes:  Pink , anicteric  Neck: No JVD, trachea midline   Chest:  Bilateral diminished BS,  minimal crackles in bases   Occasional wheeze mushtaq   Cardiovascular:  Irregular  S1S2 heard, no murmurs or gallops  Abdomen:  Soft, undistended, non tender, no organomegaly, BS present  Extremities: no deformities noted   Neurological : grossly normal      CBC:   Recent Labs     09/27/21  0439 09/28/21  0513 09/29/21  0508   WBC 12.7* 10.5 8.3   HGB 10.6* 10.4* 10.2*   HCT 34.7* 33.7* 33.0*   MCV 80.7 79.6* 80.5    296 279     BMP:   Recent Labs     09/27/21  0439 09/28/21  0513 09/29/21  0508    137 142   K 3.9 3.9 3.9   CL 93* 93* 97*   CO2 39* 37* 36* BUN 28* 25* 23*   CREATININE 0.8 0.7 0.8     LIVER PROFILE: No results for input(s): AST, ALT, LIPASE, BILIDIR, BILITOT, ALKPHOS in the last 72 hours. Invalid input(s): AMYLASE,  ALB  PT/INR:   Recent Labs     09/27/21  0439 09/28/21  0514 09/29/21  0508   PROTIME 40.4* 26.1* 21.7*   INR 3.40* 2.23* 1.87*     APTT: No results for input(s): APTT in the last 72 hours. UA:No results for input(s): NITRITE, COLORU, PHUR, LABCAST, WBCUA, RBCUA, MUCUS, TRICHOMONAS, YEAST, BACTERIA, CLARITYU, SPECGRAV, LEUKOCYTESUR, UROBILINOGEN, BILIRUBINUR, BLOODU, GLUCOSEU, AMORPHOUS in the last 72 hours. Invalid input(s): [place]        CULTURES  Blood: NG    RADIOLOGY  XR CHEST 1 VIEW   Final Result   Dense right upper lobe consolidation with mild volume loss. Findings are   suggestive of a bacterial pneumonia, possibly postobstructive. Patchy perihilar opacification with some interval improvement. Left basilar   atelectasis. XR CHEST PORTABLE   Final Result   Bilateral interstitial groundglass opacities, likely representing COVID 19   pneumonia. Discharge Medications     Medication List      START taking these medications    busPIRone 5 MG tablet  Commonly known as: BUSPAR  Take 1 tablet by mouth 3 times daily     digoxin 125 MCG tablet  Commonly known as: LANOXIN  Take 1 tablet by mouth daily  Start taking on: September 30, 2021     dilTIAZem 30 MG tablet  Commonly known as: CARDIZEM  Take 1 tablet by mouth 4 times daily Hold for SBP <90     LORazepam 0.5 MG tablet  Commonly known as: ATIVAN  Take 1 tablet by mouth every 6 hours as needed for Anxiety for up to 3 days. CHANGE how you take these medications    buPROPion 150 MG extended release tablet  Commonly known as:  Wellbutrin XL  Take 1 tablet by mouth every morning  What changed: additional instructions     predniSONE 10 MG tablet  Commonly known as: DELTASONE  Take 2 tablets by mouth daily for 3 days, THEN 1 tablet daily for 3 days.  Start taking on: September 29, 2021  What changed: See the new instructions. QUEtiapine 25 MG tablet  Commonly known as: SEROquel  Take 1 tablet by mouth nightly  What changed: additional instructions        CONTINUE taking these medications    * albuterol (2.5 MG/3ML) 0.083% nebulizer solution  Commonly known as: PROVENTIL  Take 3 mLs by nebulization every 6 hours as needed for Wheezing or Shortness of Breath DX COPD J44.9     * albuterol sulfate  (90 Base) MCG/ACT inhaler  Commonly known as: Ventolin HFA  Inhale 2 puffs into the lungs every 6 hours as needed for Wheezing or Shortness of Breath     ezetimibe 10 MG tablet  Commonly known as: Zetia  Take 1 tablet by mouth daily     Melatonin 5 MG Caps     polyethylene glycol 17 g packet  Commonly known as: GLYCOLAX  Take 17 g by mouth daily     TYLENOL ARTHRITIS PAIN PO     vitamin B-6 50 MG tablet  Commonly known as: PYRIDOXINE     VITAMIN D-3 PO     warfarin 5 MG tablet  Commonly known as: COUMADIN  Take as directed. If you are unsure how to take this medication, talk to your nurse or doctor. Original instructions: TAKE ONE TABLET BY MOUTH DAILY         * This list has 2 medication(s) that are the same as other medications prescribed for you. Read the directions carefully, and ask your doctor or other care provider to review them with you.             STOP taking these medications    diphenhydrAMINE 25 MG tablet  Commonly known as: BENADRYL     OXYGEN     spironolactone 25 MG tablet  Commonly known as: ALDACTONE     torsemide 20 MG tablet  Commonly known as: DEMADEX           Where to Get Your Medications      You can get these medications from any pharmacy    Bring a paper prescription for each of these medications  · LORazepam 0.5 MG tablet     Information about where to get these medications is not yet available    Ask your nurse or doctor about these medications  · busPIRone 5 MG tablet  · digoxin 125 MCG tablet  · dilTIAZem 30 MG tablet  · predniSONE 10 MG tablet           Discharged in stable condition to LTAC    Follow Up:   Follow up with   physician at [facility]    35 mts spent on discharge    Herbert Schirmer PA-C  9/29/2021 12:05 PM

## 2021-09-29 NOTE — PROGRESS NOTES
Attempted to place patient on 15 lpm HFNC in preparation of transfer today to LTAC. Patient off Airvo and placed on 15 lpm HFNC, SpO2 increased from 93 to 95% while on 15 lpm HFNC. RT would recommend having 100% NRB available during transfer in case of any desaturation episodes. NRB can be placed on top of HFNC during desaturation episodes if needed. Pt placed back on Airvo before leaving the room and Spo2 94%. Case management updated on results.

## 2021-09-29 NOTE — PROGRESS NOTES
Order to remove matias. Purewick placed. Pt repositioned in bed. Pt denies any other care needs at this time. Pt stable.     Patrick Lehman RN

## 2021-09-29 NOTE — CARE COORDINATION
DISCHARGE ORDER  Date/Time 2021 11:46 AM  Completed by: Katlyn Ndiaye RN, Case Management    Patient Name: Edson Duenas      : 1940  Admitting Diagnosis: Morbid obesity ([facility] [E66.01]  Hypoxia [R09.02]  COPD exacerbation ([facility] [J44.1]  Atrial fibrillation with RVR ([facility] [I48.91]  Anticoagulated on Coumadin [Z79.01]  Poisoning by warfarin sodium, accidental or unintentional, initial encounter [T45.511A]  Acute hypoxemic respiratory failure due to COVID-19 ([facility] [U07.1, J96.01]  COVID-19 [U07.1]  Acute respiratory failure due to COVID-19 ([facility] [U07.1, J96.00]      Admit order Date and Status:2021  (verify MD's last order for status of admission)      Noted discharge order. If applicable PT/OT recommendation at Discharge: SNF  DME recommendation by PT/OT:defer to facility  Confirmed discharge plan  (pt): Yes  with whom_pt and renny Ornelas______________  If pt confirmed DC plan does family need to be contacted by CM No if yes who______  Discharge Plan: Chart reviewed. Per [facility] she can accept the pt today at [address] [place] [address] and is aware of  time. Spoke with pt via telephone and she remains agreeable to [address] [place] [address] and is aware of  time. Call placed to renny Patterson with pt permission and he is aware pt will discharge today, provided [facility] with his contact number per his request and she states she will call him. No further dc needs voiced or identified. RT Ceiclio Dailey noted: Attempted to place patient on 15 lpm HFNC in preparation of transfer today to LTAC. Patient off Airvo and placed on 15 lpm HFNC, SpO2 increased from 93 to 95% while on 15 lpm HFNC. RT would recommend having 100% NRB available during transfer in case of any desaturation episodes. NRB can be placed on top of HFNC during desaturation episodes if needed. Pt set up with [facility] for 1430 . They are aware pt will transport on 15L and will need backup of NRB. Reviewed chart.   Role of discharge planner explained and patient verbalized understanding. Discharge order is noted. Discharge timeout done with Olvin Botello. All discharge needs and concerns addressed.

## 2021-09-29 NOTE — PROGRESS NOTES
Pulmonary Progress Note  CC: COVID-19, respiratory failure    Subjective:    Responding well to diuresis  Continues on Vapotherm, I titrated down to 60% with saturation remains in the upper 90s. Less congested      Intake/Output Summary (Last 24 hours) at 9/29/2021 0713  Last data filed at 9/28/2021 1953  Gross per 24 hour   Intake 610 ml   Output 2100 ml   Net -1490 ml           EXAM:   BP (!) 96/58   Pulse 97   Temp 96.8 °F (36 °C) (Axillary)   Resp 16   Ht 5' 2\" (1.575 m)   Wt 252 lb 8 oz (114.5 kg)   LMP  (LMP Unknown)   SpO2 95%   BMI 46.18 kg/m²  on airVO 70% 35 L  Gen: No distress. Ill-appearing  Eyes: PERRL. No sclera icterus. No conjunctival injection. ENT: No discharge. Pharynx clear. Neck: Trachea midline. No obvious mass. Resp: No accessory muscle use. Few crackles. No wheezes. Few rhonchi. No dullness on percussion. Good air entry. CV: Regular rate. Regular rhythm. No murmur or rub. No edema. GI: Non-tender. Non-distended. No hernia. Skin: Warm and dry. No nodule on exposed extremities. Lymph: No cervical LAD. No supraclavicular LAD. M/S: No cyanosis. No joint deformity. No clubbing. Neuro: Awake. Alert. Moves all four extremities. Psych: Oriented x 3. No anxiety.      Scheduled Meds:   warfarin  5 mg Oral Once    predniSONE  20 mg Oral Daily    Venelex   Topical BID    warfarin (COUMADIN) daily dosing (placeholder)   Other RX Placeholder    [Held by provider] dilTIAZem  120 mg Oral Daily    busPIRone  5 mg Oral TID    insulin glargine  25 Units SubCUTAneous Nightly    digoxin  125 mcg Oral Daily    albuterol sulfate HFA  2 puff Inhalation 4x daily    insulin lispro  0-18 Units SubCUTAneous TID WC    insulin lispro  0-9 Units SubCUTAneous Nightly    buPROPion  150 mg Oral QAM    polyethylene glycol  17 g Oral Daily    vitamin B-6  50 mg Oral Daily    sodium chloride flush  5-40 mL IntraVENous 2 times per day    Vitamin D  2,000 Units Oral Daily Continuous Infusions:   dextrose      sodium chloride 25 mL (09/18/21 0040)     PRN Meds:  metoprolol, traMADol, LORazepam, albuterol sulfate HFA, glucose, dextrose, glucagon (rDNA), dextrose, melatonin, sodium chloride flush, sodium chloride, ondansetron **OR** ondansetron, polyethylene glycol, acetaminophen **OR** acetaminophen, guaiFENesin-dextromethorphan    Labs:  CBC:   Recent Labs     09/27/21  0439 09/28/21  0513 09/29/21  0508   WBC 12.7* 10.5 8.3   HGB 10.6* 10.4* 10.2*   HCT 34.7* 33.7* 33.0*   MCV 80.7 79.6* 80.5    296 279     BMP:   Recent Labs     09/27/21  0439 09/28/21  0513 09/29/21  0508    137 142   K 3.9 3.9 3.9   CL 93* 93* 97*   CO2 39* 37* 36*   BUN 28* 25* 23*   CREATININE 0.8 0.7 0.8     Micro:  9/14/2021 SARS-CoV-2 positive  9/14/2021 blood cultures NGTD    Imaging:  CXR 9/25/2021 imaging reviewed by me and showed  Worsening RUL consolidation  Improvement RLL/left-sided airspace disease    ASSESSMENT:  · Acute on chronic hypoxemic hypercapnic respiratory failure, baseline 4 L qhs and 6 L with exertion  · COVID-19 pneumonia in an unvaccinated patient   · Severe COPD with acute exacerbation, f/b Dr. Abad Vincent previously   · A. fib with RVR, h/o paroxysmal a. fib  · Chronic diastolic CHF   · H/O PE on coumadin  · B cell lymphoma   · SHANNON, tells me she does not use BiPAP at home     PLAN:  [address] for life threatening respiratory failure. No plan to escalate care.    COVID-19 isolation, droplet plus   Completed Remdesevir & Tocilizumab   Steroid D#16, now PO prednisone -20 mg   Completed 7 days doxycycline    Continue Coumadin    Repeat dose of Lasix 40 IV x1   DNI   PT/OT   LTAC placement pending   I recommend CXR in 4-6 week to document resolution of abnormalities

## 2021-09-29 NOTE — FLOWSHEET NOTE
09/29/21 0830   Vital Signs   Temp 97.3 °F (36.3 °C)   Temp Source Oral   Pulse 99   Heart Rate Source Monitor   Resp 19   BP (!) 108/52   BP Location Right lower arm   Patient Position Semi fowlers   Level of Consciousness Alert (0)   MEWS Score 1   Patient Currently in Pain Denies   Pain Assessment   Pain Assessment 0-10   Pain Level 0   Patient's Stated Pain Goal No pain   Oxygen Therapy   SpO2 92 %   Pulse Oximeter Device Mode Continuous   O2 Device Heated high flow cannula   Skin Assessment Clean, dry, & intact   FiO2  70 %   O2 Flow Rate (L/min) 35 L/min       Pt assessment complete; see flow sheets. Vitals completed. Meds given per MAR. Pt currently sitting up in bed, eating breakfast. Pt stable at this time.     Felicitas Hernadez RN

## 2021-09-29 NOTE — FLOWSHEET NOTE
09/28/21 2145   Assessment   Charting Type Shift assessment   Neurological   Neuro (WDL) WDL   Level of Consciousness Alert (0)   Swallow Screening   Is the patient able to remain alert for testing? Yes   Was the Patient Eating a Modified Diet Prior to being Admitted? No   Is there an Existing PEG or Abdominal Feeding Tube? No   Does the patient have a Head of Bed [facility] restriction <30°? No   Kirstin Coma Scale   Eye Opening 4   Best Verbal Response 5   Best Motor Response 6   [place] Coma Scale Score 15   NIHSS Stroke Scale   NIHSS Stroke Scale Assessed No   HEENT   HEENT (WDL) X  (glasses)   Teeth Dentures upper;Dentures lower   Voice Normal   Right Eye Intact; Impaired vision   Left Eye Intact; Impaired vision   Right Ear Impaired hearing   Left Ear Impaired hearing   Respiratory   Respiratory (WDL) X   Respiratory Pattern Regular   Respiratory Depth Shallow   Respiratory Quality/Effort Dyspnea at rest   Chest Assessment Chest expansion symmetrical   L Breath Sounds Diminished   R Breath Sounds Diminished   Breath Sounds   Right Upper Lobe Diminished   Right Middle Lobe Diminished   Right Lower Lobe Diminished   Left Upper Lobe Diminished   Left Lower Lobe Diminished   Cough/Sputum   Cough Productive   Frequency Frequent   Sputum Amount Moderate   Sputum Color Yellow;Brown   Incentive Spirometry Tx   Respiratory Effort Dyspnea at rest   Cardiac   Cardiac (WDL) X   Cardiac Regularity Irregular   Heart Sounds S1, S2   Cardiac Rhythm Atrial fib   Cardiac Monitor   Telemetry Monitor On Yes   Telemetry Audible Yes   Telemetry Alarms Set Yes   Telemetry Box Number PCU   Telemetry Monitor Alarm Parameters PCU   Gastrointestinal   Abdominal (WDL) WDL   Abdomen Inspection Rounded; Soft   Tenderness Soft;Nontender   RUQ Bowel Sounds Active   LUQ Bowel Sounds Active   RLQ Bowel Sounds Active   LLQ Bowel Sounds Active   Peripheral Vascular   Peripheral Vascular (WDL) X   Edema Generalized   Edema Generalized +2   Skin Color/Condition   Skin Color/Condition (WDL) X   Skin Color Appropriate for ethnicity;Pale   Skin Integrity   Skin Integrity (WDL) X   Skin Integrity Abrasion;Bruising; Other (Comment)  (tear on L ear)   Musculoskeletal   Musculoskeletal (WDL) X  (gen weakness)   RUE Full movement   LUE Full movement   RL Extremity Swelling   LL Extremity Swelling   Genitourinary   Genitourinary (WDL) X  (matias)   Flank Tenderness No   Suprapubic Tenderness No   Dysuria KORY   Anus/Rectum   Anus/Rectum (WDL) X  (anal fistula)   Wound 09/26/21 Ear Left SDTI behind left ear (airvo oxygen tubing)   Date First Assessed/Time First Assessed: 09/26/21 0900   Primary Wound Type: Pressure Injury  Location: Ear  Wound Location Orientation: Left  Wound Description (Comments): SDTI behind left ear (airvo oxygen tubing)   Wound Etiology Other  (skin tear )   Dressing/Treatment Other (comment)  (venelex)   Urethral Catheter Temperature probe   Placement Date/Time: 09/15/21 0000   Catheter Type: Temperature probe  Catheter Balloon Size: 10 mL  Collection Container: Standard  Securement Method: Securing device (Describe)  Urine Returned: Yes   Catheter Indications Need for fluid volume management of the critically ill patient in a critical care setting   Urine Color Yellow   Urine Appearance Clear   Psychosocial   Psychosocial (WDL) WDL   Patient Behaviors Calm; Cooperative   Shift assessment completed see, flow sheet. Patient in bed alert and oriented X4. Evening medications given per order. PRN ativan and melatonin given. FSBS 136 insulin held. Patient provided a sandwich upon request. No other needs at this time. Call light in reach.

## 2021-09-29 NOTE — PROGRESS NOTES
Hand off report given to Maria Esther Daugherty RN. Patient stable, with no other needs. Care transferred at this time.   Oliver Winters RN

## 2021-09-29 NOTE — PLAN OF CARE
Problem: Airway Clearance - Ineffective  Goal: Achieve or maintain patent airway  9/29/2021 1109 by Deion Bermudez RN  Outcome: Ongoing  9/28/2021 2210 by Sandra Ramos RN  Outcome: Ongoing     Problem: Gas Exchange - Impaired  Goal: Absence of hypoxia  9/29/2021 1109 by Deion Bermudez RN  Outcome: Ongoing  9/28/2021 2210 by Sandra Ramos RN  Outcome: Ongoing  Goal: Promote optimal lung function  Outcome: Ongoing     Problem: Breathing Pattern - Ineffective  Goal: Ability to achieve and maintain a regular respiratory rate  Outcome: Ongoing     Problem:  Body Temperature -  Risk of, Imbalanced  Goal: Ability to maintain a body temperature within defined limits  Outcome: Ongoing  Goal: Will regain or maintain usual level of consciousness  Outcome: Ongoing  Goal: Complications related to the disease process, condition or treatment will be avoided or minimized  Outcome: Ongoing     Problem: Isolation Precautions - Risk of Spread of Infection  Goal: Prevent transmission of infection  Outcome: Ongoing     Problem: Nutrition Deficits  Goal: Optimize nutritional status  Outcome: Ongoing     Problem: Risk for Fluid Volume Deficit  Goal: Maintain normal heart rhythm  Outcome: Ongoing  Goal: Maintain absence of muscle cramping  Outcome: Ongoing  Goal: Maintain normal serum potassium, sodium, calcium, phosphorus, and pH  Outcome: Ongoing     Problem: Loneliness or Risk for Loneliness  Goal: Demonstrate positive use of time alone when socialization is not possible  Outcome: Ongoing     Problem: Fatigue  Goal: Verbalize increase energy and improved vitality  Outcome: Ongoing     Problem: Patient Education: Go to Patient Education Activity  Goal: Patient/Family Education  Outcome: Ongoing     Problem: Falls - Risk of:  Goal: Will remain free from falls  Description: Will remain free from falls  9/29/2021 1109 by Deion Bermudez RN  Outcome: Ongoing  9/28/2021 2210 by Sandra Ramos RN  Outcome:

## 2021-09-29 NOTE — PROGRESS NOTES
Patient educated on discharge instructions as well as new medications use, dosage, administration and possible side effects. Patient verified knowledge. IV removed without difficulty and dry dressing in place. Telemetry monitor removed and returned to [facility]. Pt left facility in stable condition to [facility] with all of their personal belongings. Spoke with Sudhir from [facility]. Addressed all patient and caregiver concerns at this time. Pt stable.     Baldemar Rendon RN

## 2021-09-29 NOTE — DISCHARGE INSTR - DIET

## 2021-09-29 NOTE — DISCHARGE INSTR - COC
Continuity of Care Form    Patient Name: Tala Ricardo   :  1940  MRN:  [id number]    Admit date:  2021  Discharge date:  2021      Code Status Order: DNR-CC   Advance Directives:      Admitting Physician:  Justice Hodges MD  PCP: TURNER Alvarado CNP    Discharging Nurse: Hellen Gutierrez 23 Unit/Room#: /9141-93  Discharging Unit Phone Number: [phone]      Emergency Contact:   Extended Emergency Contact Information  Primary Emergency Contact: Johnson Castillo  Address: [address] Phone: [phone]  Relation: Child  Secondary Emergency Contact: Allison Patel [address] [place] [address] Phone: [phone]  Relation: Child    Past Surgical History:  Past Surgical History:   Procedure Laterality Date    APPENDECTOMY  1963    BRONCHOSCOPY N/A 2019    EBUS WF W/ANES.  performed by Brianna Mosqueda MD at [place] [address]  2019    BRONCHOSCOPY ALVEOLAR LAVAGE performed by Brianna Mosqueda MD at [place] [address]  2019    BRONCHOSCOPY BRUSHINGS performed by Brianna Mosqueda MD at [place] [address]  2019    BRONCHOSCOPY/TRANSBRONCHIAL LUNG BIOPSY performed by Brianna Mosqueda MD at [place] [address]  2019    BRONCHOSCOPY/TRANSBRONCHIAL NEEDLE BIOPSY performed by Brianna Mosqueda MD at [place] [address]  2019    BRONCHOSCOPY/TRANSBRONCHIAL NEEDLE BIOPSY ADDL LOBE performed by Brianna Mosqueda MD at [facility]      COLONOSCOPY N/A 2019    COLONOSCOPY POLYPECTOMY SNARE/COLD BIOPSY showed diverticulilosis performed by Sharad Tovar MD at [address]      umbilical--Dr [address] N/A 2020    PORT INSERTION performed by Birdia Ormond MD Kamran at [address]     TUNNELED VENOUS PORT PLACEMENT Left 01/17/2020    Port Insertion ENRIQUETA Chest w. Dr Radha Hendricks 1/17/20 Vaccess CT Injectable [id number] lot [id number] 3/31/21       Immunization History:   Immunization History   Administered Date(s) Administered    Influenza Virus Vaccine 01/02/2013, 10/01/2013, 09/15/2014, 10/27/2016, 09/22/2017, 11/02/2018, 09/29/2020    Influenza Whole 10/01/2013, 09/15/2014, 09/15/2015    Influenza, Scherry Craze, IM, (6 mo and older Fluzone, Flulaval, Fluarix and 3 yrs and older Afluria) 10/27/2016, 09/22/2017, 11/02/2018    Influenza, Scherry Craze, IM, PF (6 mo and older Fluzone, Flulaval, Fluarix, and 3 yrs and older Afluria) 09/04/2015, 09/21/2020    Influenza, Scherry Craze, Recombinant, IM PF (Flublok 18 yrs and older) 11/01/2019    Pneumococcal Conjugate 13-valent (Iilqxvc73) 09/25/2017    Pneumococcal Conjugate 7-valent (Prevnar7) 09/01/2009    Pneumococcal Polysaccharide (Tiqchgdvu00) 01/29/2015    Zoster Live (Zostavax) 05/30/2013       Active Problems:  Patient Active Problem List   Diagnosis Code    COPD exacerbation J44.1    Chronic respiratory failure with hypoxia ([facility] J96.11    Anxiety F41.9    Hyperlipidemia E78.5    Diverticulosis K57.90    COPD, severe ([facility] J44.9    Vitamin D deficiency E55.9    Atrial fibrillation with RVR ([facility]) I48.91    Other pulmonary embolism without acute cor pulmonale ([facility]) I26.99    Venous (peripheral) insufficiency I87.2    Lymphedema I89.0    Chronic angle-closure glaucoma of both eyes H40.2230    Arthritis of hips and knees M19.90    Shortness of breath E96.74    Diastolic dysfunction K25.43    Acute on chronic respiratory failure with hypoxia ([facility]) J96.21    Bilateral edema of lower extremity R60.0    Angina pectoris ([facility]) I20.9    Paroxysmal atrial fibrillation ([facility]) I48.0    Erythrocytosis D75.1    IFG (impaired fasting glucose) R73.01    Lung mass R91.8    Mediastinal lymphadenopathy R59.0    Atrial fibrillation, chronic ([address] I48.20    Pulmonary nodule R91.1    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    Non-Hodgkin lymphoma (HCC) C85.90    PVD (peripheral vascular disease) with claudication ([facility]) I73.9    Carotid stenosis I65.29    TIA (transient ischemic attack) G45.9    CHF (congestive heart failure) ([facility]) I50.9    HTN (hypertension), benign I10    Dyslipidemia E78.5    History of diverticulitis Z87.19    Elevated lactic acid level R79.89    Moderate episode of recurrent major depressive disorder ([facility]) F33.1    Primary insomnia F51.01    Primary osteoarthritis of both knees M17.0    Acute pain of right knee M25.561    History of pulmonary embolism Z86.711    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    Nausea R11.0    Polyp of colon K63.5    Chronic airway obstruction, not elsewhere classified J44.9    Morbid obesity ([facility]) E66.01    Sleep apnea G47.30    Restless legs syndrome G25.81    Chronic back pain M54.9, G89.29    Panic disorder F41.0    Acute hypoxemic respiratory failure due to COVID-19 (HCC) U07.1, J96.01    COVID-19 U07.1    Acute on chronic respiratory failure with hypoxia and hypercapnia (HCC) J96.21, J96.22    Anticoagulated on Coumadin Z79.01    Pneumonia due to COVID-19 virus U07.1, J12.82    Acute hypoxemic respiratory failure ([facility]) J96.01       Isolation/Infection:   Isolation            Droplet Plus          Patient Infection Status       Infection Onset Added Last Indicated Last Indicated By Review Planned Expiration Resolved Resolved By    COVID-19 09/14/21 09/15/21 09/14/21 COVID-19 & Influenza Combo 09/22/21 10/12/21      Resolved    COVID-19 Rule Out 09/14/21 09/14/21 09/15/21 COVID-19 & Influenza Combo (Ordered)   09/15/21 Rule-Out Test Resulted            Nurse Assessment:  Last Vital Signs: BP (!) 108/52   Pulse 99   Temp 97.3 °F (36.3 °C) (Oral)   Resp 19   Ht 5' 2\" (1.575 m)   Wt 252 lb 8 oz (114.5 kg)   LMP  (LMP Unknown)   SpO2 92%   BMI 46.18 kg/m²     Last documented pain score (0-10 scale): Pain Level: 0  Last Weight:   Wt Readings from Last 1 Encounters:   09/28/21 252 lb 8 oz (114.5 kg)     Mental Status:  oriented and alert    IV Access:  - None    Nursing Mobility/ADLs:  Walking   Assisted  Transfer  Assisted  Bathing  Assisted  Dressing  Assisted  Toileting  Assisted  Feeding  Independent  Med Admin  Assisted  Med Delivery   whole    Wound Care Documentation and Therapy:  Wound 01/05/16 1/5/16 NO OPEN WOUND  (Active)   Number of days: 2093       Wound (Active)   Number of days:        Wound 09/26/21 Ear Left SDTI behind left ear (airvo oxygen tubing) (Active)   Wound Etiology Other 09/29/21 0830   Dressing/Treatment Other (comment) 09/29/21 0830   Number of days: 3        Elimination:  Continence: Bowel: Yes  Bladder: Yes  Urinary Catheter: None   Colostomy/Ileostomy/Ileal Conduit: No       Date of Last BM: 9/29      Intake/Output Summary (Last 24 hours) at 9/29/2021 1118  Last data filed at 9/29/2021 1106  Gross per 24 hour   Intake 840 ml   Output 2900 ml   Net -2060 ml     I/O last 3 completed shifts: In: 56 [P.O.:600; I.V.:10]  Out: 2100 [Urine:2100]    Safety Concerns: At Risk for Falls    Impairments/Disabilities:      None    Nutrition Therapy:  Current Nutrition Therapy:   - Oral Diet:  General    Routes of Feeding: Oral  Liquids:  Thin Liquids  Daily Fluid Restriction: no  Last Modified Barium Swallow with Video (Video Swallowing Test): not done    Treatments at the Time of Hospital Discharge:   Respiratory Treatments: Airvo 70% at 35L  Oxygen Therapy:   Airvo 70% at 35L  Ventilator:    - Airvo 70% @35L    Rehab Therapies: Physical Therapy and Occupational Therapy  Weight Bearing Status/Restrictions: No weight bearing restirctions  Other Medical Equipment (for information only, NOT a DME order):  walker and hospital bed  Other Treatments:     Patient's personal belongings (please select all that are sent with patient):  Glasses    RN SIGNATURE:  Electronically signed by Rainer Jalloh RN on 9/29/21 at 11:20 AM EDT    CASE MANAGEMENT/SOCIAL WORK SECTION    Inpatient Status Date: ***    Readmission Risk Assessment Score:  Readmission Risk              Risk of Unplanned Readmission:  38           Discharging to Facility/ Agency   Name:   Address:  Phone:  Fax:    Dialysis Facility (if applicable)   Name:  Address:  Dialysis Schedule:  Phone:  Fax:    / signature: {Esignature:[id number]}    PHYSICIAN SECTION    Prognosis: Fair    Condition at Discharge: Stable    Rehab Potential (if transferring to Rehab): Fair    Recommended Labs or Other Treatments After Discharge: PT and OT. HR monitoring, she was switched to immediate release cardizem due to hypotension on extended release. Her previous home diuretics (demadex 20 mg daily and aldactone 25 mg daily) were held and then discontinued due to hypotension, monitor fluid status and may need resumption of diuretics     Physician Certification: I certify the above information and transfer of Sebastian Kaba  is necessary for the continuing treatment of the diagnosis listed and that she requires LTAC for less 30 days.      Update Admission H&P: No change in H&P    PHYSICIAN SIGNATURE:  Electronically signed by Ute Platt PA-C on 9/29/21 at 12:17 PM EDT

## 2021-10-06 ENCOUNTER — CARE COORDINATION (OUTPATIENT)
Dept: CARE COORDINATION | Age: 81
End: 2021-10-06

## 2021-10-06 NOTE — CARE COORDINATION
Chart reviewed. Plan to complete care coordination episode at this time and await CTN handoff for discharge plans.  Patient is currently at [address] [place] [address])

## 2021-10-18 ENCOUNTER — HOSPITAL ENCOUNTER (INPATIENT)
Age: 81
LOS: 11 days | Discharge: HOME HEALTH | DRG: 092 | End: 2021-10-29
Attending: PHYSICAL MEDICINE & REHABILITATION | Admitting: PHYSICAL MEDICINE & REHABILITATION
Payer: MEDICARE

## 2021-10-18 DIAGNOSIS — U07.1 COVID-19 VIRUS INFECTION: Primary | ICD-10-CM

## 2021-10-18 DIAGNOSIS — U07.1 COVID-19: ICD-10-CM

## 2021-10-18 DIAGNOSIS — J44.9 CHRONIC OBSTRUCTIVE PULMONARY DISEASE, UNSPECIFIED COPD TYPE (HCC): ICD-10-CM

## 2021-10-18 LAB
GLUCOSE BLD-MCNC: 122 MG/DL (ref 70–99)
GLUCOSE BLD-MCNC: 244 MG/DL (ref 70–99)
GLUCOSE BLD-MCNC: 307 MG/DL (ref 70–99)
PERFORMED ON: ABNORMAL

## 2021-10-18 PROCEDURE — 2700000000 HC OXYGEN THERAPY PER DAY

## 2021-10-18 PROCEDURE — 94640 AIRWAY INHALATION TREATMENT: CPT

## 2021-10-18 PROCEDURE — 1280000000 HC REHAB R&B

## 2021-10-18 PROCEDURE — 94761 N-INVAS EAR/PLS OXIMETRY MLT: CPT

## 2021-10-18 PROCEDURE — 6370000000 HC RX 637 (ALT 250 FOR IP): Performed by: PHYSICAL MEDICINE & REHABILITATION

## 2021-10-18 PROCEDURE — 6360000002 HC RX W HCPCS: Performed by: PHYSICAL MEDICINE & REHABILITATION

## 2021-10-18 RX ORDER — DILTIAZEM HYDROCHLORIDE 60 MG/1
60 TABLET, FILM COATED ORAL EVERY 6 HOURS SCHEDULED
Status: DISCONTINUED | OUTPATIENT
Start: 2021-10-18 | End: 2021-10-29 | Stop reason: HOSPADM

## 2021-10-18 RX ORDER — FUROSEMIDE 20 MG/1
20 TABLET ORAL DAILY
Status: DISCONTINUED | OUTPATIENT
Start: 2021-10-18 | End: 2021-10-29 | Stop reason: HOSPADM

## 2021-10-18 RX ORDER — ONDANSETRON 4 MG/1
8 TABLET, ORALLY DISINTEGRATING ORAL EVERY 8 HOURS PRN
Status: DISCONTINUED | OUTPATIENT
Start: 2021-10-18 | End: 2021-10-29 | Stop reason: HOSPADM

## 2021-10-18 RX ORDER — BUPROPION HYDROCHLORIDE 150 MG/1
150 TABLET, EXTENDED RELEASE ORAL DAILY
Status: DISCONTINUED | OUTPATIENT
Start: 2021-10-18 | End: 2021-10-29 | Stop reason: HOSPADM

## 2021-10-18 RX ORDER — DEXTROSE MONOHYDRATE 25 G/50ML
12.5 INJECTION, SOLUTION INTRAVENOUS PRN
Status: DISCONTINUED | OUTPATIENT
Start: 2021-10-18 | End: 2021-10-29 | Stop reason: HOSPADM

## 2021-10-18 RX ORDER — HYDRALAZINE HYDROCHLORIDE 25 MG/1
50 TABLET, FILM COATED ORAL EVERY 6 HOURS PRN
Status: DISCONTINUED | OUTPATIENT
Start: 2021-10-18 | End: 2021-10-29 | Stop reason: HOSPADM

## 2021-10-18 RX ORDER — NICOTINE POLACRILEX 4 MG
15 LOZENGE BUCCAL PRN
Status: DISCONTINUED | OUTPATIENT
Start: 2021-10-18 | End: 2021-10-29 | Stop reason: HOSPADM

## 2021-10-18 RX ORDER — TRAZODONE HYDROCHLORIDE 50 MG/1
50 TABLET ORAL NIGHTLY PRN
Status: DISCONTINUED | OUTPATIENT
Start: 2021-10-18 | End: 2021-10-29 | Stop reason: HOSPADM

## 2021-10-18 RX ORDER — BUDESONIDE 0.5 MG/2ML
0.5 INHALANT ORAL 2 TIMES DAILY
Status: DISCONTINUED | OUTPATIENT
Start: 2021-10-18 | End: 2021-10-29 | Stop reason: HOSPADM

## 2021-10-18 RX ORDER — BUSPIRONE HYDROCHLORIDE 5 MG/1
5 TABLET ORAL
Status: DISCONTINUED | OUTPATIENT
Start: 2021-10-19 | End: 2021-10-29 | Stop reason: HOSPADM

## 2021-10-18 RX ORDER — FAMOTIDINE 20 MG/1
20 TABLET, FILM COATED ORAL 2 TIMES DAILY
Status: DISCONTINUED | OUTPATIENT
Start: 2021-10-18 | End: 2021-10-29 | Stop reason: HOSPADM

## 2021-10-18 RX ORDER — ACETAMINOPHEN 325 MG/1
650 TABLET ORAL EVERY 4 HOURS PRN
Status: DISCONTINUED | OUTPATIENT
Start: 2021-10-18 | End: 2021-10-29 | Stop reason: HOSPADM

## 2021-10-18 RX ORDER — LANOLIN ALCOHOL/MO/W.PET/CERES
50 CREAM (GRAM) TOPICAL DAILY
Status: DISCONTINUED | OUTPATIENT
Start: 2021-10-18 | End: 2021-10-29 | Stop reason: HOSPADM

## 2021-10-18 RX ORDER — INSULIN GLARGINE 100 [IU]/ML
20 INJECTION, SOLUTION SUBCUTANEOUS NIGHTLY
Status: DISCONTINUED | OUTPATIENT
Start: 2021-10-18 | End: 2021-10-29 | Stop reason: HOSPADM

## 2021-10-18 RX ORDER — DEXTROSE MONOHYDRATE 50 MG/ML
100 INJECTION, SOLUTION INTRAVENOUS PRN
Status: DISCONTINUED | OUTPATIENT
Start: 2021-10-18 | End: 2021-10-29 | Stop reason: HOSPADM

## 2021-10-18 RX ORDER — DIGOXIN 125 MCG
200 TABLET ORAL DAILY
Status: DISCONTINUED | OUTPATIENT
Start: 2021-10-18 | End: 2021-10-29 | Stop reason: HOSPADM

## 2021-10-18 RX ORDER — WARFARIN SODIUM 5 MG/1
5 TABLET ORAL DAILY
Status: DISCONTINUED | OUTPATIENT
Start: 2021-10-18 | End: 2021-10-19

## 2021-10-18 RX ORDER — LORAZEPAM 0.5 MG/1
0.5 TABLET ORAL EVERY 4 HOURS PRN
Status: DISCONTINUED | OUTPATIENT
Start: 2021-10-18 | End: 2021-10-19

## 2021-10-18 RX ORDER — POLYETHYLENE GLYCOL 3350 17 G/17G
17 POWDER, FOR SOLUTION ORAL DAILY PRN
Status: DISCONTINUED | OUTPATIENT
Start: 2021-10-18 | End: 2021-10-29 | Stop reason: HOSPADM

## 2021-10-18 RX ORDER — PREDNISONE 20 MG/1
20 TABLET ORAL 2 TIMES DAILY
Status: DISCONTINUED | OUTPATIENT
Start: 2021-10-18 | End: 2021-10-20

## 2021-10-18 RX ADMIN — BUDESONIDE 500 MCG: 0.5 SUSPENSION RESPIRATORY (INHALATION) at 20:24

## 2021-10-18 RX ADMIN — INSULIN LISPRO 4 UNITS: 100 INJECTION, SOLUTION INTRAVENOUS; SUBCUTANEOUS at 21:50

## 2021-10-18 RX ADMIN — PREDNISONE 20 MG: 20 TABLET ORAL at 21:49

## 2021-10-18 RX ADMIN — LORAZEPAM 0.5 MG: 0.5 TABLET ORAL at 21:49

## 2021-10-18 RX ADMIN — BUDESONIDE 500 MCG: 0.5 SUSPENSION RESPIRATORY (INHALATION) at 11:27

## 2021-10-18 RX ADMIN — DILTIAZEM HYDROCHLORIDE 60 MG: 60 TABLET, FILM COATED ORAL at 17:51

## 2021-10-18 RX ADMIN — ACETAMINOPHEN 650 MG: 325 TABLET ORAL at 21:49

## 2021-10-18 RX ADMIN — BUPROPION HYDROCHLORIDE 150 MG: 150 TABLET, EXTENDED RELEASE ORAL at 11:21

## 2021-10-18 RX ADMIN — PYRIDOXINE HCL TAB 50 MG 50 MG: 50 TAB at 11:20

## 2021-10-18 RX ADMIN — LORAZEPAM 0.5 MG: 0.5 TABLET ORAL at 11:50

## 2021-10-18 RX ADMIN — TRAZODONE HYDROCHLORIDE 50 MG: 50 TABLET ORAL at 21:49

## 2021-10-18 RX ADMIN — DILTIAZEM HYDROCHLORIDE 60 MG: 60 TABLET, FILM COATED ORAL at 11:20

## 2021-10-18 RX ADMIN — INSULIN GLARGINE 20 UNITS: 100 INJECTION, SOLUTION SUBCUTANEOUS at 21:50

## 2021-10-18 RX ADMIN — FUROSEMIDE 20 MG: 20 TABLET ORAL at 11:20

## 2021-10-18 RX ADMIN — FAMOTIDINE 20 MG: 20 TABLET, FILM COATED ORAL at 21:49

## 2021-10-18 RX ADMIN — WARFARIN SODIUM 5 MG: 5 TABLET ORAL at 17:51

## 2021-10-18 RX ADMIN — DIGOXIN 187.5 MCG: 125 TABLET ORAL at 11:21

## 2021-10-18 RX ADMIN — INSULIN LISPRO 4 UNITS: 100 INJECTION, SOLUTION INTRAVENOUS; SUBCUTANEOUS at 17:51

## 2021-10-18 RX ADMIN — PREDNISONE 20 MG: 20 TABLET ORAL at 11:20

## 2021-10-18 RX ADMIN — FAMOTIDINE 20 MG: 20 TABLET, FILM COATED ORAL at 11:20

## 2021-10-18 ASSESSMENT — PAIN DESCRIPTION - PAIN TYPE
TYPE: CHRONIC PAIN
TYPE: CHRONIC PAIN

## 2021-10-18 ASSESSMENT — PAIN DESCRIPTION - ORIENTATION
ORIENTATION: MID;LOWER
ORIENTATION: MID;LOWER

## 2021-10-18 ASSESSMENT — PAIN SCALES - GENERAL
PAINLEVEL_OUTOF10: 8
PAINLEVEL_OUTOF10: 7
PAINLEVEL_OUTOF10: 6

## 2021-10-18 ASSESSMENT — PAIN DESCRIPTION - LOCATION
LOCATION: BACK
LOCATION: BACK;HIP

## 2021-10-18 NOTE — PLAN OF CARE
[facility] PATIENT TREATMENT PLAN  [address]  [facility], [address] [place]   [phone]) [address]    : 1940  [facility]  MRN: [id number]   PHYSICIAN:  Juanita Brooks MD  Primary Problem    Patient Active Problem List   Diagnosis    COPD exacerbation    Chronic respiratory failure with hypoxia ([address]    Anxiety    Hyperlipidemia    Diverticulosis    COPD, severe ([address]    Vitamin D deficiency    Atrial fibrillation with RVR ([address]    Other pulmonary embolism without acute cor pulmonale (HCC)    Venous (peripheral) insufficiency    Lymphedema    Chronic angle-closure glaucoma of both eyes    Arthritis of hips and knees    Shortness of breath    Diastolic dysfunction    Acute on chronic respiratory failure with hypoxia (HCC)    Bilateral edema of lower extremity    Angina pectoris (HCC)    Paroxysmal atrial fibrillation (HCC)    Erythrocytosis    IFG (impaired fasting glucose)    Lung mass    Mediastinal lymphadenopathy    Atrial fibrillation, chronic (HCC)    Pulmonary nodule    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)    Non-Hodgkin lymphoma ([address]    PVD (peripheral vascular disease) with claudication ([address]    Carotid stenosis    TIA (transient ischemic attack)    CHF (congestive heart failure) (HCC)    HTN (hypertension), benign    Dyslipidemia    History of diverticulitis    Elevated lactic acid level    Moderate episode of recurrent major depressive disorder (HCC)    Primary insomnia    Primary osteoarthritis of both knees    Acute pain of right knee    History of pulmonary embolism    Colovaginal fistula    Nausea    Polyp of colon    Chronic airway obstruction, not elsewhere classified    Morbid obesity (HCC)    Sleep apnea    Restless legs syndrome    Chronic back pain    Panic disorder    Acute hypoxemic respiratory failure due to COVID-19 (HCC)    COVID-19    Acute on chronic respiratory failure with hypoxia and hypercapnia (HCC)    Anticoagulated on Coumadin    Pneumonia due to COVID-19 virus    Acute hypoxemic respiratory failure ([facility]    COVID-19 virus infection       Rehabilitation Diagnosis:     COVID-19 virus infection [U07.1]       ADMIT DATE:10/18/2021    Patient Goals: \"To get more strength in my legs. \"    Admitting Impairments: Pt. Admitted s/p COPD and Covid PNA resulting in Decreased functional mobility ; Decreased high-level IADLs;Decreased ADL status; Decreased endurance;Decreased balance;Decreased safe awareness;Decreased strength  Barriers: weakness, endurance, medical comorbidities  Participation: Good     CARE PLAN     [address] [place] [address] while on this unit will:     [x] Be continent of bowel and bladder     [] Have an adequate number of bowel movements  [x] Urinate with no urinary retention >300ml in bladder  [] Complete bladder protocol with matias removal  [] Maintain O2 SATs at ___%  [x] Have pain managed while on ARU       [] Be pain free by discharge   [x] Have no skin breakdown while on ARU  [] Have improved skin integrity via wound measurements  [] Have no signs/symptoms of infection at the wound site  [x] Be free from injury during hospitalization   [] Complete education with patient/family with understanding demonstrated for:  [] Adjustment   [] Other:   Nursing interventions may include bowel/bladder training, education for medical assistive devices, medication education, O2 saturation management, energy conservation, stress management techniques, fall prevention, alarms protocol, seating and positioning, skin/wound care, pressure relief instruction,dressing changes,  infection protection, DVT prophylaxis, and/or assistance with in room safety with transfers to bed, toilet, wheelchair, shower as well as bathroom activities and hygiene.      Patient/caregiver education for:   [] Disease/sustained injury/management      [x] Medication Use   [] Surgical intervention   [x] Safety   [x] Body mechanics and or joint protection   [] Health maintenance         PHYSICAL THERAPY:  Goals:                  Short term goals  Time Frame for Short term goals: Patient demonstrates indep in bed mobility. Short term goal 1: 10/26/2021  Short term goal 2: Patient demonstrates 150 feet with SpO2 90% or greater on O2 via nc. with rollator  Short term goal 3: Patient demonstrates 5 minutes of standing dynamic balance activities to improve dynamic balance with gait with SPO2 90% or greater. GOAL met 10/21/2021Patient demonstrates 6 minutes of standing dynamic balance activities to improve dynamic balance with gait with SPO2 90% or greater. Short term goal 4: Patient demonstrates indep in core and DBE in sitting and standing. Short term goal 5: Patient demonstrates safe indep transfers bed<>chair, car transfer, with use of rollator . Long term goals  Time Frame for Long term goals : 11/2/2021  Long term goal 1: Patient demonstrates safe indep stoop to recover shoe from floor wtih use of rollator. .  Long term goal 2: Patiend demonstrates gait 150 feet ROSAS with use of rollator and spO2 90% or greater  Long term goal 3: Pt demo up and down 3-4 steps indep with use of cane and rail to safely enter and egress home. Long term goal 4: Pt indep in  hep LE therex to increase LE endurance/functional strength with walking. These goals were reviewed with this patient at the time of assessment and [address] [place] [address] is in agreement. Plan of Care: Pt to be seen 5 out of 7 days per week, 60  mins (exact) per day for 14 days (exact)                   Current Treatment Recommendations: Transfer Training, Strengthening, Balance Training, Gait Training, Stair training, Functional Mobility Training, Safety Education & Training      OCCUPATIONAL THERAPY:  Goals:             Assessment: Pt was supine in bed and agreeable for therapy. Pt req supervision for supine to EOB. Pt req SBA for amb with 4WW and CGA for toileting. Pt was educated on the use of AE for moon-care. Pt req set up and supervision for seated grooming task. Pt was educated on the use of sock aid and req CGA for donning socks and Rachell for aurelio shoes, pt req no assistace for doffing shoes. Pt req CGA for standing IALD task (hanging clothing). Pt req CGA for amb with 4WW from therapy gym to room. Pt was left in bed with bed alarm, call light, and phone. Cont OT POC. Short term goals  Time Frame for Short term goals: 10/22/21  Short term goal 1: Pt will complete toileting while using LRAD with SBA. Short term goal 2: Pt will complete TB bathing with set up and SBA. Short term goal 3: Pt will complete grooming task while standing for at least 3 minutes with SBA. Short term goal 4: Pt will complete total body dressing with LRAD and SBA. :  Long term goals  Time Frame for Long term goals : 10/27/21  Long term goal 1: Pt will complete toileting task with Ivana. Long term goal 2: Pt will complete tub/shower transfer with supervision and LRAD. Long term goal 3: Pt will complete TB bathing with supervision. Long term goal 4: Pt will complete IADL task with SBA.   Long term goal 5: Pt will complete TB dressing using LRAD and Ivana. :    These goals were reviewed with this patient at the time of assessment and [place] is in agreement    Plan of Care:  Pt to be seen 5 out of 7 days per week, 60   mins (exact) per day for 14 days (exact)  Current Treatment Recommendations: Strengthening, Safety Education & Training, Balance Training, Self-Care / ADL, Home Management Training, Equipment Evaluation, Education, & procurement, Endurance Training, Functional Mobility Training, Patient/Caregiver Education & Training      SPEECH THERAPY:   Goals:                         Short-term Goals  Timeframe for Short-term Goals: 10 days (10/28/21)  Goal 1: Pt will complete recall tasks with 90% acc given min cues for use of Transfer CARE Score: 4 Discharge Goal: Independent   Car Transfer CARE Score: 3 Discharge Goal: Independent   Walk 10 Feet   Discharge Goal: Independent   Walk 50 Feet, 2 Turns CARE Score: 4 Discharge Goal: Independent   Walk 150 Feet CARE Score: 88 Discharge Goal: Independent   Walk 10 Feet, Uneven Surface CARE Score: 4 Discharge Goal: Independent   1 Step (Curb) CARE Score: 3 Discharge Goal: Independent   4 Steps CARE Score: 88 Discharge Goal: Independent   12 Steps CARE Score: 9 Discharge Goal: Not Applicable    Object CARE Score: 4     Wheel 50 feet, 2 turns CARE Score: 3 Discharge Goal: Independent   Wheel 150 Feet CARE Score: 3 Discharge Goal: 64 Amber Winters will be seen a minimum of 3 hours of therapy per day, a minimum of 5 out of 7 days per week. [] In this rare instance due to the nature of this patient's medical involvement, this patient will be seen 15 hours per week (900 minutes within a 7 day period). Treatments may include therapeutic exercises, gait training, neuromuscular re-ed, transfer training, community reintegration, bed mobility, w/c mobility and training, self care, home mgmt, cognitive training, energy conservation,dysphagia tx, speech/language/communication therapy, group therapy, and patient/family education. In addition, dietician/nutritionist may monitor calorie count as well as intake and collaboratively work with SLP on dietary upgrades. Neuropsychology/Psychology may evaluate and provide necessary support.     Medical issues being managed closely and that require 24 hour availability of a physician:   [x] Swallowing Precautions  [x] Bowel/Bladder Fx  [] Weight bearing precautions   [] Wound Care    [x] Pain Mgmt   [x] Infection Protection   [x] DVT Prophylaxis   [x] Fall Precautions  [x] Fluid/Electrolyte/Nutrition Balance   [x] Voice Protection   [x] Respiratory  [] Other:    Medical Prognosis: [] Good  [x] Fair    [] Guarded   Total expected IRF days 14  Anticipated discharge destination:    [] Home Independently   [x] Home Modified Independent  [] Home with supervision    []SNF     [] Other                                           Physician anticipated functional outcomes:  Home w/ assist as needed and home health services. IPOC brief synthesis: 68year old female admitted to ARU to address strengthening, endurance, balance, gait, ADLs, assistive/adaptive device needs, patient/family training, speech, language, cognition. This plan has been reviewed with [address] [place] [address] on 10/19  in a language the patient understands. [address] [place] [address] has had the opportunity to include input with the therapy team.      I have reviewed this initial plan of care and agree with its contents:    Title   Name    Date    Time    Physician: Raisa Mireles. Eliza Correa MD 10/21/2021, 9:45 AM     Case Mgmt:BOB Moreira    OT: [facility] OTR/L    PT:zAeb Mcnair Staff [id number]    RN: Karma Marroquin RN    ST: Ba Arias MA [place] [id number] SLP     Program Esther Espitia OTR/L    Other:

## 2021-10-18 NOTE — CARE COORDINATION
Social Work Admission Assessment    Objective: Met with patient to complete initial assessment & review role of  in rehab process. Patient oriented to unit, patient states understanding of this. Order from Dr. Gaviota Kebede for \"discharge planning\". Family present: NA    Support Person/contact:  DaughterNicholas date: 10/18/21    Admitting Dx: COVID pneumonia, COPD    Insurance: medicare    Current Home Situation: home alone    DME at home: rollator, home oxygen    Services prior to admission: NA    Patient's goal(s) to return to work/volunteer: return home    Accessibility to community resources/transportation:  car    Has patient experienced a recent loss or significant life event that would impact their care or ability to participate? Yes___ No_x__ If yes, please explain:     Has patient even been treated for emotional disorders? Yes___ No_x__ If yes, how does that affect current situation? How does patient/family cope with stressful events & this hospitalization? Family, friends    Barriers to Discharge: medical complicaitons    Discharge Plan: home with home care    Summary: Referred to patient for d/c planning. Spoke to patient. Patient is a 68year old female admitted s/p COVID pneumonia. Patient usually lives at home alone. Patient lives in a one story home. Patient has a rollator at home and home oxygen through [place]. Patient has had [address] in past.   Explained the role of SW and team conference. Will continue to follow for d/c needs.   Electronically signed by KRISTY Riley LISW-S on 10/18/2021 at 12:03 PM

## 2021-10-18 NOTE — H&P
Patient: Marco A Turcios  [id number]  Date: 10/18/2021      Chief Complaint: covid    History of Present Illness/Hospital Course:  Ms Kemar Silva is a 68year old female admitted from [facility] where she was recovering from covid pneumonia in the context of SHANNON, COPD, with baseline 4L oxygen requirement. She completed remdesevir and tocilizumab and was admitted to [facility] for respiratory management. Her pulmonary status has improved and she is admitted to ARU for further rehabilitation. Prior Level of Function:  Mod I     Current Level of Function:  Ubaldo MERCEDES     has a past medical history of Abnormal chest CT, Abnormal CXR, Acute exacerbation of chronic obstructive pulmonary disease (COPD) ([address] Acute on chronic respiratory failure (HCC), Acute on chronic respiratory failure with hypoxia ([address] Anticoagulant long-term use, Atrial fibrillation ([address] Atrial fibrillation with rapid ventricular response ([address] B-cell lymphoma ([address] CAP (community acquired pneumonia), Carotid artery stenosis, CHF (congestive heart failure) ([address] Chronic back pain, Congenital heart disease, COVID-19, Depression, Diverticulitis, Diverticulitis of colon, Dizziness, Fistula, Gram-negative pneumonia ([address] HCAP (healthcare-associated pneumonia), Hx of blood clots, Hyperlipidemia, Hypertension, IFG (impaired fasting glucose), Infection due to Stenotrophomonas maltophilia, Non-Hodgkins lymphoma ([address] Obesity, Panic disorder, Peripheral vascular disease ([address] Primary osteoarthritis of both knees, Pulmonary embolism ([address] Rectal vaginal fistula, Restless legs syndrome, [facility] spotted fever, Sciatic nerve pain, Sepsis ([address] Sleep apnea, Swelling of limb, Tobacco abuse, Urinary tract infection in female, and Vitamin D deficiency.      has a past surgical history that includes Appendectomy (1963); partial hysterectomy (cervix not removed) (1980); hernia repair; bronchoscopy (N/A, 8/12/2019); bronchoscopy (8/12/2019); bronchoscopy (8/12/2019); bronchoscopy (8/12/2019); bronchoscopy (8/12/2019); bronchoscopy (8/12/2019); Tunneled venous port placement (Left, 01/17/2020); [place] [facility] (N/A, 1/17/2020); Cardiac catheterization (2003); Colonoscopy (N/A, 9/25/2019); and Tonsillectomy (1965). reports that she quit smoking about 6 years ago. Her smoking use included cigarettes. She has a 16.50 pack-year smoking history. She has never used smokeless tobacco. She reports previous alcohol use. She reports that she does not use drugs. family history includes Asthma in her grandchild and son; Cancer (age of onset: 43) in her daughter and mother; Diabetes in her sister; Hypertension in her son and son.     Current Facility-Administered Medications   Medication Dose Route Frequency Provider Last Rate Last Admin    budesonide (PULMICORT) nebulizer suspension 500 mcg  0.5 mg Nebulization BID Tomasz Weeks MD        buPROPion [facility]) extended release tablet 150 mg  150 mg Oral Daily Tomasz Weeks MD        [START ON 10/19/2021] busPIRone (BUSPAR) tablet 5 mg  5 mg Oral QAM AC Tomasz Weeks MD        digoxin (LANOXIN) tablet 187.5 mcg  187.5 mcg Oral Daily Tomasz Weeks MD        dilTIAZem (CARDIZEM) tablet 60 mg  60 mg Oral 4 times per day Tomasz Weeks MD        famotidine (PEPCID) tablet 20 mg  20 mg Oral BID Tomasz Weeks MD        furosemide (LASIX) tablet 20 mg  20 mg Oral Daily Tomasz Weeks MD        insulin glargine (LANTUS) injection vial 20 Units  20 Units SubCUTAneous Nightly Tomasz Weeks MD        insulin lispro (HUMALOG) injection vial 0-12 Units  0-12 Units SubCUTAneous TID WC Tomasz Weeks MD        insulin lispro (HUMALOG) injection vial 0-6 Units  0-6 Units SubCUTAneous Nightly Tomasz Weeks MD        predniSONE (DELTASONE) tablet 20 mg  20 mg Oral BID Tomasz Weeks MD        vitamin B-6 (PYRIDOXINE) tablet 50 mg  50 mg Oral Daily Tomasz Weeks MD       [facility] warfarin (COUMADIN) tablet 2.5 mg  2.5 mg Oral Daily Tomasz Weeks MD        ondansetron (ZOFRAN-ODT) disintegrating tablet 8 mg  8 mg Oral Q8H PRN Tomasz Weeks MD        traZODone (DESYREL) tablet 50 mg  50 mg Oral Nightly PRN Tomasz Weeks MD        hydrALAZINE (APRESOLINE) tablet 50 mg  50 mg Oral Q6H PRN Tomasz Weeks MD        acetaminophen (TYLENOL) tablet 650 mg  650 mg Oral Q4H PRN Tomasz Weeks MD        polyethylene glycol (GLYCOLAX) packet 17 g  17 g Oral Daily PRN Tomasz Weeks MD        glucose (GLUTOSE) 40 % oral gel 15 g  15 g Oral PRN Tomasz Weeks MD        dextrose 50 % IV solution  12.5 g IntraVENous PRN Tomasz Weeks MD        glucagon (rDNA) injection 1 mg  1 mg IntraMUSCular PRN Tomasz Weeks MD        dextrose 5 % solution  100 mL/hr IntraVENous PRN Tomasz Weeks MD           Allergies   Allergen Reactions    Crestor [Rosuvastatin]      Muscle aches    Levofloxacin Other (See Comments)     Achiness    Lipitor      Muscle aches    Rocephin In Dextrose [Ceftriaxone Sodium In Dextrose]      Rash,itching    Zocor [Simvastatin]      Muscle aches    Codeine Itching and Nausea And Vomiting    Pravastatin Nausea And Vomiting       Current Facility-Administered Medications   Medication Dose Route Frequency Provider Last Rate Last Admin    budesonide (PULMICORT) nebulizer suspension 500 mcg  0.5 mg Nebulization BID Tomasz Weeks MD        [facility] extended release tablet 150 mg  150 mg Oral Daily Tomasz Weeks MD        [START ON 10/19/2021] busPIRone (BUSPAR) tablet 5 mg  5 mg Oral QAM AC Tomasz Weeks MD        digoxin (LANOXIN) tablet 187.5 mcg  187.5 mcg Oral Daily Tomasz Weeks MD        dilTIAZem (CARDIZEM) tablet 60 mg  60 mg Oral 4 times per day Tomasz Weeks MD        famotidine (PEPCID) tablet 20 mg  20 mg Oral BID Tomasz Weeks MD        furosemide (LASIX) tablet 20 mg  20 mg Oral Daily Laney Hernandes MD        insulin glargine (LANTUS) injection vial 20 Units  20 Units SubCUTAneous Nightly Sye December, MD        insulin lispro (HUMALOG) injection vial 0-12 Units  0-12 Units SubCUTAneous TID WC Laney December, MD        insulin lispro (HUMALOG) injection vial 0-6 Units  0-6 Units SubCUTAneous Nightly Sye December, MD        predniSONE (DELTASONE) tablet 20 mg  20 mg Oral BID Alicelee December, MD        vitamin B-6 (PYRIDOXINE) tablet 50 mg  50 mg Oral Daily Curlee December, MD        warfarin (COUMADIN) tablet 2.5 mg  2.5 mg Oral Daily Alicelee December, MD        ondansetron (ZOFRAN-ODT) disintegrating tablet 8 mg  8 mg Oral Q8H PRN Alicelee December, MD        traZODone (DESYREL) tablet 50 mg  50 mg Oral Nightly PRN Sye December, MD        hydrALAZINE (APRESOLINE) tablet 50 mg  50 mg Oral Q6H PRN Alicelee December, MD        acetaminophen (TYLENOL) tablet 650 mg  650 mg Oral Q4H PRN Alicelee December, MD        polyethylene glycol (GLYCOLAX) packet 17 g  17 g Oral Daily PRN Laney December, MD        glucose (GLUTOSE) 40 % oral gel 15 g  15 g Oral PRN Laney December, MD        dextrose 50 % IV solution  12.5 g IntraVENous PRN Laney December, MD        glucagon (rDNA) injection 1 mg  1 mg IntraMUSCular PRN Laney December, MD        dextrose 5 % solution  100 mL/hr IntraVENous PRN Laney Hernandes MD             REVIEW OF SYSTEMS:   CONSTITUTIONAL: negative for fevers, chills, diaphoresis, activity change, appetite change, fatigue, night sweats and unexpected weight change. EYES: negative for blurred vision, eye discharge, visual disturbance and icterus. HEENT: negative for hearing loss, tinnitus, ear drainage, sinus pressure, nasal congestion, epistaxis and snoring. RESPIRATORY: Negative for hemoptysis, cough, sputum production. CARDIOVASCULAR: negative for chest pain, palpitations, exertional chest pressure/discomfort, edema, syncope. GASTROINTESTINAL: negative for nausea, vomiting, diarrhea, constipation, blood in stool and abdominal pain. GENITOURINARY: negative for frequency, dysuria, urinary incontinence, decreased urine volume, and hematuria. HEMATOLOGIC/LYMPHATIC: negative for easy bruising, bleeding and lymphadenopathy. ALLERGIC/IMMUNOLOGIC: negative for recurrent infections, angioedema, anaphylaxis and drug reactions. ENDOCRINE: negative for weight changes and diabetic symptoms including polyuria, polydipsia and polyphagia. MUSCULOSKELETAL: negative for pain, joint swelling, decreased range of motion and muscle weakness. NEUROLOGICAL: negative for headaches, slurred speech, unilateral weakness. PSYCHIATRIC/BEHAVIORAL: negative for hallucinations, behavioral problems, confusion and agitation. All pertinent positives are noted in the HPI. Physical Examination:  Vitals:   Patient Vitals for the past 24 hrs:   BP Temp Temp src Pulse Resp SpO2   10/18/21 1000 128/88 98.1 °F (36.7 °C) Oral 118 18 95 %     Psych: Stable mood, normal judgement, normal affect   Const: No distress  Eyes: Conjunctiva noninjected, no icterus noted; pupils equal, round, and reactive to light. HENT: Atraumatic, normocephalic; Oral mucosa moist  Neck: Trachea midline, neck supple. No thyromegaly noted. CV: Regular rate and rhythm, no murmur rub or gallop noted  Resp: Lungs clear to auscultation bilaterally, no rales wheezes or ronchi, no retractions. Respirations unlabored. GI: Soft, nontender, nondistended. Normal bowel sounds. No palpable masses. Neuro: Alert, oriented, appropriate. No cranial nerve deficits appreciated. Sensation intact to light touch. Motor examination reveals nonfocal weakness  Skin: Normal temperature and turgor  MSK: No joint abnormalities noted. Ext: No significant edema appreciated. No varicosities.     Lab Results   Component Value Date    WBC 8.3 09/29/2021    HGB 10.2 (L) 09/29/2021    HCT 33.0 (L) 09/29/2021 MCV 80.5 09/29/2021     09/29/2021     Lab Results   Component Value Date    INR 1.87 (H) 09/29/2021    INR 2.23 (H) 09/28/2021    INR 3.40 (H) 09/27/2021    PROTIME 21.7 (H) 09/29/2021    PROTIME 26.1 (H) 09/28/2021    PROTIME 40.4 (H) 09/27/2021     Lab Results   Component Value Date    CREATININE 0.8 09/29/2021    BUN 23 (H) 09/29/2021     09/29/2021    K 3.9 09/29/2021    CL 97 (L) 09/29/2021    CO2 36 (H) 09/29/2021     Lab Results   Component Value Date    ALT 54 (H) 09/23/2021    AST 25 09/23/2021    ALKPHOS 81 09/23/2021    BILITOT 0.4 09/23/2021     Impression   Dense right upper lobe consolidation with mild volume loss.  Findings are   suggestive of a bacterial pneumonia, possibly postobstructive.       Patchy perihilar opacification with some interval improvement.  Left basilar   atelectasis. The above laboratory data have been reviewed. The above imaging data have been reviewed. The above medical testing have been reviewed. There is no height or weight on file to calculate BMI. Barriers to Discharge: weakness, endurance, medical comorbidities    POST ADMISSION PHYSICIAN EVALUATION  The patient has agreed to being admitted to our comprehensive inpatient  rehabilitation facility consisting of at least 180 minutes of therapy a day,  5 out of 7 days a week. The patient/family has a good understanding of our discharge process. The  patient has potential to make improvement and is in need of at least two of  the following multidisciplinary therapies including but not limited to  physical, occupational, respiratory, and speech, nutritional services, wound care, and prosthetics and orthotics. Given the patients complex condition  and risk of further medical complications, rehabilitation services cannot be  safely provided at a lower level of care such as a skilled nursing facility.   I have compared the patients medical and functional status at the time of the  preadmission screening and the same on this date, and there are no significant changes. By signing this document, I acknowledge that I have personally performed a  full physical examination on this patient within 24 hours of admission to  this inpatient rehabilitation facility and have determined the patient to be  able to tolerate the above course of treatment at an intensive level for a  reasonable period of time. I will be completing a detailed individualized  Plan of Care for this patient by day four of the patients stay based upon the  Preadmission Screen, this Post-Admission Evaluation, and the therapy  evaluations. Rehabilitation Diagnosis:  Neurologic, 3.8, Neuromuscular Disorders, e.g. Critical Illness Myopathy, Other Myopathy    Assessment and Plan:  CIM  - PT/OT, dietary support    COVID pneumonia  - completed isolation  - supportive care    COPD  - nebs, supplemental oxygen  - 4L at baseline  - slowly wean steroids      Mood disorder  - buspar, wellbutrin    Bowels: Schedule stool softener. Follow bowel movements. Enema or suppository if needed. Bladder: Check PVR x 3. [address] if PVR > 200ml or if any volume is > 500 ml. Sleep: Trazodone provided prn. Follow up appointments: PCP    Timothy Whitt.  Jaziel Rondon MD 10/18/2021, 10:43 AM

## 2021-10-18 NOTE — PLAN OF CARE
Call light and phone within reach  Siderails up 2/4  Bed in lowest position, brakes on  Non-skid socks on  Instructed to use call light for assistance, verbalizes an understanding Jasmine Bhatt/ Binta Dorado

## 2021-10-19 LAB
A/G RATIO: 1.2 (ref 1.1–2.2)
ALBUMIN SERPL-MCNC: 3.2 G/DL (ref 3.4–5)
ALP BLD-CCNC: 82 U/L (ref 40–129)
ALT SERPL-CCNC: 21 U/L (ref 10–40)
ANION GAP SERPL CALCULATED.3IONS-SCNC: 6 MMOL/L (ref 3–16)
AST SERPL-CCNC: 16 U/L (ref 15–37)
BILIRUB SERPL-MCNC: <0.2 MG/DL (ref 0–1)
BUN BLDV-MCNC: 14 MG/DL (ref 7–20)
CALCIUM SERPL-MCNC: 8.9 MG/DL (ref 8.3–10.6)
CHLORIDE BLD-SCNC: 98 MMOL/L (ref 99–110)
CO2: 38 MMOL/L (ref 21–32)
CREAT SERPL-MCNC: 0.6 MG/DL (ref 0.6–1.2)
GFR AFRICAN AMERICAN: >60
GFR NON-AFRICAN AMERICAN: >60
GLOBULIN: 2.6 G/DL
GLUCOSE BLD-MCNC: 134 MG/DL (ref 70–99)
GLUCOSE BLD-MCNC: 155 MG/DL (ref 70–99)
GLUCOSE BLD-MCNC: 175 MG/DL (ref 70–99)
GLUCOSE BLD-MCNC: 180 MG/DL (ref 70–99)
GLUCOSE BLD-MCNC: 182 MG/DL (ref 70–99)
HCT VFR BLD CALC: 30.4 % (ref 36–48)
HEMOGLOBIN: 9.6 G/DL (ref 12–16)
INR BLD: 2.51 (ref 0.88–1.12)
MCH RBC QN AUTO: 24.4 PG (ref 26–34)
MCHC RBC AUTO-ENTMCNC: 31.5 G/DL (ref 31–36)
MCV RBC AUTO: 77.4 FL (ref 80–100)
PDW BLD-RTO: 18.9 % (ref 12.4–15.4)
PERFORMED ON: ABNORMAL
PLATELET # BLD: 385 K/UL (ref 135–450)
PMV BLD AUTO: 6.3 FL (ref 5–10.5)
POTASSIUM REFLEX MAGNESIUM: 4.5 MMOL/L (ref 3.5–5.1)
PROTHROMBIN TIME: 29.5 SEC (ref 9.9–12.7)
RBC # BLD: 3.93 M/UL (ref 4–5.2)
SODIUM BLD-SCNC: 142 MMOL/L (ref 136–145)
TOTAL PROTEIN: 5.8 G/DL (ref 6.4–8.2)
WBC # BLD: 7.8 K/UL (ref 4–11)

## 2021-10-19 PROCEDURE — 6370000000 HC RX 637 (ALT 250 FOR IP): Performed by: PHYSICAL MEDICINE & REHABILITATION

## 2021-10-19 PROCEDURE — 36415 COLL VENOUS BLD VENIPUNCTURE: CPT

## 2021-10-19 PROCEDURE — 94640 AIRWAY INHALATION TREATMENT: CPT

## 2021-10-19 PROCEDURE — 85610 PROTHROMBIN TIME: CPT

## 2021-10-19 PROCEDURE — 1280000000 HC REHAB R&B

## 2021-10-19 PROCEDURE — 92526 ORAL FUNCTION THERAPY: CPT

## 2021-10-19 PROCEDURE — 97116 GAIT TRAINING THERAPY: CPT

## 2021-10-19 PROCEDURE — 92523 SPEECH SOUND LANG COMPREHEN: CPT

## 2021-10-19 PROCEDURE — 94761 N-INVAS EAR/PLS OXIMETRY MLT: CPT

## 2021-10-19 PROCEDURE — 97163 PT EVAL HIGH COMPLEX 45 MIN: CPT

## 2021-10-19 PROCEDURE — 97165 OT EVAL LOW COMPLEX 30 MIN: CPT

## 2021-10-19 PROCEDURE — 85027 COMPLETE CBC AUTOMATED: CPT

## 2021-10-19 PROCEDURE — 2700000000 HC OXYGEN THERAPY PER DAY

## 2021-10-19 PROCEDURE — 97535 SELF CARE MNGMENT TRAINING: CPT

## 2021-10-19 PROCEDURE — 6360000002 HC RX W HCPCS: Performed by: PHYSICAL MEDICINE & REHABILITATION

## 2021-10-19 PROCEDURE — 92610 EVALUATE SWALLOWING FUNCTION: CPT

## 2021-10-19 PROCEDURE — 80053 COMPREHEN METABOLIC PANEL: CPT

## 2021-10-19 PROCEDURE — 97110 THERAPEUTIC EXERCISES: CPT

## 2021-10-19 PROCEDURE — 97530 THERAPEUTIC ACTIVITIES: CPT

## 2021-10-19 RX ORDER — LORAZEPAM 1 MG/1
1 TABLET ORAL EVERY 4 HOURS PRN
Status: DISCONTINUED | OUTPATIENT
Start: 2021-10-19 | End: 2021-10-29 | Stop reason: HOSPADM

## 2021-10-19 RX ORDER — TRAMADOL HYDROCHLORIDE 50 MG/1
50 TABLET ORAL EVERY 4 HOURS PRN
Status: DISCONTINUED | OUTPATIENT
Start: 2021-10-19 | End: 2021-10-29 | Stop reason: HOSPADM

## 2021-10-19 RX ADMIN — INSULIN LISPRO 1 UNITS: 100 INJECTION, SOLUTION INTRAVENOUS; SUBCUTANEOUS at 20:32

## 2021-10-19 RX ADMIN — ACETAMINOPHEN 650 MG: 325 TABLET ORAL at 08:26

## 2021-10-19 RX ADMIN — PREDNISONE 20 MG: 20 TABLET ORAL at 20:30

## 2021-10-19 RX ADMIN — TRAMADOL HYDROCHLORIDE 50 MG: 50 TABLET ORAL at 20:30

## 2021-10-19 RX ADMIN — BUPROPION HYDROCHLORIDE 150 MG: 150 TABLET, EXTENDED RELEASE ORAL at 08:25

## 2021-10-19 RX ADMIN — TRAZODONE HYDROCHLORIDE 50 MG: 50 TABLET ORAL at 20:30

## 2021-10-19 RX ADMIN — LORAZEPAM 1 MG: 1 TABLET ORAL at 17:24

## 2021-10-19 RX ADMIN — INSULIN LISPRO 2 UNITS: 100 INJECTION, SOLUTION INTRAVENOUS; SUBCUTANEOUS at 17:25

## 2021-10-19 RX ADMIN — DILTIAZEM HYDROCHLORIDE 60 MG: 60 TABLET, FILM COATED ORAL at 00:58

## 2021-10-19 RX ADMIN — INSULIN GLARGINE 20 UNITS: 100 INJECTION, SOLUTION SUBCUTANEOUS at 20:32

## 2021-10-19 RX ADMIN — DIGOXIN 187.5 MCG: 125 TABLET ORAL at 08:24

## 2021-10-19 RX ADMIN — FUROSEMIDE 20 MG: 20 TABLET ORAL at 08:25

## 2021-10-19 RX ADMIN — FAMOTIDINE 20 MG: 20 TABLET, FILM COATED ORAL at 08:25

## 2021-10-19 RX ADMIN — FAMOTIDINE 20 MG: 20 TABLET, FILM COATED ORAL at 20:30

## 2021-10-19 RX ADMIN — BUDESONIDE 500 MCG: 0.5 SUSPENSION RESPIRATORY (INHALATION) at 20:02

## 2021-10-19 RX ADMIN — BUDESONIDE 500 MCG: 0.5 SUSPENSION RESPIRATORY (INHALATION) at 07:46

## 2021-10-19 RX ADMIN — BUSPIRONE HYDROCHLORIDE 5 MG: 5 TABLET ORAL at 06:01

## 2021-10-19 RX ADMIN — PYRIDOXINE HCL TAB 50 MG 50 MG: 50 TAB at 08:26

## 2021-10-19 RX ADMIN — PREDNISONE 20 MG: 20 TABLET ORAL at 08:26

## 2021-10-19 RX ADMIN — DILTIAZEM HYDROCHLORIDE 60 MG: 60 TABLET, FILM COATED ORAL at 17:24

## 2021-10-19 RX ADMIN — SALINE NASAL SPRAY 1 SPRAY: 1.5 SOLUTION NASAL at 20:30

## 2021-10-19 RX ADMIN — DILTIAZEM HYDROCHLORIDE 60 MG: 60 TABLET, FILM COATED ORAL at 12:27

## 2021-10-19 RX ADMIN — INSULIN LISPRO 2 UNITS: 100 INJECTION, SOLUTION INTRAVENOUS; SUBCUTANEOUS at 06:01

## 2021-10-19 ASSESSMENT — PAIN DESCRIPTION - ORIENTATION
ORIENTATION: LOWER
ORIENTATION: POSTERIOR;LEFT

## 2021-10-19 ASSESSMENT — PAIN DESCRIPTION - LOCATION
LOCATION: BACK
LOCATION: BACK;HIP
LOCATION: BACK;HIP
LOCATION: HIP;BACK
LOCATION: BACK;HIP

## 2021-10-19 ASSESSMENT — PAIN DESCRIPTION - FREQUENCY: FREQUENCY: INTERMITTENT

## 2021-10-19 ASSESSMENT — PAIN SCALES - GENERAL
PAINLEVEL_OUTOF10: 6
PAINLEVEL_OUTOF10: 5
PAINLEVEL_OUTOF10: 6
PAINLEVEL_OUTOF10: 5
PAINLEVEL_OUTOF10: 6
PAINLEVEL_OUTOF10: 4
PAINLEVEL_OUTOF10: 8
PAINLEVEL_OUTOF10: 6
PAINLEVEL_OUTOF10: 5

## 2021-10-19 ASSESSMENT — PAIN DESCRIPTION - ONSET: ONSET: ON-GOING

## 2021-10-19 ASSESSMENT — PAIN DESCRIPTION - PAIN TYPE
TYPE: CHRONIC PAIN
TYPE: CHRONIC PAIN

## 2021-10-19 ASSESSMENT — PAIN DESCRIPTION - DESCRIPTORS
DESCRIPTORS: ACHING
DESCRIPTORS: ACHING

## 2021-10-19 ASSESSMENT — PAIN - FUNCTIONAL ASSESSMENT
PAIN_FUNCTIONAL_ASSESSMENT: PREVENTS OR INTERFERES SOME ACTIVE ACTIVITIES AND ADLS
PAIN_FUNCTIONAL_ASSESSMENT: ACTIVITIES ARE NOT PREVENTED

## 2021-10-19 ASSESSMENT — PAIN DESCRIPTION - PROGRESSION: CLINICAL_PROGRESSION: NOT CHANGED

## 2021-10-19 NOTE — PROGRESS NOTES
[address] [place] [address]  10/19/2021  [id number]    Chief Complaint: COVID-19 virus infection    Subjective:   Blood sugars somewhat labile; no new c/o this AM.     ROS: no n/v, cp, f/c  Objective:  Patient Vitals for the past 24 hrs:   BP Temp Temp src Pulse Resp SpO2 Height Weight   10/19/21 0545 108/71   51       10/19/21 0045 123/71          10/18/21 2130 122/62 (!) 68 °F (20 °C) Oral 91 20 97 %     10/18/21 2027      95 %     10/18/21 1319       5' 2\" (1.575 m) 256 lb (116.1 kg)   10/18/21 1131      97 %     10/18/21 1126    94       10/18/21 1045 117/63 98.2 °F (36.8 °C) Oral 94 18 99 %     10/18/21 1000 128/88 98.1 °F (36.7 °C) Oral 118 18 95 %       Gen: No distress, pleasant. HEENT: Normocephalic, atraumatic. CV: Regular rate and rhythm. Resp: No respiratory distress. Abd: Soft, nontender   Ext: No edema. Neuro: Alert, oriented, appropriately interactive. Wt Readings from Last 3 Encounters:   10/18/21 256 lb (116.1 kg)   09/28/21 252 lb 8 oz (114.5 kg)   06/02/21 273 lb (123.8 kg)       Laboratory data:   Lab Results   Component Value Date    WBC 8.3 09/29/2021    HGB 10.2 (L) 09/29/2021    HCT 33.0 (L) 09/29/2021    MCV 80.5 09/29/2021     09/29/2021       Lab Results   Component Value Date     09/29/2021    K 3.9 09/29/2021    K 4.2 09/23/2021    CL 97 09/29/2021    CO2 36 09/29/2021    BUN 23 09/29/2021    CREATININE 0.8 09/29/2021    GLUCOSE 95 09/29/2021    CALCIUM 7.9 09/29/2021        Therapy progress:  PT     Objective           OT        Assessment        SLP                Body mass index is 46.82 kg/m².     Rehabilitation Diagnosis:  Neurologic, 3.8, Neuromuscular Disorders, e.g. Critical Illness Myopathy, Other Myopathy     Assessment and Plan:  CIM  - PT/OT, dietary support     COVID pneumonia  - completed isolation  - supportive care     COPD  - nebs, supplemental oxygen  - 4L at baseline  - slowly wean steroids       Mood disorder  - buspar, wellbutrin    DM  - follow blood sugars as steroids wean     Bowels: Schedule stool softener. Follow bowel movements. Enema or suppository if needed.      Bladder: Check PVR x 3. [facility] if PVR > 200ml or if any volume is > 500 ml.      Sleep: Trazodone provided prn.      Follow up appointments: PCP    Citlalli Bowman MD 10/19/2021, 6:20 AM

## 2021-10-19 NOTE — PROGRESS NOTES
Physical Therapy    Facility/Department: [facility]  Initial Assessment    NAME: Lesley Nails  : 1940  MRN: [id number]    Date of Service: 10/19/2021    Discharge Recommendations: prn assist.       PT Equipment Recommendations  Equipment Needed: No  Other: has rollator at home and cane    Assessment   Assessment: Per 10/19/2021 H&P per Dr. Friedman Viviana is a 68year old female admitted from [address] [place] [address] where she was recovering from covid pneumonia in the context of SHANNON, COPD, with baseline 4L oxygen requirement. She completed remdesevir and tocilizumab and was admitted to [facility] for respiratory management. Her pulmonary status has improved and she is admitted to ARU for further rehabilitation. \"  Patient deonstrates impaired functional strength and endurance  with walking, transfers, bed mobility appropriate for skilled therapy to address these and other noted concerns to progress to PLOF Indep in  home mobility with use of rollator walker. TOday patient is limited to 61 feet of walking due to  O2 desaturation and severe dyspnea. Paient takes extra time with all mobility tasks due to need for frequent seated rest breaks due to dyspnea with increased RR and tachycardia with irregular heartbeat noted. Treatment Diagnosis: Difficulty walking, impaired endurance and safety. Prognosis: Good  Decision Making: High Complexity  PT Education: Goals;PT Role;Plan of Care;Transfer Training;Gait Training;Disease Specific Education  Patient Education: Patien educated in pacing self with gait to manage sob, PLB, use of rollator brakes for transfers and LE therex with patient needing cues to complete. Needs frequent reminders for positioning of walker and use of walker brakes for safety.   Barriers to Learning: stm, decreased safety awareness  REQUIRES PT FOLLOW UP: Yes  Activity Tolerance  Activity Tolerance: Patient limited by fatigue;Patient limited by endurance  Activity Tolerance: pt noted to have tachycardia with irregular heartbeat (manual) and  dyspnea with increased RR to 40/minute from 16/minute with transient hypoxia with rebound with seated rest  within minute(s) to 90's% on 4L via nc. Fatigue and Left knee pain limits toelrance for steps. Pt slow with all activities due to sob. Patient Diagnosis(es): There were no encounter diagnoses. has a past medical history of Abnormal chest CT, Abnormal CXR, Acute exacerbation of chronic obstructive pulmonary disease (COPD) ([address] Acute on chronic respiratory failure (HCC), Acute on chronic respiratory failure with hypoxia (HCC), Anticoagulant long-term use, Atrial fibrillation ([address] Atrial fibrillation with rapid ventricular response ([address] B-cell lymphoma ([address] CAP (community acquired pneumonia), Carotid artery stenosis, CHF (congestive heart failure) ([address] Chronic back pain, Congenital heart disease, COVID-19, Depression, Diverticulitis, Diverticulitis of colon, Dizziness, Fistula, Gram-negative pneumonia ([address] HCAP (healthcare-associated pneumonia), Hx of blood clots, Hyperlipidemia, Hypertension, IFG (impaired fasting glucose), Infection due to Stenotrophomonas maltophilia, Non-Hodgkins lymphoma ([address] Obesity, Panic disorder, Peripheral vascular disease ([address] Primary osteoarthritis of both knees, Pulmonary embolism ([address] Rectal vaginal fistula, Restless legs syndrome, [facility] spotted fever, Sciatic nerve pain, Sepsis ([address] Sleep apnea, Swelling of limb, Tobacco abuse, Urinary tract infection in female, and Vitamin D deficiency. has a past surgical history that includes Appendectomy (1963); partial hysterectomy (cervix not removed) (1980); hernia repair; bronchoscopy (N/A, 8/12/2019); bronchoscopy (8/12/2019); bronchoscopy (8/12/2019); bronchoscopy (8/12/2019); bronchoscopy (8/12/2019); bronchoscopy (8/12/2019); Tunneled venous port placement (Left, 01/17/2020); [place] [id number] Surgery (N/A, 1/17/2020);  Cardiac catheterization (2003); Colonoscopy (N/A, 9/25/2019); and Tonsillectomy (1965). Restrictions  Restrictions/Precautions  Restrictions/Precautions: Fall Risk, General Precautions  Position Activity Restriction  Other position/activity restrictions: on 4L O2  Vision/Hearing  Vision: Impaired  Vision Exceptions: Wears glasses for reading  Hearing: Exceptions to [facility]  Hearing Exceptions: No hearing aid;Hard of hearing/hearing concerns     Subjective  General  Referring Practitioner: Jose Miguel Snow MD  Referral Date : 10/18/21  Diagnosis: Covid 19 virus  Pain Screening  Patient Currently in Pain: Yes  Pain Assessment  Pain Assessment: 0-10  Pain Level: 6  Pain Type: Chronic pain  Pain Location: Hip;Back (patient states she is not ready for pain medication yet.)  Pain Orientation: Posterior; Left  Pain Descriptors: Aching  Functional Pain Assessment: Prevents or interferes some active activities and ADLs  Vital Signs  Pulse: 116  Heart Rate Source: Monitor  Resp: 16  BP: (!) 143/66  Level of Consciousness: Alert (0)  Patient Currently in Pain: Yes  Oxygen Therapy  SpO2: 97 %  Pulse Oximeter Device Mode: Intermittent  Pulse Oximeter Device Location: Left;Finger  O2 Device: Nasal cannula  O2 Flow Rate (L/min): 4 L/min    Pt with steps rates LEft knee pain as 10/10 which limits her to 1 step along with fatigue limiting with sob.      Orientation  Orientation  Overall Orientation Status: Within Normal Limits  Social/Functional History  Social/Functional History  Lives With: Alone  Type of Home: Mobile home  Home Layout: One level  Home Access: Stairs to enter with rails  Entrance Stairs - Number of Steps: 3  Entrance Stairs - Rails: Both  Bathroom Shower/Tub: Tub/Shower unit  Bathroom Toilet: Standard  Bathroom Equipment: Grab bars in shower  Bathroom Accessibility: Walker accessible  Home Equipment: 4 wheeled walker, [employer] Help From: Friend(s), Family  ADL Assistance: Independent  Homemaking Assistance: [facility] chair, WCx5 , toiletx2 needs intermittent cues to put brakes on walker and to get walker closer to transfer surface.)  Stand to sit: Supervision  Bed to Chair: Supervision  Car Transfer: Minimal Assistance (uses car, gets in from standing stepping into car with  1L and slideing over)  Comment: pt with bed<>chiar sup<>sit sob with 1 minute seated rest needed to recover to baseline per pt report. immediate post activity HR 114bpm irregular manual,  SpO2 94% on 4L via nc., patient with transfers chair to toilet wtih rollator needed assist with O2 tubing Supervision due to sob, needed 3 minute seated rest post walking chair<>toilet with rollator due to sob with immediate rest  RR 40/minute spO2 94%, HR  irregular 114 bpm.  Ambulation  Ambulation?: Yes  More Ambulation?: Yes  Ambulation 1  Surface: level tile  Device: Rollator  Other Apparatus: Wheelchair follow;O2  Assistance: Contact guard assistance  Quality of Gait: wide franky with increased tremulous quality in LE with fatiuge and tens to WB more heavily with flexion in trunk and hips with incresaed fatigue with WC follow  Distance: 61 feet  Comments: post walking SpO2 on 4L via nc 87% RR 40/min, dyspnic,  HR 137bpm/irregular, after 30 seconds seated rest spO2 94%  and with 3 minutes seated rest sob resolved per patient. Ambulation 2  Surface - 2: level tile  Device 2: Rollator  Assistance 2: Contact guard assistance  Quality of Gait 2: step thru gait wtih WB support, slow jaimie leans heavily on WC  Distance: 20 feet with L/R turns on carpet. Comments: 94% SpO2 on 4L HR   100bpm irregular RR 40/minute with dyspnea noted   Patient later in session with multiple trips in and out of bathroom 10 feet x4 with rollator and CGA     Stairs/Curb  Stairs?: Yes  Stairs  # Steps : 1  Stairs Height: 6\"  Rails: Right ascending (Left cane)  Assistance:  Moderate assistance  Wheelchair Activities  Wheelchair Size: 20 inch  Wheelchair Type: Standard  Wheelchair Cushion: Standard  Level of Assistance for pressure relief activities: Modified independent  Propulsion: Yes  Propulsion 1  Propulsion: Manual  Level: Level Tile (and 10 feet of carpet wtih L/r turns)  Method: RUE;LUE;RLE;LLE  Level of Assistance: Moderate assistance  Description/ Details: patient needs 3 seated rest breaks due to sob and up to mod assist throughout for propuslion and steering. Immediate psot rest SpO2 94% on 4 L via nc. Distance: 150 feet wtih 10 feet carpet and L/R turns. Balance  Sitting - Static: Good (unsteady dynamic when sob)  Sitting - Dynamic: Fair  Standing - Static: Fair  Standing - Dynamic: Poor  Comments: requires use of rollator for dynamic standing, with stoop to recover getting back up mod assist with use of Rollator  Other exercises  Other exercises?: Yes  Other exercises 1: patient perfomred ankle pumps x30, hip ER from neutral in long sitting in recliner x30 BLE     Plan   Plan  Times per week: 5/7 days/week  Times per day: Daily  Plan weeks: 14 days  Current Treatment Recommendations: Transfer Training, Strengthening, Balance Training, Gait Training, Stair training, Functional Mobility Training, Safety Education & Training  Safety Devices  Type of devices: All fall risk precautions in place, Bed alarm in place, Left in bed, Patient at risk for falls, Call light within reach       Goals  Short term goals  Time Frame for Short term goals: Patient demonstrates indep in bed mobility. Short term goal 1: 10/26/2021  Short term goal 2: Patient demonstrates 150 feet with SpO2 90% or greater on O2 via nc. with rollator  Short term goal 3: Patient demonstrates 5 minutes of standing dynamic balance activities to improve dynamic balance with gait with SPO2 90% or greater. Short term goal 4: Patient demonstrates indep in core and DBE in sitting and standing. Short term goal 5: Patient demonstrates safe indep transfers bed<>chair, car transfer, with use of rollator .   Long term goals  Time Frame for Long term goals : 11/2/2021  Long term goal 1: Patient demonstrates safe indep stoop to recover shoe from floor wtih use of rollator. .  Long term goal 2: Patiend demonstrates gait 150 feet ROSAS with use of rollator and spO2 90% or greater  Long term goal 3: Pt demo up and down 3-4 steps indep with use of cane and rail to safely enter and egress home. Long term goal 4: Pt indep in  hep LE therex to increase LE endurance/functional strength with walking.   Patient Goals   Patient goals : \" To get my legs stronger\"       Therapy Time   Individual Concurrent Group Co-treatment   Time In 6327         Time Out 1420         Minutes 105         Timed Code Treatment Minutes: 105 Minutes (+ 15 minute evaluation)       Michael Farrell, PT

## 2021-10-19 NOTE — PROGRESS NOTES
Occupational Therapy   Occupational Therapy Initial Assessment  Date: 10/19/2021   Patient Name: Parris Vann  MRN: [id number]     : 1940    Date of Service: 10/19/2021    Discharge Recommendations:  Home with assist PRN, Home with Home health OT  OT Equipment Recommendations  Other: CTA    Assessment   Performance deficits / Impairments: Decreased functional mobility ; Decreased high-level IADLs;Decreased ADL status; Decreased endurance;Decreased balance;Decreased safe awareness;Decreased strength  Assessment: Ms Eda Wild is a 68year old female admitted from [address] [place] [address] where she was recovering from covid pneumonia in the context of SHANNON, COPD, with baseline 4L oxygen requirement. She completed remdesevir and tocilizumab and was admitted to [facility] for respiratory management. Prior to admission pt was Ivana with rollator and independent with ADLs and had some assistance with IADLs. Current pt in functioning below baseline needing CGA for transfers, mobility, and CGA-modA for ADLs. OT skilled services are indicated to address above deficits and f/u with pt and caregiver education. At this time it will be recommended pt will be able to DC home with [facility]. CTA for DME. Treatment Diagnosis: Weakness (post COVID)  Prognosis: Good  Decision Making: Low Complexity  OT Education: OT Role;Plan of Care;Precautions;Transfer Training;Energy Conservation  Patient Education: Pt was educated on the role of OT, daily schedule, importance of taking breaks when needed, breathing in through nose out through mouth, safety awareness. Barriers to Learning: Decreased safety awareness. REQUIRES OT FOLLOW UP: Yes  Activity Tolerance  Activity Tolerance: Patient Tolerated treatment well;Patient limited by fatigue  Activity Tolerance: Pt on 4L of O2, RQ=296/60, HR=74, O2=94%  Safety Devices  Safety Devices in place: Yes  Type of devices: All fall risk precautions in place;Call light within reach; Chair alarm in Activity Restriction  Other position/activity restrictions: on 4L O2    Subjective   General  Chart Reviewed: Yes, Progress Notes, History and Physical, Labs  Additional Pertinent Hx: has a past medical history of Abnormal chest CT, Abnormal CXR, Acute exacerbation of chronic obstructive pulmonary disease (COPD) ([facility]), Acute on chronic respiratory failure ([facility]), Acute on chronic respiratory failure with hypoxia ([facility]), Anticoagulant long-term use, Atrial fibrillation ([address] Atrial fibrillation with rapid ventricular response ([address] B-cell lymphoma ([address] CAP (community acquired pneumonia), Carotid artery stenosis, CHF (congestive heart failure) ([facility]), Chronic back pain, Congenital heart disease, COVID-19, Depression, Diverticulitis, Diverticulitis of colon, Dizziness, Fistula, Gram-negative pneumonia ([address] HCAP (healthcare-associated pneumonia), Hx of blood clots, Hyperlipidemia, Hypertension, IFG (impaired fasting glucose), Infection due to Stenotrophomonas maltophilia, Non-Hodgkins lymphoma ([address] Obesity, Panic disorder, Peripheral vascular disease ([address] Primary osteoarthritis of both knees, Pulmonary embolism ([address] Rectal vaginal fistula, Restless legs syndrome, [facility] spotted fever, Sciatic nerve pain, Sepsis ([address] Sleep apnea, Swelling of limb, Tobacco abuse, Urinary tract infection in female, and Vitamin D deficiency. Family / Caregiver Present: No  Referring Practitioner: Dr. Eliza Correa  Diagnosis: Post COVID  Subjective  Subjective: Pt supine in bed and agreeable for OT eval.  Patient Currently in Pain: Yes  Pain Assessment  Pain Level: 6  Pain Location: Back; Hip  Response to Pain Intervention: Patient Satisfied  Vital Signs  Pulse: 111  Heart Rate Source: Monitor  Resp: 24  BP: 116/77  BP Location: Left upper arm  Patient Position: Semi fowlers  Patient Currently in Pain: Yes  Oxygen Therapy  SpO2: 94 %  Pulse Oximeter Device Mode: Intermittent  Pulse Oximeter Device Location: Finger  O2 Device: Nasal cannula  O2 Flow Rate (L/min): 4 L/min  Social/Functional History  Social/Functional History  Lives With: Alone  Type of Home: Mobile home  Home Layout: One level  Home Access: Stairs to enter with rails  Entrance Stairs - Number of Steps: 3  Entrance Stairs - Rails: Both  Bathroom Shower/Tub: Tub/Shower unit  Bathroom Toilet: Standard  Bathroom Equipment: Grab bars in shower (Grab bar to get into the tub however pt says it does not help with getting out.)  Bathroom Accessibility: Walker accessible  Home Equipment: 4 wheeled walker, [employer] Help From: Friend(s), Family  ADL Assistance: Independent  Homemaking Assistance: Independent  Homemaking Responsibilities: Yes  Meal Prep Responsibility: Primary  Laundry Responsibility: Primary  Cleaning Responsibility: Secondary  Bill Paying/Finance Responsibility: Primary  Shopping Responsibility: Secondary  Health Care Management: Primary  Ambulation Assistance: Independent  Transfer Assistance: Independent  Active : Yes  Mode of Transportation: Car  Occupation: Retired  Type of occupation: [employer]  for 34 years  [address]: sewing, crossword puzzles, cooking, and playing on her ipad       Objective   Vision: Impaired  Vision Exceptions: Wears glasses for reading  Hearing: Exceptions to [facility]  Hearing Exceptions: No hearing aid    Orientation  Overall Orientation Status: Within Normal Limits     Balance  Sitting Balance: Stand by assistance  Standing Balance: Contact guard assistance  Standing Balance  Time: ~1 minute x 2, ~2 minutes, ~3 minutes  Activity: Amb to and from the bathroom with RW and CGA, standing for toileting and LB bathing, grooming tasks.   Functional Mobility  Functional - Mobility Device: Rolling Walker  Activity: To/from bathroom  Assist Level: Contact guard assistance  Toilet Transfers  Toilet - Technique: Ambulating (RW)  Toilet Transfer: Contact guard assistance  Shower Transfers  Shower - Transfer From: Divina Paz Transfer Type: To and From  Shower - Transfer To: Shower seat with back  Shower - Technique: Ambulating (RW)  Shower Transfers: Contact Guard  ADL  Feeding: Setup;Supervision  Grooming: Setup;Supervision  UE Bathing: Setup;Supervision  LE Bathing: Setup; Moderate assistance  UE Dressing: Setup;Supervision  LE Dressing: Setup;Stand by assistance  Toileting: Contact guard assistance        Bed mobility  Supine to Sit: Stand by assistance  Sit to Supine: Unable to assess  Scooting: Contact guard assistance  Transfers  Sit to stand: Contact guard assistance  Stand to sit: Contact guard assistance     Cognition  Safety Judgement: Decreased awareness of need for assistance;Decreased awareness of need for safety                 LUE AROM (degrees)  LUE AROM : WFL  Left Hand AROM (degrees)  Left Hand AROM: WFL  RUE AROM (degrees)  RUE AROM : WFL  Right Hand AROM (degrees)  Right Hand AROM: WFL  LUE Strength  L Hand General: 4/5  RUE Strength  R Hand General: 4/5                   Plan   Plan  Times per week: 5/7 days  Times per day: Daily  Plan weeks: 7-10 days  Current Treatment Recommendations: Strengthening, Safety Education & Training, Balance Training, Self-Care / ADL, Home Management Training, Equipment Evaluation, Education, & procurement, Endurance Training, Functional Mobility Training, Patient/Caregiver Education & Training            Goals  Short term goals  Time Frame for Short term goals: 10/22/21  Short term goal 1: Pt will complete toileting while using LRAD with SBA. Short term goal 2: Pt will complete TB bathing with set up and SBA. Short term goal 3: Pt will complete grooming task while standing for at least 3 minutes with SBA. Short term goal 4: Pt will complete total body dressing with LRAD and SBA. Long term goals  Time Frame for Long term goals : 10/27/21  Long term goal 1: Pt will complete toileting task with Ivana.   Long term goal 2: Pt will complete tub/shower transfer with supervision and

## 2021-10-19 NOTE — PROGRESS NOTES
Pharmacy Note  Warfarin Consult    Goal INR range 2-3  Home Warfarin dose:5 mg daily    Date  INR  Warfarin  10/18              1.87                  5 mg  10/19              2.51                  hold  Recommend to hold Warfarin tonight x1. Daily INR ordered. Rx will continue to manage therapy per consult order.   Jony Blakely PharmD 10/19/21 2:38 PM

## 2021-10-19 NOTE — PROGRESS NOTES
Speech Language Pathology  Facility/Department: [facility] ARU  Initial Speech/Language/Cognitive Assessment    NAME: Abimael Pelayo  : 1940   MRN: [id number]  ADMISSION DATE: 10/18/2021  ADMITTING DIAGNOSIS: has COPD exacerbation; Chronic respiratory failure with hypoxia ([address] Anxiety; Hyperlipidemia; Diverticulosis; COPD, severe ([address] Vitamin D deficiency; Atrial fibrillation with RVR ([address] Other pulmonary embolism without acute cor pulmonale ([address] Venous (peripheral) insufficiency; Lymphedema; Chronic angle-closure glaucoma of both eyes; Arthritis of hips and knees; Shortness of breath; Diastolic dysfunction; Acute on chronic respiratory failure with hypoxia ([address] Bilateral edema of lower extremity; Angina pectoris ([address] Paroxysmal atrial fibrillation ([address] Erythrocytosis; IFG (impaired fasting glucose); Lung mass; Mediastinal lymphadenopathy; Atrial fibrillation, chronic ([address] Pulmonary nodule; 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 ([address] Non-Hodgkin lymphoma ([address] PVD (peripheral vascular disease) with claudication ([address] Carotid stenosis; TIA (transient ischemic attack); CHF (congestive heart failure) ([address] HTN (hypertension), benign; Dyslipidemia; History of diverticulitis; Elevated lactic acid level; Moderate episode of recurrent major depressive disorder ([address] Primary insomnia; Primary osteoarthritis of both knees; Acute pain of right knee; History of pulmonary embolism; Colovaginal fistula; Nausea; Polyp of colon; Chronic airway obstruction, not elsewhere classified; Morbid obesity ([address] Sleep apnea; Restless legs syndrome; Chronic back pain; Panic disorder; Acute hypoxemic respiratory failure due to COVID-19 [facility]); COVID-19; Acute on chronic respiratory failure with hypoxia and hypercapnia ([address]  Anticoagulated on Coumadin; Pneumonia due to COVID-19 virus; Acute hypoxemic respiratory failure ([address] and COVID-19 virus infection on their problem list.  DATE ONSET: 10/18/21    Date of Eval: 10/19/2021   Evaluating Therapist: MARLIN Cook    RECENT RESULTS  CT OF HEAD/MRI: N/A    Primary Complaint:  Pt denies any changes with her current cognitive status. Pt's goals: \"to get stronger and get back home\"     Pain:  Pain Assessment  Pain Assessment: 0-10  Pain Level: 6  Patient's Stated Pain Goal: No pain  Pain Type: Chronic pain  Pain Location: Back, Hip  Pain Orientation: Mid, Lower  Response to Pain Intervention: Patient Satisfied    Assessment:  Cognitive Diagnosis: Mild cognitive linguistic impairment   Per H&P: \"Ms Hilary Ricardo is a 68year old female admitted from [facility] where she was recovering from covid pneumonia in the context of SHANNON, COPD, with baseline 4L oxygen requirement. She completed remdesevir and tocilizumab and was admitted to [facility] for respiratory management. Her pulmonary status has improved and she is admitted to ARU for further rehabilitation. \"        Pt alert and oriented, cooperative and agreeable to participate in evaluation. Pt reported that she lives at home independently, retired , manages own meds/finances, cooking, and laundry. Pt did report that she does receive some assistance with grocery shopping and cleaning if/when needed. Pt enjoys cooking, playing on her ipad, sewing, and doing crossword puzzles. Pt was administered the SLUMS scoring a 24/30. A score of 26/30 is considered WFL. Pt does have an eighth grade education but reported she went back to school and obtained her GED. Pt demonstrates areas of strength with orientation, divergent naming, repetition of numbers in backwards order, auditory comprehension of paragraph. Pt demonstrated some difficulty with short term recall, money calculations, clock drawing, and overall higher level thought organization. Pt would benefit from ongoing ST services to improve overall cognitive linguistic skills to increase safety and independence to return to [facility]. Recommendations:  Requires SLP Intervention: Yes  Duration/Frequency of Treatment: 5x/wk during LOS  D/C Recommendations: To be determined       Plan:   Goals:  Short-term Goals  Timeframe for Short-term Goals: 10 days (10/28/21)  Goal 1: Pt will complete recall tasks with 90% acc given min cues for use of compensatory memory strategies. Goal 2: Pt will complete executive function tasks (meds, money, math, time, etc) with 90% acc given min cues. Goal 3: Pt will complete problem solving and thought organization tasks with 90% acc given min cues. Goal 4: Pt will complete verbal and visual reasoning tasks with 90% acc given min cues. Long-term Goals  Timeframe for Long-term Goals: 14 days (11/01/21)  Goal 1: Pt will improve overall cognitive linguistic skills to increase safety and independence to return to PLOF. Patient/family involved in developing goals and treatment plan: yes, Pt    Subjective:  General  Chart Reviewed: Yes  Family / Caregiver Present: No  Subjective  Subjective: Pt alert and oriented, cooperative and agreeable to participate in evaluation. Social/Functional History  Lives With: Alone  Type of Home: Mobile home  Homemaking Responsibilities: Yes  Meal Prep Responsibility: Primary  Laundry Responsibility: Primary  Cleaning Responsibility: Secondary  Bill Paying/Finance Responsibility: Primary  Shopping Responsibility: Secondary  Health Care Management: Primary  Ambulation Assistance: Independent  Transfer Assistance: Independent  Active : Yes  Mode of Transportation: Car  Occupation: Retired  Type of occupation: [employer]  for 34 years  [address]: sewing, crossword puzzles, cooking, and playing on her [address]: Impaired  Vision Exceptions: Wears glasses for reading  Hearing  Hearing: Exceptions to [facility]  Hearing Exceptions: No hearing aid;Hard of hearing/hearing concerns           Objective:  Oral/Motor  Oral Motor:  Within functional limits    Auditory Comprehension  Comprehension: Within Functional Limits    Expression  Primary Mode of Expression: Verbal    Verbal Expression  Verbal Expression: Within functional limits    Written Expression  Dominant Hand: Right  Written Expression: Within Functional Limits    Motor Speech  Motor Speech: Within Functional Limits    Pragmatics/Social Functioning  Pragmatics: Within functional limits    Cognition:   Orientation  Overall Orientation Status: Within Normal Limits  Attention  Attention: Within Functional Limits  Memory  Memory: Exceptions to [facility]  Short-term Memory: Moderate  Working Memory: Mild  Problem Solving  Problem Solving: Exceptions to [facility]  Complex Functional Tasks: Mild  Managing Finances: To be assessed in therapy  Managing Medications: To be assessed in therapy  Verbal Reasoning Skills: Mild  Numeric Reasoning  Numeric Reasoning: Exceptions to [facility]   Calculations: Mild  Money Management: Mild  Time: Mild  Abstract Reasoning  Abstract Reasoning: Exceptions to [facility]; Within Functional Limits  Safety/Judgement  Safety/Judgement: Within Functional Limits    Prognosis:  Speech Therapy Prognosis  Prognosis: Good  Individuals consulted  Consulted and agree with results and recommendations: Patient;RN    Education:  Patient Education: Edu provided re: role of SLP, results of assessment, recommendations. Patient Education Response: Verbalizes understanding  Safety Devices in place: Yes  Type of devices: All fall risk precautions in place; Left in chair;Call light within reach;Nurse notified; Chair alarm in place    Therapy Time:   Individual   Time In 1000   Time Out 1035   Minutes [address] [address] CCC-SLP  Speech-Language Pathologist  [id number]

## 2021-10-19 NOTE — PATIENT CARE CONFERENCE
[address]  Inpatient Rehabilitation  Weekly Team Conference Note    Date: 10/20/2021  Patient Name: Kristyn Cruz        MRN: [id number]    : 1940  (68 y.o.)  Gender: female   Referring Practitioner: Mable Lombard, MD  Diagnosis: Covid 19 virus      Interventions to be utilized toward barriers to discharge, per discipline:  [address] observed barriers to dc: Decreased endurance, Lower extremity weakness, Skin Care, Medication management, and managing insulin/blood sugars (not on insulin at home)  Nursing interventions:Assist with ADLs, medication management  Family Education: No  Fall Risk:  Yes      Physical therapy observed barriers to dc:    Baseline: indep home mobility with rollator   Current level: assist of 1 with rollator with walking limited due to tachycardia and dyspnea to 61 feet, unable to do >1 step with patient having 3 steps to enter home. Barriers to DC: Left knee pain, tachycardia, easily sob, functional weakness with decreased attention to safety with transfers. Needs in order to achieve dc home/next level of care: ROSAS in home mobility with rollator, prn assist for higher level community ADL's, and able to negotiate 3 steps with rails/cane independently. Physical therapy interventions:   Current Treatment Recommendations: Transfer Training, Strengthening, Balance Training, Gait Training, Stair training, Functional Mobility Training, Safety Education & Training      PHYSICAL THERAPY  PT Equipment Recommendations  Equipment Needed: No  Other: has rollator at home and cane    Assessment: Per 10/19/2021 H&P per Dr. Capellan Iva is a 68year old female admitted from [address] [place] [address] where she was recovering from covid pneumonia in the context of SHANNON, COPD, with baseline 4L oxygen requirement. She completed remdesevir and tocilizumab and was admitted to [facility] for respiratory management.  Her pulmonary status has improved and she is admitted to ARU for further rehabilitation. \"  Patient deonstrates impaired functional strength and endurance  with walking, transfers, bed mobility appropriate for skilled therapy to address these and other noted concerns to progress to PLOF Indep in  home mobility with use of rollator walker. TOday patient is limited to 61 feet of walking due to  O2 desaturation and severe dyspnea. Paient takes extra time with all mobility tasks due to need for frequent seated rest breaks due to dyspnea with increased RR and tachycardia with irregular heartbeat noted. Occupational therapy observed barriers to dc:    Baseline: Pt lives in a mobile home with 3 steps to enter. Pt was independent for ADLs and IADLs. On 4L of O2. Current level: Pt CGA for toileting and toilet transfer. Pt set up and supervision for UB bathing and modA for LB bathing. Pt set up and supervision for UB dressing, CGA for LB dressing. Pt dependent for donning socks. Barriers to DC: Decrease activity tolerance, tub/shower transfer. Needs in order to achieve dc home/next level of care:  Pt needs to be able to complete tub/shower transfer. Needs tub/shower bench. CTA  Occupational Therapy interventions:  Current Treatment Recommendations: Strengthening, Safety Education & Training, Balance Training, Self-Care / ADL, Home Management Training, Equipment Evaluation, Education, & procurement, Endurance Training, Functional Mobility Training, Patient/Caregiver Education & Training          OCCUPATIONAL THERAPY  Assessment: Ms Redd Snow is a 68year old female admitted from [address] [place] [address] where she was recovering from covid pneumonia in the context of SHANNON, COPD, with baseline 4L oxygen requirement. She completed remdesevir and tocilizumab and was admitted to [facility] for respiratory management. Prior to admission pt was Ivana with rollator and independent with ADLs and had some assistance with IADLs.  Current pt in functioning below baseline needing CGA for transfers, mobility, and CGA-modA for ADLs. OT skilled services are indicated to address above deficits and f/u with pt and caregiver education. At this time it will be recommended pt will be able to DC home with [facility]. CTA for DME. Speech therapy observed barriers to dc:    Baseline: pt lives at home indep, manages meds/finances, has assist with cleaning/shopping, manages cooking and laundry, active    Current level: mild high level cog    Barriers to DC: None    Needs in order to achieve dc home/next level of care: carryover of compensatory strategies     Speech Therapy interventions:  Dysphagia: N/A  Speech/Language/Cognition: Compensatory strategy training and carryover, recall/STM, problem solving, reasoning, exec function, thought organization, attention. SPEECH THERAPY:  Pt was administered the SLUMS scoring a 24/30. A score of 26/30 is considered WFL. Pt does have an eighth grade education but reported she went back to school and obtained her GED. Pt demonstrates areas of strength with orientation, divergent naming, repetition of numbers in backwards order, auditory comprehension of paragraph. Pt demonstrated some difficulty with short term recall, money calculations, clock drawing, and overall higher level thought organization. Pt would benefit from ongoing ST services to improve overall cognitive linguistic skills to increase safety and independence to return to [facility]. SLP recommending pt continue with Regular texture solids, thin liquids via cup or straw, meds whole water. No further ST services are warranted for dysphagia as pt's overall swallow function appears intact. NUTRITION  Weight: 256 lb (116.1 kg) / Body mass index is 46.82 kg/m². Diet Order: ADULT DIET;  Regular; 4 carb choices (60 gm/meal)  Adult Oral Nutrition Supplement; Diabetic Oral Supplement  PO Meals Eaten (%): 76 - 100%  Education: Declined       CASE MANAGEMENT  Assessment: Referred to patient for d/c planning. Patient usually lives at home alone. Patient is usually independent in ADLs. Will continue to follow for d/c needs. Interdisciplinary Goals:   1.) Pt will implement use of compensatory memory strategies for improved recall across all disciplines given min cues   2.) Pt will complete toileting task with Ivana. 3.)Patient demonstrates  consistently engaing rollator brakes appropriately for transfers. Discharge Plan   Estimated discharge date: 10/30/2021  Destination: home health  Pass:No  Services at Discharge: [address] [facility], Occupational Therapy, Speech Therapy and Nursing   Equipment at Discharge: Ramp. No other DME at this time. (pt owns rollator)    Team Members Present at Conference:  : BOB Brooks    Occupational Therapist: Mandeep Das S/OT & Jairon Titus OTR/L  Physical Donna Mercado P.T. 0033  Speech Therapist: Rainer Bush MA [facility] SLP   Nurse: Corona Kenny RN   Dietician: Toya De Los Santos RDN, LD  : Juli Lomeli OTR/L  Psychiatry: N/A    Family members present at conference: No      I led this team conference and I approve the established interdisciplinary plan of care as documented within the medical record of [address] [place] [address]. MD: Sejal Roa.  Ellis Quintero MD 10/20/2021, 11:20 AM

## 2021-10-19 NOTE — PROGRESS NOTES
Speech Language Pathology  Facility/Department: [facility]   CLINICAL BEDSIDE SWALLOW EVALUATION    NAME: Judy Murillo  : 1940  MRN: [id number]    ADMISSION DATE: 10/18/2021  ADMITTING DIAGNOSIS: has COPD exacerbation; Chronic respiratory failure with hypoxia ([address] Anxiety; Hyperlipidemia; Diverticulosis; COPD, severe ([address] Vitamin D deficiency; Atrial fibrillation with RVR ([address] Other pulmonary embolism without acute cor pulmonale ([address] Venous (peripheral) insufficiency; Lymphedema; Chronic angle-closure glaucoma of both eyes; Arthritis of hips and knees; Shortness of breath; Diastolic dysfunction; Acute on chronic respiratory failure with hypoxia ([address] Bilateral edema of lower extremity; Angina pectoris ([address] Paroxysmal atrial fibrillation ([address] Erythrocytosis; IFG (impaired fasting glucose); Lung mass; Mediastinal lymphadenopathy; Atrial fibrillation, chronic ([address] Pulmonary nodule; 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 ([address] Non-Hodgkin lymphoma ([address] PVD (peripheral vascular disease) with claudication ([address] Carotid stenosis; TIA (transient ischemic attack); CHF (congestive heart failure) ([address] HTN (hypertension), benign; Dyslipidemia; History of diverticulitis; Elevated lactic acid level; Moderate episode of recurrent major depressive disorder ([address] Primary insomnia; Primary osteoarthritis of both knees; Acute pain of right knee; History of pulmonary embolism; Colovaginal fistula; Nausea; Polyp of colon; Chronic airway obstruction, not elsewhere classified; Morbid obesity ([address] Sleep apnea; Restless legs syndrome; Chronic back pain; Panic disorder; Acute hypoxemic respiratory failure due to COVID-19 [facility]); COVID-19; Acute on chronic respiratory failure with hypoxia and hypercapnia ([address]  Anticoagulated on Coumadin; Pneumonia due to COVID-19 virus; Acute hypoxemic respiratory failure ([address] and COVID-19 virus infection on their problem list.  ONSET DATE: 10/18/21    Recent Chest Xray/CT of Chest: (10/01/21)  COMPARISON:  10/01/2021     PROCEDURE COMMENTS: AP portable technique. FINDINGS: Stable infiltrates. Stable focal consolidation right upper lobe. No   pneumothorax. IMPRESSION:   No change       Date of Eval: 10/19/2021  Evaluating Therapist: MARLIN Mullen    Current Diet level:  Current Diet : Regular  Current Liquid Diet : Thin      Primary Complaint  Patient Complaint: No complaints    Pain:  Pain Assessment  Pain Assessment: 0-10  Pain Level: 6  Patient's Stated Pain Goal: No pain  Pain Type: Chronic pain  Pain Location: Back, Hip  Pain Orientation: Mid, Lower  Response to Pain Intervention: Patient Satisfied    Reason for Referral  Kemal Winters was referred for a bedside swallow evaluation to assess the efficiency of her swallow function, identify signs and symptoms of aspiration and make recommendations regarding safe dietary consistencies, effective compensatory strategies, and safe eating environment. Impression  Dysphagia Diagnosis: Swallow function appears grossly intact  Dysphagia Outcome Severity Scale: Level 7: Normal in all situations     Pt seen sitting upright in bedside chair, alert and oriented. Pt cooperative and agreeable to participate in evaluation. Pt currently on 4L O2 via nasal cannula. Pt with both upper and lower dentures that adequately fit. Pt's O2 stable at 94% prior to, during, and after all PO trials. Pt denies any dysphagia. Pt observed with thin liquids via cup and straw, puree and regular solids. Pt presenting with adequate mastication, A-P transit, no residue in oral cavity observed post swallow, timely swallow initiation, adequate laryngeal elevation upon manual palpation of the anterior neck. No overt s/s of penetration/aspiration. No coughing, wet vocal quality, or throat clearing observed. Pt denies history of GERD and reported one instance of PNA prior to this admission.  Pt does present with a congested cough, however reported this is baseline for her as she typically has a congested cough/increased mucous production at baseline. SLP recommending pt continue with Regular texture solids, thin liquids via cup or straw, meds whole water. No further ST services are warranted for dysphagia as pt's overall swallow function appears intact. Treatment Plan  Requires SLP Intervention: No  Duration/Frequency of Treatment: No ST warranted for dysphagia  D/C Recommendations: To be determined       Recommended Diet and Intervention  Diet Solids Recommendation: Regular  Liquid Consistency Recommendation: Thin  Recommended Form of Meds: Whole with water    Compensatory Swallowing Strategies  Compensatory Swallowing Strategies: Alternate solids and liquids;Eat/Feed slowly;Upright as possible for all oral intake;Remain upright for 30-45 minutes after meals;Small bites/sips      General  Chart Reviewed: Yes  Subjective  Subjective: Pt alert and oriented, cooperative and agreeable to participate in evaluation. Behavior/Cognition: Alert; Cooperative;Pleasant mood  Respiratory Status: O2 via nasual cannula  O2 Device: Nasal cannula  Liters of Oxygen: 4 L  Communication Observation: Functional  Follows Directions: Simple  Dentition: Dentures top;Dentures bottom  Patient Positioning: Upright in chair  Baseline Vocal Quality: Hoarse  Volitional Cough: Strong  Prior Dysphagia History: No known history of dysphagia  Consistencies Administered: Reg solid; Dysphagia Pureed (Dysphagia I); Thin - cup; Thin - straw    Vision/Hearing  Vision  Vision: Impaired  Vision Exceptions: Wears glasses for reading  Hearing  Hearing: Exceptions to [facility]  Hearing Exceptions: No hearing aid;Hard of hearing/hearing concerns    Oral Motor Deficits  Oral/Motor  Oral Motor:  Within functional limits    Oral Phase Dysfunction  Oral Phase  Oral Phase: WNL     Indicators of Pharyngeal Phase Dysfunction   Pharyngeal Phase  Pharyngeal Phase: WNL    Prognosis  Prognosis  Prognosis for safe diet advancement: good  Individuals consulted  Consulted and agree with results and recommendations: Patient;RN    Education  Patient Education: Edu provided re: role of SLP, current diet recommendations, safe swallow strategies during all meals. Patient Education Response: Verbalizes understanding  Safety Devices in place: Yes  Type of devices: All fall risk precautions in place; Left in chair;Call light within reach;Nurse notified; Chair alarm in place       Therapy Time  SLP Individual Minutes  Time In: 7798  Time Out: 1100  Minutes: 1430 [facility]-SL[facility]  Speech-Language Pathologist  [phone]

## 2021-10-19 NOTE — CONSULTS
Pharmacy Note  Warfarin Consult    Goal INR range 2-3  Home Warfarin dose:5 mg daily    Date  INR  Warfarin  10/18              1.87                  5 mg    Recommend Warfarin 5 mg tonight x1. Daily INR ordered. Rx will continue to manage therapy per consult order.     Rosaura Valera PharmD 10/18/21 11:13 AM
admission, weight loss      Nutrition Interventions:   Food and/or Nutrient Delivery:  Continue Current Diet, Start Oral Nutrition Supplement  Nutrition Education/Counseling:  Education declined   Coordination of Nutrition Care:  Continue to monitor while inpatient, Interdisciplinary Rounds    Goals:  Pt will consume greater than 50% of meals and ONS this admission       Nutrition Monitoring and Evaluation:   Behavioral-Environmental Outcomes:  None Identified   Food/Nutrient Intake Outcomes:  Food and Nutrient Intake, Supplement Intake  Physical Signs/Symptoms Outcomes:  Biochemical Data, Nutrition Focused Physical Findings, Weight     Discharge Planning:    Continue current diet, Continue Oral Nutrition Supplement     Electronically signed by Luca Albarado MS, RD, LD on 10/19/21 at 1:25 PM EDT    Contact: [phone]

## 2021-10-20 LAB
GLUCOSE BLD-MCNC: 128 MG/DL (ref 70–99)
GLUCOSE BLD-MCNC: 142 MG/DL (ref 70–99)
GLUCOSE BLD-MCNC: 148 MG/DL (ref 70–99)
GLUCOSE BLD-MCNC: 245 MG/DL (ref 70–99)
INR BLD: 2.56 (ref 0.88–1.12)
PERFORMED ON: ABNORMAL
PROTHROMBIN TIME: 30.1 SEC (ref 9.9–12.7)

## 2021-10-20 PROCEDURE — 97535 SELF CARE MNGMENT TRAINING: CPT

## 2021-10-20 PROCEDURE — 97130 THER IVNTJ EA ADDL 15 MIN: CPT

## 2021-10-20 PROCEDURE — 36415 COLL VENOUS BLD VENIPUNCTURE: CPT

## 2021-10-20 PROCEDURE — 97530 THERAPEUTIC ACTIVITIES: CPT

## 2021-10-20 PROCEDURE — 94761 N-INVAS EAR/PLS OXIMETRY MLT: CPT

## 2021-10-20 PROCEDURE — 6370000000 HC RX 637 (ALT 250 FOR IP): Performed by: PHYSICAL MEDICINE & REHABILITATION

## 2021-10-20 PROCEDURE — 97116 GAIT TRAINING THERAPY: CPT

## 2021-10-20 PROCEDURE — 97110 THERAPEUTIC EXERCISES: CPT

## 2021-10-20 PROCEDURE — 94640 AIRWAY INHALATION TREATMENT: CPT

## 2021-10-20 PROCEDURE — 97129 THER IVNTJ 1ST 15 MIN: CPT

## 2021-10-20 PROCEDURE — 2700000000 HC OXYGEN THERAPY PER DAY

## 2021-10-20 PROCEDURE — 85610 PROTHROMBIN TIME: CPT

## 2021-10-20 PROCEDURE — 1280000000 HC REHAB R&B

## 2021-10-20 PROCEDURE — 6360000002 HC RX W HCPCS: Performed by: PHYSICAL MEDICINE & REHABILITATION

## 2021-10-20 RX ORDER — WARFARIN SODIUM 2.5 MG/1
2.5 TABLET ORAL
Status: COMPLETED | OUTPATIENT
Start: 2021-10-20 | End: 2021-10-20

## 2021-10-20 RX ADMIN — LORAZEPAM 1 MG: 1 TABLET ORAL at 21:41

## 2021-10-20 RX ADMIN — DICLOFENAC SODIUM 4 G: 10 GEL TOPICAL at 19:43

## 2021-10-20 RX ADMIN — TRAZODONE HYDROCHLORIDE 50 MG: 50 TABLET ORAL at 21:42

## 2021-10-20 RX ADMIN — BUDESONIDE 500 MCG: 0.5 SUSPENSION RESPIRATORY (INHALATION) at 08:53

## 2021-10-20 RX ADMIN — BUSPIRONE HYDROCHLORIDE 5 MG: 5 TABLET ORAL at 06:24

## 2021-10-20 RX ADMIN — DIGOXIN 187.5 MCG: 125 TABLET ORAL at 09:24

## 2021-10-20 RX ADMIN — PREDNISONE 15 MG: 10 TABLET ORAL at 19:41

## 2021-10-20 RX ADMIN — PREDNISONE 15 MG: 10 TABLET ORAL at 09:27

## 2021-10-20 RX ADMIN — INSULIN LISPRO 2 UNITS: 100 INJECTION, SOLUTION INTRAVENOUS; SUBCUTANEOUS at 19:41

## 2021-10-20 RX ADMIN — DILTIAZEM HYDROCHLORIDE 60 MG: 60 TABLET, FILM COATED ORAL at 00:23

## 2021-10-20 RX ADMIN — FAMOTIDINE 20 MG: 20 TABLET, FILM COATED ORAL at 19:41

## 2021-10-20 RX ADMIN — DILTIAZEM HYDROCHLORIDE 60 MG: 60 TABLET, FILM COATED ORAL at 06:24

## 2021-10-20 RX ADMIN — FAMOTIDINE 20 MG: 20 TABLET, FILM COATED ORAL at 09:24

## 2021-10-20 RX ADMIN — DILTIAZEM HYDROCHLORIDE 60 MG: 60 TABLET, FILM COATED ORAL at 18:12

## 2021-10-20 RX ADMIN — DICLOFENAC SODIUM 4 G: 10 GEL TOPICAL at 17:02

## 2021-10-20 RX ADMIN — WARFARIN SODIUM 2.5 MG: 2.5 TABLET ORAL at 18:12

## 2021-10-20 RX ADMIN — INSULIN LISPRO 2 UNITS: 100 INJECTION, SOLUTION INTRAVENOUS; SUBCUTANEOUS at 17:02

## 2021-10-20 RX ADMIN — DICLOFENAC SODIUM 4 G: 10 GEL TOPICAL at 12:43

## 2021-10-20 RX ADMIN — INSULIN LISPRO 2 UNITS: 100 INJECTION, SOLUTION INTRAVENOUS; SUBCUTANEOUS at 06:24

## 2021-10-20 RX ADMIN — PYRIDOXINE HCL TAB 50 MG 50 MG: 50 TAB at 09:21

## 2021-10-20 RX ADMIN — INSULIN GLARGINE 20 UNITS: 100 INJECTION, SOLUTION SUBCUTANEOUS at 19:42

## 2021-10-20 RX ADMIN — TRAMADOL HYDROCHLORIDE 50 MG: 50 TABLET ORAL at 21:41

## 2021-10-20 RX ADMIN — BUDESONIDE 500 MCG: 0.5 SUSPENSION RESPIRATORY (INHALATION) at 20:58

## 2021-10-20 RX ADMIN — DILTIAZEM HYDROCHLORIDE 60 MG: 60 TABLET, FILM COATED ORAL at 12:43

## 2021-10-20 RX ADMIN — BUPROPION HYDROCHLORIDE 150 MG: 150 TABLET, EXTENDED RELEASE ORAL at 09:23

## 2021-10-20 ASSESSMENT — PAIN DESCRIPTION - PAIN TYPE
TYPE: CHRONIC PAIN

## 2021-10-20 ASSESSMENT — PAIN DESCRIPTION - LOCATION
LOCATION: KNEE
LOCATION: BACK
LOCATION: BACK

## 2021-10-20 ASSESSMENT — PAIN DESCRIPTION - ORIENTATION
ORIENTATION: LEFT;RIGHT
ORIENTATION: LOWER
ORIENTATION: LEFT;RIGHT
ORIENTATION: RIGHT;LEFT

## 2021-10-20 ASSESSMENT — PAIN - FUNCTIONAL ASSESSMENT: PAIN_FUNCTIONAL_ASSESSMENT: ACTIVITIES ARE NOT PREVENTED

## 2021-10-20 ASSESSMENT — PAIN DESCRIPTION - ONSET
ONSET: ON-GOING
ONSET: ON-GOING

## 2021-10-20 ASSESSMENT — PAIN DESCRIPTION - PROGRESSION
CLINICAL_PROGRESSION: NOT CHANGED
CLINICAL_PROGRESSION: NOT CHANGED

## 2021-10-20 ASSESSMENT — PAIN DESCRIPTION - DESCRIPTORS
DESCRIPTORS: ACHING

## 2021-10-20 ASSESSMENT — PAIN SCALES - GENERAL
PAINLEVEL_OUTOF10: 3
PAINLEVEL_OUTOF10: 4
PAINLEVEL_OUTOF10: 4
PAINLEVEL_OUTOF10: 5

## 2021-10-20 ASSESSMENT — PAIN DESCRIPTION - FREQUENCY
FREQUENCY: INTERMITTENT

## 2021-10-20 NOTE — PROGRESS NOTES
[place]  10/20/2021  [id number]    Chief Complaint: COVID-19 virus infection    Subjective:   Requests voltaren gel for knees. No other c/o. ROS: no n/v, cp, f/c  Objective:  Patient Vitals for the past 24 hrs:   BP Temp Temp src Pulse Resp SpO2 Height   10/20/21 0803 113/68   104  94 %    10/20/21 0615 117/70   76      10/20/21 0023 112/71   87      10/19/21 2015 114/70 98 °F (36.7 °C) Oral 105 18 96 %    10/19/21 2002      97 %    10/19/21 1315     16     10/19/21 1314       5' 2\" (1.575 m)   10/19/21 1239 (!) 143/66   116 20 97 %    10/19/21 1215    100 20     10/19/21 1046 116/77   111 24 94 %      Gen: No distress, pleasant. HEENT: Normocephalic, atraumatic. CV: Regular rate and rhythm. Resp: No respiratory distress. Abd: Soft, nontender   Ext: No edema. Neuro: Alert, oriented, appropriately interactive.    Wt Readings from Last 3 Encounters:   10/18/21 256 lb (116.1 kg)   09/28/21 252 lb 8 oz (114.5 kg)   06/02/21 273 lb (123.8 kg)       Laboratory data:   Lab Results   Component Value Date    WBC 7.8 10/19/2021    HGB 9.6 (L) 10/19/2021    HCT 30.4 (L) 10/19/2021    MCV 77.4 (L) 10/19/2021     10/19/2021       Lab Results   Component Value Date     10/19/2021    K 4.5 10/19/2021    CL 98 10/19/2021    CO2 38 10/19/2021    BUN 14 10/19/2021    CREATININE 0.6 10/19/2021    GLUCOSE 182 10/19/2021    CALCIUM 8.9 10/19/2021        Therapy progress:  PT  Position Activity Restriction  Other position/activity restrictions: on 4L O2  Objective     Sit to Stand: Supervision  Stand to sit: Supervision  Bed to Chair: Supervision  Device: Rollator  Other Apparatus: Wheelchair follow, O2  Assistance: Contact guard assistance  Distance: 61 feet  OT  PT Equipment Recommendations  Equipment Needed: No  Other: has rollator at home and cane  Toilet - Technique: Ambulating (RW)  Assessment        SLP  Current Diet : Regular  Current Liquid Diet : Thin  Diet Solids Recommendation: Regular  Liquid Consistency Recommendation: Thin    Body mass index is 46.82 kg/m². Rehabilitation Diagnosis:  Neurologic, 3.8, Neuromuscular Disorders, e.g. Critical Illness Myopathy, Other Myopathy     Assessment and Plan:  CIM  - PT/OT, dietary support     COVID pneumonia  - completed isolation  - supportive care     COPD  - nebs, supplemental oxygen  - 4L at baseline  - slowly wean steroids       Mood disorder  - buspar, wellbutrin    DM  - follow blood sugars as steroids wean     Bowels: Schedule stool softener. Follow bowel movements. Enema or suppository if needed.      Bladder: Check PVR x 3. [facility] if PVR > 200ml or if any volume is > 500 ml.      Sleep: Trazodone provided prn. Morbid obesity. Dietary consulted.      Follow up appointments: PCP  VANDANA: 10/30 home w/ home health  DME: rollator    Interdisciplinary team conference was held today with entire rehab treatment team including PT, OT, SLP, Dietician, RN, and SW. Discussion focused on progress toward rehab goals and discharge planning. Barriers: weakness, balance, endurance. Total treatment time >35 min with greater than 50% spent in care coordination. Moi Villegas MD 10/20/2021, 8:37 AM

## 2021-10-20 NOTE — PROGRESS NOTES
Occupational Therapy  Facility/Department: [facility]  Daily Treatment Note  NAME: Nicolette Mathews  : 1940  MRN: [id number]    Date of Service: 10/20/2021    Discharge Recommendations:  Home with assist PRN, Home with Home health OT  OT Equipment Recommendations  Other: CTA    Assessment   Performance deficits / Impairments: Decreased functional mobility ; Decreased high-level IADLs;Decreased ADL status; Decreased endurance;Decreased balance;Decreased safe awareness;Decreased strength  Assessment: Pt was supine in bed and agreeable for therapy. Pt req supervision for supine to EOB. Pt req SBA for amb with 4WW and CGA for toileting. Pt was educated on the use of AE for moon-care. Pt req set up and supervision for seated grooming task. Pt was educated on the use of sock aid and req CGA for donning socks and Rachell for aurelio shoes, pt req no assistace for doffing shoes. Pt req CGA for standing IADL task (hanging clothing with increased time for buttoning/unbuttoning but no LOB and good endurance). Pt req CGA for amb with 4WW from therapy gym to room. Pt was left in bed with bed alarm, call light, and phone. Cont OT POC. Treatment Diagnosis: Weakness (post COVID)  Prognosis: Good  OT Education: OT Role;Plan of Care;Precautions;Transfer Training;Energy Conservation; ADL Adaptive Strategies  Patient Education: Pt was educated on AE for toileting, use of sock aid. Barriers to Learning: Decreased safety awareness. REQUIRES OT FOLLOW UP: Yes  Activity Tolerance  Activity Tolerance: Patient Tolerated treatment well;Patient limited by fatigue  Activity Tolerance: Pt on 4L of O2, NT=522/78, GQ=613, O2=94%  Safety Devices  Safety Devices in place: Yes  Type of devices: All fall risk precautions in place;Call light within reach; Patient at risk for falls; Left in bed;Bed alarm in place  Restraints  Initially in place: No         Patient Diagnosis(es): There were no encounter diagnoses.       has a past medical history of Abnormal chest CT, Abnormal CXR, Acute exacerbation of chronic obstructive pulmonary disease (COPD) (HCC), Acute on chronic respiratory failure (HCC), Acute on chronic respiratory failure with hypoxia (HCC), Anticoagulant long-term use, Atrial fibrillation (HCC), Atrial fibrillation with rapid ventricular response (HCC), B-cell lymphoma ([facility] [address] CAP (community acquired pneumonia), Carotid artery stenosis, CHF (congestive heart failure) ([facility] [address] Chronic back pain, Congenital heart disease, COVID-19, Depression, Diverticulitis, Diverticulitis of colon, Dizziness, Fistula, Gram-negative pneumonia ([facility] [address] HCAP (healthcare-associated pneumonia), Hx of blood clots, Hyperlipidemia, Hypertension, IFG (impaired fasting glucose), Infection due to Stenotrophomonas maltophilia, Non-Hodgkins lymphoma ([facility] [address] Obesity, Panic disorder, Peripheral vascular disease ([facility] [address] Primary osteoarthritis of both knees, Pulmonary embolism ([facility] [address] Rectal vaginal fistula, Restless legs syndrome, [facility] spotted fever, Sciatic nerve pain, Sepsis ([facility] [address] Sleep apnea, Swelling of limb, Tobacco abuse, Urinary tract infection in female, and Vitamin D deficiency. has a past surgical history that includes Appendectomy (1963); partial hysterectomy (cervix not removed) (1980); hernia repair; bronchoscopy (N/A, 8/12/2019); bronchoscopy (8/12/2019); bronchoscopy (8/12/2019); bronchoscopy (8/12/2019); bronchoscopy (8/12/2019); bronchoscopy (8/12/2019); Tunneled venous port placement (Left, 01/17/2020); [facility] Surgery (N/A, 1/17/2020); Cardiac catheterization (2003); Colonoscopy (N/A, 9/25/2019); and Tonsillectomy (1965).     Restrictions  Restrictions/Precautions  Restrictions/Precautions: Fall Risk, General Precautions  Position Activity Restriction  Other position/activity restrictions: on 4L O2  Subjective   General  Chart Reviewed: Yes, Progress Notes, History and Physical, Labs  Additional Pertinent Hx: has a past medical history of Abnormal chest CT, Abnormal CXR, Acute exacerbation of chronic obstructive pulmonary disease (COPD) (HCC), Acute on chronic respiratory failure (HCC), Acute on chronic respiratory failure with hypoxia (HCC), Anticoagulant long-term use, Atrial fibrillation ([address] Atrial fibrillation with rapid ventricular response ([address] B-cell lymphoma ([address] CAP (community acquired pneumonia), Carotid artery stenosis, CHF (congestive heart failure) ([address] Chronic back pain, Congenital heart disease, COVID-19, Depression, Diverticulitis, Diverticulitis of colon, Dizziness, Fistula, Gram-negative pneumonia ([address] HCAP (healthcare-associated pneumonia), Hx of blood clots, Hyperlipidemia, Hypertension, IFG (impaired fasting glucose), Infection due to Stenotrophomonas maltophilia, Non-Hodgkins lymphoma ([address] Obesity, Panic disorder, Peripheral vascular disease ([address] Primary osteoarthritis of both knees, Pulmonary embolism ([address] Rectal vaginal fistula, Restless legs syndrome, [facility] spotted fever, Sciatic nerve pain, Sepsis ([address] Sleep apnea, Swelling of limb, Tobacco abuse, Urinary tract infection in female, and Vitamin D deficiency. Response to previous treatment: Patient with no complaints from previous session  Family / Caregiver Present: No  Referring Practitioner: Dr. Jasen Plasencia  Diagnosis: Post COVID  Subjective  Subjective: Pt supine in bed and agreeable for OT eval.  Pain Assessment  Pain Location: Knee  Pain Orientation: Left;Right  Pre Treatment Pain Screening  Intervention List: Patient able to continue with treatment  Vital Signs  Patient Currently in Pain: Yes (At times pt mentions that her knees are in pain.)   Orientation  Orientation  Overall Orientation Status: Within Normal Limits  Objective    ADL  Grooming: Setup;Supervision (For brushing hair while seated.)  Toileting: Contact guard assistance  Instrumental ADL's  Instrumental ADLs: Yes  Light Housekeeping  Light Housekeeping: Pt stood ~3 minutes while hanging clothes. Balance  Sitting Balance: Stand by assistance  Standing Balance: Contact guard assistance  Standing Balance  Time: ~1 minute x 2, ~ 30 secs, ~3 minutes x 2  Activity: Amb to and from the bathroom with RW and CGA, standing for toileting, standing for IADL task, amb back to the room. Functional Mobility  Functional - Mobility Device: 4-Wheeled Walker  Activity: To/from bathroom (Back from the therapy gym.)  Assist Level: Contact guard assistance  Toilet Transfers  Toilet - Technique: Ambulating  Equipment Used: Standard toilet  Toilet Transfer: Contact guard assistance  Wheelchair Bed Transfers  Wheelchair/Bed - Technique: Ambulating (4WD)  Level of Asssistance: Contact guard assistance  Bed mobility  Supine to Sit: Supervision  Sit to Supine: Supervision  Scooting: Supervision  Transfers  Sit to stand: Stand by assistance  Stand to sit: Stand by assistance        Coordination  Gross Motor: Pt demonstrates fair GMC when amb with 4WW. Fine Motor: Pt demonstrates good [address] when brushing hair and hanging clothes. Cognition  Overall Cognitive Status: Exceptions  Arousal/Alertness: Appropriate responses to stimuli  Following Commands:  Follows one step commands consistently  Attention Span: Appears intact  Memory: Decreased short term memory  Safety Judgement: Decreased awareness of need for assistance;Decreased awareness of need for safety  Problem Solving: Assistance required to correct errors made  Insights: Decreased awareness of deficits  Initiation: Does not require cues  Sequencing: Requires cues for some                                         Plan   Plan  Times per week: 5/7 days  Times per day: Daily  Plan weeks: 7-10 days  Current Treatment Recommendations: Strengthening, Safety Education & Training, Balance Training, Self-Care / ADL, Home Management Training, Equipment Evaluation, Education, & procurement, Endurance Training, Functional Mobility Training, Patient/Caregiver Education & Training Goals  Short term goals  Time Frame for Short term goals: 10/22/21  Short term goal 1: Pt will complete toileting while using LRAD with SBA. Short term goal 2: Pt will complete TB bathing with set up and SBA. Short term goal 3: Pt will complete grooming task while standing for at least 3 minutes with SBA. Short term goal 4: Pt will complete total body dressing with LRAD and SBA. Long term goals  Time Frame for Long term goals : 10/27/21  Long term goal 1: Pt will complete toileting task with Ivana. Long term goal 2: Pt will complete tub/shower transfer with supervision and LRAD. Long term goal 3: Pt will complete TB bathing with supervision. Long term goal 4: Pt will complete IADL task with SBA. Long term goal 5: Pt will complete TB dressing using LRAD and Ivana. Patient Goals   Patient goals : \"To get more strength in my legs. \"       Therapy Time   Individual Concurrent Group Co-treatment   Time In 1330         Time Out 1430         Minutes 60         Timed Code Treatment Minutes: Julius Gutierrez S/OT  Cosigned by Tiffanie Nelson OTR/L

## 2021-10-20 NOTE — PLAN OF CARE
Problem: Falls - Risk of:  Goal: Will remain free from falls  Description: Will remain free from falls  Outcome: Ongoing  Note: Fall risk band on patient. Non skid footwear in place. Alarms used appropriately. Side rails X 4 per pt request. Patient instructed to call and wait for staff before getting up. Rounding done to anticipate needs. Appropriate safety devices used for transfers. Pt denies further needs at this time. Nursing will continue to monitor for changes.

## 2021-10-20 NOTE — PROGRESS NOTES
Speech Language Pathology  [facility]: ACUTE REHAB UNIT  SPEECH-LANGUAGE PATHOLOGY      [x] Daily  [] Weekly Care Conference Note  [] Discharge    Patient:Raven Jara      :1940  FXA:[id number]  Rehab Dx/Hx: COVID-19 virus infection [U07.1]    Precautions: falls  Home situation: Lives at home indep. Manages finances and meds; cooks and does the laundry. Active . ST Dx: [] Aphasia  [] Dysarthria  [] Apraxia   [] Oropharyngeal dysphagia [x] Cognitive Impairment  [] Other:   Date of Admit: 10/18/2021  Room #: 0153/0153-01    Current functional status (updated daily):         Pt being seen for : [] Speech/Language Treatment  [] Dysphagia Treatment [x] Cognitive Treatment  [] Other:  Communication: [x]WFL  [] Aphasia  [] Dysarthria  [] Apraxia  [] Pragmatic Impairment [] Non-verbal  [] Hearing Loss  [] Other:   Cognition: [] WFL  [x] Mild  [] Moderate  [] Severe [] Unable to Assess  [] Other:  Memory: [] WFL  [x] Mild  [] Moderate  [] Severe [] Unable to Assess  [] Other:  Behavior: [x] Alert  [x] Cooperative  [x]  Pleasant  [] Confused  [] Agitated  [] Uncooperative  [] Distractible [] Motivated  [] Self-Limiting [] Anxious  [] Other:  Endurance:  [x] Adequate for participation in SLP sessions  [] Reduced overall  [] Lethargic  [] Other:  Safety: [x] No concerns at this time  [] Reduced insight into deficits  []  Reduced safety awareness [] Not following call light procedures  [] Unable to Assess  [] Other:    Current Diet Order:ADULT DIET; Regular; 4 carb choices (60 gm/meal)  Adult Oral Nutrition Supplement; Diabetic Oral Supplement  Swallowing Precautions:  Alternate solids and liquids;Eat/Feed slowly;Upright as possible for all oral intake;Remain upright for 30-45 minutes after meals;Small bites/sips        Date: 10/20/2021      Tx session 1  1230 - 1330 Tx session 2  All tx needs met in session 1   Total Timed Code Min 60 0   Total Treatment Minutes 60 0   Individual Treatment Minutes 60 0   Group Treatment Minutes 0 0   Co-Treat Minutes 0 0   Variance/Reason:  n/a n/a   Pain Back pain     Pain Intervention [] RN notified  [] Repositioned  [] Intervention offered and patient declined  [] N/A  [] Other: [] RN notified  [] Repositioned  [] Intervention offered and patient declined  [] N/A  [] Other:   Subjective     Pt alert and cooperative, agreeable to tx. Pt upright in bed for session. Objective:  Goals  Short-term Goals  Timeframe for Short-term Goals: 10 days (10/28/21)       Goal 1: Pt will complete recall tasks with 90% acc given min cues for use of compensatory memory strategies.     SLP edu pt re: the following external memory strategies:  -writing info down  -using a calendar and phone book   -setting timers    SLP edu pt re: the following internal memory strategies:  -repetition  -association  -visualization     Examples provided re: when pt can use these strategies, and then pt explained how she has used them PTA      Goal 2: Pt will complete executive function tasks (meds, money, math, time, etc) with 90% acc given min cues   Discussion re: finances:  -pt reported that all bills are automatically withdrawn from back account  -pt will provide a check for 6 months of rent to [employer]  -pt used bank card for groceries, and receives statement in the mail    Discussion re: med management:  -pt does not use a pill box  -pt keeps bills in their container, but has prescription info written on a paper  -on the paper, pt has written the following info: name of medication, how many pills, dosage, time of day to take it  -pt will ruth down on paper the first letter of day when she takes it (e.g. will write a M when takes in on Monday)     *of note, pt did state she does not always take an anxiety medication she is prescribed because she doesn't like \"all the chemicals in my stomach\"      Goal 3: Pt will complete problem solving and thought organization tasks with 90% acc given min cues [id number] [facility] #[id number]  Speech Language Pathologist

## 2021-10-20 NOTE — PROGRESS NOTES
Pharmacy Note  Warfarin Consult     Goal INR range 2-3  Home Warfarin dose:5 mg daily     Date                 INR                  Warfarin  10/18              1.87                  5 mg  10/19              2.51                  hold  10/20              2.56                  2.5 mg    Recommend Warfarin 2.5 mg tonight x1. Daily INR ordered. Rx will continue to manage therapy per consult order.   Susan Marino, PharmD  10/20/2021 at 9:55 AM

## 2021-10-20 NOTE — PROGRESS NOTES
Physical Therapy  Facility/Department: [facility]  Daily Treatment Note  NAME: Beth Tadeo  : 1940  MRN: [id number]    Date of Service: 10/20/2021    Discharge Recommendations: home with prn assist           Assessment  Patient seen for split session PT with cues for gait safety with rollator for upright posture and WC follow with monitored vitals with walking with distances increased to 81 feet. PT seen for transfer training with cues to not walk away from but align to transfer surface and use breaks. PT very easily sob with decreased but stable BP during second session. PT educated in cued for and performed with set up assist and cues for exercises angles therex to improve respiratory Mm endurance and core strength with patient able to complete all activity with frequent rest breaks on 4L O2 via nc with pt returned to wall mount O2 after each session. Noted overall increase in amount walked during sessions this date as compared to day prior. Patient engaged throughout each session and completed tasks with encouragement. Encouraged pt re:OOB activity as pt returns to bed after second session in the am.            Patient Diagnosis(es): There were no encounter diagnoses.      has a past medical history of Abnormal chest CT, Abnormal CXR, Acute exacerbation of chronic obstructive pulmonary disease (COPD) ([address] Acute on chronic respiratory failure (HCC), Acute on chronic respiratory failure with hypoxia (HCC), Anticoagulant long-term use, Atrial fibrillation ([address] Atrial fibrillation with rapid ventricular response ([address] B-cell lymphoma ([address] CAP (community acquired pneumonia), Carotid artery stenosis, CHF (congestive heart failure) ([address] Chronic back pain, Congenital heart disease, COVID-19, Depression, Diverticulitis, Diverticulitis of colon, Dizziness, Fistula, Gram-negative pneumonia ([address] HCAP (healthcare-associated pneumonia), Hx of blood clots, Hyperlipidemia, Hypertension, IFG seated  Rest due to dyspnea, set up for breakfast with chair alarm engaged and call light in reach. Patient perfromed dynamic standing balance activity for 4 minutes x2 of using toilet tongs  With set up assist and instruction for use for moon care post bm as patient states she, \"cannot reach back there. .my arms are too short. \"          Exercises  Heelslides: x30 BLE  Hip Abduction: x10 sidelying BLE  and x30 BLE supien simultaneous hip abd/add  Ankle Pumps: x30 BLE     Education:   Educated patient in need to align walker and not walk away from walker during transfers, educated in use of toilet tongs for pericare during standing dynamic balance activity of pericare with patient stabilizing 1UE on rollator seat and using tongs in standing with cues only and patient verbalizing understanding and requiring intermittent cues. Disposition:Patient with call light in reach and alarm in place at end of session with patient in chair          SECOND SESSION:    Subjective:  Pain in low back 4/10, patient notes her BP has been lower after this morning's first therapy session   Vitals seated BP RUE sitting 103/69 HR 91bpm, SpO2 on 4L moist air via nc 98%, RR 20/minute  THEREX: to increase core stability and respiratory endurance required cues and rest breaks after each set due to dyspnea on 4L O@ able to maintain O2 in 90's% spO2 with therex. Seated chest pulls 2x15 yellow band   Standing shoulder elvation/flexion to 90 degrees to 0 degrees flexion with elbows straight 1x15 yellow band   Standing rows x15 yellow ban     BED MOBILITY:  Sup<sit independent. Patient asked for four rails up and blanket given blanket and  PCA getting patient additional pillow case.   Explained to patient 4 rails is considered restraint (patient with 3 rails up)  Rolling indep L/R     TRANSFERS:  Sit<>stand x5 from WC with SBA today across trials with intermittent cues to reach u    GAIT: Patient with assist of 1 WC follow with all gait with exception of in room distances (10 feet)  Patient walks 61 feet, 80feet, 75, 81 feet  wc follow rollater 4L O2 pt manages O2 tubing , needed WC follow as had to sit suddenly and needed occasional less than 1 minute standing rest breaks with SBA to CGA as pt with increased sob and decreased steadiness with gait with patient attempting to push walker on forearms with flexion in trunk cued for WB on hands and to walk safe distance from walker. After first walk   BP seated 151/82  bpm irregular  ((manual) (2% SpO2 requires 3 minutes seated PLB to recover from dyspnea    After second walk  SpO2 87%  bpm with rebound to 90% after 30 seconds seated rest requires 3 minutes seated PLB to recover from dyspnea,   After third walk  SpO2 92%  bpm requires 3 minutes seated rest  PLB to recover from dyspnea,   After fourth walk SpO2 93%requires 3 minutes seated rest  PLB to recover from dyspnea  Patient also walked from chair>toilet to bed with patient using toilet with CGA and switch to wall unit O2 with cues for hand brakes with transfer and SBA to CGA  Due to sob with mobility. SBA for pericare after patient used toilet (urinated)  Education:   Educated patient in need to align walker and not walk away from walker during transfers, to not lean on forearms on walker with fatigue as pt pushes walker too far away but to take standing rest with patient verbalizing understanding and requiring intermittent cues. Disposition:Patient with call light in reach and alarm in place at end of session with patient in bed (pt requests to return to bed)     Goals  Short term goals  Time Frame for Short term goals: Patient demonstrates indep in bed mobility.   Short term goal 1: 10/26/2021  Short term goal 2: Patient demonstrates 150 feet with SpO2 90% or greater on O2 via nc. with rollator  Short term goal 3: Patient demonstrates 5 minutes of standing dynamic balance activities to improve dynamic balance with gait with SPO2 90% or greater. Short term goal 4: Patient demonstrates indep in core and DBE in sitting and standing. Short term goal 5: Patient demonstrates safe indep transfers bed<>chair, car transfer, with use of rollator . Long term goals  Time Frame for Long term goals : 11/2/2021  Long term goal 1: Patient demonstrates safe indep stoop to recover shoe from floor wtih use of rollator. .  Long term goal 2: Patiend demonstrates gait 150 feet ROSAS with use of rollator and spO2 90% or greater  Long term goal 3: Pt demo up and down 3-4 steps indep with use of cane and rail to safely enter and egress home. Long term goal 4: Pt indep in  hep LE therex to increase LE endurance/functional strength with walking. Patient Goals   Patient goals : \" To get my legs stronger\"    Plan    Plan  Times per week: 5/7 days/week  Times per day: Daily  Plan weeks: 14 days  Current Treatment Recommendations: Transfer Training, Strengthening, Balance Training, Gait Training, Stair training, Functional Mobility Training, Safety Education & Training  Safety Devices  Type of devices:  All fall risk precautions in place, Bed alarm in place, Left in bed, Patient at risk for falls, Call light within reach     Therapy Time   Individual Concurrent Group Co-treatment   Time In 0800         Time Out 0830         Minutes 30              Second Session Therapy Time:   Individual Concurrent Group Co-treatment   Time In 0930         Time Out 1030         Minutes 60           Timed Code Treatment Minutes:  60    Total Treatment Minutes:  1161 John Davis, PT

## 2021-10-21 LAB
ANION GAP SERPL CALCULATED.3IONS-SCNC: 11 MMOL/L (ref 3–16)
ANISOCYTOSIS: ABNORMAL
BANDED NEUTROPHILS RELATIVE PERCENT: 12 % (ref 0–7)
BASOPHILS ABSOLUTE: 0.1 K/UL (ref 0–0.2)
BASOPHILS RELATIVE PERCENT: 1 %
BUN BLDV-MCNC: 15 MG/DL (ref 7–20)
CALCIUM SERPL-MCNC: 8.7 MG/DL (ref 8.3–10.6)
CHLORIDE BLD-SCNC: 98 MMOL/L (ref 99–110)
CO2: 34 MMOL/L (ref 21–32)
CREAT SERPL-MCNC: 0.7 MG/DL (ref 0.6–1.2)
DOHLE BODIES: PRESENT
EOSINOPHILS ABSOLUTE: 0 K/UL (ref 0–0.6)
EOSINOPHILS RELATIVE PERCENT: 0 %
GFR AFRICAN AMERICAN: >60
GFR NON-AFRICAN AMERICAN: >60
GLUCOSE BLD-MCNC: 114 MG/DL (ref 70–99)
GLUCOSE BLD-MCNC: 161 MG/DL (ref 70–99)
GLUCOSE BLD-MCNC: 165 MG/DL (ref 70–99)
GLUCOSE BLD-MCNC: 166 MG/DL (ref 70–99)
GLUCOSE BLD-MCNC: 176 MG/DL (ref 70–99)
HCT VFR BLD CALC: 31 % (ref 36–48)
HEMOGLOBIN: 9.7 G/DL (ref 12–16)
HYPOCHROMIA: ABNORMAL
INR BLD: 2 (ref 0.88–1.12)
LYMPHOCYTES ABSOLUTE: 1.8 K/UL (ref 1–5.1)
LYMPHOCYTES RELATIVE PERCENT: 21 %
MACROCYTES: ABNORMAL
MCH RBC QN AUTO: 24.4 PG (ref 26–34)
MCHC RBC AUTO-ENTMCNC: 31.2 G/DL (ref 31–36)
MCV RBC AUTO: 78.3 FL (ref 80–100)
METAMYELOCYTES RELATIVE PERCENT: 1 %
MICROCYTES: ABNORMAL
MONOCYTES ABSOLUTE: 0.6 K/UL (ref 0–1.3)
MONOCYTES RELATIVE PERCENT: 7 %
MYELOCYTE PERCENT: 1 %
NEUTROPHILS ABSOLUTE: 6 K/UL (ref 1.7–7.7)
NEUTROPHILS RELATIVE PERCENT: 57 %
OVALOCYTES: ABNORMAL
PDW BLD-RTO: 18.7 % (ref 12.4–15.4)
PERFORMED ON: ABNORMAL
PLATELET # BLD: 396 K/UL (ref 135–450)
PMV BLD AUTO: 6.2 FL (ref 5–10.5)
POTASSIUM REFLEX MAGNESIUM: 4.2 MMOL/L (ref 3.5–5.1)
PROTHROMBIN TIME: 23.3 SEC (ref 9.9–12.7)
RBC # BLD: 3.95 M/UL (ref 4–5.2)
SODIUM BLD-SCNC: 143 MMOL/L (ref 136–145)
TOXIC GRANULATION: PRESENT
WBC # BLD: 8.4 K/UL (ref 4–11)

## 2021-10-21 PROCEDURE — 6360000002 HC RX W HCPCS: Performed by: PHYSICAL MEDICINE & REHABILITATION

## 2021-10-21 PROCEDURE — 85025 COMPLETE CBC W/AUTO DIFF WBC: CPT

## 2021-10-21 PROCEDURE — 85610 PROTHROMBIN TIME: CPT

## 2021-10-21 PROCEDURE — 97112 NEUROMUSCULAR REEDUCATION: CPT

## 2021-10-21 PROCEDURE — 6370000000 HC RX 637 (ALT 250 FOR IP): Performed by: PHYSICAL MEDICINE & REHABILITATION

## 2021-10-21 PROCEDURE — 97129 THER IVNTJ 1ST 15 MIN: CPT

## 2021-10-21 PROCEDURE — 97130 THER IVNTJ EA ADDL 15 MIN: CPT

## 2021-10-21 PROCEDURE — 97535 SELF CARE MNGMENT TRAINING: CPT

## 2021-10-21 PROCEDURE — 97110 THERAPEUTIC EXERCISES: CPT

## 2021-10-21 PROCEDURE — 1280000000 HC REHAB R&B

## 2021-10-21 PROCEDURE — 97530 THERAPEUTIC ACTIVITIES: CPT

## 2021-10-21 PROCEDURE — 2700000000 HC OXYGEN THERAPY PER DAY

## 2021-10-21 PROCEDURE — 80048 BASIC METABOLIC PNL TOTAL CA: CPT

## 2021-10-21 PROCEDURE — 94640 AIRWAY INHALATION TREATMENT: CPT

## 2021-10-21 PROCEDURE — 36415 COLL VENOUS BLD VENIPUNCTURE: CPT

## 2021-10-21 PROCEDURE — 94761 N-INVAS EAR/PLS OXIMETRY MLT: CPT

## 2021-10-21 PROCEDURE — 97116 GAIT TRAINING THERAPY: CPT

## 2021-10-21 RX ORDER — WARFARIN SODIUM 5 MG/1
5 TABLET ORAL
Status: COMPLETED | OUTPATIENT
Start: 2021-10-21 | End: 2021-10-21

## 2021-10-21 RX ADMIN — DILTIAZEM HYDROCHLORIDE 60 MG: 60 TABLET, FILM COATED ORAL at 05:31

## 2021-10-21 RX ADMIN — DICLOFENAC SODIUM 4 G: 10 GEL TOPICAL at 22:22

## 2021-10-21 RX ADMIN — TRAMADOL HYDROCHLORIDE 50 MG: 50 TABLET ORAL at 13:06

## 2021-10-21 RX ADMIN — BUPROPION HYDROCHLORIDE 150 MG: 150 TABLET, EXTENDED RELEASE ORAL at 08:28

## 2021-10-21 RX ADMIN — INSULIN LISPRO 1 UNITS: 100 INJECTION, SOLUTION INTRAVENOUS; SUBCUTANEOUS at 22:24

## 2021-10-21 RX ADMIN — BUSPIRONE HYDROCHLORIDE 5 MG: 5 TABLET ORAL at 05:31

## 2021-10-21 RX ADMIN — BUDESONIDE 500 MCG: 0.5 SUSPENSION RESPIRATORY (INHALATION) at 07:52

## 2021-10-21 RX ADMIN — WARFARIN SODIUM 5 MG: 5 TABLET ORAL at 18:49

## 2021-10-21 RX ADMIN — FAMOTIDINE 20 MG: 20 TABLET, FILM COATED ORAL at 22:23

## 2021-10-21 RX ADMIN — PREDNISONE 15 MG: 10 TABLET ORAL at 22:23

## 2021-10-21 RX ADMIN — FAMOTIDINE 20 MG: 20 TABLET, FILM COATED ORAL at 08:28

## 2021-10-21 RX ADMIN — DICLOFENAC SODIUM 4 G: 10 GEL TOPICAL at 13:58

## 2021-10-21 RX ADMIN — PYRIDOXINE HCL TAB 50 MG 50 MG: 50 TAB at 08:27

## 2021-10-21 RX ADMIN — PREDNISONE 15 MG: 10 TABLET ORAL at 08:27

## 2021-10-21 RX ADMIN — DIGOXIN 187.5 MCG: 125 TABLET ORAL at 08:27

## 2021-10-21 RX ADMIN — TRAZODONE HYDROCHLORIDE 50 MG: 50 TABLET ORAL at 22:24

## 2021-10-21 RX ADMIN — BUDESONIDE 500 MCG: 0.5 SUSPENSION RESPIRATORY (INHALATION) at 20:12

## 2021-10-21 RX ADMIN — DILTIAZEM HYDROCHLORIDE 60 MG: 60 TABLET, FILM COATED ORAL at 18:50

## 2021-10-21 RX ADMIN — INSULIN LISPRO 2 UNITS: 100 INJECTION, SOLUTION INTRAVENOUS; SUBCUTANEOUS at 16:52

## 2021-10-21 RX ADMIN — INSULIN GLARGINE 20 UNITS: 100 INJECTION, SOLUTION SUBCUTANEOUS at 22:24

## 2021-10-21 RX ADMIN — INSULIN LISPRO 2 UNITS: 100 INJECTION, SOLUTION INTRAVENOUS; SUBCUTANEOUS at 06:07

## 2021-10-21 RX ADMIN — FUROSEMIDE 20 MG: 20 TABLET ORAL at 08:28

## 2021-10-21 RX ADMIN — DILTIAZEM HYDROCHLORIDE 60 MG: 60 TABLET, FILM COATED ORAL at 13:57

## 2021-10-21 ASSESSMENT — PAIN DESCRIPTION - PAIN TYPE
TYPE: CHRONIC PAIN

## 2021-10-21 ASSESSMENT — PAIN DESCRIPTION - DESCRIPTORS
DESCRIPTORS: CRAMPING;BURNING;ACHING
DESCRIPTORS: ACHING

## 2021-10-21 ASSESSMENT — PAIN DESCRIPTION - ONSET: ONSET: ON-GOING

## 2021-10-21 ASSESSMENT — PAIN SCALES - GENERAL
PAINLEVEL_OUTOF10: 6
PAINLEVEL_OUTOF10: 6
PAINLEVEL_OUTOF10: 0
PAINLEVEL_OUTOF10: 8

## 2021-10-21 ASSESSMENT — PAIN DESCRIPTION - FREQUENCY: FREQUENCY: CONTINUOUS

## 2021-10-21 ASSESSMENT — PAIN DESCRIPTION - LOCATION
LOCATION: KNEE

## 2021-10-21 ASSESSMENT — PAIN DESCRIPTION - ORIENTATION
ORIENTATION: RIGHT;LEFT
ORIENTATION: LEFT;RIGHT

## 2021-10-21 NOTE — PROGRESS NOTES
Physical Therapy  Facility/Department: [facility]  Daily Treatment Note  NAME: Leila Acosta  : 1940  MRN: [id number]    Date of Service: 10/21/2021    Discharge Recommendations: home with prn assist for community mobility. Assessment Patient with continued activity related  sob and tachycardia/dyspnea with RR on exertion up to 40/minute. Pt progressed to 6 minutes standing blance activity and up to 110 feet of walking with overall increase in distances walked this day as compared to day prior with evidence of increased endurance. Pt with continued need for cues to safely manage rollator brakes, safety cues with walking close enough to FWW. Pt required cued for LE therex supine and sidelying and for standing exercises to increase dynamic standing balance/endruance. See updated goals. Encouraged pt to increase OOB activity to 8 hours or greater as tolerated for improved endurance and improved pulmonary toilet. Pt expectorating yellow phlegm during session. Patient Diagnosis(es): There were no encounter diagnoses.      has a past medical history of Abnormal chest CT, Abnormal CXR, Acute exacerbation of chronic obstructive pulmonary disease (COPD) ([address] Acute on chronic respiratory failure (HCC), Acute on chronic respiratory failure with hypoxia (HCC), Anticoagulant long-term use, Atrial fibrillation ([address] Atrial fibrillation with rapid ventricular response ([address] B-cell lymphoma ([address] CAP (community acquired pneumonia), Carotid artery stenosis, CHF (congestive heart failure) ([address] Chronic back pain, Congenital heart disease, COVID-19, Depression, Diverticulitis, Diverticulitis of colon, Dizziness, Fistula, Gram-negative pneumonia ([address] HCAP (healthcare-associated pneumonia), Hx of blood clots, Hyperlipidemia, Hypertension, IFG (impaired fasting glucose), Infection due to Stenotrophomonas maltophilia, Non-Hodgkins lymphoma ([address] Obesity, Panic disorder, Peripheral vascular disease ([facility] Primary osteoarthritis of both knees, Pulmonary embolism ([facility] Rectal vaginal fistula, Restless legs syndrome, [facility] spotted fever, Sciatic nerve pain, Sepsis ([facility] Sleep apnea, Swelling of limb, Tobacco abuse, Urinary tract infection in female, and Vitamin D deficiency. has a past surgical history that includes Appendectomy (1963); partial hysterectomy (cervix not removed) (1980); hernia repair; bronchoscopy (N/A, 8/12/2019); bronchoscopy (8/12/2019); bronchoscopy (8/12/2019); bronchoscopy (8/12/2019); bronchoscopy (8/12/2019); bronchoscopy (8/12/2019); Tunneled venous port placement (Left, 01/17/2020);  47 Surgery (N/A, 1/17/2020); Cardiac catheterization (2003); Colonoscopy (N/A, 9/25/2019); and Tonsillectomy (1965).     Restrictions  Restrictions/Precautions  Restrictions/Precautions: Fall Risk, General Precautions  Position Activity Restriction  Other position/activity restrictions: on 4L O2  Subjective   Pain Screening  Patient Currently in Pain: Yes  Pain Assessment  Pain Assessment: 0-10  Pain Type: Chronic pain  Pain Location: Knee  Pain Descriptors: Aching  Vital Signs  Pulse: 108  Resp: 20  BP: 131/65  BP Location: Left upper arm  Patient Position: Supine  Level of Consciousness: Alert (0)  Patient Currently in Pain: Yes  Oxygen Therapy  SpO2: 95 %  Pulse Oximeter Device Mode: Intermittent  O2 Device: Nasal cannula  O2 Flow Rate (L/min): 4 L/min       Orientation  Orientation  Overall Orientation Status: Within Normal Limits  Cognition      Objective   Bed mobility  Rolling to Left: Independent  Rolling to Right: Independent  Supine to Sit: Independent  Sit to Supine: Independent  Scooting: Independent  Transfers  Sit to Stand: Supervision  Stand to sit: Supervision  Bed to Chair: Supervision  Comment: needed cues to engage breaks before sitting and 1x to manage O2 tubing as it was wrapped behind and under foot  Ambulation  Ambulation?: Yes  More Ambulation?: Yes  Ambulation 1  Surface: level tile  Device: Rollator  Other Apparatus: Wheelchair follow;O2  Assistance: Stand by assistance  Quality of Gait: wide franky with increased tremulous quality in LE with fatiuge and tens to WB more heavily with flexion in trunk and hips with incresaed fatigue with WC follow  Shuffles gait on carpet with L/R turns and  Needs cues to walk closer to [facility],    Distance 110 feet x2   O2 sat 92% or greater post walk with 3 minutes seated rest needed with PLB due to sob post walking. Exercises  Bridging:  (x10 bridges)  Hip Abduction: x10 sidelying BLE  and x30 BLE supine  simultaneous hip abd/add  Other exercises  Other exercises 1: patient performed 6 minutes dynamic standing activity, shoulder abduction , shoulder flexion BUE, marching (knee pain limits LLErom) stepping with use of rollator walker with post activity sob requiring seated rest with SpO2 96% HR 96bpm.       3+ LE edema   Comment: complete assist to don LE compression stockings   Education:   Educated patient in safety techniques with gait and transfers  And need to slowly increase standing and sitting endurance ( recommended increasing OOB sitting time to 8 hours day) with patient needing intermittent cues. Disposition:Patient with call light in reach and alarm in place at end of session with patient in chair with LE elevated     SECOND SESSION:    Subjective:  Pain in knees BLE 7/10  On 4L O2 via nc during tx. At rest HR 108bpm, 96% SpO2  TRANSFERS:  Sit<>stand with SBA with rollator from toilet with grab bar, bed, chair dues to need to cue to engage brakes before sitting. GAIT:  Patient walks  With slow jaimie With wide franky, with fatigue tends to lean on forearms with increased shuffling gait and walker too far from trunk with need for cues to correct for safety.   Distances 110 feet x2  Shuffles gait on carpet with L/R turns( 10 feet of carpet)  and  Needs cues to walk closer to [facility],   Device rollator   Post walk 1 HR 100bpm, 96% SpO2 with sob requiring 3 minutes PLB for recover with RR 40/minute  Post walk 2 HR 100bpm, 95% [id number] sob requiring 3 minutes PLB for recover    Patient also walked with rollator into/out of bathroom 10 feet x2 SBA  and needed cues for managing O2 when turning. Education:   Educated patient in safety techniques with gait and transfers with patient needing intermittent cues. Disposition:Patient with call light in reach and alarm in place at end of session with patient in chair with LE elevated              Goals  Short term goals  Time Frame for Short term goals: Patient demonstrates indep in bed mobility. Short term goal 1: 10/26/2021  Short term goal 2: Patient demonstrates 150 feet with SpO2 90% or greater on O2 via nc. with rollator  Short term goal 3: Patient demonstrates 5 minutes of standing dynamic balance activities to improve dynamic balance with gait with SPO2 90% or greater. GOAL met 10/21/2021Patient demonstrates 6 minutes of standing dynamic balance activities to improve dynamic balance with gait with SPO2 90% or greater. Short term goal 4: Patient demonstrates indep in core and DBE in sitting and standing. Short term goal 5: Patient demonstrates safe indep transfers bed<>chair, car transfer, with use of rollator . Long term goals  Time Frame for Long term goals : 11/2/2021  Long term goal 1: Patient demonstrates safe indep stoop to recover shoe from floor wtih use of rollator. .  Long term goal 2: Patiend demonstrates gait 150 feet ROSAS with use of rollator and spO2 90% or greater  Long term goal 3: Pt demo up and down 3-4 steps indep with use of cane and rail to safely enter and egress home. Long term goal 4: Pt indep in  hep LE therex to increase LE endurance/functional strength with walking.   Patient Goals   Patient goals : \" To get my legs stronger\"    Plan    Plan  Times per week: 5/7 days/week  Times per day: Daily  Plan weeks: 14 days  Current Treatment Recommendations: Transfer Training, Strengthening, Balance Training, Gait Training, Stair training, Functional Mobility Training, Safety Education & Training  Safety Devices  Type of devices:  All fall risk precautions in place, Bed alarm in place, Left in bed, Patient at risk for falls, Call light within reach     Therapy Time   Individual Concurrent Group Co-treatment   Time In 0830         Time Out 0930         Minutes 60         Timed Code Treatment Minutes: 1000 [facility] Time:   Individual Concurrent Group Co-treatment   Time In 1030         Time Out 1100         Minutes 30           Timed Code Treatment Minutes:  30    Total Treatment Minutes:  90      Juan Pablo Barber PT

## 2021-10-21 NOTE — PLAN OF CARE
Problem: Falls - Risk of:  Goal: Will remain free from falls  Description: Will remain free from falls  10/20/2021 2226 by Berkley Rehman RN  Outcome: Ongoing    Problem: Pain:  Goal: Control of acute pain  Description: Control of acute pain  Outcome: Ongoing     Problem: Skin Integrity:  Goal: Will show no infection signs and symptoms  Description: Will show no infection signs and symptoms  Outcome: Ongoing

## 2021-10-21 NOTE — PROGRESS NOTES
Speech Language Pathology  [facility]: ACUTE REHAB UNIT  SPEECH-LANGUAGE PATHOLOGY      [x] Daily  [] Weekly Care Conference Note  [] Discharge    Patient:Raven Jaime Nurse      :1940  EUX:[id number]  Rehab Dx/Hx: COVID-19 virus infection [U07.1]    Precautions: falls  Home situation: Lives at home indep. Manages finances and meds; cooks and does the laundry. Active . ST Dx: [] Aphasia  [] Dysarthria  [] Apraxia   [] Oropharyngeal dysphagia [x] Cognitive Impairment  [] Other:   Date of Admit: 10/18/2021  Room #: 0153/0153-01    Current functional status (updated daily):         Pt being seen for : [] Speech/Language Treatment  [] Dysphagia Treatment [x] Cognitive Treatment  [] Other:  Communication: [x]WFL  [] Aphasia  [] Dysarthria  [] Apraxia  [] Pragmatic Impairment [] Non-verbal  [] Hearing Loss  [] Other:   Cognition: [] WFL  [x] Mild  [] Moderate  [] Severe [] Unable to Assess  [] Other:  Memory: [] WFL  [x] Mild  [] Moderate  [] Severe [] Unable to Assess  [] Other:  Behavior: [x] Alert  [x] Cooperative  [x]  Pleasant  [] Confused  [] Agitated  [] Uncooperative  [] Distractible [] Motivated  [] Self-Limiting [] Anxious  [] Other:  Endurance:  [x] Adequate for participation in SLP sessions  [] Reduced overall  [] Lethargic  [] Other:  Safety: [x] No concerns at this time  [] Reduced insight into deficits  []  Reduced safety awareness [] Not following call light procedures  [] Unable to Assess  [] Other:    Current Diet Order:ADULT DIET; Regular; 4 carb choices (60 gm/meal)  Adult Oral Nutrition Supplement; Diabetic Oral Supplement  Swallowing Precautions:  Alternate solids and liquids;Eat/Feed slowly;Upright as possible for all oral intake;Remain upright for 30-45 minutes after meals;Small bites/sips        Date: 10/21/2021      Tx session 1  [id number] Tx session 2  All tx needs met in session 1   Total Timed Code Min 60 0   Total Treatment Minutes 60 0   Individual Treatment Minutes 60 0 Group Treatment Minutes 0 0   Co-Treat Minutes 0 0   Variance/Reason:  n/a n/a   Pain None reported    Pain Intervention [] RN notified  [] Repositioned  [] Intervention offered and patient declined  [x] N/A  [] Other: [] RN notified  [] Repositioned  [] Intervention offered and patient declined  [] N/A  [] Other:   Subjective     Pt alert and oriented, cooperative and agreeable to participate in therapy. Pt seen sitting upright in bedside chair. Objective:  Goals  Short-term Goals  Timeframe for Short-term Goals: 10 days (10/28/21)       Goal 1: Pt will complete recall tasks with 90% acc given min cues for use of compensatory memory strategies. Functional recall of a picture:  -100% acc indep    Short term recall of the same picture after a 5 min delay:  -90% acc indep improving to 100% acc given min cues    Recall assessed during mental flexibility/scottie card task:  -mod-max cues required to recall rule of task and what cards were appropriate to play      Goal 2: Pt will complete executive function tasks (meds, money, math, time, etc) with 90% acc given min cues   Money word problems:  -ex: does one five dollar bill and six quarters equal the same as $6.25?  -73% acc indep improving to 100% acc given mod cues       Goal 3: Pt will complete problem solving and thought organization tasks with 90% acc given min cues   Thought organization targeted during mental flexibility/scottie card task:  -pt required mod-max cues to play appropriate cards based on color, numbers, symbols (increased cueing required d/t reduced recall)  -pt appropriately took turns throughout task      Goal 4: Pt will complete verbal and visual reasoning tasks with 90% acc given min cues. Did not target.      Other areas targeted: N/A    Education:   Edu provided re: rationale for tx tasks provided, compensatory strategies      Safety Devices: [x] Call light within reach  [x] Chair alarm activated  [] Bed alarm activated  [x] Other: son present at bedside  [] Call light within reach  [] Chair alarm activated  [] Bed alarm activated  [] Other:    Assessment: Pt pleasant and cooperative, agreeable to participate. Pt completed scottie card task (recalling rules appropriately) given mod-max cues. Pt demonstrated increased difficulty with recall throughout duration of this task. However, during a functional and short term recall task of a picture, pt demonstrated % acc given min cues. Pt also completed money word problems with 73% acc indep, improving to 100% acc given mod cues. Pt questioning purpose of tx tasks provided, SLP provided education regarding results of initial assessment, current goals, and POC. Continue goals listed above. Plan: Continue as per plan of care. Additional Information:     Barriers toward progress: None   Discharge recommendations:  [] Home independently  [] Home with assistance []  24 hour supervision  [] ECF [x] Other: See PT/OT recs  Continued Tx Upon Discharge: ? [] Yes [x] No [] TBD based on progress while on ARU [] Vital Stim indicated [] Other:   Estimated discharge date: 10/30/2021    Interventions used this date:  [] Speech/Language Treatment  [] Instruction in HEP [] Group [] Dysphagia Treatment [x] Cognitive Treatment   [] Other: Total Time Breakdown / Charges    Time in Time out Total Time / units   Cognitive Tx 1330 1430 60 min / 4 units    Speech Tx -- -- --   Dysphagia Tx -- -- --       Electronically Signed by    Briana Tomas. DAREN CCC-SLP  Speech-Language Pathologist  [id number]

## 2021-10-21 NOTE — PROGRESS NOTES
[place]  10/21/2021  [id number]    Chief Complaint: COVID-19 virus infection    Subjective:   Voltaren helping knees. No new issues. ROS: no n/v, cp, f/c  Objective:  Patient Vitals for the past 24 hrs:   BP Temp Temp src Pulse Resp SpO2   10/21/21 0838 131/65   108 20 95 %   10/21/21 0815 109/72 98.2 °F (36.8 °C) Oral 112 18 95 %   10/21/21 0753      95 %   10/21/21 0530 122/70   96 20 93 %   10/20/21 2300 (!) 92/58   110     10/20/21 2059      95 %   10/20/21 1930 (!) 140/72 97.8 °F (36.6 °C) Oral 104 18 95 %   10/20/21 1800 125/68   107     10/20/21 1230 (!) 140/76   108       Gen: No distress, pleasant. HEENT: Normocephalic, atraumatic. CV: Regular rate and rhythm. Resp: No respiratory distress. Abd: Soft, nontender   Ext: No edema. Neuro: Alert, oriented, appropriately interactive.    Wt Readings from Last 3 Encounters:   10/18/21 256 lb (116.1 kg)   09/28/21 252 lb 8 oz (114.5 kg)   06/02/21 273 lb (123.8 kg)       Laboratory data:   Lab Results   Component Value Date    WBC 8.4 10/21/2021    HGB 9.7 (L) 10/21/2021    HCT 31.0 (L) 10/21/2021    MCV 78.3 (L) 10/21/2021     10/21/2021       Lab Results   Component Value Date     10/21/2021    K 4.2 10/21/2021    CL 98 10/21/2021    CO2 34 10/21/2021    BUN 15 10/21/2021    CREATININE 0.7 10/21/2021    GLUCOSE 161 10/21/2021    CALCIUM 8.7 10/21/2021        Therapy progress:  PT  Position Activity Restriction  Other position/activity restrictions: on 4L O2  Objective     Sit to Stand: Supervision  Stand to sit: Supervision  Bed to Chair: Supervision  Device: Rollator  Other Apparatus: Wheelchair follow, O2  Assistance: Stand by assistance  Distance: 61 feet  OT  PT Equipment Recommendations  Equipment Needed: No  Other: has rollator at home and cane  Toilet - Technique: Ambulating  Equipment Used: Standard toilet  Assessment        SLP  Current Diet : Regular  Current Liquid Diet : Thin  Diet Solids Recommendation: Regular  Liquid Consistency Recommendation: Thin    Body mass index is 46.82 kg/m². Rehabilitation Diagnosis:  Neurologic, 3.8, Neuromuscular Disorders, e.g. Critical Illness Myopathy, Other Myopathy     Assessment and Plan:  CIM  - PT/OT, dietary support     COVID pneumonia  - completed isolation  - supportive care     COPD  - nebs, supplemental oxygen  - 4L at baseline  - slowly wean steroids       Mood disorder  - buspar, wellbutrin    DM  - follow blood sugars as steroids wean     Bowels: Schedule stool softener. Follow bowel movements. Enema or suppository if needed.      Bladder: Check PVR x 3. [address] if PVR > 200ml or if any volume is > 500 ml.      Sleep: Trazodone provided prn. Morbid obesity. Dietary consulted.      Follow up appointments: PCP  VANDANA: 10/30 home w/ home health  DME: danita Car MD 10/21/2021, 10:34 AM

## 2021-10-21 NOTE — CARE COORDINATION
Team Conference Note 10/20/21    VANDANA:10/30/21  Plan:home with home care  DME:none    Note:  Per team conference anticipate d/c  10/30/21. Spoke to patient and son. They are notified and agreeable to d/c plan. Patient has rollator, SC and home oxygen. Patient is agreeable to home care and prefers [facility]. No other needs at this time.   Electronically signed by KRISTY Alaniz LISW-S on 10/21/2021 at 2:44 PM.

## 2021-10-21 NOTE — PROGRESS NOTES
Pharmacy Note  Warfarin Consult     Goal INR range 2-3  Home Warfarin dose:5 mg daily     Date                 INR                  Warfarin  10/18              1.87                  5 mg  10/19              2.51                  hold  10/20              2.56                  2.5 mg  10/21              2.00                  5 mg    Recommend Warfarin 5 mg tonight x1. Daily INR ordered. Rx will continue to manage therapy per consult order.   Justin Ball PharmD 10/21/21 11:07 AM

## 2021-10-21 NOTE — PROGRESS NOTES
Occupational Therapy  Facility/Department: [facility]  Daily Treatment Note  NAME: Miguel Martinez  : 1940  MRN: [id number]    Date of Service: 10/21/2021    Discharge Recommendations:  Home with assist PRN, Home with Home health OT  OT Equipment Recommendations  Other: CTA    Assessment   Performance deficits / Impairments: Decreased functional mobility ; Decreased high-level IADLs;Decreased ADL status; Decreased endurance;Decreased balance;Decreased safe awareness;Decreased strength  Assessment: Pt supine in recliner and agreeable for therapy. Pt req SBA for sit<>stand. Pt req CGA while amb with 4WW to and from the bathroom and for toilet transfer. Pt req SBA for toileting task with using AE for moon-care. Pt able to aurelio sock with sockaid and SBA. Pt req set up and supervision for UB dressing and CGA for LB dressing. Pt completed grooming tasks at the sink while sitting with set up and supervision. Pt Pt left in the recliner with chair alarm, call light, and phone. Cont OT POC. Treatment Diagnosis: Weakness (post COVID)  Prognosis: Good  OT Education: OT Role;Plan of Care;Precautions;Transfer Training;Energy Conservation; ADL Adaptive Strategies  Patient Education: Pt was educated on AE for toileting, use of sock aid. Barriers to Learning: Decreased safety awareness. REQUIRES OT FOLLOW UP: Yes  Activity Tolerance  Activity Tolerance: Patient Tolerated treatment well;Patient limited by pain  Activity Tolerance: Pt on 4L and nurse called in for meds. Safety Devices  Safety Devices in place: Yes  Type of devices: All fall risk precautions in place;Call light within reach; Patient at risk for falls; Chair alarm in place; Left in chair;Gait belt  Restraints  Initially in place: No         Patient Diagnosis(es): There were no encounter diagnoses.       has a past medical history of Abnormal chest CT, Abnormal CXR, Acute exacerbation of chronic obstructive pulmonary disease (COPD) ([facility] [address] Acute on chronic respiratory failure ([address] Acute on chronic respiratory failure with hypoxia ([address] Anticoagulant long-term use, Atrial fibrillation ([address] Atrial fibrillation with rapid ventricular response ([address] B-cell lymphoma ([address] CAP (community acquired pneumonia), Carotid artery stenosis, CHF (congestive heart failure) ([address] Chronic back pain, Congenital heart disease, COVID-19, Depression, Diverticulitis, Diverticulitis of colon, Dizziness, Fistula, Gram-negative pneumonia ([address] HCAP (healthcare-associated pneumonia), Hx of blood clots, Hyperlipidemia, Hypertension, IFG (impaired fasting glucose), Infection due to Stenotrophomonas maltophilia, Non-Hodgkins lymphoma ([address] Obesity, Panic disorder, Peripheral vascular disease ([address] Primary osteoarthritis of both knees, Pulmonary embolism ([address] Rectal vaginal fistula, Restless legs syndrome, [facility] spotted fever, Sciatic nerve pain, Sepsis ([address] Sleep apnea, Swelling of limb, Tobacco abuse, Urinary tract infection in female, and Vitamin D deficiency. has a past surgical history that includes Appendectomy (1963); partial hysterectomy (cervix not removed) (1980); hernia repair; bronchoscopy (N/A, 8/12/2019); bronchoscopy (8/12/2019); bronchoscopy (8/12/2019); bronchoscopy (8/12/2019); bronchoscopy (8/12/2019); bronchoscopy (8/12/2019); Tunneled venous port placement (Left, 01/17/2020); [place] [id number] Surgery (N/A, 1/17/2020); Cardiac catheterization (2003); Colonoscopy (N/A, 9/25/2019); and Tonsillectomy (1965). Restrictions  Restrictions/Precautions  Restrictions/Precautions: Fall Risk, General Precautions  Position Activity Restriction  Other position/activity restrictions: on 4L O2  Subjective   General  Chart Reviewed:  Yes  Additional Pertinent Hx: has a past medical history of Abnormal chest CT, Abnormal CXR, Acute exacerbation of chronic obstructive pulmonary disease (COPD) ([address] Acute on chronic respiratory failure ([address] Acute on chronic respiratory failure with hypoxia ([address] Anticoagulant long-term use, Atrial fibrillation ([address] Atrial fibrillation with rapid ventricular response (HCC), B-cell lymphoma ([address] CAP (community acquired pneumonia), Carotid artery stenosis, CHF (congestive heart failure) ([address] Chronic back pain, Congenital heart disease, COVID-19, Depression, Diverticulitis, Diverticulitis of colon, Dizziness, Fistula, Gram-negative pneumonia ([address] HCAP (healthcare-associated pneumonia), Hx of blood clots, Hyperlipidemia, Hypertension, IFG (impaired fasting glucose), Infection due to Stenotrophomonas maltophilia, Non-Hodgkins lymphoma ([address] Obesity, Panic disorder, Peripheral vascular disease ([address] Primary osteoarthritis of both knees, Pulmonary embolism ([address] Rectal vaginal fistula, Restless legs syndrome, [facility] [place] spotted fever, Sciatic nerve pain, Sepsis ([address] Sleep apnea, Swelling of limb, Tobacco abuse, Urinary tract infection in female, and Vitamin D deficiency. Response to previous treatment: Patient with no complaints from previous session  Family / Caregiver Present: No  Referring Practitioner: Dr. Zia Cunha  Diagnosis: Post COVID  Subjective  Subjective: Pt supine in recliner sleeping and agreeable for therapy.   Pain Assessment  Pain Assessment: 0-10  Pain Level: 6  Pain Type: Chronic pain  Pain Location: Knee  Pain Orientation: Left;Right  Pre Treatment Pain Screening  Intervention List: Patient able to continue with treatment;Nurse/Physician notified;Nurse called to administer meds  Vital Signs  Patient Currently in Pain: Yes   Orientation  Orientation  Overall Orientation Status: Within Normal Limits  Objective    ADL  Grooming: Setup;Supervision  UE Bathing: Setup;Supervision (Washing at the sink.)  UE Dressing: Setup;Supervision  LE Dressing: Setup;Contact guard assistance  Toileting: Contact guard assistance        Balance  Sitting Balance: Stand by assistance  Standing Balance: Contact guard assistance  Standing Balance  Time: ~1 minute x 2, ~ 30 secs  Activity: Amb to and from the bathroom with RW and CGA, standing for toileting, standing for dressing LB and amb back to the room. Functional Mobility  Functional - Mobility Device: 4-Wheeled Walker  Activity: To/from bathroom  Assist Level: Contact guard assistance  Toilet Transfers  Toilet - Technique: Ambulating  Equipment Used: Standard toilet  Toilet Transfer: Stand by assistance  Bed mobility  Supine to Sit: Unable to assess  Sit to Supine: Unable to assess  Scooting: Unable to assess  Transfers  Sit to stand: Stand by assistance  Stand to sit: Stand by assistance        Coordination  Gross Motor: Pt demonstrates fair GMC when amb with 4WW. Fine Motor: Pt demonstrates good [address] when putting clips in hair. Cognition  Overall Cognitive Status: Exceptions  Arousal/Alertness: Appropriate responses to stimuli  Following Commands: Follows one step commands consistently  Attention Span: Appears intact  Memory: Decreased short term memory  Safety Judgement: Decreased awareness of need for assistance;Decreased awareness of need for safety  Problem Solving: Assistance required to correct errors made  Insights: Decreased awareness of deficits  Initiation: Does not require cues  Sequencing: Requires cues for some                                         Plan   Plan  Times per week: 5/7 days  Times per day: Daily  Plan weeks: 7-10 days  Current Treatment Recommendations: Strengthening, Safety Education & Training, Balance Training, Self-Care / ADL, Home Management Training, Equipment Evaluation, Education, & procurement, Endurance Training, Functional Mobility Training, Patient/Caregiver Education & Training          Goals  Short term goals  Time Frame for Short term goals: 10/22/21  Short term goal 1: Pt will complete toileting while using LRAD with SBA. Short term goal 2: Pt will complete TB bathing with set up and SBA.   Short term goal 3: Pt will complete grooming task while standing for at least 3 minutes with SBA. Short term goal 4: Pt will complete total body dressing with LRAD and SBA. Long term goals  Time Frame for Long term goals : 10/27/21  Long term goal 1: Pt will complete toileting task with Ivana. Long term goal 2: Pt will complete tub/shower transfer with supervision and LRAD. Long term goal 3: Pt will complete TB bathing with supervision. Long term goal 4: Pt will complete IADL task with SBA. Long term goal 5: Pt will complete TB dressing using LRAD and Ivana. Patient Goals   Patient goals : \"To get more strength in my legs. \"       Therapy Time   Individual Concurrent Group Co-treatment   Time In 1230         Time Out 1330         Minutes 60         Timed Code Treatment Minutes: [address] S/OT

## 2021-10-22 LAB
GLUCOSE BLD-MCNC: 110 MG/DL (ref 70–99)
GLUCOSE BLD-MCNC: 154 MG/DL (ref 70–99)
GLUCOSE BLD-MCNC: 188 MG/DL (ref 70–99)
GLUCOSE BLD-MCNC: 206 MG/DL (ref 70–99)
INR BLD: 2.12 (ref 0.88–1.12)
PERFORMED ON: ABNORMAL
PROTHROMBIN TIME: 24.7 SEC (ref 9.9–12.7)

## 2021-10-22 PROCEDURE — 97110 THERAPEUTIC EXERCISES: CPT

## 2021-10-22 PROCEDURE — 97129 THER IVNTJ 1ST 15 MIN: CPT

## 2021-10-22 PROCEDURE — 97530 THERAPEUTIC ACTIVITIES: CPT

## 2021-10-22 PROCEDURE — 85610 PROTHROMBIN TIME: CPT

## 2021-10-22 PROCEDURE — 2700000000 HC OXYGEN THERAPY PER DAY

## 2021-10-22 PROCEDURE — 1280000000 HC REHAB R&B

## 2021-10-22 PROCEDURE — 97535 SELF CARE MNGMENT TRAINING: CPT

## 2021-10-22 PROCEDURE — 36415 COLL VENOUS BLD VENIPUNCTURE: CPT

## 2021-10-22 PROCEDURE — 97130 THER IVNTJ EA ADDL 15 MIN: CPT

## 2021-10-22 PROCEDURE — 94640 AIRWAY INHALATION TREATMENT: CPT

## 2021-10-22 PROCEDURE — 6360000002 HC RX W HCPCS: Performed by: PHYSICAL MEDICINE & REHABILITATION

## 2021-10-22 PROCEDURE — 6370000000 HC RX 637 (ALT 250 FOR IP): Performed by: PHYSICAL MEDICINE & REHABILITATION

## 2021-10-22 PROCEDURE — 97116 GAIT TRAINING THERAPY: CPT

## 2021-10-22 PROCEDURE — 94761 N-INVAS EAR/PLS OXIMETRY MLT: CPT

## 2021-10-22 RX ORDER — PREDNISONE 10 MG/1
10 TABLET ORAL 2 TIMES DAILY
Status: DISCONTINUED | OUTPATIENT
Start: 2021-10-22 | End: 2021-10-25

## 2021-10-22 RX ORDER — WARFARIN SODIUM 5 MG/1
5 TABLET ORAL
Status: COMPLETED | OUTPATIENT
Start: 2021-10-22 | End: 2021-10-22

## 2021-10-22 RX ADMIN — DILTIAZEM HYDROCHLORIDE 60 MG: 60 TABLET, FILM COATED ORAL at 01:16

## 2021-10-22 RX ADMIN — DIGOXIN 187.5 MCG: 125 TABLET ORAL at 08:40

## 2021-10-22 RX ADMIN — BUSPIRONE HYDROCHLORIDE 5 MG: 5 TABLET ORAL at 06:34

## 2021-10-22 RX ADMIN — FAMOTIDINE 20 MG: 20 TABLET, FILM COATED ORAL at 20:47

## 2021-10-22 RX ADMIN — BUPROPION HYDROCHLORIDE 150 MG: 150 TABLET, EXTENDED RELEASE ORAL at 08:42

## 2021-10-22 RX ADMIN — INSULIN GLARGINE 20 UNITS: 100 INJECTION, SOLUTION SUBCUTANEOUS at 20:48

## 2021-10-22 RX ADMIN — FUROSEMIDE 20 MG: 20 TABLET ORAL at 08:40

## 2021-10-22 RX ADMIN — DICLOFENAC SODIUM 4 G: 10 GEL TOPICAL at 13:34

## 2021-10-22 RX ADMIN — PREDNISONE 15 MG: 10 TABLET ORAL at 08:41

## 2021-10-22 RX ADMIN — INSULIN LISPRO 2 UNITS: 100 INJECTION, SOLUTION INTRAVENOUS; SUBCUTANEOUS at 20:47

## 2021-10-22 RX ADMIN — DILTIAZEM HYDROCHLORIDE 60 MG: 60 TABLET, FILM COATED ORAL at 17:52

## 2021-10-22 RX ADMIN — TRAMADOL HYDROCHLORIDE 50 MG: 50 TABLET ORAL at 20:47

## 2021-10-22 RX ADMIN — TRAMADOL HYDROCHLORIDE 50 MG: 50 TABLET ORAL at 13:34

## 2021-10-22 RX ADMIN — FAMOTIDINE 20 MG: 20 TABLET, FILM COATED ORAL at 08:42

## 2021-10-22 RX ADMIN — DILTIAZEM HYDROCHLORIDE 60 MG: 60 TABLET, FILM COATED ORAL at 06:34

## 2021-10-22 RX ADMIN — PREDNISONE 10 MG: 10 TABLET ORAL at 20:47

## 2021-10-22 RX ADMIN — DILTIAZEM HYDROCHLORIDE 60 MG: 60 TABLET, FILM COATED ORAL at 11:36

## 2021-10-22 RX ADMIN — TRAZODONE HYDROCHLORIDE 50 MG: 50 TABLET ORAL at 22:02

## 2021-10-22 RX ADMIN — DILTIAZEM HYDROCHLORIDE 60 MG: 60 TABLET, FILM COATED ORAL at 23:58

## 2021-10-22 RX ADMIN — WARFARIN SODIUM 5 MG: 5 TABLET ORAL at 17:52

## 2021-10-22 RX ADMIN — PYRIDOXINE HCL TAB 50 MG 50 MG: 50 TAB at 08:42

## 2021-10-22 RX ADMIN — BUDESONIDE 500 MCG: 0.5 SUSPENSION RESPIRATORY (INHALATION) at 20:24

## 2021-10-22 RX ADMIN — DICLOFENAC SODIUM 4 G: 10 GEL TOPICAL at 20:47

## 2021-10-22 RX ADMIN — INSULIN LISPRO 2 UNITS: 100 INJECTION, SOLUTION INTRAVENOUS; SUBCUTANEOUS at 16:31

## 2021-10-22 RX ADMIN — LORAZEPAM 1 MG: 1 TABLET ORAL at 22:02

## 2021-10-22 RX ADMIN — INSULIN LISPRO 2 UNITS: 100 INJECTION, SOLUTION INTRAVENOUS; SUBCUTANEOUS at 06:34

## 2021-10-22 RX ADMIN — BUDESONIDE 500 MCG: 0.5 SUSPENSION RESPIRATORY (INHALATION) at 07:59

## 2021-10-22 RX ADMIN — DICLOFENAC SODIUM 4 G: 10 GEL TOPICAL at 08:43

## 2021-10-22 ASSESSMENT — PAIN SCALES - GENERAL
PAINLEVEL_OUTOF10: 5
PAINLEVEL_OUTOF10: 7
PAINLEVEL_OUTOF10: 7
PAINLEVEL_OUTOF10: 6
PAINLEVEL_OUTOF10: 6
PAINLEVEL_OUTOF10: 8

## 2021-10-22 ASSESSMENT — PAIN DESCRIPTION - ORIENTATION
ORIENTATION: LEFT;RIGHT
ORIENTATION: RIGHT;LEFT
ORIENTATION: LEFT;RIGHT

## 2021-10-22 ASSESSMENT — PAIN DESCRIPTION - PAIN TYPE
TYPE: CHRONIC PAIN
TYPE: CHRONIC PAIN

## 2021-10-22 ASSESSMENT — PAIN DESCRIPTION - DESCRIPTORS
DESCRIPTORS: ACHING;SORE
DESCRIPTORS: ACHING

## 2021-10-22 ASSESSMENT — PAIN DESCRIPTION - LOCATION
LOCATION: KNEE

## 2021-10-22 ASSESSMENT — PAIN DESCRIPTION - FREQUENCY: FREQUENCY: CONTINUOUS

## 2021-10-22 ASSESSMENT — PAIN DESCRIPTION - ONSET: ONSET: ON-GOING

## 2021-10-22 ASSESSMENT — PAIN DESCRIPTION - PROGRESSION: CLINICAL_PROGRESSION: NOT CHANGED

## 2021-10-22 NOTE — PROGRESS NOTES
Occupational Therapy  Facility/Department: [facility]  Daily Treatment Note  NAME: Patito Bass  : 1940  MRN: [id number]    Date of Service: 10/22/2021    Discharge Recommendations:  Home with assist PRN, Home with Home health OT  OT Equipment Recommendations  Other: CTA    Assessment   Performance deficits / Impairments: Decreased functional mobility ; Decreased high-level IADLs;Decreased ADL status; Decreased endurance;Decreased balance;Decreased safe awareness;Decreased strength  Assessment: Pt seated in recliner and agreeable for therapy. Pt req increased time to complete all tasks. Pt mentioned she was exhausted and in pain and nursing was notified however still agreeable to for OT session. Pt able to aurelio shoes when using long handed shoe horn. Pt req SBA for sit<>stand, toilet transfer, for toileting task and when amb with 4WW. Pt completed LB dressing with the reacher and CGA. Pt completed grooming task at the sink with set up and supervision. Pt was left in recliner with phone, call light, and chair alarm. Continues to progress towards goals but is limited by fatigue/pain and needs encouragement at times to remain standing. Cont OT POC. Treatment Diagnosis: Weakness (post COVID)  Prognosis: Good  OT Education: OT Role;Plan of Care;Precautions;Transfer Training;Energy Conservation; ADL Adaptive Strategies  Patient Education: Pt was educated on taking breaks when needed and using reacher for LB dressing. Barriers to Learning: Decreased safety awareness. REQUIRES OT FOLLOW UP: Yes  Activity Tolerance  Activity Tolerance: Patient Tolerated treatment well;Patient limited by pain  Activity Tolerance: Pt on 4L and nurse called in for meds. Safety Devices  Safety Devices in place: Yes  Type of devices: All fall risk precautions in place;Call light within reach; Patient at risk for falls; Chair alarm in place; Left in chair;Gait belt  Restraints  Initially in place: No         Patient Diagnosis(es): There were no encounter diagnoses. has a past medical history of Abnormal chest CT, Abnormal CXR, Acute exacerbation of chronic obstructive pulmonary disease (COPD) ([address] Acute on chronic respiratory failure (HCC), Acute on chronic respiratory failure with hypoxia (HCC), Anticoagulant long-term use, Atrial fibrillation ([address] Atrial fibrillation with rapid ventricular response ([address] B-cell lymphoma ([address] CAP (community acquired pneumonia), Carotid artery stenosis, CHF (congestive heart failure) ([address] Chronic back pain, Congenital heart disease, COVID-19, Depression, Diverticulitis, Diverticulitis of colon, Dizziness, Fistula, Gram-negative pneumonia ([address] HCAP (healthcare-associated pneumonia), Hx of blood clots, Hyperlipidemia, Hypertension, IFG (impaired fasting glucose), Infection due to Stenotrophomonas maltophilia, Non-Hodgkins lymphoma ([address] Obesity, Panic disorder, Peripheral vascular disease ([address] Primary osteoarthritis of both knees, Pulmonary embolism ([address] Rectal vaginal fistula, Restless legs syndrome, [facility] spotted fever, Sciatic nerve pain, Sepsis ([address] Sleep apnea, Swelling of limb, Tobacco abuse, Urinary tract infection in female, and Vitamin D deficiency. has a past surgical history that includes Appendectomy (1963); partial hysterectomy (cervix not removed) (1980); hernia repair; bronchoscopy (N/A, 8/12/2019); bronchoscopy (8/12/2019); bronchoscopy (8/12/2019); bronchoscopy (8/12/2019); bronchoscopy (8/12/2019); bronchoscopy (8/12/2019); Tunneled venous port placement (Left, 01/17/2020);  [address] Surgery (N/A, 1/17/2020); Cardiac catheterization (2003); Colonoscopy (N/A, 9/25/2019); and Tonsillectomy (1965). Restrictions  Restrictions/Precautions  Restrictions/Precautions: Fall Risk, General Precautions  Position Activity Restriction  Other position/activity restrictions: on 4L O2  Subjective   General  Chart Reviewed:  Yes  Additional Pertinent Hx: has a past medical history of Abnormal chest CT, Abnormal CXR, Acute exacerbation of chronic obstructive pulmonary disease (COPD) (HCC), Acute on chronic respiratory failure (HCC), Acute on chronic respiratory failure with hypoxia (HCC), Anticoagulant long-term use, Atrial fibrillation ([address] Atrial fibrillation with rapid ventricular response ([address] B-cell lymphoma ([address] CAP (community acquired pneumonia), Carotid artery stenosis, CHF (congestive heart failure) ([address] Chronic back pain, Congenital heart disease, COVID-19, Depression, Diverticulitis, Diverticulitis of colon, Dizziness, Fistula, Gram-negative pneumonia ([address] HCAP (healthcare-associated pneumonia), Hx of blood clots, Hyperlipidemia, Hypertension, IFG (impaired fasting glucose), Infection due to Stenotrophomonas maltophilia, Non-Hodgkins lymphoma ([address] Obesity, Panic disorder, Peripheral vascular disease ([address] Primary osteoarthritis of both knees, Pulmonary embolism ([address] Rectal vaginal fistula, Restless legs syndrome, [facility] spotted fever, Sciatic nerve pain, Sepsis ([address] Sleep apnea, Swelling of limb, Tobacco abuse, Urinary tract infection in female, and Vitamin D deficiency. Response to previous treatment: Patient with no complaints from previous session  Family / Caregiver Present: No  Referring Practitioner: Dr. Shireen Stewart  Diagnosis: Post COVID  Subjective  Subjective: Pt supine in recliner and agreeable for therapy.   Pain Assessment  Pain Level: 7  Pain Type: Chronic pain  Pain Location: Knee  Pain Orientation: Left;Right  Pre Treatment Pain Screening  Intervention List: Patient able to continue with treatment;Nurse/Physician notified;Nurse called to administer meds  Vital Signs  Patient Currently in Pain: Yes   Orientation  Orientation  Overall Orientation Status: Within Normal Limits  Objective    ADL  Equipment Provided: Reacher;Long-handled shoe horn  Grooming: Setup;Supervision (While seated.)  LE Dressing: Setup;Contact guard assistance  Toileting: Stand by assistance (Using AE)        Balance  Sitting Balance: Supervision  Standing Balance: Stand by assistance  Standing Balance  Time: ~1minute x 2 ~2 minutes  Activity: Amb to and from the bathroom with 4WW and CGA, standing for toileting, standing for dressing LB and amb back to the room. Functional Mobility  Functional - Mobility Device: 4-Wheeled Walker  Activity: To/from bathroom  Assist Level: Stand by assistance  Toilet Transfers  Toilet - Technique: Ambulating  Equipment Used: Standard toilet  Toilet Transfer: Stand by assistance     Transfers  Sit to stand: Stand by assistance  Stand to sit: Stand by assistance        Coordination  Gross Motor: Pt demonstrates fair GMC when amb with 4WW. Cognition  Overall Cognitive Status: Exceptions  Arousal/Alertness: Appropriate responses to stimuli  Following Commands: Follows one step commands consistently  Attention Span: Appears intact  Memory: Decreased short term memory  Safety Judgement: Decreased awareness of need for assistance;Decreased awareness of need for safety  Problem Solving: Assistance required to correct errors made  Insights: Decreased awareness of deficits  Initiation: Does not require cues  Sequencing: Requires cues for some                                         Plan   Plan  Times per week: 5/7 days  Times per day: Daily  Plan weeks: 7-10 days  Current Treatment Recommendations: Strengthening, Safety Education & Training, Balance Training, Self-Care / ADL, Home Management Training, Equipment Evaluation, Education, & procurement, Endurance Training, Functional Mobility Training, Patient/Caregiver Education & Training          Goals  Short term goals  Time Frame for Short term goals: 10/22/21  Short term goal 1: Pt will complete toileting while using LRAD with SBA.-goal MET 10/22/21  Short term goal 2: Pt will complete TB bathing with set up and SBA.   Short term goal 3: Pt will complete grooming task while standing for at least 3 minutes with SBA. Short term goal 4: Pt will complete total body dressing with LRAD and SBA. Long term goals  Time Frame for Long term goals : 10/27/21  Long term goal 1: Pt will complete toileting task with Ivana. Long term goal 2: Pt will complete tub/shower transfer with supervision and LRAD. Long term goal 3: Pt will complete TB bathing with supervision. Long term goal 4: Pt will complete IADL task with SBA. Long term goal 5: Pt will complete TB dressing using LRAD and Ivana. Patient Goals   Patient goals : \"To get more strength in my legs. \"       Therapy Time   Individual Concurrent Group Co-treatment   Time In 1230         Time Out 1330         Minutes 60         Timed Code Treatment Minutes: [address] S/OT  Cosigned by Liang Nowak OTR/KENNEY

## 2021-10-22 NOTE — PROGRESS NOTES
[address] [place] [address]  10/22/2021  [id number]    Chief Complaint: COVID-19 virus infection    Subjective:   No issues overnight; no new complaints this AM.     ROS: no n/v, cp, f/c  Objective:  Patient Vitals for the past 24 hrs:   BP Temp Temp src Pulse Resp SpO2 Weight   10/22/21 0929       249 lb 11.2 oz (113.3 kg)   10/22/21 0912 (!) 128/58   108  95 %    10/22/21 0830 105/71 97.4 °F (36.3 °C) Oral 88 22 94 %    10/22/21 0800     20 95 %    10/22/21 0630 121/61   105      10/22/21 0115 126/65   93      10/21/21 2215 131/67 98 °F (36.7 °C) Oral 103 20 94 %    10/21/21 2012     18 96 %    10/21/21 1845 (!) 150/73   118        Gen: No distress, pleasant. HEENT: Normocephalic, atraumatic. CV: Regular rate and rhythm. Resp: No respiratory distress. Abd: Soft, nontender   Ext: No edema. Neuro: Alert, oriented, appropriately interactive.    Wt Readings from Last 3 Encounters:   10/22/21 249 lb 11.2 oz (113.3 kg)   09/28/21 252 lb 8 oz (114.5 kg)   06/02/21 273 lb (123.8 kg)       Laboratory data:   Lab Results   Component Value Date    WBC 8.4 10/21/2021    HGB 9.7 (L) 10/21/2021    HCT 31.0 (L) 10/21/2021    MCV 78.3 (L) 10/21/2021     10/21/2021       Lab Results   Component Value Date     10/21/2021    K 4.2 10/21/2021    CL 98 10/21/2021    CO2 34 10/21/2021    BUN 15 10/21/2021    CREATININE 0.7 10/21/2021    GLUCOSE 161 10/21/2021    CALCIUM 8.7 10/21/2021        Therapy progress:  PT  Position Activity Restriction  Other position/activity restrictions: on 4L O2  Objective     Sit to Stand: Modified independent  Stand to sit: Modified independent  Bed to Chair: Modified independent  Device: Rollator  Other Apparatus: Wheelchair follow, O2  Assistance: Stand by assistance  Distance: 120, 131, 80, 75,104 feet = total 510 feet cumulative  OT  PT Equipment Recommendations  Equipment Needed: No  Other: has rollator at home and cane  Toilet - Technique: Ambulating  Equipment Used: Standard toilet  Assessment        SLP  Current Diet : Regular  Current Liquid Diet : Thin  Diet Solids Recommendation: Regular  Liquid Consistency Recommendation: Thin    Body mass index is 45.67 kg/m². Rehabilitation Diagnosis:  Neurologic, 3.8, Neuromuscular Disorders, e.g. Critical Illness Myopathy, Other Myopathy     Assessment and Plan:  CIM  - PT/OT, dietary support     COVID pneumonia  - completed isolation  - supportive care     COPD  - nebs, supplemental oxygen  - 4L at baseline  - slowly wean steroids       Mood disorder  - buspar, wellbutrin    DM  - follow blood sugars as steroids wean     Bowels: Schedule stool softener. Follow bowel movements. Enema or suppository if needed.      Bladder: Check PVR x 3. [address] if PVR > 200ml or if any volume is > 500 ml.      Sleep: Trazodone provided prn. Morbid obesity. Dietary consulted.      Follow up appointments: PCP  VANDANA: 10/30 home w/ home health  DME: danita Gonzalez MD 10/22/2021, 10:07 AM

## 2021-10-22 NOTE — CARE COORDINATION
[facility]    Referral received from  to follow for home care services. I will follow for needs, and speak with patient to verify demos.     Corey Scott RN, BSN CTN  [facility] [phone]

## 2021-10-22 NOTE — PROGRESS NOTES
Physical Therapy  Facility/Department: [facility]  Daily Treatment Note  NAME: Nicolette Mathews  : 1940  MRN: [id number]    Date of Service: 10/22/2021    Discharge Recommendations:  Home with Home health PT, Home with assist PRN        Assessment   Assessment: Patient seen  for split session with  emphasis on increased endurance and safety with gait wtih rollator. Pt takes extra time with all mobility and needs frequent cues to brake rollator brakes for gait, needs  WC follow due to variable distances walking with  increased distance to 131 feet max today and toatl walking during first session 510 feet cumulative. Pt easily sob with RR to 40/minute with SpO@ in 90's % throoughout session and requiring 3 minutes seated PLB for recovery from dyspnea and tachycardia with activity with continued irregular HR. Patient in bathroom multiple times during PT this am able to indep manage pericare and clothing this date and SBA for transfers with rollator,  Pt given cues only for LE therex. PT Education: Transfer Training;Gait Training;Disease Specific Education  Patient Education: recommended to increase sitting up time during day and to work towards more frequent short bouts of walkling. Pt  verbalized understanding and notes she was OOB until dinner day prior. Patient Diagnosis(es): There were no encounter diagnoses.      has a past medical history of Abnormal chest CT, Abnormal CXR, Acute exacerbation of chronic obstructive pulmonary disease (COPD) ([address] Acute on chronic respiratory failure (HCC), Acute on chronic respiratory failure with hypoxia (HCC), Anticoagulant long-term use, Atrial fibrillation ([address] Atrial fibrillation with rapid ventricular response ([address] B-cell lymphoma ([address] CAP (community acquired pneumonia), Carotid artery stenosis, CHF (congestive heart failure) ([address] Chronic back pain, Congenital heart disease, COVID-19, Depression, Diverticulitis, Diverticulitis of colon, Dizziness, Fistula, Gram-negative pneumonia ([facility] HCAP (healthcare-associated pneumonia), Hx of blood clots, Hyperlipidemia, Hypertension, IFG (impaired fasting glucose), Infection due to Stenotrophomonas maltophilia, Non-Hodgkins lymphoma ([facility] Obesity, Panic disorder, Peripheral vascular disease ([facility] Primary osteoarthritis of both knees, Pulmonary embolism ([facility] Rectal vaginal fistula, Restless legs syndrome, [facility] spotted fever, Sciatic nerve pain, Sepsis ([facility] Sleep apnea, Swelling of limb, Tobacco abuse, Urinary tract infection in female, and Vitamin D deficiency. has a past surgical history that includes Appendectomy (1963); partial hysterectomy (cervix not removed) (1980); hernia repair; bronchoscopy (N/A, 8/12/2019); bronchoscopy (8/12/2019); bronchoscopy (8/12/2019); bronchoscopy (8/12/2019); bronchoscopy (8/12/2019); bronchoscopy (8/12/2019); Tunneled venous port placement (Left, 01/17/2020); [facility] Surgery (N/A, 1/17/2020); Cardiac catheterization (2003); Colonoscopy (N/A, 9/25/2019); and Tonsillectomy (1965). Restrictions  Restrictions/Precautions  Restrictions/Precautions: Fall Risk, General Precautions  Position Activity Restriction  Other position/activity restrictions: on 4L O2  Subjective   Pain Screening  Patient Currently in Pain: Yes  Pain Assessment  Pain Assessment: 0-10  Pain Level: 7  Pain Location: Knee  Pain Orientation: Left;Right  Pain Descriptors: Aching  Functional Pain Assessment: Prevents or interferes some active activities and ADLs  Vital Signs  Pulse: 108  Heart Rate Source: Monitor  BP: (!) 128/58  BP Location: Left upper arm  Patient Position: Sitting  Patient Currently in Pain: Yes  Height and Weight  Weight: 249 lb 11.2 oz (113.3 kg)  Weight Method: Standing scale; Actual  BMI (Calculated): 45.8  Oxygen Therapy  SpO2: 95 %  Pulse Oximeter Device Mode: Intermittent  Pulse Oximeter Device Location: Left;Finger  O2 Device: Nasal cannula  O2 Flow Rate (L/min): 4 L/min (moist air)       Orientation WNL     Cognition needs frequent reminders each session to engage rollator brakes. Objective   Bed mobility  Rolling to Right: Modified independent  Supine to Sit: Modified independent  Comment: uses rail to get OOB and roll encouraged to get OOB with HOB flat. Transfers  Sit to Stand: SBA as pt needs frequent cues for engagement of rollator brakes. Stand to sit:SBA as pt needs frequent cues for engagement of rollator brakes. Bed to Chair: SBA as pt needs frequent cues for engagement of rollator brakes. Car Transfer: SBA as pt needs frequent cues for engagement of rollator brakes. Transfer bed<>toilet, WC<>stand x4, chair<>stand x2  Ambulation  Ambulation?: Yes  More Ambulation?: Yes  Ambulation 1  Surface: level tile  Device: Rollator  Other Apparatus: Wheelchair follow;O2  Assistance: Stand by assistance  Quality of Gait: wide giovani with increased tremulous quality in LE with fatiuge and tens to WB more heavily with flexion in trunk and hips with incresaed fatigue with WC follow. PT given cues not to let walker get too far in front of her as she tends to shuffle and not clear well on swing when she does this. PT notes she does this to accomodate her knee pain. Gait Deviations: Slow Iris; Increased GIOVANI; Decreased step height;Decreased step length  Distance: 120, 131, 80, 75,104 feet = total 510 feet cumulative  Comments: post walking SpO2 RR up to 40/minute, SpO2 beteween 93%-95%, HR 96-100bpm iummediate post walk with need for 3 minutes seated rest during tx. Pt weighed on standing scale during tx. Exercises  Hip Flexion: seated x15 BLE  Knee Long Arc Quad: x15 BLE  Ankle Pumps: x30   SECOND SESSION:  At start of session patient finishing BM and completes pericare with toilet tongs and SBA for transfers, indep in clothing management. Total assist for Donning Prescribed tenoshapes BLE, pt notes comfort in wear (3+ LE edema).   Pt verbalizes understanding to remove/have removed in p,. Subjective:  Pain 7/10 in knees. Pt notes she has this pain at home too but her hips and knees cannot be replaced she notes due to she is not a surgical candidate because of issues with anesthesia. TRANSFERS:  Sit<>stand with rollator from toilet, chair, WC x3 with SBA as needs intermittent cues to engage rollator brakes. GAIT:  Patient walks with WC follow, oxygen  4L via nc 12 feet, 10 feet, 50 feet, 30  Feet with rollator walker and SBA as needs to sit suddenly due to sob which continues to limit walking distances. Slow rate and decreased step length with knee pain limiting WB tolerance BLE and leans heavily forearms to hands on rollator with sob also limiting distances walked. Cued to walk close enough to walker to allow better UE WB with patient noting her knee's hurt more if she walks too close to the walker. Post walk dyspnea noted with SpO2 94% or greater on 4L O2 via nc. Education:   Educated patient in safety techniques with transfers and gait  with patient requiring intermittent cues and additional education to achieve goals    Disposition:Patient with call light in reach and alarm in place at end of session with patient in chair    Goals  Short term goals  Time Frame for Short term goals: Patient demonstrates indep in bed mobility. Short term goal 1: 10/26/2021  Short term goal 2: Patient demonstrates 150 feet with SpO2 90% or greater on O2 via nc. with rollator  Short term goal 3: Patient demonstrates 5 minutes of standing dynamic balance activities to improve dynamic balance with gait with SPO2 90% or greater. GOAL met 10/21/2021Patient demonstrates 6 minutes of standing dynamic balance activities to improve dynamic balance with gait with SPO2 90% or greater. Short term goal 4: Patient demonstrates indep in core and DBE in sitting and standing.   Short term goal 5: Patient demonstrates safe indep transfers bed<>chair, car transfer, with use of rollator .  Long term goals  Time Frame for Long term goals : 11/2/2021  Long term goal 1: Patient demonstrates safe indep stoop to recover shoe from floor wtih use of rollator. .  Long term goal 2: Patiend demonstrates gait 150 feet ROSAS with use of rollator and spO2 90% or greater  Long term goal 3: Pt demo up and down 3-4 steps indep with use of cane and rail to safely enter and egress home. Long term goal 4: Pt indep in  hep LE therex to increase LE endurance/functional strength with walking. Patient Goals   Patient goals : \" To get my legs stronger\"    Plan    Plan  Times per week: 5/7 days/week  Times per day: Daily  Plan weeks: 14 days  Current Treatment Recommendations: Transfer Training, Strengthening, Balance Training, Gait Training, Stair training, Functional Mobility Training, Safety Education & Training  Safety Devices  Type of devices:  All fall risk precautions in place, Left in bed, Call light within reach, Left in chair, Chair alarm in place     Therapy Time   Individual Concurrent Group Co-treatment   Time In 0900         Time Out 1000         Minutes 60         Timed Code Treatment Minutes: [address] Time:   Individual Concurrent Group Co-treatment   Time In 1030         Time Out 1100         Minutes 30           Timed Code Treatment Minutes:  30    Total Treatment Minutes:  90         Milderd Orts, PT

## 2021-10-22 NOTE — PROGRESS NOTES
Speech Language Pathology  [facility]: [facility]  SPEECH-LANGUAGE PATHOLOGY      [x] Daily  [] Weekly Care Conference Note  [] Discharge    Patient:Raven Corral      :1940  RRA:[id number]  Rehab Dx/Hx: COVID-19 virus infection [U07.1]    Precautions: falls  Home situation: Lives at home indep. Manages finances and meds; cooks and does the laundry. Active . ST Dx: [] Aphasia  [] Dysarthria  [] Apraxia   [] Oropharyngeal dysphagia [x] Cognitive Impairment  [] Other:   Date of Admit: 10/18/2021  Room #: 0153/0153-01    Current functional status (updated daily):         Pt being seen for : [] Speech/Language Treatment  [] Dysphagia Treatment [x] Cognitive Treatment  [] Other:  Communication: [x]WFL  [] Aphasia  [] Dysarthria  [] Apraxia  [] Pragmatic Impairment [] Non-verbal  [] Hearing Loss  [] Other:   Cognition: [] WFL  [x] Mild  [] Moderate  [] Severe [] Unable to Assess  [] Other:  Memory: [] WFL  [x] Mild  [] Moderate  [] Severe [] Unable to Assess  [] Other:  Behavior: [x] Alert  [x] Cooperative  [x]  Pleasant  [] Confused  [] Agitated  [] Uncooperative  [] Distractible [] Motivated  [] Self-Limiting [] Anxious  [] Other:  Endurance:  [x] Adequate for participation in SLP sessions  [] Reduced overall  [] Lethargic  [] Other:  Safety: [x] No concerns at this time  [] Reduced insight into deficits  []  Reduced safety awareness [] Not following call light procedures  [] Unable to Assess  [] Other:    Current Diet Order:ADULT DIET; Regular; 4 carb choices (60 gm/meal)  Adult Oral Nutrition Supplement; Diabetic Oral Supplement  Swallowing Precautions:  Alternate solids and liquids;Eat/Feed slowly;Upright as possible for all oral intake;Remain upright for 30-45 minutes after meals;Small bites/sips        Date: 10/22/2021      Tx session 1  [id number] Tx session 2  [id number]   Total Timed Code Min 30 30   Total Treatment Minutes 30 30   Individual Treatment Minutes 30 30   Group

## 2021-10-22 NOTE — PLAN OF CARE
Problem: Falls - Risk of:  Goal: Will remain free from falls  Description: Will remain free from falls  Outcome: Ongoing  Note: Fall precautions in place, bed alarm on, nonskid foot wear applied, bed in lowest position, and call light within reach. Pt denies further needs at this time. Bedside table within reach. Will continue to monitor.

## 2021-10-22 NOTE — PROGRESS NOTES
Pharmacy Note  Warfarin Consult     Goal INR range 2-3  Home Warfarin dose:5 mg daily     Date                 INR                  Warfarin  10/18              1.87                  5 mg  10/19              2.51                  hold  10/20              2.56                  2.5 mg  10/21              2.00                  5 mg  10/22              2.12                  5 mg  Recommend Warfarin 5 mg tonight x1. Daily INR ordered. Rx will continue to manage therapy per consult order.   Nitesh Collins PharmD 10/22/21 3:03 PM

## 2021-10-23 LAB
GLUCOSE BLD-MCNC: 118 MG/DL (ref 70–99)
GLUCOSE BLD-MCNC: 137 MG/DL (ref 70–99)
GLUCOSE BLD-MCNC: 158 MG/DL (ref 70–99)
GLUCOSE BLD-MCNC: 163 MG/DL (ref 70–99)
INR BLD: 2.98 (ref 0.88–1.12)
PERFORMED ON: ABNORMAL
PROTHROMBIN TIME: 35.3 SEC (ref 9.9–12.7)

## 2021-10-23 PROCEDURE — 94640 AIRWAY INHALATION TREATMENT: CPT

## 2021-10-23 PROCEDURE — 94761 N-INVAS EAR/PLS OXIMETRY MLT: CPT

## 2021-10-23 PROCEDURE — 2700000000 HC OXYGEN THERAPY PER DAY

## 2021-10-23 PROCEDURE — 97530 THERAPEUTIC ACTIVITIES: CPT

## 2021-10-23 PROCEDURE — 97535 SELF CARE MNGMENT TRAINING: CPT

## 2021-10-23 PROCEDURE — 1280000000 HC REHAB R&B

## 2021-10-23 PROCEDURE — 97116 GAIT TRAINING THERAPY: CPT

## 2021-10-23 PROCEDURE — 85610 PROTHROMBIN TIME: CPT

## 2021-10-23 PROCEDURE — 6370000000 HC RX 637 (ALT 250 FOR IP): Performed by: PHYSICAL MEDICINE & REHABILITATION

## 2021-10-23 PROCEDURE — 97129 THER IVNTJ 1ST 15 MIN: CPT

## 2021-10-23 PROCEDURE — 6360000002 HC RX W HCPCS: Performed by: PHYSICAL MEDICINE & REHABILITATION

## 2021-10-23 PROCEDURE — 36415 COLL VENOUS BLD VENIPUNCTURE: CPT

## 2021-10-23 PROCEDURE — 97130 THER IVNTJ EA ADDL 15 MIN: CPT

## 2021-10-23 PROCEDURE — 97110 THERAPEUTIC EXERCISES: CPT

## 2021-10-23 RX ADMIN — PREDNISONE 10 MG: 10 TABLET ORAL at 09:57

## 2021-10-23 RX ADMIN — DICLOFENAC SODIUM 4 G: 10 GEL TOPICAL at 19:53

## 2021-10-23 RX ADMIN — DILTIAZEM HYDROCHLORIDE 60 MG: 60 TABLET, FILM COATED ORAL at 06:22

## 2021-10-23 RX ADMIN — DILTIAZEM HYDROCHLORIDE 60 MG: 60 TABLET, FILM COATED ORAL at 17:59

## 2021-10-23 RX ADMIN — INSULIN LISPRO 2 UNITS: 100 INJECTION, SOLUTION INTRAVENOUS; SUBCUTANEOUS at 18:00

## 2021-10-23 RX ADMIN — TRAMADOL HYDROCHLORIDE 50 MG: 50 TABLET ORAL at 19:53

## 2021-10-23 RX ADMIN — FUROSEMIDE 20 MG: 20 TABLET ORAL at 09:57

## 2021-10-23 RX ADMIN — LORAZEPAM 1 MG: 1 TABLET ORAL at 22:06

## 2021-10-23 RX ADMIN — INSULIN LISPRO 2 UNITS: 100 INJECTION, SOLUTION INTRAVENOUS; SUBCUTANEOUS at 06:22

## 2021-10-23 RX ADMIN — INSULIN GLARGINE 20 UNITS: 100 INJECTION, SOLUTION SUBCUTANEOUS at 19:54

## 2021-10-23 RX ADMIN — DILTIAZEM HYDROCHLORIDE 60 MG: 60 TABLET, FILM COATED ORAL at 13:15

## 2021-10-23 RX ADMIN — FAMOTIDINE 20 MG: 20 TABLET, FILM COATED ORAL at 19:53

## 2021-10-23 RX ADMIN — TRAZODONE HYDROCHLORIDE 50 MG: 50 TABLET ORAL at 22:06

## 2021-10-23 RX ADMIN — DICLOFENAC SODIUM 4 G: 10 GEL TOPICAL at 14:00

## 2021-10-23 RX ADMIN — TRAMADOL HYDROCHLORIDE 50 MG: 50 TABLET ORAL at 09:57

## 2021-10-23 RX ADMIN — BUDESONIDE 500 MCG: 0.5 SUSPENSION RESPIRATORY (INHALATION) at 20:19

## 2021-10-23 RX ADMIN — FAMOTIDINE 20 MG: 20 TABLET, FILM COATED ORAL at 09:57

## 2021-10-23 RX ADMIN — DIGOXIN 187.5 MCG: 125 TABLET ORAL at 09:57

## 2021-10-23 RX ADMIN — BUSPIRONE HYDROCHLORIDE 5 MG: 5 TABLET ORAL at 06:22

## 2021-10-23 RX ADMIN — BUDESONIDE 500 MCG: 0.5 SUSPENSION RESPIRATORY (INHALATION) at 07:28

## 2021-10-23 RX ADMIN — PYRIDOXINE HCL TAB 50 MG 50 MG: 50 TAB at 09:57

## 2021-10-23 RX ADMIN — PREDNISONE 10 MG: 10 TABLET ORAL at 19:53

## 2021-10-23 RX ADMIN — TRAMADOL HYDROCHLORIDE 50 MG: 50 TABLET ORAL at 15:10

## 2021-10-23 RX ADMIN — BUPROPION HYDROCHLORIDE 150 MG: 150 TABLET, EXTENDED RELEASE ORAL at 09:57

## 2021-10-23 RX ADMIN — DICLOFENAC SODIUM 4 G: 10 GEL TOPICAL at 09:59

## 2021-10-23 ASSESSMENT — PAIN SCALES - GENERAL
PAINLEVEL_OUTOF10: 8
PAINLEVEL_OUTOF10: 5
PAINLEVEL_OUTOF10: 7
PAINLEVEL_OUTOF10: 10
PAINLEVEL_OUTOF10: 6
PAINLEVEL_OUTOF10: 7

## 2021-10-23 ASSESSMENT — PAIN DESCRIPTION - ORIENTATION
ORIENTATION: RIGHT;LEFT

## 2021-10-23 ASSESSMENT — PAIN DESCRIPTION - ONSET
ONSET: ON-GOING
ONSET: ON-GOING

## 2021-10-23 ASSESSMENT — PAIN DESCRIPTION - LOCATION
LOCATION: KNEE

## 2021-10-23 ASSESSMENT — PAIN DESCRIPTION - PROGRESSION
CLINICAL_PROGRESSION: NOT CHANGED

## 2021-10-23 ASSESSMENT — PAIN DESCRIPTION - FREQUENCY
FREQUENCY: CONTINUOUS
FREQUENCY: CONTINUOUS

## 2021-10-23 ASSESSMENT — PAIN DESCRIPTION - PAIN TYPE
TYPE: CHRONIC PAIN
TYPE: CHRONIC PAIN

## 2021-10-23 ASSESSMENT — PAIN DESCRIPTION - DESCRIPTORS
DESCRIPTORS: ACHING;DISCOMFORT
DESCRIPTORS: ACHING;CONSTANT;SORE

## 2021-10-23 NOTE — PROGRESS NOTES
Physical Therapy  Facility/Department: [facility]  Daily Treatment Note  NAME: Ricco Dec  : 1940  MRN: [id number]    Date of Service: 10/23/2021    Discharge Recommendations:  Home with Home health PT, Home with assist PRN   PT Equipment Recommendations  Equipment Needed: No  Other: has rollator at home and cane    Assessment   Body structures, Functions, Activity limitations: Decreased functional mobility ; Decreased strength;Decreased endurance;Decreased balance  Assessment: PT tx session focused on functional transfers, gait training and overall activity tolerance. Pt performed multiple sit<>stand transfers from various surfaces with mod I and rollator, able to increase ambulation distance up to 180ft without physical assist this date. Remains limited by tachycardia and SOB though SpO2 remained >95% throughout on 4L. HR up to 137bpm at highest requiring frequent and extended rest breaks to manage. Pt will benefit from continued skilled PT focusing on BLE strength, gait and endurance to maximize functional mobility. Treatment Diagnosis: Difficulty walking, impaired endurance and safety. Prognosis: Good  Decision Making: Medium Complexity  PT Education: Transfer Training;Gait Training;Disease Specific Education  Patient Education: Pt educated regarding importance of progressive ambulation and upright tolerance - verbalizes understanding but required encouragement at end of session  Barriers to Learning: stm, decreased safety awareness  REQUIRES PT FOLLOW UP: Yes  Activity Tolerance  Activity Tolerance: Patient limited by fatigue;Patient limited by endurance  Activity Tolerance: Pretx: 115/60, HR 97bpm, SpO2 93% on 4L via NC. SPo2 remained >95% during activity on 4L, HR ranged from 107-137bpm immediately following ambulation with irregular rate. Patient Diagnosis(es): There were no encounter diagnoses.      has a past medical history of Abnormal chest CT, Abnormal CXR, Acute exacerbation of chronic obstructive pulmonary disease (COPD) (HCC), Acute on chronic respiratory failure (HCC), Acute on chronic respiratory failure with hypoxia (HCC), Anticoagulant long-term use, Atrial fibrillation ([address] Atrial fibrillation with rapid ventricular response ([address] B-cell lymphoma ([address] CAP (community acquired pneumonia), Carotid artery stenosis, CHF (congestive heart failure) ([address] Chronic back pain, Congenital heart disease, COVID-19, Depression, Diverticulitis, Diverticulitis of colon, Dizziness, Fistula, Gram-negative pneumonia ([address] HCAP (healthcare-associated pneumonia), Hx of blood clots, Hyperlipidemia, Hypertension, IFG (impaired fasting glucose), Infection due to Stenotrophomonas maltophilia, Non-Hodgkins lymphoma ([address] Obesity, Panic disorder, Peripheral vascular disease ([address] Primary osteoarthritis of both knees, Pulmonary embolism ([address] Rectal vaginal fistula, Restless legs syndrome, [facility] spotted fever, Sciatic nerve pain, Sepsis ([address] Sleep apnea, Swelling of limb, Tobacco abuse, Urinary tract infection in female, and Vitamin D deficiency. has a past surgical history that includes Appendectomy (1963); partial hysterectomy (cervix not removed) (1980); hernia repair; bronchoscopy (N/A, 8/12/2019); bronchoscopy (8/12/2019); bronchoscopy (8/12/2019); bronchoscopy (8/12/2019); bronchoscopy (8/12/2019); bronchoscopy (8/12/2019); Tunneled venous port placement (Left, 01/17/2020); [facility] Surgery (N/A, 1/17/2020); Cardiac catheterization (2003); Colonoscopy (N/A, 9/25/2019); and Tonsillectomy (1965). Restrictions  Restrictions/Precautions  Restrictions/Precautions: Fall Risk, General Precautions  Position Activity Restriction  Other position/activity restrictions: on 4L O2  Subjective   General  Chart Reviewed: Yes  Response To Previous Treatment: Patient reporting fatigue but able to participate.   Family / Caregiver Present: No  Referring Practitioner: Lani Psiano MD  Subjective  Subjective: Pt lying in bed upon arrival, agreeable to PT tx session  General Comment  Comments: RN clears for therapy  Pain Screening  Patient Currently in Pain: Yes  Pain Assessment  Pain Assessment: 0-10  Pain Level: 7  Pain Type: Chronic pain  Pain Location: Knee  Pain Orientation: Right;Left  Pain Descriptors: Aching;Discomfort  Pain Frequency: Continuous  Pain Onset: On-going  Clinical Progression: Not changed  Functional Pain Assessment: Prevents or interferes some active activities and ADLs  Non-Pharmaceutical Pain Intervention(s): Repositioned; Ambulation/Increased Activity; Emotional support;Distraction  Response to Pain Intervention: Patient Satisfied  Vital Signs  Pulse: 97  Heart Rate Source: Monitor  BP: 115/60  BP Location: Left upper arm  Patient Position: Semi fowlers  Patient Currently in Pain: Yes  Oxygen Therapy  SpO2: 93 %  Pulse Oximeter Device Mode: Intermittent  O2 Device: Nasal cannula  O2 Flow Rate (L/min): 4 L/min        Objective   Bed mobility  Supine to Sit: Modified independent  Sit to Supine: Unable to assess (Pt seated in recliner at end of session)  Transfers  Sit to Stand: Modified independent  Stand to sit: Modified independent  Comment: Multiple sit<>stand transfers from various surfaces including EOB, toilet, W/C and recliner. INcreased time to perform due to pain and fatigue this date. Ambulation  Ambulation?: Yes  Ambulation 1  Surface: level tile  Device: Rollator  Other Apparatus: O2  Assistance: Stand by assistance  Quality of Gait: Pt demonstrates flexed posture, decreased step length and clearance, increased lateral sway with mildly antalgic gait pattern due to B knee pain. Cues to maintain safe proximity to rollator with fair carryover, worsens as pt fatigues. Distance: 10ft x 2 + 92ft + 180ft  Comments: Post walking, SpO2 >95% throughout, HR ranging from 107-137bpm irregular rate. Improves to baseline with 3 minute rest break between bouts. Balance  Posture: Fair  Sitting - Static: Good  Sitting - Dynamic: Good;-  Standing - Static: Good  Standing - Dynamic: Fair;+  Comments: Pt is able to maintain balance with 1 UE support on rollator to perform pericare and clothing management with distant supervision. Exercises  Heelslides: Seated hamstring curl with yellow TB x 15  Hip Abduction: Seated with yellow TB x 15  Knee Long Arc Quad: x15 BLE  Ankle Pumps: x30 warmup in bed         Comment: Pt ambulated to/from bathroom with rollator and SBA, able to manage brief down in standing with supervision. Continent of bowel and bladder, able to perform pericare in sitting/standing with supervision. Assist to don compression stockings and thread pants due to time constraints. Pt is able to pull up fully in standing. Increased time required to perform all activities due to SOB and fatigue. Goals  Short term goals  Time Frame for Short term goals: Patient demonstrates indep in bed mobility. Short term goal 1: 10/26/2021  Short term goal 2: Patient demonstrates 150 feet with SpO2 90% or greater on O2 via nc. with rollator  Short term goal 3: Patient demonstrates 5 minutes of standing dynamic balance activities to improve dynamic balance with gait with SPO2 90% or greater. GOAL met 10/21/2021Patient demonstrates 6 minutes of standing dynamic balance activities to improve dynamic balance with gait with SPO2 90% or greater. Short term goal 4: Patient demonstrates indep in core and DBE in sitting and standing. Short term goal 5: Patient demonstrates safe indep transfers bed<>chair, car transfer, with use of rollator . Long term goals  Time Frame for Long term goals : 11/2/2021  Long term goal 1: Patient demonstrates safe indep stoop to recover shoe from floor wtih use of rollator.  .  Long term goal 2: Patiend demonstrates gait 150 feet ROSAS with use of rollator and spO2 90% or greater  Long term goal 3: Pt demo up and down 3-4 steps indep with use of cane and rail to safely enter and egress home. Long term goal 4: Pt indep in  hep LE therex to increase LE endurance/functional strength with walking. Patient Goals   Patient goals : \" To get my legs stronger\"    Plan    Plan  Times per week: 5/7 days/week  Times per day: Daily  Plan weeks: 14 days  Current Treatment Recommendations: Transfer Training, Strengthening, Balance Training, Gait Training, Stair training, Functional Mobility Training, Safety Education & Training  Safety Devices  Type of devices:  All fall risk precautions in place, Left in bed, Call light within reach, Left in chair, Chair alarm in place  Restraints  Initially in place: No     Therapy Time   Individual Concurrent Group Co-treatment   Time In 0930         Time Out 1030         Minutes 60         Timed Code Treatment Minutes: [address] [place], PT

## 2021-10-23 NOTE — PROGRESS NOTES
Occupational Therapy  Facility/Department: [facility]  Daily Treatment Note  NAME: Meagan Barboza  : 1940  MRN: [id number]    Date of Service: 10/23/2021    Discharge Recommendations:  Home with assist PRN, Home with Home health OT  OT Equipment Recommendations  Other: CTA    Assessment   Performance deficits / Impairments: Decreased functional mobility ; Decreased high-level IADLs;Decreased ADL status; Decreased endurance;Decreased balance;Decreased safe awareness;Decreased strength  Assessment: Pt continues to be limited by arthritic pain in bilateral knees. Pt tolerated ADLs in bathroom with supervision while SpO2 remained above 90% on 4L. Pt completed BUE exercises seated/standing x15 reps with 2lb weight in order to increase activity tolerance and strength for ADLs. Pt would benefit from ongoing OT in order to increase functional status. OT Education: OT Role;Plan of Care;Precautions;Transfer Training;Energy Conservation; ADL Adaptive Strategies; Home Exercise Program  REQUIRES OT FOLLOW UP: Yes  Activity Tolerance  Activity Tolerance: Patient Tolerated treatment well;Patient limited by pain  Activity Tolerance: Topical pain medicine applied to bilateral knees by RN; Pt with SpO2 above 90%; HR ranging from [address] in place: Yes  Type of devices: All fall risk precautions in place;Call light within reach; Patient at risk for falls; Chair alarm in place; Left in chair;Gait belt         Patient Diagnosis(es): There were no encounter diagnoses.       has a past medical history of Abnormal chest CT, Abnormal CXR, Acute exacerbation of chronic obstructive pulmonary disease (COPD) ([address] Acute on chronic respiratory failure (HCC), Acute on chronic respiratory failure with hypoxia (HCC), Anticoagulant long-term use, Atrial fibrillation ([address] Atrial fibrillation with rapid ventricular response ([address] B-cell lymphoma ([address] CAP (community acquired pneumonia), Carotid artery stenosis, CHF (congestive heart failure) ([address] Chronic back pain, Congenital heart disease, COVID-19, Depression, Diverticulitis, Diverticulitis of colon, Dizziness, Fistula, Gram-negative pneumonia ([address] HCAP (healthcare-associated pneumonia), Hx of blood clots, Hyperlipidemia, Hypertension, IFG (impaired fasting glucose), Infection due to Stenotrophomonas maltophilia, Non-Hodgkins lymphoma ([address] Obesity, Panic disorder, Peripheral vascular disease ([address] Primary osteoarthritis of both knees, Pulmonary embolism ([address] Rectal vaginal fistula, Restless legs syndrome, [facility] spotted fever, Sciatic nerve pain, Sepsis ([address] Sleep apnea, Swelling of limb, Tobacco abuse, Urinary tract infection in female, and Vitamin D deficiency. has a past surgical history that includes Appendectomy (1963); partial hysterectomy (cervix not removed) (1980); hernia repair; bronchoscopy (N/A, 8/12/2019); bronchoscopy (8/12/2019); bronchoscopy (8/12/2019); bronchoscopy (8/12/2019); bronchoscopy (8/12/2019); bronchoscopy (8/12/2019); Tunneled venous port placement (Left, 01/17/2020); [facility] Surgery (N/A, 1/17/2020); Cardiac catheterization (2003); Colonoscopy (N/A, 9/25/2019); and Tonsillectomy (1965). Restrictions  Restrictions/Precautions  Restrictions/Precautions: Fall Risk, General Precautions  Position Activity Restriction  Other position/activity restrictions: on 4L O2  Subjective   General  Chart Reviewed:  Yes  Additional Pertinent Hx: has a past medical history of Abnormal chest CT, Abnormal CXR, Acute exacerbation of chronic obstructive pulmonary disease (COPD) ([address] Acute on chronic respiratory failure (HCC), Acute on chronic respiratory failure with hypoxia (HCC), Anticoagulant long-term use, Atrial fibrillation ([address] Atrial fibrillation with rapid ventricular response ([address] B-cell lymphoma ([address] CAP (community acquired pneumonia), Carotid artery stenosis, CHF (congestive heart failure) ([address] Chronic back pain, Congenital heart disease, COVID-19, Depression, Diverticulitis, Diverticulitis of colon, Dizziness, Fistula, Gram-negative pneumonia ([facility] HCAP (healthcare-associated pneumonia), Hx of blood clots, Hyperlipidemia, Hypertension, IFG (impaired fasting glucose), Infection due to Stenotrophomonas maltophilia, Non-Hodgkins lymphoma ([facility] Obesity, Panic disorder, Peripheral vascular disease ([facility] Primary osteoarthritis of both knees, Pulmonary embolism ([facility] Rectal vaginal fistula, Restless legs syndrome, [facility] spotted fever, Sciatic nerve pain, Sepsis ([facility] Sleep apnea, Swelling of limb, Tobacco abuse, Urinary tract infection in female, and Vitamin D deficiency. Response to previous treatment: Patient with no complaints from previous session  Family / Caregiver Present: No  Referring Practitioner: Dr. Eliza Correa  Diagnosis: Post COVID  Subjective  Subjective: Pt supine in recliner and agreeable for therapy. Pt reporting 10/10 pain in BLE knees. RN notified and aware. Pain Assessment  Pain Assessment: 0-10  Pain Level: 10  Pain Location: Knee  Pain Orientation: Right;Left  Pre Treatment Pain Screening  Intervention List: Patient able to continue with treatment;Nurse/Physician notified;Nurse called to administer meds  Vital Signs  Patient Currently in Pain: Yes   Orientation  Orientation  Overall Orientation Status: Within Normal Limits  Objective    ADL  LE Dressing: Stand by assistance  Toileting: Stand by assistance (intermittent cues for safety during hygiene; Pt noted to flex trunk and position hand on 4WW seat while completing hygiene.          Balance  Sitting Balance: Supervision  Standing Balance: Stand by assistance  Functional Mobility  Functional - Mobility Device: 4-Wheeled Walker  Activity: To/from bathroom  Assist Level: Stand by assistance  Functional Mobility Comments: 4L of O2  Toilet Transfers  Toilet - Technique: Ambulating  Equipment Used: Standard toilet  Toilet Transfer: Stand by assistance  Toilet Transfers Comments: + grab bars  Bed mobility  Comment: NT; pt up in chair at start of session and end  Transfers  Sit to stand: Stand by assistance  Stand to sit: Stand by assistance  Transfer Comments: cues for hand placement                       Cognition  Overall Cognitive Status: Exceptions  Arousal/Alertness: Appropriate responses to stimuli  Following Commands:  Follows one step commands consistently  Attention Span: Appears intact  Memory: Decreased short term memory  Safety Judgement: Decreased awareness of need for assistance;Decreased awareness of need for safety  Problem Solving: Assistance required to correct errors made  Insights: Decreased awareness of deficits  Initiation: Does not require cues  Sequencing: Requires cues for some                    Type of ROM/Therapeutic Exercise  Type of ROM/Therapeutic Exercise: AROM  Comment: 2lb weights  Exercises  Shoulder Flexion: x10  Shoulder Extension: x10  Horizontal ABduction: x10  Horizontal ADduction: x10  Chair Push-ups: x5  Elbow Flexion: x15  Elbow Extension: x15  Supination: x15  Pronation: x14  Other: 2 stes; increased time and rest breaks between exercises                    Plan   Plan  Times per week: 5/7 days  Times per day: Daily  Plan weeks: 7-10 days  Current Treatment Recommendations: Strengthening, Safety Education & Training, Balance Training, Self-Care / ADL, Home Management Training, Equipment Evaluation, Education, & procurement, Endurance Training, Functional Mobility Training, Patient/Caregiver Education & Training    Goals  Short term goals  Time Frame for Short term goals: 10/22/21  Short term goal 1: Pt will complete toileting while using LRAD with SBA.-goal MET 10/22/21  Short term goal 2: Pt will complete TB bathing with set up and SBA- ongoing 10/23  Short term goal 3: Pt will complete grooming task while standing for at least 3 minutes with SBA.- GOAL MET 10/23  Short term goal 4: Pt will complete total body dressing with LRAD and SBA- ongoing 10/23  Long term goals  Time Frame for Long term goals : 10/27/21  Long term goal 1: Pt will complete toileting task with Ivana. Long term goal 2: Pt will complete tub/shower transfer with supervision and LRAD. Long term goal 3: Pt will complete TB bathing with supervision. Long term goal 4: Pt will complete IADL task with SBA. Long term goal 5: Pt will complete TB dressing using LRAD and Ivana. Patient Goals   Patient goals : \"To get more strength in my legs. \"       Therapy Time   Individual Concurrent Group Co-treatment   Time In 1250         Time Out 1350         Minutes 60         Timed Code Treatment Minutes: [address], OTR/L

## 2021-10-23 NOTE — PROGRESS NOTES
[place]  10/23/2021  [id number]    Chief Complaint: COVID-19 virus infection    Subjective:   No issues overnight. Patient seen this afternoon sitting up in room. Reports breathing is stable. Therapy going well. Having some knee soreness. Tramadol and diclofenac gel helpful. ROS: no n/v, cp, f/c  Objective:  Patient Vitals for the past 24 hrs:   BP Temp Temp src Pulse Resp SpO2   10/23/21 0939 115/60 98.1 °F (36.7 °C) Oral 97 20 93 %   10/23/21 0729     20 98 %   10/23/21 0615 (!) 166/69   94     10/22/21 2345 120/75   108     10/22/21 2030 109/73 97.6 °F (36.4 °C) Oral 101 18 95 %   10/22/21 2027      95 %   10/22/21 1745 119/66   80     10/22/21 1530 104/69 98.1 °F (36.7 °C) Oral 97  95 %   10/22/21 1135 131/76   132       Gen: No distress, pleasant. HEENT: Normocephalic, atraumatic. CV: Regular rate and rhythm. Resp: No respiratory distress. Abd: Soft, nontender   Ext: No edema. Neuro: Alert, oriented, appropriately interactive.    Wt Readings from Last 3 Encounters:   10/22/21 249 lb 11.2 oz (113.3 kg)   09/28/21 252 lb 8 oz (114.5 kg)   06/02/21 273 lb (123.8 kg)       Laboratory data:   Lab Results   Component Value Date    WBC 8.4 10/21/2021    HGB 9.7 (L) 10/21/2021    HCT 31.0 (L) 10/21/2021    MCV 78.3 (L) 10/21/2021     10/21/2021       Lab Results   Component Value Date     10/21/2021    K 4.2 10/21/2021    CL 98 10/21/2021    CO2 34 10/21/2021    BUN 15 10/21/2021    CREATININE 0.7 10/21/2021    GLUCOSE 161 10/21/2021    CALCIUM 8.7 10/21/2021        Therapy progress:  PT  Position Activity Restriction  Other position/activity restrictions: on 4L O2  Objective     Sit to Stand: Modified independent  Stand to sit: Modified independent  Bed to Chair: Modified independent  Device: Rollator  Other Apparatus: O2  Assistance: Stand by assistance  Distance: 10ft x 2 + 92ft + 180ft  OT  PT Equipment Recommendations  Equipment Needed: No  Other: has rollator at home and cane  Toilet - Technique: Ambulating  Equipment Used: Standard toilet  Assessment        SLP  Current Diet : Regular  Current Liquid Diet : Thin  Diet Solids Recommendation: Regular  Liquid Consistency Recommendation: Thin    Body mass index is 45.67 kg/m². Rehabilitation Diagnosis:  Neurologic, 3.8, Neuromuscular Disorders, e.g. Critical Illness Myopathy, Other Myopathy     Assessment and Plan:  CIM  - PT/OT, dietary support     COVID pneumonia  - completed isolation  - supportive care     COPD  - nebs, supplemental oxygen  - 4L at baseline  - slowly wean steroids       Mood disorder  - buspar, wellbutrin    DM  - follow blood sugars as steroids wean     Bowels: Schedule stool softener. Follow bowel movements. Enema or suppository if needed.      Bladder: Check PVR x 3. [facility] if PVR > 200ml or if any volume is > 500 ml.      Sleep: Trazodone provided prn. Morbid obesity. Dietary consulted.      Follow up appointments: PCP  VANDANA: 10/30 home w/ home health  DME: Cailin Virk.  Nasima Zaidi MD 10/23/2021, 11:18 AM

## 2021-10-23 NOTE — PROGRESS NOTES
Pharmacy Note  Warfarin Consult     Goal INR range 2-3  Home Warfarin dose:5 mg daily     Date                 INR                  Warfarin  10/18              1.87                  5 mg  10/19              2.51                  hold  10/20              2.56                  2.5 mg  10/21              2.00                  5 mg  10/22              2.12                  5 mg  10/23              2.98                  0 mg  Recommend  to hold Warfarin tonight x1 due to big jump in INR . Daily INR ordered. Rx will continue to manage therapy per consult order.   Karol Baltazar/Abhay. 10/23/21 5:45 PM EDT

## 2021-10-23 NOTE — PROGRESS NOTES
Speech Language Pathology  [facility]: ACUTE REHAB UNIT  SPEECH-LANGUAGE PATHOLOGY      [x] Daily  [] Weekly Care Conference Note  [] Discharge    Patient:Raven Snow      :1940  DJY:[id number]  Rehab Dx/Hx: COVID-19 virus infection [U07.1]    Precautions: falls  Home situation: Lives at home indep. Manages finances and meds; cooks and does the laundry. Active . ST Dx: [] Aphasia  [] Dysarthria  [] Apraxia   [] Oropharyngeal dysphagia [x] Cognitive Impairment  [] Other:   Date of Admit: 10/18/2021  Room #: 0153/0153-01    Current functional status (updated daily):         Pt being seen for : [] Speech/Language Treatment  [] Dysphagia Treatment [x] Cognitive Treatment  [] Other:  Communication: [x]WFL  [] Aphasia  [] Dysarthria  [] Apraxia  [] Pragmatic Impairment [] Non-verbal  [] Hearing Loss  [] Other:   Cognition: [] WFL  [x] Mild  [] Moderate  [] Severe [] Unable to Assess  [] Other:  Memory: [] WFL  [x] Mild  [] Moderate  [] Severe [] Unable to Assess  [] Other:  Behavior: [x] Alert  [x] Cooperative  [x]  Pleasant  [] Confused  [] Agitated  [] Uncooperative  [] Distractible [] Motivated  [] Self-Limiting [] Anxious  [] Other:  Endurance:  [x] Adequate for participation in SLP sessions  [] Reduced overall  [] Lethargic  [] Other:  Safety: [x] No concerns at this time  [] Reduced insight into deficits  []  Reduced safety awareness [] Not following call light procedures  [] Unable to Assess  [] Other:    Current Diet Order:ADULT DIET; Regular; 4 carb choices (60 gm/meal)  Adult Oral Nutrition Supplement; Diabetic Oral Supplement  Swallowing Precautions:  Alternate solids and liquids;Eat/Feed slowly;Upright as possible for all oral intake;Remain upright for 30-45 minutes after meals;Small bites/sips        Date: 10/23/2021      Tx session 1  [id number] Tx session 2  Addressed in 1st session   Total Timed Code Min 60 0   Total Treatment Minutes 60 0   Individual Treatment Minutes 60 0 Group Treatment Minutes 0 0   Co-Treat Minutes 0 0   Variance/Reason:  n/a n/a   Pain None reported None reported   Pain Intervention [] RN notified  [] Repositioned  [] Intervention offered and patient declined  [x] N/A  [] Other: [] RN notified  [] Repositioned  [] Intervention offered and patient declined  [] N/A  [] Other:   Subjective     Pt alert and cooperative, agreeable to tx but states she does not enjoy ST sessions. Pt seen upright in bed. Objective:  Goals  Short-term Goals  Timeframe for Short-term Goals: 10 days (10/28/21)       Goal 1: Pt will complete recall tasks with 90% acc given min cues for use of compensatory memory strategies. Working memory within written scheduling task- pt re-read pieces of content 3-5 times for recall    Goal 2: Pt will complete executive function tasks (meds, money, math, time, etc) with 90% acc given min cues     Not addressed- pt requested not to do math this date    Goal 3: Pt will complete problem solving and thought organization tasks with 90% acc given min cues   Schedule organizing: pt organized 7 errands with 57% acc given min cues, following paragraph level written parameters re locations and time constraints. Accuracy increased to 71% with moderate cues. Goal 4: Pt will complete verbal and visual reasoning tasks with 90% acc given min cues. Sequencing 4 step tasks (written): 75% acc independently, 100% with min cues  Sequencing 6 steps (written): 67% independently, 100% with min cues      Other areas targeted: N/A N/A   Education:   rationale for tx tasks provided, role of SLP in overall plan of care    Safety Devices: [x] Call light within reach  [] Chair alarm activated  [x] Bed alarm activated  [] Other:  [] Call light within reach  [] Chair alarm activated  [] Bed alarm activated  [] Other:    Assessment: Pt alert and cooperative, agreeable to participate. She self-corrected her initial omission of time constraints in scheduling task.  Pt continues to question the purpose of ST sessions. She benefits from goal-oriented therapy tasks and explanation of the rationale, including improving her ability to meet her goals in other therapies. Plan: Continue as per plan of care. Additional Information:     Barriers toward progress: None   Discharge recommendations:  [] Home independently  [] Home with assistance []  24 hour supervision  [] ECF [x] Other: See PT/OT recs  Continued Tx Upon Discharge: ? [] Yes [x] No [] TBD based on progress while on ARU [] Vital Stim indicated [] Other:   Estimated discharge date: 10/30/2021    Interventions used this date:  [] Speech/Language Treatment  [] Instruction in HEP [] Group [] Dysphagia Treatment [x] Cognitive Treatment   [] Other: Total Time Breakdown / Charges    Time in Time out Total Time / units   Cognitive Tx 0800 0900 60 min/ 4 units   Speech Tx -- -- --   Dysphagia Tx -- -- --       Electronically Signed by    Marino Holstein.  Misael ROMAN CCC-SLP  Speech Language Pathologist

## 2021-10-24 LAB
GLUCOSE BLD-MCNC: 143 MG/DL (ref 70–99)
GLUCOSE BLD-MCNC: 144 MG/DL (ref 70–99)
GLUCOSE BLD-MCNC: 147 MG/DL (ref 70–99)
GLUCOSE BLD-MCNC: 150 MG/DL (ref 70–99)
INR BLD: 3.22 (ref 0.88–1.12)
PERFORMED ON: ABNORMAL
PROTHROMBIN TIME: 38.2 SEC (ref 9.9–12.7)

## 2021-10-24 PROCEDURE — 94640 AIRWAY INHALATION TREATMENT: CPT

## 2021-10-24 PROCEDURE — 1280000000 HC REHAB R&B

## 2021-10-24 PROCEDURE — 85610 PROTHROMBIN TIME: CPT

## 2021-10-24 PROCEDURE — 2700000000 HC OXYGEN THERAPY PER DAY

## 2021-10-24 PROCEDURE — 6370000000 HC RX 637 (ALT 250 FOR IP): Performed by: PHYSICAL MEDICINE & REHABILITATION

## 2021-10-24 PROCEDURE — 36415 COLL VENOUS BLD VENIPUNCTURE: CPT

## 2021-10-24 PROCEDURE — 94761 N-INVAS EAR/PLS OXIMETRY MLT: CPT

## 2021-10-24 PROCEDURE — 6360000002 HC RX W HCPCS: Performed by: PHYSICAL MEDICINE & REHABILITATION

## 2021-10-24 RX ADMIN — INSULIN LISPRO 2 UNITS: 100 INJECTION, SOLUTION INTRAVENOUS; SUBCUTANEOUS at 06:32

## 2021-10-24 RX ADMIN — FUROSEMIDE 20 MG: 20 TABLET ORAL at 10:01

## 2021-10-24 RX ADMIN — BUPROPION HYDROCHLORIDE 150 MG: 150 TABLET, EXTENDED RELEASE ORAL at 10:01

## 2021-10-24 RX ADMIN — BUSPIRONE HYDROCHLORIDE 5 MG: 5 TABLET ORAL at 06:32

## 2021-10-24 RX ADMIN — PREDNISONE 10 MG: 10 TABLET ORAL at 20:06

## 2021-10-24 RX ADMIN — DICLOFENAC SODIUM 4 G: 10 GEL TOPICAL at 13:56

## 2021-10-24 RX ADMIN — PREDNISONE 10 MG: 10 TABLET ORAL at 10:01

## 2021-10-24 RX ADMIN — FAMOTIDINE 20 MG: 20 TABLET, FILM COATED ORAL at 10:00

## 2021-10-24 RX ADMIN — DICLOFENAC SODIUM 4 G: 10 GEL TOPICAL at 10:02

## 2021-10-24 RX ADMIN — INSULIN LISPRO 2 UNITS: 100 INJECTION, SOLUTION INTRAVENOUS; SUBCUTANEOUS at 16:27

## 2021-10-24 RX ADMIN — TRAZODONE HYDROCHLORIDE 50 MG: 50 TABLET ORAL at 22:02

## 2021-10-24 RX ADMIN — INSULIN LISPRO 2 UNITS: 100 INJECTION, SOLUTION INTRAVENOUS; SUBCUTANEOUS at 12:06

## 2021-10-24 RX ADMIN — DILTIAZEM HYDROCHLORIDE 60 MG: 60 TABLET, FILM COATED ORAL at 00:09

## 2021-10-24 RX ADMIN — DILTIAZEM HYDROCHLORIDE 60 MG: 60 TABLET, FILM COATED ORAL at 06:32

## 2021-10-24 RX ADMIN — FAMOTIDINE 20 MG: 20 TABLET, FILM COATED ORAL at 20:06

## 2021-10-24 RX ADMIN — BUDESONIDE 500 MCG: 0.5 SUSPENSION RESPIRATORY (INHALATION) at 19:22

## 2021-10-24 RX ADMIN — PYRIDOXINE HCL TAB 50 MG 50 MG: 50 TAB at 10:00

## 2021-10-24 RX ADMIN — INSULIN GLARGINE 20 UNITS: 100 INJECTION, SOLUTION SUBCUTANEOUS at 20:07

## 2021-10-24 RX ADMIN — TRAMADOL HYDROCHLORIDE 50 MG: 50 TABLET ORAL at 12:05

## 2021-10-24 RX ADMIN — INSULIN LISPRO 1 UNITS: 100 INJECTION, SOLUTION INTRAVENOUS; SUBCUTANEOUS at 20:07

## 2021-10-24 RX ADMIN — DILTIAZEM HYDROCHLORIDE 60 MG: 60 TABLET, FILM COATED ORAL at 12:03

## 2021-10-24 RX ADMIN — DILTIAZEM HYDROCHLORIDE 60 MG: 60 TABLET, FILM COATED ORAL at 18:51

## 2021-10-24 RX ADMIN — DICLOFENAC SODIUM 4 G: 10 GEL TOPICAL at 20:06

## 2021-10-24 RX ADMIN — DIGOXIN 187.5 MCG: 125 TABLET ORAL at 10:02

## 2021-10-24 RX ADMIN — LORAZEPAM 1 MG: 1 TABLET ORAL at 22:02

## 2021-10-24 ASSESSMENT — PAIN SCALES - GENERAL: PAINLEVEL_OUTOF10: 6

## 2021-10-24 NOTE — PROGRESS NOTES
Pharmacy Note  Warfarin Consult     Goal INR range 2-3  Home Warfarin dose:5 mg daily     Date                 INR                  Warfarin  10/18              1.87                  5 mg  10/19              2.51                  hold  10/20              2.56                  2.5 mg  10/21              2.00                  5 mg  10/22              2.12                  5 mg  10/23              2.98                  0 mg  10/24              3.22                  0 mg   Recommend  to hold Warfarin tonight x1 due to big jump in INR . Daily INR ordered. Rx will continue to manage therapy per consult order.   Karol Baltazar/Abhay. 10/24/21 11:24 AM EDT

## 2021-10-24 NOTE — PROGRESS NOTES
bars  Assessment        SLP  Current Diet : Regular  Current Liquid Diet : Thin  Diet Solids Recommendation: Regular  Liquid Consistency Recommendation: Thin    Body mass index is 45.27 kg/m². Rehabilitation Diagnosis:  Neurologic, 3.8, Neuromuscular Disorders, e.g. Critical Illness Myopathy, Other Myopathy     Assessment and Plan:  CIM  - PT/OT, dietary support     COVID pneumonia  - completed isolation  - supportive care     COPD  - nebs, supplemental oxygen  - 4L at baseline  - slowly wean steroids       Mood disorder  - buspar, wellbutrin    DM  - follow blood sugars as steroids wean     Bowels: Schedule stool softener. Follow bowel movements. Enema or suppository if needed.      Bladder: Check PVR x 3. [address] if PVR > 200ml or if any volume is > 500 ml.      Sleep: Trazodone provided prn. Morbid obesity.  Dietary consulted.      Follow up appointments: PCP  VANDANA: 10/30 home w/ home health  DME: TURNER Guevara CNP  10/24/21  3:17 PM

## 2021-10-25 LAB
ANION GAP SERPL CALCULATED.3IONS-SCNC: 4 MMOL/L (ref 3–16)
ANISOCYTOSIS: ABNORMAL
BANDED NEUTROPHILS RELATIVE PERCENT: 24 % (ref 0–7)
BASOPHILS ABSOLUTE: 0 K/UL (ref 0–0.2)
BASOPHILS RELATIVE PERCENT: 0 %
BUN BLDV-MCNC: 13 MG/DL (ref 7–20)
CALCIUM SERPL-MCNC: 8.7 MG/DL (ref 8.3–10.6)
CHLORIDE BLD-SCNC: 97 MMOL/L (ref 99–110)
CO2: 40 MMOL/L (ref 21–32)
CREAT SERPL-MCNC: 0.7 MG/DL (ref 0.6–1.2)
DOHLE BODIES: PRESENT
EOSINOPHILS ABSOLUTE: 0 K/UL (ref 0–0.6)
EOSINOPHILS RELATIVE PERCENT: 0 %
GFR AFRICAN AMERICAN: >60
GFR NON-AFRICAN AMERICAN: >60
GLUCOSE BLD-MCNC: 124 MG/DL (ref 70–99)
GLUCOSE BLD-MCNC: 127 MG/DL (ref 70–99)
GLUCOSE BLD-MCNC: 156 MG/DL (ref 70–99)
GLUCOSE BLD-MCNC: 156 MG/DL (ref 70–99)
GLUCOSE BLD-MCNC: 182 MG/DL (ref 70–99)
HCT VFR BLD CALC: 29 % (ref 36–48)
HEMOGLOBIN: 8.9 G/DL (ref 12–16)
HYPOCHROMIA: ABNORMAL
INR BLD: 2.45 (ref 0.88–1.12)
LYMPHOCYTES ABSOLUTE: 1.7 K/UL (ref 1–5.1)
LYMPHOCYTES RELATIVE PERCENT: 26 %
MACROCYTES: ABNORMAL
MCH RBC QN AUTO: 23.8 PG (ref 26–34)
MCHC RBC AUTO-ENTMCNC: 30.8 G/DL (ref 31–36)
MCV RBC AUTO: 77.5 FL (ref 80–100)
MICROCYTES: ABNORMAL
MONOCYTES ABSOLUTE: 0.5 K/UL (ref 0–1.3)
MONOCYTES RELATIVE PERCENT: 7 %
NEUTROPHILS ABSOLUTE: 4.5 K/UL (ref 1.7–7.7)
NEUTROPHILS RELATIVE PERCENT: 43 %
OVALOCYTES: ABNORMAL
PDW BLD-RTO: 19.2 % (ref 12.4–15.4)
PERFORMED ON: ABNORMAL
PLATELET # BLD: 373 K/UL (ref 135–450)
PMV BLD AUTO: 6.3 FL (ref 5–10.5)
POTASSIUM REFLEX MAGNESIUM: 3.9 MMOL/L (ref 3.5–5.1)
PROTHROMBIN TIME: 28.7 SEC (ref 9.9–12.7)
RBC # BLD: 3.74 M/UL (ref 4–5.2)
SODIUM BLD-SCNC: 141 MMOL/L (ref 136–145)
TOXIC GRANULATION: PRESENT
WBC # BLD: 6.7 K/UL (ref 4–11)

## 2021-10-25 PROCEDURE — 97116 GAIT TRAINING THERAPY: CPT

## 2021-10-25 PROCEDURE — 97530 THERAPEUTIC ACTIVITIES: CPT

## 2021-10-25 PROCEDURE — 80048 BASIC METABOLIC PNL TOTAL CA: CPT

## 2021-10-25 PROCEDURE — 97129 THER IVNTJ 1ST 15 MIN: CPT

## 2021-10-25 PROCEDURE — 97110 THERAPEUTIC EXERCISES: CPT

## 2021-10-25 PROCEDURE — 94761 N-INVAS EAR/PLS OXIMETRY MLT: CPT

## 2021-10-25 PROCEDURE — 36415 COLL VENOUS BLD VENIPUNCTURE: CPT

## 2021-10-25 PROCEDURE — 97535 SELF CARE MNGMENT TRAINING: CPT

## 2021-10-25 PROCEDURE — 85025 COMPLETE CBC W/AUTO DIFF WBC: CPT

## 2021-10-25 PROCEDURE — 6360000002 HC RX W HCPCS: Performed by: PHYSICAL MEDICINE & REHABILITATION

## 2021-10-25 PROCEDURE — 97130 THER IVNTJ EA ADDL 15 MIN: CPT

## 2021-10-25 PROCEDURE — 2700000000 HC OXYGEN THERAPY PER DAY

## 2021-10-25 PROCEDURE — 1280000000 HC REHAB R&B

## 2021-10-25 PROCEDURE — 85610 PROTHROMBIN TIME: CPT

## 2021-10-25 PROCEDURE — 94640 AIRWAY INHALATION TREATMENT: CPT

## 2021-10-25 PROCEDURE — 6370000000 HC RX 637 (ALT 250 FOR IP): Performed by: PHYSICAL MEDICINE & REHABILITATION

## 2021-10-25 RX ORDER — PREDNISONE 1 MG/1
5 TABLET ORAL 2 TIMES DAILY
Status: DISCONTINUED | OUTPATIENT
Start: 2021-10-25 | End: 2021-10-29 | Stop reason: HOSPADM

## 2021-10-25 RX ORDER — WARFARIN SODIUM 1 MG/1
1 TABLET ORAL
Status: COMPLETED | OUTPATIENT
Start: 2021-10-25 | End: 2021-10-25

## 2021-10-25 RX ADMIN — PREDNISONE 5 MG: 5 TABLET ORAL at 09:19

## 2021-10-25 RX ADMIN — PREDNISONE 5 MG: 5 TABLET ORAL at 21:47

## 2021-10-25 RX ADMIN — BUDESONIDE 500 MCG: 0.5 SUSPENSION RESPIRATORY (INHALATION) at 19:26

## 2021-10-25 RX ADMIN — INSULIN LISPRO 2 UNITS: 100 INJECTION, SOLUTION INTRAVENOUS; SUBCUTANEOUS at 06:31

## 2021-10-25 RX ADMIN — LORAZEPAM 1 MG: 1 TABLET ORAL at 21:46

## 2021-10-25 RX ADMIN — DIGOXIN 187.5 MCG: 125 TABLET ORAL at 09:19

## 2021-10-25 RX ADMIN — BUSPIRONE HYDROCHLORIDE 5 MG: 5 TABLET ORAL at 06:31

## 2021-10-25 RX ADMIN — FAMOTIDINE 20 MG: 20 TABLET, FILM COATED ORAL at 21:46

## 2021-10-25 RX ADMIN — DICLOFENAC SODIUM 4 G: 10 GEL TOPICAL at 09:22

## 2021-10-25 RX ADMIN — WARFARIN SODIUM 1 MG: 1 TABLET ORAL at 17:53

## 2021-10-25 RX ADMIN — INSULIN GLARGINE 20 UNITS: 100 INJECTION, SOLUTION SUBCUTANEOUS at 20:16

## 2021-10-25 RX ADMIN — DILTIAZEM HYDROCHLORIDE 60 MG: 60 TABLET, FILM COATED ORAL at 00:15

## 2021-10-25 RX ADMIN — PYRIDOXINE HCL TAB 50 MG 50 MG: 50 TAB at 09:19

## 2021-10-25 RX ADMIN — FAMOTIDINE 20 MG: 20 TABLET, FILM COATED ORAL at 09:19

## 2021-10-25 RX ADMIN — TRAMADOL HYDROCHLORIDE 50 MG: 50 TABLET ORAL at 21:47

## 2021-10-25 RX ADMIN — INSULIN LISPRO 1 UNITS: 100 INJECTION, SOLUTION INTRAVENOUS; SUBCUTANEOUS at 20:16

## 2021-10-25 RX ADMIN — DILTIAZEM HYDROCHLORIDE 60 MG: 60 TABLET, FILM COATED ORAL at 17:53

## 2021-10-25 RX ADMIN — DICLOFENAC SODIUM 4 G: 10 GEL TOPICAL at 21:47

## 2021-10-25 RX ADMIN — DILTIAZEM HYDROCHLORIDE 60 MG: 60 TABLET, FILM COATED ORAL at 13:14

## 2021-10-25 RX ADMIN — DILTIAZEM HYDROCHLORIDE 60 MG: 60 TABLET, FILM COATED ORAL at 06:31

## 2021-10-25 RX ADMIN — FUROSEMIDE 20 MG: 20 TABLET ORAL at 09:19

## 2021-10-25 RX ADMIN — BUPROPION HYDROCHLORIDE 150 MG: 150 TABLET, EXTENDED RELEASE ORAL at 09:19

## 2021-10-25 RX ADMIN — BUDESONIDE 500 MCG: 0.5 SUSPENSION RESPIRATORY (INHALATION) at 07:54

## 2021-10-25 ASSESSMENT — PAIN DESCRIPTION - LOCATION
LOCATION: KNEE
LOCATION: BACK;KNEE
LOCATION: KNEE

## 2021-10-25 ASSESSMENT — PAIN DESCRIPTION - ONSET: ONSET: ON-GOING

## 2021-10-25 ASSESSMENT — PAIN DESCRIPTION - DESCRIPTORS: DESCRIPTORS: ACHING;CONSTANT

## 2021-10-25 ASSESSMENT — PAIN DESCRIPTION - PAIN TYPE
TYPE: CHRONIC PAIN

## 2021-10-25 ASSESSMENT — PAIN DESCRIPTION - ORIENTATION
ORIENTATION: LEFT;RIGHT
ORIENTATION: LEFT;RIGHT;MID;LOWER

## 2021-10-25 ASSESSMENT — PAIN SCALES - GENERAL
PAINLEVEL_OUTOF10: 6
PAINLEVEL_OUTOF10: 5
PAINLEVEL_OUTOF10: 6
PAINLEVEL_OUTOF10: 6
PAINLEVEL_OUTOF10: 7

## 2021-10-25 ASSESSMENT — PAIN DESCRIPTION - FREQUENCY: FREQUENCY: CONTINUOUS

## 2021-10-25 ASSESSMENT — PAIN DESCRIPTION - PROGRESSION
CLINICAL_PROGRESSION: NOT CHANGED
CLINICAL_PROGRESSION: NOT CHANGED

## 2021-10-25 NOTE — PROGRESS NOTES
Comprehensive Nutrition Assessment    Type and Reason for Visit:  Reassess    Nutrition Recommendations/Plan:   1. Continue carb control diet   2. Encourage pt to order [place] yogurt for breakfast to increase protein intakes   3. Encourage PO intakes   4. Monitor nutrition adequacy, pertinent labs, bowel habits, wt changes, and clinical progress    Nutrition Assessment:  Follow up: Pt remains nutritionally at risk AEB variable PO intakes. Pt continues to c/o no taste and decreased appetite. Discussed importance of nutrition following COVID-19. Encouraged higher calories and protein to heal. Pt unfavorable to current ONS, will discontinue. Agreeable to order [place] yogurt at breakfast for additional protein intakes. Will monitor. Malnutrition Assessment:  Malnutrition Status: At risk for malnutrition (Comment)    Context:  Acute Illness       Estimated Daily Nutrient Needs:  Energy (kcal):  2183-5081 kcal; Weight Used for Energy Requirements:  Ideal (50 kg)     Protein (g):  60-75 g; Weight Used for Protein Requirements:  Ideal (1.2-1.5 g/kg)        Fluid (ml/day):   ; Method Used for Fluid Requirements:  1 ml/kcal      Nutrition Related Findings:  2% weight loss in 1 month. BG stable. Wounds:  Deep Tissue Injury       Current Nutrition Therapies:    ADULT DIET;  Regular; 4 carb choices (60 gm/meal)    Anthropometric Measures:  · Height: 5' 2\" (157.5 cm)  · Current Body Weight: 247 lb (112 kg)   · Usual Body Weight: 252 lb (114.3 kg) (bed scale 9/14/21)     · Ideal Body Weight: 110 lbs; % Ideal Body Weight 232.7 %   · BMI: 45.2  · BMI Categories: Obese Class 3 (BMI 40.0 or greater)       Nutrition Diagnosis:   · Inadequate oral intake related to altered taste perception, impaired respiratory function as evidenced by poor intake prior to admission, weight loss      Nutrition Interventions:   Food and/or Nutrient Delivery:  Continue Current Diet, Discontinue Oral Nutrition Supplement  Nutrition

## 2021-10-25 NOTE — PROGRESS NOTES
Head to toe assessment completed. Ambulated with PT. Patient had to break during ambulation due to decreased oxygen levels. Patient recovered quickly.  Bubba Davis

## 2021-10-25 NOTE — PROGRESS NOTES
Speech Language Pathology  [facility]: ACUTE REHAB UNIT  SPEECH-LANGUAGE PATHOLOGY      [x] Daily  [] Weekly Care Conference Note  [] Discharge    Patient:Raven Rodas      :1940  XET:[id number]  Rehab Dx/Hx: COVID-19 virus infection [U07.1]    Precautions: falls  Home situation: Lives at home indep. Manages finances and meds; cooks and does the laundry. Active . ST Dx: [] Aphasia  [] Dysarthria  [] Apraxia   [] Oropharyngeal dysphagia [x] Cognitive Impairment  [] Other:   Date of Admit: 10/18/2021  Room #: 0153/0153-01    Current functional status (updated daily):         Pt being seen for : [] Speech/Language Treatment  [] Dysphagia Treatment [x] Cognitive Treatment  [] Other:  Communication: [x]WFL  [] Aphasia  [] Dysarthria  [] Apraxia  [] Pragmatic Impairment [] Non-verbal  [] Hearing Loss  [] Other:   Cognition: [] WFL  [x] Mild  [] Moderate  [] Severe [] Unable to Assess  [] Other:  Memory: [] WFL  [x] Mild  [] Moderate  [] Severe [] Unable to Assess  [] Other:  Behavior: [x] Alert  [x] Cooperative  [x]  Pleasant  [] Confused  [] Agitated  [] Uncooperative  [] Distractible [] Motivated  [] Self-Limiting [] Anxious  [] Other:  Endurance:  [x] Adequate for participation in SLP sessions  [] Reduced overall  [] Lethargic  [] Other:  Safety: [x] No concerns at this time  [] Reduced insight into deficits  []  Reduced safety awareness [] Not following call light procedures  [] Unable to Assess  [] Other:    Current Diet Order:ADULT DIET; Regular; 4 carb choices (60 gm/meal)  Adult Oral Nutrition Supplement; Diabetic Oral Supplement  Swallowing Precautions:  Alternate solids and liquids;Eat/Feed slowly;Upright as possible for all oral intake;Remain upright for 30-45 minutes after meals;Small bites/sips        Date: 10/25/2021      Tx session 1  [id number] Tx session 2  Addressed in 1st session   Total Timed Code Min 60 0   Total Treatment Minutes 60 0   Individual Treatment Minutes 60 0 Group Treatment Minutes 0 0   Co-Treat Minutes 0 0   Variance/Reason:  n/a n/a   Pain None reported None reported   Pain Intervention [] RN notified  [] Repositioned  [] Intervention offered and patient declined  [x] N/A  [] Other: [] RN notified  [] Repositioned  [] Intervention offered and patient declined  [] N/A  [] Other:   Subjective     Reports she was \"up and down\" all night, tired this morning. Agreeable to ST while up in her recliner with 4L O2 per NC      Objective:  Goals  Short-term Goals  Timeframe for Short-term Goals: 10 days (10/28/21)       Goal 1: Pt will complete recall tasks with 90% acc given min cues for use of compensatory memory strategies. Pt implemented external memory strategy of verbal elaboration for picture recall given min cues. Pt then recalled details with 100% acc      Goal 2: Pt will complete executive function tasks (meds, money, math, time, etc) with 90% acc given min cues   Pt showing prospective memory and discharge planning re checking her BS and INR, setting goals for her pm PT session today. Goal 3: Pt will complete problem solving and thought organization tasks with 90% acc given min cues   Schedule organizing: Pt organized a full day of activities (6) from written paragraph into a written \"journal\": 66% acc independently, to 100% given min cues        Goal 4: Pt will complete verbal and visual reasoning tasks with 90% acc given min cues. Not addressed this session      Other areas targeted: N/A N/A   Education:   rationale for tx tasks provided, role of SLP in overall plan of care, verbalizing or repeating aloud what she reads/sees/learns to increase recall     Safety Devices: [x] Call light within reach  [] Chair alarm activated  [x] Bed alarm activated  [] Other:  [] Call light within reach  [] Chair alarm activated  [] Bed alarm activated  [] Other:    Assessment: Pt alert and cooperative, agreeable to participate.  She demonstrated slow organization of thought for planning tasks. She verbalized insight stating \"I wasn't thinking right,\" but with good endurance and self-editing. Pt continues to benefit from goal-oriented therapy tasks and explanation of the rationale, including improving her ability to meet her goals in other therapies (she is focused on lower body strength and walking). Plan: Continue as per plan of care. Additional Information:     Barriers toward progress: None   Discharge recommendations:  [] Home independently  [] Home with assistance []  24 hour supervision  [] ECF [x] Other: See PT/OT recs  Continued Tx Upon Discharge: ? [] Yes [x] No [] TBD based on progress while on ARU [] Vital Stim indicated [] Other:   Estimated discharge date: 10/30/2021    Interventions used this date:  [] Speech/Language Treatment  [] Instruction in HEP [] Group [] Dysphagia Treatment [x] Cognitive Treatment   [] Other: Total Time Breakdown / Charges    Time in Time out Total Time / units   Cognitive Tx 1030 1130 60 min/ 4 units   Speech Tx -- -- --   Dysphagia Tx -- -- --       Electronically Signed by    Smitha Douglas MS CCC-SLP  Speech Language Pathologist

## 2021-10-25 NOTE — PLAN OF CARE
Patient continues to use the call light appropriately. Fall precautions remain in place. Patient remains free of falls this shift.

## 2021-10-25 NOTE — PLAN OF CARE
Problem: Nutrition  Goal: Optimal nutrition therapy  Outcome: Ongoing  Note: Nutrition Problem #1: Inadequate oral intake  Intervention: Food and/or Nutrient Delivery: Continue Current Diet, Discontinue Oral Nutrition Supplement  Nutritional Goals: Pt will consume greater than 50% of meals this ARU stay

## 2021-10-25 NOTE — PROGRESS NOTES
Pharmacy Note  Warfarin Consult     Goal INR range 2-3  Home Warfarin dose:5 mg daily     Date                 INR                  Warfarin  10/18              1.87                  5 mg  10/19              2.51                  hold  10/20              2.56                  2.5 mg  10/21              2.00                  5 mg  10/22              2.12                  5 mg  10/23              2.98                  0 mg  10/24              3.22                  0 mg   10/25              2.45                  1 mg    Recommend Warfarin 1 mg tonight x1. Daily INR ordered. Rx will continue to manage therapy per consult order.   Ilia Fajardo, PharmD 10/25/2021  3:11 PM

## 2021-10-25 NOTE — PROGRESS NOTES
Occupational Therapy  Facility/Department: [facility] ARU  Daily Treatment Note  NAME: Lorraine Aclaraz  : 1940  MRN: [id number]    Date of Service: 10/25/2021    Discharge Recommendations:  Home with assist PRN, Home with Home health OT       Assessment   Performance deficits / Impairments: Decreased functional mobility ; Decreased high-level IADLs;Decreased ADL status; Decreased endurance;Decreased balance;Decreased safe awareness;Decreased strength  Assessment: Pt supine in bed and agreeable for therapy. Pt Ivana sit<>stand and for supine to sit. Pt req supervision for toileting and toilet transfer. Pt completed grooming task while standing at the sink with SBA. Pt amb to and from the therapy gym with SBA and WC follow. Pt completed BUE ex while seated in the WC. Pt left in recliner with call light and chair alarm. Cont OT POC  Treatment Diagnosis: Weakness (post COVID)  Prognosis: Good  OT Education: OT Role;Plan of Care;Precautions;Transfer Training;Energy Conservation; ADL Adaptive Strategies; Home Exercise Program  Patient Education: Pt was educated on taking breaks when needed. Barriers to Learning: Decreased safety awareness. REQUIRES OT FOLLOW UP: Yes  Activity Tolerance  Activity Tolerance: Patient Tolerated treatment well;Patient limited by pain  Activity Tolerance: SR=913/77, JE=573, O2=95%  Safety Devices  Safety Devices in place: Yes  Type of devices: All fall risk precautions in place;Call light within reach; Patient at risk for falls; Chair alarm in place; Left in chair;Gait belt  Restraints  Initially in place: No         Patient Diagnosis(es): There were no encounter diagnoses.       has a past medical history of Abnormal chest CT, Abnormal CXR, Acute exacerbation of chronic obstructive pulmonary disease (COPD) ([address] Acute on chronic respiratory failure (HCC), Acute on chronic respiratory failure with hypoxia ([address] Anticoagulant long-term use, Atrial fibrillation ([address] Atrial fibrillation with rapid ventricular response ([address] B-cell lymphoma ([address] CAP (community acquired pneumonia), Carotid artery stenosis, CHF (congestive heart failure) ([address] Chronic back pain, Congenital heart disease, COVID-19, Depression, Diverticulitis, Diverticulitis of colon, Dizziness, Fistula, Gram-negative pneumonia ([address] HCAP (healthcare-associated pneumonia), Hx of blood clots, Hyperlipidemia, Hypertension, IFG (impaired fasting glucose), Infection due to Stenotrophomonas maltophilia, Non-Hodgkins lymphoma ([address] Obesity, Panic disorder, Peripheral vascular disease ([address] Primary osteoarthritis of both knees, Pulmonary embolism ([address] Rectal vaginal fistula, Restless legs syndrome, [facility] spotted fever, Sciatic nerve pain, Sepsis ([address] Sleep apnea, Swelling of limb, Tobacco abuse, Urinary tract infection in female, and Vitamin D deficiency. has a past surgical history that includes Appendectomy (1963); partial hysterectomy (cervix not removed) (1980); hernia repair; bronchoscopy (N/A, 8/12/2019); bronchoscopy (8/12/2019); bronchoscopy (8/12/2019); bronchoscopy (8/12/2019); bronchoscopy (8/12/2019); bronchoscopy (8/12/2019); Tunneled venous port placement (Left, 01/17/2020); [place] [id number] Surgery (N/A, 1/17/2020); Cardiac catheterization (2003); Colonoscopy (N/A, 9/25/2019); and Tonsillectomy (1965). Restrictions  Restrictions/Precautions  Restrictions/Precautions: Fall Risk, General Precautions  Position Activity Restriction  Other position/activity restrictions: on 4L O2  Subjective   General  Chart Reviewed:  Yes  Additional Pertinent Hx: has a past medical history of Abnormal chest CT, Abnormal CXR, Acute exacerbation of chronic obstructive pulmonary disease (COPD) ([address] Acute on chronic respiratory failure (HCC), Acute on chronic respiratory failure with hypoxia ([address] Anticoagulant long-term use, Atrial fibrillation ([address] Atrial fibrillation with rapid ventricular response ([address] B-cell lymphoma ([address] CAP (community acquired pneumonia), Carotid artery stenosis, CHF (congestive heart failure) ([address] Chronic back pain, Congenital heart disease, COVID-19, Depression, Diverticulitis, Diverticulitis of colon, Dizziness, Fistula, Gram-negative pneumonia ([address] HCAP (healthcare-associated pneumonia), Hx of blood clots, Hyperlipidemia, Hypertension, IFG (impaired fasting glucose), Infection due to Stenotrophomonas maltophilia, Non-Hodgkins lymphoma ([address] Obesity, Panic disorder, Peripheral vascular disease ([address] Primary osteoarthritis of both knees, Pulmonary embolism ([address] Rectal vaginal fistula, Restless legs syndrome, [facility] spotted fever, Sciatic nerve pain, Sepsis [address] [address] [facility] Sleep apnea, Swelling of limb, Tobacco abuse, Urinary tract infection in female, and Vitamin D deficiency. Response to previous treatment: Patient with no complaints from previous session  Family / Caregiver Present: No  Referring Practitioner: Dr. Karla Bills  Diagnosis: Post COVID  Subjective  Subjective: Pt supine in bed and finishing a breathing treatment. Pt agreeable for therapy. Pain Assessment  Pain Level: 6  Pain Type: Chronic pain  Pain Location: Knee  Pain Orientation: Left;Right  Pre Treatment Pain Screening  Intervention List: Patient able to continue with treatment  Vital Signs  Patient Currently in Pain: Yes   Orientation  Orientation  Overall Orientation Status: Within Normal Limits  Objective    ADL  Equipment Provided: Long-handled shoe horn  Feeding: Setup  Grooming: Setup;Supervision  Toileting: Supervision        Balance  Sitting Balance: Modified independent   Standing Balance: Stand by assistance  Standing Balance  Time: ~1 minute x 2, ~4 minutes, ~ 2 minutes x2  Activity: Amb to and from the bathroom with 4WW and SBA, standing for toileting, standing for grooming, amb with 4WW to and from the therapy gym. Functional Mobility  Functional - Mobility Device: 4-Wheeled Walker  Activity: To/from bathroom; To/From therapy SBA- ongoing 10/23  Short term goal 3: Pt will complete grooming task while standing for at least 3 minutes with SBA.- GOAL MET 10/23  Short term goal 4: Pt will complete total body dressing with LRAD and SBA- ongoing 10/23  Long term goals  Time Frame for Long term goals : 10/27/21  Long term goal 1: Pt will complete toileting task with Ivana. Long term goal 2: Pt will complete tub/shower transfer with supervision and LRAD. Long term goal 3: Pt will complete TB bathing with supervision. Long term goal 4: Pt will complete IADL task with SBA. Long term goal 5: Pt will complete TB dressing using LRAD and Ivana. Patient Goals   Patient goals : \"To get more strength in my legs. \"       Therapy Time   Individual Concurrent Group Co-treatment   Time In 0800         Time Out 0900         Minutes 60         Timed Code Treatment Minutes: [address]/OT

## 2021-10-25 NOTE — PROGRESS NOTES
[place]  10/25/2021  [id number]    Chief Complaint: COVID-19 virus infection    Subjective:   Patient sitting in bed, knees feeling a bit better. ROS: no n/v, cp, f/c  Objective:  Patient Vitals for the past 24 hrs:   BP Temp Temp src Pulse Resp SpO2   10/25/21 0756      95 %   10/25/21 0745 115/77 96.9 °F (36.1 °C) Axillary 101 16 95 %   10/25/21 0615 132/79   77     10/25/21 0000 122/67   81     10/24/21 2000 122/69 97.9 °F (36.6 °C) Oral 90 18 95 %   10/24/21 1926      96 %   10/24/21 0945 109/70 97.8 °F (36.6 °C) Oral 80 20 96 %     Gen: No distress, pleasant. HEENT: Normocephalic, atraumatic. CV: warm, well perfused   Resp: No respiratory distress. Abd: Soft, nontender   Ext: No edema. Neuro: Alert, oriented, appropriately interactive.    Wt Readings from Last 3 Encounters:   10/24/21 247 lb 8 oz (112.3 kg)   09/28/21 252 lb 8 oz (114.5 kg)   06/02/21 273 lb (123.8 kg)       Laboratory data:   Lab Results   Component Value Date    WBC 6.7 10/25/2021    HGB 8.9 (L) 10/25/2021    HCT 29.0 (L) 10/25/2021    MCV 77.5 (L) 10/25/2021     10/25/2021       Lab Results   Component Value Date     10/25/2021    K 3.9 10/25/2021    CL 97 10/25/2021    CO2 40 10/25/2021    BUN 13 10/25/2021    CREATININE 0.7 10/25/2021    GLUCOSE 156 10/25/2021    CALCIUM 8.7 10/25/2021        Therapy progress:  PT  Position Activity Restriction  Other position/activity restrictions: on 4L O2  Objective     Sit to Stand: Modified independent  Stand to sit: Modified independent  Bed to Chair: Modified independent  Device: Rollator  Other Apparatus: O2  Assistance: Stand by assistance  Distance: 10ft x 2 + 92ft + 180ft  OT  PT Equipment Recommendations  Equipment Needed: No  Other: has rollator at home and cane  Toilet - Technique: Ambulating  Equipment Used: Standard toilet  Toilet Transfers Comments: + grab bars  Assessment        SLP  Current Diet : Regular  Current Liquid Diet : Thin  Diet Solids Recommendation: Regular  Liquid Consistency Recommendation: Thin    Body mass index is 45.27 kg/m². Rehabilitation Diagnosis:  Neurologic, 3.8, Neuromuscular Disorders, e.g. Critical Illness Myopathy, Other Myopathy     Assessment and Plan:  CIM  - PT/OT, dietary support     COVID pneumonia  - completed isolation  - supportive care     COPD  - nebs, supplemental oxygen  - 4L at baseline  - slowly wean steroids       Mood disorder  - buspar, wellbutrin    DM  - follow blood sugars as steroids wean     Bowels: Schedule stool softener. Follow bowel movements. Enema or suppository if needed.      Bladder: Check PVR x 3. [facility] if PVR > 200ml or if any volume is > 500 ml.      Sleep: Trazodone provided prn. Morbid obesity.  Dietary consulted.      Follow up appointments: PCP  VANDANA: 10/30 home w/ home health  DME: danita Cam MD  10/25/21  8:48 AM

## 2021-10-25 NOTE — PLAN OF CARE
Problem: Pain:  Goal: Control of acute pain  Description: Control of acute pain  10/25/2021 1404 by Chetan Braden  Outcome: Ongoing  Note: Patient complains of knee pain. Pain cream applied with minimal relief. Problem: Skin Integrity:  Goal: Absence of new skin breakdown  Description: Absence of new skin breakdown  10/25/2021 1404 by Chetan Braden  Outcome: Ongoing  Note: No evidence of new skin breakdown.

## 2021-10-26 LAB
GLUCOSE BLD-MCNC: 108 MG/DL (ref 70–99)
GLUCOSE BLD-MCNC: 119 MG/DL (ref 70–99)
GLUCOSE BLD-MCNC: 123 MG/DL (ref 70–99)
GLUCOSE BLD-MCNC: 175 MG/DL (ref 70–99)
INR BLD: 1.93 (ref 0.88–1.12)
PERFORMED ON: ABNORMAL
PROTHROMBIN TIME: 22.4 SEC (ref 9.9–12.7)

## 2021-10-26 PROCEDURE — 94761 N-INVAS EAR/PLS OXIMETRY MLT: CPT

## 2021-10-26 PROCEDURE — 97530 THERAPEUTIC ACTIVITIES: CPT

## 2021-10-26 PROCEDURE — 85610 PROTHROMBIN TIME: CPT

## 2021-10-26 PROCEDURE — 94640 AIRWAY INHALATION TREATMENT: CPT

## 2021-10-26 PROCEDURE — 6370000000 HC RX 637 (ALT 250 FOR IP): Performed by: PHYSICAL MEDICINE & REHABILITATION

## 2021-10-26 PROCEDURE — 36415 COLL VENOUS BLD VENIPUNCTURE: CPT

## 2021-10-26 PROCEDURE — 97116 GAIT TRAINING THERAPY: CPT

## 2021-10-26 PROCEDURE — 2700000000 HC OXYGEN THERAPY PER DAY

## 2021-10-26 PROCEDURE — 97129 THER IVNTJ 1ST 15 MIN: CPT

## 2021-10-26 PROCEDURE — 6360000002 HC RX W HCPCS: Performed by: PHYSICAL MEDICINE & REHABILITATION

## 2021-10-26 PROCEDURE — 97130 THER IVNTJ EA ADDL 15 MIN: CPT

## 2021-10-26 PROCEDURE — 97535 SELF CARE MNGMENT TRAINING: CPT

## 2021-10-26 PROCEDURE — 1280000000 HC REHAB R&B

## 2021-10-26 PROCEDURE — 97110 THERAPEUTIC EXERCISES: CPT

## 2021-10-26 RX ORDER — WARFARIN SODIUM 2 MG/1
2 TABLET ORAL
Status: COMPLETED | OUTPATIENT
Start: 2021-10-26 | End: 2021-10-26

## 2021-10-26 RX ADMIN — WARFARIN SODIUM 2 MG: 2 TABLET ORAL at 18:39

## 2021-10-26 RX ADMIN — TRAMADOL HYDROCHLORIDE 50 MG: 50 TABLET ORAL at 21:31

## 2021-10-26 RX ADMIN — DICLOFENAC SODIUM 4 G: 10 GEL TOPICAL at 21:31

## 2021-10-26 RX ADMIN — PREDNISONE 5 MG: 5 TABLET ORAL at 07:58

## 2021-10-26 RX ADMIN — PYRIDOXINE HCL TAB 50 MG 50 MG: 50 TAB at 07:58

## 2021-10-26 RX ADMIN — FAMOTIDINE 20 MG: 20 TABLET, FILM COATED ORAL at 07:58

## 2021-10-26 RX ADMIN — INSULIN GLARGINE 20 UNITS: 100 INJECTION, SOLUTION SUBCUTANEOUS at 21:32

## 2021-10-26 RX ADMIN — BUDESONIDE 500 MCG: 0.5 SUSPENSION RESPIRATORY (INHALATION) at 19:19

## 2021-10-26 RX ADMIN — LORAZEPAM 1 MG: 1 TABLET ORAL at 21:32

## 2021-10-26 RX ADMIN — BUDESONIDE 500 MCG: 0.5 SUSPENSION RESPIRATORY (INHALATION) at 11:18

## 2021-10-26 RX ADMIN — FUROSEMIDE 20 MG: 20 TABLET ORAL at 07:58

## 2021-10-26 RX ADMIN — BUPROPION HYDROCHLORIDE 150 MG: 150 TABLET, EXTENDED RELEASE ORAL at 07:58

## 2021-10-26 RX ADMIN — TRAMADOL HYDROCHLORIDE 50 MG: 50 TABLET ORAL at 07:58

## 2021-10-26 RX ADMIN — TRAZODONE HYDROCHLORIDE 50 MG: 50 TABLET ORAL at 21:31

## 2021-10-26 RX ADMIN — DIGOXIN 187.5 MCG: 125 TABLET ORAL at 07:58

## 2021-10-26 RX ADMIN — DILTIAZEM HYDROCHLORIDE 60 MG: 60 TABLET, FILM COATED ORAL at 18:39

## 2021-10-26 RX ADMIN — DILTIAZEM HYDROCHLORIDE 60 MG: 60 TABLET, FILM COATED ORAL at 00:20

## 2021-10-26 RX ADMIN — DILTIAZEM HYDROCHLORIDE 60 MG: 60 TABLET, FILM COATED ORAL at 12:22

## 2021-10-26 RX ADMIN — DILTIAZEM HYDROCHLORIDE 60 MG: 60 TABLET, FILM COATED ORAL at 06:00

## 2021-10-26 RX ADMIN — BUSPIRONE HYDROCHLORIDE 5 MG: 5 TABLET ORAL at 06:00

## 2021-10-26 RX ADMIN — DICLOFENAC SODIUM 4 G: 10 GEL TOPICAL at 12:22

## 2021-10-26 RX ADMIN — FAMOTIDINE 20 MG: 20 TABLET, FILM COATED ORAL at 21:32

## 2021-10-26 RX ADMIN — PREDNISONE 5 MG: 5 TABLET ORAL at 21:31

## 2021-10-26 RX ADMIN — POLYETHYLENE GLYCOL 3350 17 G: 17 POWDER, FOR SOLUTION ORAL at 09:30

## 2021-10-26 RX ADMIN — DICLOFENAC SODIUM 4 G: 10 GEL TOPICAL at 07:59

## 2021-10-26 RX ADMIN — INSULIN LISPRO 1 UNITS: 100 INJECTION, SOLUTION INTRAVENOUS; SUBCUTANEOUS at 21:32

## 2021-10-26 ASSESSMENT — PAIN DESCRIPTION - PROGRESSION
CLINICAL_PROGRESSION: NOT CHANGED

## 2021-10-26 ASSESSMENT — PAIN SCALES - GENERAL
PAINLEVEL_OUTOF10: 6
PAINLEVEL_OUTOF10: 7
PAINLEVEL_OUTOF10: 5
PAINLEVEL_OUTOF10: 6
PAINLEVEL_OUTOF10: 2
PAINLEVEL_OUTOF10: 6

## 2021-10-26 ASSESSMENT — PAIN DESCRIPTION - LOCATION
LOCATION: KNEE

## 2021-10-26 ASSESSMENT — PAIN DESCRIPTION - ORIENTATION
ORIENTATION: LEFT;RIGHT

## 2021-10-26 ASSESSMENT — PAIN DESCRIPTION - PAIN TYPE
TYPE: CHRONIC PAIN

## 2021-10-26 ASSESSMENT — PAIN - FUNCTIONAL ASSESSMENT: PAIN_FUNCTIONAL_ASSESSMENT: PREVENTS OR INTERFERES SOME ACTIVE ACTIVITIES AND ADLS

## 2021-10-26 NOTE — PROGRESS NOTES
Speech Language Pathology  [facility]: ACUTE REHAB UNIT  SPEECH-LANGUAGE PATHOLOGY      [x] Daily  [] Weekly Care Conference Note  [] Discharge    Patient:Raven Nino      :1940  SST:[id number]  Rehab Dx/Hx: COVID-19 virus infection [U07.1]    Precautions: falls  Home situation: Lives at home indep. Manages finances and meds; cooks and does the laundry. Active . ST Dx: [] Aphasia  [] Dysarthria  [] Apraxia   [] Oropharyngeal dysphagia [x] Cognitive Impairment  [] Other:   Date of Admit: 10/18/2021  Room #: 0153/0153-01    Current functional status (updated daily):         Pt being seen for : [] Speech/Language Treatment  [] Dysphagia Treatment [x] Cognitive Treatment  [] Other:  Communication: [x]WFL  [] Aphasia  [] Dysarthria  [] Apraxia  [] Pragmatic Impairment [] Non-verbal  [] Hearing Loss  [] Other:   Cognition: [] WFL  [x] Mild  [] Moderate  [] Severe [] Unable to Assess  [] Other:  Memory: [] WFL  [x] Mild  [] Moderate  [] Severe [] Unable to Assess  [] Other:  Behavior: [x] Alert  [x] Cooperative  [x]  Pleasant  [] Confused  [] Agitated  [] Uncooperative  [] Distractible [] Motivated  [] Self-Limiting [] Anxious  [] Other:  Endurance:  [x] Adequate for participation in SLP sessions  [] Reduced overall  [] Lethargic  [] Other:  Safety: [x] No concerns at this time  [] Reduced insight into deficits  []  Reduced safety awareness [] Not following call light procedures  [] Unable to Assess  [] Other:    Current Diet Order:ADULT DIET; Regular; 4 carb choices (60 gm/meal)  Swallowing Precautions:  Alternate solids and liquids;Eat/Feed slowly;Upright as possible for all oral intake;Remain upright for 30-45 minutes after meals;Small bites/sips        Date: 10/26/2021      Tx session 1  1400 - 1500 Tx session 2  Addressed in 1st session   Total Timed Code Min 60 0   Total Treatment Minutes 60 0   Individual Treatment Minutes 60 0   Group Treatment Minutes 0 0   Co-Treat Minutes 0 0 Variance/Reason:  n/a n/a   Pain None reported None reported   Pain Intervention [] RN notified  [] Repositioned  [] Intervention offered and patient declined  [x] N/A  [] Other: [] RN notified  [] Repositioned  [] Intervention offered and patient declined  [] N/A  [] Other:   Subjective     Pt alert and cooperative, agreeable to tx. Pt upright in w/c for session, seen in community room. Objective:  Goals  Short-term Goals  Timeframe for Short-term Goals: 10 days (10/28/21)       Goal 1: Pt will complete recall tasks with 90% acc given min cues for use of compensatory memory strategies. Card Game Task  -SLP introduced new card game   -pt used recall abilities to complete task with 90% acc given min cues     Goal 2: Pt will complete executive function tasks (meds, money, math, time, etc) with 90% acc given min cues   Portions of the PACHECO completed:    Telling Time   -pt completed with 80% acc indep, improved to 100% acc given min cues     Solving Daily Math Problems  -pt completed portions of section  -pt completed with 60% acc indep, improved to 80% acc given mod cues    Understanding medicine labels  -pt completed with 78% acc indep, improved to 89% acc given mod cues       Goal 3: Pt will complete problem solving and thought organization tasks with 90% acc given min cues   Functional Problem Solving re: room independence  -pt able to provide three safety concerns: \"make sure there is nothing that can trip me,\" \"keep clothes and shoes up,\" and \"keep the bathroom clean\"  -pt required mod cues in order to state she needs to use her walker and lock the brakes        Goal 4: Pt will complete verbal and visual reasoning tasks with 90% acc given min cues.    Deduction Puzzle  -pt completed with 80% acc, but required mod-max cues in order to complete      Other areas targeted: N/A N/A   Education:   SLP edu pt re: role of SLP, rationale of tx tasks, functional problem solving, calc strategies     Safety Devices: [x] Call light within reach  [] Chair alarm activated  [] Bed alarm activated  [] Other:  [] Call light within reach  [] Chair alarm activated  [] Bed alarm activated  [] Other:    Assessment: Pt alert and cooperative, agreeable to tx. Pt participated in card game task using recall abilities - benefited from min cues. Pt with more difficulty completing a deduction puzzle using reasoning abilities - required mod cueing. Portions of the PACHECO completed - pt required mod cues for calculation, reading prescription labels. Despite requiring cues, pt able to fully verbalize financial and medication routine / management at home, thus will likely be able to complete independently at home. Pt having difficulty with more structured tasks. Continue goals above. Plan: Continue as per plan of care. Additional Information:     Barriers toward progress: None   Discharge recommendations:  [] Home independently  [] Home with assistance []  24 hour supervision  [] ECF [x] Other: See PT/OT recs  Continued Tx Upon Discharge: ? [] Yes [x] No [] TBD based on progress while on ARU [] Vital Stim indicated [] Other:   Estimated discharge date: 10/30/2021    Interventions used this date:  [] Speech/Language Treatment  [] Instruction in HEP [] Group [] Dysphagia Treatment [x] Cognitive Treatment   [] Other:     Total Time Breakdown / Charges    Time in Time out Total Time / units   Cognitive Tx 1400 1500 60 min/ 4 units   Speech Tx -- -- --   Dysphagia Tx -- -- --       Electronically Signed by    Esequiel Garcia MA CCC-SLP #[id number]  Speech Language Pathologist

## 2021-10-26 NOTE — PROGRESS NOTES
Occupational Therapy  [facility]  Daily Treatment Note  NAME: Domonique Thomas  : 1940  MRN: [id number]    Date of Service: 10/26/2021    Discharge Recommendations:  Home with assist PRN, Home with Home health OT  OT Equipment Recommendations  Equipment Needed: Yes  Mobility Devices: ADL Assistive Devices  ADL Assistive Devices: Toilet Hygiene Aid;Transfer Tub Bench    Assessment   Performance deficits / Impairments: Decreased functional mobility ; Decreased high-level IADLs;Decreased ADL status; Decreased endurance;Decreased balance;Decreased safe awareness;Decreased strength  Assessment: Pt seated EOB upon OT arrival and agreeable for OT session. Pt Ivana sit<>stand and grossly supervision (SBA for longer distances) for amb with 4WW to and from the bathroom. Pt Ivana for toilet transfer and for toileting with use of AE for moon-care. Pt req set up supervision for TB bathing when using long handed sponge. Pt req zenaida-up supervision and set up for TB dressing, pt able to aurelio socks with the use of a sock aid. Pt completed grooming task while standing at the sink with SBA. Pt left in recliner with call light and chair alarm. Cont OT POC. Treatment Diagnosis: Weakness (post COVID)  Prognosis: Good  OT Education: OT Role;Plan of Care;Precautions;Transfer Training;Energy Conservation; ADL Adaptive Strategies; Home Exercise Program;Equipment  Patient Education: Pt educated on use of long handed sponge to help with LB bathing. Barriers to Learning: Decreased safety awareness. REQUIRES OT FOLLOW UP: Yes  Safety Devices  Safety Devices in place: Yes  Type of devices: All fall risk precautions in place;Call light within reach; Patient at risk for falls; Chair alarm in place; Left in chair;Gait belt  Restraints  Initially in place: No         Patient Diagnosis(es): There were no encounter diagnoses.       has a past medical history of Abnormal chest CT, Abnormal CXR, Acute exacerbation of chronic obstructive pulmonary disease (COPD) ([address] Acute on chronic respiratory failure (HCC), Acute on chronic respiratory failure with hypoxia (HCC), Anticoagulant long-term use, Atrial fibrillation ([address] Atrial fibrillation with rapid ventricular response ([address] B-cell lymphoma ([address] CAP (community acquired pneumonia), Carotid artery stenosis, CHF (congestive heart failure) ([address] Chronic back pain, Congenital heart disease, COVID-19, Depression, Diverticulitis, Diverticulitis of colon, Dizziness, Fistula, Gram-negative pneumonia ([address] HCAP (healthcare-associated pneumonia), Hx of blood clots, Hyperlipidemia, Hypertension, IFG (impaired fasting glucose), Infection due to Stenotrophomonas maltophilia, Non-Hodgkins lymphoma ([address] Obesity, Panic disorder, Peripheral vascular disease ([address] Primary osteoarthritis of both knees, Pulmonary embolism ([address] Rectal vaginal fistula, Restless legs syndrome, [facility] spotted fever, Sciatic nerve pain, Sepsis ([address] Sleep apnea, Swelling of limb, Tobacco abuse, Urinary tract infection in female, and Vitamin D deficiency. has a past surgical history that includes Appendectomy (1963); partial hysterectomy (cervix not removed) (1980); hernia repair; bronchoscopy (N/A, 8/12/2019); bronchoscopy (8/12/2019); bronchoscopy (8/12/2019); bronchoscopy (8/12/2019); bronchoscopy (8/12/2019); bronchoscopy (8/12/2019); Tunneled venous port placement (Left, 01/17/2020); [facility] Surgery (N/A, 1/17/2020); Cardiac catheterization (2003); Colonoscopy (N/A, 9/25/2019); and Tonsillectomy (1965). Restrictions  Restrictions/Precautions  Restrictions/Precautions: Fall Risk, General Precautions  Position Activity Restriction  Other position/activity restrictions: on 4L O2  Subjective   General  Chart Reviewed:  Yes  Additional Pertinent Hx: has a past medical history of Abnormal chest CT, Abnormal CXR, Acute exacerbation of chronic obstructive pulmonary disease (COPD) ([address] Acute on chronic respiratory failure ([facility] Acute on chronic respiratory failure with hypoxia ([address] Anticoagulant long-term use, Atrial fibrillation ([address] Atrial fibrillation with rapid ventricular response ([address] B-cell lymphoma ([address] CAP (community acquired pneumonia), Carotid artery stenosis, CHF (congestive heart failure) ([facility] Chronic back pain, Congenital heart disease, COVID-19, Depression, Diverticulitis, Diverticulitis of colon, Dizziness, Fistula, Gram-negative pneumonia ([address] HCAP (healthcare-associated pneumonia), Hx of blood clots, Hyperlipidemia, Hypertension, IFG (impaired fasting glucose), Infection due to Stenotrophomonas maltophilia, Non-Hodgkins lymphoma ([address] Obesity, Panic disorder, Peripheral vascular disease ([address] Primary osteoarthritis of both knees, Pulmonary embolism ([facility] Rectal vaginal fistula, Restless legs syndrome, [facility] spotted fever, Sciatic nerve pain, Sepsis ([facility] Sleep apnea, Swelling of limb, Tobacco abuse, Urinary tract infection in female, and Vitamin D deficiency. Response to previous treatment: Patient with no complaints from previous session  Family / Caregiver Present: No  Referring Practitioner: Dr. Helena Amor  Diagnosis: Post COVID  Subjective  Subjective: Pt seated EOB eating breakfast upon OT arrival and agreeable for therapy.   Pain Assessment  Pain Assessment: 0-10  Pain Level: 6  Pain Type: Chronic pain  Pain Location: Knee  Pain Orientation: Left;Right  Pre Treatment Pain Screening  Intervention List: Patient able to continue with treatment  Vital Signs  Patient Currently in Pain: Yes   Orientation  Orientation  Overall Orientation Status: Within Functional Limits  Objective    ADL  Equipment Provided: Long-handled sponge;Sock aid  UE Bathing: Setup;Supervision  LE Bathing: Setup;Supervision (When using long handed sponge.)  UE Dressing: Modified independent ;Setup  LE Dressing: Supervision;Setup  Toileting: Modified independent         Balance  Sitting Balance: Modified independent   Standing Balance: Stand by assistance  Standing Balance  Time: ~1 x 2, ~30 sec x 3, ~3 minutes  Activity: Amb to and from the bathroom with 4WW and SBA, standing for toileting, dressing and bathing, standing for grooming task. Functional Mobility  Functional - Mobility Device: 4-Wheeled Walker  Activity: To/from bathroom  Assist Level: Stand by assistance  Functional Mobility Comments: 4L of O2  Toilet Transfers  Toilet - Technique: Ambulating  Equipment Used: Standard toilet  Toilet Transfer: Modified independent  Toilet Transfers Comments: + grab bars  Shower Transfers  Shower - Transfer From: Walker  Shower - Transfer Type: To and From  Shower - Transfer To: Shower seat with back  Shower - Technique: Ambulating  Shower Transfers: Stand by assistance     Transfers  Sit to stand: Modified independent  Stand to sit: Modified independent        Coordination  Gross Motor: Pt demonstrates fair GMC when amb with 4WW. Fine Motor: Pt demonstrates good [address] when opening and sqeezing soap containers. Cognition  Overall Cognitive Status: Exceptions  Arousal/Alertness: Appropriate responses to stimuli  Following Commands:  Follows one step commands consistently  Attention Span: Appears intact  Memory: Decreased short term memory  Safety Judgement: Decreased awareness of need for assistance;Decreased awareness of need for safety  Problem Solving: Assistance required to correct errors made  Insights: Decreased awareness of deficits  Initiation: Does not require cues  Sequencing: Requires cues for some                                         Plan   Plan  Times per week: 5/7 days  Times per day: Daily  Plan weeks: 7-10 days  Current Treatment Recommendations: Strengthening, Safety Education & Training, Balance Training, Self-Care / ADL, Home Management Training, Equipment Evaluation, Education, & procurement, Endurance Training, Functional Mobility Training, Patient/Caregiver Education & Training       Goals  Short term goals  Time Frame for Short term goals: 10/22/21  Short term goal 1: Pt will complete toileting while using LRAD with SBA.-goal MET 10/22/21  Short term goal 2: Pt will complete TB bathing with set up and SBA- ongoing 10/23  Short term goal 3: Pt will complete grooming task while standing for at least 3 minutes with SBA.- GOAL MET 10/23  Short term goal 4: Pt will complete total body dressing with LRAD and SBA- ongoing 10/23  Long term goals  Time Frame for Long term goals : 10/27/21  Long term goal 1: Pt will complete toileting task with Ivana. Long term goal 2: Pt will complete tub/shower transfer with supervision and LRAD. Long term goal 3: Pt will complete TB bathing with supervision. Long term goal 4: Pt will complete IADL task with SBA. Long term goal 5: Pt will complete TB dressing using LRAD and Ivana. Patient Goals   Patient goals : \"To get more strength in my legs. \"       Therapy Time   Individual Concurrent Group Co-treatment   Time In 0800         Time Out 0930         Minutes 90         Timed Code Treatment Minutes: [address]/OT

## 2021-10-26 NOTE — PROGRESS NOTES
Physical Therapy  Facility/Department: [facility]  Daily Treatment Note  NAME: Dennis Andrea  : 1940  MRN: [id number]    Date of Service: 10/26/2021    Discharge Recommendations:  Home with Home health PT, Home with assist PRN   PT Equipment Recommendations  Equipment Needed: No  Other: has rollator at home and cane    Assessment   Assessment: Patient seen for thereact to increase endurance and indep with household walking and transfers with rollator, LE strength in the presence of dyspnea on exertion with chronic bilateral degenerative knee pain. Patient progressed to S/SBA for gait with rollator 150 feet self managing O2 tubing with SpO2 post walk 93%. PT inroom walking  demo ROSAS with rollator and O2 tubing. However, with transfers this date patient regressed with goal for transfers and needed intermittent cues to engage rollator brakes. Pt progressed to up<>down 4 steps wtih can/rail and min assist of 1. Pt stated that, \"this is how it is at home (steps), my grandaughter usually helps me. \"  Pt with increased indep in gait and making progress towards gait and steps goal.  collaborated with SLP for need for memory strategy for patient  remembring to engage rollator brakes with transfers. Continue to progress. Treatment Diagnosis: Difficulty walking, impaired endurance and safety. Prognosis: Good  Decision Making: High Complexity  Barriers to Learning: forgets to engage rollator brakes  REQUIRES PT FOLLOW UP: Yes     Patient Diagnosis(es): There were no encounter diagnoses.      has a past medical history of Abnormal chest CT, Abnormal CXR, Acute exacerbation of chronic obstructive pulmonary disease (COPD) ([address] Acute on chronic respiratory failure (HCC), Acute on chronic respiratory failure with hypoxia ([address] Anticoagulant long-term use, Atrial fibrillation ([address] Atrial fibrillation with rapid ventricular response ([address] B-cell lymphoma ([address] CAP (community acquired pneumonia), Carotid artery stenosis, CHF (congestive heart failure) (HCC), Chronic back pain, Congenital heart disease, COVID-19, Depression, Diverticulitis, Diverticulitis of colon, Dizziness, Fistula, Gram-negative pneumonia ([facility] HCAP (healthcare-associated pneumonia), Hx of blood clots, Hyperlipidemia, Hypertension, IFG (impaired fasting glucose), Infection due to Stenotrophomonas maltophilia, Non-Hodgkins lymphoma ([address] Obesity, Panic disorder, Peripheral vascular disease ([address] Primary osteoarthritis of both knees, Pulmonary embolism ([address] Rectal vaginal fistula, Restless legs syndrome, [facility] spotted fever, Sciatic nerve pain, Sepsis ([facility] Sleep apnea, Swelling of limb, Tobacco abuse, Urinary tract infection in female, and Vitamin D deficiency. has a past surgical history that includes Appendectomy (1963); partial hysterectomy (cervix not removed) (1980); hernia repair; bronchoscopy (N/A, 8/12/2019); bronchoscopy (8/12/2019); bronchoscopy (8/12/2019); bronchoscopy (8/12/2019); bronchoscopy (8/12/2019); bronchoscopy (8/12/2019); Tunneled venous port placement (Left, 01/17/2020); [place] [address] Surgery (N/A, 1/17/2020); Cardiac catheterization (2003); Colonoscopy (N/A, 9/25/2019); and Tonsillectomy (1965).     Restrictions  Restrictions/Precautions  Restrictions/Precautions: Fall Risk, General Precautions  Position Activity Restriction  Other position/activity restrictions: on 4L O2  Subjective   Pain Screening  Patient Currently in Pain: Yes  Pain Assessment  Pain Assessment: 0-10  Pain Level: 5  Pain Type: Chronic pain  Pain Location: Knee  Pain Orientation: Left;Right  Functional Pain Assessment: Prevents or interferes some active activities and ADLs  Vital Signs  Pulse: 84  Resp: 20  BP: 117/74  Level of Consciousness: Alert (0)  Patient Currently in Pain: Yes  Oxygen Therapy  SpO2: 98 %  Pulse Oximeter Device Mode: Intermittent  Pulse Oximeter Device Location: Finger;Left  O2 Device: Nasal cannula  O2 Flow Rate (L/min): 4 L/min       Orientation WNL     Cognition Needs intermittent cues/rminder for employing Rollator brakes for transfer activity     Objective  Transfers:   from toilet and chair with rollator no cues needed to engage rollator brakes. Sit to Stand: Modified independent  Stand to sit: Modified independent         Theract: Gait with rollator on4L O2 via nc; immediate post gait RR 40/minute SpO2 96%, Hr 110 bpm manual irregular pulse. With increaed distance increased path deviation from R/L with walker and walker further away from patient requiring sba as pt with patient rating dyspnea as moderate. 254 feet, second walk 185 feet immediate spO2 92% with dyspnea after 1 minute SpO2 94%, after 2 minutes recovered from dyspnea    Education:   Educated patient in need to pace self with walking and to step and stand to rest or sit on rollator seat with walking With patient and stating, \"Okay. \"           SECOND SESSION:    Subjective:  Pain in both knees 7/10 bilateral  On 4L O2 via nc. Pt needed to use bathroom at start of tx. Reminded patient to use rollator brakes and if able to use consistently with safety in room will address progression to potential room indep with patient for later in the week. At rest sPO2 97%. BED MOBILITY:  Sup<>sit indep   Rolling to right indep. THEREX:  recumbent stepper 50 steps/minute or greater 10 minutes with post activity sob requiring seated rest and 93% SpO2  On 4L via nc with  SLP with patient (pt transferred to [facility] with Supervision and 1 cue for brake engagement of rollator during transfer. TRANSFERS:  Sit<>stand with rollator from recumbent stepper seat chair, bed, toilet indep with exception of needed cues to engage rollator brakes before sitting down at eob. Pt able to complete with safety with 1 vc. For each.       GAIT/Theract:  Patient walks 150 feet with rollator walker 4L O2 via nc, pt self manages O2 tubing line with gait and transfers independently with supervision for monitoring of SpO2 immediate pst walk SpO2 93% with dyspnea on exertion requiring seated rest.  WC follow not required. PT required 2 minutes seated rest to recover    GAIT with STAIRS:  Pt demo up and down 4 steps with rail and st cane non alt pattern up with Supervision and down with min assist to stabilize Right knee due to antalgic stance control going down steps. Post steps spO2 93% with need for 2 minute recovery PLB due to dyspnea. Education second/third session:    Facilitated safety going down steps with min assist stabilization right knee stepping down with LLE. Educated patient inneed to engage rollator brakes consistently for safety and would need to be consistent with this before progression to room indep. With patient verbalizing understanding and stating, \"I know I just forgot. \"     Disposition:Patient with SLP at end of session in [facility] in gym. Last session:  Therapeutic activity: Sit>stand cues to lock brakes from chair and stand to sit cues to lock getting to sit at EOB, sit<>stand at toilet with rollator engages brakes appropriately. Pt walked with Rollator from gym  With supervision with L/R turns level and turf carpet with wide franky and decreased swing clearance BLE but no shuffling and safe distance to walker (no overt lob) after completing SLP session ( Resting O2 on4L O2 via nc 98%) to nurses station and back to room 132 feet with post walk sPO2 97%, walked to bathroom from bed 10 feet ROSAS with rollator no sob noted and able to manage O2 tubing indep. Patient given clothing and wipes for set up assist for moon-care, patient agrees to use bathroom call light when finished toileting with  PCA and pt's nurse Angeles Jesus notified patient in bathroom with pull string cord in reach. Goals  Short term goals  Time Frame for Short term goals: Patient demonstrates indep in bed mobility.   Short term goal 1: 10/26/2021  Short term goal 2: Patient demonstrates 150 feet with SpO2 90% or greater on O2 via nc. with rollator. GOAL MET  10/26/2021 Patient demonstrates S/SBA  150 feet with SpO2 93% on 4L O2 via nc. with rollator. Short term goal 3: Patient demonstrates 5 minutes of standing dynamic balance activities to improve dynamic balance with gait with SPO2 90% or greater. GOAL met 10/21/2021Patient demonstrates 6 minutes of standing dynamic balance activities to improve dynamic balance with gait with SPO2 90% or greater. Short term goal 4: Patient demonstrates indep in core and DBE in sitting and standing. 10/26/2021 needs continued supervision for tanding activity due to need for cues with rollator brakes. Short term goal 5: Patient demonstrates safe indep transfers bed<>chair, car transfer, with use of rollator . GOAL MET 10/25/2021 Patient demonstrates safe indep transfers bed<>chair, car transfer, with use of rollator . Long term goals  Time Frame for Long term goals : 11/2/2021  Long term goal 1: Patient demonstrates safe indep stoop to recover shoe from floor wtih use of rollator. .  Long term goal 2: Patiend demonstrates gait 150 feet ROSAS with use of rollator and spO2 90% or greater  Long term goal 3: Pt demo up and down 3-4 steps indep with use of cane and rail to safely enter and egress home. Long term goal 4: Pt indep in  hep LE therex to increase LE endurance/functional strength with walking. Patient Goals   Patient goals : \" To get my legs stronger\"    Plan    Plan  Times per week: 5/7 days/week  Times per day: Daily  Plan weeks: 14 days  Current Treatment Recommendations: Transfer Training, Strengthening, Balance Training, Gait Training, Stair training, Functional Mobility Training, Safety Education & Training  Safety Devices  Type of devices:  All fall risk precautions in place, Left in bed, Call light within reach, Left in chair, Chair alarm in place  Restraints  Initially in place: No     Therapy Time   Individual Concurrent Group Co-treatment   Time In 1030 Time Out 1100         Minutes 30         Timed Code Treatment Minutes: 30 Minutes  Second Session Therapy Time:   Individual Concurrent Group Co-treatment   Time In 1311         Time Out 1400         Minutes 49           Timed Code Treatment Minutes:  49    Total Treatment Minutes:        Third split Session Therapy Time:   Individual Concurrent Group Co-treatment   Time In 1500         Time Out 1511         Minutes 11           Timed Code Treatment Minutes:  11    Total Treatment Minutes:  90      Vinicio Leon

## 2021-10-26 NOTE — PATIENT CARE CONFERENCE
[facility]  Inpatient Rehabilitation  Weekly Team Conference Note    Date: 10/27/2021  Patient Name: Anu Pittman        MRN: [id number]    : 1940  (80 y.o.)  Gender: female   Referring Practitioner: Berkley Cam MD  Diagnosis: Covid 19 virus      Interventions to be utilized toward barriers to discharge, per discipline:  [address] observed barriers to dc: Pain and Lower extremity weakness  Nursing interventions:Patient is encouraged to complete ADL's but slowly as to not be fall risk or cause skin tears. Family Education: no  Fall Risk:  Yes      Physical therapy observed barriers to dc:    Baseline: ROSAS with pt now reporting has grand neema asssit in and out of home. Current level: needs cues for transfers to engage rollator brakes intermittently, min assist for down steps, indep in room walking distances   Barriers to DC: STM, 3 steps to enter, prn assist from grand neema for community mobility. Needs in order to achieve dc home/next level of care: ROSAS for home mobility       Physical therapy interventions:   Current Treatment Recommendations: Transfer Training, Strengthening, Balance Training, Gait Training, Stair training, Functional Mobility Training, Safety Education & Training      PHYSICAL THERAPY  PT Equipment Recommendations  Equipment Needed: No  Other: has rollator at home and cane    Assessment: Patient seen for thereact to increase endurance and indep with household walking and transfers with rollator, LE strength in the presence of dyspnea on exertion with chronic bilateral degenerative knee pain. Patient progressed to S/SBA for gait with rollator 150 feet self managing O2 tubing with SpO2 post walk 93%. PT inroom walking  demo ROSAS with rollator and O2 tubing. However, with transfers this date patient regressed with goal for transfers and needed intermittent cues to engage rollator brakes.   Pt progressed to up<>down 4 steps wtih can/rail and min assist of 1. Pt stated that, \"this is how it is at home (steps), my grandgilma usually helps me. \"  Pt with increased indep in gait and making progress towards gait and steps goal.  collaborated with SLP for need for memory strategy for patient  remembring to engage rollator brakes with transfers. Continue to progress. Occupational therapy observed barriers to dc:    Baseline: Pt lives in a mobile home with 3 steps to enter. Pt was independent for ADLs and IADLs. On 4L of O2. Current level: Pt SBA for toileting and toilet transfer. Pt set up and supervision for TB bathing when using long handed sponge. Pt set up supervision for UB dressing, SBA for LB dressing. Barriers to DC: Tub shower transfer    Needs in order to achieve dc home/next level of care:  Tub transfer bench   Occupational Therapy interventions:  Current Treatment Recommendations: Strengthening, Safety Education & Training, Balance Training, Self-Care / ADL, Home Management Training, Equipment Evaluation, Education, & procurement, Endurance Training, Functional Mobility Training, Patient/Caregiver Education & Training      OCCUPATIONAL THERAPY      Assessment: Pt seated EOB upon OT arrival and agreeable for OT session. Pt Ivana sit<>stand and grossly supervision (SBA for longer distances) for amb with 4WW to and from the bathroom. Pt Ivana for toilet transfer and for toileting with use of AE for moon-care. Pt req set up supervision for TB bathing when using long handed sponge. Pt req zenaida-up supervision and set up for TB dressing, pt able to aurelio socks with the use of a sock aid. Pt completed grooming task while standing at the sink with SBA. Pt left in recliner with call light and chair alarm. Cont OT POC.       Speech therapy observed barriers to dc:               Baseline: pt lives at home indep, manages meds/finances, has assist with cleaning/shopping, manages cooking and laundry, active               Current level: mild high level cog               Barriers to DC: None               Needs in order to achieve dc home/next level of care: carryover of compensatory strategies     Speech Therapy interventions:  Dysphagia:  N/A  Speech/Language/Cognition: Compensatory strategy training and carryover, recall/STM, problem solving, reasoning, exec function, thought organization, attention. SPEECH THERAPY:  Pt alert and cooperative, agreeable to tx. Pt participated in card game task using recall abilities - benefited from min cues. Pt with more difficulty completing a deduction puzzle using reasoning abilities - required mod cueing. Portions of the PACHECO completed - pt required mod cues for calculation, reading prescription labels. Despite requiring cues, pt able to fully verbalize financial and medication routine / management at home, thus will likely be able to complete independently at home. Pt having difficulty with more structured tasks. Continue goals above. NUTRITION  Weight: 247 lb 8 oz (112.3 kg) / Body mass index is 45.27 kg/m². Diet Order: ADULT DIET; Regular; 4 carb choices (60 gm/meal)  PO Meals Eaten (%): 76 - 100%  Education: Declined       CASE MANAGEMENT  Assessment: Referred to patient for d/c planning. Patient usually lives at home alone. Son plans to stay with patient on d/c for the first few days. Plans to return home with home care. Has DME. Interdisciplinary Goals:   1.) Pt will implement use of compensatory memory strategies for improved recall across all disciplines given min cues   2.) Pt will complete toileting task with Ivana. 3.)Patient  consistently uses rollator brakes or uses FWW safely for transfers. Discharge Plan   Estimated discharge date: 10/30/2021  Destination: home health  Pass:No  Services at Discharge: [facility], Occupational Therapy, Speech Therapy and Nursing   Equipment at Discharge: Ramp.   No PT DME at this time. (pt owns rollator), OT Recs tub transfer bench, moon care aid for toileting. Team Members Present at Conference:  : BOB Beaulieu   Occupational Therapist: Savanah Lira S/OT& Jessica Clements OTR/L  Physical Therapist:Azeb Hong [id number]  Speech Therapist: Zaynab Chu, [address]  Nurse: Nasra Watson RN  Dietician: Milady Meyers, CELESTINE, LD  : Carlitos Currie, OTR/L  Psychiatry: N/A    Family members present at conference: No      I led this team conference and I approve the established interdisciplinary plan of care as documented within the medical record of [address] [place] [address]. MD: Asif Richardson.  Mine Antunez MD 10/27/2021, 11:14 AM

## 2021-10-26 NOTE — PROGRESS NOTES
[address] [place] [address]  10/26/2021  [id number]    Chief Complaint: COVID-19 virus infection    Subjective:   No issues overnight; no new c/o this AM.    ROS: no n/v, cp, f/c  Objective:  Patient Vitals for the past 24 hrs:   BP Temp Temp src Pulse Resp SpO2   10/26/21 0745 106/67 97.5 °F (36.4 °C) Oral 98 18 97 %   10/26/21 0545 124/64   80     10/26/21 0015 127/71   77     10/25/21 2000 112/62 97.8 °F (36.6 °C) Oral 80 16 96 %   10/25/21 1745 99/65   92 16    10/25/21 1300 136/76   103 16    10/25/21 1235 116/78   92  97 %     Gen: No distress, pleasant. HEENT: Normocephalic, atraumatic. CV: warm, well perfused   Resp: No respiratory distress. Abd: Soft, nontender   Ext: No edema. Neuro: Alert, oriented, appropriately interactive.    Wt Readings from Last 3 Encounters:   10/24/21 247 lb 8 oz (112.3 kg)   09/28/21 252 lb 8 oz (114.5 kg)   06/02/21 273 lb (123.8 kg)       Laboratory data:   Lab Results   Component Value Date    WBC 6.7 10/25/2021    HGB 8.9 (L) 10/25/2021    HCT 29.0 (L) 10/25/2021    MCV 77.5 (L) 10/25/2021     10/25/2021       Lab Results   Component Value Date     10/25/2021    K 3.9 10/25/2021    CL 97 10/25/2021    CO2 40 10/25/2021    BUN 13 10/25/2021    CREATININE 0.7 10/25/2021    GLUCOSE 156 10/25/2021    CALCIUM 8.7 10/25/2021        Therapy progress:  PT  Position Activity Restriction  Other position/activity restrictions: on 4L O2  Objective     Sit to Stand: Modified independent  Stand to sit: Modified independent  Bed to Chair: Modified independent  Device: Rollator  Other Apparatus: O2  Assistance: Stand by assistance  Distance: 100 feet, 40 feet,  130 feet  OT  PT Equipment Recommendations  Equipment Needed: No  Other: has rollator at home and cane  Toilet - Technique: Ambulating  Equipment Used: Standard toilet  Toilet Transfers Comments: + grab bars  Assessment        SLP  Current Diet : Regular  Current Liquid Diet : Thin  Diet Solids Recommendation: Regular  Liquid Consistency Recommendation: Thin    Body mass index is 45.27 kg/m². Rehabilitation Diagnosis:  Neurologic, 3.8, Neuromuscular Disorders, e.g. Critical Illness Myopathy, Other Myopathy     Assessment and Plan:  CIM  - PT/OT, dietary support     COVID pneumonia  - completed isolation  - supportive care     COPD  - nebs, supplemental oxygen  - 4L at baseline  - slowly wean steroids       Mood disorder  - buspar, wellbutrin    DM  - follow blood sugars as steroids wean     Bowels: Schedule stool softener. Follow bowel movements. Enema or suppository if needed.      Bladder: Check PVR x 3. [address] if PVR > 200ml or if any volume is > 500 ml.      Sleep: Trazodone provided prn. Morbid obesity.  Dietary consulted.      Follow up appointments: PCP  VANDANA: 10/30 home w/ home health  DME: danita Paige MD  10/26/21  8:23 AM

## 2021-10-26 NOTE — PLAN OF CARE
Patient will remain free of pain by use of call light to notify nursing staff for need of pain medication to control level of discomfort.

## 2021-10-26 NOTE — PROGRESS NOTES
Pharmacy Note  Warfarin Consult     Goal INR range 2-3  Home Warfarin dose:5 mg daily     Date                 INR                  Warfarin  10/18              1.87                  5 mg  10/19              2.51                  hold  10/20              2.56                  2.5 mg  10/21              2.00                  5 mg  10/22              2.12                  5 mg  10/23              2.98                  0 mg  10/24              3.22                  0 mg   10/25              2.45                  1 mg  10/26              1.93                  2 mg         Recommend Warfarin 2 mg tonight x1. Daily INR ordered. Rx will continue to manage therapy per consult order.   Terrence Lopez, PharmD 10/26/2021  2:57 PM

## 2021-10-26 NOTE — PLAN OF CARE
Problem: Pain:  Description: Pain management should include both nonpharmacologic and pharmacologic interventions.   Goal: Pain level will decrease  Description: Pain level will decrease  10/26/2021 1029 by Sagrario Milan RN  Outcome: Ongoing  10/25/2021 2238 by Marybeth Anguiano RN  Outcome: Ongoing

## 2021-10-27 LAB
GLUCOSE BLD-MCNC: 105 MG/DL (ref 70–99)
GLUCOSE BLD-MCNC: 116 MG/DL (ref 70–99)
GLUCOSE BLD-MCNC: 127 MG/DL (ref 70–99)
GLUCOSE BLD-MCNC: 132 MG/DL (ref 70–99)
INR BLD: 1.78 (ref 0.88–1.12)
PERFORMED ON: ABNORMAL
PROTHROMBIN TIME: 20.6 SEC (ref 9.9–12.7)

## 2021-10-27 PROCEDURE — 97110 THERAPEUTIC EXERCISES: CPT

## 2021-10-27 PROCEDURE — 6360000002 HC RX W HCPCS: Performed by: PHYSICAL MEDICINE & REHABILITATION

## 2021-10-27 PROCEDURE — 6370000000 HC RX 637 (ALT 250 FOR IP): Performed by: PHYSICAL MEDICINE & REHABILITATION

## 2021-10-27 PROCEDURE — 97130 THER IVNTJ EA ADDL 15 MIN: CPT

## 2021-10-27 PROCEDURE — 97530 THERAPEUTIC ACTIVITIES: CPT

## 2021-10-27 PROCEDURE — 97535 SELF CARE MNGMENT TRAINING: CPT

## 2021-10-27 PROCEDURE — 97129 THER IVNTJ 1ST 15 MIN: CPT

## 2021-10-27 PROCEDURE — 36415 COLL VENOUS BLD VENIPUNCTURE: CPT

## 2021-10-27 PROCEDURE — 94640 AIRWAY INHALATION TREATMENT: CPT

## 2021-10-27 PROCEDURE — 1280000000 HC REHAB R&B

## 2021-10-27 PROCEDURE — 85610 PROTHROMBIN TIME: CPT

## 2021-10-27 RX ORDER — PREDNISONE 1 MG/1
5 TABLET ORAL 2 TIMES DAILY
Qty: 60 TABLET | Refills: 0 | Status: SHIPPED | OUTPATIENT
Start: 2021-10-27 | End: 2021-11-26

## 2021-10-27 RX ORDER — BUDESONIDE AND FORMOTEROL FUMARATE DIHYDRATE 160; 4.5 UG/1; UG/1
2 AEROSOL RESPIRATORY (INHALATION) 2 TIMES DAILY
Qty: 10.2 G | Refills: 5 | Status: SHIPPED | OUTPATIENT
Start: 2021-10-27

## 2021-10-27 RX ORDER — DILTIAZEM HYDROCHLORIDE 60 MG/1
60 TABLET, FILM COATED ORAL 4 TIMES DAILY
Qty: 120 TABLET | Refills: 3 | Status: ON HOLD | OUTPATIENT
Start: 2021-10-27 | End: 2021-11-19 | Stop reason: HOSPADM

## 2021-10-27 RX ORDER — INSULIN GLARGINE 100 [IU]/ML
20 INJECTION, SOLUTION SUBCUTANEOUS NIGHTLY
Qty: 10 ML | Refills: 3 | Status: SHIPPED | OUTPATIENT
Start: 2021-10-27 | End: 2021-10-29 | Stop reason: HOSPADM

## 2021-10-27 RX ORDER — TRAMADOL HYDROCHLORIDE 50 MG/1
50 TABLET ORAL EVERY 4 HOURS PRN
Qty: 20 TABLET | Refills: 0 | Status: SHIPPED | OUTPATIENT
Start: 2021-10-27 | End: 2021-11-01

## 2021-10-27 RX ORDER — FAMOTIDINE 20 MG/1
20 TABLET, FILM COATED ORAL 2 TIMES DAILY
Qty: 60 TABLET | Refills: 3 | Status: SHIPPED | OUTPATIENT
Start: 2021-10-27

## 2021-10-27 RX ORDER — WARFARIN SODIUM 2 MG/1
2 TABLET ORAL
Status: COMPLETED | OUTPATIENT
Start: 2021-10-27 | End: 2021-10-27

## 2021-10-27 RX ORDER — ALBUTEROL SULFATE 90 UG/1
2 AEROSOL, METERED RESPIRATORY (INHALATION) EVERY 6 HOURS PRN
Qty: 1 EACH | Refills: 1 | Status: SHIPPED | OUTPATIENT
Start: 2021-10-27

## 2021-10-27 RX ORDER — FUROSEMIDE 20 MG/1
20 TABLET ORAL DAILY
Qty: 60 TABLET | Refills: 3 | Status: ON HOLD | OUTPATIENT
Start: 2021-10-28 | End: 2021-11-19 | Stop reason: SDUPTHER

## 2021-10-27 RX ADMIN — DILTIAZEM HYDROCHLORIDE 60 MG: 60 TABLET, FILM COATED ORAL at 06:33

## 2021-10-27 RX ADMIN — DILTIAZEM HYDROCHLORIDE 60 MG: 60 TABLET, FILM COATED ORAL at 23:46

## 2021-10-27 RX ADMIN — PREDNISONE 5 MG: 5 TABLET ORAL at 08:40

## 2021-10-27 RX ADMIN — DILTIAZEM HYDROCHLORIDE 60 MG: 60 TABLET, FILM COATED ORAL at 11:55

## 2021-10-27 RX ADMIN — INSULIN GLARGINE 20 UNITS: 100 INJECTION, SOLUTION SUBCUTANEOUS at 21:38

## 2021-10-27 RX ADMIN — PYRIDOXINE HCL TAB 50 MG 50 MG: 50 TAB at 08:40

## 2021-10-27 RX ADMIN — DILTIAZEM HYDROCHLORIDE 60 MG: 60 TABLET, FILM COATED ORAL at 17:30

## 2021-10-27 RX ADMIN — FAMOTIDINE 20 MG: 20 TABLET, FILM COATED ORAL at 08:40

## 2021-10-27 RX ADMIN — DILTIAZEM HYDROCHLORIDE 60 MG: 60 TABLET, FILM COATED ORAL at 00:00

## 2021-10-27 RX ADMIN — DICLOFENAC SODIUM 4 G: 10 GEL TOPICAL at 15:18

## 2021-10-27 RX ADMIN — TRAMADOL HYDROCHLORIDE 50 MG: 50 TABLET ORAL at 13:58

## 2021-10-27 RX ADMIN — TRAMADOL HYDROCHLORIDE 50 MG: 50 TABLET ORAL at 21:37

## 2021-10-27 RX ADMIN — FUROSEMIDE 20 MG: 20 TABLET ORAL at 08:40

## 2021-10-27 RX ADMIN — DIGOXIN 187.5 MCG: 125 TABLET ORAL at 08:40

## 2021-10-27 RX ADMIN — TRAMADOL HYDROCHLORIDE 50 MG: 50 TABLET ORAL at 08:40

## 2021-10-27 RX ADMIN — TRAZODONE HYDROCHLORIDE 50 MG: 50 TABLET ORAL at 21:38

## 2021-10-27 RX ADMIN — BUDESONIDE 500 MCG: 0.5 SUSPENSION RESPIRATORY (INHALATION) at 20:47

## 2021-10-27 RX ADMIN — DICLOFENAC SODIUM 4 G: 10 GEL TOPICAL at 21:42

## 2021-10-27 RX ADMIN — FAMOTIDINE 20 MG: 20 TABLET, FILM COATED ORAL at 21:37

## 2021-10-27 RX ADMIN — BUSPIRONE HYDROCHLORIDE 5 MG: 5 TABLET ORAL at 06:34

## 2021-10-27 RX ADMIN — LORAZEPAM 1 MG: 1 TABLET ORAL at 21:37

## 2021-10-27 RX ADMIN — BUPROPION HYDROCHLORIDE 150 MG: 150 TABLET, EXTENDED RELEASE ORAL at 08:40

## 2021-10-27 RX ADMIN — WARFARIN SODIUM 2 MG: 2 TABLET ORAL at 17:30

## 2021-10-27 RX ADMIN — PREDNISONE 5 MG: 5 TABLET ORAL at 21:38

## 2021-10-27 RX ADMIN — DICLOFENAC SODIUM 4 G: 10 GEL TOPICAL at 08:41

## 2021-10-27 ASSESSMENT — PAIN DESCRIPTION - PROGRESSION
CLINICAL_PROGRESSION: NOT CHANGED

## 2021-10-27 ASSESSMENT — PAIN DESCRIPTION - ORIENTATION
ORIENTATION: RIGHT;LEFT
ORIENTATION: RIGHT;LEFT

## 2021-10-27 ASSESSMENT — PAIN SCALES - GENERAL
PAINLEVEL_OUTOF10: 7
PAINLEVEL_OUTOF10: 6
PAINLEVEL_OUTOF10: 7
PAINLEVEL_OUTOF10: 7
PAINLEVEL_OUTOF10: 6
PAINLEVEL_OUTOF10: 7

## 2021-10-27 ASSESSMENT — PAIN DESCRIPTION - DESCRIPTORS: DESCRIPTORS: ACHING;DISCOMFORT

## 2021-10-27 ASSESSMENT — PAIN DESCRIPTION - LOCATION
LOCATION: KNEE
LOCATION: BACK;KNEE

## 2021-10-27 ASSESSMENT — PAIN DESCRIPTION - FREQUENCY: FREQUENCY: CONTINUOUS

## 2021-10-27 ASSESSMENT — PAIN - FUNCTIONAL ASSESSMENT: PAIN_FUNCTIONAL_ASSESSMENT: ACTIVITIES ARE NOT PREVENTED

## 2021-10-27 ASSESSMENT — PAIN DESCRIPTION - PAIN TYPE
TYPE: CHRONIC PAIN
TYPE: CHRONIC PAIN;NEUROPATHIC PAIN

## 2021-10-27 ASSESSMENT — PAIN DESCRIPTION - ONSET: ONSET: ON-GOING

## 2021-10-27 NOTE — PROGRESS NOTES
[place]  10/27/2021  [id number]    Chief Complaint: COVID-19 virus infection    Subjective:   Resting in bed without complaint; no new issues. ROS: no n/v, cp, f/c  Objective:  Patient Vitals for the past 24 hrs:   BP Temp Temp src Pulse Resp SpO2   10/27/21 0806 (!) 112/57   105 16 97 %   10/27/21 0630 117/69   99     10/26/21 2345 124/73   74     10/26/21 1930 126/62 98.1 °F (36.7 °C) Oral 98 16 96 %   10/26/21 1839 113/73   112 18    10/26/21 1222 123/63   100     10/26/21 1120      98 %   10/26/21 1038 117/74   84 20 98 %     Gen: No distress, pleasant. HEENT: Normocephalic, atraumatic. CV: warm, well perfused   Resp: No respiratory distress. Abd: Soft, nontender   Ext: No edema. Neuro: Alert, oriented, appropriately interactive.    Wt Readings from Last 3 Encounters:   10/24/21 247 lb 8 oz (112.3 kg)   09/28/21 252 lb 8 oz (114.5 kg)   06/02/21 273 lb (123.8 kg)       Laboratory data:   Lab Results   Component Value Date    WBC 6.7 10/25/2021    HGB 8.9 (L) 10/25/2021    HCT 29.0 (L) 10/25/2021    MCV 77.5 (L) 10/25/2021     10/25/2021       Lab Results   Component Value Date     10/25/2021    K 3.9 10/25/2021    CL 97 10/25/2021    CO2 40 10/25/2021    BUN 13 10/25/2021    CREATININE 0.7 10/25/2021    GLUCOSE 156 10/25/2021    CALCIUM 8.7 10/25/2021        Therapy progress:  PT  Position Activity Restriction  Other position/activity restrictions: on 4L O2  Objective     Sit to Stand: Modified independent  Stand to sit: Modified independent  Bed to Chair: Modified independent  Device: Rollator  Other Apparatus: O2  Assistance: Stand by assistance  Distance: 100 feet, 40 feet,  130 feet  OT  PT Equipment Recommendations  Equipment Needed: No  Other: has rollator at home and cane  Toilet - Technique: Ambulating  Equipment Used: Standard toilet  Toilet Transfers Comments: + grab bars  Assessment        SLP  Current Diet : Regular  Current Liquid Diet : Thin  Diet Solids Recommendation: Regular  Liquid Consistency Recommendation: Thin    Body mass index is 45.27 kg/m². Rehabilitation Diagnosis:  Neurologic, 3.8, Neuromuscular Disorders, e.g. Critical Illness Myopathy, Other Myopathy     Assessment and Plan:  CIM  - PT/OT, dietary support     COVID pneumonia  - completed isolation  - supportive care     COPD  - nebs, supplemental oxygen  - 4L at baseline  - slowly wean steroids       Mood disorder  - buspar, wellbutrin    DM  - follow blood sugars as steroids wean     Bowels: Schedule stool softener. Follow bowel movements. Enema or suppository if needed.      Bladder: Check PVR x 3. [facility] if PVR > 200ml or if any volume is > 500 ml.      Sleep: Trazodone provided prn. Morbid obesity. Dietary consulted.      Follow up appointments: PCP  VANDANA: 10/30 home w/ home health  DME: rollator, ttb, bottom mark    Interdisciplinary team conference was held today with entire rehab treatment team including PT, OT, SLP, Dietician, RN, and SW. Discussion focused on progress toward rehab goals and discharge planning. Barriers: weakness, balance, endurance. Total treatment time >35 min with greater than 50% spent in care coordination.      Woody Oden MD  10/27/21  8:11 AM

## 2021-10-27 NOTE — PROGRESS NOTES
Physical Therapy  ARU IN ROOM ADVANCEMENT of INDEPENDENCE with Ysabel Shah    Patient Name: Idalia Rodrigues        MRN: [id number]    : 1940  (80 y.o.)  Date: 10/27/2021     The recommended mobility for Idalia Rodrigues has been updated as of 10/27/2021 to reflect the following changes:  []  Room independence   [x]  BED<>CHAIR independence   [x]  BED<>Bedside Commode independence   [] Bathroom Privileges   For the duration:  [] 24 hours   [x]  Daytime only      With ___Rollator walker and wall unit O2 _____(assistive device/equipment)    Physical Therapy: Luis A Jackson PT, endorses the above recommendations based on safety and independence demonstrated during treatment session on 10/27/2021    Occupational Therapy:   Samir Davies OTR/L endorses the above recommendations based on safety and independence demonstrated during treatment session on 10/27/2021    Speech Therapy: Tc Dubois MA CCC-SLP endorses the above recommendations based on safety and independence demonstrated during treatment session on 10/27/2021    Nursing : Ferdinand Terrazas RN BSN endorses the above recommendations based on safety and independence demonstrated during care on 10/27/2021    Patient, MD, and treatment team all aware and in agreement. Signature: Ye Duncan.  Ray Feliciano MD 10/28/2021, 1:28 PM

## 2021-10-27 NOTE — PROGRESS NOTES
Occupational Therapy  Facility/Department: [facility]  Daily Treatment Note  NAME: Dhruv De Los Santos  : 1940  MRN: [id number]    Date of Service: 10/27/2021    Discharge Recommendations:  Home with assist PRN, Home with Home health OT  OT Equipment Recommendations  ADL Assistive Devices: Toilet Hygiene Aid;Transfer Tub Bench    Assessment   Performance deficits / Impairments: Decreased functional mobility ; Decreased high-level IADLs;Decreased ADL status; Decreased endurance;Decreased balance;Decreased safe awareness;Decreased strength  Assessment: Pt seated in recliner and agreeable for therapy. Pt Ivana for sit<>stand. Pt set up and supervision for aurelio shoes. Pt req supervision while amb with 4WW to and from the therapy gym with WC follow. Pt was educated on tub transfer with a tub transfer bench and leg . Pt mentions that she bathroom being small and is unsure if tub transfer bench will fit. F/U with family member, at this time a tub transfer bench is recommended for the pt. Pt participated in card game while seated due to fatigue and demonstrated good [address] and was willing to learn a new game. Pt was left in the recliner with call light and chair alarm. Cont OT POC. Treatment Diagnosis: Weakness (post COVID)  Prognosis: Good  OT Education: OT Role;Plan of Care;Precautions;Transfer Training;Energy Conservation; ADL Adaptive Strategies; Home Exercise Program;Equipment  Patient Education: Pt educated on tub transfer, safety awareness, use of leg . Barriers to Learning: Decreased safety awareness. REQUIRES OT FOLLOW UP: Yes  Activity Tolerance  Activity Tolerance: Patient Tolerated treatment well;Patient limited by pain  Activity Tolerance: Pt able to mainatin O2 levels however req breaks due to increaed HR. Safety Devices  Safety Devices in place: Yes  Type of devices: All fall risk precautions in place;Call light within reach; Patient at risk for falls; Chair alarm in place; Left in chair;Gait belt  Restraints  Initially in place: No         Patient Diagnosis(es): The primary encounter diagnosis was COVID-19 virus infection. A diagnosis of Chronic obstructive pulmonary disease, unspecified COPD type ([address] was also pertinent to this visit. has a past medical history of Abnormal chest CT, Abnormal CXR, Acute exacerbation of chronic obstructive pulmonary disease (COPD) ([address] Acute on chronic respiratory failure with hypoxia (HCC), Anticoagulant long-term use, Atrial fibrillation (HCC), Atrial fibrillation with rapid ventricular response ([address] B-cell lymphoma ([address] CAP (community acquired pneumonia), Carotid artery stenosis, CHF (congestive heart failure) ([address] Chronic back pain, Congenital heart disease, COVID-19, Depression, Diverticulitis, Diverticulitis of colon, Dizziness, Fistula, Gram-negative pneumonia ([address] HCAP (healthcare-associated pneumonia), Hx of blood clots, Hyperlipidemia, Hypertension, IFG (impaired fasting glucose), Infection due to Stenotrophomonas maltophilia, Non-Hodgkins lymphoma ([address] Obesity, Panic disorder, Peripheral vascular disease ([address] Primary osteoarthritis of both knees, Pulmonary embolism ([address] Rectal vaginal fistula, Restless legs syndrome, [facility] spotted fever, Sciatic nerve pain, Sepsis ([address] Sleep apnea, Swelling of limb, Tobacco abuse, Urinary tract infection in female, and Vitamin D deficiency. has a past surgical history that includes Appendectomy (1963); partial hysterectomy (cervix not removed) (1980); hernia repair; bronchoscopy (N/A, 8/12/2019); bronchoscopy (8/12/2019); bronchoscopy (8/12/2019); bronchoscopy (8/12/2019); bronchoscopy (8/12/2019); bronchoscopy (8/12/2019); Tunneled venous port placement (Left, 01/17/2020); [facility] 45 Surgery (N/A, 1/17/2020); Cardiac catheterization (2003); Colonoscopy (N/A, 9/25/2019); and Tonsillectomy (1965).     Restrictions  Restrictions/Precautions  Restrictions/Precautions: Fall Risk, General Precautions  Position Activity Restriction  Other position/activity restrictions: on 4L O2  Subjective   General  Chart Reviewed: Yes  Additional Pertinent Hx: has a past medical history of Abnormal chest CT, Abnormal CXR, Acute exacerbation of chronic obstructive pulmonary disease (COPD) ([address] Acute on chronic respiratory failure (HCC), Acute on chronic respiratory failure with hypoxia (HCC), Anticoagulant long-term use, Atrial fibrillation ([address] Atrial fibrillation with rapid ventricular response ([address] B-cell lymphoma ([address] CAP (community acquired pneumonia), Carotid artery stenosis, CHF (congestive heart failure) ([address] Chronic back pain, Congenital heart disease, COVID-19, Depression, Diverticulitis, Diverticulitis of colon, Dizziness, Fistula, Gram-negative pneumonia ([address] HCAP (healthcare-associated pneumonia), Hx of blood clots, Hyperlipidemia, Hypertension, IFG (impaired fasting glucose), Infection due to Stenotrophomonas maltophilia, Non-Hodgkins lymphoma ([address] Obesity, Panic disorder, Peripheral vascular disease ([address] Primary osteoarthritis of both knees, Pulmonary embolism ([address] Rectal vaginal fistula, Restless legs syndrome, [facility] spotted fever, Sciatic nerve pain, Sepsis ([address] Sleep apnea, Swelling of limb, Tobacco abuse, Urinary tract infection in female, and Vitamin D deficiency. Response to previous treatment: Patient with no complaints from previous session  Family / Caregiver Present: No  Referring Practitioner: Dr. Dorian Bowman  Diagnosis: Post COVID  Subjective  Subjective: Pt seated in recliner and agreeable for therapy.   Pain Assessment  Pain Assessment: 0-10  Pain Level: 7  Pain Type: Chronic pain  Pain Location: Knee  Pain Orientation: Right;Left  Pre Treatment Pain Screening  Intervention List: Patient able to continue with treatment  Vital Signs  Patient Currently in Pain: Yes   Orientation  Orientation  Overall Orientation Status: Within Functional Limits  Objective Balance  Sitting Balance: Modified independent   Standing Balance: Supervision  Standing Balance  Time: ~3 minutes x 2, ~1 minute x 2  Activity: Amb with 4WW to and from the therapy gym, transfers. Functional Mobility  Functional - Mobility Device: 4-Wheeled Walker  Activity: To/From therapy gym  Functional Mobility Comments: 4L of O2  Tub Transfers  Tub - Transfer From: Rolling walker  Tub - Transfer Type: To and From  Tub - Transfer To: Transfer tub bench  Tub - Technique: Ambulating  Tub Transfers: Supervision;Verbal cues  Tub Transfers Comments: Pt used leg  to assist with getting legs into and out of the tub. Transfers  Sit to stand: Independent  Stand to sit: Independent        Coordination  Gross Motor: Pt demonstrates good [facility] when amb with 4WW to and from the therapy gym. Fine Motor: Pt demonstrated good [facility] when manipulating cards while playing a game. Cognition  Overall Cognitive Status: Exceptions  Arousal/Alertness: Appropriate responses to stimuli  Following Commands:  Follows one step commands consistently  Attention Span: Appears intact  Memory: Decreased short term memory  Safety Judgement: Decreased awareness of need for assistance;Decreased awareness of need for safety  Problem Solving: Assistance required to correct errors made  Insights: Decreased awareness of deficits  Initiation: Does not require cues  Sequencing: Requires cues for some                                         Plan   Plan  Times per week: 5/7 days  Times per day: Daily  Plan weeks: 7-10 days  Current Treatment Recommendations: Strengthening, Safety Education & Training, Balance Training, Self-Care / ADL, Home Management Training, Equipment Evaluation, Education, & procurement, Endurance Training, Functional Mobility Training, Patient/Caregiver Education & Training          Goals  Short term goals  Time Frame for Short term goals: 10/22/21  Short term goal 1: Pt will complete toileting while using LRAD with SBA.-goal MET 10/22/21  Short term goal 2: Pt will complete TB bathing with set up and SBA- ongoing 10/23  Short term goal 3: Pt will complete grooming task while standing for at least 3 minutes with SBA.- GOAL MET 10/23  Short term goal 4: Pt will complete total body dressing with LRAD and SBA- ongoing 10/23  Long term goals  Time Frame for Long term goals : 10/27/21  Long term goal 1: Pt will complete toileting task with Ivana.-pt MET goal 10/26/21 Pt demonstrated the ability and safety needed for Ivana. Long term goal 2: Pt will complete tub/shower transfer with supervision and LRAD. -pt MET goal 10/27/21 CTA at home set up for safety. Long term goal 3: Pt will complete TB bathing with supervision. Long term goal 4: Pt will complete IADL task with SBA. -Pt MET goal  Long term goal 5: Pt will complete TB dressing using LRAD and Ivana. Patient Goals   Patient goals : \"To get more strength in my legs. \"       Therapy Time   Individual Concurrent Group Co-treatment   Time In 1330         Time Out 1430         Minutes 60         Timed Code Treatment Minutes: [address]/OT

## 2021-10-27 NOTE — PROGRESS NOTES
Physical Therapy  Facility/Department: [facility]  Daily Treatment Note  NAME: Dhruv De Los Santos  : 1940  MRN: [id number]    Date of Service: 10/27/2021    Discharge Recommendations:  Home with Home health PT, Home with assist PRN        Assessment  Patient   Assessment: Patient seen for thereact to increase endurance and indep with household walking and transfers with rollator, LE strength in the presence of dyspnea on exertion with chronic bilateral degenerative knee pain. Patient presented with consistent indep in O2 line and rollator walker brakes as well as indep in in room distance walking today. Recommended room indep priveleges after conference [facility] care team. Patient coninues [facility] dyspnea limiting distances outside of room from 93feet to 131 feet but is able to independently use walker rollator seat to recover with PT monitoring SpO2 on 4L with walking and cues given for navigation around unit for patient to find room going to and from gym. Patient declines trial of steps today due to knees pain but notes she will trial before discharge. Patient demo indep in LE AROM in seated and supine this date as well as standing therex. Continue to progress towards goals with increased endruance for household walking distances. Patient Education: Reviewed at start of session patient would be \"tested\" on her ability to remmeber to engage walker brakes safely throghout session with patient verbalizing understanding and demo good safety with this throughout session. Patient Diagnosis(es): The primary encounter diagnosis was COVID-19 virus infection. A diagnosis of Chronic obstructive pulmonary disease, unspecified COPD type ([address] was also pertinent to this visit.      has a past medical history of Abnormal chest CT, Abnormal CXR, Acute exacerbation of chronic obstructive pulmonary disease (COPD) ([address] Acute on chronic respiratory failure with hypoxia ([address] Anticoagulant long-term use, Atrial fibrillation ([address] Atrial fibrillation with rapid ventricular response ([address] B-cell lymphoma ([address] CAP (community acquired pneumonia), Carotid artery stenosis, CHF (congestive heart failure) ([address] Chronic back pain, Congenital heart disease, COVID-19, Depression, Diverticulitis, Diverticulitis of colon, Dizziness, Fistula, Gram-negative pneumonia ([address] HCAP (healthcare-associated pneumonia), Hx of blood clots, Hyperlipidemia, Hypertension, IFG (impaired fasting glucose), Infection due to Stenotrophomonas maltophilia, Non-Hodgkins lymphoma ([address] Obesity, Panic disorder, Peripheral vascular disease ([address] Primary osteoarthritis of both knees, Pulmonary embolism ([address] Rectal vaginal fistula, Restless legs syndrome, [facility] spotted fever, Sciatic nerve pain, Sepsis ([address] Sleep apnea, Swelling of limb, Tobacco abuse, Urinary tract infection in female, and Vitamin D deficiency. has a past surgical history that includes Appendectomy (1963); partial hysterectomy (cervix not removed) (1980); hernia repair; bronchoscopy (N/A, 8/12/2019); bronchoscopy (8/12/2019); bronchoscopy (8/12/2019); bronchoscopy (8/12/2019); bronchoscopy (8/12/2019); bronchoscopy (8/12/2019); Tunneled venous port placement (Left, 01/17/2020); [facility] 45 Surgery (N/A, 1/17/2020); Cardiac catheterization (2003); Colonoscopy (N/A, 9/25/2019); and Tonsillectomy (1965).     Restrictions  Restrictions/Precautions  Restrictions/Precautions: Fall Risk, General Precautions  Position Activity Restriction  Other position/activity restrictions: on 4L O2  Subjective      Vital Signs  Pulse: 105  Heart Rate Source: Monitor  Resp: 16  BP: (!) 112/57  BP Location: Left upper arm  Patient Position: Supine  Level of Consciousness: Alert (0)  Oxygen Therapy  SpO2: 97 %  Pulse Oximeter Device Mode: Intermittent  Pulse Oximeter Device Location: Left  O2 Device: Nasal cannula  O2 Flow Rate (L/min): 4 L/min       Orientation WNL      Cognition   Cognition  Memory: Decreased short term memory (needed navigational cues to find room going to and from gym, However, remembered appropriate use of walker brakes across sessions.)  Objective   Bed mobility  Rolling to Right: Independent  Supine to Sit: Independent  Transfers  Sit to Stand: Modified independent  Stand to sit: Modified independent  Bed to Chair: Modified independent  Comment: First session pt demo manage O2 tubing and engagement of  rollator brakes with transfers bed/toilet and chair indep. (included 6 sit<>stand from  chair)  Ambulation  Ambulation?: Yes  More Ambulation?: Yes  Ambulation 1  Surface: level tile  Device: Rollator  Other Apparatus: O2  Assistance: Supervision  Quality of Gait: Pt demonstrates flexed posture, decreased step length and clearance, increased lateral sway with mildly antalgic gait pattern due to B knee pain. Cues to maintain safe proximity to rollator with fair carryover, worsens as pt fatigues. Distance: 175 feet  Comments: post walking  SpO2 on 4L 90%  with dyspena and PLB with 3 minutes seated rest to recover from dyspena and sPO2  97 %   Pt up to bathroom during session with indep in toilet transfer and pericare. Short in room walking Distances chair<>bathroom 10 feet pt is ROSAS with rollator and no lob. Exercises Pt demo indep in bed exercises. Heelslides: x 30 reciprocal warm up  Hip Abduction: supine simulataneous BLE  Ankle Pumps: x30 BLUE supine     Disposition:Patient with call light in reach and alarm in place at end of session with patient in chair   Education:   Educated patient in testing safety with transfers and in room wallking with pt demo safety with rollator this session. Demo indep in LE bed therex this session. SECOND SESSION:    Subjective:  Pain in bilateral knees after pain medication and pain relief gel application 4/45 BLE     THEREX:   Recumbent stepper 9 minutes, 1.5 minutes with brief 1 minute rest needed after  9 minutes due ot dypsnea.   SpO2 98% with L1 and steps/minue 55 or greater using BLE   Indep in LE therex seated and Standing with UE support alternate side step x1 minute indep, heel raises x10. LAQ  1x15 and 1x10 BLE . TRANSFERS:  Sit<>stand with ROSAS toilet, chair with rollator 4L O2 via nc, indep in O2 line  management  And rollator brakes without cues. GAIT:  Patient walks up to10 feet in and out of bathroom ROSAS (indep in O2 line management and walker brakes)  with SpO2  90% after walking in /out of bathroom and with 2 minutes seated rest 97%  Gait with rollator and 4 L O2 (indep in O2 line management and walker brakes)  Slow gait with increased medial lateral weight shift and swing not completely passing stance BLE with antalgic knee pain noted with increased UE WB on rollator. distances 131 feet, 108 feet, 93 feet ( L/R turns all distances with last distance on 10 feet of carpet and going to and from gym from room needed cues to find room) SPO2 immediate walking 94-98% and HR  manual immediate post walk with RR post walk 40/minute with  Return to baseline from dyspnea with 2-3 minutes seated PLB rest.     Education:   Educated patient in testing safety with transfers and in room wallking with pt demo safety with rollator this session. pt demo indep in LE seated and supine therex for strengthening along with standing heel raises and 1 minute of reciprocal side stepping . Disposition:Patient with call light in reach and alarm in place at end of session with patient in chair                Goals  Short term goals  Time Frame for Short term goals: Patient demonstrates indep in bed mobility. Short term goal 1: 10/26/2021  Short term goal 2: Patient demonstrates 150 feet with SpO2 90% or greater on O2 via nc. with rollator. GOAL MET  10/26/2021 Patient demonstrates S/SBA  150 feet with SpO2 93% on 4L O2 via nc. with rollator.   Short term goal 3: Patient demonstrates 5 minutes of standing dynamic balance activities to improve dynamic balance with gait with SPO2 90% or greater. GOAL met 10/21/2021Patient demonstrates 6 minutes of standing dynamic balance activities to improve dynamic balance with gait with SPO2 90% or greater. Short term goal 4: Patient demonstrates indep in core and DBE in sitting and standing. 10/26/2021 needs continued supervision for tanding activity due to need for cues with rollator brakes. Short term goal 5: Patient demonstrates safe indep transfers bed<>chair, car transfer, with use of rollator . GOAL MET 10/25/2021 Patient demonstrates safe indep transfers bed<>chair, car transfer, with use of rollator . Long term goals  Time Frame for Long term goals : 11/2/2021  Long term goal 1: Patient demonstrates safe indep stoop to recover shoe from floor wtih use of rollator. .  Long term goal 2: Patiend demonstrates gait 150 feet ROSAS with use of rollator and spO2 90% or greater  Long term goal 3: Pt demo up and down 3-4 steps indep with use of cane and rail to safely enter and egress home. Long term goal 4: Pt indep in  hep LE therex to increase LE endurance/functional strength with walking. GOAL met pt demo indep in LE seated and supine therex for strengthening along with standing heel raises and 1 minute of reciprocal side stepping . Patient Goals   Patient goals : \" To get my legs stronger\"    Plan    Plan  Times per week: 5/7 days/week  Times per day: Daily  Plan weeks: 14 days  Current Treatment Recommendations: Transfer Training, Strengthening, Balance Training, Gait Training, Stair training, Functional Mobility Training, Safety Education & Training  Safety Devices  Type of devices:  All fall risk precautions in place, Left in bed, Call light within reach, Left in chair, Chair alarm in place  Restraints  Initially in place: No     Therapy Time   Individual Concurrent Group Co-treatment   Time In 0801         Time Out 0831         Minutes 30         Timed Code Treatment Minutes: 30 Minutes    Second Session Therapy Time:   Individual Concurrent Group Co-treatment   Time In 1000         Time Out 1100         Minutes 60           Timed Code Treatment Minutes:       Total Treatment Minutes:        Tresa Vance PT

## 2021-10-27 NOTE — PROGRESS NOTES
Pharmacy Note  Warfarin Consult     Goal INR range 2-3  Home Warfarin dose:5 mg daily     Date                 INR                  Warfarin  10/18              1.87                  5 mg  10/19              2.51                  hold  10/20              2.56                  2.5 mg  10/21              2.00                  5 mg  10/22              2.12                  5 mg  10/23              2.98                  0 mg  10/24              3.22                  0 mg   10/25              2.45                  1 mg  10/26              1.93                  2 mg       10/27              1.78                  2 mg    Recommend Warfarin 2 mg tonight x1. Daily INR ordered. Rx will continue to manage therapy per consult order.   Elana Phipps, PharmD 10/27/2021  10:45 AM

## 2021-10-27 NOTE — DISCHARGE INSTR - COC
Continuity of Care Form    Patient Name: Anu Pittman   :  1940  MRN:  [id number]    Admit date:  10/18/2021  Discharge date:  10/29/21    Code Status Order: Full Code   Advance Directives:      Admitting Physician:  Berkley Cam MD  PCP: TURNER Mcqueen - CNP    Discharging Nurse: THE [facility] Unit/Room#: 9485/4756-27  Discharging Unit Phone Number: [phone]    Emergency Contact:   Extended Emergency Contact Information  Primary Emergency Contact: [facility]  Address: [address] [facility] Phone: [phone]  Relation: Child  Secondary Emergency Contact: Allison Kim [address] Phone: [phone]  Relation: Child    Past Surgical History:  Past Surgical History:   Procedure Laterality Date    APPENDECTOMY  1963    BRONCHOSCOPY N/A 2019    EBUS WF W/ANES.  performed by Mehrdad Johns MD at  [facility]  2019    BRONCHOSCOPY ALVEOLAR LAVAGE performed by Mehrdad Johns MD at  [facility]  2019    BRONCHOSCOPY BRUSHINGS performed by Mehrdad Johns MD at  [facility]  2019    BRONCHOSCOPY/TRANSBRONCHIAL LUNG BIOPSY performed by Mehrdad Johns MD at  [facility]  2019    BRONCHOSCOPY/TRANSBRONCHIAL NEEDLE BIOPSY performed by Mehrdad Johns MD at  [facility]  2019    BRONCHOSCOPY/TRANSBRONCHIAL NEEDLE BIOPSY ADDL LOBE performed by Mehrdad Johns MD at [address] [place] [address]      COLONOSCOPY N/A 2019    COLONOSCOPY POLYPECTOMY SNARE/COLD BIOPSY showed diverticulilosis performed by True Cedeño MD at [facility]      umbilical-- Pending sale to [facility] N/A 2020    PORT INSERTION performed by Che Silva MD at [address] OR    TONSILLECTOMY  1965    TUNNELED VENOUS PORT PLACEMENT Left 01/17/2020    Port Insertion ENRIQUETA Chest w. Dr Swathi Nicholas 1/17/20 Vaccess CT Injectable [id number] lot [id number] 3/31/21       Immunization History:   Immunization History   Administered Date(s) Administered    Influenza Virus Vaccine 01/02/2013, 10/01/2013, 09/15/2014, 10/27/2016, 09/22/2017, 11/02/2018, 09/29/2020    Influenza Whole 10/01/2013, 09/15/2014, 09/15/2015    Influenza, Oli Dallas, IM, (6 mo and older Fluzone, Flulaval, Fluarix and 3 yrs and older Afluria) 10/27/2016, 09/22/2017, 11/02/2018    Influenza, Oli Dallas, IM, PF (6 mo and older Fluzone, Flulaval, Fluarix, and 3 yrs and older Afluria) 09/04/2015, 09/21/2020    Influenza, Oli Dallas, Recombinant, IM PF (Flublok 18 yrs and older) 11/01/2019    Pneumococcal Conjugate 13-valent (Tqvizfx69) 09/25/2017    Pneumococcal Conjugate 7-valent (Prevnar7) 09/01/2009    Pneumococcal Polysaccharide (Qzklbjfxm10) 01/29/2015    Zoster Live (Zostavax) 05/30/2013       Active Problems:  Patient Active Problem List   Diagnosis Code    COPD exacerbation J44.1    Chronic respiratory failure with hypoxia ([address] J96.11    Anxiety F41.9    Hyperlipidemia E78.5    Diverticulosis K57.90    COPD, severe ([address] J44.9    Vitamin D deficiency E55.9    Atrial fibrillation with RVR ([address] I48.91    Other pulmonary embolism without acute cor pulmonale (HCC) I26.99    Venous (peripheral) insufficiency I87.2    Lymphedema I89.0    Chronic angle-closure glaucoma of both eyes H40.2230    Arthritis of hips and knees M19.90    Shortness of breath T23.13    Diastolic dysfunction X99.44    Acute on chronic respiratory failure with hypoxia (HCC) J96.21    Bilateral edema of lower extremity R60.0    Angina pectoris (HCC) I20.9    Paroxysmal atrial fibrillation (HCC) I48.0    Erythrocytosis D75.1    IFG (impaired fasting glucose) R73.01    Lung mass R91.8    Mediastinal lymphadenopathy R59.0    Atrial fibrillation, chronic (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) C83.02    Non-Hodgkin lymphoma ([facility]) C85.90    PVD (peripheral vascular disease) with claudication ([facility]) I73.9    Carotid stenosis I65.29    TIA (transient ischemic attack) G45.9    CHF (congestive heart failure) ([facility]) I50.9    HTN (hypertension), benign I10    Dyslipidemia E78.5    History of diverticulitis Z87.19    Elevated lactic acid level R79.89    Moderate episode of recurrent major depressive disorder (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) E66.01    Sleep apnea G47.30    Restless legs syndrome G25.81    Chronic back pain M54.9, G89.29    Panic disorder F41.0    Acute hypoxemic respiratory failure due to COVID-19 ([facility]) U07.1, J96.01    COVID-19 U07.1    Acute on chronic respiratory failure with hypoxia and hypercapnia ([facility]) J96.21, J96.22    Anticoagulated on Coumadin Z79.01    Pneumonia due to COVID-19 virus U07.1, J12.82    Acute hypoxemic respiratory failure ([facility]) J96.01    COVID-19 virus infection U07.1       Isolation/Infection:   Isolation            No Isolation          Unreconciled Outside Infections       Enable clinical decision support by reconciling outside information with the patient's chart. .      Infection Onset Last Indicated Last Received Source    No mapped external infections found      .     Unmapped Infections        COVID+R 09/12/21 09/30/21 10/18/21 [facility]                  Patient Infection Status       Infection Onset Added Last Indicated Last Indicated By Review Planned Expiration Resolved Resolved By    None active    Resolved    COVID-19 09/14/21 09/15/21 09/14/21 COVID-19 & Influenza Combo   10/12/21     COVID-19 Rule Out 09/14/21 09/14/21 09/15/21 COVID-19 & Influenza Combo (Ordered)   09/15/21 Rule-Out Test Resulted            Nurse Assessment:  Last Vital Signs: BP (!) 138/54   Pulse 92   Temp 98.1 °F (36.7 °C) (Oral)   Resp 16   Ht 5' 2\" (1.575 m)   Wt 247 lb 8 oz (112.3 kg)   LMP  (LMP Unknown)   SpO2 99%   BMI 45.27 kg/m²     Last documented pain score (0-10 scale): Pain Level: 6  Last Weight:   Wt Readings from Last 1 Encounters:   10/24/21 247 lb 8 oz (112.3 kg)     Mental Status:  oriented    IV Access:  - None    Nursing Mobility/ADLs:  Walking   Assisted  Transfer  Assisted  Bathing  Assisted  Dressing  Assisted  Toileting  Assisted  Feeding  Assisted  Med Admin  Assisted  Med Delivery   whole    Wound Care Documentation and Therapy:  Wound 16 NO OPEN WOUND  (Active)   Number of days: 2121       Wound (Active)   Number of days:        Wound 21 Ear Left SDTI behind left ear (airvo oxygen tubing) (Active)   Wound Etiology Other 10/22/21 0830   Dressing/Treatment Open to air 10/26/21 2131   Wound Assessment Dry;Pink/red 10/26/21 2131   Drainage Amount None 10/22/21 0830   Marianela-wound Assessment Intact 10/24/21 0945   Margins Defined edges 10/22/21 0830   Number of days: 31        Elimination:  Continence:   · Bowel: Yes  · Bladder: Yes  Urinary Catheter: None   Colostomy/Ileostomy/Ileal Conduit: No       Date of Last BM: 10/25    Intake/Output Summary (Last 24 hours) at 10/27/2021 1204  Last data filed at 10/26/2021 1239  Gross per 24 hour   Intake 120 ml   Output    Net 120 ml     I/O last 3 completed shifts: In: 300 [P.O.:300]  Out: -     Safety Concerns: At Risk for Falls    Impairments/Disabilities:      None    Nutrition Therapy:  Current Nutrition Therapy:   - Oral Diet:  General    Routes of Feeding: Oral  Liquids:  Thin Liquids  Daily Fluid Restriction: no  Last Modified Barium Swallow with Video (Video Swallowing Test): not done    Treatments at the Time of Hospital Discharge:   Respiratory Treatments: See medication orders  Oxygen Therapy:  is on oxygen at 4 L/min per nasal cannula.   Ventilator:    - No ventilator support    Rehab Therapies: Physical Therapy, Occupational Therapy, Speech/Language Therapy and Nurse  Weight Bearing Status/Restrictions: No weight bearing restirctions  Other Medical Equipment (for information only, NOT a DME order):  walker  Other Treatments: [facility]: LEVEL 3 [address] to establish plan of care for patient over 60 day period   [address] Initial home SN evaluation visit to occur within 24-48 hours for:  1)  medication management  2)  VS and clinical assessment  3)  S&S chronic disease exacerbation education + when to contact MD/NP  4)  care coordination   Medication Reconciliation during 1st SN visit   PT/OT/Speech    Evaluations in home within 24-48 hours of discharge to include DME and home safety   [employer] Frontload therapy 5 days, then 3x a week    OT to evaluate if patient has [address] needs for personal care     evaluation within 24-48 hours to evaluate resources & insurance for potential AL, [place], LTC, and Medicaid options    Palliative Care referral within 5 days of hospital discharge   PCP Visit scheduled within 3 - 7 days of hospital discharge    [address] (If patient is agreeable and meets guidelines)      Patient's personal belongings (please select all that are sent with patient):  Dentures {DESC; UPPER/LOWER/BOTH:[id number]}    RN SIGNATURE:  Electronically signed by Alvina Benitez RN on 10/29/21 at 2:19 PM EDT    CASE MANAGEMENT/SOCIAL WORK SECTION    Inpatient Status Date: 10/18/21    Readmission Risk Assessment Score:  Readmission Risk              Risk of Unplanned Readmission:  28           Discharging to Facility/ Agency   · Name: [facility]  · Phone: [phone]  · Fax: [phone]  ·   / signature: Electronically signed by KRISTY Lyle Mai on 10/27/21 at 3:33 PM EDT    PHYSICIAN SECTION    Prognosis: Good    Condition at Discharge: Stable    Rehab Potential (if transferring to Rehab): Good    Recommended Labs or Other Treatments After Discharge: Home health as ordered; follow up with PCP; check INR twice weekly; check blood sugar every day    Physician Certification: I certify the above information and transfer of Lesley Nails  is necessary for the continuing treatment of the diagnosis listed and that she requires [address] for less 30 days.      Update Admission H&P: No change in H&P    PHYSICIAN SIGNATURE:  Electronically signed by Paige Ibrahim MD on 10/27/21 at 12:05 PM EDT

## 2021-10-27 NOTE — CARE COORDINATION
Team Conference Note 10/27/21    VANDANA:10/29/21  Plan:home, son to stay for the first few days, [facility]  DME: has all DME    Note:  Per team conference anticipate d/c  10/29. Spoke to patient and son, both notified and agreeable. Son states he can pick patient up in the afternoon. Discussed TTB and bottom mark with both patient and son. Per patient, TTB does not fit in her bathroom. Both notified and agreeable to home care. Patient prefers [facility]. Referral made. Will continue to follow for d/c needs.   Electronically signed by KRISTY Delong, PARVEEN-S on 10/27/2021 at 3:41 PM

## 2021-10-27 NOTE — PLAN OF CARE
Patient will remain free of pain by notifying nursing staff as discomfort starts to prevent levels from rising. Patient will use call light for assistance.

## 2021-10-27 NOTE — PROGRESS NOTES
Speech Language Pathology  [facility]: ACUTE REHAB UNIT  SPEECH-LANGUAGE PATHOLOGY      [x] Daily  [] Weekly Care Conference Note  [] Discharge    Patient:Raven Snow      :1940  DXN:[id number]  Rehab Dx/Hx: COVID-19 virus infection [U07.1]    Precautions: falls  Home situation: Lives at home indep. Manages finances and meds; cooks and does the laundry. Active . ST Dx: [] Aphasia  [] Dysarthria  [] Apraxia   [] Oropharyngeal dysphagia [x] Cognitive Impairment  [] Other:   Date of Admit: 10/18/2021  Room #: 0153/0153-01    Current functional status (updated daily):         Pt being seen for : [] Speech/Language Treatment  [] Dysphagia Treatment [x] Cognitive Treatment  [] Other:  Communication: [x]WFL  [] Aphasia  [] Dysarthria  [] Apraxia  [] Pragmatic Impairment [] Non-verbal  [] Hearing Loss  [] Other:   Cognition: [] WFL  [x] Mild  [] Moderate  [] Severe [] Unable to Assess  [] Other:  Memory: [] WFL  [x] Mild  [] Moderate  [] Severe [] Unable to Assess  [] Other:  Behavior: [x] Alert  [x] Cooperative  [x]  Pleasant  [] Confused  [] Agitated  [] Uncooperative  [] Distractible [] Motivated  [] Self-Limiting [] Anxious  [] Other:  Endurance:  [x] Adequate for participation in SLP sessions  [] Reduced overall  [] Lethargic  [] Other:  Safety: [x] No concerns at this time  [] Reduced insight into deficits  []  Reduced safety awareness [] Not following call light procedures  [] Unable to Assess  [] Other:    Current Diet Order:ADULT DIET; Regular; 4 carb choices (60 gm/meal)  Swallowing Precautions:  Alternate solids and liquids;Eat/Feed slowly;Upright as possible for all oral intake;Remain upright for 30-45 minutes after meals;Small bites/sips        Date: 10/27/2021      Tx session 1  830 Tx session 2  Addressed in 1st session   Total Timed Code Min 60 0   Total Treatment Minutes 60 0   Individual Treatment Minutes 60 0   Group Treatment Minutes 0 0   Co-Treat Minutes 0 0 Variance/Reason:  n/a n/a   Pain None reported None reported   Pain Intervention [] RN notified  [] Repositioned  [] Intervention offered and patient declined  [x] N/A  [] Other: [] RN notified  [] Repositioned  [] Intervention offered and patient declined  [] N/A  [] Other:   Subjective     Pt alert and cooperative, agreeable to tx. Pt upright in bedside chair for session. Objective:  Goals  Short-term Goals  Timeframe for Short-term Goals: 10 days (10/28/21)       Goal 1: Pt will complete recall tasks with 90% acc given min cues for use of compensatory memory strategies. Recall used during bananagrams task:  -pt able to recall instructions and how to complete task with 90% acc given min cues  -pt required more repetition of instructions at beginning of task, then able to recall info with more independence as task continued      Goal 2: Pt will complete executive function tasks (meds, money, math, time, etc) with 90% acc given min cues   Goal not directly targeted    Goal 3: Pt will complete problem solving and thought organization tasks with 90% acc given min cues   Bananagrams Task  -pt given 15 tiles with letters  -first task was to create as many words with the letters given - pt completed with 100% acc indep    -second task was more of a scrabble like game - SLP and pt creating words by using letter tiles to build off of other words already created  -pt used thought org to complete task with 100% acc indep     Functional Problem Solving   -discussed with pt safety concerns with being room independent  -pt is aware of reducing clutter and removing obstacles from environment  -pt did not report to SLP how she needs to use walker         Goal 4: Pt will complete verbal and visual reasoning tasks with 90% acc given min cues.    Bananagrams Task  -task was a scrabble like game - SLP and pt creating words by using letter tiles to build off of other words already created  -pt used reasoning abilities to complete task with 100% acc indep         Other areas targeted: N/A N/A   Education:   SLP edu pt re; rationale of tx tasks, recall strategies, functional problem solving     Safety Devices: [x] Call light within reach  [x] Chair alarm activated  [] Bed alarm activated  [] Other:  [] Call light within reach  [] Chair alarm activated  [] Bed alarm activated  [] Other:    Assessment: Pt alert and cooperative, agreeable to tx. SLP introduced bananagrams to pt in order to target thought organization, reasoning, and recall. Pt required min cues in order to recall instructions and how to complete task. As task continued, pt required less cueing and used recall abilities more indep. Pt did well using thought org and reasoning abilities to create and build words. Pt with good insight re: removing obstacles in environment, but SLP questions pt's safety with use of walker and locking brakes. SLP to continue to target with pt. Continue goals above. Plan: Continue as per plan of care. Additional Information:     Barriers toward progress: None   Discharge recommendations:  [] Home independently  [] Home with assistance []  24 hour supervision  [] ECF [x] Other: See PT/OT recs  Continued Tx Upon Discharge: ? [] Yes [x] No [] TBD based on progress while on ARU [] Vital Stim indicated [] Other:   Estimated discharge date: 10/30/2021    Interventions used this date:  [] Speech/Language Treatment  [] Instruction in HEP [] Group [] Dysphagia Treatment [x] Cognitive Treatment   [] Other:     Total Time Breakdown / Charges    Time in Time out Total Time / units   Cognitive Tx 0830  0930 60 min/ 4 units   Speech Tx -- -- --   Dysphagia Tx -- -- --       Electronically Signed by    Delmer Cardenas MA CCC-SLP #[id number]  Speech Language Pathologist

## 2021-10-28 LAB
ANION GAP SERPL CALCULATED.3IONS-SCNC: 12 MMOL/L (ref 3–16)
ANISOCYTOSIS: ABNORMAL
BANDED NEUTROPHILS RELATIVE PERCENT: 8 % (ref 0–7)
BASOPHILS ABSOLUTE: 0.1 K/UL (ref 0–0.2)
BASOPHILS RELATIVE PERCENT: 1 %
BUN BLDV-MCNC: 14 MG/DL (ref 7–20)
CALCIUM SERPL-MCNC: 8.4 MG/DL (ref 8.3–10.6)
CHLORIDE BLD-SCNC: 97 MMOL/L (ref 99–110)
CO2: 32 MMOL/L (ref 21–32)
CREAT SERPL-MCNC: 0.6 MG/DL (ref 0.6–1.2)
EOSINOPHILS ABSOLUTE: 0 K/UL (ref 0–0.6)
EOSINOPHILS RELATIVE PERCENT: 0 %
GFR AFRICAN AMERICAN: >60
GFR NON-AFRICAN AMERICAN: >60
GLUCOSE BLD-MCNC: 107 MG/DL (ref 70–99)
GLUCOSE BLD-MCNC: 138 MG/DL (ref 70–99)
GLUCOSE BLD-MCNC: 140 MG/DL (ref 70–99)
GLUCOSE BLD-MCNC: 141 MG/DL (ref 70–99)
GLUCOSE BLD-MCNC: 164 MG/DL (ref 70–99)
HCT VFR BLD CALC: 28.9 % (ref 36–48)
HEMOGLOBIN: 8.9 G/DL (ref 12–16)
INR BLD: 1.65 (ref 0.88–1.12)
LYMPHOCYTES ABSOLUTE: 2.1 K/UL (ref 1–5.1)
LYMPHOCYTES RELATIVE PERCENT: 31 %
MACROCYTES: ABNORMAL
MCH RBC QN AUTO: 23.6 PG (ref 26–34)
MCHC RBC AUTO-ENTMCNC: 30.8 G/DL (ref 31–36)
MCV RBC AUTO: 76.7 FL (ref 80–100)
MICROCYTES: ABNORMAL
MONOCYTES ABSOLUTE: 0.4 K/UL (ref 0–1.3)
MONOCYTES RELATIVE PERCENT: 6 %
MYELOCYTE PERCENT: 1 %
NEUTROPHILS ABSOLUTE: 4.3 K/UL (ref 1.7–7.7)
NEUTROPHILS RELATIVE PERCENT: 53 %
OVALOCYTES: ABNORMAL
PDW BLD-RTO: 19.1 % (ref 12.4–15.4)
PERFORMED ON: ABNORMAL
PLATELET # BLD: 325 K/UL (ref 135–450)
PMV BLD AUTO: 6.1 FL (ref 5–10.5)
POIKILOCYTES: ABNORMAL
POLYCHROMASIA: ABNORMAL
POTASSIUM REFLEX MAGNESIUM: 4.2 MMOL/L (ref 3.5–5.1)
PROTHROMBIN TIME: 19 SEC (ref 9.9–12.7)
RBC # BLD: 3.77 M/UL (ref 4–5.2)
SMUDGE CELLS: PRESENT
SODIUM BLD-SCNC: 141 MMOL/L (ref 136–145)
STOMATOCYTES: ABNORMAL
WBC # BLD: 6.9 K/UL (ref 4–11)

## 2021-10-28 PROCEDURE — 80048 BASIC METABOLIC PNL TOTAL CA: CPT

## 2021-10-28 PROCEDURE — 1280000000 HC REHAB R&B

## 2021-10-28 PROCEDURE — 85610 PROTHROMBIN TIME: CPT

## 2021-10-28 PROCEDURE — 94640 AIRWAY INHALATION TREATMENT: CPT

## 2021-10-28 PROCEDURE — 36415 COLL VENOUS BLD VENIPUNCTURE: CPT

## 2021-10-28 PROCEDURE — 2700000000 HC OXYGEN THERAPY PER DAY

## 2021-10-28 PROCEDURE — 97530 THERAPEUTIC ACTIVITIES: CPT

## 2021-10-28 PROCEDURE — 6360000002 HC RX W HCPCS: Performed by: PHYSICAL MEDICINE & REHABILITATION

## 2021-10-28 PROCEDURE — 85025 COMPLETE CBC W/AUTO DIFF WBC: CPT

## 2021-10-28 PROCEDURE — 97535 SELF CARE MNGMENT TRAINING: CPT

## 2021-10-28 PROCEDURE — 97130 THER IVNTJ EA ADDL 15 MIN: CPT

## 2021-10-28 PROCEDURE — 97129 THER IVNTJ 1ST 15 MIN: CPT

## 2021-10-28 PROCEDURE — 6370000000 HC RX 637 (ALT 250 FOR IP): Performed by: PHYSICAL MEDICINE & REHABILITATION

## 2021-10-28 PROCEDURE — 97110 THERAPEUTIC EXERCISES: CPT

## 2021-10-28 PROCEDURE — 94761 N-INVAS EAR/PLS OXIMETRY MLT: CPT

## 2021-10-28 RX ORDER — WARFARIN SODIUM 2 MG/1
4 TABLET ORAL
Status: COMPLETED | OUTPATIENT
Start: 2021-10-28 | End: 2021-10-28

## 2021-10-28 RX ADMIN — DIGOXIN 187.5 MCG: 125 TABLET ORAL at 08:49

## 2021-10-28 RX ADMIN — BUPROPION HYDROCHLORIDE 150 MG: 150 TABLET, EXTENDED RELEASE ORAL at 08:48

## 2021-10-28 RX ADMIN — DILTIAZEM HYDROCHLORIDE 60 MG: 60 TABLET, FILM COATED ORAL at 22:10

## 2021-10-28 RX ADMIN — FAMOTIDINE 20 MG: 20 TABLET, FILM COATED ORAL at 19:48

## 2021-10-28 RX ADMIN — INSULIN LISPRO 1 UNITS: 100 INJECTION, SOLUTION INTRAVENOUS; SUBCUTANEOUS at 19:47

## 2021-10-28 RX ADMIN — DICLOFENAC SODIUM 4 G: 10 GEL TOPICAL at 15:05

## 2021-10-28 RX ADMIN — DICLOFENAC SODIUM 4 G: 10 GEL TOPICAL at 08:50

## 2021-10-28 RX ADMIN — FUROSEMIDE 20 MG: 20 TABLET ORAL at 08:49

## 2021-10-28 RX ADMIN — INSULIN LISPRO 2 UNITS: 100 INJECTION, SOLUTION INTRAVENOUS; SUBCUTANEOUS at 06:29

## 2021-10-28 RX ADMIN — FAMOTIDINE 20 MG: 20 TABLET, FILM COATED ORAL at 08:49

## 2021-10-28 RX ADMIN — DILTIAZEM HYDROCHLORIDE 60 MG: 60 TABLET, FILM COATED ORAL at 06:28

## 2021-10-28 RX ADMIN — BUSPIRONE HYDROCHLORIDE 5 MG: 5 TABLET ORAL at 06:28

## 2021-10-28 RX ADMIN — PYRIDOXINE HCL TAB 50 MG 50 MG: 50 TAB at 08:48

## 2021-10-28 RX ADMIN — DICLOFENAC SODIUM 4 G: 10 GEL TOPICAL at 19:49

## 2021-10-28 RX ADMIN — BUDESONIDE 500 MCG: 0.5 SUSPENSION RESPIRATORY (INHALATION) at 21:04

## 2021-10-28 RX ADMIN — TRAMADOL HYDROCHLORIDE 50 MG: 50 TABLET ORAL at 22:10

## 2021-10-28 RX ADMIN — INSULIN GLARGINE 20 UNITS: 100 INJECTION, SOLUTION SUBCUTANEOUS at 19:48

## 2021-10-28 RX ADMIN — PREDNISONE 5 MG: 5 TABLET ORAL at 19:48

## 2021-10-28 RX ADMIN — TRAZODONE HYDROCHLORIDE 50 MG: 50 TABLET ORAL at 22:10

## 2021-10-28 RX ADMIN — BUDESONIDE 500 MCG: 0.5 SUSPENSION RESPIRATORY (INHALATION) at 08:04

## 2021-10-28 RX ADMIN — DILTIAZEM HYDROCHLORIDE 60 MG: 60 TABLET, FILM COATED ORAL at 17:06

## 2021-10-28 RX ADMIN — LORAZEPAM 1 MG: 1 TABLET ORAL at 22:10

## 2021-10-28 RX ADMIN — PREDNISONE 5 MG: 5 TABLET ORAL at 08:48

## 2021-10-28 RX ADMIN — DILTIAZEM HYDROCHLORIDE 60 MG: 60 TABLET, FILM COATED ORAL at 12:48

## 2021-10-28 RX ADMIN — WARFARIN SODIUM 4 MG: 2 TABLET ORAL at 17:06

## 2021-10-28 ASSESSMENT — PAIN DESCRIPTION - DESCRIPTORS: DESCRIPTORS: ACHING;DISCOMFORT;CONSTANT

## 2021-10-28 ASSESSMENT — PAIN SCALES - GENERAL
PAINLEVEL_OUTOF10: 7
PAINLEVEL_OUTOF10: 0

## 2021-10-28 ASSESSMENT — PAIN - FUNCTIONAL ASSESSMENT: PAIN_FUNCTIONAL_ASSESSMENT: ACTIVITIES ARE NOT PREVENTED

## 2021-10-28 ASSESSMENT — PAIN DESCRIPTION - LOCATION: LOCATION: KNEE

## 2021-10-28 ASSESSMENT — PAIN DESCRIPTION - PROGRESSION
CLINICAL_PROGRESSION: NOT CHANGED

## 2021-10-28 ASSESSMENT — PAIN DESCRIPTION - ORIENTATION: ORIENTATION: RIGHT;LEFT

## 2021-10-28 ASSESSMENT — PAIN DESCRIPTION - PAIN TYPE: TYPE: CHRONIC PAIN

## 2021-10-28 NOTE — PROGRESS NOTES
Pharmacy Note  Warfarin Consult     Goal INR range 2-3  Home Warfarin dose:5 mg daily     Date                 INR                  Warfarin  10/18              1.87                  5 mg  10/19              2.51                  hold  10/20              2.56                  2.5 mg  10/21              2.00                  5 mg  10/22              2.12                  5 mg  10/23              2.98                  0 mg  10/24              3.22                  0 mg   10/25              2.45                  1 mg  10/26              1.93                  2 mg       10/27              1.78                  2 mg  10/28              1.65                  4 mg    Recommend Warfarin 4 mg tonight x1. Daily INR ordered. Rx will continue to manage therapy per consult order.   Silvestre Ku, PharmD 10/28/2021  3:28 PM

## 2021-10-28 NOTE — PROGRESS NOTES
Occupational Therapy  Facility/Department: [facility] ARU  Daily Treatment Note  NAME: Mook Burdick  : 1940  MRN: [id number]    Date of Service: 10/28/2021    Discharge Recommendations:  Home with assist PRN, Home with Home health OT  OT Equipment Recommendations  ADL Assistive Devices: Toilet Hygiene Aid;Transfer Tub Bench    Assessment   Performance deficits / Impairments: Decreased functional mobility ; Decreased high-level IADLs;Decreased ADL status; Decreased endurance;Decreased balance;Decreased safe awareness;Decreased strength  Assessment: Pt has been progressing towards therapy goals. Pt independent for sit<>stand and independent for amb to and from the bathroom with 4WW. Pt independent for UB bathing and Ivana for LB with the use of long handle sponge. Pt Ivana with 7QD while gathering clothing and completed TB dressing independently. Pt Ivana donning socks with using sock aid. Pt was given engery conservation hand out to take home and was educated on how to conserve energy when doing ADLs and pt verbalized understanding. Pt participated in word scrable activity while seated in recliner and demonstrated good [address] when picking up pieces and demonstrated ability to problem solve simple words. Pt has met 4/4 short term goals and 5/5 long term goals and is now safe to DC home with assistance PRN and HHOT. Treatment Diagnosis: Weakness (post COVID)  Prognosis: Good  REQUIRES OT FOLLOW UP: Yes  Activity Tolerance  Activity Tolerance: Patient Tolerated treatment well  Activity Tolerance: Pt able to mainatin O2 levels however req breaks due to increaed HR. Safety Devices  Safety Devices in place: Not Applicable  Type of devices: Call light within reach;Nurse notified  Restraints  Initially in place: No         Patient Diagnosis(es): The primary encounter diagnosis was COVID-19 virus infection.  A diagnosis of Chronic obstructive pulmonary disease, unspecified COPD type ([facility] [address] was also pertinent to this visit.      has a past medical history of Abnormal chest CT, Abnormal CXR, Acute exacerbation of chronic obstructive pulmonary disease (COPD) ([facility] [address] Acute on chronic respiratory failure with hypoxia ([address] Anticoagulant long-term use, Atrial fibrillation (HCC), Atrial fibrillation with rapid ventricular response ([address] B-cell lymphoma ([address] CAP (community acquired pneumonia), Carotid artery stenosis, CHF (congestive heart failure) ([address] Chronic back pain, Congenital heart disease, COVID-19, Depression, Diverticulitis, Diverticulitis of colon, Dizziness, Fistula, Gram-negative pneumonia ([address] HCAP (healthcare-associated pneumonia), Hx of blood clots, Hyperlipidemia, Hypertension, IFG (impaired fasting glucose), Infection due to Stenotrophomonas maltophilia, Non-Hodgkins lymphoma ([address] Obesity, Panic disorder, Peripheral vascular disease ([address] Primary osteoarthritis of both knees, Pulmonary embolism ([address] Rectal vaginal fistula, Restless legs syndrome, [facility] spotted fever, Sciatic nerve pain, Sepsis ([address] Sleep apnea, Swelling of limb, Tobacco abuse, Urinary tract infection in female, and Vitamin D deficiency. has a past surgical history that includes Appendectomy (1963); partial hysterectomy (cervix not removed) (1980); hernia repair; bronchoscopy (N/A, 8/12/2019); bronchoscopy (8/12/2019); bronchoscopy (8/12/2019); bronchoscopy (8/12/2019); bronchoscopy (8/12/2019); bronchoscopy (8/12/2019); Tunneled venous port placement (Left, 01/17/2020); [facility] Surgery (N/A, 1/17/2020); Cardiac catheterization (2003); Colonoscopy (N/A, 9/25/2019); and Tonsillectomy (1965). Restrictions  Restrictions/Precautions  Restrictions/Precautions: Fall Risk, General Precautions  Position Activity Restriction  Other position/activity restrictions: on 4L O2  Subjective   General  Chart Reviewed:  Yes  Additional Pertinent Hx: has a past medical history of Abnormal chest CT, Abnormal CXR, Acute exacerbation of chronic obstructive pulmonary disease (COPD) ([facility] [address] Acute on chronic respiratory failure (HCC), Acute on chronic respiratory failure with hypoxia (HCC), Anticoagulant long-term use, Atrial fibrillation ([address] Atrial fibrillation with rapid ventricular response ([address] B-cell lymphoma ([address] CAP (community acquired pneumonia), Carotid artery stenosis, CHF (congestive heart failure) ([address] Chronic back pain, Congenital heart disease, COVID-19, Depression, Diverticulitis, Diverticulitis of colon, Dizziness, Fistula, Gram-negative pneumonia ([address] HCAP (healthcare-associated pneumonia), Hx of blood clots, Hyperlipidemia, Hypertension, IFG (impaired fasting glucose), Infection due to Stenotrophomonas maltophilia, Non-Hodgkins lymphoma ([address] Obesity, Panic disorder, Peripheral vascular disease ([address] Primary osteoarthritis of both knees, Pulmonary embolism ([facility] [address] Rectal vaginal fistula, Restless legs syndrome, [facility] spotted fever, Sciatic nerve pain, Sepsis ([address] Sleep apnea, Swelling of limb, Tobacco abuse, Urinary tract infection in female, and Vitamin D deficiency. Response to previous treatment: Patient with no complaints from previous session  Family / Caregiver Present: No  Referring Practitioner: Dr. Breezy House  Diagnosis: Post COVID  Subjective  Subjective: Pt seated in recliner and agreeable for therapy. Orientation  Orientation  Overall Orientation Status: Within Functional Limits  Objective    ADL  Equipment Provided: Sock aid;Long-handled sponge  Feeding: Independent  Grooming: Independent  UE Bathing: Independent  LE Bathing: Modified independent   UE Dressing: Independent  LE Dressing: Independent  Toileting: Independent        Balance  Sitting Balance: Independent  Standing Balance: Independent  Standing Balance  Time: ~2 minutes, ~1 minute x 3  Activity: amb with 4WW to and from the bathroom, standing for grooming, dressing, bathing, gathering items.   Functional Mobility  Functional - Mobility Device: 4-Wheeled Walker  Activity: To/from bathroom; Retrieve items;Transport items  Assist Level: Independent  Functional Mobility Comments: 4L of O2  Toilet Transfers  Toilet - Technique: Ambulating  Equipment Used: Standard toilet  Toilet Transfer: Independent  Toilet Transfers Comments: + grab bars  Shower Transfers  Shower - Transfer From:  (8YU)  Shower - Transfer Type: To and From  Shower - Transfer To: Shower seat with back  Shower - Technique: Ambulating  Shower Transfers: Independent  Bed mobility  Supine to Sit: Independent  Sit to Supine: Unable to assess  Scooting: Unable to assess  Transfers  Sit to stand: Independent  Stand to sit: Independent        Coordination  Gross Motor: Pt demonstrates good [address] when amb with 4WW to and from the closet to gather clothes. Cognition  Overall Cognitive Status: Exceptions  Arousal/Alertness: Appropriate responses to stimuli  Following Commands: Follows one step commands consistently  Attention Span: Appears intact  Memory: Decreased short term memory  Safety Judgement: Decreased awareness of need for assistance;Decreased awareness of need for safety (Has improved with safety awareness.  Pt demonstrated by locking 4WW breaks and managing O2 line.)  Problem Solving: Assistance required to correct errors made  Insights: Decreased awareness of deficits  Initiation: Does not require cues  Sequencing: Requires cues for some                                         Plan   Plan  Times per week: 5/7 days  Times per day: Daily  Plan weeks: 7-10 days  Current Treatment Recommendations: Strengthening, Safety Education & Training, Balance Training, Self-Care / ADL, Home Management Training, Equipment Evaluation, Education, & procurement, Endurance Training, Functional Mobility Training, Patient/Caregiver Education & Training          Goals  Short term goals  Time Frame for Short term goals: 10/22/21  Short term goal 1: Pt will complete toileting while using LRAD with SBA.-goal MET 10/22/21  Short term goal 2: Pt will complete TB bathing with set up and SBA- ongoing 10/23  Short term goal 3: Pt will complete grooming task while standing for at least 3 minutes with SBA.- GOAL MET 10/23  Short term goal 4: Pt will complete total body dressing with LRAD and SBA- ongoing 10/23  Long term goals  Time Frame for Long term goals : 10/27/21  Long term goal 1: Pt will complete toileting task with Ivana.-pt MET goal 10/26/21 Pt demonstrated the ability and safety needed for Ivana. Long term goal 2: Pt will complete tub/shower transfer with supervision and LRAD. -pt MET goal 10/27/21 CTA at home set up for safety. Long term goal 3: Pt will complete TB bathing with supervision.-Pt MET goal 10/28/21 pt independent  Long term goal 4: Pt will complete IADL task with SBA. -Pt MET goal 10/21/21 Pt req SBA for hanging clothes while standing. Long term goal 5: Pt will complete TB dressing using LRAD and Ivana.- Pt independt with LRAD. goal MET 10/28/21  Patient Goals   Patient goals : \"To get more strength in my legs. \"       Therapy Time   Individual Concurrent Group Co-treatment   Time In 1330         Time Out 1500         Minutes 90         Timed Code Treatment Minutes: [address]/OT

## 2021-10-28 NOTE — PROGRESS NOTES
[place]  10/28/2021  [id number]    Chief Complaint: COVID-19 virus infection    Subjective:   Sleeping on my visit; wakes to voice; no new c/o. ROS: no n/v, cp, f/c  Objective:  Patient Vitals for the past 24 hrs:   BP Temp Temp src Pulse Resp SpO2   10/28/21 0846 109/64 97.9 °F (36.6 °C) Oral 81 20 93 %   10/28/21 0805      95 %   10/28/21 0615 (!) 101/55   98     10/27/21 2345 115/68   121     10/27/21 2343 124/67   120     10/27/21 2330 (!) 94/58   132     10/27/21 1930 108/64 97.7 °F (36.5 °C) Oral 86 24 97 %     Gen: No distress, pleasant. HEENT: Normocephalic, atraumatic. CV: warm, well perfused   Resp: No respiratory distress. Abd: Soft, nontender   Ext: No edema. Neuro: Alert, oriented, appropriately interactive. Wt Readings from Last 3 Encounters:   10/24/21 247 lb 8 oz (112.3 kg)   09/28/21 252 lb 8 oz (114.5 kg)   06/02/21 273 lb (123.8 kg)       Laboratory data:   Lab Results   Component Value Date    WBC 6.9 10/28/2021    HGB 8.9 (L) 10/28/2021    HCT 28.9 (L) 10/28/2021    MCV 76.7 (L) 10/28/2021     10/28/2021       Lab Results   Component Value Date     10/28/2021    K 4.2 10/28/2021    CL 97 10/28/2021    CO2 32 10/28/2021    BUN 14 10/28/2021    CREATININE 0.6 10/28/2021    GLUCOSE 164 10/28/2021    CALCIUM 8.4 10/28/2021        Therapy progress:  PT  Position Activity Restriction  Other position/activity restrictions: on 4L O2  Objective     Sit to Stand: Modified independent, Independent  Stand to sit:  Independent, Modified independent  Bed to Chair: Modified independent, Independent  Device: Rollator  Other Apparatus: O2  Assistance: Modified Independent  Distance: 175 feet,  and 80 feet each to and from gym  OT  PT Equipment Recommendations  Equipment Needed: No  Other: has rollator at home and cane  Toilet - Technique: Ambulating  Equipment Used: Standard toilet  Toilet Transfers Comments: + grab bars  Assessment        SLP  Current

## 2021-10-28 NOTE — CARE COORDINATION
[facility]    DC order noted, all docs needed have been faxed to [facility] for home care services.     Home care to see patient within 24-48 hrs    Nathan Kumar RN, BSN CTN  [facility] [phone]

## 2021-10-28 NOTE — PROGRESS NOTES
Occupational Therapy  Occupational Therapy  Discharge Summary     Name:Raven Braga  XXU:[id number]  :1940  Treatment Diagnosis: Difficulty walking, impaired endurance and safety. Diagnosis: Covid 19 virus    Restrictions/Precautions  Restrictions/Precautions: Fall Risk, General Precautions           Position Activity Restriction  Other position/activity restrictions: on 4L O2     Goals:   Short term goals  Time Frame for Short term goals: 10/22/21  Short term goal 1: Pt will complete toileting while using LRAD with SBA.-goal MET 10/22/21  Short term goal 2: Pt will complete TB bathing with set up and SBA- ongoing 10/23  Short term goal 3: Pt will complete grooming task while standing for at least 3 minutes with SBA.- GOAL MET 10/23  Short term goal 4: Pt will complete total body dressing with LRAD and SBA- ongoing 10/23   Long term goals  Time Frame for Long term goals : 10/27/21  Long term goal 1: Pt will complete toileting task with Ivana.-pt MET goal 10/26/21 Pt demonstrated the ability and safety needed for Ivana. Long term goal 2: Pt will complete tub/shower transfer with supervision and LRAD. -pt MET goal 10/27/21 CTA at home set up for safety. Long term goal 3: Pt will complete TB bathing with supervision.-Pt MET goal 10/28/21 pt independent  Long term goal 4: Pt will complete IADL task with SBA. -Pt MET goal 10/21/21 Pt req SBA for hanging clothes while standing. Long term goal 5: Pt will complete TB dressing using LRAD and Ivana.- Pt independt with LRAD. goal MET 10/28/21      Pt has met 4/4 short term goals and 5/5 long term goals and is now safe to DC home with assistance PRN and HHOT.     Current Functional Status:   ADL  Equipment Provided: Sock aid, Long-handled sponge  Feeding: Independent  Grooming: Independent  UE Bathing: Independent  LE Bathing: Modified independent   UE Dressing: Independent  LE Dressing: Independent  Toileting: Independent    Bed mobility  Rolling to Left: Independent  Rolling to Right: Independent  Supine to Sit: Independent  Sit to Supine: Unable to assess  Scooting: Unable to assess  Comment: NT; pt up in chair at start of session and end    Functional Transfers: Toilet Transfers  Toilet - Technique: Ambulating  Equipment Used: Standard toilet  Toilet Transfer: Independent  Toilet Transfers Comments: + grab bars    Tub Transfers  Tub - Transfer From: Rolling walker  Tub - Transfer Type: To and From  Tub - Transfer To: Transfer tub bench  Tub - Technique: Ambulating  Tub Transfers: Supervision, Verbal cues  Tub Transfers Comments: Pt used leg  to assist with getting legs into and out of the tub. Shower Transfers  Shower - Transfer From:  (1KY)  Shower - Transfer Type: To and From  Shower - Transfer To: Shower seat with back  Shower - Technique: Ambulating  Shower Transfers: Independent           Functional Mobility  Functional - Mobility Device: 4-Wheeled Walker  Activity: To/from bathroom, [employer], Transport items  Assist Level: Independent  Functional Mobility Comments: 4L of O2    Instrumental ADL's  Instrumental ADLs: Yes     Light Housekeeping  Light Housekeeping: Pt stood ~3 minutes while hanging clothes. Assessment:   Assessment: Pt has been progressing towards therapy goals. Pt independent for sit<>stand and independent for amb to and from the bathroom with 4WW. Pt independent for UB bathing and Ivana for LB with the use of long handle sponge. Pt Ivana with 1RB while gathering clothing and completed TB dressing independently. Pt Ivana donning socks with using sock aid. Pt was given engery conservation hand out to take home and was educated on how to conserve energy when doing ADLs and pt verbalized understanding. Pt participated in word scrable activity while seated in recliner and demonstrated good [address] when picking up pieces and demonstrated ability to problem solve simple words.  Pt has met 4/4 short term goals and 5/5 long term goals and is now safe to DC home with assistance PRN and HHOT. Prognosis: Good  Barriers to Learning: Decreased safety awareness however has shown improvements during therapy by locking 4WW breaks. REQUIRES OT FOLLOW UP: Yes  Discharge Recommendations: Home with assist PRN, Home with Home health OT    Equipment Recommendations:  Tub transfer bench and moon-care aid. Home Exercise Program  Provided Pt with energy conservation handout for ADLs.     Electronically signed by Donte ALVAREZ/OT on 10/28/2021 at 3:14 PM

## 2021-10-28 NOTE — PROGRESS NOTES
Physical Therapy  Facility/Department: [facility]  Daily Treatment Note  NAME: Vicky Ness  : 1940  MRN: [id number]    Date of Service: 10/28/2021    Discharge Recommendations:  Home with Home health PT, Home with assist PRN        Assessment    Patient seen for split session this date for endurance training with rollator (progressed to room indep). Pt gave informed consent for discussion of therapy related issues with son present during treatment. PT progressed to achieving all goals including steps despite patient's relating she does not think she should do steps. Explained to patient need to make sure she can safely negotiate steps to enter and egress her home. Patient agreed after explaining. Educated pt in hep with written education dispensed and pt demo indep in written hep. Pt notified of room indep status during daytime hours. to use walker during walking in room and to call for assist if needed. Patient verbalized understanding. Patient with functional LE strengthe but easily sob/dyspnea with SpO2 in 90's % on 4L SpO2 throughout session with patient able to independently use rollator brakes and transfer on and off rollator seat independently. Pt notes she has rollator and cane at home. Recommend home PT to progress to community mobility indep level and prn assist from family for higher level ADL's. Activity Tolerance  Activity Tolerance: Patient limited by fatigue;Patient limited by endurance  Activity Tolerance: SPO2% 96%, HR 98bpm, knee pain BLE 5/10, BP RUE seated 107/64. Son present during tx with patient giving informed consent to discuss therapy progress with her son. Patient Diagnosis(es): The primary encounter diagnosis was COVID-19 virus infection. A diagnosis of Chronic obstructive pulmonary disease, unspecified COPD type ([address] was also pertinent to this visit.      has a past medical history of Abnormal chest CT, Abnormal CXR, Acute exacerbation of chronic obstructive pulmonary disease (COPD) ([address] Acute on chronic respiratory failure with hypoxia (HCC), Anticoagulant long-term use, Atrial fibrillation ([address] Atrial fibrillation with rapid ventricular response ([address] B-cell lymphoma ([address] CAP (community acquired pneumonia), Carotid artery stenosis, CHF (congestive heart failure) ([address] Chronic back pain, Congenital heart disease, COVID-19, Depression, Diverticulitis, Diverticulitis of colon, Dizziness, Fistula, Gram-negative pneumonia ([address] HCAP (healthcare-associated pneumonia), Hx of blood clots, Hyperlipidemia, Hypertension, IFG (impaired fasting glucose), Infection due to Stenotrophomonas maltophilia, Non-Hodgkins lymphoma ([address] Obesity, Panic disorder, Peripheral vascular disease ([address] Primary osteoarthritis of both knees, Pulmonary embolism ([facility] [address] [facility] Rectal vaginal fistula, Restless legs syndrome, [facility] spotted fever, Sciatic nerve pain, Sepsis ([address] Sleep apnea, Swelling of limb, Tobacco abuse, Urinary tract infection in female, and Vitamin D deficiency. has a past surgical history that includes Appendectomy (1963); partial hysterectomy (cervix not removed) (1980); hernia repair; bronchoscopy (N/A, 8/12/2019); bronchoscopy (8/12/2019); bronchoscopy (8/12/2019); bronchoscopy (8/12/2019); bronchoscopy (8/12/2019); bronchoscopy (8/12/2019); Tunneled venous port placement (Left, 01/17/2020); [place] [id number] Surgery (N/A, 1/17/2020); Cardiac catheterization (2003); Colonoscopy (N/A, 9/25/2019); and Tonsillectomy (1965). Restrictions  Restrictions/Precautions  Restrictions/Precautions: Fall Risk, General Precautions  Position Activity Restriction  Other position/activity restrictions: on 4L O2  Subjective  Patient rates her bilateral  knee pain as 7/10. Orientation person, place, date. Cognition consistent use of rrollator walker brakes, follows 1 step commands, STM impairment as needs cues to navigate to room.       Objective   Bed mobility  Rolling to Left: Independent  Rolling to Right: Independent  Supine to Sit: Independent  Sit to Supine: Independent  Scooting: Independent  Transfers effective use of brakes and O2 tubing management. Multiple trials from chair, bed with rollator indep chair>bed without AD with wide giovani. Sit to Stand: Modified independent; Independent  Stand to sit: Independent; Modified independent  Bed to Chair: Modified independent; Independent  Car Transfer: Modified independent  Comment: Patient demo indep with use of rollator walker with use of walker brakes and also demo chair>bed with no AD with wide giovani and safe tech, no lob and indep in line management  Ambulation  Ambulation?: Yes  More Ambulation?: Yes  Ambulation 1  Surface: level tile  Device: Rollator  Other Apparatus: O2  Assistance: Modified Independent  Quality of Gait: patient demonstrated steady gait with wide giovani and slow jaimie. Gait Deviations: Slow Jaimie; Increased GIOVANI; Decreased step height;Decreased step length  Distance: 175 feet with L/R turns on 10 feet of turf carpet,  and 80 feet each to and from gym  Comments: post walking  SpO2 on 4L 93-94%%   and  HR up to 108/minute manual post walk. Patient with dyspena and PLB with 3 minutes seated rest to recover from dyspnea effective use of brakes and O2 tubing management. Stairs/Curb  Stairs?: Yes  Stairs  # Steps : 4  Stairs Height: 6\"  Device: Single pt cane;Rolling walker (rolling walker at bottom of steps, uses cane and has cane at home)  Assistance: Modified independent  effective use of brakes and O2 tubing management.         Balance  Sitting - Static: Good  Sitting - Dynamic: Good  Standing - Static: Good  Standing - Dynamic: Good (for short distances (knee pain limits and patient uses walker but able to indep in wide giovani transfer chair>bed))  Comments: Patiend indep and safe to stoop to recover with rollator walker tissue box from floor     SECOND SESSION:    Subjective:  Pain in toes 6/10 patient denies need for tensoshape garments but has LE elevated   At rest 4L O2 via nc 96%, HR 96bpm manual.     THEREX: Reviewed written hep. Ankle [id number]  Gluteal sets 10 seconds x10  [employer] 10 [id number]   Heel slides x30   Hip abd x30   LAQ x30  Marches seated x10 BLE Dispensed written hep and pt demo indep in hep. TRANSFERS:  Sit<>stand with indep with rollator from bed and chair with rollator with ROSAS effective use of brakes and O2 tubing management. GAIT:  175 feet  rollator with slow jaimie wide franky, with fatigue leans on forearms on walker at rest 4L O2 96% SpO@ RR 20/minute, HR 96 bpm immediate post activity  94%SpO2 , RR36 with 3 minutes seated rest to recover. Education:   Educated patient inwritten hep and reviewed room indep with use of rollator for daylight hours with patient verbalizing understanding. GOALS achieved    Disposition:Patient with call light in reach and patient seated at EOB with rollator in reach and nursing aware of room indep during day with whiteboard updated. Goals  Short term goals  Time Frame for Short term goals: Patient demonstrates indep in bed mobility. Short term goal 1: 10/26/2021  Short term goal 2: Patient demonstrates 150 feet with SpO2 90% or greater on O2 via nc. with rollator. GOAL MET  10/26/2021 Patient demonstrates S/SBA  150 feet with SpO2 93% on 4L O2 via nc. with rollator. Short term goal 3: Patient demonstrates 5 minutes of standing dynamic balance activities to improve dynamic balance with gait with SPO2 90% or greater. GOAL met 10/21/2021Patient demonstrates 6 minutes of standing dynamic balance activities to improve dynamic balance with gait with SPO2 90% or greater. Short term goal 4: Patient demonstrates indep in core and DBE in sitting and standing. 10/26/2021 needs continued supervision for tanding activity due to need for cues with rollator brakes.   Short term goal 5: Patient demonstrates safe indep transfers bed<>chair, car transfer, with use of rollator . GOAL MET 10/25/2021 Patient demonstrates safe indep transfers bed<>chair, car transfer, with use of rollator . Long term goals  Time Frame for Long term goals : 11/2/2021  Long term goal 1: Patient demonstrates safe indep stoop to recover shoe from floor wtih use of rollator. GOAL met 10/28/2021 Patient demonstrates safe indep stoop to recovertissue box (to simulate shoe)  from floor wtih use of rollator. Long term goal 2: Patiend demonstrates gait 150 feet ROSAS with use of rollator and spO2 90% or greater. GOAL MET 10/28/2021 Patiend demonstrates gait 175 feet ROSAS with use of rollator and spO2 90% or greater  Long term goal 3: Pt demo up and down 3-4 steps indep with use of cane and rail to safely enter and egress home. GOAL MET 10/28/2021 Pt demo up and down 4 steps indep with use of cane and rail (rollato at bottom of steps) to safely enter and egress home. Long term goal 4: Pt indep in  hep LE therex to increase LE endurance/functional strength with walking. GOAL met pt demo indep in LE seated and supine therex for strengthening along with standing heel raises and 1 minute of reciprocal side stepping . Patient Goals   Patient goals : \" To get my legs stronger\"    Plan    Plan  Times per week: 5/7 days/week  Times per day: Daily  Plan weeks: 14 days  Current Treatment Recommendations: Transfer Training, Strengthening, Balance Training, Gait Training, Stair training, Functional Mobility Training, Safety Education & Training  Safety Devices  Type of devices:  All fall risk precautions in place, Left in bed, Call light within reach, Left in chair, Chair alarm in place  Restraints  Initially in place: No     Therapy Time   Individual Concurrent Group Co-treatment   Time In 0940         Time Out 1030         Minutes 50         Timed Code Treatment Minutes: 50 Minutes    Second Session Therapy Time:   Individual Concurrent Group Co-treatment   Time In [facility]         Minutes 45           Timed Code Treatment Minutes:  45    Total Treatment Minutes:  95      Bo Krause, PT

## 2021-10-28 NOTE — PROGRESS NOTES
Speech Language Pathology  [facility]: ACUTE REHAB UNIT  SPEECH-LANGUAGE PATHOLOGY      [x] Daily  [] Weekly Care Conference Note  [x] Discharge    Patient:Raven Hernandez      :1940  [facility]:[id number]  Rehab Dx/Hx: COVID-19 virus infection [U07.1]    Precautions: falls  Home situation: Lives at home indep. Manages finances and meds; cooks and does the laundry. Active . ST Dx: [] Aphasia  [] Dysarthria  [] Apraxia   [] Oropharyngeal dysphagia [x] Cognitive Impairment  [] Other:   Date of Admit: 10/18/2021  Room #: 0153/0153-01    Current functional status (updated daily):         Pt being seen for : [] Speech/Language Treatment  [] Dysphagia Treatment [x] Cognitive Treatment  [] Other:  Communication: [x]WFL  [] Aphasia  [] Dysarthria  [] Apraxia  [] Pragmatic Impairment [] Non-verbal  [] Hearing Loss  [] Other:   Cognition: [] WFL  [x] Mild  [] Moderate  [] Severe [] Unable to Assess  [] Other:  Memory: [] WFL  [x] Mild  [] Moderate  [] Severe [] Unable to Assess  [] Other:  Behavior: [x] Alert  [x] Cooperative  [x]  Pleasant  [] Confused  [] Agitated  [] Uncooperative  [] Distractible [] Motivated  [] Self-Limiting [] Anxious  [] Other:  Endurance:  [x] Adequate for participation in SLP sessions  [] Reduced overall  [] Lethargic  [] Other:  Safety: [x] No concerns at this time  [] Reduced insight into deficits  []  Reduced safety awareness [] Not following call light procedures  [] Unable to Assess  [] Other:    Current Diet Order:ADULT DIET; Regular; 4 carb choices (60 gm/meal)  Swallowing Precautions:  Alternate solids and liquids;Eat/Feed slowly;Upright as possible for all oral intake;Remain upright for 30-45 minutes after meals;Small bites/sips        Date: 10/28/2021      Tx session 1  1960 - 5200 DISCHARGE SUMMARY   Total Timed Code Min 60 0   Total Treatment Minutes 60 0   Individual Treatment Minutes 60 0   Group Treatment Minutes 0 0   Co-Treat Minutes 0 0   Variance/Reason:  n/a n/a Pain None reported None reported   Pain Intervention [] RN notified  [] Repositioned  [] Intervention offered and patient declined  [x] N/A  [] Other: [] RN notified  [] Repositioned  [] Intervention offered and patient declined  [] N/A  [] Other:   Subjective     Pt alert and cooperative, agreeable to tx. Pt upright in bed for session. Son present at beginning of session. Objective:  Goals  Short-term Goals  Timeframe for Short-term Goals: 10 days (10/28/21)       Goal 1: Pt will complete recall tasks with 90% acc given min cues for use of compensatory memory strategies. 5 Novel Word Recall on SLUIMS  -immediate recall trial 1: 4/5 indep  -immediate recall trial 2: 5/5 indep  -following a 5 minute delay, pt indep recalled 4/5 words; +1 given semantic cue     Auditory Comprehension / Paragraph Retention on SLUMS  -pt received 6/8 points    Goal met: Pt demonstrated good progress with recall abilities and has demonstrated ability to utilize recall strategies. Recall abilities [facility]. Goal 2: Pt will complete executive function tasks (meds, money, math, time, etc) with 90% acc given min cues   Calculation on SLUMS  -pt received 3/3 points  -accurate addition and subtraction     Clock Drawing on UMS  -pt received 4/4 points  -accurate numbers and hands     Med Management  -SLP and pt discussed various medications she has taken at hospital  -pt has written down list of meds - pt to discuss with RN re: times to take meds, dosage   Goal met: Pt has demonstrated good progress with executive function tasks, ability to manage meds and finances. Executive function abilities [facility].     Goal 3: Pt will complete problem solving and thought organization tasks with 90% acc given min cues   Divergent Naming on SLUMS  -pt received 3/3 points  -pt named 20 animals in 1 minute     Backward Number Repetition on SLUMS  -pt received 2/2 points     Functional problem solving re: d/c planning:  -pt with good safety awareness and insight re: d/c home    Goal met: Pt has demonstrated good progress with functional problem solving and thought organization - WFL. Goal 4: Pt will complete verbal and visual reasoning tasks with 90% acc given min cues. Goal not directly targeted. Goal met: Pt has demonstrated good progress with functional problem solving and thought organization - WFL. Other areas targeted: Repeat SLUMS. Pt received 27/30 points this date, compared to 24/30 points on initial eval date. Results as follows:  -orientation: 3/3  -calculation: 3/3  -divergent naming: 3/3  -delayed recall:   -attention:   -clock drawin/4  -visuospatial/following commands:   -auditory comp:   N/A   Education:   SLP edu pt re: rationale of repeat SLUMS, results of repeat SLUMS, recall strategies, d/c recs    Safety Devices: [x] Call light within reach  [] Chair alarm activated  [x] Bed alarm activated  [] Other:  [] Call light within reach  [] Chair alarm activated  [] Bed alarm activated  [] Other:    Assessment: Session 1: Pt alert and cooperative, agreeable to tx. SLP administered SLUMS for re-assessment of cognitive-linguistic abilities. Pt scored 24/30 points on initial eval date, and scored 27/30 points this date. This score is judged to be [facility]. Pt demonstrated improvement with calculation, recall, problem solving, and executive function. Pt has demonstrated ability to implement recall strategies, especially when writing info down. Pt has written list of meds - is going to discuss with nurse dosage, when to take meds. Discharge Summary: Pt has demonstrated progress with cognitive goals targeted above - recall, problem solving / thought organization, reasoning, and executive function. Pt's overall cognitive abilities WFL. Swallowing is [facility] - continue to recommend regular diet with thin liquids. Pt has demonstrated ability to indep manage meds and finances, and has asked appropriate questions to continue to manage them.

## 2021-10-29 VITALS
BODY MASS INDEX: 45.54 KG/M2 | HEIGHT: 62 IN | OXYGEN SATURATION: 95 % | TEMPERATURE: 97.3 F | RESPIRATION RATE: 18 BRPM | WEIGHT: 247.5 LBS | HEART RATE: 90 BPM | DIASTOLIC BLOOD PRESSURE: 74 MMHG | SYSTOLIC BLOOD PRESSURE: 107 MMHG

## 2021-10-29 LAB
GLUCOSE BLD-MCNC: 127 MG/DL (ref 70–99)
GLUCOSE BLD-MCNC: 139 MG/DL (ref 70–99)
INR BLD: 1.92 (ref 0.88–1.12)
PERFORMED ON: ABNORMAL
PERFORMED ON: ABNORMAL
PROTHROMBIN TIME: 22.3 SEC (ref 9.9–12.7)

## 2021-10-29 PROCEDURE — 6370000000 HC RX 637 (ALT 250 FOR IP): Performed by: PHYSICAL MEDICINE & REHABILITATION

## 2021-10-29 PROCEDURE — 6360000002 HC RX W HCPCS: Performed by: PHYSICAL MEDICINE & REHABILITATION

## 2021-10-29 PROCEDURE — 94761 N-INVAS EAR/PLS OXIMETRY MLT: CPT

## 2021-10-29 PROCEDURE — 85610 PROTHROMBIN TIME: CPT

## 2021-10-29 PROCEDURE — 2700000000 HC OXYGEN THERAPY PER DAY

## 2021-10-29 PROCEDURE — 94640 AIRWAY INHALATION TREATMENT: CPT

## 2021-10-29 PROCEDURE — 36415 COLL VENOUS BLD VENIPUNCTURE: CPT

## 2021-10-29 RX ORDER — WARFARIN SODIUM 2 MG/1
4 TABLET ORAL
Status: DISCONTINUED | OUTPATIENT
Start: 2021-10-29 | End: 2021-10-29 | Stop reason: HOSPADM

## 2021-10-29 RX ADMIN — DIGOXIN 187.5 MCG: 125 TABLET ORAL at 08:24

## 2021-10-29 RX ADMIN — DILTIAZEM HYDROCHLORIDE 60 MG: 60 TABLET, FILM COATED ORAL at 11:29

## 2021-10-29 RX ADMIN — DILTIAZEM HYDROCHLORIDE 60 MG: 60 TABLET, FILM COATED ORAL at 05:44

## 2021-10-29 RX ADMIN — FUROSEMIDE 20 MG: 20 TABLET ORAL at 08:24

## 2021-10-29 RX ADMIN — BUDESONIDE 500 MCG: 0.5 SUSPENSION RESPIRATORY (INHALATION) at 08:42

## 2021-10-29 RX ADMIN — FAMOTIDINE 20 MG: 20 TABLET, FILM COATED ORAL at 08:23

## 2021-10-29 RX ADMIN — PYRIDOXINE HCL TAB 50 MG 50 MG: 50 TAB at 08:24

## 2021-10-29 RX ADMIN — BUSPIRONE HYDROCHLORIDE 5 MG: 5 TABLET ORAL at 05:44

## 2021-10-29 RX ADMIN — TRAMADOL HYDROCHLORIDE 50 MG: 50 TABLET ORAL at 08:23

## 2021-10-29 RX ADMIN — BUPROPION HYDROCHLORIDE 150 MG: 150 TABLET, EXTENDED RELEASE ORAL at 08:23

## 2021-10-29 RX ADMIN — PREDNISONE 5 MG: 5 TABLET ORAL at 08:24

## 2021-10-29 RX ADMIN — DICLOFENAC SODIUM 4 G: 10 GEL TOPICAL at 10:15

## 2021-10-29 ASSESSMENT — PAIN SCALES - GENERAL
PAINLEVEL_OUTOF10: 6
PAINLEVEL_OUTOF10: 3
PAINLEVEL_OUTOF10: 8

## 2021-10-29 ASSESSMENT — PAIN DESCRIPTION - PROGRESSION
CLINICAL_PROGRESSION: NOT CHANGED

## 2021-10-29 ASSESSMENT — PAIN DESCRIPTION - LOCATION: LOCATION: BACK

## 2021-10-29 NOTE — PROGRESS NOTES
Discharge instructions reviewed with the patient. Understanding was verbalized. Personal belongings were collected. The patient was taken to pharmacy and collected her medications. The patient was taken out with oxygen at 4 l/m NC. She went out via wheelchair to private vehicle for transport to home. She was alert talkative and eager to leave at time of discharge.

## 2021-10-29 NOTE — PLAN OF CARE
Problem: Pain:  Goal: Pain level will decrease  Description: Pain level will decrease  Outcome: Ongoing     Problem: Pain:  Goal: Control of chronic pain  Description: Control of chronic pain  Outcome: Ongoing     Problem: Skin Integrity:  Goal: Will show no infection signs and symptoms  Description: Will show no infection signs and symptoms  Outcome: Ongoing     Problem: Skin Integrity:  Goal: Absence of new skin breakdown  Description: Absence of new skin breakdown  Outcome: Ongoing

## 2021-10-29 NOTE — CARE COORDINATION
CASE MANAGEMENT DISCHARGE SUMMARY      Discharge to: home with [facility], son to stay for a few days    Precertification completed: NA  Hospital Exemption Notification (HENS) completed: NA    IMM given: 10/29/21     New Durable Medical Equipment ordered/agency: son to obtain TTB if it will fit into bathroom and bottom mark    Transportation:    Family/car: son       Confirmed discharge plan with:     Patient: yes     Family:  yes    Name: son Markus Bustamante, name:  OSCAR/AVS faxed, [facility] to obtain from [employer]       RN, name: Marlena Goodpasture    Note: Discharging nurse to complete OSCAR, reconcile AVS, and place final copy with patient's discharge packet. RN to ensure that written prescriptions for  Level II medications are sent with patient to the facility as per protocol.   Electronically signed by KRISTY Beaulieu, PARVEEN-S on 10/29/2021 at 8:08 AM

## 2021-10-29 NOTE — PROGRESS NOTES
Pharmacy Note  Warfarin Consult     Goal INR range 2-3  Home Warfarin dose:5 mg daily     Date                 INR                  Warfarin  10/18              1.87                  5 mg  10/19              2.51                  hold  10/20              2.56                  2.5 mg  10/21              2.00                  5 mg  10/22              2.12                  5 mg  10/23              2.98                  0 mg  10/24              3.22                  0 mg   10/25              2.45                  1 mg  10/26              1.93                  2 mg       10/27              1.78                  2 mg  10/28              1.65                  4 mg  10/29              1.92                  4 mg     Recommend Warfarin 4 mg tonight x1. Daily INR ordered. Rx will continue to manage therapy per consult order.   Yasmine Paulino, PharmD 10/29/2021  12:39 PM

## 2021-11-01 ENCOUNTER — TELEPHONE (OUTPATIENT)
Dept: FAMILY MEDICINE CLINIC | Age: 81
End: 2021-11-01

## 2021-11-02 ENCOUNTER — ANTI-COAG VISIT (OUTPATIENT)
Dept: FAMILY MEDICINE CLINIC | Age: 81
End: 2021-11-02

## 2021-11-02 DIAGNOSIS — I26.99 OTHER PULMONARY EMBOLISM WITHOUT ACUTE COR PULMONALE, UNSPECIFIED CHRONICITY (HCC): Primary | ICD-10-CM

## 2021-11-02 LAB — INR BLD: 4.4

## 2021-11-02 NOTE — PROGRESS NOTES
Patient called and informed to hold her dose tonight and the recheck her INR tomorrow, patient checks at home

## 2021-11-03 ENCOUNTER — CARE COORDINATION (OUTPATIENT)
Dept: CASE MANAGEMENT | Age: 81
End: 2021-11-03

## 2021-11-03 ENCOUNTER — TELEPHONE (OUTPATIENT)
Dept: FAMILY MEDICINE CLINIC | Age: 81
End: 2021-11-03

## 2021-11-03 NOTE — TELEPHONE ENCOUNTER
Pt states that her ankles are swollen and she carty 2 lbs over night. Was wanting to see what you wanted her to do. She uses [address]

## 2021-11-03 NOTE — TELEPHONE ENCOUNTER
Spoke to patient to take lasix twice a day for the next 3 days starting today. Patient unable to arrange for transportation before November 17th. Appoinment will remain on 11/ 17/ 21.

## 2021-11-03 NOTE — DISCHARGE SUMMARY
Physical Medicine & Rehabilitation  Discharge Summary     Patient Identification:  [facility]  : 1940  Admit date: 10/18/2021  Discharge date: 10/29/2021  Attending provider: No att. providers found        Primary care provider: TURNER Yee CNP     Discharge Diagnoses:   Patient Active Problem List   Diagnosis    COPD exacerbation    Chronic respiratory failure with hypoxia ([address]    Anxiety    Hyperlipidemia    Diverticulosis    COPD, severe ([address]    Vitamin D deficiency    Atrial fibrillation with RVR ([address]    Other pulmonary embolism without acute cor pulmonale (HCC)    Venous (peripheral) insufficiency    Lymphedema    Chronic angle-closure glaucoma of both eyes    Arthritis of hips and knees    Shortness of breath    Diastolic dysfunction    Acute on chronic respiratory failure with hypoxia (HCC)    Bilateral edema of lower extremity    Angina pectoris (HCC)    Paroxysmal atrial fibrillation ([address]    Erythrocytosis    IFG (impaired fasting glucose)    Lung mass    Mediastinal lymphadenopathy    Atrial fibrillation, chronic (HCC)    Pulmonary nodule    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 ([address]    Non-Hodgkin lymphoma ([address]    PVD (peripheral vascular disease) with claudication ([address]    Carotid stenosis    TIA (transient ischemic attack)    CHF (congestive heart failure) (HCC)    HTN (hypertension), benign    Dyslipidemia    History of diverticulitis    Elevated lactic acid level    Moderate episode of recurrent major depressive disorder (HCC)    Primary insomnia    Primary osteoarthritis of both knees    Acute pain of right knee    History of pulmonary embolism    Colovaginal fistula    Nausea    Polyp of colon    Chronic airway obstruction, not elsewhere classified    Morbid obesity ([address]    Sleep apnea    Restless legs syndrome    Chronic back pain    Panic disorder    Acute hypoxemic respiratory failure due to COVID-19 ([facility]    COVID-19    Acute on chronic respiratory failure with hypoxia and hypercapnia (HCC)    Anticoagulated on Coumadin    Pneumonia due to COVID-19 virus    Acute hypoxemic respiratory failure ([facility]    COVID-19 virus infection       Discharge Functional Status:    Physical therapy:  Bed Mobility: Scooting: Unable to assess  Transfers: Sit to Stand: Modified independent, Independent  Stand to sit: Independent, Modified independent  Bed to Chair: Modified independent, Independent  Comment: Pt ambulated to/from bathroom with rollator and SBA, able to manage brief down in standing with supervision. Continent of bowel and bladder, able to perform pericare in sitting/standing with supervision. Assist to don compression stockings and thread pants due to time constraints. Pt is able to pull up fully in standing., Ambulation 1  Surface: level tile  Device: Rollator  Other Apparatus: O2  Assistance: Modified Independent  Quality of Gait: patient demonstrated steady gait with wide giovani and slow jaimie. Gait Deviations: Slow Jaimie, Increased GIOVANI, Decreased step height, Decreased step length  Distance: 175 feet,  and 80 feet each to and from gym  Comments: post walking  SpO2 on 4L 93-94%%   and  HR up to 108/minute manual post walk. Patient with dyspena and PLB with 3 minutes seated rest to recover from dyspnea, Stairs  # Steps : 4  Stairs Height: 6\"  Rails: Right ascending (Left cane)  Device: Single pt cane, Rolling walker (rolling walker at bottom of steps, uses cane and has cane at home)  Assistance: Modified independent   Mobility:  , PT Equipment Recommendations  Equipment Needed: No  Other: has rollator at home and cane, Assessment: Patient seen for thereact to increase endurance and indep with household walking and transfers with rollator, LE strength in the presence of dyspnea on exertion with chronic bilateral degenerative knee pain.   Patient presented with consistent indep in O2 line and rollator walker brakes as well as indep in in room distance walking today. Recommended room indep priveleges after conference [facility] care team. Patient coninues wt dyspnea limiting distances outside of room from 93feet to 131 feet but is able to independently use walker rollator seat to recover with PT monitoring SpO2 on 4L with walking and cues given for navigation around unit for patient to find room going to and from gym. Patient declines trial of steps today due to knees pain but notes she will trial before discharge. Patient demo indep in LE AROM in seated and supine this date as well as standing therex. Continue to progress towards goals with increased endruance for household walking distances. Occupational therapy:  ,  , Assessment: Pt has been progressing towards therapy goals. Pt independent for sit<>stand and independent for amb to and from the bathroom with 4WW. Pt independent for UB bathing and Ivana for LB with the use of long handle sponge. Pt Ivana with 2DD while gathering clothing and completed TB dressing independently. Pt Ivana donning socks with using sock aid. Pt was given engery conservation hand out to take home and was educated on how to conserve energy when doing ADLs and pt verbalized understanding. Pt participated in word scrable activity while seated in recliner and demonstrated good [address] when picking up pieces and demonstrated ability to problem solve simple words. Pt has met 4/4 short term goals and 5/5 long term goals and is now safe to DC home with assistance PRN and HHOT. Speech therapy:       Inpatient Rehabilitation Course:   Kristyn Cruz is a 80 y.o. female admitted to inpatient rehabilitation on 10/18/2021 s/p COVID-19 virus infection. The patient participated in an aggressive multidisciplinary inpatient rehabilitation program involving 3 hours of therapy per day, at least 5 days per week.      CIM  - PT/OT, dietary support     COVID pneumonia  - completed isolation  - supportive care     COPD  - nebs, supplemental oxygen  - 4L at baseline  - slowly wean steroids       Mood disorder  - buspar, wellbutrin    Discharge Exam:  Constitutional: Pleasant, no distress  Head: Normocephalic  Eyes: Conjunctiva noninjected  Pulm: CTA bilat  CV: No murmurs noted  Abd: Soft, nontender  Ext: No edema  Neuro: Alert, fully oriented, appropriate     Significant Diagnostics:   Lab Results   Component Value Date    CREATININE 0.6 10/28/2021    BUN 14 10/28/2021     10/28/2021    K 4.2 10/28/2021    CL 97 (L) 10/28/2021    CO2 32 10/28/2021       Lab Results   Component Value Date    WBC 6.9 10/28/2021    HGB 8.9 (L) 10/28/2021    HCT 28.9 (L) 10/28/2021    MCV 76.7 (L) 10/28/2021     10/28/2021       Disposition:  Home in stable condition    Follow-up:  See after visit summary from hospitalization    Discharge Medications:     Medication List      START taking these medications    budesonide-formoterol 160-4.5 MCG/ACT Aero  Commonly known as: Symbicort  Inhale 2 puffs into the lungs 2 times daily  Notes to patient: COPD     diclofenac sodium 1 % Gel  Commonly known as: VOLTAREN  Apply 4 g topically 3 times daily  Notes to patient: Knee pain     famotidine 20 MG tablet  Commonly known as: PEPCID  Take 1 tablet by mouth 2 times daily  Notes to patient: Stomach acid     furosemide 20 MG tablet  Commonly known as: LASIX  Take 1 tablet by mouth daily  Notes to patient: Diuretic     metFORMIN 500 MG tablet  Commonly known as: GLUCOPHAGE  Take 1 tablet by mouth 2 times daily (with meals)  Notes to patient: Blood sugar control     predniSONE 5 MG tablet  Commonly known as: DELTASONE  Take 1 tablet by mouth 2 times daily  Notes to patient: Steroid for lungs        CHANGE how you take these medications    albuterol sulfate  (90 Base) MCG/ACT inhaler  Commonly known as: Ventolin HFA  Inhale 2 puffs into the lungs every 6 hours as needed for Wheezing or Shortness of Breath  What changed: Another medication with the same name was removed. Continue taking this medication, and follow the directions you see here. Notes to patient: COPD     buPROPion 150 MG extended release tablet  Commonly known as: Wellbutrin XL  Take 1 tablet by mouth every morning  What changed: additional instructions  Notes to patient: Mood     Digoxin 187.5 MCG Tabs  Take 200 mcg by mouth daily  What changed:   · medication strength  · how much to take  Notes to patient: Heart rate     dilTIAZem 60 MG tablet  Commonly known as: CARDIZEM  Take 1 tablet by mouth 4 times daily  What changed:   · medication strength  · how much to take  · additional instructions  Notes to patient: Heart rate/blood pressure     QUEtiapine 25 MG tablet  Commonly known as: SEROquel  Take 1 tablet by mouth nightly  What changed: additional instructions  Notes to patient: Sleep        CONTINUE taking these medications    busPIRone 5 MG tablet  Commonly known as: BUSPAR  Take 1 tablet by mouth 3 times daily  Notes to patient: anxiety     Melatonin 5 MG Caps  Notes to patient: Sleep     polyethylene glycol 17 g packet  Commonly known as: GLYCOLAX  Take 17 g by mouth daily  Notes to patient: Promotes bowel movements     TYLENOL ARTHRITIS PAIN PO  Notes to patient: For pain     vitamin B-6 50 MG tablet  Commonly known as: PYRIDOXINE  Notes to patient: Dietary supplement     VITAMIN D-3 PO  Notes to patient: Dietary/nutritional supplement     warfarin 5 MG tablet  Commonly known as: COUMADIN  Take as directed. If you are unsure how to take this medication, talk to your nurse or doctor. Original instructions: TAKE ONE TABLET BY MOUTH DAILY  Notes to patient: Anticoagulant        ASK your doctor about these medications    traMADol 50 MG tablet  Commonly known as: ULTRAM  Take 1 tablet by mouth every 4 hours as needed for Pain for up to 5 days. Ask about: Should I take this medication?            Where to Get Your Medications      These medications were sent to [address]  [address]    Phone: [phone]   · albuterol sulfate  (90 Base) MCG/ACT inhaler  · budesonide-formoterol 160-4.5 MCG/ACT Aero  · diclofenac sodium 1 % Gel  · Digoxin 187.5 MCG Tabs  · dilTIAZem 60 MG tablet  · famotidine 20 MG tablet  · furosemide 20 MG tablet  · metFORMIN 500 MG tablet  · predniSONE 5 MG tablet  · traMADol 50 MG tablet         I spent over 35 minutes on this discharge encounter between counseling, coordination of care, and medication reconciliation.   To comply with [facility] eligio R.II.4.1: Discharge order placed in advance to facilitate discharge planning with rehab team.     González Albright MD

## 2021-11-03 NOTE — TELEPHONE ENCOUNTER
Increase Lasix to 20 mg twice a day for 3 days only  Also recommend the following  Limit sodium to 2,000 milligrams (mg) a day or less (less than 1 teaspoon of salt a day), including all the salt you eat in cooking or in packaged foods. avoid processed foods (they are high in sodium)  Avoid fast food and restaurant meals because they tend to be very high in sodium. Fluid restriction:  2,000 milliliters (mL) of fluid a day. This is the equivalent of 2 quarts of fluid. Weigh self without clothing at the same time each day. Record  Weight and bring to office visits   Call  if you have a sudden weight gain, such as more than 2 to 3 pounds in a day or 5 pounds in a week. A sudden weight gain may mean that your heart failure is getting worse. If you smoke you should quit  Limit alcohol to 2 drinks a day for men and 1 drink a day for women. Avoid getting sick from colds and the flu. Get a pneumococcal vaccine and influenza vaccine.

## 2021-11-03 NOTE — TELEPHONE ENCOUNTER
----- Message from Ileana Pettit sent at 11/3/2021  2:11 PM EDT -----  Subject: Message to Provider    QUESTIONS  Information for Provider? Pt would like PCP to know that she had lost one   pound of water weight and that she will leave her appt on 11/17   ---------------------------------------------------------------------------  --------------  CALL BACK INFO  What is the best way for the office to contact you? OK to leave message on   voicemail  Preferred Call Back Phone Number? [phone]  ---------------------------------------------------------------------------  --------------  SCRIPT ANSWERS  Relationship to Patient?  Self

## 2021-11-03 NOTE — CARE COORDINATION
[facility] Transitions Follow Up Call    11/3/2021    Patient: Abimael Pelayo  Patient : 1940   MRN: [id number]  Reason for Admission: Covid SOB CHF   Discharge Date: 10/29/21 RARS: Readmission Risk Score: 21.5         Spoke with: Abimael Pelayo    CTN notified patient per SORAIDA Dye CNP recommends the following: \"Increase Lasix to 20 mg twice a day for 3 days only\". Also discussed salt and fluid restrictions. Patient verbalized understanding. Care Transitions Subsequent and Final Call    Subsequent and Final Calls  Are you currently active with any services?: Home Health  Care Transitions Interventions     Other Services: Completed   Other Interventions:            Follow Up  Future Appointments   Date Time Provider Allyssa Levine   2021 11:20 AM TURNER Packer - BOOKER DE PAZ, RN, [facility]  Care Transition Nurse  [phone] mobile

## 2021-11-05 ENCOUNTER — CARE COORDINATION (OUTPATIENT)
Dept: CASE MANAGEMENT | Age: 81
End: 2021-11-05

## 2021-11-05 ENCOUNTER — ANTI-COAG VISIT (OUTPATIENT)
Dept: FAMILY MEDICINE CLINIC | Age: 81
End: 2021-11-05

## 2021-11-05 DIAGNOSIS — I26.99 OTHER PULMONARY EMBOLISM WITHOUT ACUTE COR PULMONALE, UNSPECIFIED CHRONICITY (HCC): Primary | ICD-10-CM

## 2021-11-05 LAB — INR BLD: 5

## 2021-11-05 NOTE — CARE COORDINATION
[facility] Transitions Follow Up Call    2021    Patient: Nicolette Mathews  Patient : 1940   MRN: [id number]  Reason for Admission: pna covid  Discharge Date: 10/29/21 RARS: Readmission Risk Score: 21.5         Spoke with: Adalgisa Robb with patient who reported she is doing alright. Patient reported her weight today was 254 lbs and she reports she feels it is more actual weight gain versus water  Weight. Patient reported she spoke with NP SORAIDA Garcia today and patient to continue to monitor. Patient reported her breathing remains stable. CTN advised patient of use of urgent care or physicians 24 hr access line if assistance is needed after hours. Also advised that they can always contact their home care provider to request a nurse visit even if it isn't their regularly scheduled day for their nurse to visit. Care Transitions Subsequent and Final Call    Subsequent and Final Calls  Do you have any ongoing symptoms?: Yes  Patient-reported symptoms: Shortness of Breath  Have your medications changed?: No  Do you have any questions related to your medications?: No  Do you currently have any active services?: Yes  Are you currently active with any services?: Home Health  Do you have any needs or concerns that I can assist you with?: No  Identified Barriers: None  Care Transitions Interventions     Other Services: Completed   Other Interventions:            Follow Up  Future Appointments   Date Time Provider Allyssa Levine   2021 11:20 AM TURNER Aguirre CNP, [place], RN, [facility]  Care Transition Nurse  [phone] mobile

## 2021-11-08 ENCOUNTER — ANTI-COAG VISIT (OUTPATIENT)
Dept: FAMILY MEDICINE CLINIC | Age: 81
End: 2021-11-08

## 2021-11-08 DIAGNOSIS — I27.82 OTHER CHRONIC PULMONARY EMBOLISM WITHOUT ACUTE COR PULMONALE (HCC): Primary | ICD-10-CM

## 2021-11-08 LAB — INR BLD: 1.8

## 2021-11-08 NOTE — PROGRESS NOTES
INR slightly low. Take warfarin 7.5 mg tonight and then resume normal home dosage. Recheck on Friday.

## 2021-11-10 ENCOUNTER — CARE COORDINATION (OUTPATIENT)
Dept: CASE MANAGEMENT | Age: 81
End: 2021-11-10

## 2021-11-10 NOTE — CARE COORDINATION
[facility] Transitions Follow Up Call    11/10/2021    Patient: Patito Bass  Patient : 1940   MRN: [id number]  Reason for Admission: CHF PNA   Discharge Date: 10/29/21 RARS: Readmission Risk Score: 21.5         Spoke with: Adalgisa Robb with patient who reported she is doing alright but still continues to have sob. Patient reported her oxygen saturation remains between 93-96%. Patient reported her weight today was 253 lbs and she has apt with PCP on . Patient encouraged to reach out to the PCP sooner if breathing should worsen. CTN advised patient of use of urgent care or physicians 24 hr access line if assistance is needed after hours. Also advised that they can always contact their home care provider to request a nurse visit even if it isn't their regularly scheduled day for their nurse to visit. Care Transitions Subsequent and Final Call    Subsequent and Final Calls  Do you have any ongoing symptoms?: Yes  Patient-reported symptoms: Weakness, Shortness of Breath  Have your medications changed?: No  Do you have any questions related to your medications?: No  Do you currently have any active services?: Yes  Are you currently active with any services?: Home Health  Do you have any needs or concerns that I can assist you with?: No  Identified Barriers: None  Care Transitions Interventions     Other Services: Completed   Other Interventions:            Follow Up  Future Appointments   Date Time Provider Allyssa Levine   2021 11:20 AM TURNER Denny - CNP [place]  Angelica DE PAZ, RN, [facility]  Care Transition Nurse  [phone] mobile

## 2021-11-15 ENCOUNTER — ANTI-COAG VISIT (OUTPATIENT)
Dept: FAMILY MEDICINE CLINIC | Age: 81
End: 2021-11-15

## 2021-11-15 LAB — INR BLD: 3.9

## 2021-11-15 NOTE — PROGRESS NOTES
The patient should hold her Coumadin today.   Tomorrow she should take 2.5 mg of Coumadin and recheck her INR on Wednesday

## 2021-11-16 ENCOUNTER — TELEPHONE (OUTPATIENT)
Dept: OTHER | Facility: CLINIC | Age: 81
End: 2021-11-16

## 2021-11-16 ENCOUNTER — APPOINTMENT (OUTPATIENT)
Dept: GENERAL RADIOLOGY | Age: 81
DRG: 291 | End: 2021-11-16
Payer: MEDICARE

## 2021-11-16 ENCOUNTER — TELEPHONE (OUTPATIENT)
Dept: FAMILY MEDICINE CLINIC | Age: 81
End: 2021-11-16

## 2021-11-16 ENCOUNTER — HOSPITAL ENCOUNTER (INPATIENT)
Age: 81
LOS: 3 days | Discharge: HOME HEALTH | DRG: 291 | End: 2021-11-19
Attending: EMERGENCY MEDICINE | Admitting: HOSPITALIST
Payer: MEDICARE

## 2021-11-16 DIAGNOSIS — R06.09 DYSPNEA ON EXERTION: ICD-10-CM

## 2021-11-16 DIAGNOSIS — I48.91 ATRIAL FIBRILLATION WITH RVR (HCC): Primary | ICD-10-CM

## 2021-11-16 LAB
ANION GAP SERPL CALCULATED.3IONS-SCNC: 10 MMOL/L (ref 3–16)
BASOPHILS ABSOLUTE: 0.1 K/UL (ref 0–0.2)
BASOPHILS RELATIVE PERCENT: 0.8 %
BUN BLDV-MCNC: 9 MG/DL (ref 7–20)
CALCIUM SERPL-MCNC: 8.9 MG/DL (ref 8.3–10.6)
CHLORIDE BLD-SCNC: 100 MMOL/L (ref 99–110)
CO2: 33 MMOL/L (ref 21–32)
CREAT SERPL-MCNC: 0.6 MG/DL (ref 0.6–1.2)
D DIMER: 237 NG/ML DDU (ref 0–229)
EOSINOPHILS ABSOLUTE: 0 K/UL (ref 0–0.6)
EOSINOPHILS RELATIVE PERCENT: 0.3 %
GFR AFRICAN AMERICAN: >60
GFR NON-AFRICAN AMERICAN: >60
GLUCOSE BLD-MCNC: 163 MG/DL (ref 70–99)
GLUCOSE BLD-MCNC: 260 MG/DL (ref 70–99)
HCT VFR BLD CALC: 28.5 % (ref 36–48)
HEMOGLOBIN: 8.7 G/DL (ref 12–16)
INR BLD: 3.77 (ref 0.88–1.12)
LYMPHOCYTES ABSOLUTE: 4.7 K/UL (ref 1–5.1)
LYMPHOCYTES RELATIVE PERCENT: 45.1 %
MCH RBC QN AUTO: 23 PG (ref 26–34)
MCHC RBC AUTO-ENTMCNC: 30.4 G/DL (ref 31–36)
MCV RBC AUTO: 75.6 FL (ref 80–100)
MONOCYTES ABSOLUTE: 0.6 K/UL (ref 0–1.3)
MONOCYTES RELATIVE PERCENT: 6 %
NEUTROPHILS ABSOLUTE: 4.9 K/UL (ref 1.7–7.7)
NEUTROPHILS RELATIVE PERCENT: 47.8 %
PDW BLD-RTO: 19.3 % (ref 12.4–15.4)
PERFORMED ON: ABNORMAL
PLATELET # BLD: 424 K/UL (ref 135–450)
PMV BLD AUTO: 6 FL (ref 5–10.5)
POTASSIUM SERPL-SCNC: 3.6 MMOL/L (ref 3.5–5.1)
PRO-BNP: 1474 PG/ML (ref 0–449)
PROCALCITONIN: 0.08 NG/ML (ref 0–0.15)
PROTHROMBIN TIME: 44.1 SEC (ref 9.9–12.7)
RBC # BLD: 3.78 M/UL (ref 4–5.2)
SARS-COV-2, NAAT: NOT DETECTED
SODIUM BLD-SCNC: 143 MMOL/L (ref 136–145)
TROPONIN: <0.01 NG/ML
WBC # BLD: 10.3 K/UL (ref 4–11)

## 2021-11-16 PROCEDURE — 71045 X-RAY EXAM CHEST 1 VIEW: CPT

## 2021-11-16 PROCEDURE — 80048 BASIC METABOLIC PNL TOTAL CA: CPT

## 2021-11-16 PROCEDURE — 2500000003 HC RX 250 WO HCPCS: Performed by: EMERGENCY MEDICINE

## 2021-11-16 PROCEDURE — 2580000003 HC RX 258: Performed by: HOSPITALIST

## 2021-11-16 PROCEDURE — 85610 PROTHROMBIN TIME: CPT

## 2021-11-16 PROCEDURE — 96365 THER/PROPH/DIAG IV INF INIT: CPT

## 2021-11-16 PROCEDURE — 85025 COMPLETE CBC W/AUTO DIFF WBC: CPT

## 2021-11-16 PROCEDURE — 84484 ASSAY OF TROPONIN QUANT: CPT

## 2021-11-16 PROCEDURE — 96375 TX/PRO/DX INJ NEW DRUG ADDON: CPT

## 2021-11-16 PROCEDURE — 2700000000 HC OXYGEN THERAPY PER DAY

## 2021-11-16 PROCEDURE — 2500000003 HC RX 250 WO HCPCS: Performed by: HOSPITALIST

## 2021-11-16 PROCEDURE — 93005 ELECTROCARDIOGRAM TRACING: CPT | Performed by: EMERGENCY MEDICINE

## 2021-11-16 PROCEDURE — 36415 COLL VENOUS BLD VENIPUNCTURE: CPT

## 2021-11-16 PROCEDURE — 2580000003 HC RX 258: Performed by: EMERGENCY MEDICINE

## 2021-11-16 PROCEDURE — 94761 N-INVAS EAR/PLS OXIMETRY MLT: CPT

## 2021-11-16 PROCEDURE — 85379 FIBRIN DEGRADATION QUANT: CPT

## 2021-11-16 PROCEDURE — 83880 ASSAY OF NATRIURETIC PEPTIDE: CPT

## 2021-11-16 PROCEDURE — 2060000000 HC ICU INTERMEDIATE R&B

## 2021-11-16 PROCEDURE — 87635 SARS-COV-2 COVID-19 AMP PRB: CPT

## 2021-11-16 PROCEDURE — 6370000000 HC RX 637 (ALT 250 FOR IP): Performed by: EMERGENCY MEDICINE

## 2021-11-16 PROCEDURE — 99285 EMERGENCY DEPT VISIT HI MDM: CPT

## 2021-11-16 PROCEDURE — 6360000002 HC RX W HCPCS: Performed by: EMERGENCY MEDICINE

## 2021-11-16 PROCEDURE — 84145 PROCALCITONIN (PCT): CPT

## 2021-11-16 RX ORDER — ACETAMINOPHEN 325 MG/1
650 TABLET ORAL EVERY 6 HOURS PRN
Status: DISCONTINUED | OUTPATIENT
Start: 2021-11-16 | End: 2021-11-19 | Stop reason: HOSPADM

## 2021-11-16 RX ORDER — BUDESONIDE AND FORMOTEROL FUMARATE DIHYDRATE 160; 4.5 UG/1; UG/1
2 AEROSOL RESPIRATORY (INHALATION) 2 TIMES DAILY
Status: DISCONTINUED | OUTPATIENT
Start: 2021-11-17 | End: 2021-11-17

## 2021-11-16 RX ORDER — SODIUM CHLORIDE 0.9 % (FLUSH) 0.9 %
5-40 SYRINGE (ML) INJECTION EVERY 12 HOURS SCHEDULED
Status: DISCONTINUED | OUTPATIENT
Start: 2021-11-17 | End: 2021-11-19 | Stop reason: HOSPADM

## 2021-11-16 RX ORDER — SODIUM CHLORIDE 9 MG/ML
INJECTION, SOLUTION INTRAVENOUS CONTINUOUS
Status: DISCONTINUED | OUTPATIENT
Start: 2021-11-17 | End: 2021-11-17

## 2021-11-16 RX ORDER — FAMOTIDINE 20 MG/1
20 TABLET, FILM COATED ORAL 2 TIMES DAILY
Status: DISCONTINUED | OUTPATIENT
Start: 2021-11-17 | End: 2021-11-19 | Stop reason: HOSPADM

## 2021-11-16 RX ORDER — METHYLPREDNISOLONE SODIUM SUCCINATE 125 MG/2ML
125 INJECTION, POWDER, LYOPHILIZED, FOR SOLUTION INTRAMUSCULAR; INTRAVENOUS ONCE
Status: COMPLETED | OUTPATIENT
Start: 2021-11-16 | End: 2021-11-16

## 2021-11-16 RX ORDER — QUETIAPINE FUMARATE 25 MG/1
25 TABLET, FILM COATED ORAL NIGHTLY
Status: DISCONTINUED | OUTPATIENT
Start: 2021-11-17 | End: 2021-11-19 | Stop reason: HOSPADM

## 2021-11-16 RX ORDER — BUSPIRONE HYDROCHLORIDE 5 MG/1
5 TABLET ORAL 3 TIMES DAILY
Status: DISCONTINUED | OUTPATIENT
Start: 2021-11-17 | End: 2021-11-19 | Stop reason: HOSPADM

## 2021-11-16 RX ORDER — IPRATROPIUM BROMIDE AND ALBUTEROL SULFATE 2.5; .5 MG/3ML; MG/3ML
1 SOLUTION RESPIRATORY (INHALATION) EVERY 4 HOURS PRN
Status: DISCONTINUED | OUTPATIENT
Start: 2021-11-16 | End: 2021-11-17

## 2021-11-16 RX ORDER — VITAMIN B COMPLEX
2000 TABLET ORAL DAILY
Status: DISCONTINUED | OUTPATIENT
Start: 2021-11-17 | End: 2021-11-19 | Stop reason: HOSPADM

## 2021-11-16 RX ORDER — IPRATROPIUM BROMIDE AND ALBUTEROL SULFATE 2.5; .5 MG/3ML; MG/3ML
1 SOLUTION RESPIRATORY (INHALATION) ONCE
Status: DISCONTINUED | OUTPATIENT
Start: 2021-11-16 | End: 2021-11-19 | Stop reason: HOSPADM

## 2021-11-16 RX ORDER — DILTIAZEM HYDROCHLORIDE 5 MG/ML
10 INJECTION INTRAVENOUS ONCE
Status: COMPLETED | OUTPATIENT
Start: 2021-11-16 | End: 2021-11-16

## 2021-11-16 RX ORDER — ONDANSETRON 2 MG/ML
4 INJECTION INTRAMUSCULAR; INTRAVENOUS EVERY 6 HOURS PRN
Status: DISCONTINUED | OUTPATIENT
Start: 2021-11-16 | End: 2021-11-19 | Stop reason: HOSPADM

## 2021-11-16 RX ORDER — MECOBALAMIN 5000 MCG
5 TABLET,DISINTEGRATING ORAL NIGHTLY PRN
Status: DISCONTINUED | OUTPATIENT
Start: 2021-11-16 | End: 2021-11-19 | Stop reason: HOSPADM

## 2021-11-16 RX ORDER — DIGOXIN 125 MCG
125 TABLET ORAL DAILY
Status: DISCONTINUED | OUTPATIENT
Start: 2021-11-17 | End: 2021-11-17

## 2021-11-16 RX ORDER — 0.9 % SODIUM CHLORIDE 0.9 %
1000 INTRAVENOUS SOLUTION INTRAVENOUS ONCE
Status: DISCONTINUED | OUTPATIENT
Start: 2021-11-16 | End: 2021-11-17

## 2021-11-16 RX ORDER — SODIUM CHLORIDE 9 MG/ML
25 INJECTION, SOLUTION INTRAVENOUS PRN
Status: DISCONTINUED | OUTPATIENT
Start: 2021-11-16 | End: 2021-11-19 | Stop reason: HOSPADM

## 2021-11-16 RX ORDER — SODIUM CHLORIDE 0.9 % (FLUSH) 0.9 %
5-40 SYRINGE (ML) INJECTION PRN
Status: DISCONTINUED | OUTPATIENT
Start: 2021-11-16 | End: 2021-11-19 | Stop reason: HOSPADM

## 2021-11-16 RX ORDER — ONDANSETRON 4 MG/1
4 TABLET, ORALLY DISINTEGRATING ORAL EVERY 8 HOURS PRN
Status: DISCONTINUED | OUTPATIENT
Start: 2021-11-16 | End: 2021-11-19 | Stop reason: HOSPADM

## 2021-11-16 RX ORDER — POLYETHYLENE GLYCOL 3350 17 G/17G
17 POWDER, FOR SOLUTION ORAL DAILY
Status: DISCONTINUED | OUTPATIENT
Start: 2021-11-17 | End: 2021-11-19 | Stop reason: HOSPADM

## 2021-11-16 RX ORDER — LANOLIN ALCOHOL/MO/W.PET/CERES
50 CREAM (GRAM) TOPICAL DAILY
Status: DISCONTINUED | OUTPATIENT
Start: 2021-11-17 | End: 2021-11-19 | Stop reason: HOSPADM

## 2021-11-16 RX ORDER — ACETAMINOPHEN 650 MG/1
650 SUPPOSITORY RECTAL EVERY 6 HOURS PRN
Status: DISCONTINUED | OUTPATIENT
Start: 2021-11-16 | End: 2021-11-19 | Stop reason: HOSPADM

## 2021-11-16 RX ORDER — DEXTROSE MONOHYDRATE 50 MG/ML
100 INJECTION, SOLUTION INTRAVENOUS PRN
Status: DISCONTINUED | OUTPATIENT
Start: 2021-11-16 | End: 2021-11-19 | Stop reason: HOSPADM

## 2021-11-16 RX ORDER — BUPROPION HYDROCHLORIDE 150 MG/1
150 TABLET ORAL EVERY MORNING
Status: DISCONTINUED | OUTPATIENT
Start: 2021-11-17 | End: 2021-11-19 | Stop reason: HOSPADM

## 2021-11-16 RX ORDER — POLYETHYLENE GLYCOL 3350 17 G/17G
17 POWDER, FOR SOLUTION ORAL DAILY PRN
Status: DISCONTINUED | OUTPATIENT
Start: 2021-11-16 | End: 2021-11-19 | Stop reason: HOSPADM

## 2021-11-16 RX ORDER — IPRATROPIUM BROMIDE AND ALBUTEROL SULFATE 2.5; .5 MG/3ML; MG/3ML
2 SOLUTION RESPIRATORY (INHALATION) ONCE
Status: COMPLETED | OUTPATIENT
Start: 2021-11-16 | End: 2021-11-16

## 2021-11-16 RX ORDER — DEXTROSE MONOHYDRATE 25 G/50ML
12.5 INJECTION, SOLUTION INTRAVENOUS PRN
Status: DISCONTINUED | OUTPATIENT
Start: 2021-11-16 | End: 2021-11-19 | Stop reason: HOSPADM

## 2021-11-16 RX ORDER — NICOTINE POLACRILEX 4 MG
15 LOZENGE BUCCAL PRN
Status: DISCONTINUED | OUTPATIENT
Start: 2021-11-16 | End: 2021-11-19 | Stop reason: HOSPADM

## 2021-11-16 RX ORDER — PREDNISONE 1 MG/1
5 TABLET ORAL 2 TIMES DAILY
Status: DISCONTINUED | OUTPATIENT
Start: 2021-11-17 | End: 2021-11-19 | Stop reason: HOSPADM

## 2021-11-16 RX ADMIN — IPRATROPIUM BROMIDE AND ALBUTEROL SULFATE 2 AMPULE: .5; 3 SOLUTION RESPIRATORY (INHALATION) at 18:22

## 2021-11-16 RX ADMIN — DILTIAZEM HYDROCHLORIDE 5 MG/HR: 5 INJECTION INTRAVENOUS at 18:39

## 2021-11-16 RX ADMIN — METHYLPREDNISOLONE SODIUM SUCCINATE 125 MG: 125 INJECTION, POWDER, FOR SOLUTION INTRAMUSCULAR; INTRAVENOUS at 18:24

## 2021-11-16 RX ADMIN — DILTIAZEM HYDROCHLORIDE 15 MG/HR: 5 INJECTION INTRAVENOUS at 23:57

## 2021-11-16 RX ADMIN — DILTIAZEM HYDROCHLORIDE 10 MG: 5 INJECTION, SOLUTION INTRAVENOUS at 18:36

## 2021-11-16 ASSESSMENT — ENCOUNTER SYMPTOMS
NAUSEA: 0
VOICE CHANGE: 0
VOMITING: 0
FACIAL SWELLING: 0
STRIDOR: 0
TROUBLE SWALLOWING: 0
COLOR CHANGE: 0
WHEEZING: 0
SHORTNESS OF BREATH: 1
ABDOMINAL PAIN: 0

## 2021-11-16 NOTE — TELEPHONE ENCOUNTER
With these symptoms I do not believe patient should wait until tomorrow and should go to the emergency room

## 2021-11-16 NOTE — TELEPHONE ENCOUNTER
Dorinda with [address] called with some concerns regarding pt's. HR between 105-134  Increase in SOB the last 2 days  Weight is up 2 pounds, has some swelling  O2 between 88-90% on 4.5 liters  Pt has appt scheduled with PCP tomorrow, but believes pt should at least have chest xray and some labs done today. States pt is refusing to go to ER. Call pt first with recommendations [phone]  Call Pito Scott once pt is called [phone]  Routing to Dr. Skyla Cerrato due to PCP out of office today.

## 2021-11-16 NOTE — ED PROVIDER NOTES
[facility]  eMERGENCY dEPARTMENT eNCOUnter      Pt Name: Kristyn Cruz  MRN: [id number]  [place] 1940  Date of evaluation: 11/16/2021  Provider: Mark Layne MD    CHIEF COMPLAINT       Chief Complaint   Patient presents with    Shortness of Breath     Pt having SOB since yesterday. Pt wears 4 L O2 at home. Pt had COVID in September and has been having problems since. Pt gained 3lbs overnight. HISTORY OF PRESENT ILLNESS   (Location/Symptom, Timing/Onset, Context/Setting, Quality, Duration, Modifying Factors, Severity)  Note limiting factors. Kristyn Cruz is a 80 y.o. female with hx of diagnosis of COVID 2 months ago who is on 4 L of oxygen at baseline for pulmonary fibrosis who presents due to worsening of shortness of breath ever since he was discharged from the hospital with Covid but has continued to worsen. Patient has any fever or hemoptysis. She does report palpitations shortness of breath worse with exertion and better with rest.  Gallito symptoms are moderate constant and worsening. She denies any fever or chest pain. HPI    Nursing Notes were reviewed. REVIEW OFSYSTEMS    (2-9 systems for level 4, 10 or more for level 5)     Review of Systems   Constitutional: Positive for fatigue. Negative for appetite change, fever and unexpected weight change. HENT: Negative for facial swelling, trouble swallowing and voice change. Eyes: Negative for visual disturbance. Respiratory: Positive for shortness of breath. Negative for wheezing and stridor. Cardiovascular: Positive for palpitations. Negative for chest pain. Gastrointestinal: Negative for abdominal pain, nausea and vomiting. Genitourinary: Negative for dysuria and vaginal bleeding. Musculoskeletal: Negative for neck pain and neck stiffness. Skin: Negative for color change and wound. Neurological: Negative for seizures and syncope.    Psychiatric/Behavioral: Negative for self-injury and suicidal ideas. Except as noted above the remainder of the review of systems was reviewed and negative. PAST MEDICAL HISTORY     Past Medical History:   Diagnosis Date    Abnormal chest CT 05/09/2013    Abnormal CXR 02/19/2015    Acute exacerbation of chronic obstructive pulmonary disease (COPD) ([address]     Acute on chronic respiratory failure with hypoxia ([address] 01/02/2017    Anticoagulant long-term use     Atrial fibrillation (HCC)     Atrial fibrillation with rapid ventricular response (HCC) 06/23/2015    B-cell lymphoma ([address]     CAP (community acquired pneumonia) 01/29/2015    Carotid artery stenosis     CHF (congestive heart failure) (HCC)     Chronic back pain     Congenital heart disease     COVID-19 09/14/2021    Depression     Diverticulitis     Diverticulitis of colon     Dizziness 06/14/2020    Fistula     anal-vaginal fistula    Gram-negative pneumonia ([address]     HCAP (healthcare-associated pneumonia)     Hx of blood clots     Hyperlipidemia     Hypertension     IFG (impaired fasting glucose) 09/13/2018    Infection due to Stenotrophomonas maltophilia     Non-Hodgkins lymphoma (HCC)     Obesity     Panic disorder     Peripheral vascular disease ([address]     Primary osteoarthritis of both knees 12/02/2020    Pulmonary embolism ([address]     6/15    Rectal vaginal fistula     Restless legs syndrome     [facility] spotted fever 2013    Sciatic nerve pain     Sepsis ([address] 06/22/2015    Sleep apnea     Swelling of limb 01/05/2016    Tobacco abuse 06/22/2015    Urinary tract infection in female     Vitamin D deficiency          SURGICAL HISTORY       Past Surgical History:   Procedure Laterality Date    APPENDECTOMY  1963    BRONCHOSCOPY N/A 8/12/2019    EBUS WF W/ANES.  performed by Alden Aquino MD at [facility]  8/12/2019    BRONCHOSCOPY ALVEOLAR LAVAGE performed by Alden Aquino MD at [facility] 8/12/2019    BRONCHOSCOPY BRUSHINGS performed by Alden Aquino MD at [address]  8/12/2019    BRONCHOSCOPY/TRANSBRONCHIAL LUNG BIOPSY performed by Alden Aquino MD at [address]  8/12/2019    BRONCHOSCOPY/TRANSBRONCHIAL NEEDLE BIOPSY performed by Alden Aquino MD at [address]  8/12/2019    BRONCHOSCOPY/TRANSBRONCHIAL NEEDLE BIOPSY ADDL LOBE performed by Alden Aquino MD at [address] [place] [address]  2003    COLONOSCOPY N/A 9/25/2019    COLONOSCOPY POLYPECTOMY SNARE/COLD BIOPSY showed diverticulilosis performed by Starr Ratliff MD at [address] [facility]      umbilical-- Hontanosis    [address] N/A 1/17/2020    PORT INSERTION performed by Mara Solis MD at [facility] TUNNELED VENOUS PORT PLACEMENT Left 01/17/2020    Port Insertion ENRIQUETA Chest w. Dr Svetlana Monsivais 1/17/20 Vaccess CT Injectable [id number] lot [id number] 3/31/21         CURRENT MEDICATIONS       Previous Medications    ACETAMINOPHEN (TYLENOL ARTHRITIS PAIN PO)    Take by mouth    ALBUTEROL SULFATE HFA (VENTOLIN HFA) 108 (90 BASE) MCG/ACT INHALER    Inhale 2 puffs into the lungs every 6 hours as needed for Wheezing or Shortness of Breath    BUDESONIDE-FORMOTEROL (SYMBICORT) 160-4.5 MCG/ACT AERO    Inhale 2 puffs into the lungs 2 times daily    BUPROPION (WELLBUTRIN XL) 150 MG EXTENDED RELEASE TABLET    Take 1 tablet by mouth every morning    BUSPIRONE (BUSPAR) 5 MG TABLET    Take 1 tablet by mouth 3 times daily    CHOLECALCIFEROL (VITAMIN D-3 PO)    Take 2,000 Units by mouth daily     DICLOFENAC SODIUM (VOLTAREN) 1 % GEL    Apply 4 g topically 3 times daily    DIGOXIN 187.5 MCG TABS    Take 200 mcg by mouth daily    DILTIAZEM (CARDIZEM) 60 MG TABLET    Take 1 tablet by mouth 4 times daily    FAMOTIDINE (PEPCID) 20 MG TABLET    Take 1 tablet by mouth 2 times daily    FUROSEMIDE (LASIX) 20 MG TABLET    Take 1 tablet by mouth daily    MELATONIN 5 MG CAPS    Take 1 capsule by mouth nightly as needed    METFORMIN (GLUCOPHAGE) 500 MG TABLET    Take 1 tablet by mouth 2 times daily (with meals)    POLYETHYLENE GLYCOL (GLYCOLAX) 17 G PACKET    Take 17 g by mouth daily    PREDNISONE (DELTASONE) 5 MG TABLET    Take 1 tablet by mouth 2 times daily    QUETIAPINE (SEROQUEL) 25 MG TABLET    Take 1 tablet by mouth nightly    VITAMIN B-6 (PYRIDOXINE) 50 MG TABLET    Take by mouth daily    WARFARIN (COUMADIN) 5 MG TABLET    TAKE ONE TABLET BY MOUTH DAILY       ALLERGIES     Crestor [rosuvastatin], Levofloxacin, Lipitor, Rocephin in dextrose [ceftriaxone sodium in dextrose], Zocor [simvastatin], Codeine, and Pravastatin    FAMILY HISTORY       Family History   Problem Relation Age of Onset    Cancer Mother 43        lung cancer    Diabetes Sister     Cancer Daughter 43        lung cancer    Asthma Son     Hypertension Son     Asthma Grandchild     Hypertension Son     Emphysema Neg Hx     Heart Failure Neg Hx           SOCIAL HISTORY       Social History     Socioeconomic History    Marital status:      Spouse name: None    Number of children: 3    Years of education: None    Highest education level: None   Occupational History    Occupation: retired   Tobacco Use    Smoking status: Former Smoker     Packs/day: 0.33     Years: 50.00     Pack years: 16.50     Types: Cigarettes     Quit date: 2015     Years since quittin.4    Smokeless tobacco: Never Used   Vaping Use    Vaping Use: Never used   Substance and Sexual Activity    Alcohol use: Not Currently     Alcohol/week: 0.0 standard drinks    Drug use: No    Sexual activity: Not Currently   Other Topics Concern    None   Social History Narrative    None     Social Determinants of Health     Financial Resource Strain: Low Risk     Difficulty of Paying Living Expenses: Not hard at all   Food Insecurity: No Food Insecurity    Worried About Running Out of Food in the Last Year: Never true    Jordyn of Food in the Last Year: Never true   Transportation Needs: No Transportation Needs    Lack of Transportation (Medical): No    Lack of Transportation (Non-Medical): No   Physical Activity: Inactive    Days of Exercise per Week: 0 days    Minutes of Exercise per Session: 0 min   Stress:     Feeling of Stress : Not on file   Social Connections: Unknown    Frequency of Communication with Friends and Family: More than three times a week    Frequency of Social Gatherings with Friends and Family: More than three times a week    Attends Shinto Services: Not on file   [employer] or Organizations: Not on file    Attends Club or Organization Meetings: Not on file    Marital Status: Not on file   Intimate Partner Violence: Not At Risk    Fear of Current or Ex-Partner: No    Emotionally Abused: No    Physically Abused: No    Sexually Abused: No   Housing Stability: Unknown    Unable to Pay for Housing in the Last Year: No    Number of [place] in the Last Year: Not on file    Unstable Housing in the Last Year: No         PHYSICAL EXAM    (up to 7 for level 4, 8 or more for level 5)     ED Triage Vitals [11/16/21 1716]   BP Temp Temp Source Pulse Resp SpO2 Height Weight   127/60 98.5 °F (36.9 °C) Oral 90 21 95 % 5' 2\" (1.575 m) 256 lb (116.1 kg)       Physical Exam  Vitals and nursing note reviewed. Constitutional:       Appearance: She is well-developed. She is not diaphoretic. HENT:      Head: Normocephalic and atraumatic. Right Ear: External ear normal.      Left Ear: External ear normal.   Eyes:      Conjunctiva/sclera: Conjunctivae normal.   Neck:      Vascular: No JVD. Trachea: No tracheal deviation. Cardiovascular:      Rate and Rhythm: Tachycardia present. Rhythm irregular.    Pulmonary:      Comments: Mild reduced air movement bilaterally with scant expiratory wheezes  Abdominal:      General: There is no distension. Palpations: Abdomen is soft. Tenderness: There is no abdominal tenderness. There is no guarding or rebound. Musculoskeletal:         General: No tenderness or deformity. Normal range of motion. Cervical back: Neck supple. Skin:     General: Skin is warm and dry. Neurological:      Mental Status: She is alert. Cranial Nerves: No cranial nerve deficit. DIAGNOSTIC RESULTS     EKG:All EKG's are interpreted by the Emergency Department Physician who either signs or Co-signs this chart in the absence of a cardiologist.    The Ekg interpreted by me shows  atrial fibrillation with a rate of 110  Axis is   Normal  QTc is  422ms  Intervals and Durations are unremarkable. ST Segments: nonspecific changes  Nonspecific T wave changes in inferior leads new from previous EKG on 9/14/2021      RADIOLOGY:     Interpretation per the Radiologist below, if available at the time of this note:    XR CHEST PORTABLE   Final Result   Interval decreased consolidation in the right upper lobe, since 09/25/2021. Otherwise, stable pattern of airspace disease, consistent with COVID   pneumonia. Small bilateral pleural effusions. ED BEDSIDE ULTRASOUND:   Performed by ED Physician - none    LABS:  Labs Reviewed   CBC WITH AUTO DIFFERENTIAL - Abnormal; Notable for the following components:       Result Value    RBC 3.78 (*)     Hemoglobin 8.7 (*)     Hematocrit 28.5 (*)     MCV 75.6 (*)     MCH 23.0 (*)     MCHC 30.4 (*)     RDW 19.3 (*)     All other components within normal limits    Narrative:     Performed at:  [facility]. [facility]  [facility] [place] [address].  [address]   Phone [phone]   BASIC METABOLIC PANEL - Abnormal; Notable for the following components:    CO2 33 (*)     Glucose 163 (*)     All other components within normal limits    Narrative:     Performed at:  [facility]) [facility]  [address] [place] [address]. [place]   Phone [phone]   D-DIMER, QUANTITATIVE - Abnormal; Notable for the following components:    D-Dimer, Quant 237 (*)     All other components within normal limits    Narrative:     Performed at:  [facility]. [facility]  [address] [place] [address]. [place], [address]   Phone [phone] - Abnormal; Notable for the following components:    Pro-BNP 1,474 (*)     All other components within normal limits    Narrative:     Performed at:  [facility]. [facility]  [address] [place] [address]. [place], [address]   Phone ( - Abnormal; Notable for the following components:    Protime 44.1 (*)     INR 3.77 (*)     All other components within normal limits    Narrative:     Performed at:  [facility]. [facility]  [address] [place] [address]. [address]   Phone [phone]   TROPONIN    Narrative:     Performed at:  [facility]. [facility]  [address] [place] [address]. [employer] [address]   Phone [phone]       All otherlabs were within normal range or not returned as of this dictation. EMERGENCY DEPARTMENT COURSE and DIFFERENTIAL DIAGNOSIS/MDM:   Vitals:    Vitals:    11/16/21 1716   BP: 127/60   Pulse: 90   Resp: 21   Temp: 98.5 °F (36.9 °C)   TempSrc: Oral   SpO2: 95%   Weight: 256 lb (116.1 kg)   Height: 5' 2\" (1.575 m)         MDM  Patient is in atrial fibrillation with rate anywhere from 90-1 50 however he does have a prolonged phase of elevation above 110. Troponin is undetectable, there is no significant electrolyte abnormality, and D-dimer is only 237 which is below age-adjusted level for pulmonary embolism evaluation and furthermore patient has a supratherapeutic INR of 3.77 making pulmonary embolism unlikely at this time.   I primarily suspect the patient's symptoms are due to atrial fibrillation with rapid ventricular response and pulmonary fibrosis with recent Covid episode. I feel the patient is appropriate for IV steroids, breathing treatments, diltiazem drip, and admission. Patient is closely monitored throughout her stay in the emergency room with no acute worsening in clinical status. She agrees to plan for admission for atrial fibrillation. Patient's heart rate is continuously monitored given atrial fibrillation with need for diltiazem to keep heart rate below 110. Critical Care  Performed by: Jose M Murry MD  Authorized by: Jose M Murry MD     Critical care provider statement:     Critical care time (minutes):  35    Critical care time was exclusive of:  Separately billable procedures and treating other patients and teaching time    Critical care was necessary to treat or prevent imminent or life-threatening deterioration of the following conditions:  Circulatory failure, respiratory failure and shock    Critical care was time spent personally by me on the following activities:  Ordering and performing treatments and interventions, development of treatment plan with patient or surrogate, ordering and review of laboratory studies, discussions with consultants, ordering and review of radiographic studies, evaluation of patient's response to treatment, re-evaluation of patient's condition, pulse oximetry, examination of patient, review of old charts and obtaining history from patient or surrogate        FINAL IMPRESSION      1. Atrial fibrillation with RVR ([address]    2. Dyspnea on exertion          DISPOSITION/PLAN   DISPOSITION Decision To Admit 11/16/2021 06:08:25 PM        (Please note that portions of this note were completed with a voice recognition program.  Efforts were made to edit the dictations but occasionally words aremis-transcribed. )    Jose M Murry MD (electronically signed)  Attending Emergency Physician Belinda Dave MD  11/16/21 [id number]

## 2021-11-17 ENCOUNTER — CARE COORDINATION (OUTPATIENT)
Dept: CASE MANAGEMENT | Age: 81
End: 2021-11-17

## 2021-11-17 LAB
ANION GAP SERPL CALCULATED.3IONS-SCNC: 9 MMOL/L (ref 3–16)
BASOPHILS ABSOLUTE: 0 K/UL (ref 0–0.2)
BASOPHILS RELATIVE PERCENT: 0.5 %
BUN BLDV-MCNC: 9 MG/DL (ref 7–20)
CALCIUM SERPL-MCNC: 8.5 MG/DL (ref 8.3–10.6)
CHLORIDE BLD-SCNC: 101 MMOL/L (ref 99–110)
CO2: 32 MMOL/L (ref 21–32)
CREAT SERPL-MCNC: 0.6 MG/DL (ref 0.6–1.2)
EKG ATRIAL RATE: 106 BPM
EKG DIAGNOSIS: NORMAL
EKG Q-T INTERVAL: 312 MS
EKG QRS DURATION: 76 MS
EKG QTC CALCULATION (BAZETT): 422 MS
EKG R AXIS: -26 DEGREES
EKG T AXIS: 57 DEGREES
EKG VENTRICULAR RATE: 110 BPM
EOSINOPHILS ABSOLUTE: 0 K/UL (ref 0–0.6)
EOSINOPHILS RELATIVE PERCENT: 0 %
ESTIMATED AVERAGE GLUCOSE: 145.6 MG/DL
GFR AFRICAN AMERICAN: >60
GFR NON-AFRICAN AMERICAN: >60
GLUCOSE BLD-MCNC: 207 MG/DL (ref 70–99)
GLUCOSE BLD-MCNC: 215 MG/DL (ref 70–99)
GLUCOSE BLD-MCNC: 228 MG/DL (ref 70–99)
GLUCOSE BLD-MCNC: 228 MG/DL (ref 70–99)
GLUCOSE BLD-MCNC: 241 MG/DL (ref 70–99)
HBA1C MFR BLD: 6.7 %
HCT VFR BLD CALC: 27.6 % (ref 36–48)
HEMOGLOBIN: 8.2 G/DL (ref 12–16)
INR BLD: 3.54 (ref 0.88–1.12)
LACTIC ACID: 1.6 MMOL/L (ref 0.4–2)
LYMPHOCYTES ABSOLUTE: 1.4 K/UL (ref 1–5.1)
LYMPHOCYTES RELATIVE PERCENT: 26.2 %
MCH RBC QN AUTO: 22.6 PG (ref 26–34)
MCHC RBC AUTO-ENTMCNC: 29.7 G/DL (ref 31–36)
MCV RBC AUTO: 76.1 FL (ref 80–100)
MONOCYTES ABSOLUTE: 0 K/UL (ref 0–1.3)
MONOCYTES RELATIVE PERCENT: 0.8 %
NEUTROPHILS ABSOLUTE: 4 K/UL (ref 1.7–7.7)
NEUTROPHILS RELATIVE PERCENT: 72.5 %
PDW BLD-RTO: 19.7 % (ref 12.4–15.4)
PERFORMED ON: ABNORMAL
PLATELET # BLD: 392 K/UL (ref 135–450)
PMV BLD AUTO: 6.4 FL (ref 5–10.5)
POTASSIUM REFLEX MAGNESIUM: 4.1 MMOL/L (ref 3.5–5.1)
PROTHROMBIN TIME: 42.2 SEC (ref 9.9–12.7)
RBC # BLD: 3.63 M/UL (ref 4–5.2)
SODIUM BLD-SCNC: 142 MMOL/L (ref 136–145)
TROPONIN: <0.01 NG/ML
TROPONIN: <0.01 NG/ML
WBC # BLD: 5.5 K/UL (ref 4–11)

## 2021-11-17 PROCEDURE — 84484 ASSAY OF TROPONIN QUANT: CPT

## 2021-11-17 PROCEDURE — 97535 SELF CARE MNGMENT TRAINING: CPT

## 2021-11-17 PROCEDURE — 6370000000 HC RX 637 (ALT 250 FOR IP): Performed by: INTERNAL MEDICINE

## 2021-11-17 PROCEDURE — 6370000000 HC RX 637 (ALT 250 FOR IP): Performed by: HOSPITALIST

## 2021-11-17 PROCEDURE — 97116 GAIT TRAINING THERAPY: CPT

## 2021-11-17 PROCEDURE — 2700000000 HC OXYGEN THERAPY PER DAY

## 2021-11-17 PROCEDURE — 85025 COMPLETE CBC W/AUTO DIFF WBC: CPT

## 2021-11-17 PROCEDURE — 80048 BASIC METABOLIC PNL TOTAL CA: CPT

## 2021-11-17 PROCEDURE — 99223 1ST HOSP IP/OBS HIGH 75: CPT | Performed by: INTERNAL MEDICINE

## 2021-11-17 PROCEDURE — 83605 ASSAY OF LACTIC ACID: CPT

## 2021-11-17 PROCEDURE — 93010 ELECTROCARDIOGRAM REPORT: CPT | Performed by: INTERNAL MEDICINE

## 2021-11-17 PROCEDURE — 94640 AIRWAY INHALATION TREATMENT: CPT

## 2021-11-17 PROCEDURE — 6360000002 HC RX W HCPCS: Performed by: INTERNAL MEDICINE

## 2021-11-17 PROCEDURE — 97161 PT EVAL LOW COMPLEX 20 MIN: CPT

## 2021-11-17 PROCEDURE — 2580000003 HC RX 258: Performed by: HOSPITALIST

## 2021-11-17 PROCEDURE — 94761 N-INVAS EAR/PLS OXIMETRY MLT: CPT

## 2021-11-17 PROCEDURE — 97530 THERAPEUTIC ACTIVITIES: CPT

## 2021-11-17 PROCEDURE — 36415 COLL VENOUS BLD VENIPUNCTURE: CPT

## 2021-11-17 PROCEDURE — 2500000003 HC RX 250 WO HCPCS: Performed by: HOSPITALIST

## 2021-11-17 PROCEDURE — 97166 OT EVAL MOD COMPLEX 45 MIN: CPT

## 2021-11-17 PROCEDURE — 83036 HEMOGLOBIN GLYCOSYLATED A1C: CPT

## 2021-11-17 PROCEDURE — 2060000000 HC ICU INTERMEDIATE R&B

## 2021-11-17 PROCEDURE — 85610 PROTHROMBIN TIME: CPT

## 2021-11-17 RX ORDER — BUDESONIDE AND FORMOTEROL FUMARATE DIHYDRATE 160; 4.5 UG/1; UG/1
2 AEROSOL RESPIRATORY (INHALATION) 2 TIMES DAILY
Status: DISCONTINUED | OUTPATIENT
Start: 2021-11-17 | End: 2021-11-19 | Stop reason: HOSPADM

## 2021-11-17 RX ORDER — FUROSEMIDE 10 MG/ML
40 INJECTION INTRAMUSCULAR; INTRAVENOUS ONCE
Status: COMPLETED | OUTPATIENT
Start: 2021-11-17 | End: 2021-11-17

## 2021-11-17 RX ORDER — IPRATROPIUM BROMIDE AND ALBUTEROL SULFATE 2.5; .5 MG/3ML; MG/3ML
1 SOLUTION RESPIRATORY (INHALATION) EVERY 6 HOURS PRN
Status: DISCONTINUED | OUTPATIENT
Start: 2021-11-17 | End: 2021-11-19 | Stop reason: HOSPADM

## 2021-11-17 RX ORDER — DIGOXIN 125 MCG
187.5 TABLET ORAL DAILY
Status: DISCONTINUED | OUTPATIENT
Start: 2021-11-18 | End: 2021-11-19 | Stop reason: HOSPADM

## 2021-11-17 RX ORDER — DILTIAZEM HYDROCHLORIDE 60 MG/1
60 TABLET, FILM COATED ORAL EVERY 6 HOURS SCHEDULED
Status: DISCONTINUED | OUTPATIENT
Start: 2021-11-17 | End: 2021-11-18

## 2021-11-17 RX ADMIN — DILTIAZEM HYDROCHLORIDE 60 MG: 60 TABLET, FILM COATED ORAL at 18:10

## 2021-11-17 RX ADMIN — Medication 2 PUFF: at 07:12

## 2021-11-17 RX ADMIN — ACETAMINOPHEN 650 MG: 325 TABLET ORAL at 08:53

## 2021-11-17 RX ADMIN — Medication 2 PUFF: at 19:45

## 2021-11-17 RX ADMIN — DICLOFENAC SODIUM 4 G: 10 GEL TOPICAL at 08:54

## 2021-11-17 RX ADMIN — DIGOXIN 125 MCG: 125 TABLET ORAL at 08:47

## 2021-11-17 RX ADMIN — INSULIN LISPRO 2 UNITS: 100 INJECTION, SOLUTION INTRAVENOUS; SUBCUTANEOUS at 12:39

## 2021-11-17 RX ADMIN — DILTIAZEM HYDROCHLORIDE 5 MG/HR: 5 INJECTION INTRAVENOUS at 07:35

## 2021-11-17 RX ADMIN — INSULIN LISPRO 1 UNITS: 100 INJECTION, SOLUTION INTRAVENOUS; SUBCUTANEOUS at 20:47

## 2021-11-17 RX ADMIN — PREDNISONE 5 MG: 5 TABLET ORAL at 20:45

## 2021-11-17 RX ADMIN — PREDNISONE 5 MG: 5 TABLET ORAL at 08:47

## 2021-11-17 RX ADMIN — FAMOTIDINE 20 MG: 20 TABLET, FILM COATED ORAL at 20:45

## 2021-11-17 RX ADMIN — INSULIN LISPRO 2 UNITS: 100 INJECTION, SOLUTION INTRAVENOUS; SUBCUTANEOUS at 18:11

## 2021-11-17 RX ADMIN — FUROSEMIDE 40 MG: 40 INJECTION, SOLUTION INTRAMUSCULAR; INTRAVENOUS at 12:38

## 2021-11-17 RX ADMIN — INSULIN LISPRO 2 UNITS: 100 INJECTION, SOLUTION INTRAVENOUS; SUBCUTANEOUS at 08:54

## 2021-11-17 RX ADMIN — Medication 10 ML: at 08:47

## 2021-11-17 RX ADMIN — QUETIAPINE FUMARATE 25 MG: 25 TABLET ORAL at 20:45

## 2021-11-17 RX ADMIN — INSULIN LISPRO 2 UNITS: 100 INJECTION, SOLUTION INTRAVENOUS; SUBCUTANEOUS at 00:38

## 2021-11-17 RX ADMIN — PREDNISONE 5 MG: 5 TABLET ORAL at 00:38

## 2021-11-17 RX ADMIN — BUPROPION HYDROCHLORIDE 150 MG: 150 TABLET, EXTENDED RELEASE ORAL at 08:47

## 2021-11-17 RX ADMIN — Medication 2000 UNITS: at 08:46

## 2021-11-17 RX ADMIN — DICLOFENAC SODIUM 4 G: 10 GEL TOPICAL at 16:02

## 2021-11-17 RX ADMIN — DICLOFENAC SODIUM 4 G: 10 GEL TOPICAL at 20:47

## 2021-11-17 RX ADMIN — PYRIDOXINE HCL TAB 50 MG 50 MG: 50 TAB at 08:47

## 2021-11-17 RX ADMIN — FAMOTIDINE 20 MG: 20 TABLET, FILM COATED ORAL at 00:38

## 2021-11-17 RX ADMIN — DILTIAZEM HYDROCHLORIDE 60 MG: 60 TABLET, FILM COATED ORAL at 12:38

## 2021-11-17 RX ADMIN — BUSPIRONE HYDROCHLORIDE 5 MG: 5 TABLET ORAL at 16:03

## 2021-11-17 RX ADMIN — QUETIAPINE FUMARATE 25 MG: 25 TABLET ORAL at 00:38

## 2021-11-17 RX ADMIN — FAMOTIDINE 20 MG: 20 TABLET, FILM COATED ORAL at 08:47

## 2021-11-17 RX ADMIN — Medication 10 ML: at 20:46

## 2021-11-17 RX ADMIN — POLYETHYLENE GLYCOL (3350) 17 G: 17 POWDER, FOR SOLUTION ORAL at 08:47

## 2021-11-17 RX ADMIN — BUSPIRONE HYDROCHLORIDE 5 MG: 5 TABLET ORAL at 20:45

## 2021-11-17 RX ADMIN — BUSPIRONE HYDROCHLORIDE 5 MG: 5 TABLET ORAL at 08:47

## 2021-11-17 ASSESSMENT — PAIN DESCRIPTION - PROGRESSION
CLINICAL_PROGRESSION: NOT CHANGED

## 2021-11-17 ASSESSMENT — PAIN SCALES - GENERAL: PAINLEVEL_OUTOF10: 8

## 2021-11-17 NOTE — PROGRESS NOTES
Shift assessment completed. See flow sheet. Medications given. Patient is A&O x4. Patient is very sleepy, but will awaken and answer questions before falling right back to sleep. Patient denies further needs at this time. Call light within reach. Will continue to monitor.

## 2021-11-17 NOTE — PROGRESS NOTES
Inpatient Physical Therapy Evaluation and Treatment    Unit: PCU  Date:  11/17/2021  Patient Name:    Booker Zepeda  Admitting diagnosis:  Dyspnea on exertion [R06.00]  Atrial fibrillation with rapid ventricular response ([address] [I48.91]  Atrial fibrillation with RVR ([address] [I48.91]  Admit Date:  11/16/2021  Precautions/Restrictions/WB Status/ Lines/ Wounds/ Oxygen: Fall risk, Bed/chair alarm, Lines -IV and Supplemental O2 (5L/min) and Telemetry    Treatment Time:  1437 - 1521  Treatment Number:  1   Timed Code Treatment Minutes: 34 minutes  Total Treatment Minutes:  44  minutes    Patient Goals for Therapy: \" Go home \"          Discharge Recommendations: Home 24 hr assist and with home PT   DME needs for discharge: Needs Met       Therapy recommendation for EMS Transport: can transport by wheelchair    Therapy recommendations for staff:   Stand by assist with use of rolling walker (RW) and gait belt for all transfers and ambulation to/from [facility]  to/from [facility]    History of Present Illness: H & P as per Carlos Manuel Daniels MD's note dated 11/16/2021  Booker Zepeda is a 80 y.o. female with hx of diagnosis of COVID 2 months ago who is on 4 L of oxygen at baseline for pulmonary fibrosis who presents due to worsening of shortness of breath ever since he was discharged from the hospital with Covid but has continued to worsen. Patient has any fever or hemoptysis. She does report palpitations shortness of breath worse with exertion and better with rest.  Gallito symptoms are moderate constant and worsening. She denies any fever or chest pain. FINAL IMPRESSION       1. Atrial fibrillation with RVR (HCC)    2. Dyspnea on exertion         Home Health S4 Level Recommendation:  Level 3 Safety  AM-PAC Mobility Score    AM-PAC Inpatient Mobility Raw Score : 21       Preadmission Environment    Pt.  Lives Alone and 24/7 Assist Available   Home environment:    mobile home/trailer  Steps to enter first floor:   3 steps to enter with HRs  Bathroom:       Tub/Shower unit, Grab bars, Tub Transfer Bench and Raised toilet seat w/o arms  Equipment owned:      SPC, Rollator , lift chair, home O2 (4.5 L) continuous and pulse ox      Preadmission Status / PLOF:  History of falls             No  Pt. Able to drive          Yes  Pt Fully independent with ADL's         Yes  Pt. Required assistance from family for:  Independent PTA    Pt. Fully independent for transfers and gait and walked with: Rollator    Pain   Yes  Location: L hip and knee  Ratin /10  Pain Medicine Status: Received pain med prior to tx    Cognition    A&O x4   Able to follow 2 step commands    Subjective  Patient lying supine in bed with no family present. Pt agreeable to this PT eval & tx. Upper Extremity ROM/Strength  Please see OT evaluation. Lower Extremity ROM / Strength   AROM WFL: Yes  ROM limitations: N/A    Strength Assessment (measured on a 0-5 scale):  R LE   Quad   5   Ant Tib  5   Hamstring 5   Iliopsoas 5  L LE  Quad   5   Ant Tib  5   Hamstring 5   Iliopsoas 5    Lower Extremity Sensation    WFL    Lower Extremity Proprioception:   WFL    Coordination and Tone  WFL    Balance  Sitting:  Normal; Modified Independent  Comments:     Standing: Fair +; SBA  Comments: 3 min    Bed Mobility   Supine to Sit:    Modified Independent  Sit to Supine:   Not Tested  Rolling:   Not Tested  Scooting in sitting: Modified Independent  Scooting in supine:  Not Tested    Transfer Training     Sit to stand:   SBA  Stand to sit:   SBA  Bed to Chair:   SBA with use of gait belt and rolling walker (RW)    Gait gait completed as indicated below  Distance:      40 ft  Deviations (firm surface/linoleum):  decreased jaimie, increased GIOVANI and forward flexed posture  Assistive Device Used:    gait belt and rolling walker (RW)  Level of Assist:    SBA  Comment: Patient was limited in walking distance due to fatigue.      Stair Training Patient declined to participate in stair ambulation and reported that she did not have any trouble to ambulate 2 steps to get in the house. Patient uses cane and HR to perform stair ambulation. Activity Tolerance   Pt completed therapy session with SOB noted with all functional activity. Patient recovered to baseline within 2 min with good activity pacing. SpO2: 95% on 5L/min of O2, After ambulation dropped to 88% and recovered to 93% within 2 min  HR: 111 at rest, with activity 146 bpm quickly recovered to baseline within 3 min with deep breathing exercises. RN made aware about treatment response. Positioning Needs   Pt reclined in chair, alarm set, positioned in proper neutral alignment and pressure relief provided. Call light provided and all needs within reach    Exercises Initiated  all completed bilaterally unless indicated  Ankle Pumps x 10 reps    Other  None. Patient/Family Education   Pt educated on role of inpatient PT, POC, importance of continued activity, DC recommendations, safety awareness, transfer techniques, pursed lip breathing, energy conservation, pacing activity and calling for assist with mobility. Assessment  Pt seen for Physical Therapy evaluation in acute care setting. Pt demonstrated decreased Activity tolerance, Balance, ROM, Safety and Strength as well as decreased independence with Ambulation, Bed Mobility  and Transfers. Recommending Home 24 hr assist and with home PT upon discharge as patient functioning close to baseline level and would benefit from continued therapy services    Goals : To be met in 3 visits:  1). Independent with LE Ex x 10 reps    To be met in 6 visits:  1). Supine to/from sit: Independent  2). Sit to/from stand: Modified Independent  3). Bed to chair: Modified Independent  4). Gait: Ambulate 50 ft.  with  Modified Independent and use of LRAD (least restrictive assistive device)  5). Tolerate B LE exercises 3 sets of 10-15 reps  6).   Ascend/descend 2 steps with Supervision with use of hand rail bilateral and LRAD (least restrictive assistive device)    Rehabilitation Potential: Good  Strengths for achieving goals include:   Pt motivated, PLOF, Family Support and Pt cooperative   Barriers to achieving goals include:    Complexity of condition    Plan    To be seen 2-3 x / week  while in acute care setting for therapeutic exercises, bed mobility, transfers, progressive gait training, balance training, and family/patient education. Signature: Tara Moran MS PT, # [id number]    If patient discharges from this facility prior to next visit, this note will serve as the Discharge Summary.

## 2021-11-17 NOTE — H&P
Hospital Medicine History & Physical      PCP: TURNER Yee - CNP    Date of Admission: 11/16/2021    Date of Service: Pt seen/examined on 11/17/2021     Chief Complaint:    Chief Complaint   Patient presents with    Shortness of Breath     Pt having SOB since yesterday. Pt wears 4 L O2 at home. Pt had COVID in September and has been having problems since. Pt gained 3lbs overnight. History of Present Illness: The patient is a 80 y.o. female with COVID 23 in Sept 2021, COPD, A fib, dCHF, h/o PE, h/o anovaginal fistula, morbid obesity  who presented to [facility] ED with complaint of shortness of breath. She was discharged to the ARU at [facility] after her Sept 2021 admit for COVID 19- since returning home from ARU she reports that she has had continued worsening of her dyspnea. Denies fever or hemoptysis. No chest pain but has pedal edema. Compliant with chronic prednisone and 4.5 L o2 . Found to be in a fib RVR with fluid overload.   Admitted for further care     Past Medical History:        Diagnosis Date    Abnormal chest CT 05/09/2013    Abnormal CXR 02/19/2015    Acute exacerbation of chronic obstructive pulmonary disease (COPD) ([address]     Acute on chronic respiratory failure with hypoxia (HCC) 01/02/2017    Anticoagulant long-term use     Atrial fibrillation (HCC)     Atrial fibrillation with rapid ventricular response ([address] 06/23/2015    B-cell lymphoma ([address]     CAP (community acquired pneumonia) 01/29/2015    Carotid artery stenosis     CHF (congestive heart failure) (HCC)     Chronic back pain     Congenital heart disease     COVID-19 09/14/2021    Depression     Diverticulitis     Diverticulitis of colon     Dizziness 06/14/2020    Fistula     anal-vaginal fistula    Gram-negative pneumonia ([address]     HCAP (healthcare-associated pneumonia)     Hx of blood clots     Hyperlipidemia     Hypertension     IFG (impaired fasting glucose) 09/13/2018    Infection due to Stenotrophomonas maltophilia     Non-Hodgkins lymphoma (HCC)     Obesity     Panic disorder     Peripheral vascular disease (HCC)     Primary osteoarthritis of both knees 12/02/2020    Pulmonary embolism ([facility]     6/15    Rectal vaginal fistula     Restless legs syndrome     [facility] spotted fever 2013    Sciatic nerve pain     Sepsis ([facility] 06/22/2015    Sleep apnea     Swelling of limb 01/05/2016    Tobacco abuse 06/22/2015    Urinary tract infection in female     Vitamin D deficiency        Past Surgical History:        Procedure Laterality Date    APPENDECTOMY  1963    BRONCHOSCOPY N/A 8/12/2019    EBUS WF W/ANES.  performed by Sidra Paz MD at [address] [place] [address]  8/12/2019    BRONCHOSCOPY ALVEOLAR LAVAGE performed by Sidra Paz MD at [address] [place] [address]  8/12/2019    BRONCHOSCOPY BRUSHINGS performed by Sidra Paz MD at [address] [place] [address]  8/12/2019    BRONCHOSCOPY/TRANSBRONCHIAL LUNG BIOPSY performed by Sidra Paz MD at [address] [place] [address]  8/12/2019    BRONCHOSCOPY/TRANSBRONCHIAL NEEDLE BIOPSY performed by Sidra Paz MD at [address] [place] [address]  8/12/2019    BRONCHOSCOPY/TRANSBRONCHIAL NEEDLE BIOPSY ADDL LOBE performed by Sidra Paz MD at [address] [place] [address]  2003    COLONOSCOPY N/A 9/25/2019    COLONOSCOPY POLYPECTOMY SNARE/COLD BIOPSY showed diverticulilosis performed by Jeanne Loco MD at [address] [place] [address]      umbilical-- [facility] N/A 1/17/2020    PORT INSERTION performed by Jaun Barajas MD at [facility] TUNNELED VENOUS PORT PLACEMENT Left 01/17/2020    Port Insertion ENRIQUETA Chest w. Dr Man Dire 1/17/20 Vaccess CT Injectable [id number] lot [id number] 3/31/21       Medications Prior to Admission:    Prior to Admission medications    Medication Sig Start Date End Date Taking?  Authorizing Provider   albuterol sulfate HFA (VENTOLIN HFA) 108 (90 Base) MCG/ACT inhaler Inhale 2 puffs into the lungs every 6 hours as needed for Wheezing or Shortness of Breath 10/27/21  Yes Darcy Sparks MD   budesonide-formoterol [facility]) 160-4.5 MCG/ACT AERO Inhale 2 puffs into the lungs 2 times daily 10/27/21  Yes Darcy Sparks MD   dilTIAZem (CARDIZEM) 60 MG tablet Take 1 tablet by mouth 4 times daily 10/27/21  Yes Darcy Sparks MD   digoxin 187.5 MCG TABS Take 200 mcg by mouth daily  Patient taking differently: Take 125 mcg by mouth daily Take 1!/2 tablets daily 10/28/21  Yes Darcy Sparks MD   diclofenac sodium (VOLTAREN) 1 % GEL Apply 4 g topically 3 times daily 10/27/21  Yes Darcy Sparks MD   busPIRone (BUSPAR) 5 MG tablet Take 1 tablet by mouth 3 times daily 9/29/21  Yes Britni Henry PA-C   warfarin (COUMADIN) 5 MG tablet TAKE ONE TABLET BY MOUTH DAILY 6/24/21  Yes Clifford Simeon APRN - CNP   QUEtiapine (SEROQUEL) 25 MG tablet Take 1 tablet by mouth nightly  Patient taking differently: Take 25 mg by mouth nightly As needed 6/24/21  Yes Clifford Simeon APRN - CNP   buPROPion (WELLBUTRIN XL) 150 MG extended release tablet Take 1 tablet by mouth every morning  Patient taking differently: Take 150 mg by mouth every morning Takes as needed 6/24/21  Yes Clifford Simeon APRN - CNP   polyethylene glycol (GLYCOLAX) 17 g packet Take 17 g by mouth daily 10/23/20  Yes Clifford Simeon APRN - CNP   vitamin B-6 (PYRIDOXINE) 50 MG tablet Take by mouth daily   Yes Historical Provider, MD   Cholecalciferol (VITAMIN D-3 PO) Take 2,000 Units by mouth daily    Yes Historical Provider, MD   metFORMIN (GLUCOPHAGE) 500 MG tablet Take 1 tablet by mouth 2 times daily (with meals) 10/29/21   Darcy Sparks MD   predniSONE (DELTASONE) 5 MG tablet Take 1 tablet by mouth 2 times daily 10/27/21 11/26/21  Darcy Sparks MD   furosemide (LASIX) 20 MG tablet Take 1 tablet by mouth daily 10/28/21   Nina Lopez MD   famotidine (PEPCID) 20 MG tablet Take 1 tablet by mouth 2 times daily 10/27/21   Nina Lopez MD   Melatonin 5 MG CAPS Take 1 capsule by mouth nightly as needed    Historical Provider, MD   Acetaminophen (TYLENOL ARTHRITIS PAIN PO) Take by mouth    Historical Provider, MD       Allergies:  Crestor [rosuvastatin], Levofloxacin, Lipitor, Rocephin in dextrose [ceftriaxone sodium in dextrose], Zocor [simvastatin], Codeine, and Pravastatin    Social History:  The patient currently lives with family at home     TOBACCO:   reports that she quit smoking about 6 years ago. Her smoking use included cigarettes. She has a 16.50 pack-year smoking history. She has never used smokeless tobacco.  ETOH:   reports previous alcohol use. Family History:   Positive as follows:        Problem Relation Age of Onset    Cancer Mother 43        lung cancer    Diabetes Sister     Cancer Daughter 43        lung cancer    Asthma Son     Hypertension Son     Asthma Grandchild     Hypertension Son     Emphysema Neg Hx     Heart Failure Neg Hx        REVIEW OF SYSTEMS:       Constitutional: Negative for fever   HENT: Negative for sore throat   Eyes: Negative for redness   Respiratory: +ve  for dyspnea, cough   Cardiovascular: Negative for chest pain   Gastrointestinal: Negative for vomiting, diarrhea   Genitourinary: Negative for hematuria   Musculoskeletal: Negative for arthralgias   Skin: Negative for rash   Neurological: Negative for syncope   Hematological: Negative for adenopathy   Psychiatric/Behavorial: Negative for anxiety    PHYSICAL EXAM:    BP (!) 142/56   Pulse 98   Temp 97.4 °F (36.3 °C) (Oral)   Resp 18   Ht 5' 2\" (1.575 m)   Wt 275 lb 4 oz (124.9 kg)   LMP  (LMP Unknown)   SpO2 94%   BMI 50.34 kg/m²         General:  Elderly female up in bed  [place]  Awake, alert and oriented.  Appears to be not in any distress  Mucous Membranes:  Pink , anicteric  Neck: No JVD, no carotid bruit, no thyromegaly  Chest:  Clear to auscultation bilaterally, no added sounds  Cardiovascular:  Irregular  S1S2 heard, no murmurs or gallops  Abdomen:  Soft, undistended, non tender, no organomegaly, BS present  Extremities: 2+ edema to both LE . Distal pulses well felt  Neurological : grossly normal with mild [place]        CBC:   Recent Labs     11/16/21 1737 11/17/21  0401   WBC 10.3 5.5   HGB 8.7* 8.2*   HCT 28.5* 27.6*   MCV 75.6* 76.1*    392     BMP:   Recent Labs     11/16/21 1737 11/17/21  0401    142   K 3.6 4.1    101   CO2 33* 32   BUN 9 9   CREATININE 0.6 0.6     LIVER PROFILE: No results for input(s): AST, ALT, LIPASE, BILIDIR, BILITOT, ALKPHOS in the last 72 hours. Invalid input(s): AMYLASE,  ALB  PT/INR:   Recent Labs     11/15/21  0000 11/16/21 1737 11/17/21  0401   PROTIME  --  44.1* 42.2*   INR 3.90 3.77* 3.54*     APTT: No results for input(s): APTT in the last 72 hours. UA:No results for input(s): NITRITE, COLORU, PHUR, LABCAST, WBCUA, RBCUA, MUCUS, TRICHOMONAS, YEAST, BACTERIA, CLARITYU, SPECGRAV, LEUKOCYTESUR, UROBILINOGEN, BILIRUBINUR, BLOODU, GLUCOSEU, AMORPHOUS in the last 72 hours.     Invalid input(s): [place] Messing       CARDIAC ENZYMES  Recent Labs     11/16/21 1737 11/17/21  0002 11/17/21  0401   TROPONINI <0.01 <0.01 <0.01       U/A:    Lab Results   Component Value Date    NITRITE negative 05/01/2017    COLORU Yellow 10/10/2020    WBCUA 3-5 10/10/2020    RBCUA 3-4 10/10/2020    BACTERIA 4+ 10/10/2020    CLARITYU CLOUDY 10/10/2020    SPECGRAV 1.010 10/10/2020    LEUKOCYTESUR LARGE 10/10/2020    BLOODU SMALL 10/10/2020    GLUCOSEU Negative 10/10/2020    AMORPHOUS 4+ 09/01/2016       ABG    Lab Results   Component Value Date    OKF7QBP 33.1 09/14/2021    BEART 6.3 09/14/2021    N9MZRFLT 99.8 09/14/2021    PHART 7.359 09/14/2021    THGBART 17.8 01/01/2013    JTB0OTU 60.1 09/14/2021    PO2ART 367.5 09/14/2021    DLV1BIL 35.0 09/14/2021     Lab Results   Component Value Date    INR 3.54 (H) 11/17/2021    INR 3.77 (H) 11/16/2021    INR 3.90 11/15/2021    PROTIME 42.2 (H) 11/17/2021    PROTIME 44.1 (H) 11/16/2021    PROTIME 22.3 (H) 10/29/2021         CULTURES  COVID 19, NAAT: not detected     EKG:   Atrial fibrillation with rapid ventricular response  Fusion complexes  Nonspecific ST abnormality  Abnormal ECG  When compared with ECG of 14-SEP-2021 14:01,  Criteria for Septal infarct are no longer Present  HR decreased    RADIOLOGY  XR CHEST PORTABLE   Final Result   Interval decreased consolidation in the right upper lobe, since 09/25/2021. Otherwise, stable pattern of airspace disease, consistent with COVID   pneumonia. Small bilateral pleural effusions. ASSESSMENT/PLAN:    #Atrial fibrillation with RVR   - in patient with history of atrial fib  - initially given cardizem bolus-> gtt. Now transitioning to PO cardizem   - cont digoxin at home dose 187.5 mg daily   - cont warfarin for CVA ppx     #Acute on chronic diastolic CHF     Start on lasix 40 mg IV lasix  - unsure of home dose, not listed. #Severe COPD  - without AE  - cont scheduled prednisone 5 mg bid     #Chronic hypoxic resp failure  - currently stable on 4L NC o2    #COVID 19 Sept 2021  - woth prolonged hospital admission and high O2 demand- see dc summary form 9/2021     #Microcytic Anemia - chronic   - Hb 8.2 today  - most recently Hb in the 10s  - no evidence of bleeding   - check iron studies    #DM2  - use ssi      #Hx of PE  - AC on Coumadin  - pharmacy to dose  - INR at 3     #B cell Lymphoma  - f/w Dr. Timi Srinivasan     #H/o Anovaginal Fistula  - noted per patient on prior admission     #Morbid Obesity  - Body mass index is 50.34 kg/m². - Complicating assessment and treatment. Placing patient at risk for multiple co-morbidities as well as early death and contributing to the patient's presentation.   - Counseled on weight loss.       DVT Prophylaxis: Warfarin- INR 3.77  Diet: ADULT DIET;  Regular; 3 carb choices (45 gm/meal)  Code Status: Full Code    Nilson Braswell MD, 11/17/2021 4:23 PM

## 2021-11-17 NOTE — PLAN OF CARE
Per Dr Edyta Bose VO discontinue all medication not taken. afibb with RVR,   Recent covid with prolonged hypoxia,   Severe copd on chronic prednisone  Fluid overload

## 2021-11-17 NOTE — PROGRESS NOTES
and walked with: Rollator    Pain  Yes  Ratin  Location: L hip and L knee  Pain Medicine Status: Denies need      Cognition    A&O x4   Able to follow 2 step commands    Subjective  Patient lying supine in bed with no family present. Pt agreeable to this OT eval & tx. Upper Extremity ROM:    WFL,  pt able to perform all bed mobility, transfers, and gait without ROM limitation. Upper Extremity Strength:    BUE strength impaired but not formally assessed w/ MMT    Upper Extremity Sensation    WFL    Upper Extremity Proprioception:  WFL    Coordination and Tone  Diminished    Balance  Functional Sitting Balance:  WFL  Functional Standing Balance:Diminished    Bed mobility:    Supine to sit:   SBA  Sit to supine:   Not Tested  Rolling:    SBA  Scooting in sitting:  SBA  Scooting to head of bed:   Not Tested    Bridging:   Not Tested    Transfers:    Sit to stand:  SBA  Stand to sit:  SBA  Bed to chair:   SBA and with use of RW  Standard toilet: Not Tested  Bed to BSC:  SBA and with use of RW    Dressing:      UE:   Not Tested  LE:    SBA to don briefs     Bathing:    UE:  Not Tested  LE:  Not Tested    Eating:   Not Tested    Toileting:  SBA    Activity Tolerance   Pt completed therapy session with SOB noted with activity    Sitting at EOB  SpO2: 94%  HR: 114  BP: 130/70    HR after functional mobility: 146 bpm    Positioning Needs:   Reclined in chair with call light and needs in reach. Alarm Set    Exercise / Activities Initiated:   N/A    Patient/Family Education:   Role of OT  Recommendations for DC  Safe RW use/hand placement    Assessment of Deficits: Pt seen for Occupational therapy evaluation in acute care setting. Pt demonstrated decreased Activity tolerance, ADLs, IADLs, Balance , Bathing, Bed mobility, Dressing, ROM, Safety Awareness, Strength, Transfers, Cognition and Coping Skills.  Pt functioning below baseline and will likely benefit from skilled occupational therapy services to maximize safety and independence. Goal(s) : To be met in 3 Visits:  1). Bed to toilet/BSC: Supervision    To be met in 5 Visits:  1). Supine to/from Sit:  Independent  2). Upper Body Bathing:   Supervision  3). Lower Body Bathing:   Supervision  4). Upper Body Dressing:  Supervision  5). Lower Body Dressing:  Supervision  6). Pt to demonstrate UE exs x 15 reps with minimal cues    Rehabilitation Potential:  Good for goals listed above. Strengths for achieving goals include: Pt motivated, PLOF and Pt cooperative  Barriers to achieving goals include:  Complexity of condition     Plan: To be seen 3-5 x/wk while in acute care setting for therapeutic exercises, bed mobility, transfers, dressing, bathing, family/patient education, ADL/IADL retraining, energy conservation training.        ALANA BeltranR/L #[id number]      If patient discharges from this facility prior to next visit, this note will serve as the Discharge Summary

## 2021-11-17 NOTE — ED NOTES
Report given to EMS. Report called to Trevin Haddad on PCU at [facility]. Pt left via stretcher with EMS. VSS. No further needs.       Navneet Moreau RN  11/16/21 [id number]

## 2021-11-17 NOTE — CONSULTS
Pharmacy Note  Warfarin Consult  Dx: Afib  Goal INR range 2-3   Home Warfarin dose: 5 mg QD      Date  INR  Warfarin  11/16              3.77                  Held    Recommend no dose of warfarin today. Daily INR ordered. Rx will continue to manage therapy per consult order.     Jacek Peñaloza PharmD, [facility], 11/17/2021 2:42 AM

## 2021-11-17 NOTE — CARE COORDINATION
Per chart review patient admitted to [facility]. Will continue to monitor.     Candace DE PAZ, RN, [facility]  Care Transition Nurse  [phone] mobile

## 2021-11-17 NOTE — PROGRESS NOTES
RT Inhaler-Nebulizer Bronchodilator Protocol Note    There is a bronchodilator order in the chart from a provider indicating to follow the RT Bronchodilator Protocol and there is an Initiate RT Inhaler-Nebulizer Bronchodilator Protocol order as well (see protocol at bottom of note). CXR Findings:  XR CHEST PORTABLE    Result Date: 11/16/2021  Interval decreased consolidation in the right upper lobe, since 09/25/2021. Otherwise, stable pattern of airspace disease, consistent with COVID pneumonia. Small bilateral pleural effusions. The findings from the last RT Protocol Assessment were as follows:   History Pulmonary Disease: Chronic pulmonary disease  Respiratory Pattern: Regular pattern and RR 12-20 bpm  Breath Sounds: Clear breath sounds  Cough: Strong, spontaneous, non-productive  Indication for Bronchodilator Therapy: Decreased or absent breath sounds, On home bronchodilators  Bronchodilator Assessment Score: 2    Aerosolized bronchodilator medication orders have been revised according to the RT Inhaler-Nebulizer Bronchodilator Protocol below. Respiratory Therapist to perform RT Therapy Protocol Assessment initially then follow the protocol. Repeat RT Therapy Protocol Assessment PRN for score 0-3 or on second treatment, BID, and PRN for scores above 3. No Indications  adjust the frequency to every 6 hours PRN wheezing or bronchospasm, if no treatments needed after 48 hours then discontinue using Per Protocol order mode. If indication present, adjust the RT bronchodilator orders based on the Bronchodilator Assessment Score as indicated below. Use Inhaler orders unless patient has one or more of the following: on home nebulizer, not able to hold breath for 10 seconds, is not alert and oriented, cannot activate and use MDI correctly, or respiratory rate 25 breaths per minute or more, then use the equivalent nebulizer order(s) with same Frequency and PRN reasons based on the score.   If a

## 2021-11-17 NOTE — PROGRESS NOTES
Patient admitted to room 321 from [address] [place] [address] ED. Patient oriented to room, call light, bed rails, phone, lights and bathroom. Patient instructed about the schedule of the day including: vital sign frequency, lab draws, possible tests, frequency of MD and staff rounds, daily weights, I &O's and prescribed diet. *** bed alarm in place, patient aware of placement and reason. Telemetry box in place, patient aware of placement and reason. Bed locked, in lowest position, side rails up 2/4, call light within reach. Recliner Assessment  Patient is able to demonstrate the ability to move from a reclining position to an upright position within the recliner. 4 Eyes Skin Assessment     The patient is being assess for   Admission    I agree that 2 RN's have performed a thorough Head to Toe Skin Assessment on the patient. ALL assessment sites listed below have been assessed. Areas assessed for pressure by both nurses:   [x]   Head, Face, and Ears   [x]   Shoulders, Back, and Chest, Abdomen  [x]   Arms, Elbows, and Hands   [x]   Coccyx, Sacrum, and Ischium  [x]   Legs, Feet, and Heels   Small area of redness on bottom, pt says its r/t when she was at [facility] [employer]. picture in media. Scattered busing throughout. Skin Assessed Under all Medical Devices by both nurses:  O2 device tubing              All Mepilex Borders were peeled back and area peeked at by both nurses:  No: n.a  Please list where Mepilex Borders are located:               **SHARE this note so that the co-signing nurse is able to place an eSignature**    Co-signer eSignature: {Esignature:[id number]}    Does the Patient have Skin Breakdown related to pressure?   Yes LDA WOUND CARE was Initiated documentation include the Marianela-wound, Wound Assessment, Measurements, Dressing Treatment, Drainage, and Color\",              Jefe Prevention initiated:  Yes   Wound Care Orders initiated:  NA      WO nurse consulted for Pressure Injury (Stage 3,4, Unstageable, DTI, NWPT, Complex wounds)and New or Established Ostomies:  NA      Primary Nurse eSignature: Electronically signed by Mariah Conley RN on 11/16/21 at 11:59 PM EST

## 2021-11-17 NOTE — CARE COORDINATION
CASE MANAGEMENT INITIAL ASSESSMENT      Reviewed chart and completed assessment with patient: At bedside   Explained Case Management role/services. Health Care Decision Maker :   Primary Decision Maker: Emerald Roy Child - [phone]    Secondary Decision Maker: Vivian Ferrara Child - [phone]        Admit date/status: 11/16/21 inpt  Diagnosis: Afib with RVR   Is this a Readmission?:  Yes      Insurance: Medicare, [employer] secondary    Precert required for SNF: No       3 night stay required: Yes    Living arrangements, Adls, care needs, prior to admission: Pt lives in single story home alone, however she states that her son has been staying with her since her DC from [address] [place] [address] 10/29/21. She states that she is normally independent in all ADLs and is able to drive as needed. Her family is available to assist if needed. Transportation: Private via [employer]      [facility] MYFLY at home:  Walker_x_Cane__RTS__ BSC__Shower Chair_x_  02_4.5L/NC Cont w/ [employer]_ HHN__ CPAP__  BiPap__  Hospital Bed__ W/C___ Other__Pulse ox________    Services in the home and/or outpatient, prior to admission: Active with [facility] for SN, PT/OT     PT/OT recs: Ordered and Pending     Hospital Exemption Notification (HEN): Needed for SNF     Barriers to discharge: None     Plan/comments: Pt plans on returning home upon DC. She states that her son has been staying with her and she has 24/7 assisted if needed. Pt would like to resume services with [facility]; She will require [facility] orders at DC. Pt does have portable O2 which her family will bring to pick her up at Dc. Pt states that she is very compliant with her medications, and checks vitals in addition to her own INR with home vitals program. CM will continue and assist with needs as able.     Fe Orlando RN       [facility] on chart for MD signature

## 2021-11-18 LAB
ANION GAP SERPL CALCULATED.3IONS-SCNC: 10 MMOL/L (ref 3–16)
BASOPHILS ABSOLUTE: 0 K/UL (ref 0–0.2)
BASOPHILS RELATIVE PERCENT: 0.2 %
BUN BLDV-MCNC: 14 MG/DL (ref 7–20)
CALCIUM SERPL-MCNC: 8.8 MG/DL (ref 8.3–10.6)
CHLORIDE BLD-SCNC: 101 MMOL/L (ref 99–110)
CO2: 31 MMOL/L (ref 21–32)
CREAT SERPL-MCNC: 0.6 MG/DL (ref 0.6–1.2)
EOSINOPHILS ABSOLUTE: 0 K/UL (ref 0–0.6)
EOSINOPHILS RELATIVE PERCENT: 0 %
GFR AFRICAN AMERICAN: >60
GFR NON-AFRICAN AMERICAN: >60
GLUCOSE BLD-MCNC: 164 MG/DL (ref 70–99)
GLUCOSE BLD-MCNC: 168 MG/DL (ref 70–99)
GLUCOSE BLD-MCNC: 183 MG/DL (ref 70–99)
GLUCOSE BLD-MCNC: 199 MG/DL (ref 70–99)
GLUCOSE BLD-MCNC: 211 MG/DL (ref 70–99)
HCT VFR BLD CALC: 27.1 % (ref 36–48)
HEMOGLOBIN: 8.1 G/DL (ref 12–16)
INR BLD: 2.79 (ref 0.88–1.12)
LYMPHOCYTES ABSOLUTE: 2.5 K/UL (ref 1–5.1)
LYMPHOCYTES RELATIVE PERCENT: 17.8 %
MCH RBC QN AUTO: 22.9 PG (ref 26–34)
MCHC RBC AUTO-ENTMCNC: 30 G/DL (ref 31–36)
MCV RBC AUTO: 76.3 FL (ref 80–100)
MONOCYTES ABSOLUTE: 1.1 K/UL (ref 0–1.3)
MONOCYTES RELATIVE PERCENT: 8.2 %
NEUTROPHILS ABSOLUTE: 10.3 K/UL (ref 1.7–7.7)
NEUTROPHILS RELATIVE PERCENT: 73.8 %
PDW BLD-RTO: 19.8 % (ref 12.4–15.4)
PERFORMED ON: ABNORMAL
PLATELET # BLD: 457 K/UL (ref 135–450)
PMV BLD AUTO: 6.5 FL (ref 5–10.5)
POTASSIUM SERPL-SCNC: 3.8 MMOL/L (ref 3.5–5.1)
PROTHROMBIN TIME: 32.9 SEC (ref 9.9–12.7)
RBC # BLD: 3.55 M/UL (ref 4–5.2)
SODIUM BLD-SCNC: 142 MMOL/L (ref 136–145)
WBC # BLD: 13.9 K/UL (ref 4–11)

## 2021-11-18 PROCEDURE — 80048 BASIC METABOLIC PNL TOTAL CA: CPT

## 2021-11-18 PROCEDURE — 6370000000 HC RX 637 (ALT 250 FOR IP): Performed by: INTERNAL MEDICINE

## 2021-11-18 PROCEDURE — 85610 PROTHROMBIN TIME: CPT

## 2021-11-18 PROCEDURE — 2580000003 HC RX 258: Performed by: HOSPITALIST

## 2021-11-18 PROCEDURE — 85025 COMPLETE CBC W/AUTO DIFF WBC: CPT

## 2021-11-18 PROCEDURE — 6370000000 HC RX 637 (ALT 250 FOR IP): Performed by: HOSPITALIST

## 2021-11-18 PROCEDURE — 2060000000 HC ICU INTERMEDIATE R&B

## 2021-11-18 PROCEDURE — 6360000002 HC RX W HCPCS: Performed by: INTERNAL MEDICINE

## 2021-11-18 PROCEDURE — 36415 COLL VENOUS BLD VENIPUNCTURE: CPT

## 2021-11-18 PROCEDURE — 94761 N-INVAS EAR/PLS OXIMETRY MLT: CPT

## 2021-11-18 PROCEDURE — 2700000000 HC OXYGEN THERAPY PER DAY

## 2021-11-18 PROCEDURE — 94640 AIRWAY INHALATION TREATMENT: CPT

## 2021-11-18 PROCEDURE — 99232 SBSQ HOSP IP/OBS MODERATE 35: CPT | Performed by: INTERNAL MEDICINE

## 2021-11-18 RX ORDER — DILTIAZEM HYDROCHLORIDE 240 MG/1
240 CAPSULE, COATED, EXTENDED RELEASE ORAL DAILY
Status: DISCONTINUED | OUTPATIENT
Start: 2021-11-18 | End: 2021-11-19 | Stop reason: HOSPADM

## 2021-11-18 RX ORDER — FUROSEMIDE 10 MG/ML
40 INJECTION INTRAMUSCULAR; INTRAVENOUS ONCE
Status: COMPLETED | OUTPATIENT
Start: 2021-11-18 | End: 2021-11-18

## 2021-11-18 RX ADMIN — PREDNISONE 5 MG: 5 TABLET ORAL at 09:03

## 2021-11-18 RX ADMIN — Medication 2 PUFF: at 07:20

## 2021-11-18 RX ADMIN — DILTIAZEM HYDROCHLORIDE 60 MG: 60 TABLET, FILM COATED ORAL at 05:20

## 2021-11-18 RX ADMIN — DICLOFENAC SODIUM 4 G: 10 GEL TOPICAL at 22:30

## 2021-11-18 RX ADMIN — INSULIN LISPRO 1 UNITS: 100 INJECTION, SOLUTION INTRAVENOUS; SUBCUTANEOUS at 17:01

## 2021-11-18 RX ADMIN — FAMOTIDINE 20 MG: 20 TABLET, FILM COATED ORAL at 22:25

## 2021-11-18 RX ADMIN — FUROSEMIDE 40 MG: 40 INJECTION, SOLUTION INTRAMUSCULAR; INTRAVENOUS at 12:14

## 2021-11-18 RX ADMIN — INSULIN LISPRO 2 UNITS: 100 INJECTION, SOLUTION INTRAVENOUS; SUBCUTANEOUS at 12:18

## 2021-11-18 RX ADMIN — INSULIN LISPRO 1 UNITS: 100 INJECTION, SOLUTION INTRAVENOUS; SUBCUTANEOUS at 09:05

## 2021-11-18 RX ADMIN — BUSPIRONE HYDROCHLORIDE 5 MG: 5 TABLET ORAL at 09:03

## 2021-11-18 RX ADMIN — PREDNISONE 5 MG: 5 TABLET ORAL at 22:25

## 2021-11-18 RX ADMIN — DILTIAZEM HYDROCHLORIDE 240 MG: 240 CAPSULE, COATED, EXTENDED RELEASE ORAL at 09:03

## 2021-11-18 RX ADMIN — FAMOTIDINE 20 MG: 20 TABLET, FILM COATED ORAL at 09:03

## 2021-11-18 RX ADMIN — Medication 2000 UNITS: at 09:03

## 2021-11-18 RX ADMIN — BUPROPION HYDROCHLORIDE 150 MG: 150 TABLET, EXTENDED RELEASE ORAL at 09:03

## 2021-11-18 RX ADMIN — DICLOFENAC SODIUM 4 G: 10 GEL TOPICAL at 09:05

## 2021-11-18 RX ADMIN — DIGOXIN 187.5 MCG: 125 TABLET ORAL at 09:02

## 2021-11-18 RX ADMIN — Medication 10 ML: at 22:28

## 2021-11-18 RX ADMIN — DILTIAZEM HYDROCHLORIDE 60 MG: 60 TABLET, FILM COATED ORAL at 01:10

## 2021-11-18 RX ADMIN — IPRATROPIUM BROMIDE AND ALBUTEROL SULFATE 1 AMPULE: .5; 3 SOLUTION RESPIRATORY (INHALATION) at 13:01

## 2021-11-18 RX ADMIN — QUETIAPINE FUMARATE 25 MG: 25 TABLET ORAL at 22:25

## 2021-11-18 RX ADMIN — PYRIDOXINE HCL TAB 50 MG 50 MG: 50 TAB at 09:03

## 2021-11-18 RX ADMIN — Medication 10 ML: at 09:04

## 2021-11-18 RX ADMIN — BUSPIRONE HYDROCHLORIDE 5 MG: 5 TABLET ORAL at 14:10

## 2021-11-18 RX ADMIN — Medication 2 PUFF: at 19:54

## 2021-11-18 RX ADMIN — WARFARIN SODIUM 3 MG: 1 TABLET ORAL at 17:55

## 2021-11-18 RX ADMIN — DICLOFENAC SODIUM 4 G: 10 GEL TOPICAL at 14:10

## 2021-11-18 ASSESSMENT — PAIN DESCRIPTION - PAIN TYPE
TYPE: CHRONIC PAIN
TYPE: CHRONIC PAIN

## 2021-11-18 ASSESSMENT — PAIN DESCRIPTION - PROGRESSION
CLINICAL_PROGRESSION: NOT CHANGED

## 2021-11-18 ASSESSMENT — PAIN DESCRIPTION - LOCATION
LOCATION: HIP
LOCATION: HIP

## 2021-11-18 ASSESSMENT — PAIN DESCRIPTION - ORIENTATION: ORIENTATION: RIGHT;LEFT

## 2021-11-18 ASSESSMENT — PAIN DESCRIPTION - DESCRIPTORS: DESCRIPTORS: CONSTANT;DISCOMFORT;ACHING

## 2021-11-18 ASSESSMENT — PAIN DESCRIPTION - ONSET: ONSET: ON-GOING

## 2021-11-18 ASSESSMENT — PAIN SCALES - GENERAL
PAINLEVEL_OUTOF10: 5
PAINLEVEL_OUTOF10: 7

## 2021-11-18 NOTE — PROGRESS NOTES
Shift assessment complete- see flow sheet. Patient is A/Ox4. VSS. Morning medications given without difficulty. Currently on 5 L NC, is normally on 4.5 at home., SpO2 WNL. Lung sounds diminished . Denies worsening shortness of breath. Afib RVR. -125 this morning. Patient denies any further needs. Call light explained and in reach. Bed alarm on. Will continue to monitor.

## 2021-11-18 NOTE — PROGRESS NOTES
[facility]side [facility] Pt currently resting in bed with the call light within reach. Pt denies any other care needs at this time. Pt stable at this time.

## 2021-11-18 NOTE — PROGRESS NOTES
IM Progress Note    Admit Date:  11/16/2021  2    Interval history:  Severe COPD, Rapid Afibb, mild chf    Subjective:  Ms. Rachel Duarte seen up in bed, feels ok today . Remains on 5 L   Did not sleep well last night      Objective:   /63   Pulse 110   Temp 97.6 °F (36.4 °C) (Oral)   Resp 18   Ht 5' 2\" (1.575 m)   Wt 275 lb 4 oz (124.9 kg)   LMP  (LMP Unknown)   SpO2 94%   BMI 50.34 kg/m²     Intake/Output Summary (Last 24 hours) at 11/18/2021 0746  Last data filed at 11/17/2021 2305  Gross per 24 hour   Intake 360 ml   Output 400 ml   Net -40 ml       Physical Exam:       General:  Elderly female up in bed  [place]  Awake, alert and oriented. Appears to be not in any distress  Mucous Membranes:  Pink , anicteric  Neck: No JVD, no carotid bruit, no thyromegaly  Chest:  chronic ILD with diffuse crackles. no wheeze  Cardiovascular:  Irregular  S1S2 heard, no murmurs or gallops  Abdomen:  Soft, undistended, non tender, no organomegaly, BS present  Extremities: 2+ edema to both LE .  Distal pulses well felt  Neurological : grossly normal with mild [place]          Medications:   Scheduled Medications:    budesonide-formoterol  2 puff Inhalation BID    warfarin (COUMADIN) daily dosing (placeholder)   Other RX Placeholder    dilTIAZem  60 mg Oral 4 times per day    digoxin  187.5 mcg Oral Daily    ipratropium-albuterol  1 ampule Inhalation Once    buPROPion  150 mg Oral QAM    busPIRone  5 mg Oral TID    Vitamin D  2,000 Units Oral Daily    diclofenac sodium  4 g Topical TID    famotidine  20 mg Oral BID    polyethylene glycol  17 g Oral Daily    predniSONE  5 mg Oral BID    QUEtiapine  25 mg Oral Nightly    vitamin B-6  50 mg Oral Daily    sodium chloride flush  5-40 mL IntraVENous 2 times per day    insulin lispro  0-6 Units SubCUTAneous TID WC    insulin lispro  0-3 Units SubCUTAneous Nightly     I   sodium chloride      dextrose       ipratropium-albuterol, melatonin, sodium chloride flush, sodium chloride, ondansetron **OR** ondansetron, polyethylene glycol, acetaminophen **OR** acetaminophen, glucose, dextrose, glucagon (rDNA), dextrose    Lab Data:  Recent Labs     11/16/21  1737 11/17/21  0401 11/18/21  0526   WBC 10.3 5.5 13.9*   HGB 8.7* 8.2* 8.1*   HCT 28.5* 27.6* 27.1*   MCV 75.6* 76.1* 76.3*    392 457*     Recent Labs     11/16/21  1737 11/17/21  0401 11/18/21  0526    142 142   K 3.6 4.1 3.8    101 101   CO2 33* 32 31   BUN 9 9 14   CREATININE 0.6 0.6 0.6     Recent Labs     11/17/21  0002 11/17/21  0401   TROPONINI <0.01 <0.01       Coagulation:   Lab Results   Component Value Date    INR 2.79 11/18/2021    APTT 39.3 01/17/2020     Cardiac markers:   Lab Results   Component Value Date    TROPONINI <0.01 11/17/2021         Lab Results   Component Value Date    ALT 21 10/19/2021    AST 16 10/19/2021    ALKPHOS 82 10/19/2021    BILITOT <0.2 10/19/2021       Lab Results   Component Value Date    INR 2.79 (H) 11/18/2021    INR 3.54 (H) 11/17/2021    INR 3.77 (H) 11/16/2021    PROTIME 32.9 (H) 11/18/2021    PROTIME 42.2 (H) 11/17/2021    PROTIME 44.1 (H) 11/16/2021           CULTURES  COVID 19, NAAT: not detected      EKG:   Atrial fibrillation with rapid ventricular response  Fusion complexes  Nonspecific ST abnormality  Abnormal ECG  When compared with ECG of 14-SEP-2021 14:01,  Criteria for Septal infarct are no longer Present  HR decreased     RADIOLOGY  XR CHEST PORTABLE   Final Result   Interval decreased consolidation in the right upper lobe, since 09/25/2021.       Otherwise, stable pattern of airspace disease, consistent with COVID   pneumonia.       Small bilateral pleural effusions.                 ASSESSMENT/PLAN:     #Atrial fibrillation with RVR   - in patient with history of atrial fib  - initially given cardizem bolus-> gtt.   Now transitioning to PO cardizem 240 mg daily   - cont digoxin at home dose 187.5 mg daily   - cont warfarin for CVA ppx      #Acute on

## 2021-11-18 NOTE — PROGRESS NOTES
Patient is resting showing no s/s of distress. Patient is alert and oriented. Meds were given, see MAR. Patient is denying any needs. Bed is in lowest position and call light is within reach. Will continue to monitor. Shift assessment complete, see flowsheets.    Yue Bernal RN

## 2021-11-19 VITALS
BODY MASS INDEX: 46.33 KG/M2 | WEIGHT: 251.8 LBS | RESPIRATION RATE: 18 BRPM | OXYGEN SATURATION: 93 % | SYSTOLIC BLOOD PRESSURE: 144 MMHG | HEART RATE: 109 BPM | HEIGHT: 62 IN | TEMPERATURE: 97.2 F | DIASTOLIC BLOOD PRESSURE: 66 MMHG

## 2021-11-19 LAB
GLUCOSE BLD-MCNC: 143 MG/DL (ref 70–99)
GLUCOSE BLD-MCNC: 156 MG/DL (ref 70–99)
INR BLD: 2.4 (ref 0.88–1.12)
PERFORMED ON: ABNORMAL
PERFORMED ON: ABNORMAL
PROTHROMBIN TIME: 28.1 SEC (ref 9.9–12.7)

## 2021-11-19 PROCEDURE — 85610 PROTHROMBIN TIME: CPT

## 2021-11-19 PROCEDURE — 6370000000 HC RX 637 (ALT 250 FOR IP): Performed by: HOSPITALIST

## 2021-11-19 PROCEDURE — 94761 N-INVAS EAR/PLS OXIMETRY MLT: CPT

## 2021-11-19 PROCEDURE — 6370000000 HC RX 637 (ALT 250 FOR IP): Performed by: INTERNAL MEDICINE

## 2021-11-19 PROCEDURE — 94640 AIRWAY INHALATION TREATMENT: CPT

## 2021-11-19 PROCEDURE — 2700000000 HC OXYGEN THERAPY PER DAY

## 2021-11-19 PROCEDURE — 36415 COLL VENOUS BLD VENIPUNCTURE: CPT

## 2021-11-19 PROCEDURE — 99238 HOSP IP/OBS DSCHRG MGMT 30/<: CPT | Performed by: INTERNAL MEDICINE

## 2021-11-19 RX ORDER — WARFARIN SODIUM 5 MG/1
5 TABLET ORAL
Status: DISCONTINUED | OUTPATIENT
Start: 2021-11-19 | End: 2021-11-19 | Stop reason: HOSPADM

## 2021-11-19 RX ORDER — DILTIAZEM HYDROCHLORIDE 240 MG/1
240 CAPSULE, COATED, EXTENDED RELEASE ORAL DAILY
Qty: 30 CAPSULE | Refills: 3 | Status: SHIPPED | OUTPATIENT
Start: 2021-11-20

## 2021-11-19 RX ORDER — FUROSEMIDE 40 MG/1
40 TABLET ORAL DAILY
Qty: 90 TABLET | Refills: 0 | Status: SHIPPED | OUTPATIENT
Start: 2021-11-19

## 2021-11-19 RX ADMIN — BUSPIRONE HYDROCHLORIDE 5 MG: 5 TABLET ORAL at 13:34

## 2021-11-19 RX ADMIN — Medication 2 PUFF: at 08:11

## 2021-11-19 RX ADMIN — FAMOTIDINE 20 MG: 20 TABLET, FILM COATED ORAL at 08:38

## 2021-11-19 RX ADMIN — DICLOFENAC SODIUM 4 G: 10 GEL TOPICAL at 08:39

## 2021-11-19 RX ADMIN — DILTIAZEM HYDROCHLORIDE 240 MG: 240 CAPSULE, COATED, EXTENDED RELEASE ORAL at 08:38

## 2021-11-19 RX ADMIN — BUPROPION HYDROCHLORIDE 150 MG: 150 TABLET, EXTENDED RELEASE ORAL at 08:38

## 2021-11-19 RX ADMIN — PYRIDOXINE HCL TAB 50 MG 50 MG: 50 TAB at 08:38

## 2021-11-19 RX ADMIN — IPRATROPIUM BROMIDE AND ALBUTEROL SULFATE 1 AMPULE: .5; 3 SOLUTION RESPIRATORY (INHALATION) at 08:11

## 2021-11-19 RX ADMIN — INSULIN LISPRO 1 UNITS: 100 INJECTION, SOLUTION INTRAVENOUS; SUBCUTANEOUS at 11:31

## 2021-11-19 RX ADMIN — DIGOXIN 187.5 MCG: 125 TABLET ORAL at 08:38

## 2021-11-19 RX ADMIN — Medication 2000 UNITS: at 08:38

## 2021-11-19 RX ADMIN — INSULIN LISPRO 1 UNITS: 100 INJECTION, SOLUTION INTRAVENOUS; SUBCUTANEOUS at 08:39

## 2021-11-19 RX ADMIN — PREDNISONE 5 MG: 5 TABLET ORAL at 08:38

## 2021-11-19 RX ADMIN — BUSPIRONE HYDROCHLORIDE 5 MG: 5 TABLET ORAL at 08:38

## 2021-11-19 ASSESSMENT — PAIN DESCRIPTION - PAIN TYPE: TYPE: CHRONIC PAIN

## 2021-11-19 ASSESSMENT — PAIN DESCRIPTION - DESCRIPTORS: DESCRIPTORS: DISCOMFORT;CONSTANT

## 2021-11-19 ASSESSMENT — PAIN SCALES - GENERAL: PAINLEVEL_OUTOF10: 4

## 2021-11-19 ASSESSMENT — PAIN DESCRIPTION - ONSET: ONSET: ON-GOING

## 2021-11-19 ASSESSMENT — PAIN DESCRIPTION - LOCATION: LOCATION: HIP;KNEE

## 2021-11-19 NOTE — CARE COORDINATION
[facility]  Notified [facility] pt discharging home today. Pt is active with [facility]. Liaison will fax HC orders when received. Received HC orders from . Faxed referral with orders to [facility].         Electronically signed by Ruma Kaur RN on 11/19/2021 at 12:38 PM Patient is taking metformin 2 x a day and she is now experiencing severe diarrhea and a lot of gas she is asking what can she do to help this is very bad she stated.  Please call her back to advise thank you.

## 2021-11-19 NOTE — CARE COORDINATION
DISCHARGE ORDER  Date/Time 2021 2:07 PM  Completed by: Mirta Irving RN, Case Management    Patient Name: Dhruv De Los Santos      : 1940  Admitting Diagnosis: Dyspnea on exertion [R06.00]  Atrial fibrillation with rapid ventricular response ([address] [I48.91]  Atrial fibrillation with RVR ([address] [I48.91]      Admit order Date and Status:2021  (verify MD's last order for status of admission)      Noted discharge order. If applicable PT/OT recommendation at Discharge: Home 24 hr assist and with home PT   DME recommendation by PT/OT:Needs Met  Confirmed discharge plan: Yes  with whom_pt______________  If pt confirmed DC plan does family need to be contacted by CM No if yes who______  Discharge Plan: Chart reviewed. Met with pt at bedside and she states she is returning home and her son will provide . Pt wanting [facility]PT/OT. Burnard Ours with [facility] aware pt will discharge home today. Pt on home dose of home O2 and states son will bring tank from home. No further dc needs voiced. Reviewed chart. Role of discharge planner explained and patient verbalized understanding. Discharge order is noted. Has Home O2 in place on admit:  Yes  Informed of need to bring portable home O2 tank on day of discharge for nursing to connect prior to leaving:   Yes  Verbalized agreement/Understanding:   Yes  Pt is being d/c'd to home today. Pt's O2 sats are 93% on 5L. Discharge timeout done with Sia Montes. All discharge needs and concerns addressed.

## 2021-11-19 NOTE — PROGRESS NOTES
IM Progress Note    Admit Date:  11/16/2021  3    Interval history:  Severe COPD, Rapid Afibb, mild chf    Subjective:  Ms. Shan Hawk seen up in bed, feels ok today . Remains on 5 L   Ready to go home      Objective:   /68   Pulse 99   Temp 98.5 °F (36.9 °C) (Oral)   Resp 18   Ht 5' 2\" (1.575 m)   Wt 251 lb 12.8 oz (114.2 kg)   LMP  (LMP Unknown)   SpO2 95%   BMI 46.05 kg/m²       Intake/Output Summary (Last 24 hours) at 11/19/2021 0735  Last data filed at 11/18/2021 1643  Gross per 24 hour   Intake 300 ml   Output 600 ml   Net -300 ml       Physical Exam:       General:  Elderly female up in bed  [place]  Awake, alert and oriented. Appears to be not in any distress  Mucous Membranes:  Pink , anicteric  Neck: No JVD, no carotid bruit, no thyromegaly  Chest:  chronic ILD with diffuse crackles. no wheeze  Cardiovascular:  Irregular  S1S2 heard, no murmurs or gallops  Abdomen:  Soft, undistended, non tender, no organomegaly, BS present  Extremities: resolving 2+ edema to both LE .  Distal pulses well felt  Neurological : grossly normal with mild [place]          Medications:   Scheduled Medications:    dilTIAZem  240 mg Oral Daily    budesonide-formoterol  2 puff Inhalation BID    warfarin (COUMADIN) daily dosing (placeholder)   Other RX Placeholder    digoxin  187.5 mcg Oral Daily    ipratropium-albuterol  1 ampule Inhalation Once    buPROPion  150 mg Oral QAM    busPIRone  5 mg Oral TID    Vitamin D  2,000 Units Oral Daily    diclofenac sodium  4 g Topical TID    famotidine  20 mg Oral BID    polyethylene glycol  17 g Oral Daily    predniSONE  5 mg Oral BID    QUEtiapine  25 mg Oral Nightly    vitamin B-6  50 mg Oral Daily    sodium chloride flush  5-40 mL IntraVENous 2 times per day    insulin lispro  0-6 Units SubCUTAneous TID WC    insulin lispro  0-3 Units SubCUTAneous Nightly     I   sodium chloride      dextrose       ipratropium-albuterol, melatonin, sodium chloride flush, sodium chloride, ondansetron **OR** ondansetron, polyethylene glycol, acetaminophen **OR** acetaminophen, glucose, dextrose, glucagon (rDNA), dextrose    Lab Data:  Recent Labs     11/16/21  1737 11/17/21  0401 11/18/21  0526   WBC 10.3 5.5 13.9*   HGB 8.7* 8.2* 8.1*   HCT 28.5* 27.6* 27.1*   MCV 75.6* 76.1* 76.3*    392 457*     Recent Labs     11/16/21  1737 11/17/21  0401 11/18/21  0526    142 142   K 3.6 4.1 3.8    101 101   CO2 33* 32 31   BUN 9 9 14   CREATININE 0.6 0.6 0.6     Recent Labs     11/17/21  0002 11/17/21  0401   TROPONINI <0.01 <0.01       Coagulation:   Lab Results   Component Value Date    INR 2.40 11/19/2021    APTT 39.3 01/17/2020     Cardiac markers:   Lab Results   Component Value Date    TROPONINI <0.01 11/17/2021         Lab Results   Component Value Date    ALT 21 10/19/2021    AST 16 10/19/2021    ALKPHOS 82 10/19/2021    BILITOT <0.2 10/19/2021       Lab Results   Component Value Date    INR 2.40 (H) 11/19/2021    INR 2.79 (H) 11/18/2021    INR 3.54 (H) 11/17/2021    PROTIME 28.1 (H) 11/19/2021    PROTIME 32.9 (H) 11/18/2021    PROTIME 42.2 (H) 11/17/2021           CULTURES  COVID 19, NAAT: not detected      EKG:   Atrial fibrillation with rapid ventricular response  Fusion complexes  Nonspecific ST abnormality  Abnormal ECG  When compared with ECG of 14-SEP-2021 14:01,  Criteria for Septal infarct are no longer Present  HR decreased     RADIOLOGY  XR CHEST PORTABLE   Final Result   Interval decreased consolidation in the right upper lobe, since 09/25/2021.       Otherwise, stable pattern of airspace disease, consistent with COVID   pneumonia.       Small bilateral pleural effusions.                 ASSESSMENT/PLAN:     #Atrial fibrillation with RVR   - in patient with history of atrial fib  - initially given cardizem bolus-> gtt.   Now transitioning to PO cardizem 240 mg daily   - cont digoxin at home dose 187.5 mg daily   - cont warfarin for CVA ppx      #Acute on chronic diastolic CHF     Start on lasix 40 mg IV lasix  - good diuresis and improved symptoms  - has chronic crackles in lung with ILD  - takes 20 mg lasix at home, increase to 40 mg daily      #Severe COPD  - without AE  - cont scheduled prednisone 5 mg bid        #Chronic hypoxic resp failure  - currently stable on 4-5L NC o2     #COVID 19 Sept 2021  - woth prolonged hospital admission and high O2 demand- see dc summary form 9/2021      #Microcytic Anemia - chronic   - Hb 8.2 today  - most recently Hb in the 10s  - no evidence of bleeding   - recent iron studies wnl      #DM2  - use ssi       #Hx of PE  - AC on Coumadin  - pharmacy to dose  - INR at 3     #B cell Lymphoma  - f/w Dr. Anya Felix     #H/o Anovaginal Fistula  - noted per patient on prior admission     #Morbid Obesity  - Body mass index is 50.34 kg/m². - Complicating assessment and treatment. Placing patient at risk for multiple co-morbidities as well as early death and contributing to the patient's presentation.   - Counseled on weight loss.        DVT Prophylaxis: Warfarin- INR 2.7  Diet: ADULT DIET;  Regular; 3 carb choices (45 gm/meal)  Code Status: Full Code    Will arrange for covid vaccine before leaving  Dc home      Andrew Rubio MD, 11/19/2021 7:35 AM

## 2021-11-19 NOTE — DISCHARGE INSTR - COC
Continuity of Care Form    Patient Name: Sebastian Kaba   :  1940  MRN:  [id number]    Admit date:  2021  Discharge date:      Code Status Order: Full Code   Advance Directives:      Admitting Physician:  Thomas Morton MD  PCP: Weston Lozano, APRN - CNP    Discharging Nurse:Toshia LOVE  [address] Unit/Room#: /3997-63  Discharging Unit Phone Number: [phone]    Emergency Contact:   Extended Emergency Contact Information  Primary Emergency Contact: [facility]  Address: [address] Phone: [phone]  Relation: Child  Secondary Emergency Contact: Allison Ha [address] [place] [address] Phone: [phone]  Relation: Child    Past Surgical History:  Past Surgical History:   Procedure Laterality Date    APPENDECTOMY  1963    BRONCHOSCOPY N/A 2019    EBUS WF W/ANES.  performed by Ilya Jaramillo MD at [place] [address]  2019    BRONCHOSCOPY ALVEOLAR LAVAGE performed by Ilya Jaramillo MD at [place] [address]  2019    BRONCHOSCOPY BRUSHINGS performed by Ilya Jaramillo MD at [place] [address]  2019    BRONCHOSCOPY/TRANSBRONCHIAL LUNG BIOPSY performed by Ilya Jaramillo MD at [place] [address]  2019    BRONCHOSCOPY/TRANSBRONCHIAL NEEDLE BIOPSY performed by Ilya Jaramillo MD at [place] [address]  2019    BRONCHOSCOPY/TRANSBRONCHIAL NEEDLE BIOPSY ADDL LOBE performed by Ilya Jaramillo MD at [facility]      COLONOSCOPY N/A 2019    COLONOSCOPY POLYPECTOMY SNARE/COLD BIOPSY showed diverticulilosis performed by Mitchell Garrison MD at [address]      umbilical--Dr [address] N/A 2020    PORT INSERTION performed by Nicolasa Cervantes MD at [place] [address] B-cell lymphoma of intrathoracic lymph nodes (formerly [facility]) C83.02    Non-Hodgkin lymphoma (formerly [facility]) C85.90    PVD (peripheral vascular disease) with claudication (formerly [facility]) I73.9    Carotid stenosis I65.29    TIA (transient ischemic attack) G45.9    CHF (congestive heart failure) (formerly [facility]) I50.9    HTN (hypertension), benign I10    Dyslipidemia E78.5    History of diverticulitis Z87.19    Elevated lactic acid level R79.89    Moderate episode of recurrent major depressive disorder (formerly [facility]) F33.1    Primary insomnia F51.01    Primary osteoarthritis of both knees M17.0    Acute pain of right knee M25.561    History of pulmonary embolism Z86.711    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    Nausea R11.0    Polyp of colon K63.5    Chronic airway obstruction, not elsewhere classified J44.9    Morbid obesity (formerly [facility]) E66.01    Sleep apnea G47.30    Restless legs syndrome G25.81    Chronic back pain M54.9, G89.29    Panic disorder F41.0    Acute hypoxemic respiratory failure due to COVID-19 (formerly [facility]) U07.1, J96.01    COVID-19 U07.1    Acute on chronic respiratory failure with hypoxia and hypercapnia (formerly [facility]) J96.21, J96.22    Anticoagulated on Coumadin Z79.01    Pneumonia due to COVID-19 virus U07.1, J12.82    Acute hypoxemic respiratory failure (formerly [facility]) J96.01    COVID-19 virus infection U07.1    Atrial fibrillation with rapid ventricular response (formerly [facility]) I48.91       Isolation/Infection:   Isolation            No Isolation          Patient Infection Status       Infection Onset Added Last Indicated Last Indicated By Review Planned Expiration Resolved Resolved By    None active    Resolved    COVID-19 (Rule Out) 21 COVID-19, Rapid (Ordered)   21 Rule-Out Test Resulted    COVID-19 09/14/21 09/15/21 09/14/21 COVID-19 & Influenza Combo   10/12/21     COVID-19 (Rule Out) 09/14/21 09/14/21 09/15/21 COVID-19 & Influenza Combo (Ordered)   09/15/21 Rule-Out Test Resulted            Nurse Assessment:  Last Vital Signs: /65   Pulse 128 Temp 96.8 °F (36 °C) (Oral)   Resp 18   Ht 5' 2\" (1.575 m)   Wt 251 lb 12.8 oz (114.2 kg)   LMP  (LMP Unknown)   SpO2 93%   BMI 46.05 kg/m²     Last documented pain score (0-10 scale): Pain Level: 4  Last Weight:   Wt Readings from Last 1 Encounters:   11/19/21 251 lb 12.8 oz (114.2 kg)     Mental Status:  oriented and alert    IV Access:  - None    Nursing Mobility/ADLs:  Walking   Independent  Transfer  Independent  Bathing  Independent  Dressing  Independent  Toileting  Independent  Feeding  Independent  Med Admin  Independent  Med Delivery   whole    Wound Care Documentation and Therapy:  Wound 01/05/16 1/5/16 NO OPEN WOUND  (Active)   Number of days: 2145       Wound (Active)   Number of days:        Wound 09/26/21 Ear Left SDTI behind left ear (airvo oxygen tubing) (Active)   Wound Etiology Other 10/22/21 0830   Dressing/Treatment Open to air 11/19/21 0848   Wound Assessment Pink/red 11/19/21 0848   Drainage Amount None 11/19/21 0848   Marianela-wound Assessment Intact 11/19/21 0848   Margins Defined edges 11/19/21 0848   Number of days: 54        Elimination:  Continence: Bowel: Yes  Bladder: Yes  Urinary Catheter: None   Colostomy/Ileostomy/Ileal Conduit: No       Date of Last BM: Prior to admit    Intake/Output Summary (Last 24 hours) at 11/19/2021 1337  Last data filed at 11/19/2021 0902  Gross per 24 hour   Intake 300 ml   Output 600 ml   Net -300 ml     I/O last 3 completed shifts: In: 300 [P.O.:300]  Out: 600 [Urine:600]    Safety Concerns: At Risk for Falls    Impairments/Disabilities:      None    Nutrition Therapy:  Current Nutrition Therapy:   - Oral Diet:  General    Routes of Feeding: Oral  Liquids: Thin Liquids  Daily Fluid Restriction: no  Last Modified Barium Swallow with Video (Video Swallowing Test): not done    Treatments at the Time of Hospital Discharge:   Respiratory Treatments: see MAR  Oxygen Therapy:  is on oxygen at 4 L/min per nasal cannula.   Ventilator:    - No ventilator support    Rehab Therapies: Physical Therapy and Occupational Therapy  Weight Bearing Status/Restrictions: No weight bearing restirctions  Other Medical Equipment (for information only, NOT a DME order):  walker  Other Treatments:     Patient's personal belongings (please select all that are sent with patient):  Glasses    RN SIGNATURE:  Electronically signed by La Coley RN on 11/19/21 at 1:46 PM EST    CASE MANAGEMENT/SOCIAL WORK SECTION    Inpatient Status Date: 11/16/2021    Readmission Risk Assessment Score:  Readmission Risk              Risk of Unplanned Readmission:  34           Discharging to Facility/ Agency   Name: [facility]  Address:  Phone: [phone]  Fax: [phone] [place] [address],[facility] [address] agency to establish plan of care for patient over 60 day period   Nursing  Initial home SN evaluation visit to occur within 24-48 hours for:  medication management  VS and clinical assessment  S&S chronic disease exacerbation education + when to contact MD / NP  care coordination  Medication Reconciliation during 1st SN visit    PT/OT  Evaluations in home within 24-48 hours of discharge to include DME and home safety   Frontload therapy 5 days, then 3x a week   OT to evaluate if patient has [address] needs for personal care       PCP Visit scheduled within 3 - 7 days of hospital discharge      Telehealth-Homecare Vitals(If patient is agreeable and meets guidelines)      Dialysis Facility (if applicable)   Name:  Address:  Dialysis Schedule:  Phone:  Fax:    / signature: Electronically signed by Jaime Weinberg RN on 11/19/21 at 2:00 PM EST    PHYSICIAN SECTION    Prognosis: Good    Condition at Discharge: Stable    Rehab Potential (if transferring to Rehab):     Recommended Labs or Other Treatments After Discharge: SN, PT/OT    Physician Certification: I certify the above information and transfer of Meagan Barboza  is necessary for the continuing

## 2021-11-19 NOTE — PROGRESS NOTES
Patient resting in bed, IV x 2 and monitor removed. Patient's son at bedside with a full tank of oxygen. Request placed for patient to be taken out per wheelchair.

## 2021-11-19 NOTE — PROGRESS NOTES
Pt heart rate elevated to 181. Upon entering the room Pt was sitting on side of the bed. Pt had just returned to bed after transferring to and from [facility] [place] [place]. Pt denies any pain, dizziness. Pt short or breath. Pt oxygen in nose. Pt educated to take breaths in the nose. Call light in reach.

## 2021-11-19 NOTE — PROGRESS NOTES
Patient given discharge instructions at this time, states understanding and denies any other needs. Patient refuses to take the Shonna Koyanagi and Shonna Koyanagi vaccine on discharge. Patient's ride will be here to pick her up around 1530.

## 2021-11-19 NOTE — PROGRESS NOTES
Patient resting in bed, AM assessment completed. Lungs decreased. BS+. INT intact. O2 at 5L per NC. No acute distress noted. Call light in reach. Will continue to monitor.

## 2021-11-19 NOTE — PROGRESS NOTES
Pharmacy Note  Warfarin Consult  Dx: Afib  Goal INR range 2-3   Home Warfarin dose: 5 mg QD      Date  INR  Warfarin  11/16              3.77                  Held  11/17              3.54                   hold  11/18              2.79                   3mg  11/19              2.40                  5mg    Recommend warfarin 5mg x1  today. Daily INR ordered. Rx will continue to manage therapy per consult order.     Eleni Castillo Pharm D 11/19/202112:38 PM  .

## 2021-11-19 NOTE — PROGRESS NOTES
Physical Therapy    Patient was attempted for Physical therapy session this AM. Patient was sound a sleep and on waking her up to request for participating in PT session patient declined and asked to attempt later. Patient wanted to rest at that moment. Patient will be followed up later as schedule permits. No charge.    Brandon Le, MS PT, # [id number]

## 2021-11-20 NOTE — DISCHARGE SUMMARY
Name:  Kaylin Corado  Room:  /3285-11  MRN:    [id number]    IM Discharge Summary    Discharging Physician:  Pedro Miller MD    Admit: 11/16/2021    Discharge:  11/19/2021    PCP      TURNER Quiroz - CNP    Diagnoses and hospital course  this Admission        #Atrial fibrillation with RVR   - in patient with history of atrial fib  - initially given cardizem bolus-> gtt.  Now transitioning to PO cardizem 240 mg daily   - cont digoxin at home dose 187.5 mg daily   - cont warfarin for CVA ppx      #Acute on chronic diastolic CHF     Start on lasix 40 mg IV lasix  - good diuresis and improved symptoms  - has chronic crackles in lung with ILD  - takes 20 mg lasix at home, increase to 40 mg daily      #Severe COPD  - without AE  - cont scheduled prednisone 5 mg bid         #Chronic hypoxic resp failure  - currently stable on 4-5L NC o2     #COVID 19 Sept 2021  - woth prolonged hospital admission and high O2 demand- see dc summary form 9/2021      #Microcytic Anemia - chronic   - Hb 8.2 today  - most recently Hb in the 10s  - no evidence of bleeding   - recent iron studies wnl      #DM2  - use ssi       #Hx of PE  - AC on Coumadin  - pharmacy to dose  - INR at 3     #B cell Lymphoma  - f/w Dr. Doris Martinez     #H/o Anovaginal Fistula  - noted per patient on prior admission     #Morbid Obesity  - Body mass index is 50.34 kg/m². - Complicating assessment and treatment. Placing patient at risk for multiple co-morbidities as well as early death and contributing to the patient's presentation.   - Counseled on weight loss.         HPI   80 y.o. female with COVID 23 in Sept 2021, COPD, A fib, dCHF, h/o PE, h/o anovaginal fistula, morbid obesity  who presented to [facility] ED with complaint of shortness of breath. She was discharged to the ARU at [facility] after her Sept 2021 admit for COVID 19- since returning home from ARU she reports that she has had continued worsening of her dyspnea. Denies fever or hemoptysis.  No chest pain but has pedal edema. Compliant with chronic prednisone and 4.5 L o2 .      Found to be in a fib RVR with fluid overload. Admitted for further care     Physical Exam at Discharge:        General:  Elderly female up in bed  [facility]  Awake, alert and oriented. Appears to be not in any distress  Mucous Membranes:  Pink , anicteric  Neck: No JVD, no carotid bruit, no thyromegaly  Chest:  chronic ILD with diffuse crackles. no wheeze  Cardiovascular:  Irregular  S1S2 heard, no murmurs or gallops  Abdomen:  Soft, undistended, non tender, no organomegaly, BS present  Extremities: resolving 2+ edema to both LE .  Distal pulses well felt  Neurological : grossly normal with mild Mentasta       Radiology (Please Review Full Report for Details)     CULTURES  COVID 19, NAAT: not detected      EKG:   Atrial fibrillation with rapid ventricular response  Fusion complexes  Nonspecific ST abnormality  Abnormal ECG  When compared with ECG of 14-SEP-2021 14:01,  Criteria for Septal infarct are no longer Present  HR decreased     RADIOLOGY  XR CHEST PORTABLE   Final Result   Interval decreased consolidation in the right upper lobe, since 09/25/2021.       Otherwise, stable pattern of airspace disease, consistent with COVID   pneumonia.       Small bilateral pleural effusions.                          Recent Labs     11/18/21  0526   WBC 13.9*   HGB 8.1*   HCT 27.1*   MCV 76.3*   *       Recent Labs     11/18/21  0526      K 3.8      CO2 31   BUN 14   CREATININE 0.6       CBC:  Lab Results   Component Value Date    WBC 13.9 11/18/2021    HGB 8.1 11/18/2021    HCT 27.1 11/18/2021    MCV 76.3 11/18/2021     11/18/2021    NEUTOPHILPCT 73.8 11/18/2021    LYMPHOPCT 17.8 11/18/2021    MONOPCT 8.2 11/18/2021    EOSPCT 0.0 03/29/2012    BASOPCT 0.2 11/18/2021    NEUTROABS 10.3 11/18/2021    LYMPHSABS 2.5 11/18/2021    MONOSABS 1.1 11/18/2021    EOSABS 0.0 11/18/2021    BASOSABS 0.0 11/18/2021     BNP:   Lab Results Component Value Date     11/18/2021    K 3.8 11/18/2021    K 4.1 11/17/2021    CO2 31 11/18/2021    BUN 14 11/18/2021    CREATININE 0.6 11/18/2021    CALCIUM 8.8 11/18/2021                Medication List      START taking these medications    dilTIAZem 240 MG extended release capsule  Commonly known as: CARDIZEM CD  Take 1 capsule by mouth daily        CHANGE how you take these medications    buPROPion 150 MG extended release tablet  Commonly known as:  Wellbutrin XL  Take 1 tablet by mouth every morning  What changed: additional instructions     Digoxin 187.5 MCG Tabs  Take 200 mcg by mouth daily  What changed:   · how much to take  · additional instructions     furosemide 40 MG tablet  Commonly known as: LASIX  Take 1 tablet by mouth daily  What changed:   · medication strength  · how much to take     QUEtiapine 25 MG tablet  Commonly known as: SEROquel  Take 1 tablet by mouth nightly  What changed: additional instructions        CONTINUE taking these medications    albuterol sulfate  (90 Base) MCG/ACT inhaler  Commonly known as: Ventolin HFA  Inhale 2 puffs into the lungs every 6 hours as needed for Wheezing or Shortness of Breath     budesonide-formoterol 160-4.5 MCG/ACT Aero  Commonly known as: Symbicort  Inhale 2 puffs into the lungs 2 times daily     busPIRone 5 MG tablet  Commonly known as: BUSPAR  Take 1 tablet by mouth 3 times daily     diclofenac sodium 1 % Gel  Commonly known as: VOLTAREN  Apply 4 g topically 3 times daily     famotidine 20 MG tablet  Commonly known as: PEPCID  Take 1 tablet by mouth 2 times daily     Melatonin 5 MG Caps     metFORMIN 500 MG tablet  Commonly known as: GLUCOPHAGE  Take 1 tablet by mouth 2 times daily (with meals)     polyethylene glycol 17 g packet  Commonly known as: GLYCOLAX  Take 17 g by mouth daily     predniSONE 5 MG tablet  Commonly known as: DELTASONE  Take 1 tablet by mouth 2 times daily     TYLENOL ARTHRITIS PAIN PO     vitamin B-6 50 MG tablet  Commonly known as: PYRIDOXINE     VITAMIN D-3 PO     warfarin 5 MG tablet  Commonly known as: COUMADIN  Take as directed. If you are unsure how to take this medication, talk to your nurse or doctor. Original instructions: TAKE ONE TABLET BY MOUTH DAILY        STOP taking these medications    dilTIAZem 60 MG tablet  Commonly known as: CARDIZEM           Where to Get Your Medications      These medications were sent to [place] [address] [facility], [place] - [address] [facility] - P [phone] - F [phone]  [address] [facility] [address]    Phone: [phone]   · dilTIAZem 240 MG extended release capsule  · furosemide 40 MG tablet           Discharge Condition/Location: stable/ home     Follow Up:    PCP in 1 weeks      Time Spent on discharge is more than 28 minutes in the examination, evaluation, counseling and review of medications and discharge plan.       Jose A Paiz MD, 11/20/2021 7:46 AM

## 2021-11-22 ENCOUNTER — TELEPHONE (OUTPATIENT)
Dept: FAMILY MEDICINE CLINIC | Age: 81
End: 2021-11-22

## 2021-11-22 ENCOUNTER — ANTI-COAG VISIT (OUTPATIENT)
Dept: FAMILY MEDICINE CLINIC | Age: 81
End: 2021-11-22

## 2021-11-22 ENCOUNTER — CARE COORDINATION (OUTPATIENT)
Dept: CASE MANAGEMENT | Age: 81
End: 2021-11-22

## 2021-11-22 DIAGNOSIS — J96.21 ACUTE ON CHRONIC RESPIRATORY FAILURE WITH HYPOXIA (HCC): Primary | ICD-10-CM

## 2021-11-22 DIAGNOSIS — I26.99 OTHER PULMONARY EMBOLISM WITHOUT ACUTE COR PULMONALE, UNSPECIFIED CHRONICITY (HCC): Primary | ICD-10-CM

## 2021-11-22 LAB — INR BLD: 2.8

## 2021-11-22 PROCEDURE — 1111F DSCHRG MED/CURRENT MED MERGE: CPT | Performed by: NURSE PRACTITIONER

## 2021-11-22 NOTE — TELEPHONE ENCOUNTER
Carlos Manuel 45 Transitions Initial Follow Up Call    Outreach made within 2 business days of discharge: Yes    Patient: [facility] Patient : 1940   MRN: <[id number]>  Reason for Admission: There are no discharge diagnoses documented for the most recent discharge. Discharge Date: 21       Spoke with: [facility] department/facility: [facility] Interactive Patient Contact:  Was patient able to fill all prescriptions: Yes  Was patient instructed to bring all medications to the follow-up visit: Yes  Is patient taking all medications as directed in the discharge summary?  Yes  Does patient understand their discharge instructions: Yes  Does patient have questions or concerns that need addressed prior to 7-14 day follow up office visit: no    Scheduled appointment with PCP within 7-14 days    Follow Up  Future Appointments   Date Time Provider Allyssa Levine   2021  8:40 AM Roslyn Riggs, [address] - DYD Claude Raspberry, RN

## 2021-11-22 NOTE — CARE COORDINATION
PCP/Physician  Do you have a copy of your discharge instructions?: Yes  Do you have all of your prescriptions and are they filled?: Yes  Have you been contacted by a [address]?: No  Have you scheduled your follow up appointment?: No  Were you discharged with any Home Care or Post Acute Services: Yes  Post Acute Services: [address] (Comment: [facility] resumption of care)  Patient DME: Straight cane, Walker, Shower chair  Patient Home Equipment: Nebulizer, Oxygen  Do you have support at home?: Alone  Do you feel like you have everything you need to keep you well at home?: Yes  Are you an active caregiver in your home?: No  Care Transitions Interventions         Follow Up  No future appointments.     Iain Allen RN

## 2021-11-23 ENCOUNTER — TELEPHONE (OUTPATIENT)
Dept: FAMILY MEDICINE CLINIC | Age: 81
End: 2021-11-23

## 2021-11-23 NOTE — TELEPHONE ENCOUNTER
Twin Schroeder (AQ#[phone]) w/ [facility] called and stated she did a resumption of care on this patient and would like to know if Ann Noguera will sign the home care orders.

## 2021-11-29 ENCOUNTER — CARE COORDINATION (OUTPATIENT)
Dept: CASE MANAGEMENT | Age: 81
End: 2021-11-29

## 2021-11-29 NOTE — CARE COORDINATION
Carlos Manuel  Transitions Follow Up Call    2021    Patient: Sloane Rodriguez  Patient : 1940   MRN: [id number]  Reason for Admission: A Fib with RVR, acute on chronic dCHF, severe COPD without AE, chronic hypoxic resp failure (4-5lpm), COVID-19 in 2021, microcytic anemia chronic, DM2, hx PE, B cell lymphoma, hx anovaginal fistula, morbid obesity. Discharge Date: 21 RARS: Readmission Risk Score: 25.4 ( )         Spoke with: Sloane Rodriguez (patient)    Reports she has had some ups and downs since home. Her HR elevates with activity and she feels weak and breathless. States it relieves with rest. She thinks SN will be there tomorrow. Has TCM visit on Thursday. She is on Coumadin and has home Coag Check machine. She has not checked her INR since first day home from hospital. Weight stable within 2# of 252#. When up to bathroom she gets \"breathless\" and says this morning her HR elevated as high as 144bpm. Her cardiologist is Dr Jez Martel. She does not have future appointment. States she plans to call for appointment with Dr Amy Noble. She denies cp, palpitations, lightheadedness, dizziness at this time and during episodes of elevated HR - \"I just get weak and breathless\". States she does breathing tx or uses her rescue inhaler and says they help \"some\". SaO2 90-96% with oxygen at 5lpm. She adjusts to 4.5lpm as tolerated. Reviewed cardiac meds and she is taking as prescribed. Unsure now if tomorrow's [facility]. visit is SN or therapy. CTN will contact [facility] and confirm there is a visit tomorrow for SN and/or therapy. Reviewed s/s that require a medical emergency.      CHF education:  -weigh daily in the morning at the same time and in the same clothing  -report weight gain of >3#/day or >5#/week  -report increased SOB, edema, abd fullness, difficulty lying flat  -report palpitations, cough, difficulty urinating  -eat a low sodium diet and limit fluid intake to 64 ounces daily     Instructed her to take her medications same time every day. She will continue to log her VS and weights and take log to appt with PCP on Thursday as scheduled - date/time reviewed. She voices understanding. Outreach to [facility]. No answer at their office number ([phone]) x two attempts. Outreach to covering carola Davis to check if SN and/or PT visit scheduled for tomorrow and ask for it to not be bumped, notify her of PCP OV and request SN if it is not already scheduled. Chart routing to PCP as FYI. Care Transitions Subsequent and Final Call    Schedule Follow Up Appointment with PCP: Completed  Subsequent and Final Calls  Do you have any ongoing symptoms?: Yes  Onset of Patient-reported symptoms: Other  Patient-reported symptoms: Other  Interventions for patient-reported symptoms: Notified Home Care  Have your medications changed?: No  Do you have any questions related to your medications?: No  Do you currently have any active services?: Yes  Are you currently active with any services?: Home Health  Do you have any needs or concerns that I can assist you with?: No  Identified Barriers: Impairment  Care Transitions Interventions     Other Services: Completed   Other Interventions:            Follow Up  Future Appointments   Date Time Provider Allyssa Levine   12/2/2021  8:40 AM TURNER Parmar - CNP [facility] Angelica Duncan RN

## 2021-12-01 ENCOUNTER — TELEPHONE (OUTPATIENT)
Dept: FAMILY MEDICINE CLINIC | Age: 81
End: 2021-12-01

## 2021-12-01 NOTE — TELEPHONE ENCOUNTER
Radha with [address] called stating that pt's HR has been running anywhere from 114-130. States that while on the phone with pt today it was 144, pt took medication, and it went down to 114. States pt was recently Dx with Afib and wanting to know what numbers she should be concerned about. States pt did mention she been really tired, no chest pain. Call back pt [phone].  Call back Merced Meek [phone]

## 2021-12-01 NOTE — TELEPHONE ENCOUNTER
[facility] detailed for patient.  Called [address] [place] [address] and informed as well

## 2021-12-01 NOTE — TELEPHONE ENCOUNTER
Typically a pulse below 120 is fine however patient should contact her cardiologist to inform them of this information to see if they want to make any medication changes before they see her

## 2021-12-06 ENCOUNTER — OFFICE VISIT (OUTPATIENT)
Dept: FAMILY MEDICINE CLINIC | Age: 81
End: 2021-12-06
Payer: MEDICARE

## 2021-12-06 VITALS
OXYGEN SATURATION: 95 % | BODY MASS INDEX: 47.48 KG/M2 | HEART RATE: 104 BPM | SYSTOLIC BLOOD PRESSURE: 124 MMHG | HEIGHT: 62 IN | DIASTOLIC BLOOD PRESSURE: 62 MMHG | WEIGHT: 258 LBS

## 2021-12-06 DIAGNOSIS — C83.02 SMALL B-CELL LYMPHOMA OF INTRATHORACIC LYMPH NODES (HCC): ICD-10-CM

## 2021-12-06 DIAGNOSIS — I50.32 CHRONIC DIASTOLIC CONGESTIVE HEART FAILURE (HCC): ICD-10-CM

## 2021-12-06 DIAGNOSIS — J44.9 COPD, SEVERE (HCC): ICD-10-CM

## 2021-12-06 DIAGNOSIS — Z86.711 HISTORY OF PULMONARY EMBOLISM: ICD-10-CM

## 2021-12-06 DIAGNOSIS — J44.1 COPD EXACERBATION (HCC): ICD-10-CM

## 2021-12-06 DIAGNOSIS — J12.82 PNEUMONIA DUE TO COVID-19 VIRUS: ICD-10-CM

## 2021-12-06 DIAGNOSIS — U07.1 PNEUMONIA DUE TO COVID-19 VIRUS: ICD-10-CM

## 2021-12-06 DIAGNOSIS — I48.0 PAROXYSMAL ATRIAL FIBRILLATION (HCC): Primary | ICD-10-CM

## 2021-12-06 DIAGNOSIS — J96.11 CHRONIC RESPIRATORY FAILURE WITH HYPOXIA (HCC): ICD-10-CM

## 2021-12-06 DIAGNOSIS — I48.91 ATRIAL FIBRILLATION WITH RAPID VENTRICULAR RESPONSE (HCC): ICD-10-CM

## 2021-12-06 DIAGNOSIS — Z09 HOSPITAL DISCHARGE FOLLOW-UP: ICD-10-CM

## 2021-12-06 DIAGNOSIS — D50.9 MICROCYTIC ANEMIA: ICD-10-CM

## 2021-12-06 DIAGNOSIS — D64.9 LOW HEMOGLOBIN: ICD-10-CM

## 2021-12-06 DIAGNOSIS — E11.9 NEW ONSET TYPE 2 DIABETES MELLITUS (HCC): ICD-10-CM

## 2021-12-06 LAB
INTERNATIONAL NORMALIZATION RATIO, POC: 2.6
PROTHROMBIN TIME, POC: NORMAL

## 2021-12-06 PROCEDURE — 4040F PNEUMOC VAC/ADMIN/RCVD: CPT | Performed by: NURSE PRACTITIONER

## 2021-12-06 PROCEDURE — 1036F TOBACCO NON-USER: CPT | Performed by: NURSE PRACTITIONER

## 2021-12-06 PROCEDURE — G8427 DOCREV CUR MEDS BY ELIG CLIN: HCPCS | Performed by: NURSE PRACTITIONER

## 2021-12-06 PROCEDURE — 1111F DSCHRG MED/CURRENT MED MERGE: CPT | Performed by: NURSE PRACTITIONER

## 2021-12-06 PROCEDURE — 36415 COLL VENOUS BLD VENIPUNCTURE: CPT | Performed by: NURSE PRACTITIONER

## 2021-12-06 PROCEDURE — 1090F PRES/ABSN URINE INCON ASSESS: CPT | Performed by: NURSE PRACTITIONER

## 2021-12-06 PROCEDURE — 85610 PROTHROMBIN TIME: CPT | Performed by: NURSE PRACTITIONER

## 2021-12-06 PROCEDURE — 3023F SPIROM DOC REV: CPT | Performed by: NURSE PRACTITIONER

## 2021-12-06 PROCEDURE — G8484 FLU IMMUNIZE NO ADMIN: HCPCS | Performed by: NURSE PRACTITIONER

## 2021-12-06 PROCEDURE — 1123F ACP DISCUSS/DSCN MKR DOCD: CPT | Performed by: NURSE PRACTITIONER

## 2021-12-06 PROCEDURE — G8417 CALC BMI ABV UP PARAM F/U: HCPCS | Performed by: NURSE PRACTITIONER

## 2021-12-06 PROCEDURE — G8400 PT W/DXA NO RESULTS DOC: HCPCS | Performed by: NURSE PRACTITIONER

## 2021-12-06 PROCEDURE — G8926 SPIRO NO PERF OR DOC: HCPCS | Performed by: NURSE PRACTITIONER

## 2021-12-06 PROCEDURE — 99214 OFFICE O/P EST MOD 30 MIN: CPT | Performed by: NURSE PRACTITIONER

## 2021-12-06 RX ORDER — BLOOD PRESSURE TEST KIT
KIT MISCELLANEOUS
Qty: 100 EACH | Refills: 11 | Status: SHIPPED | OUTPATIENT
Start: 2021-12-06

## 2021-12-06 RX ORDER — LANCETS 30 GAUGE
EACH MISCELLANEOUS
Qty: 100 EACH | Refills: 11 | Status: SHIPPED | OUTPATIENT
Start: 2021-12-06

## 2021-12-06 RX ORDER — GLUCOSAMINE HCL/CHONDROITIN SU 500-400 MG
CAPSULE ORAL
Qty: 100 STRIP | Refills: 11 | Status: SHIPPED | OUTPATIENT
Start: 2021-12-06

## 2021-12-06 NOTE — PROGRESS NOTES
Post-Discharge Transitional Care Management Services or Hospital Follow Up      [place]   YOB: 1940    Date of Office Visit:  12/6/2021  Date of Hospital Admission: 11/16/21  Date of Hospital Discharge: 11/19/21  Risk of hospital readmission (high >=14%.  Medium >=10%) :Readmission Risk Score: 25.4 ( )      Care management risk score Rising risk (score 2-5) and Complex Care (Scores >=6): 11     Non face to face  following discharge, date last encounter closed (first attempt may have been earlier): 11/22/2021  1:19 PM    Call initiated 2 business days of discharge: Yes    Patient Active Problem List   Diagnosis    COPD exacerbation    Chronic respiratory failure with hypoxia ([address]    Anxiety    Hyperlipidemia    Diverticulosis    COPD, severe ([address]    Vitamin D deficiency    Atrial fibrillation with RVR ([address]    Other pulmonary embolism without acute cor pulmonale (HCC)    Venous (peripheral) insufficiency    Lymphedema    Chronic angle-closure glaucoma of both eyes    Arthritis of hips and knees    Shortness of breath    Diastolic dysfunction    Acute on chronic respiratory failure with hypoxia (HCC)    Bilateral edema of lower extremity    Angina pectoris (HCC)    Paroxysmal atrial fibrillation ([address]    Erythrocytosis    IFG (impaired fasting glucose)    Lung mass    Mediastinal lymphadenopathy    Atrial fibrillation, chronic (HCC)    Pulmonary nodule    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)    Non-Hodgkin lymphoma ([address]    PVD (peripheral vascular disease) with claudication ([address]    Carotid stenosis    TIA (transient ischemic attack)    CHF (congestive heart failure) (HCC)    HTN (hypertension), benign    Dyslipidemia    History of diverticulitis    Elevated lactic acid level    Moderate episode of recurrent major depressive disorder (HCC)    Primary insomnia    Primary osteoarthritis of both knees    Acute pain of right knee  History of pulmonary embolism    Colovaginal fistula    Nausea    Polyp of colon    Chronic airway obstruction, not elsewhere classified    Morbid obesity ([facility]    Sleep apnea    Restless legs syndrome    Chronic back pain    Panic disorder    Acute hypoxemic respiratory failure due to COVID-19 ([facility]    COVID-19    Acute on chronic respiratory failure with hypoxia and hypercapnia (HCC)    Anticoagulated on Coumadin    Pneumonia due to COVID-19 virus    Acute hypoxemic respiratory failure (HCC)    COVID-19 virus infection    Atrial fibrillation with rapid ventricular response (HCC)       Allergies   Allergen Reactions    Crestor [Rosuvastatin]      Muscle aches    Levofloxacin Other (See Comments)     Achiness    Lipitor      Muscle aches    Rocephin In Dextrose [Ceftriaxone Sodium In Dextrose]      Rash,itching    Zocor [Simvastatin]      Muscle aches    Codeine Itching and Nausea And Vomiting    Pravastatin Nausea And Vomiting       Medications listed as ordered at the time of discharge from hospital      Medications marked \"taking\" at this time  Outpatient Medications Marked as Taking for the 12/6/21 encounter (Office Visit) with TURNER Cordero CNP   Medication Sig Dispense Refill    blood glucose monitor kit and supplies Use once daily when checking fasting glucose 1 kit 0    Lancets MISC Use once daily when checking fasting glucose 100 each 11    blood glucose monitor strips Check fasting glucose once daily 100 strip 11    Alcohol Swabs PADS Use daily when checking fasting glucose level 100 each 11    furosemide (LASIX) 40 MG tablet Take 1 tablet by mouth daily 90 tablet 0    dilTIAZem (CARDIZEM CD) 240 MG extended release capsule Take 1 capsule by mouth daily 30 capsule 3    metFORMIN (GLUCOPHAGE) 500 MG tablet Take 1 tablet by mouth 2 times daily (with meals) 60 tablet 5    albuterol sulfate HFA (VENTOLIN HFA) 108 (90 Base) MCG/ACT inhaler Inhale 2 puffs into the lungs every 6 hours as needed for Wheezing or Shortness of Breath 1 each 1    budesonide-formoterol (SYMBICORT) 160-4.5 MCG/ACT AERO Inhale 2 puffs into the lungs 2 times daily 10.2 g 5    digoxin 187.5 MCG TABS Take 200 mcg by mouth daily (Patient taking differently: Take 125 mcg by mouth daily Take 1!/2 tablets daily) 30 tablet 3    diclofenac sodium (VOLTAREN) 1 % GEL Apply 4 g topically 3 times daily 100 g 1    famotidine (PEPCID) 20 MG tablet Take 1 tablet by mouth 2 times daily 60 tablet 3    busPIRone (BUSPAR) 5 MG tablet Take 1 tablet by mouth 3 times daily (Patient taking differently: Take 5 mg by mouth 3 times daily Takes prn) 90 tablet 0    warfarin (COUMADIN) 5 MG tablet TAKE ONE TABLET BY MOUTH DAILY 90 tablet 2    QUEtiapine (SEROQUEL) 25 MG tablet Take 1 tablet by mouth nightly (Patient taking differently: Take 25 mg by mouth nightly As needed) 90 tablet 0    buPROPion (WELLBUTRIN XL) 150 MG extended release tablet Take 1 tablet by mouth every morning (Patient taking differently: Take 150 mg by mouth every morning Takes as needed) 90 tablet 1    Melatonin 5 MG CAPS Take 1 capsule by mouth nightly as needed      polyethylene glycol (GLYCOLAX) 17 g packet Take 17 g by mouth daily 30 each 1    vitamin B-6 (PYRIDOXINE) 50 MG tablet Take by mouth daily      Acetaminophen (TYLENOL ARTHRITIS PAIN PO) Take by mouth      Cholecalciferol (VITAMIN D-3 PO) Take 2,000 Units by mouth daily           Medications patient taking as of now reconciled against medications ordered at time of hospital discharge: Yes    Chief Complaint   Patient presents with    Follow-Up from Hospital       History of Present illness - Follow up of Hospital diagnosis(es):   1. Paroxysmal atrial fibrillation (HCC)    2. Low hemoglobin    3. New onset type 2 diabetes mellitus ([facility]    4. Pneumonia due to COVID-19 virus    5. COPD exacerbation    6. COPD, severe ([facility]    7.  Chronic respiratory failure with hypoxia (HCC) 8. Chronic diastolic congestive heart failure ([facility]    9. Atrial fibrillation with rapid ventricular response (HCC)    10. Microcytic anemia    11. History of pulmonary embolism    12. 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 Interval history/Current status:   She presented to ER with COVID symptoms on 9/12. Tested positive on 9/14. Patient admitted to ICU and treated for COVID 19 Pneumonia. Completed remdesevir and Tocilizumab. She was admitted from 914 until 9/29/2021  On 9/29/2021 [facility] management of her respiratory status  The patient was then discharged on 10/18/2021 and sent to [facility] inpatient PM&R. Patient was discharged on 10/29/2021  Her most recent hospitalization was 3/16/2021 with a discharge date of 11/19/2021. Patient was admitted for A. fib and was transitioned to Cardizem 240 mg daily. She is continuing on digoxin and Coumadin    The patient states that she still has no taste or smell. She lost it back in September when she was diagnosed with Covid. The patient denies any unusual weight loss. She is still eating well. She states that she has had some weight gain over time. She states that she is doing daily weights and has not had more than 2 to 3 pounds in 24 hours or 5 pounds in 1 week. Patient does state that she still needs to schedule follow-up with her pulmonologist Dr. Shira Medina. Upon coming into the exam room today her oxygen saturation was 63% on 2-1/2 L. After resting and putting the patient on 4-1/2 L it did come up to 95%. The patient states that she is using 5 L per nasal cannula at home continuously. The patient did have an INR today and it is 2.6. The patient will continue the same Coumadin dosage and recheck in 2 weeks. The patient denies any active bleeding.   She states that occasionally she has a little blood in her stool related to hemorrhoids. She states this is intermittent. She denies any rectal bleeding over the past week. The patient does appear pale today. She does have hemolytic anemia chronically. Her hemoglobin in the hospital was 8.2. Will recheck CBC today    The patient's Lasix was increased from 20 mg to 40 mg daily. A BMP will be checked today    The patient states that she will be getting a Covid vaccine within the next month. The patient did have a history of impaired fasting glucose however does appear that she did become diagnosed with new onset diabetes with a hemoglobin A1c of 6.7 on 11/17/2021. Patient was getting large amounts of steroids and was put on insulin in the hospital.  Since discharge she has only been taking Metformin. Patient's insulin was discontinued upon hospital discharge  The patient has not been checking her glucose levels because she does not have any meter or supplies. A comprehensive review of systems was negative except for what was noted in the HPI. Vitals:    12/06/21 1033   BP: 124/62   Site: Left Upper Arm   Position: Sitting   Cuff Size: Large Adult   Pulse: 104   SpO2: 95%   Weight: 258 lb (117 kg)   Height: 5' 2\" (1.575 m)     Body mass index is 47.19 kg/m².    Wt Readings from Last 3 Encounters:   12/06/21 258 lb (117 kg)   11/19/21 251 lb 12.8 oz (114.2 kg)   10/24/21 247 lb 8 oz (112.3 kg)     BP Readings from Last 3 Encounters:   12/06/21 124/62   11/19/21 (!) 144/66   10/29/21 107/74        Physical Exam:  General Appearance: pale alert and oriented to person, place and time, well developed and well- nourished, in no acute distress, o2 per NC  Skin: warm and dry, no rash or erythema  Head: normocephalic and atraumatic  Eyes: pupils equal, round, and reactive to light, extraocular eye movements intact, conjunctivae normal  ENT: tympanic membrane, external ear and ear canal normal bilaterally, nose without deformity, nasal mucosa and turbinates normal without polyps  Neck: supple and non-tender without mass, no thyromegaly or thyroid nodules, no cervical lymphadenopathy  Pulmonary/Chest:  no wheezes, rales or rhonchi,diminished air movement, no respiratory distress  Cardiovascular: normal rate, regular rhythm, normal S1 and S2, no murmurs, rubs, clicks, or gallops, distal pulses intact, no carotid bruits  Abdomen: soft, non-tender, non-distended, normal bowel sounds, no masses or organomegaly  Extremities: no cyanosis, clubbing or edema  Musculoskeletal: normal range of motion, no joint swelling, deformity or tenderness  Neurologic: reflexes normal and symmetric, no cranial nerve deficit, gait, coordination and speech normal    Assessment/Plan:  1. Paroxysmal atrial fibrillation (HCC)  The patient denies abnormal bruising or abnormal bleeding from any body orifice such as bleeding from nose or gums, blood in urine or stool, or melena, hemoptysis or hematemesis. - POCT INR  continue her current Coumadin regimen and recheck INR in 2 weeks  - MD DISCHARGE MEDS RECONCILED W/ CURRENT OUTPATIENT MED LIST    2. Low hemoglobin  check  - CBC Auto Differential  - MD DISCHARGE MEDS RECONCILED W/ CURRENT OUTPATIENT MED LIST    3. New onset type 2 diabetes mellitus (HCC)  Continue current medication regimen  - Basic Metabolic Panel  - blood glucose monitor kit and supplies; Use once daily when checking fasting glucose  Dispense: 1 kit; Refill: 0  - Lancets MISC; Use once daily when checking fasting glucose  Dispense: 100 each; Refill: 11  - blood glucose monitor strips; Check fasting glucose once daily  Dispense: 100 strip; Refill: 11  - Alcohol Swabs PADS; Use daily when checking fasting glucose level  Dispense: 100 each; Refill: 11  - MD DISCHARGE MEDS RECONCILED W/ CURRENT OUTPATIENT MED LIST    4. Pneumonia due to COVID-19 virus  Follow up with pulmo  - MD DISCHARGE MEDS RECONCILED W/ CURRENT OUTPATIENT MED LIST    5.  COPD exacerbation  - MD DISCHARGE MEDS RECONCILED W/ CURRENT OUTPATIENT MED LIST    6. COPD, severe ([facility]  - [place] DISCHARGE MEDS RECONCILED W/ CURRENT OUTPATIENT MED LIST    7. Chronic respiratory failure with hypoxia (HCC)  - [place] DISCHARGE MEDS RECONCILED W/ CURRENT OUTPATIENT MED LIST    8. Chronic diastolic congestive heart failure (HCC)  - [place] DISCHARGE MEDS RECONCILED W/ CURRENT OUTPATIENT MED LIST  · Limit sodium to 2,000 milligrams (mg) a day or less (less than 1 teaspoon of salt a day), including all the salt you eat in cooking or in packaged foods. ·  avoid processed foods (they are high in sodium)  · Avoid fast food and restaurant meals because they tend to be very high in sodium. · Fluid restriction:  None at this time  · Weigh self without clothing at the same time each day. · Record  Weight and bring to office visits   · Call  if you have a sudden weight gain, such as more than 2 to 3 pounds in a day or 5 pounds in a week. ·  A sudden weight gain may mean that your heart failure is getting worse. · If you smoke you should quit  · Limit alcohol to 2 drinks a day for men and 1 drink a day for women. · Avoid getting sick from colds and the flu. Get a pneumococcal vaccine and influenza vaccine. 9. Atrial fibrillation with rapid ventricular response (HCC)  - [place] DISCHARGE MEDS RECONCILED W/ CURRENT OUTPATIENT MED LIST    10. Microcytic anemia  - [place] DISCHARGE MEDS RECONCILED W/ CURRENT OUTPATIENT MED LIST    11. History of pulmonary embolism  - [place] DISCHARGE MEDS RECONCILED W/ CURRENT OUTPATIENT MED LIST    12. 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 Hospital discharge follow-up  Extensively reviewed and discussed recent in patient hospital records, labs, imaging and consults with patient, discussed HPI and Plan, coordinated care and patient counseling provided at today's visit    - [place] DISCHARGE MEDS RECONCILED W/ CURRENT OUTPATIENT MED LIST      Pt.  Does require assistance with medication set-up, monitoring of vitals, and monitoring of condition to discover changes in condition requiring pre-emptive action to prevent deterioration/hospitalization. Extensively reviewed and discussed recent in patient hospital records, labs, imaging and consults with patient, discussed HPI and Plan, coordinated care and patient counseling provided at today's visit  Pt. Does require assistance with medication set-up, monitoring of vitals, and monitoring of condition to discover changes in condition requiring pre-emptive action to prevent deterioration/hospitalization.       Medical Decision Making: high complexity

## 2021-12-07 DIAGNOSIS — R79.89 ABNORMAL CBC: Primary | ICD-10-CM

## 2021-12-07 LAB
ANION GAP SERPL CALCULATED.3IONS-SCNC: 12 MMOL/L (ref 3–16)
BASOPHILS ABSOLUTE: 0.1 K/UL (ref 0–0.2)
BASOPHILS RELATIVE PERCENT: 1.2 %
BUN BLDV-MCNC: 13 MG/DL (ref 7–20)
CALCIUM SERPL-MCNC: 8.5 MG/DL (ref 8.3–10.6)
CHLORIDE BLD-SCNC: 95 MMOL/L (ref 99–110)
CO2: 34 MMOL/L (ref 21–32)
CREAT SERPL-MCNC: 0.7 MG/DL (ref 0.6–1.2)
EOSINOPHILS ABSOLUTE: 0.1 K/UL (ref 0–0.6)
EOSINOPHILS RELATIVE PERCENT: 1.3 %
GFR AFRICAN AMERICAN: >60
GFR NON-AFRICAN AMERICAN: >60
GLUCOSE BLD-MCNC: 193 MG/DL (ref 70–99)
HCT VFR BLD CALC: 26.8 % (ref 36–48)
HEMOGLOBIN: 7.8 G/DL (ref 12–16)
LYMPHOCYTES ABSOLUTE: 2 K/UL (ref 1–5.1)
LYMPHOCYTES RELATIVE PERCENT: 25 %
MCH RBC QN AUTO: 21.2 PG (ref 26–34)
MCHC RBC AUTO-ENTMCNC: 29.2 G/DL (ref 31–36)
MCV RBC AUTO: 72.7 FL (ref 80–100)
MONOCYTES ABSOLUTE: 1 K/UL (ref 0–1.3)
MONOCYTES RELATIVE PERCENT: 12.4 %
NEUTROPHILS ABSOLUTE: 4.8 K/UL (ref 1.7–7.7)
NEUTROPHILS RELATIVE PERCENT: 60.1 %
PDW BLD-RTO: 19.7 % (ref 12.4–15.4)
PLATELET # BLD: 413 K/UL (ref 135–450)
PMV BLD AUTO: 7.2 FL (ref 5–10.5)
POTASSIUM SERPL-SCNC: 3.7 MMOL/L (ref 3.5–5.1)
RBC # BLD: 3.69 M/UL (ref 4–5.2)
SODIUM BLD-SCNC: 141 MMOL/L (ref 136–145)
WBC # BLD: 8.1 K/UL (ref 4–11)

## 2021-12-08 ENCOUNTER — CARE COORDINATION (OUTPATIENT)
Dept: CASE MANAGEMENT | Age: 81
End: 2021-12-08

## 2021-12-08 ENCOUNTER — TELEPHONE (OUTPATIENT)
Dept: FAMILY MEDICINE CLINIC | Age: 81
End: 2021-12-08

## 2021-12-08 ENCOUNTER — ANTI-COAG VISIT (OUTPATIENT)
Dept: FAMILY MEDICINE CLINIC | Age: 81
End: 2021-12-08

## 2021-12-08 ENCOUNTER — TELEPHONE (OUTPATIENT)
Dept: PULMONOLOGY | Age: 81
End: 2021-12-08

## 2021-12-08 DIAGNOSIS — I27.82 OTHER CHRONIC PULMONARY EMBOLISM WITHOUT ACUTE COR PULMONALE (HCC): Primary | ICD-10-CM

## 2021-12-08 DIAGNOSIS — R79.89 ABNORMAL CBC: ICD-10-CM

## 2021-12-08 LAB
FERRITIN: 23 NG/ML (ref 15–150)
FOLATE: 7.56 NG/ML (ref 4.78–24.2)
INR BLD: 2.7
INR, EXTERNAL: 2.7
IRON SATURATION: 6 % (ref 15–50)
IRON: 22 UG/DL (ref 37–145)
PT, EXTERNAL: 0
TOTAL IRON BINDING CAPACITY: 348 UG/DL (ref 260–445)
VITAMIN B-12: 491 PG/ML (ref 211–911)

## 2021-12-08 NOTE — TELEPHONE ENCOUNTER
Patient called requesting hospital fu. Patient was d/c 11/19/21. Former patient of Dr Bruce Payan. Please advise. Assessment:4/28/21   -Chronic Obstructive Pulmonary Disease severe with AE  -Chronic Respiratory Failure - hypoxemic, Stable  - Dyspnea-severe  -Erythrocytosis- likely secondary to chronic hypoxemia; myeloproliferative disease also possible, f/b Hem/Onc previously  - bilateral PEs 6/15  - obesity- persistent  LE edema  - B cell lymphoma in chest- improving on CT 3/30/20        Plan:   - pred taper and doxy x 7 days   - Inhaled bronchodilator therapy -  Albuterol mdi and  nebs prn. Could not afford spiriva, tudorza- did not tolerate because of vision changes  - Patient is up to date with Pneumococcal vaccine   - Continue home O2 at night 4l and 6Lwith exertion,- OCD for supplemental O2 ;  Based upon repeat 6MWT.    - has handicap placard  - recommend lifelong anticoagulation- remains on coumadin  - had flu vaccine 9/20  - Covid vaccine ASAP  - Oncology follow up- CT f/u per them

## 2021-12-08 NOTE — CARE COORDINATION
[facility] Transitions Follow Up Call    2021    Patient: Jazmin Jo  Patient : 1940   MRN: [id number]  Reason for Admission: A Fib with RVR, acute on chronic dCHF, severe COPD without AE, chronic hypoxic resp failure (4-5lpm), COVID-19 in 2021, microcytic anemia chronic, DM2, hx PE, B cell lymphoma, hx anovaginal fistula, morbid obesity. Discharge Date: 21 RARS: Readmission Risk Score: 25.4 ( )         Spoke with: Jazmin Jo (patient)    States [facility] nurse just left her home about 30 min ago. States her oxygen \"was low\" and her heart rate was \"I think 145 but it dropped right away\". States she is wearing oxygen at 5lpm continuous. She has home pulse oximeter. She said she monitored SaO2 after nurse left and was 89% and increasing. States she will get high heart rate and oxygen will drop \"real low\" (documented in 63% on 2.5lpm at TCM visit earlier this week). We discussed that she needs to see Dr Rose Garcia sooner than scheduled if he can allow. She uses [employer] or a friend for transportation. She is in no distress during call. Denies cp, palpitations. States she didn't know she has A Fib. Taking digoxin 187.5mg and diltiazem 240mg as listed. She declines CTN contacting Dr Rose Garcia at this time for a sooner appt because she feels she has a lot of appointments. States the [facility] nurse is contacting Dr Phoebe Torres office to get her an appt. CTN will notify Rose Garcia of her elevated HR with decreased SaO2 and her preference to wait until seen on 2022 as scheduled. She declines needs at this time.      Care Transitions Subsequent and Final Call    Schedule Follow Up Appointment with PCP: Completed  Subsequent and Final Calls  Do you have any ongoing symptoms?: Yes  Onset of Patient-reported symptoms: Other  Patient-reported symptoms: Other  Interventions for patient-reported symptoms: Notified PCP/Physician  Have your medications changed?: No  Do you have any questions related to your medications?: No  Do you currently have any active services?: Yes  Are you currently active with any services?: Home Health  Do you have any needs or concerns that I can assist you with?: No  Identified Barriers: Other, Lack of Support  Care Transitions Interventions     Other Services: Completed   Other Interventions:            Follow Up  Future Appointments   Date Time Provider Allyssa Levine   1/31/2022  2:45 PM Onur Rivas MD [facility] Caballero RN

## 2021-12-08 NOTE — TELEPHONE ENCOUNTER
Marly Gamble from [address] [place] [address] called and stated that pt in the last 5 days has gained 3 lbs. And her legs and feet are swollen, feeling full in abdomen. Her heart reate has been 109 to 140.

## 2021-12-09 ENCOUNTER — TELEPHONE (OUTPATIENT)
Dept: CARDIOLOGY CLINIC | Age: 81
End: 2021-12-09

## 2021-12-09 ENCOUNTER — CARE COORDINATION (OUTPATIENT)
Dept: CASE MANAGEMENT | Age: 81
End: 2021-12-09

## 2021-12-09 NOTE — TELEPHONE ENCOUNTER
I sent a prescription for metoprolol 25mg twice daily to her drug store for rapid heart rate  Continue all previous medications. per Dr. Britni Husain for patient to call back

## 2021-12-09 NOTE — CARE COORDINATION
[facility]    2021    Patient: Kristyn Cruz  Patient : 1940   MRN: [id number]  Reason for Admission: A Fib with RVR, acute on chronic dCHF, severe COPD without AE, chronic hypoxic resp failure (4-5lpm), COVID-19 in 2021, microcytic anemia chronic, DM2, hx PE, B cell lymphoma, hx anovaginal fistula, morbid obesity. Discharge Date: 21 RARS: Readmission Risk Score: 25.4 ( )       Received recommendation back from Dr Sukh Gan: Rainer Merritt sent a prescription for metoprolol 25mg twice daily to her drug store for rapid heart rate  Continue all previous medications. \"    Outreach to patient to notify her. Message left with information and CTN call back number. Outreach to [address] [facility] to notify her of new med and dose. Reviewed also that Lasix was increased to BID x 3 days yesterday after [facility]. nurse call to PCP office per notes and patient was notified. Lyly Weber will also follow up with patient/son. Son fills mediset. CTN will attempt follow up again tomorrow if no call back.      Follow Up  Future Appointments   Date Time Provider Allyssa Levine   2022  2:45 PM Keny Case MD [facility] Erwin RN

## 2021-12-09 NOTE — TELEPHONE ENCOUNTER
I sent a prescription for metoprolol 25mg twice daily to her drug store for rapid heart rate  Continue all previous medications.

## 2021-12-13 LAB — INR BLD: 2.3

## 2021-12-14 ENCOUNTER — TELEPHONE (OUTPATIENT)
Dept: PULMONOLOGY | Age: 81
End: 2021-12-14

## 2021-12-14 ENCOUNTER — ANTI-COAG VISIT (OUTPATIENT)
Dept: FAMILY MEDICINE CLINIC | Age: 81
End: 2021-12-14

## 2021-12-14 ENCOUNTER — CARE COORDINATION (OUTPATIENT)
Dept: CASE MANAGEMENT | Age: 81
End: 2021-12-14

## 2021-12-14 DIAGNOSIS — D50.9 IRON DEFICIENCY ANEMIA, UNSPECIFIED IRON DEFICIENCY ANEMIA TYPE: ICD-10-CM

## 2021-12-14 DIAGNOSIS — D50.9 IRON DEFICIENCY ANEMIA, UNSPECIFIED IRON DEFICIENCY ANEMIA TYPE: Primary | ICD-10-CM

## 2021-12-14 DIAGNOSIS — I26.99 OTHER PULMONARY EMBOLISM WITHOUT ACUTE COR PULMONALE, UNSPECIFIED CHRONICITY (HCC): Primary | ICD-10-CM

## 2021-12-14 RX ORDER — FERROUS SULFATE 325(65) MG
325 TABLET ORAL
COMMUNITY
End: 2021-12-14 | Stop reason: SDUPTHER

## 2021-12-14 RX ORDER — FERROUS SULFATE 325(65) MG
325 TABLET ORAL
Qty: 30 TABLET | Refills: 5 | Status: SHIPPED | OUTPATIENT
Start: 2021-12-14

## 2021-12-14 NOTE — CARE COORDINATION
[facility] Transitions Follow Up Call    2021    Patient: Sloane Rodrgiuez  Patient : 1940   MRN: [id number]  Reason for Admission: A Fib with RVR, acute on chronic dCHF, severe COPD without AE, chronic hypoxic resp failure (4-5lpm), COVID-19 in 2021, microcytic anemia chronic, DM2, hx PE, B cell lymphoma, hx anovaginal fistula, morbid obesity. Discharge Date: 21 RARS: Readmission Risk Score: 25.4 ( )       Unable to reach patient by phone at this time. Message left including CTN contact info for return call. No further CTN outreach scheduled at this time.        Follow Up  Future Appointments   Date Time Provider Allyssa Levine   2021 10:30 AM Jerman Salas MD [facility]   2022  2:45 PM Torri Liu MD P [facility]       Andrea Burgess RN

## 2021-12-14 NOTE — TELEPHONE ENCOUNTER
Patient advised on Nelly's message and will start the iron supplement. Anemia added to the problem and orders for repeat labs were placed and patient will call back to schedule a lab appt in 3 months. Will mail stool cards to patient.

## 2021-12-15 ENCOUNTER — TELEPHONE (OUTPATIENT)
Dept: FAMILY MEDICINE CLINIC | Age: 81
End: 2021-12-15

## 2021-12-15 NOTE — TELEPHONE ENCOUNTER
Ranulfo Galan LPN with [facility] called stating that pt just called their office. States pt has had a weight gain of 7 pounds in 2 days, which would be a total of 11 pound weight gain in 1 week. States she hasn't seen the pt, had just taken the call. States pt has swelling in lower legs, breathing about the same. If it's before 5pm, call back Ranulfo Galan [phone].  After 5pm, call pt [phone]

## 2021-12-16 ENCOUNTER — TELEPHONE (OUTPATIENT)
Dept: PULMONOLOGY | Age: 81
End: 2021-12-16

## 2021-12-16 NOTE — TELEPHONE ENCOUNTER
Pt sons called to notify office that pt was being picked up to be transported to appt today, and pt has passed away. No further information was provided. Son Shoshana Felton was not with pt at the home and could not locate pts DNR for ambulance. A copy in pt chart was sent to Shoshana Felton at [email or web address]. [address]    Note:chart has not been marked as  yet, until confirmation from medical provider.

## 2021-12-29 ENCOUNTER — TELEPHONE (OUTPATIENT)
Dept: FAMILY MEDICINE CLINIC | Age: 81
End: 2021-12-29

## 2021-12-29 NOTE — TELEPHONE ENCOUNTER
Call from [employer] child of [employer]. Requests assistance with returning Aslis  INR machine. Call placed to [phone] to representative.  Requested Aslis to send return address label to:   Sujata Allen, [address] [place] [address] I have reviewed and confirmed nurses' notes for patient's medications, allergies, medical history, and surgical history.

## 2022-06-23 NOTE — PROGRESS NOTES
Attempted to reach pt at home, no voicemail and she just left here a few minutes ago so will have to try to reach her again later. Critical Care

## (undated) DEVICE — SYRINGE MED 10ML LUERLOCK TIP W/O SFTY DISP

## (undated) DEVICE — CANNULA NSL 13FT TUBE AD ETCO2 DIV SAMP M

## (undated) DEVICE — ELECTRODE,RADIOTRANSLUCENT,FOAM,3PK: Brand: MEDLINE

## (undated) DEVICE — 3M™ STERI-STRIP™ COMPOUND BENZOIN TINCTURE 40 BAGS/CARTON 4 CARTONS/CASE C1544: Brand: 3M™ STERI-STRIP™

## (undated) DEVICE — GAUZE,SPONGE,4"X4",8PLY,STRL,LF,10/TRAY: Brand: MEDLINE

## (undated) DEVICE — SYRINGE MED 10ML SLIP TIP BLNT FILL AND LUERLOCK DISP

## (undated) DEVICE — SUTURE VCRL SZ 3-0 L18IN ABSRB UD L26MM SH 1/2 CIR J864D

## (undated) DEVICE — Device: Brand: DISPOSABLE ELECTROSURGICAL SNARE

## (undated) DEVICE — GLOVE ORANGE PI 7 1/2   MSG9075

## (undated) DEVICE — DISPOSABLE BIOPSY FORCEPS: Brand: DISPOSABLE BIOPSY FORCEPS

## (undated) DEVICE — MAJOR SET UP PK

## (undated) DEVICE — SINGLE USE ASPIRATION NEEDLE: Brand: SINGLE USE ASPIRATION NEEDLE

## (undated) DEVICE — SOLUTION IV IRRIG 500ML 0.9% SODIUM CHL 2F7123

## (undated) DEVICE — ENDO CARRY-ON PROCEDURE KIT INCLUDES SUCTION TUBING, LUBRICANT, GAUZE, BIOHAZARD STICKER, TRANSPORT PAD AND INTERCEPT BEDSIDE KIT.: Brand: ENDO CARRY-ON PROCEDURE KIT

## (undated) DEVICE — FRAME EYEWR PROTCT ASST CLR DISP SAFEVIEW

## (undated) DEVICE — 3M™ TEGADERM™ TRANSPARENT FILM DRESSING FRAME STYLE, 1626W, 4 IN X 4-3/4 IN (10 CM X 12 CM), 50/CT 4CT/CASE: Brand: 3M™ TEGADERM™

## (undated) DEVICE — PACK,UNIVERSAL,SPLIT,II,AURORA: Brand: MEDLINE

## (undated) DEVICE — GOWN SIRUS NONREIN LG W/TWL: Brand: MEDLINE INDUSTRIES, INC.

## (undated) DEVICE — SINGLE USE SUCTION VALVE MAJ-209: Brand: SINGLE USE SUCTION VALVE (STERILE)

## (undated) DEVICE — SINGLE USE BIOPSY VALVE MAJ-210: Brand: SINGLE USE BIOPSY VALVE (STERILE)

## (undated) DEVICE — 3M™ STERI-STRIP™ REINFORCED ADHESIVE SKIN CLOSURES, R1540, 1/8 IN X 3 IN (3 MM X 75 MM), 5 STRIPS/ENVELOPE: Brand: 3M™ STERI-STRIP™

## (undated) DEVICE — DRAPE C ARM UNIV W41XL74IN CLR PLAS XR VELC CLSR POLY STRP

## (undated) DEVICE — SUTURE VCRL SZ 4-0 L18IN ABSRB UD L19MM PS-2 3/8 CIR PRIM J496H

## (undated) DEVICE — TRAP POLYP ETRAP

## (undated) DEVICE — BRUSH CYTO L150CM CHN L2MM BRIST DIA1.9MM PTFE S STL BULL

## (undated) DEVICE — ELECTRODE ECG MONITR FOAM TEAR DROP ADLT RED

## (undated) DEVICE — CONMED SCOPE SAVER BITE BLOCK, 20X27 MM: Brand: SCOPE SAVER

## (undated) DEVICE — SHEET,DRAPE,40X58,STERILE: Brand: MEDLINE

## (undated) DEVICE — ELECTRODE PT RET AD L9FT HI MOIST COND ADH HYDRGEL CORDED

## (undated) DEVICE — APPLICATOR PREP 26ML 0.7% IOD POVACRYLEX 74% ISO ALC ST

## (undated) DEVICE — SYRINGE MED 50ML LUERSLIP TIP

## (undated) DEVICE — CANNULA NSL O2 AD 7 FT TBNG SFT TCH STD CONN N FLARED PRNG